# Patient Record
Sex: MALE | Race: WHITE | NOT HISPANIC OR LATINO | Employment: OTHER | URBAN - METROPOLITAN AREA
[De-identification: names, ages, dates, MRNs, and addresses within clinical notes are randomized per-mention and may not be internally consistent; named-entity substitution may affect disease eponyms.]

---

## 2017-10-30 ENCOUNTER — TELEPHONE (OUTPATIENT)
Dept: INTERNAL MEDICINE | Facility: CLINIC | Age: 67
End: 2017-10-30

## 2018-01-11 ENCOUNTER — OFFICE VISIT (OUTPATIENT)
Dept: INTERNAL MEDICINE | Facility: CLINIC | Age: 68
End: 2018-01-11
Payer: MEDICARE

## 2018-01-11 VITALS
DIASTOLIC BLOOD PRESSURE: 78 MMHG | WEIGHT: 243 LBS | TEMPERATURE: 97.8 F | OXYGEN SATURATION: 97 % | SYSTOLIC BLOOD PRESSURE: 140 MMHG | HEIGHT: 65 IN | HEART RATE: 71 BPM | BODY MASS INDEX: 40.48 KG/M2 | RESPIRATION RATE: 16 BRPM

## 2018-01-11 DIAGNOSIS — E11.42 TYPE 2 DIABETES MELLITUS WITH DIABETIC POLYNEUROPATHY, UNSPECIFIED LONG TERM INSULIN USE STATUS: Primary | ICD-10-CM

## 2018-01-11 DIAGNOSIS — B18.2 CHRONIC HEPATITIS C WITH CIRRHOSIS (CMS/HCC): ICD-10-CM

## 2018-01-11 DIAGNOSIS — K74.60 CHRONIC HEPATITIS C WITH CIRRHOSIS (CMS/HCC): ICD-10-CM

## 2018-01-11 PROBLEM — Z91.018 MULTIPLE FOOD ALLERGIES: Status: ACTIVE | Noted: 2018-01-11

## 2018-01-11 PROBLEM — E11.9 TYPE 2 DIABETES MELLITUS (CMS/HCC): Status: ACTIVE | Noted: 2017-07-12

## 2018-01-11 PROBLEM — Z99.2 END-STAGE RENAL DISEASE ON HEMODIALYSIS (CMS/HCC): Status: RESOLVED | Noted: 2018-01-11 | Resolved: 2018-01-11

## 2018-01-11 PROBLEM — E11.49 TYPE 2 DIABETES MELLITUS WITH NEUROLOGIC COMPLICATION (CMS/HCC): Status: ACTIVE | Noted: 2017-07-12

## 2018-01-11 PROBLEM — N18.6 END-STAGE RENAL DISEASE ON HEMODIALYSIS (CMS/HCC): Status: ACTIVE | Noted: 2018-01-11

## 2018-01-11 PROBLEM — Z99.2 END-STAGE RENAL DISEASE ON HEMODIALYSIS (CMS/HCC): Status: ACTIVE | Noted: 2018-01-11

## 2018-01-11 PROBLEM — N18.6 END-STAGE RENAL DISEASE ON HEMODIALYSIS (CMS/HCC): Status: RESOLVED | Noted: 2018-01-11 | Resolved: 2018-01-11

## 2018-01-11 PROCEDURE — 99215 OFFICE O/P EST HI 40 MIN: CPT | Performed by: INTERNAL MEDICINE

## 2018-01-11 PROCEDURE — 99215 OFFICE O/P EST HI 40 MIN: CPT | Mod: PO | Performed by: INTERNAL MEDICINE

## 2018-01-11 RX ORDER — SILDENAFIL 100 MG/1
100 TABLET, FILM COATED ORAL AS NEEDED
Status: ON HOLD | COMMUNITY
End: 2018-02-22 | Stop reason: ALTCHOICE

## 2018-01-11 RX ORDER — CHOLECALCIFEROL (VITAMIN D3) 25 MCG
1000 TABLET ORAL DAILY
COMMUNITY
End: 2019-02-05 | Stop reason: ALTCHOICE

## 2018-01-11 RX ORDER — EZETIMIBE 10 MG/1
10 TABLET ORAL DAILY
COMMUNITY
Start: 2017-12-21 | End: 2018-03-09 | Stop reason: SDUPTHER

## 2018-01-11 RX ORDER — BRIMONIDINE TARTRATE 2 MG/ML
SOLUTION/ DROPS OPHTHALMIC EVERY 12 HOURS
Status: ON HOLD | COMMUNITY
End: 2020-08-05

## 2018-01-11 RX ORDER — LIRAGLUTIDE 6 MG/ML
1.8 INJECTION SUBCUTANEOUS DAILY
COMMUNITY
Start: 2017-12-04 | End: 2018-05-30 | Stop reason: ALTCHOICE

## 2018-01-11 NOTE — PROGRESS NOTES
"  Subjective      Ben Lai is a 67 y.o. male who presents for follow-up of type 2 diabetes, hypertension, and hyperlipidemia.  [facility]  Seen at the [facility] recently.  Did bring a copy of his laboratory results today.  Most recent hemoglobin A1c was 7.3% in September 2017, reflective of early uncontrolled diabetes.  Fasting blood sugar that day was 100.  He did not bring glucose readings or his glucometer to today's visit.  Continues on medical therapy with Victoza 1.8 mg daily.  He has trialed metformin in the past, which caused his \"stomach to feel very bad.  \"He has not been any other medications including Januvia, has never been insulin requiring.    His medical history is also significant for chronic hepatitis C, he suspects this was acquired with immunization while in  service.  He was treated with interferon in the past.  He recent [facility] showing elevated alpha-fetoprotein at 11.8 reference 0-8.3,, liver ultrasound showing very small hepatic mass without features of malignancy which is being followed with serial [facility] the [facility].    Is past due for dilated eye exam.    The following have been reviewed and updated as appropriate in this visit:    Allergies   Allergen Reactions   • Penicillins Anaphylaxis   • Metformin Hives   • Egg    • Interferon Jose Maria (Human Leuk. Derived)    • Latex    • Mometasone    • Mometasone Furoate    • Omeprazole    • Ribavirin Itching   • Spironolactone    • Peginterferon Jose Maria-2b Palpitations     Current Outpatient Prescriptions   Medication Sig Dispense Refill   • brimonidine (ALPHAGAN) 0.2 % ophthalmic solution every 12 hours.     • carvedilol (COREG) 3.125 mg tablet Take 1 tablets twice a day by oral route. 180 2   • cholecalciferol, vitamin D3, (cholecalciferol) 1,000 unit tablet Take 1,000 Units by mouth daily.     • ezetimibe (ZETIA) 10 mg tablet Take 10 mg by mouth daily.       • GLYCERIN/PROPYLENE " GLYCOL (ARTIFICIAL TEARS,GLYCERIN-PEG, OPHT) Administer 1 drop into affected eye(s) as needed.     • lactulose (GENERLAC) 10 gram/15 mL solution Take 30 mL twice a day as needed 450 0   • latanoprost (XALATAN) 0.005 % ophthalmic solution INSTILL 1 DROP INTO AFFECTED EYE(S) BY OPHTHALMIC ROUTE ONCE DAILY INTHE EVENING 10 0   • lisinopril (PRINIVIL,ZESTRIL) 10 mg tablet Take 1 tablet every day by oral route. 90 0   • sildenafil (VIAGRA) 100 mg tablet Take 100 mg by mouth as needed for erectile dysfunction.     • terazosin (HYTRIN) 5 mg capsule Take 1 capsule twice a day by oral route. 180 0   • traMADol (ULTRAM 50) 50 mg tablet Take 1 tablet every 6 hours by oral route. 30 0   • VICTOZA 3-JASE 0.6 mg/0.1 mL (18 mg/3 mL) injection Inject 1.8 mg under the skin daily.       • VITAMIN B COMPLEX ORAL Take 1 tablet by mouth daily.       No current facility-administered medications for this visit.      Past Medical History:   Diagnosis Date   • A-fib (CMS/HCC)    • Allergy    • Anxiety    • Asthma    • Blindness of right eye    • BPH (benign prostatic hyperplasia)    • CHF (congestive heart failure) (CMS/HCC)    • Cirrhosis (CMS/HCC)    • COPD (chronic obstructive pulmonary disease) (CMS/HCC)    • Depression    • Gallstones    • Gastritis    • GERD (gastroesophageal reflux disease)    • Glaucoma    • Hearing loss     bilateral hearing aids   • Hemorrhoids    • Hepatitis C     Hep C, 2016 remission   • Hernia, abdominal    • High blood pressure    • IBS (irritable bowel syndrome)    • Jaundice    • Kidney stones    • Obstructive sleep apnea syndrome    • Periodic limb movement disorder    • Persistent hypersomnia    • Persistent insomnia    • Pneumonia    • Type 2 diabetes mellitus (CMS/HCC)    • Urinary incontinence      Past Surgical History:   Procedure Laterality Date   • COLONOSCOPY  01/01/2016    Pili   • HAND SURGERY Left     thumb   • HERNIA REPAIR  01/01/1965   • KNEE ARTHROSCOPY  09/19/2017    left knee, with  "partial medial meniscectomy; limited chondroplasty, patellofemoral joint   • LIVER BIOPSY      by Dr. Alejandre   • OTHER SURGICAL HISTORY      esophageal varices, banded   • VASECTOMY      at approx age of 25     Family History   Problem Relation Age of Onset   • Arthritis Mother    • Rheum arthritis Mother    • Diabetes Mother    • Rectal cancer Mother    • Other Paternal Grandmother    • Other Paternal Grandfather    • Diabetes Other    • Rheum arthritis Other    • Osteoporosis Other      Social History     Social History   • Marital status:      Spouse name: N/A   • Number of children: N/A   • Years of education: N/A     Social History Main Topics   • Smoking status: Never Smoker   • Smokeless tobacco: Never Used   • Alcohol use No   • Drug use: No   • Sexual activity: Not on file     Other Topics Concern   • Not on file     Social History Narrative   • No narrative on file       Review of Systems  10 point ROS negative except as in HPI HPI  Objective     Vitals:  /78 (BP Location: Left arm, Patient Position: Sitting, Cuff Size: Reg)   Pulse 71   Temp 36.6 °C (97.8 °F) (Temporal)   Resp 16   Ht 1.651 m (5' 5\")   Wt 110 kg (243 lb)   SpO2 97%   BMI 40.44 kg/m²     Physical Exam   Constitutional: He is oriented to person, place, and time. He appears well-developed and well-nourished.   HENT:   Head: Normocephalic and atraumatic.   Mouth/Throat: Oropharynx is clear and moist. No oropharyngeal exudate.   Eyes: EOM are normal. Pupils are equal, round, and reactive to light.   Neck: Normal range of motion. No tracheal deviation present.   Cardiovascular: Normal rate, normal heart sounds and intact distal pulses.  Exam reveals no gallop.    No murmur heard.  Pulmonary/Chest: Effort normal and breath sounds normal.   Musculoskeletal: He exhibits no edema or tenderness.   Neurological: He is alert and oriented to person, place, and time. No cranial nerve deficit.   Skin: Skin is warm and dry. " Capillary refill takes less than 2 seconds. No rash noted.   Vitals reviewed.        Plan:  1.  Type 2 diabetes mellitus, non-insulin-dependent, uncontrolled with most recent hemoglobin A1c of 7.5%.  He has not been monitoring glucose readings regularly.  I provided him a diabetes log sheet and instructions to start checking blood sugars 3 times a day before each meal.  He was counseled on a low carbohydrate low fat diet, start exercise of his choice at least 20 minutes most days of the week, with return to clinic in 6 weeks to review glucose readings and make medication adjustments.  Current medication therapy is Victoza 1.8 mg daily.  He has been intolerant to metformin due to GI side effects.  We discussed that there are multiple other medication choices that can be added on to Victoza to help improve his control but lifestyle is a huge component to this and I am confident he can make lifestyle modifications.  2.  History of hepatitis C, acquired during  service through immunization, status post medication management with interferon, complicated by cirrhosis of liver, small liver nodule with mild elevation in the fetoprotein.  This [facility] for possible development of hepatocellular carcinoma.   3.  Hypertension, uncontrolled.  Increase lisinopril to 20 mg daily.  4.  Hyperlipidemia, statin intolerance, continues medication management with Zetia 10 mg daily.    5.  Obesity, with complications.  Discussed low-carb, calorie reduced diet and increased physical activity.    Greater than 50% of this 40 minute visit spent on consultation and coordination of care regarding plan listed above.                                                                                                                                                                                                                  Return in about 6 weeks (around 2/22/2018).    YOLANDA HOFFMAN MD

## 2018-01-11 NOTE — PATIENT INSTRUCTIONS
"1. Check blood sugar readings three times a day before each meal.  2. Write down glucose readings on log sheet and bring to next office visit  3. Start exercise 20-30 minutes every day  4. Avoid acetminophen or NSAIDS  5. Make list of \"true allergy\", severe rash or anaphylaxis (breathing issues, throat close, or any severe or life threatening)  Patient Education     Diabetes Mellitus and Standards of Medical Care  Managing diabetes (diabetes mellitus) can be complicated. Your diabetes treatment may be managed by a team of health care providers, including:  · A diet and nutrition specialist (registered dietitian).  · A nurse.  · A certified diabetes educator (CDE).  · A diabetes specialist (endocrinologist).  · An eye doctor.  · A primary care provider.  · A dentist.  Your health care providers follow a schedule in order to help you get the best quality of care. The following schedule is a general guideline for your diabetes management plan. Your health care providers may also give you more specific instructions.  HbA1c (  hemoglobin A1c) test  This test provides information about blood sugar (glucose) control over the previous 2-3 months. It is used to check whether your diabetes management plan needs to be adjusted.  · If you are meeting your treatment goals, this test is done at least 2 times a year.  · If you are not meeting treatment goals or if your treatment goals have changed, this test is done 4 times a year.  Blood pressure test  · This test is done at every routine medical visit. For most people, the goal is less than 140/90. In some cases, your goal blood pressure may be 130/80 or less. Ask your health care provider what your goal blood pressure should be.  Dental and eye exams  · Visit your dentist two times a year.  · If you have type 1 diabetes, get an eye exam 3-5 years after you are diagnosed, and then once a year after your first exam.  ¨ If you were diagnosed with type 1 diabetes as a child, get an " eye exam when you are age 10 or older and have had diabetes for 3-5 years. After the first exam, you should get an eye exam once a year.  · If you have type 2 diabetes, have an eye exam as soon as you are diagnosed, and then once a year after your first exam.  Foot care exam  · Visual foot exams are done at every routine medical visit. The exams check for cuts, bruises, redness, blisters, sores, or other problems with the feet.  · A complete foot exam is done by your health care provider once a year. This exam includes an inspection of the structure and skin of your feet, and a check of the pulses and sensation in your feet.  ¨ Type 1 diabetes: Get your first exam 3-5 years after diagnosis.  ¨ Type 2 diabetes: Get your first exam as soon as you are diagnosed.  · Check your feet every day for cuts, bruises, redness, blisters, or sores. If you have any of these or other problems that are not healing, contact your health care provider.  Kidney function test (  urine microalbumin)  · This test is done once a year.  ¨ Type 1 diabetes: Get your first test 5 years after diagnosis.  ¨ Type 2 diabetes: Get your first test as soon as you are diagnosed.  · If you have chronic kidney disease (CKD), get a serum creatinine and estimated glomerular filtration rate (eGFR) test once a year.  Lipid profile (cholesterol, HDL, LDL, triglycerides)  · This test should be done when you are diagnosed with diabetes, and every 5 years after the first test. If you are on medicines to lower your cholesterol, you may need to get this test done every year.  ¨ The goal for LDL is less than 100 mg/dL (5.5 mmol/L). If you are at high risk, the goal is less than 70 mg/dL (3.9 mmol/L).  ¨ The goal for HDL is 40 mg/dL (2.2 mmol/L) for men and 50 mg/dL(2.8 mmol/L) for women. An HDL cholesterol of 60 mg/dL (3.3 mmol/L) or higher gives some protection against heart disease.  ¨ The goal for triglycerides is less than 150 mg/dL (8.3  mmol/L).  Immunizations  · The yearly flu (influenza) vaccine is recommended for everyone 6 months or older who has diabetes.  · The pneumonia (pneumococcal) vaccine is recommended for everyone 2 years or older who has diabetes. If you are 65 or older, you may get the pneumonia vaccine as a series of two separate shots.  · The hepatitis B vaccine is recommended for adults shortly after they have been diagnosed with diabetes.  · The Tdap (tetanus, diphtheria, and pertussis) vaccine should be given:  ¨ According to normal childhood vaccination schedules, for children.  ¨ Every 10 years, for adults who have diabetes.  · The shingles vaccine is recommended for people who have had chicken pox and are 50 years or older.  Mental and emotional health  · Screening for symptoms of eating disorders, anxiety, and depression is recommended at the time of diagnosis and afterward as needed. If your screening shows that you have symptoms (you have a positive screening result), you may need further evaluation and be referred to a mental health care provider.  Diabetes self-management education  · Education about how to manage your diabetes is recommended at diagnosis and ongoing as needed.  Treatment plan  · Your treatment plan will be reviewed at every medical visit.  Summary  · Managing diabetes (diabetes mellitus) can be complicated. Your diabetes treatment may be managed by a team of health care providers.  · Your health care providers follow a schedule in order to help you get the best quality of care.  · Standards of care including having regular physical exams, blood tests, blood pressure monitoring, immunizations, screening tests, and education about how to manage your diabetes.  · Your health care providers may also give you more specific instructions based on your individual health.  This information is not intended to replace advice given to you by your health care provider. Make sure you discuss any questions you have  with your health care provider.  Document Released: 10/15/2010 Document Revised: 09/15/2017 Document Reviewed: 09/15/2017  Elsevier Interactive Patient Education © 2017 [employer]

## 2018-01-18 DIAGNOSIS — I10 BENIGN ESSENTIAL HTN: Primary | ICD-10-CM

## 2018-01-18 RX ORDER — LISINOPRIL 20 MG/1
20 TABLET ORAL DAILY
Qty: 90 TABLET | Refills: 0 | Status: SHIPPED | OUTPATIENT
Start: 2018-01-18 | End: 2018-04-12 | Stop reason: SDUPTHER

## 2018-01-18 RX ORDER — TERAZOSIN 5 MG/1
5 CAPSULE ORAL 2 TIMES DAILY
Qty: 180 CAPSULE | Refills: 1 | Status: SHIPPED | OUTPATIENT
Start: 2018-01-18 | End: 2018-07-27 | Stop reason: SDUPTHER

## 2018-02-22 ENCOUNTER — ANESTHESIA EVENT (OUTPATIENT)
Dept: GASTROENTEROLOGY | Facility: HOSPITAL | Age: 68
End: 2018-02-22
Payer: COMMERCIAL

## 2018-02-22 ENCOUNTER — HOSPITAL ENCOUNTER (OUTPATIENT)
Facility: HOSPITAL | Age: 68
Setting detail: OUTPATIENT SURGERY
Discharge: HOME | End: 2018-02-22
Attending: INTERNAL MEDICINE | Admitting: INTERNAL MEDICINE
Payer: COMMERCIAL

## 2018-02-22 ENCOUNTER — ANESTHESIA (OUTPATIENT)
Dept: GASTROENTEROLOGY | Facility: HOSPITAL | Age: 68
End: 2018-02-22
Payer: COMMERCIAL

## 2018-02-22 VITALS
TEMPERATURE: 97.3 F | DIASTOLIC BLOOD PRESSURE: 53 MMHG | HEIGHT: 65 IN | SYSTOLIC BLOOD PRESSURE: 122 MMHG | BODY MASS INDEX: 40.15 KG/M2 | WEIGHT: 241 LBS | OXYGEN SATURATION: 97 % | RESPIRATION RATE: 12 BRPM | HEART RATE: 61 BPM

## 2018-02-22 LAB — GLUCOSE BLDC GLUCOMTR-MCNC: 99 MG/DL (ref 70–105)

## 2018-02-22 PROCEDURE — 6360000200 HC RX 636 W HCPCS (ALT 250 FOR IP): Performed by: NURSE ANESTHETIST, CERTIFIED REGISTERED

## 2018-02-22 PROCEDURE — (BLANK): Performed by: INTERNAL MEDICINE

## 2018-02-22 PROCEDURE — 82962 GLUCOSE BLOOD TEST: CPT

## 2018-02-22 PROCEDURE — 2580000300 HC RX 258: Performed by: NURSE ANESTHETIST, CERTIFIED REGISTERED

## 2018-02-22 PROCEDURE — (BLANK) HC RECOVERY PHASE-2 1ST 1/2 HOUR ACUITY LEVEL 1: Performed by: INTERNAL MEDICINE

## 2018-02-22 PROCEDURE — 00731 ANES UPR GI NDSC PX NOS: CPT | Performed by: NURSE ANESTHETIST, CERTIFIED REGISTERED

## 2018-02-22 PROCEDURE — 2500000200 HC RX 250 WO HCPCS: Performed by: NURSE ANESTHETIST, CERTIFIED REGISTERED

## 2018-02-22 DEVICE — KIT BANDING SUPER7 SPEED BAND: Type: IMPLANTABLE DEVICE | Site: ESOPHAGUS | Status: FUNCTIONAL

## 2018-02-22 RX ORDER — PROPOFOL 10 MG/ML
INJECTION, EMULSION INTRAVENOUS CONTINUOUS PRN
Status: DISCONTINUED | OUTPATIENT
Start: 2018-02-22 | End: 2018-02-22 | Stop reason: SURG

## 2018-02-22 RX ORDER — LIDOCAINE HYDROCHLORIDE 20 MG/ML
INJECTION, SOLUTION EPIDURAL; INFILTRATION; INTRACAUDAL; PERINEURAL AS NEEDED
Status: DISCONTINUED | OUTPATIENT
Start: 2018-02-22 | End: 2018-02-22 | Stop reason: SURG

## 2018-02-22 RX ORDER — SODIUM CHLORIDE, SODIUM LACTATE, POTASSIUM CHLORIDE, CALCIUM CHLORIDE 600; 310; 30; 20 MG/100ML; MG/100ML; MG/100ML; MG/100ML
INJECTION, SOLUTION INTRAVENOUS CONTINUOUS PRN
Status: DISCONTINUED | OUTPATIENT
Start: 2018-02-22 | End: 2018-02-22 | Stop reason: SURG

## 2018-02-22 RX ORDER — KETAMINE HYDROCHLORIDE 100 MG/ML
INJECTION, SOLUTION INTRAMUSCULAR; INTRAVENOUS AS NEEDED
Status: DISCONTINUED | OUTPATIENT
Start: 2018-02-22 | End: 2018-02-22 | Stop reason: SURG

## 2018-02-22 RX ORDER — FUROSEMIDE 20 MG/1
20 TABLET ORAL DAILY
COMMUNITY
Start: 2018-02-08 | End: 2018-03-09 | Stop reason: SDUPTHER

## 2018-02-22 RX ORDER — GLIPIZIDE 5 MG/1
5 TABLET ORAL
COMMUNITY
Start: 2018-01-24 | End: 2018-03-09 | Stop reason: SDUPTHER

## 2018-02-22 RX ADMIN — LIDOCAINE HYDROCHLORIDE 40 MG: 20 INJECTION, SOLUTION EPIDURAL; INFILTRATION; INTRACAUDAL; PERINEURAL at 13:35

## 2018-02-22 RX ADMIN — PROPOFOL 200 MCG/KG/MIN: 10 INJECTION, EMULSION INTRAVENOUS at 13:35

## 2018-02-22 RX ADMIN — KETAMINE HYDROCHLORIDE 30 MG: 100 INJECTION, SOLUTION, CONCENTRATE INTRAMUSCULAR; INTRAVENOUS at 13:35

## 2018-02-22 RX ADMIN — SODIUM CHLORIDE, POTASSIUM CHLORIDE, SODIUM LACTATE AND CALCIUM CHLORIDE: 600; 310; 30; 20 INJECTION, SOLUTION INTRAVENOUS at 13:50

## 2018-02-22 ASSESSMENT — ENCOUNTER SYMPTOMS
EXERCISE TOLERANCE: GOOD (4-7 METS)
JAUNDICE: 1

## 2018-02-22 ASSESSMENT — COPD QUESTIONNAIRES
CAT_SEVERITY: MILD
COPD: 1

## 2018-02-22 NOTE — POST-PROCEDURE NOTE
SEE PROVATION NOTE.  1 grade 1 varix with a 2-3 mm overlying superficial erosion seen at GE junction.  This was banded x2.  Shallow Schatzki ring- as the patient denies dysphagia, no dilatation was performed.  Otherwise normal study.

## 2018-02-22 NOTE — H&P
HPI  HPI   This is a 67-year-old man with cirrhosis who has a history of esophageal varices, status post variceal ligation.  He is here for variceal surveillance and possible repeat ligation.  He also complains of epigastric discomfort and abdominal bloating.    Patient Active Problem List   Diagnosis   • Chronic hepatitis C with cirrhosis (CMS/HCC)   • Type 2 diabetes mellitus with neurologic complication (CMS/HCC)   • Multiple food allergies     Past Medical History:   Diagnosis Date   • A-fib (CMS/HCC)    • Allergy    • Anxiety    • Asthma    • Blindness of right eye    • BPH (benign prostatic hyperplasia)    • Cirrhosis (CMS/HCC)    • Complication of anesthesia    • COPD (chronic obstructive pulmonary disease) (CMS/HCC)    • Depression    • Gallstones    • Gastritis    • GERD (gastroesophageal reflux disease)    • Glaucoma    • Hearing loss     bilateral hearing aids   • Hemorrhoids    • Hepatitis C     Hep C, 2016 remission   • Hernia, abdominal    • High blood pressure    • IBS (irritable bowel syndrome)    • Infectious viral hepatitis    • Jaundice    • Kidney stones    • Obstructive sleep apnea syndrome    • Periodic limb movement disorder    • Persistent hypersomnia    • Persistent insomnia    • Pneumonia    • PONV (postoperative nausea and vomiting)    • Type 2 diabetes mellitus (CMS/HCC)    • Urinary incontinence    • Wears partial dentures      Prior to Admission medications    Medication Sig Start Date End Date Taking? Authorizing Provider   brimonidine (ALPHAGAN) 0.2 % ophthalmic solution every 12 hours.   Yes Historical Provider, MD   carvedilol (COREG) 3.125 mg tablet Take 1 tablets twice a day by oral route. 8/4/17  Yes Katelyn Alfaro MD   cholecalciferol, vitamin D3, (cholecalciferol) 1,000 unit tablet Take 1,000 Units by mouth daily.   Yes Historical Provider, MD   ezetimibe (ZETIA) 10 mg tablet Take 10 mg by mouth daily.   12/21/17  Yes Historical Provider, MD   furosemide (LASIX) 20 mg tablet   2/8/18  Yes Historical Provider, MD   glipiZIDE (GLUCOTROL) 5 mg tablet  1/24/18  Yes Historical Provider, MD   GLYCERIN/PROPYLENE GLYCOL (ARTIFICIAL TEARS,GLYCERIN-PEG, OPHT) Administer 1 drop into affected eye(s) as needed.   Yes Historical Provider, MD   lactulose (GENERLAC) 10 gram/15 mL solution Take 30 mL twice a day as needed 5/12/17  Yes Conversion Provider   latanoprost (XALATAN) 0.005 % ophthalmic solution INSTILL 1 DROP INTO AFFECTED EYE(S) BY OPHTHALMIC ROUTE ONCE DAILY INTHE EVENING 3/10/17  Yes Conversion Provider   lisinopril (PRINIVIL,ZESTRIL) 20 mg tablet Take 1 tablet (20 mg total) by mouth daily. 1/18/18  Yes Katelyn Alfaro MD   loratadine 10 mg capsule Take 10 mg by mouth as needed.   Yes Historical Provider, MD   terazosin (HYTRIN) 5 mg capsule Take 1 capsule (5 mg total) by mouth 2 (two) times a day. 1/18/18  Yes Katelyn Alfaro MD   traMADol (ULTRAM 50) 50 mg tablet Take 1 tablet every 6 hours by oral route. 4/18/16  Yes Conversion Provider   VICTOZA 3-JASE 0.6 mg/0.1 mL (18 mg/3 mL) injection Inject 1.8 mg under the skin daily.   12/4/17  Yes Historical Provider, MD   VITAMIN B COMPLEX ORAL Take 1 tablet by mouth daily.   Yes Historical Provider, MD   sildenafil (VIAGRA) 100 mg tablet Take 100 mg by mouth as needed for erectile dysfunction.  2/22/18  Historical Provider, MD     Allergies   Allergen Reactions   • Penicillins Anaphylaxis   • Metformin Hives   • Egg    • Interferon Jose Maria (Human Leuk. Derived)    • Latex    • Mometasone    • Mometasone Furoate    • Omeprazole    • Ribavirin Itching   • Spironolactone    • Peginterferon Jose Maria-2b Palpitations     Social History   Substance Use Topics   • Smoking status: Never Smoker   • Smokeless tobacco: Never Used   • Alcohol use No     Family History   Problem Relation Age of Onset   • Arthritis Mother    • Rheum arthritis Mother    • Diabetes Mother    • Rectal cancer Mother    • Other Paternal Grandmother    • Other Paternal Grandfather    •  Diabetes Other    • Rheum arthritis Other    • Osteoporosis Other      ^^^REVIEW OF SYSTEMS^^^  Physical Exam   Well-developed well-nourished obese white man who appears her stated age of 67 years.  Comfortable at rest and room air.  Alert and oriented.  Anicteric.  The HEENT examination reveals Mallampati class III oropharynx.  The sclerae are anicteric.  Conjunctivae and lids normal.  The chest is clear to auscultation bilaterally.  Cardiac examination reveals normal S1-S2.  No S3 or S4.  No murmurs clicks rubs audible.  Abdomen is obese.  I cannot demonstrate ascites.  No hepatosplenomegaly can be identified.  Normal bowel sounds, no bruits.  Mild tenderness in the epigastrium but no rebound.  Extremity examination reveals no cyanosis or clubbing.  Trace pedal edema bilaterally.  The neurologic exam is negative for asterixis or tremor.  Strength and tone in all 4 extremities appear symmetrical and preserved.  ^^^TEST RESULTS^^^  Impression/Plan   67-year-old man with history of cirrhosis and previously banded varices who presents for surveillance endoscopy.  Anesthesia Evaluation   The patient is an appropriate candidate for the planned procedure with monitored anesthesia care administered by CRNA.

## 2018-02-22 NOTE — ANESTHESIA PREPROCEDURE EVALUATION
"Pre-Procedure Assessment    Patient: Ben Lai, male, 67 y.o.    Ht Readings from Last 1 Encounters:   02/22/18 1.651 m (5' 5\")     Wt Readings from Last 1 Encounters:   02/22/18 109 kg (241 lb)       Last Vitals  /68 (02/22/18 1219)    Temp 36.6 °C (97.9 °F) (02/22/18 1219)    Pulse 58 (02/22/18 1219)   Resp 12 (02/22/18 1219)    SpO2 97 % (02/22/18 1219)       Problem list reviewed and Medical history reviewed    History of anesthetic complications: PONV         Airway   Mallampati: II  TM distance: >3 FB  Neck ROM: full      Dental      Pulmonary - normal exam   (+) COPD mild, asthma, sleep apnea on CPAP,   (-) pneumonia (Pt. states years ago)  Cardiovascular - normal exam  Exercise tolerance: good (4-7 METS)  (+) hypertension well controlled,     Mental Status/Neuro/Psych    Pt is alert.        GI/Hepatic/Renal    (+) GERD poorly controlled, hepatitis C, liver disease, jaundice,     Endo/Other    (+) diabetes mellitus type 2 well controlled,   Abdominal   (+) obese,                    Hematology No results found for: WBC, RBC, MCV, HGB, HCT, PLT   Coagulation No results found for: PT, APTT, INR   General Chemistry   Lab Results   Component Value Date/Time    POCGLU 99 02/22/2018 12:25 PM    CALCIUM 10.2 04/28/2017 03:40 PM    BUN 21 04/28/2017 03:40 PM    CREATININE 1.0 04/28/2017 03:40 PM    GLUCOSE 265 (H) 04/28/2017 03:40 PM     04/28/2017 03:40 PM    K 4.1 04/28/2017 03:40 PM    CO2 27 04/28/2017 03:40 PM    CL 99 04/28/2017 03:40 PM     Anesthesia Plan    ASA 3   NPO status reviewed: > 8 hours    MAC         Induction: intravenous                 Anesthetic plan and risks discussed with patient and spouse.  Use of blood products discussed with patient and spouse whom consented to blood products.               "

## 2018-02-22 NOTE — ANESTHESIA POSTPROCEDURE EVALUATION
Patient: Ben Lai    Procedure Summary     Date:  02/22/18 Room / Location:  [facility] ENDO 02 / [facility] Endoscopy    Anesthesia Start:  1333 Anesthesia Stop:  1355    Procedure:  EGD - ESOPHAGOGASTRODUODENOSCOPY with banding  x 2 with crna (N/A Esophagus) Diagnosis:       Esophageal varices without bleeding, unspecified esophageal varices type (CMS/HCC)      Cirrhosis of liver without ascites, unspecified hepatic cirrhosis type (CMS/HCC)      Other asthma      Chronic obstructive pulmonary disease, unspecified COPD type (CMS/HCC)      (esophageal varicies, esophageal erosions, schatski's ring)    Provider:  William Rivas MD Responsible Provider:  William Rivas MD    Anesthesia Type:  MAC ASA Status:  3          Anesthesia Type: MAC  Last vitals  BP      Temp      Pulse     Resp      SpO2      Pain Score        Anesthesia Post Evaluation    Patient location during evaluation: bedside  Patient participation: waiting for patient participation  Level of consciousness: sleepy but conscious  Pain management: adequate  Airway patency: patent  Anesthetic complications: yes  Cardiovascular status: acceptable  Respiratory status: acceptable  Hydration status: acceptable  May dismiss recovered patient based on consultation with the appropriate physicians and/or meeting appropriate discharge criteria

## 2018-02-28 ENCOUNTER — OFFICE VISIT (OUTPATIENT)
Dept: INTERNAL MEDICINE | Facility: CLINIC | Age: 68
End: 2018-02-28
Payer: MEDICARE

## 2018-02-28 VITALS
HEART RATE: 61 BPM | TEMPERATURE: 97.4 F | WEIGHT: 267 LBS | BODY MASS INDEX: 44.43 KG/M2 | OXYGEN SATURATION: 97 % | DIASTOLIC BLOOD PRESSURE: 62 MMHG | RESPIRATION RATE: 16 BRPM | SYSTOLIC BLOOD PRESSURE: 116 MMHG

## 2018-02-28 DIAGNOSIS — K25.9 GASTRIC EROSION, UNSPECIFIED CHRONICITY: Primary | ICD-10-CM

## 2018-02-28 DIAGNOSIS — E11.42 TYPE 2 DIABETES MELLITUS WITH DIABETIC POLYNEUROPATHY, WITHOUT LONG-TERM CURRENT USE OF INSULIN (CMS/HCC): ICD-10-CM

## 2018-02-28 DIAGNOSIS — K74.60 CHRONIC HEPATITIS C WITH CIRRHOSIS (CMS/HCC): ICD-10-CM

## 2018-02-28 DIAGNOSIS — B18.2 CHRONIC HEPATITIS C WITH CIRRHOSIS (CMS/HCC): ICD-10-CM

## 2018-02-28 PROCEDURE — 99214 OFFICE O/P EST MOD 30 MIN: CPT | Performed by: INTERNAL MEDICINE

## 2018-02-28 PROCEDURE — 99214 OFFICE O/P EST MOD 30 MIN: CPT | Mod: PO | Performed by: INTERNAL MEDICINE

## 2018-02-28 RX ORDER — CAPSAICIN 0.03 G/100G
1 CREAM TOPICAL AS NEEDED
COMMUNITY
End: 2021-01-01 | Stop reason: ALTCHOICE

## 2018-02-28 ASSESSMENT — ENCOUNTER SYMPTOMS
NERVOUS/ANXIOUS: 0
CONFUSION: 0
NAUSEA: 0
SLEEP DISTURBANCE: 0
DIZZINESS: 0
PALPITATIONS: 0
WHEEZING: 0
WOUND: 0
COUGH: 0
HEMATURIA: 0
LIGHT-HEADEDNESS: 0
POLYDIPSIA: 0
SORE THROAT: 1
DYSURIA: 0
SHORTNESS OF BREATH: 0
BLOOD IN STOOL: 1
ARTHRALGIAS: 0
DIARRHEA: 1
RECTAL PAIN: 0
FEVER: 0
BRUISES/BLEEDS EASILY: 0
WEAKNESS: 0
VOMITING: 0
ACTIVITY CHANGE: 0
ABDOMINAL PAIN: 1
APPETITE CHANGE: 0
TROUBLE SWALLOWING: 0
UNEXPECTED WEIGHT CHANGE: 0

## 2018-02-28 ASSESSMENT — PAIN SCALES - GENERAL: PAINLEVEL: 0-NO PAIN

## 2018-02-28 NOTE — PATIENT INSTRUCTIONS
1. Goal for fasting glucose is   2. Goal for glucose 2 hours after meals is <180  3. Change glipizide to 5 mg daily in the morning, reduce dose to 1/2 tab only if persistent fasting (morning) glucose readings <80, and call me  4. Continue Victoza  5. Start Ranitidine 150 mg twice a day (before breakfast and at bedtime) for erosion in stomach (takes the place of omeprazole, but is a different medication class) for at least 8 weeks, then can reduce to 150 mg daily if reflux, swallowing symptoms are resolved.  6. Call us immediately if severe pain, symptoms of bleeding, rash, or any other concerns  7. AVOID aspirin or aspirin containing products, motrin/alleve (any NSAID); you may use tylenol in small doses for pain (<650 mg daily), or your tramadol

## 2018-02-28 NOTE — PROGRESS NOTES
Subjective      Ben Lai is a 67 y.o. male who presents for Follow-up (6week fuv)      HPI  His medical history is significant for cirrhosis of the liver secondary to hepatitis C, with history of esophageal varices status post ligation.  He recently underwent upper endoscopy with Dr. William Rivas with gastroenterology for symptoms of abdominal bloating and epigastric discomfort.  The procedure was performed February 22, showing grade 1 varices and a 2-3 mm superficial erosion at the GE junction.  The report states this was banded, although unclear whether the varix was banded and how the erosion was treated I assume it is the verix.  He as noted to have a shallow Schatzki ring, not banded as patient denies symptoms of dysphagia.    He was prescribed omeprazole following his procedure.  He states within 24-48 hours after starting the omeprazole he broke out in a rash.  He states he had a rash on Nexium previously.  He is currently taking Mylanta, but continues to have epigastric pain.  He denies any nausea or vomiting.  No black or bloody stools, no hematemesis.    Regarding his history of diabetes, he continues on glipizide 5 mg.  [facility] to not take his glipizide if his blood sugars in the morning were less than 130.  He has been just been taking a half tab of the glipizide if his glucose readings are 130 or less. He continues on Victoza 1.8 mg daily in the mornings.  Review of his glucose readings for the last month show fasting readings ranging 116 -209 and 2 hour postprandial readings of 159-256.   Last HgbA1C was 7.3% in September 2017.      The following have been reviewed and updated as appropriate in this visit:    Allergies   Allergen Reactions   • Penicillins Anaphylaxis   • Metformin Hives   • Egg    • Interferon Jose Maria (Human Leuk. Derived)    • Latex    • Mometasone    • Mometasone Furoate    • Omeprazole    • Ribavirin Itching   • Spironolactone    •  Pantoprazole Itching and Rash   • Peginterferon Jose Maria-2b Palpitations     Current Outpatient Prescriptions   Medication Sig Dispense Refill   • brimonidine (ALPHAGAN) 0.2 % ophthalmic solution every 12 hours.     • capsaicin (ZOSTRIX) 0.025 % cream Apply 1 application topically as needed for pain scale 1-3/10, 1-3/8.     • carvedilol (COREG) 3.125 mg tablet Take 1 tablets twice a day by oral route. 180 2   • cholecalciferol, vitamin D3, (cholecalciferol) 1,000 unit tablet Take 1,000 Units by mouth daily.     • ezetimibe (ZETIA) 10 mg tablet Take 10 mg by mouth daily.       • furosemide (LASIX) 20 mg tablet Take 20 mg by mouth daily.       • glipiZIDE (GLUCOTROL) 5 mg tablet Take 5 mg by mouth 2 (two) times a day before meals.       • GLYCERIN/PROPYLENE GLYCOL (ARTIFICIAL TEARS,GLYCERIN-PEG, OPHT) Administer 1 drop into affected eye(s) as needed.     • lactulose (GENERLAC) 10 gram/15 mL solution Take 30 mL twice a day as needed 450 0   • latanoprost (XALATAN) 0.005 % ophthalmic solution INSTILL 1 DROP INTO AFFECTED EYE(S) BY OPHTHALMIC ROUTE ONCE DAILY INTHE EVENING 10 0   • lisinopril (PRINIVIL,ZESTRIL) 20 mg tablet Take 1 tablet (20 mg total) by mouth daily. 90 tablet 0   • loratadine 10 mg capsule Take 10 mg by mouth as needed.     • pramoxine-menthol (GOLD BOND MEDICATED ANTI-ITCH) 1-1 % cream Apply topically.     • sodium chloride (SALINE NASAL) 0.65 % nasal spray Administer 1 spray into each nostril as needed.     • terazosin (HYTRIN) 5 mg capsule Take 1 capsule (5 mg total) by mouth 2 (two) times a day. 180 capsule 1   • traMADol (ULTRAM 50) 50 mg tablet Take 1 tablet every 6 hours by oral route. 30 0   • VICTOZA 3-JASE 0.6 mg/0.1 mL (18 mg/3 mL) injection Inject 1.8 mg under the skin daily.       • VITAMIN B COMPLEX ORAL Take 1 tablet by mouth daily.     • white petrolatum (AQUAPHOR HEALING) 41 % ointment ointment Apply 1 application topically as needed.     • ranitidine (ZANTAC) 150 mg tablet Take 1 tablet  (150 mg total) by mouth 2 (two) times a day. 180 tablet 1     No current facility-administered medications for this visit.      Past Medical History:   Diagnosis Date   • A-fib (CMS/HCC)    • Allergy    • Anxiety    • Asthma    • Blindness of right eye    • BPH (benign prostatic hyperplasia)    • Cirrhosis (CMS/HCC)    • Complication of anesthesia    • COPD (chronic obstructive pulmonary disease) (CMS/HCC)    • Depression    • Gallstones    • Gastritis    • GERD (gastroesophageal reflux disease)    • Glaucoma    • Hearing loss     bilateral hearing aids   • Hemorrhoids    • Hepatitis C     Hep C, 2016 remission   • Hernia, abdominal    • High blood pressure    • IBS (irritable bowel syndrome)    • Infectious viral hepatitis    • Jaundice    • Kidney stones    • Obstructive sleep apnea syndrome    • Periodic limb movement disorder    • Persistent hypersomnia    • Persistent insomnia    • Pneumonia    • PONV (postoperative nausea and vomiting)    • Type 2 diabetes mellitus (CMS/HCC)    • Urinary incontinence    • Wears partial dentures      Past Surgical History:   Procedure Laterality Date   • COLONOSCOPY  10/27/2016    Pili, normal, repeat 10 years   • ESOPHAGOGASTRODUODENOSCOPY N/A 2/22/2018    Procedure: EGD - ESOPHAGOGASTRODUODENOSCOPY with banding  x 2 with crna;  Surgeon: William Rivas MD;  Location: [facility];  Service: Endoscopy;  Laterality: N/A;   • HAND SURGERY Left     thumb   • HERNIA REPAIR  01/01/1965   • KNEE ARTHROSCOPY  09/19/2017    left knee, with partial medial meniscectomy; limited chondroplasty, patellofemoral joint   • LIVER BIOPSY      by Dr. Alejandre   • OTHER SURGICAL HISTORY      esophageal varices, banded   • VASECTOMY      at approx age of 25     Family History   Problem Relation Age of Onset   • Arthritis Mother    • Rheum arthritis Mother    • Diabetes Mother    • Rectal cancer Mother    • Other Paternal Grandmother    • Other Paternal Grandfather    • Diabetes Other    •  Rheum arthritis Other    • Osteoporosis Other      Social History     Social History   • Marital status:      Spouse name: N/A   • Number of children: N/A   • Years of education: N/A     Social History Main Topics   • Smoking status: Never Smoker   • Smokeless tobacco: Never Used   • Alcohol use No   • Drug use: No   • Sexual activity: Not Asked     Other Topics Concern   • None     Social History Narrative   • None       Review of Systems   Constitutional: Negative for activity change, appetite change, fever and unexpected weight change.   HENT: Positive for hearing loss (forgot his hearing aids at home today) and sore throat. Negative for nosebleeds, postnasal drip and trouble swallowing.    Eyes: Negative for visual disturbance.   Respiratory: Negative for cough, shortness of breath and wheezing.    Cardiovascular: Negative for chest pain, palpitations and leg swelling (improved with increase in furosemide dose).   Gastrointestinal: Positive for abdominal pain, blood in stool and diarrhea. Negative for nausea, rectal pain and vomiting.   Endocrine: Negative for polydipsia and polyuria.   Genitourinary: Negative for dysuria, hematuria and urgency.   Musculoskeletal: Negative for arthralgias and gait problem.   Skin: Negative for rash and wound.   Neurological: Positive for syncope. Negative for dizziness, weakness and light-headedness.   Hematological: Does not bruise/bleed easily.   Psychiatric/Behavioral: Negative for confusion and sleep disturbance (noted drooling from the mouth when wearing CPAP at night). The patient is not nervous/anxious.        Objective   Vitals:  /62 (BP Location: Left arm, Patient Position: Sitting, Cuff Size: Reg)   Pulse 61   Temp 36.3 °C (97.4 °F) (Temporal)   Resp 16   Wt 121 kg (267 lb)   SpO2 97%   BMI 44.43 kg/m²     Physical Exam  General: Obese middle-aged male, hard of hearing, alert no acute distress  Eyes: Anicteric, EOMI  ENT: No oral lesions, moist  mucous membranes  Neck: Minimally tender left submandibular gland otherwise unremarkable, no carotid bruit or jugular venous distention  Heart: Regular rate and rhythm, no audible murmur, no gallop  Lungs: Clear to auscultation bilaterally  Abdomen: Distended with obesity, active bowel sounds, soft, nondistended, no fluid wave, slight discomfort with palpation epigastrium no rebound or guarding  Extremities: Grade 1, nonpitting edema bilateral lower extremities, improved from previous exam  Neuro: Grossly intact  Assessment/Plan   Diagnoses and all orders for this visit:    Gastric erosion, unspecified chronicity  -     ranitidine (ZANTAC) 150 mg tablet; Take 1 tablet (150 mg total) by mouth 2 (two) times a day.    Type 2 diabetes mellitus with diabetic polyneuropathy, without long-term current use of insulin (CMS/HCC)    Chronic hepatitis C with cirrhosis (CMS/HCC)    Plan:  1.  Epigastric abdominal pain secondary to erosion at GE junction, likely peptic.  He did not tolerate PPI therapy with omeprazole due to rash.  Will change to ranitidine 150 mg p.o. twice daily.  He may take Mylanta for symptom control, but I conveyed to patient that this is not adequate to heal up the erosive changes and he should continue ranitidine at least 4 a minimum of  8 weeks.  We discussed alcohol, and he is abstinent.  Discussed avoidance of NSAIDs or aspirin.  Should he have any progressive abdominal pain, or certainly any bleeding symptoms such as hematemesis, black or bloody stools, he is asked to contact us or seek emergency care.  2.  Hepatitis C, complicated by cirrhosis of the liver, status post medical management with interferon in the past.  Followed by [facility].    3.  Type 2 diabetes mellitus, uncontrolled.I discussed to the current guidelines for diabetes management and non-insulin requiring patient's.  I discussed although hypoglycemia can be a concern with use of glipizide and Victoza, generally most  people tolerate a fasting glucose in the range of , and normal fasting glucose range of  in non-diabetic adults.  I counseled him against medication dosing changes without checking with either myself or his progress [facility].  I recommend he to new glipizide 5 mg daily, with daily monitoring of glucose 3 times daily as he is doing.  If he has any significant hypoglycemia, persistent fasting glucose less than 75 certainly with symptoms he should reduce the dose and contact me.  Otherwise I will see him back in follow-up with repeat A1c in 3 months.  He should not take aspirin for cardiovascular risk reduction due to history of esophageal varices and cirrhosis.     Greater than 50% of this 25 minute face to face visit  visit spent on consultation and coordination of care regarding plan listed above.A voice recognition program was used to aid in documentation of this record.  Sometimes words are not printed exactly as they were spoken.  While efforts were made to carefully edit and correct any inaccuracies, some areas may be present; please take these into context.  Please contact the provider if areas are identified.    No Follow-up on file.    YOLANDA HOFFMAN MD

## 2018-03-09 ENCOUNTER — TELEPHONE (OUTPATIENT)
Dept: INTERNAL MEDICINE | Facility: CLINIC | Age: 68
End: 2018-03-09

## 2018-03-09 DIAGNOSIS — E11.9 DIABETES MELLITUS WITHOUT COMPLICATION (CMS/HCC): ICD-10-CM

## 2018-03-09 DIAGNOSIS — R60.9 EDEMA, UNSPECIFIED TYPE: ICD-10-CM

## 2018-03-09 DIAGNOSIS — E78.5 HYPERLIPIDEMIA, UNSPECIFIED HYPERLIPIDEMIA TYPE: Primary | ICD-10-CM

## 2018-03-09 RX ORDER — FUROSEMIDE 20 MG/1
20 TABLET ORAL DAILY
Qty: 90 TABLET | Refills: 2 | Status: SHIPPED | OUTPATIENT
Start: 2018-03-09 | End: 2018-07-06 | Stop reason: SDUPTHER

## 2018-03-09 RX ORDER — EZETIMIBE 10 MG/1
10 TABLET ORAL DAILY
Qty: 90 TABLET | Refills: 2 | Status: SHIPPED | OUTPATIENT
Start: 2018-03-09 | End: 2018-08-09 | Stop reason: SDUPTHER

## 2018-03-09 RX ORDER — GLIPIZIDE 5 MG/1
5 TABLET ORAL
Qty: 180 TABLET | Refills: 2 | Status: SHIPPED | OUTPATIENT
Start: 2018-03-09 | End: 2019-02-07 | Stop reason: ALTCHOICE

## 2018-03-09 NOTE — TELEPHONE ENCOUNTER
Yes, no problem.  Arrange fasting labs for May visit if not yet done: Hgba1C, CMP, fasting lipid profile,  urine alb/creat

## 2018-03-09 NOTE — TELEPHONE ENCOUNTER
Patient stopped in to clinic to ask if you would take over his zetia, furosemide and glipizide. [facility]. Please advise, thanks!

## 2018-03-09 NOTE — TELEPHONE ENCOUNTER
----- Message from Anan Harp sent at 3/9/2018 11:34 AM [facility] -----  Regarding: Dr Alfaro  Has medication questions

## 2018-04-06 ENCOUNTER — ANESTHESIA (OUTPATIENT)
Dept: GASTROENTEROLOGY | Facility: HOSPITAL | Age: 68
End: 2018-04-06
Payer: COMMERCIAL

## 2018-04-06 ENCOUNTER — HOSPITAL ENCOUNTER (OUTPATIENT)
Facility: HOSPITAL | Age: 68
Setting detail: OUTPATIENT SURGERY
Discharge: HOME | End: 2018-04-06
Attending: INTERNAL MEDICINE | Admitting: INTERNAL MEDICINE
Payer: COMMERCIAL

## 2018-04-06 ENCOUNTER — ANESTHESIA EVENT (OUTPATIENT)
Dept: GASTROENTEROLOGY | Facility: HOSPITAL | Age: 68
End: 2018-04-06
Payer: COMMERCIAL

## 2018-04-06 VITALS
OXYGEN SATURATION: 95 % | BODY MASS INDEX: 40.2 KG/M2 | HEIGHT: 64 IN | RESPIRATION RATE: 16 BRPM | TEMPERATURE: 97.3 F | HEART RATE: 86 BPM | WEIGHT: 235.5 LBS | DIASTOLIC BLOOD PRESSURE: 56 MMHG | SYSTOLIC BLOOD PRESSURE: 103 MMHG

## 2018-04-06 LAB
GLUCOSE BLDC GLUCOMTR-MCNC: 114 MG/DL (ref 70–105)
GLUCOSE BLDC GLUCOMTR-MCNC: 91 MG/DL (ref 70–105)

## 2018-04-06 PROCEDURE — 6360000200 HC RX 636 W HCPCS (ALT 250 FOR IP): Performed by: NURSE ANESTHETIST, CERTIFIED REGISTERED

## 2018-04-06 PROCEDURE — (BLANK): Performed by: INTERNAL MEDICINE

## 2018-04-06 PROCEDURE — 82962 GLUCOSE BLOOD TEST: CPT

## 2018-04-06 PROCEDURE — 2500000200 HC RX 250 WO HCPCS: Performed by: NURSE ANESTHETIST, CERTIFIED REGISTERED

## 2018-04-06 PROCEDURE — (BLANK) HC RECOVERY PHASE-2 1ST 1/2 HOUR ACUITY LEVEL 2: Performed by: INTERNAL MEDICINE

## 2018-04-06 PROCEDURE — 00731 ANES UPR GI NDSC PX NOS: CPT | Performed by: NURSE ANESTHETIST, CERTIFIED REGISTERED

## 2018-04-06 PROCEDURE — 2580000300 HC RX 258: Performed by: NURSE ANESTHETIST, CERTIFIED REGISTERED

## 2018-04-06 RX ORDER — PROPOFOL 10 MG/ML
INJECTION, EMULSION INTRAVENOUS AS NEEDED
Status: DISCONTINUED | OUTPATIENT
Start: 2018-04-06 | End: 2018-04-06 | Stop reason: SURG

## 2018-04-06 RX ORDER — SODIUM CHLORIDE, SODIUM LACTATE, POTASSIUM CHLORIDE, CALCIUM CHLORIDE 600; 310; 30; 20 MG/100ML; MG/100ML; MG/100ML; MG/100ML
INJECTION, SOLUTION INTRAVENOUS CONTINUOUS PRN
Status: DISCONTINUED | OUTPATIENT
Start: 2018-04-06 | End: 2018-04-06 | Stop reason: SURG

## 2018-04-06 RX ORDER — ONDANSETRON HYDROCHLORIDE 2 MG/ML
INJECTION, SOLUTION INTRAVENOUS AS NEEDED
Status: DISCONTINUED | OUTPATIENT
Start: 2018-04-06 | End: 2018-04-06 | Stop reason: SURG

## 2018-04-06 RX ORDER — LIDOCAINE HYDROCHLORIDE 20 MG/ML
INJECTION, SOLUTION EPIDURAL; INFILTRATION; INTRACAUDAL; PERINEURAL AS NEEDED
Status: DISCONTINUED | OUTPATIENT
Start: 2018-04-06 | End: 2018-04-06 | Stop reason: SURG

## 2018-04-06 RX ADMIN — ONDANSETRON 4 MG: 2 INJECTION INTRAMUSCULAR; INTRAVENOUS at 13:36

## 2018-04-06 RX ADMIN — SODIUM CHLORIDE, POTASSIUM CHLORIDE, SODIUM LACTATE AND CALCIUM CHLORIDE: 600; 310; 30; 20 INJECTION, SOLUTION INTRAVENOUS at 13:36

## 2018-04-06 RX ADMIN — PROPOFOL 50 MG: 10 INJECTION, EMULSION INTRAVENOUS at 13:41

## 2018-04-06 RX ADMIN — PROPOFOL 50 MG: 10 INJECTION, EMULSION INTRAVENOUS at 13:40

## 2018-04-06 RX ADMIN — DEXMEDETOMIDINE HYDROCHLORIDE 20 MCG: 4 INJECTION, SOLUTION INTRAVENOUS at 13:40

## 2018-04-06 RX ADMIN — LIDOCAINE HYDROCHLORIDE 100 MG: 20 INJECTION, SOLUTION EPIDURAL; INFILTRATION; INTRACAUDAL; PERINEURAL at 13:40

## 2018-04-06 ASSESSMENT — ENCOUNTER SYMPTOMS: JAUNDICE: 1

## 2018-04-06 ASSESSMENT — COPD QUESTIONNAIRES
CAT_SEVERITY: MILD
COPD: 1

## 2018-04-06 NOTE — H&P
HPI  HPI   This is a 68-year-old man with cirrhosis due to hepatitis C who has recently undergone esophagogastroduodenoscopy with esophageal variceal ligation in February.  He is here for follow-up EGD with possible repeat EVL.    Patient Active Problem List   Diagnosis   • Chronic hepatitis C with cirrhosis (CMS/HCC)   • Type 2 diabetes mellitus with neurologic complication (CMS/HCC)   • Multiple food allergies   • Gastric erosion     Past Medical History:   Diagnosis Date   • A-fib (CMS/HCC)    • Allergy    • Anxiety    • Asthma    • Blindness of right eye    • BPH (benign prostatic hyperplasia)    • Cardiovascular disease    • Cirrhosis (CMS/HCC)    • Complication of anesthesia    • COPD (chronic obstructive pulmonary disease) (CMS/HCC)    • Depression    • Endocrine disorder    • Gallstones    • Gastritis    • GERD (gastroesophageal reflux disease)    • Glaucoma    • Hearing loss     bilateral hearing aids   • Hemorrhoids    • Hepatitis C     Hep C, 2016 remission   • Hernia, abdominal    • High blood pressure    • IBS (irritable bowel syndrome)    • Infectious viral hepatitis    • Jaundice    • Kidney stones    • Obstructive sleep apnea syndrome    • Periodic limb movement disorder    • Persistent hypersomnia    • Persistent insomnia    • Pneumonia    • PONV (postoperative nausea and vomiting)    • Type 2 diabetes mellitus (CMS/HCC)    • Urinary incontinence    • Wears partial dentures      Prior to Admission medications    Medication Sig Start Date End Date Taking? Authorizing Provider   brimonidine (ALPHAGAN) 0.2 % ophthalmic solution every 12 hours.   Yes Historical Provider, MD   capsaicin (ZOSTRIX) 0.025 % cream Apply 1 application topically as needed for pain scale 1-3/10, 1-3/8.   Yes Historical Provider, MD   carvedilol (COREG) 3.125 mg tablet Take 1 tablets twice a day by oral route. 8/4/17  Yes Katelyn Alfaro MD   cholecalciferol, vitamin D3, (cholecalciferol) 1,000 unit tablet Take 1,000 Units by  mouth daily.   Yes Historical Provider, MD   ezetimibe (ZETIA) 10 mg tablet Take 1 tablet (10 mg total) by mouth daily. 3/9/18  Yes Katelyn Alfaro MD   furosemide (LASIX) 20 mg tablet Take 1 tablet (20 mg total) by mouth daily. 3/9/18  Yes Katelyn Alfaro MD   glipiZIDE (GLUCOTROL) 5 mg tablet Take 1 tablet (5 mg total) by mouth 2 (two) times a day before meals. 3/9/18  Yes Katelyn Alfaro MD   GLYCERIN/PROPYLENE GLYCOL (ARTIFICIAL TEARS,GLYCERIN-PEG, OPHT) Administer 1 drop into affected eye(s) as needed.   Yes Historical Provider, MD   lactulose (GENERLAC) 10 gram/15 mL solution Take 30 mL twice a day as needed 5/12/17  Yes Conversion Provider   latanoprost (XALATAN) 0.005 % ophthalmic solution INSTILL 1 DROP INTO AFFECTED EYE(S) BY OPHTHALMIC ROUTE ONCE DAILY INTHE EVENING 3/10/17  Yes Conversion Provider   lisinopril (PRINIVIL,ZESTRIL) 20 mg tablet Take 1 tablet (20 mg total) by mouth daily. 1/18/18  Yes Katelyn Alfaro MD   pramoxine-menthol (GOLD BOND MEDICATED ANTI-ITCH) 1-1 % cream Apply topically.   Yes Historical Provider, MD   ranitidine (ZANTAC) 150 mg tablet Take 1 tablet (150 mg total) by mouth 2 (two) times a day. 2/28/18 2/28/19 Yes Katelyn Alfaro MD   sodium chloride (SALINE NASAL) 0.65 % nasal spray Administer 1 spray into each nostril as needed.   Yes Historical Provider, MD   terazosin (HYTRIN) 5 mg capsule Take 1 capsule (5 mg total) by mouth 2 (two) times a day. 1/18/18  Yes Katelyn Alfaro MD   traMADol (ULTRAM 50) 50 mg tablet Take 1 tablet every 6 hours by oral route. 4/18/16  Yes Conversion Provider   VICTOZA 3-JASE 0.6 mg/0.1 mL (18 mg/3 mL) injection Inject 1.8 mg under the skin daily.   12/4/17  Yes Historical Provider, MD   VITAMIN B COMPLEX ORAL Take 1 tablet by mouth daily.   Yes Historical Provider, MD   white petrolatum (AQUAPHOR HEALING) 41 % ointment ointment Apply 1 application topically as needed.   Yes Historical Provider, MD   loratadine 10 mg capsule Take 10 mg by mouth as needed.     Historical Provider, MD     Allergies   Allergen Reactions   • Penicillins Anaphylaxis   • Metformin Hives   • Adhesive Tape-Silicones    • Egg    • Interferon Jose Maria (Human Leuk. Derived)    • Latex    • Mometasone    • Mometasone Furoate    • Omeprazole    • Ribavirin Itching   • Spironolactone    • Statins-Hmg-Coa Reductase Inhibitors Itching and GI intolerance   • Pantoprazole Itching and Rash   • Peginterferon Jose Maria-2b Palpitations     Social History   Substance Use Topics   • Smoking status: Never Smoker   • Smokeless tobacco: Never Used   • Alcohol use No     Family History   Problem Relation Age of Onset   • Arthritis Mother    • Rheum arthritis Mother    • Diabetes Mother    • Rectal cancer Mother    • Other Paternal Grandmother    • Other Paternal Grandfather    • Diabetes Other    • Rheum arthritis Other    • Osteoporosis Other      ^^^REVIEW OF SYSTEMS^^^  Physical Exam   Well-developed well-nourished obese white man appears stated age of 68 years.  Comfortable at rest and room air.  Alert and oriented.  Anicteric.  HEENT examination reveals Mallampati class III oropharynx.  Chest clear to auscultation.  Cardiac examination reveals normal S1-S2, no S3-S4.  No murmurs clicks rubs audible.  Abdomen is obese, soft, nontender.  Normal bowel sounds.  No bruits.  Extremity examination reveals trace pedal edema.  Neurologic exam is negative for asterixis or tremor.  ^^^TEST RESULTS^^^  Impression/Plan   68-year-old man with history of varices here for follow-up esophagogastroduodenoscopy and possible repeat variceal ligation.  Anesthesia Evaluation   The patient is an appropriate candidate for the planned procedure with monitored anesthesia care administered by CRNA.

## 2018-04-06 NOTE — ANESTHESIA PREPROCEDURE EVALUATION
"Pre-Procedure Assessment    Patient: Ben Lai, male, 68 y.o.    Ht Readings from Last 1 Encounters:   04/06/18 1.626 m (5' 4\")     Wt Readings from Last 1 Encounters:   04/06/18 107 kg (235 lb 8 oz)       Last Vitals  /59 (04/06/18 1159)    Temp 36.2 °C (97.2 °F) (04/06/18 1159)    Pulse 57 (04/06/18 1159)   Resp 22 (04/06/18 1159)    SpO2 94 % (04/06/18 1159)       Problem list reviewed and Medical history reviewed    History of anesthetic complications: PONV         Airway   Mallampati: II  TM distance: >3 FB  Neck ROM: full      Dental - normal exam     Pulmonary    (+) pneumonia, COPD mild, asthma, sleep apnea, decreased breath sounds,   Cardiovascular - normal exam  (+) hypertension,     Mental Status/Neuro/Psych    Pt is alert.        GI/Hepatic/Renal    (+) GERD, hepatitis, liver disease, jaundice,     Endo/Other    (+) diabetes mellitus,   Abdominal                    Hematology No results found for: WBC, RBC, MCV, HGB, HCT, PLT   Coagulation No results found for: PT, APTT, INR   General Chemistry   Lab Results   Component Value Date/Time    POCGLU 99 02/22/2018 12:25 PM    CALCIUM 10.2 04/28/2017 03:40 PM    BUN 21 04/28/2017 03:40 PM    CREATININE 1.0 04/28/2017 03:40 PM    GLUCOSE 265 (H) 04/28/2017 03:40 PM     04/28/2017 03:40 PM    K 4.1 04/28/2017 03:40 PM    CO2 27 04/28/2017 03:40 PM    CL 99 04/28/2017 03:40 PM     Anesthesia Plan    ASA 3   NPO status reviewed: > 8 hours    MAC         Induction: intravenous                 Anesthetic plan and risks discussed with patient.                "

## 2018-04-06 NOTE — POST-PROCEDURE NOTE
SEE PROVATION NOTE.  There are no residual varices.  Follow-up EGD for esophageal variceal surveillance in 1 year is advised.  Erosion in distal esophagus, just above GE junction.  Likely from prior banding and GERD.  Benign appearance.  Otherwise unremarkable.

## 2018-04-06 NOTE — ANESTHESIA POSTPROCEDURE EVALUATION
Patient: Ben Lia    Procedure Summary     Date:  04/06/18 Room / Location:  [facility] / [facility]    Anesthesia Start:  1336 Anesthesia Stop:  1401    Procedure:  EGD - ESOPHAGOGASTRODUODENOSCOPY with crna (N/A Esophagus) Diagnosis:       Esophageal varices without bleeding, unspecified esophageal varices type (CMS/HCC)      (hx esophageal varicies)    Provider:  William Rivas MD Responsible Provider:  William Rivas MD    Anesthesia Type:  MAC ASA Status:  3          Anesthesia Type: MAC  Last vitals  /67 (04/06/18 1400)    Temp 36.3 °C (97.3 °F) (04/06/18 1400)    Pulse 55 (04/06/18 1400)   Resp 19 (04/06/18 1400)    SpO2 95 % (04/06/18 1400)    Pain Score        Anesthesia Post Evaluation    Patient location during evaluation: bedside  Patient participation: complete - patient cannot participate  Level of consciousness: responsive to light touch  Pain management: adequate  Airway patency: patent  Cardiovascular status: acceptable  Respiratory status: acceptable  Hydration status: acceptable  May dismiss recovered patient based on consultation with the appropriate physicians and/or meeting appropriate discharge criteria

## 2018-04-12 DIAGNOSIS — I10 BENIGN ESSENTIAL HTN: ICD-10-CM

## 2018-04-12 RX ORDER — LISINOPRIL 20 MG/1
20 TABLET ORAL DAILY
Qty: 90 TABLET | Refills: 2 | Status: SHIPPED | OUTPATIENT
Start: 2018-04-12 | End: 2019-01-06 | Stop reason: SDUPTHER

## 2018-05-08 ENCOUNTER — HOSPITAL ENCOUNTER (OUTPATIENT)
Dept: ULTRASOUND IMAGING | Facility: HOSPITAL | Age: 68
Discharge: HOME | End: 2018-05-08
Payer: MEDICARE

## 2018-05-08 DIAGNOSIS — K76.9 LIVER LESION: ICD-10-CM

## 2018-05-08 PROCEDURE — 76700 US EXAM ABDOM COMPLETE: CPT

## 2018-05-29 PROBLEM — Z91.199 NONCOMPLIANCE WITH THERAPEUTIC PLAN: Status: ACTIVE | Noted: 2018-05-29

## 2018-05-30 ENCOUNTER — OFFICE VISIT (OUTPATIENT)
Dept: INTERNAL MEDICINE | Facility: CLINIC | Age: 68
End: 2018-05-30
Payer: MEDICARE

## 2018-05-30 VITALS
TEMPERATURE: 97.2 F | BODY MASS INDEX: 41.02 KG/M2 | HEART RATE: 79 BPM | SYSTOLIC BLOOD PRESSURE: 120 MMHG | RESPIRATION RATE: 16 BRPM | OXYGEN SATURATION: 95 % | DIASTOLIC BLOOD PRESSURE: 60 MMHG | WEIGHT: 239 LBS

## 2018-05-30 DIAGNOSIS — E11.42 TYPE 2 DIABETES MELLITUS WITH DIABETIC POLYNEUROPATHY, WITHOUT LONG-TERM CURRENT USE OF INSULIN (CMS/HCC): Primary | ICD-10-CM

## 2018-05-30 DIAGNOSIS — B18.2 CHRONIC HEPATITIS C WITH CIRRHOSIS (CMS/HCC): ICD-10-CM

## 2018-05-30 DIAGNOSIS — K74.60 CHRONIC HEPATITIS C WITH CIRRHOSIS (CMS/HCC): ICD-10-CM

## 2018-05-30 PROCEDURE — 99215 OFFICE O/P EST HI 40 MIN: CPT | Mod: PO | Performed by: INTERNAL MEDICINE

## 2018-05-30 PROCEDURE — 99215 OFFICE O/P EST HI 40 MIN: CPT | Performed by: INTERNAL MEDICINE

## 2018-05-30 RX ORDER — LIRAGLUTIDE 6 MG/ML
1.8 INJECTION SUBCUTANEOUS DAILY
COMMUNITY

## 2018-05-30 RX ORDER — PREDNISOLONE ACETATE 10 MG/ML
SUSPENSION/ DROPS OPHTHALMIC
COMMUNITY
Start: 2018-05-11 | End: 2019-02-05 | Stop reason: ALTCHOICE

## 2018-05-30 RX ORDER — LANSOPRAZOLE 30 MG/1
30 CAPSULE, DELAYED RELEASE ORAL DAILY
COMMUNITY
Start: 2018-04-11

## 2018-05-30 RX ORDER — KETOROLAC TROMETHAMINE 5 MG/ML
SOLUTION OPHTHALMIC
COMMUNITY
Start: 2018-05-11 | End: 2019-02-05 | Stop reason: ALTCHOICE

## 2018-05-30 RX ORDER — POLYMYXIN B SULFATE AND TRIMETHOPRIM 1; 10000 MG/ML; [USP'U]/ML
SOLUTION OPHTHALMIC
COMMUNITY
Start: 2018-05-11 | End: 2019-02-05 | Stop reason: ALTCHOICE

## 2018-05-30 RX ORDER — LANOLIN ALCOHOL/MO/W.PET/CERES
400 CREAM (GRAM) TOPICAL DAILY
COMMUNITY
End: 2019-02-05 | Stop reason: ALTCHOICE

## 2018-05-30 ASSESSMENT — PAIN SCALES - GENERAL: PAINLEVEL: 0-NO PAIN

## 2018-05-30 NOTE — PROGRESS NOTES
"  Subjective      Ben Lai is a 68 y.o. male who presents for Follow-up      HPI  Ben is a pleasant 68-year-old male with medical history significant for hepatitis C exposure while in  service, possible exposure with immunizations, complicated by cirrhosis of the liver.  He did a recent upper endoscopy with Dr. Rivas with gastroenterology showing 4 mm nonbleeding erosion in the esophagus, no evidence of varices, mild portal hypertensive gastropathy, normal duodenum.  He recently stopped taking ranitidine.  He denies any bleeding symptoms.  No abdominal pain nausea or vomiting.  Regarding his diabetes he continues on medical therapy with glipizide 5 mg daily.  He gets his laboratory done at the [facility] and [facility], I do not have a copy of his lab reports but he reports a hemoglobin A1c of 7.1.  This is improved from our previous record at 8.8.  We request copy of records.    He states his last dilated eye exam was in April at the [facility] as well, he has a cataract in the right eye which is requiring repair, scheduled for June 6 and history of glaucoma.  He denies any history of retinopathy.    He notes some persistent lower extremity edema, he is currently on furosemide 40 mg daily, if he takes it later than noon then he is \"up all night going to the bathroom.  \"    He notes a recent fall after climbing a ladder to work on his air conditioner, he states he did hit his head and did have a brief loss of consciousness.  Scratched up his right arm a little bit but no fractures or major injuries.  He did not seek emergency care.  He denies any headaches, dizziness or vision symptoms.  No bleeding symptoms.      The following have been reviewed and updated as appropriate in this visit:    Allergies   Allergen Reactions   • Penicillins Anaphylaxis   • Metformin Hives   • Adhesive Tape-Silicones    • Egg    • Interferon Jose Maria (Human Leuk. Derived)    • Latex    • Mometasone    • " Mometasone Furoate    • Omeprazole    • Ribavirin Itching   • Spironolactone    • Statins-Hmg-Coa Reductase Inhibitors Itching and GI intolerance   • Pantoprazole Itching and Rash   • Peginterferon Jose Maria-2b Palpitations     Current Outpatient Prescriptions   Medication Sig Dispense Refill   • brimonidine (ALPHAGAN) 0.2 % ophthalmic solution every 12 hours.     • carvedilol (COREG) 3.125 mg tablet Take 1 tablets twice a day by oral route. 180 2   • cholecalciferol, vitamin D3, (cholecalciferol) 1,000 unit tablet Take 1,000 Units by mouth daily.     • ezetimibe (ZETIA) 10 mg tablet Take 1 tablet (10 mg total) by mouth daily. 90 tablet 2   • furosemide (LASIX) 20 mg tablet Take 1 tablet (20 mg total) by mouth daily. (Patient taking differently: Take 40 mg by mouth daily.  ) 90 tablet 2   • glipiZIDE (GLUCOTROL) 5 mg tablet Take 1 tablet (5 mg total) by mouth 2 (two) times a day before meals. (Patient taking differently: Take 5 mg by mouth daily.  ) 180 tablet 2   • GLYCERIN/PROPYLENE GLYCOL (ARTIFICIAL TEARS,GLYCERIN-PEG, OPHT) Administer 1 drop into affected eye(s) as needed.     • ketorolac (ACULAR) 0.5 % ophthalmic solution      • lactulose (GENERLAC) 10 gram/15 mL solution Take 30 mL twice a day as needed 450 0   • lansoprazole (PREVACID) 30 mg capsule      • latanoprost (XALATAN) 0.005 % ophthalmic solution INSTILL 1 DROP INTO AFFECTED EYE(S) BY OPHTHALMIC ROUTE ONCE DAILY INTHE EVENING 10 0   • liraglutide (VICTOZA 2-JASE) 0.6 mg/0.1 mL (18 mg/3 mL) injection Inject 1.8 mg under the skin daily.     • lisinopril (PRINIVIL,ZESTRIL) 20 mg tablet Take 1 tablet (20 mg total) by mouth daily. 90 tablet 2   • loratadine 10 mg capsule Take 10 mg by mouth as needed.     • magnesium oxide (MAG-OX) 400 mg tablet Take 400 mg by mouth daily.     • polymyxin B sulf-trimethoprim (POLYTRIM) ophthalmic solution      • pramoxine-menthol (GOLD BOND MEDICATED ANTI-ITCH) 1-1 % cream Apply topically.     • prednisoLONE acetate (PRED  FORTE) 1 % ophthalmic suspension      • sodium chloride (SALINE NASAL) 0.65 % nasal spray Administer 1 spray into each nostril as needed.     • terazosin (HYTRIN) 5 mg capsule Take 1 capsule (5 mg total) by mouth 2 (two) times a day. 180 capsule 1   • traMADol (ULTRAM 50) 50 mg tablet Take 1 tablet every 6 hours by oral route. 30 0   • VITAMIN B COMPLEX ORAL Take 1 tablet by mouth daily.     • white petrolatum (AQUAPHOR HEALING) 41 % ointment ointment Apply 1 application topically as needed.     • capsaicin (ZOSTRIX) 0.025 % cream Apply 1 application topically as needed for pain scale 1-3/10, 1-3/8.       No current facility-administered medications for this visit.      Past Medical History:   Diagnosis Date   • A-fib (CMS/HCC)    • Allergy    • Anxiety    • Asthma    • Blindness of right eye    • BPH (benign prostatic hyperplasia)    • Cardiovascular disease    • Cirrhosis (CMS/HCC)    • Complication of anesthesia    • COPD (chronic obstructive pulmonary disease) (CMS/HCC)    • Depression    • Endocrine disorder    • Gallstones    • Gastritis    • GERD (gastroesophageal reflux disease)    • Glaucoma    • Hearing loss     bilateral hearing aids   • Hemorrhoids    • Hepatitis C     Hep C, 2016 remission   • Hernia, abdominal    • High blood pressure    • IBS (irritable bowel syndrome)    • Infectious viral hepatitis    • Jaundice    • Kidney stones    • Obstructive sleep apnea syndrome    • Periodic limb movement disorder    • Persistent hypersomnia    • Persistent insomnia    • Pneumonia    • PONV (postoperative nausea and vomiting)    • Type 2 diabetes mellitus (CMS/HCC)    • Urinary incontinence    • Wears partial dentures      Past Surgical History:   Procedure Laterality Date   • COLONOSCOPY  10/27/2016    Pili, normal, repeat 10 years   • ESOPHAGOGASTRODUODENOSCOPY N/A 2/22/2018    Procedure: EGD - ESOPHAGOGASTRODUODENOSCOPY with banding  x 2 with crna;  Surgeon: William Rivas MD;  Location: [facility];   Service: Endoscopy;  Laterality: N/A;   • ESOPHAGOGASTRODUODENOSCOPY N/A 4/6/2018    Procedure: EGD - ESOPHAGOGASTRODUODENOSCOPY with crna;  Surgeon: William Rivas MD;  Location: [facility];  Service: Endoscopy;  Laterality: N/A;   • HAND SURGERY Left     thumb   • HERNIA REPAIR  01/01/1965   • KNEE ARTHROSCOPY  09/19/2017    left knee, with partial medial meniscectomy; limited chondroplasty, patellofemoral joint   • LIVER BIOPSY      by Dr. Alejandre   • OTHER SURGICAL HISTORY      esophageal varices, banded   • VASECTOMY      at approx age of 25     Family History   Problem Relation Age of Onset   • Arthritis Mother    • Rheum arthritis Mother    • Diabetes Mother    • Rectal cancer Mother    • Other Paternal Grandmother    • Other Paternal Grandfather    • Diabetes Other    • Rheum arthritis Other    • Osteoporosis Other      Social History     Social History   • Marital status:      Spouse name: N/A   • Number of children: N/A   • Years of education: N/A     Social History Main Topics   • Smoking status: Never Smoker   • Smokeless tobacco: Never Used   • Alcohol use No   • Drug use: No   • Sexual activity: Not on file     Other Topics Concern   • Not on file     Social History Narrative   • No narrative on file       Review of Systems  11 point ROS negative except as in HPI  Objective     Vitals:  /60 (BP Location: Left arm, Patient Position: Sitting, Cuff Size: Reg)   Pulse 79   Temp 36.2 °C (97.2 °F) (Temporal)   Resp 16   Wt 108 kg (239 lb)   SpO2 95%   BMI 41.02 kg/m²     Physical Exam  General: Overweight, middle-aged, well-appearing male no acute distress  Head: Atraumatic normocephalic no bruising, ecchymoses  Eyes: Anicteric, EOMI  Ears: Bilateral hearing aids  Oropharynx: Moist mucous membranes no lesions, mild PND  Neck: Supple without lymphadenopathy no jugular venous distention no carotid bruit  Heart: Regular rate and rhythm no audible murmur gallop  Lungs: Clear to auscultation  bilaterally  Abdomen: Moderately distended with obesity, active bowel sounds throughout, liver edge is palpable approximately half to 1 cm below the right costophrenic margin, nontender, firm, no palpable mass or nodules.  Tender to palpation midepigastrium, no rebound or guarding   Extremities: 2+ pitting edema of bilateral lower extremities up to the proximal calf ×2  Neuro: Grossly intact no asterixis    Assessment/Plan   Diagnoses and all orders for this visit:    Type 2 diabetes mellitus with diabetic polyneuropathy, without long-term current use of insulin (CMS/HCC)    Chronic hepatitis C with cirrhosis (CMS/HCC)    Impression and plan:  1.  Chronic hepatitis C, complicated by cirrhosis liver with hepatoma.  I will request a copy of the report from his recent MRI.  I reviewed the images, showing cholelithiasis, I do not see a specific liver mass.  He should continue on medical therapy with lactulose, and I encouraged him to resume H2 receptor blocker with ranitidine 150 mg twice daily to prevent bleeding from gastric erosion and hypertensive gastropathy.  2.  History of esophageal varices, resolved by recent EGD in April.  Ongoing follow-up with [facility] to gastroenterology  3.  Type 2 diabetes mellitus, non-insulin-requiring.  Continues management with glipizide 5 mg daily, he had been taking twice a day but notes hypoglycemic symptoms if taking twice a day agree with continued therapy.  Will request laboratory from [facility], per patient's report his hemoglobin A1c improved to 7.1.  Will request copy of recent eye exam.  He will have upcoming cataract surgery in June.  4.  Hypertension medical therapy with lisinopril 20 mg daily and carvedilol 3.125 mg twice daily.  Blood pressure is adequately controlled.  5.  Hyperlipidemia medical therapy with Zetia 10 mg daily, will need to monitor liver functions, requested laboratory.  6.  Recurrent falls, discussed fall precautions, avoidance of climbing up on high  things such as ladders, trees, roofs, and increased risk of bleeding with underlying liver disease.  If he should fall especially with any head injury the results particularly is of consciousness encouraged to seek emergency care immediately.    Follow-up with him in 3 months.  I provided [facility] regarding his history of hepatitis C and how this is directly linked to his history of hepatic cirrhosis.  He has no history of alcohol or significant tobacco abuse.  Will request copy of his records from [place] where he was seeing Dr. VALDERRAMA with liver specialty, would like to have a copy of his viral load on file and any treatments he has had or have been recommended.    Face to face time with patient was 40 minutes with > 50% spent on  consultation and coordination of care regarding plan as discussed above.    No Follow-up on file.    YOLANDA HOFFMAN MD

## 2018-05-30 NOTE — PATIENT INSTRUCTIONS
Instructions:    1. Increase your furosemide to 40 mg at 0600 and 20 mg (1/2 tab of the 40 mg ) at 10 or 11 AM  2. Eat a banana with second dose of furosemide to prevent potassium from getting low

## 2018-05-30 NOTE — LETTER
[facility]  [address] [place] [id number]  [phone]  Dept: [phone]      To whom it may concern,      I am writing on behalf of my patient,  Mr. Ben Lai, date of birth 1950.    Mr. Lai is a pleasant 68-year-old gentleman well known to me at our Internal Medicine, at [facility], [place], [place].      His medical history is significant for a diagnosis of hepatitis C infection, complicated by cirrhosis of the liver.  Cirrhosis is a serious disease of the liver, which may result in serious comorbidities and lead to complications including liver cancer, and even death.  There is a direct link between hepatitis C virus infection and cirrhosis of the liver, as well as cancer of the liver.  He continues with an ongoing, coordinated treatment and observation protocol within our medical facility for monitoring and management of this condition. It is recommended that he continue to receive life long gastroenterology specialty follow up as well, to prevent and treat any complications of Hepatitis C-mediated liver disease that may arise.    Mr. Lai has no history of significant alcohol or tobacco use, and it is my opinion that his cirrhotic liver disease can be directly linked to his underlying hepatitis C virus infection.        Please feel free to contact me with any questions.    Sincerely,        Katelyn Alfrao MD

## 2018-05-31 DIAGNOSIS — I48.91 ATRIAL FIBRILLATION, UNSPECIFIED TYPE (CMS/HCC): Primary | ICD-10-CM

## 2018-05-31 RX ORDER — CARVEDILOL 3.12 MG/1
3.12 TABLET ORAL 2 TIMES DAILY WITH MEALS
Qty: 180 TABLET | Refills: 2 | Status: SHIPPED | OUTPATIENT
Start: 2018-05-31 | End: 2019-03-12 | Stop reason: SDUPTHER

## 2018-07-06 ENCOUNTER — TELEPHONE (OUTPATIENT)
Dept: FAMILY MEDICINE | Facility: CLINIC | Age: 68
End: 2018-07-06

## 2018-07-06 NOTE — TELEPHONE ENCOUNTER
Aby would like a new script for the Lasix to reflect his current dose, spoke with his wife and he will return a call to discuss his current dose of Furosemide.

## 2018-07-27 DIAGNOSIS — I10 BENIGN ESSENTIAL HTN: ICD-10-CM

## 2018-07-27 RX ORDER — TERAZOSIN 5 MG/1
5 CAPSULE ORAL 2 TIMES DAILY
Qty: 180 CAPSULE | Refills: 1 | Status: SHIPPED | OUTPATIENT
Start: 2018-07-27 | End: 2019-01-29 | Stop reason: SDUPTHER

## 2018-08-09 ENCOUNTER — TELEPHONE (OUTPATIENT)
Dept: INTERNAL MEDICINE | Facility: CLINIC | Age: 68
End: 2018-08-09

## 2018-08-09 DIAGNOSIS — E78.5 HYPERLIPIDEMIA, UNSPECIFIED HYPERLIPIDEMIA TYPE: ICD-10-CM

## 2018-08-09 RX ORDER — EZETIMIBE 10 MG/1
10 TABLET ORAL DAILY
Qty: 90 TABLET | Refills: 1 | Status: SHIPPED | OUTPATIENT
Start: 2018-08-09 | End: 2019-03-12 | Stop reason: SDUPTHER

## 2018-08-09 NOTE — TELEPHONE ENCOUNTER
Patient would like you to take over prescribing [facility]. Ok to send in script? Please advise, thanks!

## 2018-09-05 DIAGNOSIS — E11.9 DIABETES MELLITUS WITHOUT COMPLICATION (CMS/HCC): Primary | ICD-10-CM

## 2018-09-07 PROBLEM — Z91.199 NO-SHOW FOR APPOINTMENT: Status: ACTIVE | Noted: 2018-09-07

## 2018-09-07 PROBLEM — Z87.19 HISTORY OF ESOPHAGEAL VARICES: Status: ACTIVE | Noted: 2018-09-07

## 2018-09-07 PROBLEM — I10 ESSENTIAL HYPERTENSION: Status: ACTIVE | Noted: 2018-09-07

## 2018-09-28 ENCOUNTER — TELEPHONE (OUTPATIENT)
Dept: INTERNAL MEDICINE | Facility: CLINIC | Age: 68
End: 2018-09-28

## 2018-09-28 NOTE — TELEPHONE ENCOUNTER
Spoke with esther Sams to schedule pt at 4:00 on 10/3 with Dr. Alfaro. Pt contacted and apptmt confirmed.
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
## 2018-10-03 ENCOUNTER — OFFICE VISIT (OUTPATIENT)
Dept: INTERNAL MEDICINE | Facility: CLINIC | Age: 68
End: 2018-10-03
Payer: MEDICARE

## 2018-10-03 ENCOUNTER — APPOINTMENT (OUTPATIENT)
Dept: LAB | Facility: CLINIC | Age: 68
End: 2018-10-03
Payer: MEDICARE

## 2018-10-03 VITALS
TEMPERATURE: 97.5 F | WEIGHT: 230 LBS | BODY MASS INDEX: 39.48 KG/M2 | RESPIRATION RATE: 16 BRPM | OXYGEN SATURATION: 93 % | SYSTOLIC BLOOD PRESSURE: 122 MMHG | DIASTOLIC BLOOD PRESSURE: 68 MMHG | HEART RATE: 72 BPM
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
## 2018-10-03 DIAGNOSIS — Z23 FLU VACCINE NEED: ICD-10-CM

## 2018-10-03 LAB
BASOPHILS # BLD AUTO: 0 10*3/UL
BASOPHILS NFR BLD AUTO: 1 % (ref 0–2)
EOSINOPHIL # BLD AUTO: 0.1 10*3/UL
EOSINOPHIL NFR BLD AUTO: 1 % (ref 0–3)
ERYTHROCYTE [DISTWIDTH] IN BLOOD BY AUTOMATED COUNT: 13.4 % (ref 11.5–15)
HCT VFR BLD AUTO: 43.5 % (ref 38–50)
HGB BLD-MCNC: 14.4 G/DL (ref 13.2–17.2)
LYMPHOCYTES # BLD AUTO: 1 10*3/UL
LYMPHOCYTES NFR BLD AUTO: 20 % (ref 15–47)
MCH RBC QN AUTO: 30.4 PG (ref 29–34)
MCHC RBC AUTO-ENTMCNC: 33.2 G/DL (ref 32–36)
MCV RBC AUTO: 91.8 FL (ref 82–97)
MONOCYTES # BLD AUTO: 0.4 10*3/UL
MONOCYTES NFR BLD AUTO: 9 % (ref 5–13)
NEUTROPHILS # BLD AUTO: 3.4 10*3/UL
NEUTROPHILS NFR BLD AUTO: 69 % (ref 46–70)
PLATELET # BLD AUTO: 132 10*3/UL (ref 130–350)
PMV BLD AUTO: 8.5 FL (ref 6.9–10.8)
RBC # BLD AUTO: 4.74 10*6/ΜL (ref 4.1–5.8)
WBC # BLD AUTO: 4.9 10*3/UL (ref 3.7–9.6)

## 2018-10-03 PROCEDURE — 36415 COLL VENOUS BLD VENIPUNCTURE: CPT | Mod: PO

## 2018-10-03 PROCEDURE — 99214 OFFICE O/P EST MOD 30 MIN: CPT | Mod: 25 | Performed by: INTERNAL MEDICINE

## 2018-10-03 PROCEDURE — 99214 OFFICE O/P EST MOD 30 MIN: CPT | Mod: PO | Performed by: INTERNAL MEDICINE

## 2018-10-03 PROCEDURE — 85025 COMPLETE CBC W/AUTO DIFF WBC: CPT | Mod: PO

## 2018-10-03 PROCEDURE — 90662 IIV NO PRSV INCREASED AG IM: CPT | Mod: PO
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
## 2018-10-08 ENCOUNTER — HOSPITAL ENCOUNTER (OUTPATIENT)
Dept: PHYSICAL THERAPY | Facility: CLINIC | Age: 68
Setting detail: THERAPIES SERIES
Discharge: HOME | End: 2018-10-08
Attending: INTERNAL MEDICINE
Payer: MEDICARE
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
## 2018-10-08 PROCEDURE — 97162 PT EVAL MOD COMPLEX 30 MIN: CPT | Mod: GP | Performed by: PHYSICAL THERAPIST

## 2018-10-08 PROCEDURE — G8982 BODY POS GOAL STATUS: HCPCS | Mod: GP,CK | Performed by: PHYSICAL THERAPIST

## 2018-10-08 PROCEDURE — 97110 THERAPEUTIC EXERCISES: CPT | Mod: GP | Performed by: PHYSICAL THERAPIST

## 2018-10-08 PROCEDURE — G8981 BODY POS CURRENT STATUS: HCPCS | Mod: GP,CL | Performed by: PHYSICAL THERAPIST

## 2018-10-10 ENCOUNTER — HOSPITAL ENCOUNTER (OUTPATIENT)
Dept: PHYSICAL THERAPY | Facility: CLINIC | Age: 68
Setting detail: THERAPIES SERIES
Discharge: HOME | End: 2018-10-10
Payer: MEDICARE

## 2018-10-10 PROCEDURE — 97140 MANUAL THERAPY 1/> REGIONS: CPT | Mod: GP | Performed by: PHYSICAL THERAPIST

## 2018-10-10 PROCEDURE — 97110 THERAPEUTIC EXERCISES: CPT | Mod: GP | Performed by: PHYSICAL THERAPIST

## 2018-10-10 NOTE — INTERDISCIPLINARY/THERAPY
[facility] PT Daily Treatment Note      Patient Name: Ben Lai  Date of Service: 10/11/2018  Medicare Re-certification period:10/8/2018-1/6/2019    Visit Number: 2    Subjective:  He states that he could feel the pull across the top of the foot. He states that the exercises fatigued him so did not try the bike.      Has patient fallen since last visit?  No    Pain:  Pain Assessment  Pain Assessment: 0-10  Pain Score: 0 - No pain  Pain Location: Leg  Pain Orientation: Left    Have there been any changes in medications? No    Objective:   Ambulates with decreased stance through the L LE.     Treatment:  Therapeutic exercises:   Nu step for 5 minutes level 4   ABCs   Ankle pumps   SLR with ER     Standing    Hip abd   Hip extension   Mini squats.      Manual Therapy:    STM to the left gastroc, hamstring and ITB to decreased tightness and improve ROM.    PROM Ankle DF/PF improved mobility.        Rehab Goals/Potential/Assessment/Plan:  Pt tolerated session and understands to start to use his recumbent bike at home. He ambulates with wide base of support and decreased stance through the L LE.     Short-Term Goals:    Short Term PT Goal 1: Pt was Indep with HEP in 5 weeks.        Long-Term Goals:    Long Term PT Goal 1: patient will have normal gait pattern on even surface for 100m with no increase in pain in 12 weeks.        Long Term PT Goal 2: Patient will have increase in FOTO by 20 points to improve overall function in 12 weeks.      Plan  Treatment Interventions: Gait training, Balance training, Stair training, Therapeutic activities, Modalities, Therapeutic exercises, Endurance training, Manual therapy, Aquatic therapy, Neuromuscular re-education, Pain education/TNE  PT Frequency: 1-2x/wk  Treatment Duration : up to 12 weeks.     Thank you for allowing us to share in the care of this patient.  If you have any questions, recommendations, or further concerns regarding this patient, please feel free to  contact me.    Signed by: YAW DOAN, PT  10/11/2018  1:46 PM
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
## 2018-10-16 ENCOUNTER — HOSPITAL ENCOUNTER (OUTPATIENT)
Dept: PHYSICAL THERAPY | Facility: CLINIC | Age: 68
Setting detail: THERAPIES SERIES
Discharge: HOME | End: 2018-10-16
Payer: MEDICARE

## 2018-10-16 PROCEDURE — 97110 THERAPEUTIC EXERCISES: CPT | Mod: GP | Performed by: PHYSICAL THERAPIST

## 2018-10-16 PROCEDURE — 97140 MANUAL THERAPY 1/> REGIONS: CPT | Mod: GP | Performed by: PHYSICAL THERAPIST

## 2018-10-16 NOTE — INTERDISCIPLINARY/THERAPY
[facility] PT Daily Treatment Note      Patient Name: Ben Lai  Date of Service: 10/16/2018  Medicare Re-certification period:10/8/2018-1/6/2019    Visit Number: 3    Subjective:  He states that in the morning the leg catches. He reports that he is walking up to 2 1/2 miles.      Has patient fallen since last visit?  No    Pain:  Pain Assessment  Pain Assessment: 0-10  Pain Score: 0 - No pain  Pain Location: Leg  Pain Orientation: Left    Have there been any changes in medications? No    Objective:    Treatment:  Therapeutic exercises:   Nu step for 5 minutes level %   Standing with UE support on blue foam:   Hip abd   Heel raise   Mini squats.   SLS with no UE support     Manual Therapy:    STM to the left gastroc, hamstring and ITB to decreased tightness and improve ROM.    Patella mob medial and lateral and instructed to do at home 1x a day 10x each direction.        Rehab Goals/Potential/Assessment/Plan:  Patient is tender along the inferior portion of the ITB and left gastroc. He is ambulating with a normal gait pattern.    Short-Term Goals:    Short Term PT Goal 1: Pt was Indep with [facility] in 5 weeks.        Long-Term Goals:    Long Term PT Goal 1: patient will have normal gait pattern on even surface for 100m with no increase in pain in 12 weeks.        Long Term PT Goal 2: Patient will have increase in FOTO by 20 points to improve overall function in 12 weeks.      Plan  Treatment Interventions: Gait training, Balance training, Stair training, Therapeutic activities, Modalities, Therapeutic exercises, Endurance training, Manual therapy, Aquatic therapy, Neuromuscular re-education, Pain education/TNE  PT Frequency: 1-2x/wk  Treatment Duration : up to 12 weeks.     Thank you for allowing us to share in the care of this patient.  If you have any questions, recommendations, or further concerns regarding this patient, please feel free to contact me.    Signed by: YAW DOAN, PT  10/16/2018  12:38 PM

## 2018-10-22 ENCOUNTER — HOSPITAL ENCOUNTER (OUTPATIENT)
Dept: PHYSICAL THERAPY | Facility: CLINIC | Age: 68
Setting detail: THERAPIES SERIES
Discharge: HOME | End: 2018-10-22
Payer: MEDICARE

## 2018-10-22 ENCOUNTER — DOCUMENTATION (OUTPATIENT)
Dept: PHYSICAL THERAPY | Facility: CLINIC | Age: 68
End: 2018-10-22

## 2018-10-22 PROCEDURE — 97110 THERAPEUTIC EXERCISES: CPT | Mod: GP | Performed by: PHYSICAL THERAPIST

## 2018-10-22 PROCEDURE — 97140 MANUAL THERAPY 1/> REGIONS: CPT | Mod: GP | Performed by: PHYSICAL THERAPIST

## 2018-10-22 NOTE — INTERDISCIPLINARY/THERAPY
[facility] PT Daily Treatment Note      Patient Name: Ben Lai  Date of Service: 10/22/2018  Medicare Re-certification period:10/8/2018-1/6/2019    Visit Number: 4    Subjective:  He states that on Saturday lost his balance and twisted the knee and had some pain in the leg. He did work up and down ladder and in the garage and everything ok until evening.      Has patient fallen since last visit?  yes    Pain:  Pain Assessment  Pain Assessment: 0-10  Pain Score: 0 - No pain  Pain Location: Leg  Pain Orientation: Left    Have there been any changes in medications? No    Objective:  Bruise on the ITB on the left leg  Ambulating with decreased stance through the L LE.   Incision still healing on the posterior superior knee.     Treatment:  Therapeutic exercises:   Nu step for 5 1/2 minutes level 6   Standing with UE support on blue foam:   Hip abd   Heel raise   Mini squats.    SLS with min to no UE support  Tandem standing.      Manual Therapy:    STM to the left gastroc, hamstring and ITB to decreased tightness and improve ROM.    Patella mob medial and lateral    Gastroc stretch       Rehab Goals/Potential/Assessment/Plan:  Patient is tender in the hamstring and the ITB on the left. At the end of the session improved gait pattern with min to no decreased stance through the L LE. Patient tolerated session.     Short-Term Goals:    Short Term PT Goal 1: Pt was Indep with [facility] in 5 weeks.          Long-Term Goals:    Long Term PT Goal 1: patient will have normal gait pattern on even surface for 100m with no increase in pain in 12 weeks.          Long Term PT Goal 2: Patient will have increase in FOTO by 20 points to improve overall function in 12 weeks.        Plan  Treatment Interventions: Gait training, Balance training, Stair training, Therapeutic activities, Modalities, Therapeutic exercises, Endurance training, Manual therapy, Aquatic therapy, Neuromuscular re-education, Pain education/TNE  PT Frequency:  1-2x/wk  Treatment Duration : up to 12 weeks.     Thank you for allowing us to share in the care of this patient.  If you have any questions, recommendations, or further concerns regarding this patient, please feel free to contact me.    Signed by: YAW DOAN, NONA  10/22/2018  9:02 AM

## 2018-10-31 ENCOUNTER — LAB (OUTPATIENT)
Dept: LAB | Facility: CLINIC | Age: 68
End: 2018-10-31
Payer: MEDICARE

## 2018-11-13 ENCOUNTER — HOSPITAL ENCOUNTER (OUTPATIENT)
Dept: PHYSICAL THERAPY | Facility: CLINIC | Age: 68
Setting detail: THERAPIES SERIES
Discharge: HOME | End: 2018-11-13
Payer: COMMERCIAL

## 2018-11-13 DIAGNOSIS — M25.512 LEFT SHOULDER PAIN, UNSPECIFIED CHRONICITY: ICD-10-CM

## 2018-11-13 PROCEDURE — 97140 MANUAL THERAPY 1/> REGIONS: CPT | Mod: GP | Performed by: PHYSICAL THERAPIST

## 2018-11-13 PROCEDURE — 97163 PT EVAL HIGH COMPLEX 45 MIN: CPT | Mod: GP | Performed by: PHYSICAL THERAPIST

## 2018-11-15 NOTE — INTERDISCIPLINARY/THERAPY
[facility] PHYSICAL THERAPY  [address] [place] [id number]  Dept: [phone]    Physical Therapy Initial Evaluation     Patient Name: Ben Lai  Referring Doctor: Lizet Booth CNP  Date of Service: 11/13/2018  HICN: [id number]    Primary Medical Diagnosis: Left shoulder pain     Treatment Diagnosis.Impaired Joint mobility, motor function, muscle performance, and range of motion associated with connective tissue dysfunction.     Visit Number: 1    Subjective:  Patient is 68 year old male with left shoulder pain. He reports that he fell onto the left shoulder bc off balance. He states that x rays show no fx or break. He also pulled out a sub pump and had increase pain int he left shoulder. He reports that if he reaches or picks up something with the left hand pain in the shoulder. Patient is right hand dominant. He also has pain in the right shoulder when he uses it.        Prior Function  Level of [place]: Independent with ADLs and functional transfers, Independent with homemaking with ambulation  Lived With: Spouse  Prior Function Comments: [employer]   Retired    Past Medical History:   Diagnosis Date   • A-fib (CMS/HCC) (HCC)    • Allergy    • Anxiety    • Arthritis    • Asthma    • Blindness of right eye    • BPH (benign prostatic hyperplasia)    • Cardiovascular disease    • Cirrhosis (CMS/HCC) (HCC)     with possible liver mass by MRI 5/18, rec repeat in 8/2018   • Complication of anesthesia    • COPD (chronic obstructive pulmonary disease) (CMS/HCC) (HCC)    • Depression    • Endocrine disorder    • Gallstones    • Gastritis    • GERD (gastroesophageal reflux disease)    • Glaucoma    • Hearing loss     bilateral hearing aids   • Hemorrhoids    • Hepatitis C     Hep C, 2016 remission, complicated by cirrhosis.  MRI abd 5/30/18, rec 3 month follow up for focus of enhancement right and left hepatic lobe junction   • Hernia, abdominal    • High blood pressure    • IBS  (irritable bowel syndrome)    • Infectious viral hepatitis    • Jaundice    • Kidney stones    • Obstructive sleep apnea syndrome    • Periodic limb movement disorder    • Persistent hypersomnia    • Persistent insomnia    • Pneumonia    • PONV (postoperative nausea and vomiting)    • Type 2 diabetes mellitus (CMS/HCC) (HCC)    • Urinary incontinence    • Wears partial dentures          Current Outpatient Medications:   •  brimonidine (ALPHAGAN) 0.2 % ophthalmic solution, every 12 hours., Disp: , Rfl:   •  capsaicin (ZOSTRIX) 0.025 % cream, Apply 1 application topically as needed for pain scale 1-3/10, 1-3/8., Disp: , Rfl:   •  carvedilol (COREG) 3.125 mg tablet, Take 1 tablet (3.125 mg total) by mouth 2 (two) times a day with meals., Disp: 180 tablet, Rfl: 2  •  cholecalciferol, vitamin D3, (cholecalciferol) 1,000 unit tablet, Take 1,000 Units by mouth daily., Disp: , Rfl:   •  ezetimibe (ZETIA) 10 mg tablet, Take 1 tablet (10 mg total) by mouth daily., Disp: 90 tablet, Rfl: 1  •  furosemide (LASIX) 20 mg tablet, Take 1 tablet (20 mg total) by mouth 2 (two) times a day., Disp: 180 tablet, Rfl: 2  •  glipiZIDE (GLUCOTROL) 5 mg tablet, Take 1 tablet (5 mg total) by mouth 2 (two) times a day before meals. (Patient taking differently: Take 5 mg by mouth daily. Takes 5 mg, one tab in the morning only (gets low glucose readings if takes bid) ), Disp: 180 tablet, Rfl: 2  •  GLYCERIN/PROPYLENE GLYCOL (ARTIFICIAL TEARS,GLYCERIN-PEG, OPHT), Administer 1 drop into affected eye(s) as needed., Disp: , Rfl:   •  ketorolac (ACULAR) 0.5 % ophthalmic solution, , Disp: , Rfl:   •  lactulose (GENERLAC) 10 gram/15 mL solution, Take 30 mL twice a day as needed, Disp: 450, Rfl: 0  •  lansoprazole (PREVACID) 30 mg capsule, , Disp: , Rfl:   •  latanoprost (XALATAN) 0.005 % ophthalmic solution, INSTILL 1 DROP INTO AFFECTED EYE(S) BY OPHTHALMIC ROUTE ONCE DAILY INTHE EVENING, Disp: 10, Rfl: 0  •  liraglutide (VICTOZA 2-JASE) 0.6 mg/0.1 mL  (18 mg/3 mL) injection, Inject 1.8 mg under the skin daily., Disp: , Rfl:   •  lisinopril (PRINIVIL,ZESTRIL) 20 mg tablet, Take 1 tablet (20 mg total) by mouth daily., Disp: 90 tablet, Rfl: 2  •  loratadine 10 mg capsule, Take 10 mg by mouth as needed., Disp: , Rfl:   •  magnesium oxide (MAG-OX) 400 mg tablet, Take 400 mg by mouth daily., Disp: , Rfl:   •  polymyxin B sulf-trimethoprim (POLYTRIM) ophthalmic solution, , Disp: , Rfl:   •  pramoxine-menthol (GOLD BOND MEDICATED ANTI-ITCH) 1-1 % cream, Apply topically., Disp: , Rfl:   •  prednisoLONE acetate (PRED FORTE) 1 % ophthalmic suspension, , Disp: , Rfl:   •  sodium chloride (SALINE NASAL) 0.65 % nasal spray, Administer 1 spray into each nostril as needed., Disp: , Rfl:   •  terazosin (HYTRIN) 5 mg capsule, Take 1 capsule (5 mg total) by mouth 2 (two) times a day., Disp: 180 capsule, Rfl: 1  •  traMADol (ULTRAM 50) 50 mg tablet, Take 1 tablet every 6 hours by oral route., Disp: 30, Rfl: 0  •  VITAMIN B COMPLEX ORAL, Take 1 tablet by mouth daily., Disp: , Rfl:   •  white petrolatum (AQUAPHOR HEALING) 41 % ointment ointment, Apply 1 application topically as needed., Disp: , Rfl:     Penicillins; Metformin; Adhesive tape-silicones; Egg; Interferon violetta (human leuk. derived); Latex; Mometasone; Mometasone furoate; Omeprazole; Ribavirin; Spironolactone; Statins-hmg-coa reductase inhibitors; Pantoprazole; and Peginterferon violetta-2b    Social History     Socioeconomic History   • Marital status:      Spouse name: Not on file   • Number of children: Not on file   • Years of education: Not on file   • Highest education level: Not on file   Social Needs   • Financial resource strain: Not on file   • Food insecurity - worry: Not on file   • Food insecurity - inability: Not on file   • Transportation needs - medical: Not on file   • Transportation needs - non-medical: Not on file   Occupational History   • Not on file   Tobacco Use   • Smoking status: Never Smoker   •  Smokeless tobacco: Never Used   Substance and Sexual Activity   • Alcohol use: No   • Drug use: No   • Sexual activity: Not on file   Other Topics Concern   • Not on file   Social History Narrative   • Not on file       Home Living  Type of Home: House  Home Layout: Two level  Home Access: Stairs to enter with rails  Entrance Stairs-Number of Steps: 2     Cisneros Fall Risk  History of Falling: Yes  Secondary Diagnosis: No  Ambulatory Aids: None/bedrest/nurse assist  Intravenous Therapy/Heparin/Saline Lock: No  Gait/Transferring: Normal/bedrest/wheelchair  Mental Status: Oriented to own ability  Cisneros Fall Risk Score: 25    Pain:  Pain Assessment  Pain Assessment: 0-10  Pain Score: 2  Pain Location: Shoulder   Pain rating 2/10 best and worst the arm goes numb and does not want to the use the arm.     Activities limited by Patient's complaint:   Lifting   Pulling     Patient goal:   Strengthening    Objective:  FOTO score: 47    Observation:    No guarded movement with UE but has decreased ROM   Grimaces with cervical and UE ROM   With ambulation slight decreased stance through the L LE   Decreased scapular control     Special Test:   O Ezra B negative   Speeds and Yergason B negative   90/90 B negative   Drop Arm and Empty can negative.     Neurological:   Reflex Biceps R=L   Reports numbness of the L US at times.     Palpation:   Hypertonicity of the UT, inner scap musculature with trigger points.    Decreased shoulder PROM flexion and abduction. IR and ER WFL.    1st rib R=L   Clavicle elevated on the left.        Spine Assessment  Spine Location Assessment : (Cervical SB and Rot WFL but painful. )  RUE Assessment  RUE Assessment: Exceptions to WFL  RUE AROM (degrees)  R Shoulder Flexion  : 141 degrees  R Shoulder ABduction : 145 degrees  LUE Assessment  LUE Assessment: Exceptions to WFL  LUE AROM (degrees)  L Shoulder Flexion : 110 degrees  L Shoulder ABduction: 95 degrees       Initial Treatment:  STM to the R  shoulder girdle.    Instructed in pendulum, scap retraction.     Rehab Goals/Potential/Assessment/Plan:    Short-Term Goals:    Short Term PT Goal 1: Patient will be Indep with HEP in 6 weeks.        Long-Term Goals:    Long Term PT Goal 1: Patient will have 140 degrees of left shoulder flexion to improve overall function in 12 weeks.          Long Term PT Goal 2: Patient will have increase in strength to be able to reach overhead with 5# without difficulty in 12 weeks.      Assessment  Assessment Comment: Patient has left shoulder pain with decreased ROM of the left shoulder. He has decreased strength and scapula control.     Prognosis  Assessment: Decreased ADL status, Decreased UE ROM, Decreased UE strength, Decreased endurance  Prognosis: Good, Guarded    Plan  Treatment Interventions: Endurance training, Manual therapy, Therapeutic exercises, Therapeutic activities, Modalities, Neuromuscular re-education, Pain education/TNE  PT Frequency: 1-2x/wk  Treatment Duration : up to 12 weeks.       Thank you for allowing us to share in the care of this patient.  If you have any questions, recommendations, or further concerns regarding this patient, please feel free to contact me.  Signed by: YAW DOAN, PT  11/15/2018  1:05 PM    * I have reviewed the plan of care and certify a continuing need for medically necessary services

## 2018-11-20 ENCOUNTER — TRANSFERRED RECORDS (OUTPATIENT)
Dept: HEALTH INFORMATION MANAGEMENT | Facility: CLINIC | Age: 68
End: 2018-11-20

## 2018-11-21 ENCOUNTER — TRANSFERRED RECORDS (OUTPATIENT)
Dept: HEALTH INFORMATION MANAGEMENT | Facility: CLINIC | Age: 68
End: 2018-11-21

## 2018-12-02 ENCOUNTER — HOSPITAL ENCOUNTER (EMERGENCY)
Facility: HOSPITAL | Age: 68
Discharge: HOME | End: 2018-12-02
Attending: EMERGENCY MEDICINE
Payer: MEDICARE

## 2018-12-02 ENCOUNTER — APPOINTMENT (OUTPATIENT)
Dept: CT IMAGING | Facility: HOSPITAL | Age: 68
End: 2018-12-02
Payer: MEDICARE

## 2018-12-02 VITALS
HEIGHT: 64 IN | OXYGEN SATURATION: 95 % | DIASTOLIC BLOOD PRESSURE: 65 MMHG | TEMPERATURE: 98.1 F | BODY MASS INDEX: 42.49 KG/M2 | WEIGHT: 248.9 LBS | SYSTOLIC BLOOD PRESSURE: 176 MMHG | HEART RATE: 62 BPM | RESPIRATION RATE: 16 BRPM

## 2018-12-02 DIAGNOSIS — R51.9 HA (HEADACHE): ICD-10-CM

## 2018-12-02 DIAGNOSIS — E11.9 DM (DIABETES MELLITUS) (CMS/HCC): ICD-10-CM

## 2018-12-02 DIAGNOSIS — R73.9 HYPERGLYCEMIA: ICD-10-CM

## 2018-12-02 DIAGNOSIS — H43.11 VITREOUS HEMORRHAGE OF RIGHT EYE (CMS/HCC): Primary | ICD-10-CM

## 2018-12-02 LAB
ALBUMIN SERPL-MCNC: 4.3 G/DL (ref 3.5–5.3)
ALP SERPL-CCNC: 102 U/L (ref 45–115)
ALT SERPL-CCNC: 20 U/L (ref 0–52)
ANION GAP SERPL CALC-SCNC: 7 MMOL/L (ref 3–11)
AST SERPL-CCNC: 37 U/L (ref 0–39)
BASOPHILS # BLD AUTO: 0 10*3/UL
BASOPHILS NFR BLD AUTO: 0 % (ref 0–2)
BILIRUB SERPL-MCNC: 0.6 MG/DL (ref 0–1.4)
BUN SERPL-MCNC: 13 MG/DL (ref 7–25)
CALCIUM ALBUM COR SERPL-MCNC: 8.9 MG/DL (ref 8.6–10.3)
CALCIUM SERPL-MCNC: 9.1 MG/DL (ref 8.6–10.3)
CHLORIDE SERPL-SCNC: 106 MMOL/L (ref 98–107)
CO2 SERPL-SCNC: 24 MMOL/L (ref 21–32)
CREAT SERPL-MCNC: 1.27 MG/DL (ref 0.7–1.3)
EOSINOPHIL # BLD AUTO: 0 10*3/UL
EOSINOPHIL NFR BLD AUTO: 1 % (ref 0–3)
ERYTHROCYTE [DISTWIDTH] IN BLOOD BY AUTOMATED COUNT: 12.8 % (ref 11.5–15)
GFR SERPL CREATININE-BSD FRML MDRD: 58 ML/MIN/1.73M*2
GLUCOSE BLDC GLUCOMTR-MCNC: 202 MG/DL (ref 70–105)
GLUCOSE SERPL-MCNC: 246 MG/DL (ref 70–105)
HCT VFR BLD AUTO: 42.5 % (ref 38–50)
HGB BLD-MCNC: 14.1 G/DL (ref 13.2–17.2)
LIPASE SERPL-CCNC: 48 U/L (ref 11–82)
LYMPHOCYTES # BLD AUTO: 0.8 10*3/UL
LYMPHOCYTES NFR BLD AUTO: 24 % (ref 15–47)
MAGNESIUM SERPL-MCNC: 1.9 MG/DL (ref 1.8–2.4)
MCH RBC QN AUTO: 29.1 PG (ref 29–34)
MCHC RBC AUTO-ENTMCNC: 33.1 G/DL (ref 32–36)
MCV RBC AUTO: 88.1 FL (ref 82–97)
MONOCYTES # BLD AUTO: 0.3 10*3/UL
MONOCYTES NFR BLD AUTO: 8 % (ref 5–13)
NEUTROPHILS # BLD AUTO: 2.1 10*3/UL
NEUTROPHILS NFR BLD AUTO: 66 % (ref 46–70)
PLATELET # BLD AUTO: 118 10*3/UL (ref 130–350)
PMV BLD AUTO: 8.6 FL (ref 6.9–10.8)
POTASSIUM SERPL-SCNC: 4.5 MMOL/L (ref 3.5–5.1)
PROT SERPL-MCNC: 7 G/DL (ref 6–8.3)
RBC # BLD AUTO: 4.83 10*6/ΜL (ref 4.1–5.8)
SODIUM SERPL-SCNC: 137 MMOL/L (ref 135–145)
WBC # BLD AUTO: 3.1 10*3/UL (ref 3.7–9.6)

## 2018-12-02 PROCEDURE — 99283 EMERGENCY DEPT VISIT LOW MDM: CPT

## 2018-12-02 PROCEDURE — 99283 EMERGENCY DEPT VISIT LOW MDM: CPT | Performed by: EMERGENCY MEDICINE

## 2018-12-02 PROCEDURE — 70450 CT HEAD/BRAIN W/O DYE: CPT

## 2018-12-02 PROCEDURE — 82962 GLUCOSE BLOOD TEST: CPT

## 2018-12-02 PROCEDURE — 83735 ASSAY OF MAGNESIUM: CPT | Performed by: EMERGENCY MEDICINE

## 2018-12-02 PROCEDURE — 83690 ASSAY OF LIPASE: CPT | Performed by: EMERGENCY MEDICINE

## 2018-12-02 PROCEDURE — 36415 COLL VENOUS BLD VENIPUNCTURE: CPT | Performed by: EMERGENCY MEDICINE

## 2018-12-02 PROCEDURE — 85025 COMPLETE CBC W/AUTO DIFF WBC: CPT | Performed by: EMERGENCY MEDICINE

## 2018-12-02 PROCEDURE — 82310 ASSAY OF CALCIUM: CPT | Performed by: EMERGENCY MEDICINE

## 2018-12-02 ASSESSMENT — ENCOUNTER SYMPTOMS
DIAPHORESIS: 0
EYE ITCHING: 0
HEMATURIA: 0
SORE THROAT: 0
ARTHRALGIAS: 0
PHOTOPHOBIA: 0
CONFUSION: 0
WEAKNESS: 0
BACK PAIN: 0
COUGH: 0
VOMITING: 0
EYE DISCHARGE: 0
EYE REDNESS: 0
NAUSEA: 0
DYSURIA: 0
AGITATION: 0
FATIGUE: 0
HEADACHES: 1
FEVER: 0
CHILLS: 0
RHINORRHEA: 0
ABDOMINAL PAIN: 0
DIARRHEA: 0
NECK PAIN: 0
MYALGIAS: 0
FLANK PAIN: 0
BLOOD IN STOOL: 0
EYE PAIN: 0
BRUISES/BLEEDS EASILY: 0
SHORTNESS OF BREATH: 0

## 2018-12-03 NOTE — DISCHARGE INSTRUCTIONS
Return for worse pain, any changes to the left eye vision, weakness, numbness, slurred speech, altered mental status or any other concerns.  It is very important you see an ophthalmologist tomorrow.  [facility]  Otherwise Dr. Rodríguez will see you

## 2018-12-03 NOTE — ED PROVIDER NOTES
HPI:  Chief Complaint   Patient presents with   • Blurred Vision     pt reports having a headache on the left side of head and vision changes in the right eye starting around 0900 this morning         Blurred Vision   Severity:  Severe  Onset quality:  Gradual  Timing:  Constant  Progression:  Worsening  Associated symptoms: headaches    Associated symptoms: no abdominal pain, no chest pain, no congestion, no cough, no diarrhea, no ear pain, no fatigue, no fever, no myalgias, no nausea, no rash, no rhinorrhea, no shortness of breath, no sore throat and no vomiting      Very pleasant 68-year-old male who presents with right eye vision loss which is been, progressive over the course of the day.  It was there upon awakening this morning.  He noticed initially black drops and floaters like things falling through his vision.  This is progressed slowly over the course of the day to be near opacification of his vision.  He still is seeing lines of black in the midst of gray.  There is no pain in the actual eye.  Left eye is completely unaffected.  He does have a little bit of headache on the left side.  He has glaucoma.  He takes medications for this.  He said a previous [facility] on that [facility].  He is just recently found out he has hepatocellular carcinoma and is [facility] to figure out what treatment he will be eligible for.  He does have a history of cirrhosis as well as hepatitis C which was treated to resolution.  He is not on blood thinners.  There is been no trauma.  No recent illness.  No weakness, numbness, slurred speech, altered mental status.  No rash.  No fevers.  No recent illness.  There is been no trauma to the eye.  He is never anything like this before.    HISTORY:  Past Medical History:   Diagnosis Date   • A-fib (CMS/HCC) (HCC)    • Allergy    • Anxiety    • Arthritis    • Asthma    • Blindness of right eye    •  BPH (benign prostatic hyperplasia)    • Cardiovascular disease    • Cirrhosis (CMS/HCC) (HCC)     with possible liver mass by MRI 5/18, rec repeat in 8/2018   • Complication of anesthesia    • COPD (chronic obstructive pulmonary disease) (CMS/HCC) (HCC)    • Depression    • Endocrine disorder    • Gallstones    • Gastritis    • GERD (gastroesophageal reflux disease)    • Glaucoma    • Hearing loss     bilateral hearing aids   • Hemorrhoids    • Hepatitis C     Hep C, 2016 remission, complicated by cirrhosis.  MRI abd 5/30/18, rec 3 month follow up for focus of enhancement right and left hepatic lobe junction   • Hernia, abdominal    • High blood pressure    • IBS (irritable bowel syndrome)    • Infectious viral hepatitis    • Jaundice    • Kidney stones    • Obstructive sleep apnea syndrome    • Periodic limb movement disorder    • Persistent hypersomnia    • Persistent insomnia    • Pneumonia    • PONV (postoperative nausea and vomiting)    • Type 2 diabetes mellitus (CMS/HCC) (HCC)    • Urinary incontinence    • Wears partial dentures        Past Surgical History:   Procedure Laterality Date   • COLONOSCOPY  10/27/2016    Pili, normal, repeat 10 years   • ESOPHAGOGASTRODUODENOSCOPY N/A 2/22/2018    Procedure: EGD - ESOPHAGOGASTRODUODENOSCOPY with banding  x 2 with crna;  Surgeon: William Rivas MD;  Location: [facility];  Service: Endoscopy;  Laterality: N/A;   • ESOPHAGOGASTRODUODENOSCOPY N/A 4/6/2018    Procedure: EGD - ESOPHAGOGASTRODUODENOSCOPY with crna;  Surgeon: William Rivas MD;  Location: [facility];  Service: Endoscopy;  Laterality: N/A;   • HAND SURGERY Left     thumb   • HERNIA REPAIR  01/01/1965   • KNEE ARTHROSCOPY  09/19/2017    left knee, with partial medial meniscectomy; limited chondroplasty, patellofemoral joint   • LIVER BIOPSY      by Dr. Alejandre   • OTHER SURGICAL HISTORY      esophageal varices, banded   • VASECTOMY      at approx age of 25       Family History   Problem Relation  Age of Onset   • Arthritis Mother    • Rheum arthritis Mother    • Diabetes Mother    • Rectal cancer Mother    • Other Paternal Grandmother    • Other Paternal Grandfather    • Diabetes Other    • Rheum arthritis Other    • Osteoporosis Other        Social History     Tobacco Use   • Smoking status: Never Smoker   • Smokeless tobacco: Never Used   Substance Use Topics   • Alcohol use: No   • Drug use: No         ROS:  Review of Systems   Constitutional: Negative for chills, diaphoresis, fatigue and fever.   HENT: Negative for congestion, ear pain, rhinorrhea and sore throat.    Eyes: Positive for visual disturbance. Negative for photophobia, pain, discharge, redness and itching.   Respiratory: Negative for cough and shortness of breath.    Cardiovascular: Negative for chest pain and leg swelling.   Gastrointestinal: Negative for abdominal pain, blood in stool, diarrhea, nausea and vomiting.   Genitourinary: Negative for dysuria, flank pain and hematuria.   Musculoskeletal: Negative for arthralgias, back pain, myalgias and neck pain.   Skin: Negative for rash.   Neurological: Positive for headaches. Negative for weakness.   Hematological: Does not bruise/bleed easily.   Psychiatric/Behavioral: Negative for agitation and confusion.   All other systems reviewed and are negative.      PE:  ED Triage Vitals [12/02/18 1707]   Temp Heart Rate Resp BP SpO2   36.7 °C (98.1 °F) 62 16 176/65 95 %      Temp Source Heart Rate Source Patient Position BP Location FiO2 (%)   Oral -- -- -- --       Physical Exam   Constitutional: He is oriented to person, place, and time. He appears well-developed and well-nourished.  Non-toxic appearance. No distress.   HENT:   Head: Normocephalic and atraumatic.   Mouth/Throat: Mucous membranes are normal.   Eyes: Conjunctivae and EOM are normal.   Fundoscopic exam:       The right eye shows hemorrhage.        The left eye shows no hemorrhage.   Funduscopic exam on the right shows only red in the  retina cannot be appreciated.  No vessels can be seen.  Left eye Limited nondilated funduscopic exam shows retinal vessels present.  No photophobia.  No consensual photophobia.  Pupil is reactive.  Extra ocular movements are intact.  Evidence of previous cataract surgery on the right.  No tenderness to palpation, no discharge, no swelling, no pain with eye movements, left eye vision is unaffected   Neck: Normal range of motion. Neck supple.   Cardiovascular: Normal rate and regular rhythm.   No murmur heard.  Pulmonary/Chest: Effort normal and breath sounds normal. No stridor. No respiratory distress. He has no wheezes.   Musculoskeletal: Normal range of motion. He exhibits no edema, tenderness or deformity.   Neurological: He is alert and oriented to person, place, and time.   Skin: Skin is warm and dry. Capillary refill takes less than 2 seconds.   Psychiatric: He has a normal mood and affect. His behavior is normal. Judgment and thought content normal.   Nursing note and vitals reviewed.      ED LABS:  Labs Reviewed   CBC WITH AUTO DIFFERENTIAL - Abnormal       Result Value    WBC 3.1 (*)     RBC 4.83      Hemoglobin 14.1      Hematocrit 42.5      MCV 88.1      MCH 29.1      MCHC 33.1      RDW 12.8      Platelets 118 (*)     MPV 8.6      Neutrophils% 66      Lymphocytes% 24      Monocytes% 8      Eosinophils% 1      Basophils% 0      Neutrophils Absolute 2.10      Lymphocytes Absolute 0.80      Monocytes Absolute 0.30      Eosinophils Absolute 0.00      Basophils Absolute 0.00     COMPREHENSIVE METABOLIC PANEL - Abnormal    Sodium 137      Potassium 4.5      Chloride 106      CO2 24      Anion Gap 7      BUN 13      Creatinine 1.27      Glucose 246 (*)     Calcium 9.1      AST 37      ALT (SGPT) 20      Alkaline Phosphatase 102      Total Protein 7.0      Albumin 4.3      Total Bilirubin 0.60      eGFR 58 (*)     Corrected Calcium 8.9      Narrative:     Estimated GFR calculated using the 2009 CKD-EPI creatinine  equation.   POCT GLUCOSE METER RESULTS ONLY - Abnormal    POC Glucose 202 (*)    LIPASE - Normal    Lipase 48     MAGNESIUM - Normal    Magnesium 1.9     URINALYSIS WITH MICROSCOPIC    Narrative:     The following orders were created for panel order Urinalysis w/microscopic Urine, Clean Catch.  Procedure                               Abnormality         Status                     ---------                               -----------         ------                     Urinalysis, microscopic U...[[id number]]                                                 Urinalysis, dipstick Urin...[[id number]]                                                   Please view results for these tests on the individual orders.   URINALYSIS, MICROSCOPIC ONLY   URINALYSIS, DIPSTICK ONLY, FOR USE WITH MICROSCOPIC PANEL         ED IMAGES:  CT head without IV contrast   Final Result   IMPRESSION:   1.   Negative. Nothing acute.             ED PROCEDURES:  Procedures    ED COURSE:       MDM:  MDM  Patient is 68-year-old male who presents with vision loss which is been fairly progressive over the course of the day.  It sounds based on his history, diabetes that this is likely a vitreous hemorrhage.  On exam I cannot see anything of retinal vessels.  I do see red.  Left eye is unaffected.  [facility]  [facility] a meeting with his oncologist, given his mild left-sided headache I will obtain a CT head though my pretest clinical suspicion is extremely low.  Will obtain basic laboratory diagnostics to exclude extreme hyperglycemia.  Ophthalmology Dr. Rodríguez consulted with.  She would be happy to see this patient tomorrow in her clinic.    CT head is negative.  Labs with the exception of mild hyperglycemia are within normal limits.  I discussed the options with the patient.  He will [facility] at 8 AM.  If they cannot get  him into the ophthalmologist up there he will arrange for an afternoon appointment with Dr. Rodríguez.  His information has been faxed to their office and she will be anticipating a call from him.  Return precautions have been reviewed with patient at length.  The importance of follow-up has been reviewed with patient at length.  I have strongly encouraged him regardless of his other appointments tomorrow he needs to be seen by an ophthalmologist tomorrow.  Copies of his labs and head CT have also been given to the patient for [facility] appointment [facility] in case this will be helpful for his oncologist.    Diagnosis, diagnostics, return precautions and follow-up plan have all been reviewed with patient at length.  He is discharged home.      Final diagnoses:   [H43.11] Vitreous hemorrhage of right eye (CMS/HCC) (HCC)   [R51] HA (headache)   [R73.9] Hyperglycemia   [E11.9] DM (diabetes mellitus) (CMS/HCC) ([facility])         A voice recognition program was used to aid in documentation of this record.  Sometimes words are not printed exactly as they were spoken.  While efforts were made to carefully edit and correct any inaccuracies, some areas may be present; please take these into context.  Please contact the provider if areas are identified.        Angeles Shah MD  12/02/18 2005

## 2018-12-27 ENCOUNTER — HOSPITAL ENCOUNTER (OUTPATIENT)
Dept: PHYSICAL THERAPY | Facility: CLINIC | Age: 68
Setting detail: THERAPIES SERIES
Discharge: HOME | End: 2018-12-27
Payer: MEDICARE

## 2019-01-06 DIAGNOSIS — I10 BENIGN ESSENTIAL HTN: ICD-10-CM

## 2019-01-06 RX ORDER — ESCITALOPRAM OXALATE 10 MG/1
TABLET ORAL
OUTPATIENT
Start: 2019-01-06

## 2019-01-06 RX ORDER — LISINOPRIL 20 MG/1
20 TABLET ORAL DAILY
Qty: 90 TABLET | Refills: 2 | Status: SHIPPED | OUTPATIENT
Start: 2019-01-06

## 2019-01-08 ENCOUNTER — TELEPHONE (OUTPATIENT)
Dept: INTERNAL MEDICINE | Facility: CLINIC | Age: 69
End: 2019-01-08

## 2019-01-08 DIAGNOSIS — F32.A DEPRESSIVE DISORDER: Primary | ICD-10-CM

## 2019-01-08 NOTE — TELEPHONE ENCOUNTER
He needs OV with me for diabetes follow up and ED follow.  [facility] sent from [facility].  OK to renew escitalopram for enough to get by until OV in the next 30 days with routine diabetes labs if due

## 2019-01-08 NOTE — TELEPHONE ENCOUNTER
Ben Lai is calling for medication refill.     Patient has contacted the pharmacy? Yes    Patient Advised: they will receive a call back.    Name of Medications (Have patient read from the bottle or snip from medication list):escitalpram     What is the dose of the medication:10mg    How often do you take it:1 time a day    Are you having any trouble with the medication:    How many doses do you have left:     Additional Information: you have not filled this rx before but [facility]     Patient's preferred pharmacy has been noted and populated.    Is it okay that the nurse communicates your response through [employer]? no      Caller has been advised that their call does not guarantee an immediate refill. This refill will be reviewed and if you do not hear from your provider it will be ready in 72 hours

## 2019-01-09 RX ORDER — ESCITALOPRAM OXALATE 10 MG/1
10 TABLET ORAL DAILY
Qty: 30 TABLET | Refills: 0 | Status: SHIPPED | OUTPATIENT
Start: 2019-01-09 | End: 2019-03-12 | Stop reason: SDUPTHER

## 2019-01-22 ENCOUNTER — HOSPITAL ENCOUNTER (OUTPATIENT)
Dept: ULTRASOUND IMAGING | Facility: HOSPITAL | Age: 69
Discharge: HOME | End: 2019-01-22
Payer: COMMERCIAL

## 2019-01-22 DIAGNOSIS — R22.40 LOCALIZED SWELLING, MASS AND LUMP, LOWER LIMB: ICD-10-CM

## 2019-01-22 PROCEDURE — 93971 EXTREMITY STUDY: CPT | Mod: RT

## 2019-01-23 ENCOUNTER — OFFICE VISIT (OUTPATIENT)
Dept: INTERNAL MEDICINE | Facility: CLINIC | Age: 69
End: 2019-01-23
Payer: MEDICARE

## 2019-01-23 VITALS
HEIGHT: 64 IN | RESPIRATION RATE: 16 BRPM | HEART RATE: 74 BPM | OXYGEN SATURATION: 95 % | WEIGHT: 222 LBS | SYSTOLIC BLOOD PRESSURE: 108 MMHG | TEMPERATURE: 97.9 F | BODY MASS INDEX: 37.9 KG/M2 | DIASTOLIC BLOOD PRESSURE: 64 MMHG

## 2019-01-23 DIAGNOSIS — E11.42 TYPE 2 DIABETES MELLITUS WITH DIABETIC POLYNEUROPATHY, WITHOUT LONG-TERM CURRENT USE OF INSULIN (CMS/HCC): Primary | ICD-10-CM

## 2019-01-23 DIAGNOSIS — C22.0 HEPATOCELLULAR CARCINOMA (CMS/HCC): ICD-10-CM

## 2019-01-23 DIAGNOSIS — B18.2 CHRONIC HEPATITIS C WITH CIRRHOSIS (CMS/HCC): ICD-10-CM

## 2019-01-23 DIAGNOSIS — K74.60 CHRONIC HEPATITIS C WITH CIRRHOSIS (CMS/HCC): ICD-10-CM

## 2019-01-23 DIAGNOSIS — I10 ESSENTIAL HYPERTENSION: ICD-10-CM

## 2019-01-23 PROCEDURE — G0463 HOSPITAL OUTPT CLINIC VISIT: HCPCS | Mod: PO | Performed by: INTERNAL MEDICINE

## 2019-01-23 PROCEDURE — 99215 OFFICE O/P EST HI 40 MIN: CPT | Performed by: INTERNAL MEDICINE

## 2019-01-23 RX ORDER — HYDROXYZINE HYDROCHLORIDE 25 MG/1
1 TABLET, FILM COATED ORAL NIGHTLY
COMMUNITY
Start: 2018-11-09 | End: 2019-01-28 | Stop reason: SDUPTHER

## 2019-01-23 RX ORDER — ONDANSETRON 4 MG/1
1 TABLET, FILM COATED ORAL 3 TIMES DAILY PRN
COMMUNITY
Start: 2018-12-26 | End: 2019-02-05 | Stop reason: ALTCHOICE

## 2019-01-23 RX ORDER — HYDROMORPHONE HYDROCHLORIDE 2 MG/1
2 TABLET ORAL 3 TIMES DAILY PRN
COMMUNITY
End: 2019-12-17 | Stop reason: SDUPTHER

## 2019-01-23 RX ORDER — SORAFENIB 200 MG/1
2 TABLET, FILM COATED ORAL 2 TIMES DAILY
COMMUNITY
End: 2019-09-26 | Stop reason: SDUPTHER

## 2019-01-23 RX ORDER — DEXTROSE 4 G
TABLET,CHEWABLE ORAL
Qty: 1 EACH | Refills: 0 | Status: SHIPPED | OUTPATIENT
Start: 2019-01-23

## 2019-01-23 ASSESSMENT — PAIN SCALES - GENERAL: PAINLEVEL: 0-NO PAIN

## 2019-01-23 NOTE — PATIENT INSTRUCTIONS
Patient Education     Rehydration, Adult  Rehydration is the replacement of body fluids and salts and minerals (electrolytes) that are lost during dehydration. Dehydration is when there is not enough fluid or water in the body. This happens when you lose more fluids than you take in. Common causes of dehydration include:  · Vomiting.  · Diarrhea.  · Excessive sweating, such as from heat exposure or exercise.  · Taking medicines that cause the body to lose excess fluid (diuretics).  · Impaired kidney function.  · Not drinking enough fluid.  · Certain illnesses or infections.  · Certain poorly controlled long-term (chronic) illnesses, such as diabetes, heart disease, and kidney disease.    Symptoms of mild dehydration may include thirst, dry lips and mouth, dry skin, and dizziness. Symptoms of severe dehydration may include increased heart rate, confusion, fainting, and not urinating.  You can rehydrate by drinking certain fluids or getting fluids through an IV tube, as told by your health care provider.  What are the risks?  Generally, rehydration is safe. However, one problem that can happen is taking in too much fluid (overhydration). This is rare. If overhydration happens, it can cause an electrolyte imbalance, kidney failure, or a decrease in salt (sodium) levels in the body.  How to rehydrate  Follow instructions from your health care provider for rehydration. The kind of fluid you should drink and the amount you should drink depend on your condition.  · If directed by your health care provider, drink an oral rehydration solution (ORS). This is a drink designed to treat dehydration that is found in pharmacies and retail stores.  ? Make an ORS by following instructions on the package.  ? Start by drinking small amounts, about ½ cup (120 mL) every 5-10 minutes.  ? Slowly increase how much you drink until you have taken the amount recommended by your health care provider.  · Drink enough clear fluids to keep your  urine clear or pale yellow. If you were instructed to drink an ORS, finish the ORS first, then start slowly drinking other clear fluids. Drink fluids such as:  ? Water. Do not drink only water. Doing that can lead to having too little sodium in your body (hyponatremia).  ? Ice chips.  ? Fruit juice that you have added water to (diluted juice).  ? Low-calorie sports drinks.  · If you are severely dehydrated, your health care provider may recommend that you receive fluids through an IV tube in the hospital.  · Do not take sodium tablets. Doing that can lead to the condition of having too much sodium in your body (hypernatremia).    Eating while you rehydrate  Follow instructions from your health care provider about what to eat while you rehydrate. Your health care provider may recommend that you slowly begin eating regular foods in small amounts.  · Eat foods that contain a healthy balance of electrolytes, such as bananas, oranges, potatoes, tomatoes, and spinach.  · Avoid foods that are greasy or contain a lot of fat or sugar.    In some cases, you may get nutrition through a feeding tube that is passed through your nose and into your stomach (nasogastric tube, or NG tube). This may be done if you have uncontrolled vomiting or diarrhea.  Beverages to avoid  Certain beverages may make dehydration worse. While you rehydrate, avoid:  · Alcohol.  · Caffeine.  · Drinks that contain a lot of sugar. These include:  ? High-calorie sports drinks.  ? Fruit juice that is not diluted.  ? Soda.    Check nutrition labels to see how much sugar or caffeine a beverage contains.  Signs of dehydration recovery  You may be recovering from dehydration if:  · You are urinating more often than before you started rehydrating.  · Your urine is clear or pale yellow.  · Your energy level improves.  · You vomit less frequently.  · You have diarrhea less frequently.  · Your appetite improves or returns to normal.  · You feel less dizzy or less  light-headed.  · Your skin tone and color start to look more normal.    Contact a health care provider if:  · You continue to have symptoms of mild dehydration, such as:  ? Thirst.  ? Dry lips.  ? Slightly dry mouth.  ? Dry, warm skin.  ? Dizziness.  · You continue to vomit or have diarrhea.  Get help right away if:  · You have symptoms of dehydration that get worse.  · You feel:  ? Confused.  ? Weak.  ? Like you are going to faint.  · You have not urinated in 6-8 hours.  · You have very dark urine.  · You have trouble breathing.  · Your heart rate while sitting still is over 100 beats a minute.  · You cannot drink fluids without vomiting.  · You have vomiting or diarrhea that:  ? Gets worse.  ? Does not go away.  · You have a fever.  This information is not intended to replace advice given to you by your health care provider. Make sure you discuss any questions you have with your health care provider.  Document Released: 03/11/2013 Document Revised: 07/07/2017 Document Reviewed: 02/10/2017  [employer] Patient Education © 2018 [employer]       Patient Education     Dehydration, Adult  Dehydration is when there is not enough fluid or water in your body. This happens when you lose more fluids than you take in. Dehydration can range from mild to very bad. It should be treated right away to keep it from getting very bad.  Symptoms of mild dehydration may include:  · Thirst.  · Dry lips.  · Slightly dry mouth.  · Dry, warm skin.  · Dizziness.  Symptoms of moderate dehydration may include:  · Very dry mouth.  · Muscle cramps.  · Dark pee (urine). Pee may be the color of tea.  · Your body making less pee.  · Your eyes making fewer tears.  · Heartbeat that is uneven or faster than normal (palpitations).  · Headache.  · Light-headedness, especially when you stand up from sitting.  · Fainting (syncope).  Symptoms of very bad dehydration may include:  · Changes in skin, such as:  ? Cold and clammy skin.  ? Blotchy  (mottled) or pale skin.  ? Skin that does not quickly return to normal after being lightly pinched and let go (poor skin turgor).  · Changes in body fluids, such as:  ? Feeling very thirsty.  ? Your eyes making fewer tears.  ? Not sweating when body temperature is high, such as in hot weather.  ? Your body making very little pee.  · Changes in vital signs, such as:  ? Weak pulse.  ? Pulse that is more than 100 beats a minute when you are sitting still.  ? Fast breathing.  ? Low blood pressure.  · Other changes, such as:  ? Sunken eyes.  ? Cold hands and feet.  ? Confusion.  ? Lack of energy (lethargy).  ? Trouble waking up from sleep.  ? Short-term weight loss.  ? Unconsciousness.  Follow these instructions at home:  · If told by your doctor, drink an ORS:  ? Make an ORS by using instructions on the package.  ? Start by drinking small amounts, about ½ cup (120 mL) every 5-10 minutes.  ? Slowly drink more until you have had the amount that your doctor said to have.  · Drink enough clear fluid to keep your pee clear or pale yellow. If you were told to drink an ORS, finish the ORS first, then start slowly drinking clear fluids. Drink fluids such as:  ? Water. Do not drink only water by itself. Doing that can make the salt (sodium) level in your body get too low (hyponatremia).  ? Ice chips.  ? Fruit juice that you have added water to (diluted).  ? Low-calorie sports drinks.  · Avoid:  ? Alcohol.  ? Drinks that have a lot of sugar. These include high-calorie sports drinks, fruit juice that does not have water added, and soda.  ? Caffeine.  ? Foods that are greasy or have a lot of fat or sugar.  · Take over-the-counter and prescription medicines only as told by your doctor.  · Do not take salt tablets. Doing that can make the salt level in your body get too high (hypernatremia).  · Eat foods that have minerals (electrolytes). Examples include bananas, oranges, potatoes, tomatoes, and spinach.  · Keep all follow-up visits  as told by your doctor. This is important.  Contact a doctor if:  · You have belly (abdominal) pain that:  ? Gets worse.  ? Stays in one area (localizes).  · You have a rash.  · You have a stiff neck.  · You get angry or annoyed more easily than normal (irritability).  · You are more sleepy than normal.  · You have a harder time waking up than normal.  · You feel:  ? Weak.  ? Dizzy.  ? Very thirsty.  · You have peed (urinated) only a small amount of very dark pee during 6-8 hours.  Get help right away if:  · You have symptoms of very bad dehydration.  · You cannot drink fluids without throwing up (vomiting).  · Your symptoms get worse with treatment.  · You have a fever.  · You have a very bad headache.  · You are throwing up or having watery poop (diarrhea) and it:  ? Gets worse.  ? Does not go away.  · You have blood or something green (bile) in your throw-up.  · You have blood in your poop (stool). This may cause poop to look black and tarry.  · You have not peed in 6-8 hours.  · You pass out (faint).  · Your heart rate when you are sitting still is more than 100 beats a minute.  · You have trouble breathing.  This information is not intended to replace advice given to you by your health care provider. Make sure you discuss any questions you have with your health care provider.  Document Released: 10/14/2010 Document Revised: 07/07/2017 Document Reviewed: 02/10/2017  [employer] Interactive Patient Education © 2018 [employer]

## 2019-01-23 NOTE — PROGRESS NOTES
Subjective      Ben Lai is a 68 y.o. male who presents for Follow-up      HPI  Mr. Lai is a 68-year-old male with history of chronic hepatitis C obtained during his  service in the 1970s, with diagnosis of hepatitis C and liver cirrhosis in the 1990s.  [facility] [place]  He has had known cirrhosis with esophageal varices status post banding, recently found to have a new liver mass by routine imaging with MRI on 10/31/2018 through his provider at [place].    Associated symptoms include several month history of increasing right upper abdominal pain, nausea without vomiting and 10 for weight loss over a several week period.  [facility] including [facility] alpha-fetoprotein.    He was referred by his primary provider at the [facility] to [facility] in [place], where he saw Dr.Tarek Luong with gastroenterology in November.  A CT with abdomen and pelvis with IV contrast was performed in [place] on 11/21/2018, showing large infiltrative hepatic mass, with associated occlusion/hemoptysis of anterior branch of right portal vein and anterior branch of right hepatic vein, as well as adjacent gallbladder mass concerning for extensive tumor invasion, metastases.  He was also noted to have a 4 mm left lung base lesion.    Did see  in consultation the day of the CT in November, with review of note with recommendation showing that he had a poor prognosis with the tumor, was not felt to be a sickle or liver transplant candidate, and not a candidate for embolization due to decompensated cirrhosis.    Subsequently he met with [facility] oncology team and was initiated on chemotherapy with Sorefinib  (Nexar) 400 mg PO bid.      I did not see any recommendation on [facility] consultation note regarding consideration of anticoagulation with portal vein thrombosis.  The patient did have recent  laboratory through [facility]  1/14/19, demonstrating glucose of 163, lactate dehydrogenase of 650, AST 49 ALT 49 alk phos of 104.  Serum albumin was normal at 4.4.  CBC within normal limits except for mildly decreased platelets of 108.  Hemoglobin is normal at 15.7.    Alpha-fetoprotein markedly elevated at 6, 911 (Dec, 2018).  I could not find a HgbA1C as part of recent lab report.    [facility] developed some discomfort and streaking on his right lower leg.  He was noted to be somewhat hypotensive, he was encouraged to push fluids.  An ultrasound of the right lower extremity was negative for DVT, and he was treated for possible colitis with warm compress and leg elevation.    The symptoms are now improved.  He does note dizziness when he stands.    He denies any symptoms or fevers or chills, nausea or vomiting chest pain or shortness of breath.  He does have occasional abdominal pain.  His diet is reduced.  Review of glucose readings from his meter shows only 5 readings with an average glucose of 134, lowest of 121 highest of 144.  His glucose readings do not correlate well with recent serum readings.  He does note his glucometer is over 8 years old.    He denies any polyuria polydipsia, dysuria urgency hematuria or frequency.  No diarrhea black or bloody stools.        The following have been reviewed and updated as appropriate in this visit:    Allergies   Allergen Reactions   • Penicillins Anaphylaxis   • Metformin Hives   • Adhesive Tape-Silicones    • Egg    • Interferon Jose Maria (Human Leuk. Derived)    • Latex    • Mometasone    • Mometasone Furoate    • Omeprazole    • Ribavirin Itching   • Spironolactone    • Statins-Hmg-Coa Reductase Inhibitors Itching and GI intolerance   • Pantoprazole Itching and Rash   • Peginterferon Jose Maria-2b Palpitations     Current Outpatient Medications   Medication Sig Dispense Refill   • brimonidine (ALPHAGAN) 0.2 %  ophthalmic solution every 12 hours.     • capsaicin (ZOSTRIX) 0.025 % cream Apply 1 application topically as needed for pain scale 1-3/10, 1-3/8.     • carvedilol (COREG) 3.125 mg tablet Take 1 tablet (3.125 mg total) by mouth 2 (two) times a day with meals. 180 tablet 2   • escitalopram (LEXAPRO) 10 mg tablet Take 1 tablet (10 mg total) by mouth daily 30 tablet 0   • ezetimibe (ZETIA) 10 mg tablet Take 1 tablet (10 mg total) by mouth daily. 90 tablet 1   • furosemide (LASIX) 20 mg tablet Take 1 tablet (20 mg total) by mouth 2 (two) times a day. (Patient taking differently: Take 20 mg by mouth daily  ) 180 tablet 2   • hydrOXYzine (ATARAX) 25 mg tablet Take 1 tablet by mouth nightly     • lactulose (GENERLAC) 10 gram/15 mL solution Take 30 mL twice a day as needed 450 0   • lansoprazole (PREVACID) 30 mg capsule      • latanoprost (XALATAN) 0.005 % ophthalmic solution INSTILL 1 DROP INTO AFFECTED EYE(S) BY OPHTHALMIC ROUTE ONCE DAILY INTHE EVENING 10 0   • liraglutide (VICTOZA 2-JASE) 0.6 mg/0.1 mL (18 mg/3 mL) injection Inject 1.8 mg under the skin daily.     • lisinopril (PRINIVIL,ZESTRIL) 20 mg tablet Take 1 tablet (20 mg total) by mouth daily 90 tablet 2   • sodium chloride (SALINE NASAL) 0.65 % nasal spray Administer 1 spray into each nostril as needed.     • terazosin (HYTRIN) 5 mg capsule Take 1 capsule (5 mg total) by mouth 2 (two) times a day. 180 capsule 1   • blood-glucose meter (ACCU-CHEK ADELE PLUS METER) [facility] Use to test blood sugars 3 times daily (E11.65, non insulin depedent) AccuChek Adele plus, meter is 8 years old 1 each 0   • cholecalciferol, vitamin D3, (cholecalciferol) 1,000 unit tablet Take 1,000 Units by mouth daily.     • glipiZIDE (GLUCOTROL) 5 mg tablet Take 1 tablet (5 mg total) by mouth 2 (two) times a day before meals. (Patient taking differently: Take 5 mg by mouth daily. Takes 5 mg, one tab in the morning only (gets low glucose readings if takes bid) ) 180 tablet 2   •  GLYCERIN/PROPYLENE GLYCOL (ARTIFICIAL TEARS,GLYCERIN-PEG, OPHT) Administer 1 drop into affected eye(s) as needed.     • HYDROmorphone (DILAUDID) 2 mg tablet Take 2 tab/cap by mouth 3 (three) times a day as needed     • ketorolac (ACULAR) 0.5 % ophthalmic solution      • loratadine 10 mg capsule Take 10 mg by mouth as needed.     • magnesium oxide (MAG-OX) 400 mg tablet Take 400 mg by mouth daily.     • ondansetron (ZOFRAN) 4 mg tablet Take 1 tablet by mouth 3 (three) times a day as needed     • polymyxin B sulf-trimethoprim (POLYTRIM) ophthalmic solution      • pramoxine-menthol (GOLD BOND MEDICATED ANTI-ITCH) 1-1 % cream Apply topically.     • prednisoLONE acetate (PRED FORTE) 1 % ophthalmic suspension      • SORAfenib (NexAVAR) 200 mg chemo tablet Take 2 tab/cap by mouth 2 (two) times a day     • traMADol (ULTRAM 50) 50 mg tablet Take 1 tablet every 6 hours by oral route. 30 0   • VITAMIN B COMPLEX ORAL Take 1 tablet by mouth daily.     • white petrolatum (AQUAPHOR HEALING) 41 % ointment ointment Apply 1 application topically as needed.       No current facility-administered medications for this visit.      Past Medical History:   Diagnosis Date   • A-fib (CMS/HCC) (HCC)    • Allergy    • Anxiety    • Arthritis    • Asthma    • Blindness of right eye    • BPH (benign prostatic hyperplasia)    • Cardiovascular disease    • Cirrhosis (CMS/HCC) (HCC)     with possible liver mass by MRI 5/18, rec repeat in 8/2018   • Complication of anesthesia    • COPD (chronic obstructive pulmonary disease) (CMS/HCC) (HCC)    • Depression    • Endocrine disorder    • Gallstones    • Gastritis    • GERD (gastroesophageal reflux disease)    • Glaucoma    • Hearing loss     bilateral hearing aids   • Hemorrhoids    • Hepatitis C     Hep C, 2016 remission, complicated by cirrhosis.  MRI abd 5/30/18, rec 3 month follow up for focus of enhancement right and left hepatic lobe junction   • Hernia, abdominal    • High blood pressure    • IBS  (irritable bowel syndrome)    • Infectious viral hepatitis    • Jaundice    • Kidney stones    • Obstructive sleep apnea syndrome    • Periodic limb movement disorder    • Persistent hypersomnia    • Persistent insomnia    • Pneumonia    • PONV (postoperative nausea and vomiting)    • Type 2 diabetes mellitus (CMS/HCC) (HCC)    • Urinary incontinence    • Wears partial dentures      Past Surgical History:   Procedure Laterality Date   • COLONOSCOPY  10/27/2016    Pili, normal, repeat 10 years   • ESOPHAGOGASTRODUODENOSCOPY N/A 2/22/2018    Procedure: EGD - ESOPHAGOGASTRODUODENOSCOPY with banding  x 2 with crna;  Surgeon: William Rivas MD;  Location: [facility];  Service: Endoscopy;  Laterality: N/A;   • ESOPHAGOGASTRODUODENOSCOPY N/A 4/6/2018    Procedure: EGD - ESOPHAGOGASTRODUODENOSCOPY with crna;  Surgeon: William Rivas MD;  Location: [facility];  Service: Endoscopy;  Laterality: N/A;   • HAND SURGERY Left     thumb   • HERNIA REPAIR  01/01/1965   • KNEE ARTHROSCOPY  09/19/2017    left knee, with partial medial meniscectomy; limited chondroplasty, patellofemoral joint   • LIVER BIOPSY      by Dr. Alejandre   • OTHER SURGICAL HISTORY      esophageal varices, banded   • VASECTOMY      at approx age of 25     Family History   Problem Relation Age of Onset   • Arthritis Mother    • Rheum arthritis Mother    • Diabetes Mother    • Rectal cancer Mother    • Other Paternal Grandmother    • Other Paternal Grandfather    • Diabetes Other    • Rheum arthritis Other    • Osteoporosis Other      Social History     Socioeconomic History   • Marital status:      Spouse name: None   • Number of children: None   • Years of education: None   • Highest education level: None   Social Needs   • Financial resource strain: None   • Food insecurity - worry: None   • Food insecurity - inability: None   • Transportation needs - medical: None   • Transportation needs - non-medical: None   Occupational History   • None  "  Tobacco Use   • Smoking status: Never Smoker   • Smokeless tobacco: Never Used   Substance and Sexual Activity   • Alcohol use: No   • Drug use: No   • Sexual activity: None   Other Topics Concern   • None   Social History Narrative   • None       Review of Systems    Objective     Vitals:  /64 (BP Location: Left arm, Patient Position: Sitting, Cuff Size: Reg)   Pulse 74   Temp 36.6 °C (97.9 °F) (Temporal)   Resp 16   Ht 1.626 m (5' 4\")   Wt 101 kg (222 lb)   SpO2 95%   BMI 38.11 kg/m²     Orthostatic assessment repeat blood pressure with adult cuff left arm sitting 110/68, standing 88/68    Physical Exam  12 point ROS negative except as in HPI  Assessment/Plan   Diagnoses and all orders for this visit:    Type 2 diabetes mellitus with diabetic polyneuropathy, without long-term current use of insulin (CMS/HCC) (HCC)  -     blood-glucose meter (ACCU-CHEK ADELE PLUS METER) misc; Use to test blood sugars 3 times daily (E11.65, non insulin depedent) AccuChek Adele plus, meter is 8 years old    Chronic hepatitis C with cirrhosis (CMS/HCC) (HCC)    Essential hypertension    Hepatocellular carcinoma (CMS/HCC) (HCC)    Impression and plan:    1.  Recently diagnosed metastatic hepatocellular carcinoma with regional metastases to gallbladder, unfortunately he is presenting with late stage, unresectable disease.  He is not a candidate for liver transplantation.  Recently initiated on oral chemotherapeutic with Sorafenib (VEGF antagonist), side effects including Carey-Landry, myocardial ischemia, QT prolongation, and foot syndrome, hypertensive crisis, rhabdomyolysis to name others.    2.  Orthostatic hypotension, I suspect secondary to volume depletion.    -He is encouraged to push oral fluids, avoid caffeine, if develops any lightheadedness or dizziness, fever chills or other severe symptoms to contact my office, if he continues to feel lightheaded or dizzy he should let us know and may need IV fluid " resuscitation which we can arrange as an outpatient    -Hold carvedilol for persistent symptoms despite hydration    3.  Portal venous thrombosis secondary to malignancy, he is not on anticoagulation, unclear per [facility] notes I suspect secondary to risk of bleeding with thrombocytopenia and advanced liver cirrhosis with known esophageal varices.    4.  Right lower extremity linear erythema, lower leg, ruled out for DVT.  Likely appears consistent with thrombophlebitis, improving with elevation warm compress, continue for management should notify me if symptoms worsen or progress.    5.  Type 2 diabetes mellitus, uncontrolled by recent hemoglobin A1c of 8.5%  -We have provided him with a new glucose meter today his is over 8 years old do not think is running accurately it is showing much lower readings than what reflected in recent lab work  -Discussed trying to adhere to low carb diet as tolerated  -Although back in 2-3 weeks with review of readings from new glucometer, with further medication adjustments.  He is currently on medical therapy with liraglutide, and glipizide.    Face to face time with patient was 45 minutes with > 50% spent on  consultation and coordination of care regarding plan as discussed above.    A voice recognition program was used to aid in documentation of this record. Sometimes words are not printed exactly as they were spoken.  While efforts were made to carefully edit and correct any inaccuracies, some areas may be present; please take these into context.  Please contact the provider if areas are identified.      No Follow-up on file.    YOLANDA HOFFMAN MD

## 2019-01-28 DIAGNOSIS — K74.60 CHRONIC HEPATITIS C WITH CIRRHOSIS (CMS/HCC): Primary | ICD-10-CM

## 2019-01-28 DIAGNOSIS — B18.2 CHRONIC HEPATITIS C WITH CIRRHOSIS (CMS/HCC): Primary | ICD-10-CM

## 2019-01-28 RX ORDER — HYDROXYZINE HYDROCHLORIDE 25 MG/1
TABLET, FILM COATED ORAL
Qty: 30 TABLET | Refills: 0 | Status: SHIPPED | OUTPATIENT
Start: 2019-01-28 | End: 2019-03-12 | Stop reason: SDUPTHER

## 2019-01-29 DIAGNOSIS — I10 BENIGN ESSENTIAL HTN: ICD-10-CM

## 2019-01-29 RX ORDER — TERAZOSIN 5 MG/1
5 CAPSULE ORAL 2 TIMES DAILY
Qty: 180 CAPSULE | Refills: 2 | Status: SHIPPED | OUTPATIENT
Start: 2019-01-29

## 2019-02-04 ENCOUNTER — HOME HEALTH ADMISSION (OUTPATIENT)
Dept: HOME HEALTH SERVICES | Facility: HOME HEALTH | Age: 69
End: 2019-02-04
Payer: MEDICARE

## 2019-02-05 ENCOUNTER — HOME CARE VISIT (OUTPATIENT)
Dept: HOME HEALTH SERVICES | Facility: HOME HEALTH | Age: 69
End: 2019-02-05

## 2019-02-05 ENCOUNTER — HOME CARE VISIT (OUTPATIENT)
Dept: HOME HEALTH SERVICES | Facility: HOSPICE | Age: 69
End: 2019-02-05

## 2019-02-05 VITALS
OXYGEN SATURATION: 97 % | BODY MASS INDEX: 38.07 KG/M2 | SYSTOLIC BLOOD PRESSURE: 100 MMHG | DIASTOLIC BLOOD PRESSURE: 60 MMHG | HEIGHT: 64 IN | RESPIRATION RATE: 14 BRPM | HEART RATE: 68 BPM | TEMPERATURE: 98.8 F | WEIGHT: 223 LBS

## 2019-02-05 PROCEDURE — G0299 HHS/HOSPICE OF RN EA 15 MIN: HCPCS | Mod: PPS

## 2019-02-05 SDOH — SOCIAL STABILITY: SOCIAL INSECURITY: RISK FACTORS RELATED TO PERSONAL SAFETY: 1

## 2019-02-05 ASSESSMENT — ENCOUNTER SYMPTOMS
PAIN LOCATION - PAIN DURATION: MINUTES
COUGH CHARACTERISTICS: PRODUCTIVE
EDEMA: 1
PERSON REPORTING PAIN: PATIENT
ARTHRALGIAS: 1
DIZZINESS: 1
SHORTNESS OF BREATH: 1
DENIES PAIN: 1
DYSPNEA ACTIVITY LEVEL: AFTER AMBULATING MORE THAN 20 FT
PAIN LOCATION - PAIN QUALITY: ACHY
DEPRESSED MOOD: 1
MUSCLE WEAKNESS: 1
SPUTUM PRODUCTION: 1
PAIN SEVERITY GOAL: 0/10
DESCRIPTION OF MEMORY LOSS: SHORT TERM
COUGH: 1
LAST BOWEL MOVEMENT: 65048
FATIGUES EASILY: 1
LOWEST PAIN SEVERITY IN PAST 24 HOURS: 0/10
SPUTUM AMOUNT: SCANT
SPUTUM COLOR: YELLOW
DYSPNEA ON EXERTION: 1
PAIN LOCATION - PAIN SEVERITY: 2/10
PAIN LOCATION - PAIN FREQUENCY: WITH ACTIVITY
HIGHEST PAIN SEVERITY IN PAST 24 HOURS: 2/10
NAUSEA: 1
PAIN LOCATION: GENERALIZED
PAIN LOCATION - EXACERBATING FACTORS: ACTIVITY
FORGETFULNESS: 1
FATIGUE: 1
PAIN LOCATION - RELIEVING FACTORS: REST AND MEDICATION
DRY SKIN: 1

## 2019-02-05 ASSESSMENT — ACTIVITIES OF DAILY LIVING (ADL)
DRESSING_REQUIRES_ASSISTANCE: 1
LAUNDRY: NEEDS ASSISTANCE
AMBULATION ASSISTANCE: STAND BY ASSIST
CURRENT_FUNCTION: STAND BY ASSIST
MONEY MANAGEMENT (EXPENSES/BILLS): NEEDS ASSISTANCE
SHOPPING: DEPENDENT
TOILETING: 1
DRESSING_LB_CURRENT_FUNCTION: MINIMUM ASSIST
TRANSPORTATION: DEPENDENT
HOUSEKEEPING ASSESSED: 1
TOILETING: INDEPENDENT
TRANSPORTATION ASSESSED: 1
SHOPPING ASSESSED: 1
LIGHT HOUSEKEEPING: DEPENDENT
BATHING_REQUIRES_ASSISTANCE: 1
LAUNDRY ASSESSED: 1
PREPARING MEALS: NEEDS ASSISTANCE

## 2019-02-05 ASSESSMENT — PATIENT HEALTH QUESTIONNAIRE - PHQ9
1. LITTLE INTEREST OR PLEASURE IN DOING THINGS: 02
2. FEELING DOWN, DEPRESSED, IRRITABLE, OR HOPELESS: 03

## 2019-02-07 ENCOUNTER — HOME CARE VISIT (OUTPATIENT)
Dept: HOME HEALTH SERVICES | Facility: HOME HEALTH | Age: 69
End: 2019-02-07

## 2019-02-07 ENCOUNTER — OFFICE VISIT (OUTPATIENT)
Dept: INTERNAL MEDICINE | Facility: CLINIC | Age: 69
End: 2019-02-07
Payer: MEDICARE

## 2019-02-07 ENCOUNTER — HOME CARE VISIT (OUTPATIENT)
Dept: HOME HEALTH SERVICES | Facility: HOSPICE | Age: 69
End: 2019-02-07

## 2019-02-07 VITALS — DIASTOLIC BLOOD PRESSURE: 76 MMHG | HEART RATE: 68 BPM | OXYGEN SATURATION: 97 % | SYSTOLIC BLOOD PRESSURE: 118 MMHG

## 2019-02-07 VITALS
OXYGEN SATURATION: 93 % | RESPIRATION RATE: 16 BRPM | DIASTOLIC BLOOD PRESSURE: 80 MMHG | TEMPERATURE: 97 F | BODY MASS INDEX: 38.28 KG/M2 | WEIGHT: 223 LBS | HEART RATE: 73 BPM | SYSTOLIC BLOOD PRESSURE: 124 MMHG

## 2019-02-07 DIAGNOSIS — Z87.19 HISTORY OF ESOPHAGEAL VARICES: ICD-10-CM

## 2019-02-07 DIAGNOSIS — C22.0 HEPATOCELLULAR CARCINOMA (CMS/HCC): ICD-10-CM

## 2019-02-07 DIAGNOSIS — K74.60 CHRONIC HEPATITIS C WITH CIRRHOSIS (CMS/HCC): ICD-10-CM

## 2019-02-07 DIAGNOSIS — E11.42 TYPE 2 DIABETES MELLITUS WITH DIABETIC POLYNEUROPATHY, WITHOUT LONG-TERM CURRENT USE OF INSULIN (CMS/HCC): Primary | ICD-10-CM

## 2019-02-07 DIAGNOSIS — B18.2 CHRONIC HEPATITIS C WITH CIRRHOSIS (CMS/HCC): ICD-10-CM

## 2019-02-07 DIAGNOSIS — I10 ESSENTIAL HYPERTENSION: ICD-10-CM

## 2019-02-07 PROCEDURE — G0463 HOSPITAL OUTPT CLINIC VISIT: HCPCS | Mod: PO | Performed by: INTERNAL MEDICINE

## 2019-02-07 PROCEDURE — G0299 HHS/HOSPICE OF RN EA 15 MIN: HCPCS | Mod: PPS

## 2019-02-07 PROCEDURE — 99214 OFFICE O/P EST MOD 30 MIN: CPT | Performed by: INTERNAL MEDICINE

## 2019-02-07 RX ORDER — LACTULOSE 10 G/15ML
10 SOLUTION ORAL; RECTAL DAILY
Qty: 473 ML | Refills: 2 | Status: SHIPPED | OUTPATIENT
Start: 2019-02-07

## 2019-02-07 ASSESSMENT — ENCOUNTER SYMPTOMS
PAIN LOCATION - PAIN DURATION: LASTS FOR HOURS
NAUSEA: 1
DYSPNEA ON EXERTION: 1
PAIN LOCATION: HEAD
HIGHEST PAIN SEVERITY IN PAST 24 HOURS: 8/10
LOSS OF SENSATION: 1
PERSON REPORTING PAIN: PATIENT
PAIN LOCATION - PAIN FREQUENCY: INTERMITTENT
LAST BOWEL MOVEMENT: 65051
DYSPNEA ACTIVITY LEVEL: AFTER AMBULATING 10 - 20 FT
PAIN LOCATION - PAIN SEVERITY: 1/10
SHORTNESS OF BREATH: 1
PAIN LOCATION - RELIEVING FACTORS: MEDICATION
NERVOUS/ANXIOUS: 1
SUBJECTIVE PAIN PROGRESSION: UNCHANGED
LOWEST PAIN SEVERITY IN PAST 24 HOURS: 1/10
FATIGUES EASILY: 1
DIZZINESS: 1
PAIN LOCATION - PAIN QUALITY: ACHE
PAIN SEVERITY GOAL: 1/10
PAIN: 1
DESCRIPTION OF MEMORY LOSS: SHORT TERM

## 2019-02-07 ASSESSMENT — COPD QUESTIONNAIRES: COPD: 1

## 2019-02-07 ASSESSMENT — PAIN SCALES - GENERAL: PAINLEVEL: 2

## 2019-02-07 NOTE — PROGRESS NOTES
Subjective      Ben Lai is a 68 y.o. male who presents for Follow-up (2-3week)      HPI  Mr. Lai is a 68-year-old male with recently diagnosed metastatic hepatocellular carcinoma unfortunately with regional metastases to gallbladder and presenting with late stage, unresectable disease.  He was recently initiated on chemotherapy through [facility] in [place].  New    Review of glucose readings today show average glucose over the past 2 weeks of 147, range of 119-188.  He is feeling well on current therapy.    He is here today for short-term follow-up of type 2 diabetes, with worsening control since his cancer diagnosis.  He has known portal vein thrombosis secondary to malignancy, not on anticoagulation due to esophageal varices and thrombocytopenia.    The following have been reviewed and updated as appropriate in this visit:    Allergies   Allergen Reactions   • Penicillins Anaphylaxis   • Metformin Hives   • Adhesive Tape-Silicones    • Banana    • Broccoli    • [place] Sprout    • Cabbage    • Cantaloupe    • Celery    • Cheese    • Cranberry    • Egg    • Interferon Jose Maria (Human Leuk. Derived)    • Interferon Jose Maria-2a    • Latex    • Legumes      Beans   • Lettuce    • Milk    • Mometasone    • Mometasone Furoate    • Mustard    • Nut - Unspecified      Almonds   • Oats    • Omeprazole    • Onion    • Pepper (Genus Capsicum)      Chili pepper   • Pineapple    • Ribavirin Itching   • Rosuvastatin    • Spironolactone    • Statins-Hmg-Coa Reductase Inhibitors Itching and GI intolerance   • Wheat    • Yeast    • Pantoprazole Itching and Rash   • Peginterferon Jose Maria-2b Palpitations     Current Outpatient Medications   Medication Sig Dispense Refill   • brimonidine (ALPHAGAN) 0.2 % ophthalmic solution every 12 hours.     • capsaicin (ZOSTRIX) 0.025 % cream Apply 1 application topically as needed for pain scale 1-3/10, 1-3/8.     • carvedilol (COREG) 3.125 mg tablet Take 1 tablet (3.125 mg total) by mouth  2 (two) times a day with meals. 180 tablet 2   • escitalopram (LEXAPRO) 10 mg tablet Take 1 tablet (10 mg total) by mouth daily 30 tablet 0   • ezetimibe (ZETIA) 10 mg tablet Take 1 tablet (10 mg total) by mouth daily. 90 tablet 1   • furosemide (LASIX) 20 mg tablet Take 1 tablet (20 mg total) by mouth 2 (two) times a day. (Patient taking differently: Take 20 mg by mouth daily  ) 180 tablet 2   • GLYCERIN/PROPYLENE GLYCOL (ARTIFICIAL TEARS,GLYCERIN-PEG, OPHT) Administer 1 drop into affected eye(s) as needed (Dry eyes)      • HYDROmorphone (DILAUDID) 2 mg tablet Take 2 tab/cap by mouth 3 (three) times a day as needed for pain scale 1-3/10, pain scale 4-7/10 or pain scale 8-10/10 (Cancer and joint pain)      • hydrOXYzine (ATARAX) 25 mg tablet TAKE 1 TABLET BY MOUTH AT BEDTIME OR UPON WAKING AT NIGHT 30 tablet 0   • lactulose (GENERLAC) 10 gram/15 mL solution Take 15 mL twice a day as needed for constipation 450 0   • lansoprazole (PREVACID) 30 mg capsule Take 30 mg by mouth daily      • latanoprost (XALATAN) 0.005 % ophthalmic solution INSTILL 1 DROP INTO AFFECTED EYE(S) BY OPHTHALMIC ROUTE ONCE DAILY INTHE EVENING 10 0   • liraglutide (VICTOZA 2-JASE) 0.6 mg/0.1 mL (18 mg/3 mL) injection Inject 1.1 mg under the skin daily       • lisinopril (PRINIVIL,ZESTRIL) 20 mg tablet Take 1 tablet (20 mg total) by mouth daily 90 tablet 2   • pramoxine-menthol (GOLD BOND MEDICATED ANTI-ITCH) 1-1 % cream Apply topically.     • sodium chloride (SALINE NASAL) 0.65 % nasal spray Administer 1 spray into each nostril as needed.     • SORAfenib (NexAVAR) 200 mg chemo tablet Take 2 tab/cap by mouth 2 (two) times a day     • terazosin (HYTRIN) 5 mg capsule Take 1 capsule (5 mg total) by mouth 2 (two) times a day 180 capsule 2   • white petrolatum (AQUAPHOR HEALING) 41 % ointment ointment Apply 1 application topically as needed.     • blood-glucose meter (ACCU-CHEK ADELE PLUS METER) [facility] [place] Use to test blood sugars 3 times daily (E11.65, non  insulin depedent) AccuChek [place] plus, meter is 8 years old (Patient taking differently: Use to test blood sugars 3 times daily (E11.65, non insulin dependent) ) 1 each 0     No current facility-administered medications for this visit.      Past Medical History:   Diagnosis Date   • A-fib (CMS/HCC) (HCC)    • Allergy    • Anxiety    • Arthritis    • Asthma    • Blindness of right eye    • BPH (benign prostatic hyperplasia)    • Cardiovascular disease    • Cirrhosis (CMS/HCC) (HCC)     with possible liver mass by MRI 5/18, rec repeat in 8/2018   • Complication of anesthesia    • COPD (chronic obstructive pulmonary disease) (CMS/HCC) (HCC)    • Depression    • Endocrine disorder    • Gallstones    • Gastritis    • GERD (gastroesophageal reflux disease)    • Glaucoma    • Hearing loss     bilateral hearing aids   • Hemorrhoids    • Hepatitis C     Hep C, 2016 remission, complicated by cirrhosis.  MRI abd 5/30/18, rec 3 month follow up for focus of enhancement right and left hepatic lobe junction   • Hernia, abdominal    • High blood pressure    • IBS (irritable bowel syndrome)    • Infectious viral hepatitis    • Jaundice    • Kidney stones    • Obstructive sleep apnea syndrome    • Periodic limb movement disorder    • Persistent hypersomnia    • Persistent insomnia    • Pneumonia    • PONV (postoperative nausea and vomiting)    • Type 2 diabetes mellitus (CMS/HCC) (HCC)    • Urinary incontinence    • Wears partial dentures      Past Surgical History:   Procedure Laterality Date   • COLONOSCOPY  10/27/2016    Pili, normal, repeat 10 years   • ESOPHAGOGASTRODUODENOSCOPY N/A 2/22/2018    Procedure: EGD - ESOPHAGOGASTRODUODENOSCOPY with banding  x 2 with crna;  Surgeon: William Rivas MD;  Location: [facility];  Service: Endoscopy;  Laterality: N/A;   • ESOPHAGOGASTRODUODENOSCOPY N/A 4/6/2018    Procedure: EGD - ESOPHAGOGASTRODUODENOSCOPY with crna;  Surgeon: William Rivas MD;  Location: [facility];  Service:  Endoscopy;  Laterality: N/A;   • HAND SURGERY Left     thumb   • HERNIA REPAIR  01/01/1965   • KNEE ARTHROSCOPY  09/19/2017    left knee, with partial medial meniscectomy; limited chondroplasty, patellofemoral joint   • LIVER BIOPSY      by Dr. Alejandre   • OTHER SURGICAL HISTORY      esophageal varices, banded   • VASECTOMY      at approx age of 25     Family History   Problem Relation Age of Onset   • Arthritis Mother    • Rheum arthritis Mother    • Diabetes Mother    • Rectal cancer Mother    • Other Paternal Grandmother    • Other Paternal Grandfather    • Diabetes Other    • Rheum arthritis Other    • Osteoporosis Other      Social History     Socioeconomic History   • Marital status:      Spouse name: Not on file   • Number of children: Not on file   • Years of education: Not on file   • Highest education level: Not on file   Social Needs   • Financial resource strain: Not on file   • Food insecurity - worry: Not on file   • Food insecurity - inability: Not on file   • Transportation needs - medical: Not on file   • Transportation needs - non-medical: Not on file   Occupational History   • Not on file   Tobacco Use   • Smoking status: Never Smoker   • Smokeless tobacco: Never Used   Substance and Sexual Activity   • Alcohol use: No   • Drug use: No   • Sexual activity: Not on file   Other Topics Concern   • Not on file   Social History Narrative   • Not on file       Review of Systems  10 point ROS negative except as in HPI  Objective     Vitals:  /80   Pulse 73   Temp 36.1 °C (97 °F) (Temporal)   Resp 16   Wt 101 kg (223 lb)   SpO2 93%   BMI 38.28 kg/m²     Physical Exam      General: Obese, well-developed well-appearing no acute oriented x3  Head atraumatic normocephalic  Eyes anicteric  Moist mucous membranes  Neck supple without lymphadenopathy neck  Heart: Regular rate and rhythm  Clear to auscultation  Abdomen: Distended with obesity soft nontender  Extremities without peripheral  edema  Assessment/Plan   Diagnoses and all orders for this visit:    Type 2 diabetes mellitus with diabetic polyneuropathy, without long-term current use of insulin (CMS/HCC) (HCC)    Essential hypertension    Chronic hepatitis C with cirrhosis (CMS/HCC) (HCC)    Hepatocellular carcinoma (CMS/HCC) (HCC)      Impression and plan:  1.  Metastatic hepatocellular carcinoma, stable on current chemotherapy, [facility]  [facility] peripherally.  Tolerate current therapy.    2.  Orthostatic hypotension, resolved with oral fluid resuscitation.  3.  Type 2 diabetes mellitus, non-insulin requiring.  Glucose readings appear within adequate control for patient's age and comorbidities.  Target hemoglobin A1c less than seven-point percent.  Would avoid over aggressive management which could risk of hypoglycemia.  4.  Right lower extremity edema, improved with reinitiation of low-dose furosemide  5.  Portal vein thrombosis, not a candidate for chronic anticoagulation due to history of esophageal varices next    Overall significantly improved since I last saw him, glycemic control is improved.  Appears stable.  We will continue current management with follow-up in 2-3 months, sooner problems or concerns.    Face to face time with patient was 25 minutes with > 50% spent on  consultation and coordination of care regarding plan as discussed above.    A voice recognition program was used to aid in documentation of this record. Sometimes words are not printed exactly as they were spoken.  While efforts were made to carefully edit and correct any inaccuracies, some areas may be present; please take these into context.  Please contact the provider if areas are identified.      No Follow-up on file.    YOLANDA HOFFMAN MD

## 2019-02-08 ENCOUNTER — HOME CARE VISIT (OUTPATIENT)
Dept: HOME HEALTH SERVICES | Facility: HOME HEALTH | Age: 69
End: 2019-02-08

## 2019-02-14 ENCOUNTER — HOME CARE VISIT (OUTPATIENT)
Dept: HOME HEALTH SERVICES | Facility: HOSPICE | Age: 69
End: 2019-02-14

## 2019-02-15 ENCOUNTER — HOME CARE VISIT (OUTPATIENT)
Dept: HOME HEALTH SERVICES | Facility: HOME HEALTH | Age: 69
End: 2019-02-15

## 2019-02-15 ASSESSMENT — ACTIVITIES OF DAILY LIVING (ADL): OASIS_M1830: 03

## 2019-02-20 ENCOUNTER — HOME CARE VISIT (OUTPATIENT)
Dept: HOME HEALTH SERVICES | Facility: HOSPICE | Age: 69
End: 2019-02-20

## 2019-02-25 ENCOUNTER — HOME CARE VISIT (OUTPATIENT)
Dept: HOME HEALTH SERVICES | Facility: HOME HEALTH | Age: 69
End: 2019-02-25

## 2019-02-25 ASSESSMENT — ACTIVITIES OF DAILY LIVING (ADL)
OASIS_M1830: 02
HOME_HEALTH_OASIS: 01
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
## 2019-03-12 DIAGNOSIS — E78.5 HYPERLIPIDEMIA, UNSPECIFIED HYPERLIPIDEMIA TYPE: ICD-10-CM

## 2019-03-12 DIAGNOSIS — I48.91 ATRIAL FIBRILLATION, UNSPECIFIED TYPE (CMS/HCC): ICD-10-CM

## 2019-03-12 DIAGNOSIS — F32.A DEPRESSIVE DISORDER: ICD-10-CM

## 2019-03-12 DIAGNOSIS — K74.60 CHRONIC HEPATITIS C WITH CIRRHOSIS (CMS/HCC): ICD-10-CM

## 2019-03-12 DIAGNOSIS — B18.2 CHRONIC HEPATITIS C WITH CIRRHOSIS (CMS/HCC): ICD-10-CM

## 2019-03-12 RX ORDER — PRIMIDONE 50 MG/1
TABLET ORAL
COMMUNITY
Start: 2019-03-11 | End: 2019-12-19 | Stop reason: ALTCHOICE

## 2019-03-12 RX ORDER — ESCITALOPRAM OXALATE 10 MG/1
10 TABLET ORAL DAILY
Qty: 90 TABLET | Refills: 2 | Status: SHIPPED | OUTPATIENT
Start: 2019-03-12 | End: 2020-01-16 | Stop reason: ALTCHOICE

## 2019-03-12 RX ORDER — DOXYCYCLINE 100 MG/1
200 CAPSULE ORAL
Refills: 0 | COMMUNITY
Start: 2019-02-28 | End: 2019-09-26 | Stop reason: ALTCHOICE

## 2019-03-12 RX ORDER — BUPROPION HYDROCHLORIDE 100 MG/1
1 TABLET, EXTENDED RELEASE ORAL DAILY
Refills: 0 | COMMUNITY
Start: 2019-02-11 | End: 2019-03-12 | Stop reason: SDUPTHER

## 2019-03-12 RX ORDER — HYDROXYZINE HYDROCHLORIDE 25 MG/1
25 TABLET, FILM COATED ORAL NIGHTLY
Qty: 90 TABLET | Refills: 2 | Status: SHIPPED | OUTPATIENT
Start: 2019-03-12 | End: 2021-01-01 | Stop reason: ALTCHOICE

## 2019-03-12 RX ORDER — BUPROPION HYDROCHLORIDE 100 MG/1
100 TABLET, EXTENDED RELEASE ORAL DAILY
Qty: 90 TABLET | Refills: 1 | Status: SHIPPED | OUTPATIENT
Start: 2019-03-12

## 2019-03-12 RX ORDER — EZETIMIBE 10 MG/1
10 TABLET ORAL DAILY
Qty: 90 TABLET | Refills: 2 | Status: SHIPPED | OUTPATIENT
Start: 2019-03-12 | End: 2019-12-19 | Stop reason: ALTCHOICE

## 2019-03-12 RX ORDER — CARVEDILOL 3.12 MG/1
3.12 TABLET ORAL 2 TIMES DAILY WITH MEALS
Qty: 180 TABLET | Refills: 2 | Status: SHIPPED | OUTPATIENT
Start: 2019-03-12 | End: 2020-03-18 | Stop reason: SDUPTHER

## 2019-03-27 ENCOUNTER — OFFICE VISIT (OUTPATIENT)
Dept: ONCOLOGY | Facility: CLINIC | Age: 69
End: 2019-03-27
Payer: COMMERCIAL

## 2019-03-27 ENCOUNTER — APPOINTMENT (OUTPATIENT)
Dept: ONCOLOGY | Facility: CLINIC | Age: 69
End: 2019-03-27
Payer: COMMERCIAL

## 2019-03-27 VITALS
HEIGHT: 65 IN | SYSTOLIC BLOOD PRESSURE: 175 MMHG | OXYGEN SATURATION: 96 % | HEART RATE: 51 BPM | TEMPERATURE: 97.8 F | DIASTOLIC BLOOD PRESSURE: 77 MMHG | BODY MASS INDEX: 36.65 KG/M2 | WEIGHT: 220 LBS

## 2019-03-27 DIAGNOSIS — K74.60 LIVER CIRRHOSIS SECONDARY TO NASH (NONALCOHOLIC STEATOHEPATITIS) (CMS/HCC): ICD-10-CM

## 2019-03-27 DIAGNOSIS — C22.0 HEPATOCELLULAR CARCINOMA (CMS/HCC): ICD-10-CM

## 2019-03-27 DIAGNOSIS — K75.81 LIVER CIRRHOSIS SECONDARY TO NASH (NONALCOHOLIC STEATOHEPATITIS) (CMS/HCC): ICD-10-CM

## 2019-03-27 LAB
ALBUMIN SERPL-MCNC: 4 G/DL (ref 3.5–5.3)
ALP SERPL-CCNC: 92 U/L (ref 45–115)
ALT SERPL-CCNC: 24 U/L (ref 0–52)
ANION GAP SERPL CALC-SCNC: 8 MMOL/L (ref 3–11)
AST SERPL-CCNC: 32 U/L (ref 0–39)
BASOPHILS # BLD AUTO: 0 10*3/UL
BASOPHILS NFR BLD AUTO: 1 % (ref 0–2)
BILIRUB SERPL-MCNC: 1.1 MG/DL (ref 0–1.4)
BUN SERPL-MCNC: 18 MG/DL (ref 7–25)
CALCIUM ALBUM COR SERPL-MCNC: 9.2 MG/DL (ref 8.6–10.3)
CALCIUM SERPL-MCNC: 9.2 MG/DL (ref 8.6–10.3)
CHLORIDE SERPL-SCNC: 102 MMOL/L (ref 98–107)
CO2 SERPL-SCNC: 27 MMOL/L (ref 21–32)
CREAT SERPL-MCNC: 0.79 MG/DL (ref 0.7–1.3)
EOSINOPHIL # BLD AUTO: 0.1 10*3/UL
EOSINOPHIL NFR BLD AUTO: 3 % (ref 0–3)
ERYTHROCYTE [DISTWIDTH] IN BLOOD BY AUTOMATED COUNT: 16.3 % (ref 11.5–15)
GFR SERPL CREATININE-BSD FRML MDRD: 92 ML/MIN/1.73M*2
GLUCOSE SERPL-MCNC: 177 MG/DL (ref 70–105)
HCT VFR BLD AUTO: 42 % (ref 38–50)
HGB BLD-MCNC: 14.3 G/DL (ref 13.2–17.2)
LDH SERPL L TO P-CCNC: 209 U/L (ref 87–246)
LYMPHOCYTES # BLD AUTO: 1 10*3/UL
LYMPHOCYTES NFR BLD AUTO: 22 % (ref 15–47)
MCH RBC QN AUTO: 29.1 PG (ref 29–34)
MCHC RBC AUTO-ENTMCNC: 34.1 G/DL (ref 32–36)
MCV RBC AUTO: 85.6 FL (ref 82–97)
MONOCYTES # BLD AUTO: 0.4 10*3/UL
MONOCYTES NFR BLD AUTO: 8 % (ref 5–13)
NEUTROPHILS # BLD AUTO: 3 10*3/UL
NEUTROPHILS NFR BLD AUTO: 67 % (ref 46–70)
PLATELET # BLD AUTO: 111 10*3/UL (ref 130–350)
PMV BLD AUTO: 7.9 FL (ref 6.9–10.8)
POTASSIUM SERPL-SCNC: 3.7 MMOL/L (ref 3.5–5.1)
PROT SERPL-MCNC: 6.8 G/DL (ref 6–8.3)
RBC # BLD AUTO: 4.91 10*6/ΜL (ref 4.1–5.8)
SODIUM SERPL-SCNC: 137 MMOL/L (ref 135–145)
WBC # BLD AUTO: 4.5 10*3/UL (ref 3.7–9.6)

## 2019-03-27 PROCEDURE — G0463 HOSPITAL OUTPT CLINIC VISIT: HCPCS

## 2019-03-27 PROCEDURE — 85025 COMPLETE CBC W/AUTO DIFF WBC: CPT

## 2019-03-27 PROCEDURE — 99204 OFFICE O/P NEW MOD 45 MIN: CPT | Performed by: INTERNAL MEDICINE

## 2019-03-27 PROCEDURE — 36415 COLL VENOUS BLD VENIPUNCTURE: CPT

## 2019-03-27 PROCEDURE — 80053 COMPREHEN METABOLIC PANEL: CPT

## 2019-03-27 PROCEDURE — 83615 LACTATE (LD) (LDH) ENZYME: CPT

## 2019-03-27 ASSESSMENT — PAIN SCALES - GENERAL: PAINLEVEL: 0-NO PAIN

## 2019-03-27 NOTE — PROGRESS NOTES
Medical Oncology Consult    HISTORY OF PRESENT ILLNESS:  Ben Lai is a 69 y.o. male who was diagnosed with hepatocellular carcinoma in November 2018.  He has an underlying hepatitis C which was supposedly cured.  He was started on sorafenib early December 2018 but this appears to have been stopped in February due to significant side effects.  Patient states unequivocally that he started to feel better after a 3-week break but the sorafenib was recently restarted and he is having significant quality of life compromising side effects again.  This includes severe joint and muscle pain and fatigue and inanition.  Please see further under assessment and under radiographs below.    Past medical history, past surgeries, family history, social history and allergies are as below and reviewed.      Encounter Diagnosis   Name Primary?   • Hepatocellular carcinoma (CMS/HCC) (HCC)    .     Treatment History:   No history exists.         PAST MEDICAL HISTORY:   Past Medical History:   Diagnosis Date   • A-fib (CMS/HCC) (HCC)    • Allergy    • Anxiety    • Arthritis    • Asthma    • Blindness of right eye    • BPH (benign prostatic hyperplasia)    • Cardiovascular disease    • Cirrhosis (CMS/HCC) (HCC)     with possible liver mass by MRI 5/18, rec repeat in 8/2018   • Complication of anesthesia    • COPD (chronic obstructive pulmonary disease) (CMS/HCC) (HCC)    • Depression    • Endocrine disorder    • Gallstones    • Gastritis    • GERD (gastroesophageal reflux disease)    • Glaucoma    • Hearing loss     bilateral hearing aids   • Hemorrhoids    • Hepatitis C     Hep C, 2016 remission, complicated by cirrhosis.  MRI abd 5/30/18, rec 3 month follow up for focus of enhancement right and left hepatic lobe junction   • Hernia, abdominal    • High blood pressure    • IBS (irritable bowel syndrome)    • Infectious viral hepatitis    • Jaundice    • Kidney stones    • Obstructive sleep apnea syndrome    • Periodic limb movement  disorder    • Persistent hypersomnia    • Persistent insomnia    • Pneumonia    • PONV (postoperative nausea and vomiting)    • Type 2 diabetes mellitus (CMS/HCC) (HCC)    • Urinary incontinence    • Wears partial dentures         PAST SURGICAL HISTORY:   Past Surgical History:   Procedure Laterality Date   • COLONOSCOPY  10/27/2016    Pili, normal, repeat 10 years   • ESOPHAGOGASTRODUODENOSCOPY N/A 2/22/2018    Procedure: EGD - ESOPHAGOGASTRODUODENOSCOPY with banding  x 2 with crna;  Surgeon: William Rivas MD;  Location: [facility];  Service: Endoscopy;  Laterality: N/A;   • ESOPHAGOGASTRODUODENOSCOPY N/A 4/6/2018    Procedure: EGD - ESOPHAGOGASTRODUODENOSCOPY with crna;  Surgeon: William Rivas MD;  Location: [facility];  Service: Endoscopy;  Laterality: N/A;   • HAND SURGERY Left     thumb   • HERNIA REPAIR  01/01/1965   • KNEE ARTHROSCOPY  09/19/2017    left knee, with partial medial meniscectomy; limited chondroplasty, patellofemoral joint   • LIVER BIOPSY      by Dr. Alejandre   • OTHER SURGICAL HISTORY      esophageal varices, banded   • VASECTOMY      at approx age of 25        GYNECOLOGICAL HISTORY (if applicable): NA     FAMILY HISTORY:   Family History   Problem Relation Age of Onset   • Arthritis Mother    • Rheum arthritis Mother    • Diabetes Mother    • Rectal cancer Mother    • Other Paternal Grandmother    • Other Paternal Grandfather    • Diabetes Other    • Rheum arthritis Other    • Osteoporosis Other         SOCIAL HISTORY:   Social History     Socioeconomic History   • Marital status:      Spouse name: Not on file   • Number of children: Not on file   • Years of education: Not on file   • Highest education level: Not on file   Social Needs   • Financial resource strain: Not on file   • Food insecurity - worry: Not on file   • Food insecurity - inability: Not on file   • Transportation needs - medical: Not on file   • Transportation needs - non-medical: Not on file   Occupational  History   • Not on file   Tobacco Use   • Smoking status: Never Smoker   • Smokeless tobacco: Never Used   Substance and Sexual Activity   • Alcohol use: No   • Drug use: No   • Sexual activity: Not on file   Other Topics Concern   • Not on file   Social History Narrative   • Not on file        ALLERGIES:   Allergies as of 03/27/2019 - Reviewed 03/27/2019   Allergen Reaction Noted   • Penicillins Anaphylaxis 10/19/2017   • Metformin Hives    • Adhesive tape-silicones  04/06/2018   • Banana  02/05/2019   • Broccoli  02/05/2019   • [place] sprout  02/05/2019   • Cabbage  02/05/2019   • Cantaloupe  02/05/2019   • Celery  02/05/2019   • Cheese  02/05/2019   • Cranberry  02/05/2019   • Egg     • Interferon violetta (human leuk. derived)     • Interferon violetta-2a  02/05/2019   • Latex     • Legumes  02/05/2019   • Lettuce  02/05/2019   • Milk  02/05/2019   • Mometasone     • Mometasone furoate     • Mustard  02/05/2019   • Nut - unspecified  02/05/2019   • Oats  02/05/2019   • Omeprazole     • Onion  02/05/2019   • Pepper (genus capsicum)  02/05/2019   • Pineapple  02/05/2019   • Ribavirin Itching 10/19/2017   • Rosuvastatin  02/05/2019   • Spironolactone     • Statins-hmg-coa reductase inhibitors Itching and GI intolerance 04/06/2018   • Wheat  02/05/2019   • Yeast  02/05/2019   • Pantoprazole Itching and Rash 02/28/2018   • Peginterferon violetta-2b Palpitations 10/19/2017        MEDICATIONS:   Current Outpatient Medications   Medication Sig Dispense Refill   • ezetimibe (ZETIA) 10 mg tablet Take 1 tablet (10 mg total) by mouth daily 90 tablet 2   • hydrOXYzine (ATARAX) 25 mg tablet Take 1 tablet (25 mg total) by mouth nightly 90 tablet 2   • escitalopram (LEXAPRO) 10 mg tablet Take 1 tablet (10 mg total) by mouth daily 90 tablet 2   • carvedilol (COREG) 3.125 mg tablet Take 1 tablet (3.125 mg total) by mouth 2 (two) times a day with meals 180 tablet 2   • buPROPion SR (WELLBUTRIN SR) 100 mg 12 hr tablet Take 1 tablet (100 mg  total) by mouth daily 90 tablet 1   • lactulose (GENERLAC) 10 gram/15 mL solution Take 15 mL (10 g total) by mouth daily 473 mL 2   • terazosin (HYTRIN) 5 mg capsule Take 1 capsule (5 mg total) by mouth 2 (two) times a day 180 capsule 2   • HYDROmorphone (DILAUDID) 2 mg tablet Take 2 tab/cap by mouth 3 (three) times a day as needed for pain scale 1-3/10, pain scale 4-7/10 or pain scale 8-10/10 (Cancer and joint pain)      • SORAfenib (NexAVAR) 200 mg chemo tablet Take 2 tab/cap by mouth 2 (two) times a day     • blood-glucose meter (ACCU-CHEK ADELE PLUS METER) [facility] [place] [facility] Use to test blood sugars 3 times daily (E11.65, non insulin depedent) AccuChek Adele plus, meter is 8 years old (Patient taking differently: Use to test blood sugars 3 times daily (E11.65, non insulin dependent) ) 1 each 0   • lisinopril (PRINIVIL,ZESTRIL) 20 mg tablet Take 1 tablet (20 mg total) by mouth daily 90 tablet 2   • furosemide (LASIX) 20 mg tablet Take 1 tablet (20 mg total) by mouth 2 (two) times a day. (Patient taking differently: Take 20 mg by mouth daily  ) 180 tablet 2   • lansoprazole (PREVACID) 30 mg capsule Take 30 mg by mouth daily      • liraglutide (VICTOZA 2-JASE) 0.6 mg/0.1 mL (18 mg/3 mL) injection Inject 1.1 mg under the skin daily       • capsaicin (ZOSTRIX) 0.025 % cream Apply 1 application topically as needed for pain scale 1-3/10, 1-3/8.     • sodium chloride (SALINE NASAL) 0.65 % nasal spray Administer 1 spray into each nostril as needed for congestion or rhinitis      • white petrolatum (AQUAPHOR HEALING) 41 % ointment ointment Apply 1 application topically as needed.     • pramoxine-menthol (GOLD BOND MEDICATED ANTI-ITCH) 1-1 % cream Apply topically.     • brimonidine (ALPHAGAN) 0.2 % ophthalmic solution every 12 hours.     • GLYCERIN/PROPYLENE GLYCOL (ARTIFICIAL TEARS,GLYCERIN-PEG, OPHT) Administer 1 drop into affected eye(s) as needed (Dry eyes)      • latanoprost (XALATAN) 0.005 % ophthalmic solution INSTILL 1 DROP INTO  "AFFECTED EYE(S) BY OPHTHALMIC ROUTE ONCE DAILY INTHE EVENING 10 0   • primidone (MYSOLINE) 50 mg tablet      • doxycycline (MONODOX) 100 mg capsule Take 200 mg by mouth  0     No current facility-administered medications for this visit.        REVIEW OF SYSTEMS:   Review of Systems - Oncology    The ECOG performance status today is 1 - Restricted strenuous activity; able to carry out light work.  This actually seems mostly to result from side effects and he would probably be a PS 0 it were not for the sorafenib side effects.          Objective    PHYSICAL EXAM:   /77   Pulse 51   Temp 36.6 °C (97.8 °F) (Temporal)   Ht 1.651 m (5' 5\")   Wt 99.8 kg (220 lb)   SpO2 96%   BMI 36.61 kg/m²    Body mass index is 36.61 kg/m².     Physical Exam reveals a pleasant somewhat tired gentleman.  HEENT normocephalic atraumatic.  Eyes extraocular motions intact.  Mouth no concerning lesions.  Lymph no adenopathy of the cervical, supraclavicular or infraclavicular lymph nodes.  Lungs clear to auscultation.  Cardiovascular S1-S2 are appreciated.  Abdomen is soft, benign, without palpable or percussible hepatosplenomegaly.  Extremities are without clubbing cyanosis or edema.  Skin is without concerning lesions.  Musculoskeletal no pain to spine percussion or lateral long bone pressure.  Neurologic cranial nerves II through XII intact.  Upper extremity strength in all 4 extremity reflexes are equal normal bilaterally.    DIAGNOSTIC REPORTS REVIEWED:   Imaging: I have reviewed the MRI of the liver from May 2018, October 2018, in January 2019 with Dr. Schafer.  He says that the study protocol is not ideal for examining hepatocellular carcinoma.  After extensively reviewing all of the above images, the tumor seems to have progressed from 5 x 4 cm in May to 6 x 4 cm in October and was minimally changed on the MR in January.  Laboratory/Pathology: CBC and CMP and LDH today are essentially normal.  Procedure: None.     "     Assessment/Plan    IMPRESSION:   #1 hepatocellular carcinoma.  I do not have clear and unequivocal radiographic evidence of significant disease progression as was stated by [facility].  I understand there was a significant change in the alpha-fetoprotein levels which I do not have access to however using so-called tumor markers for assessment of disease progression and response is not generally recognized as validated.  2.  Sorafenib therapy.  Patient is not tolerating this.  This medication has certainly some efficacy in hepatocellular carcinoma but would not be expected to have benefit significantly exceeding the risks and side effects the patient is currently exceeding.    I have explained to him that all of our medicines for hepatocellular carcinoma have some effectiveness but this is certainly not curative and the even the ability to double prolongation of survival has not been shown.  All of them have side effects which she certainly is experiencing.  What I think is most important is that I have no radiographic evidence that this patient's tumor is dramatically progressing.  In my discussions with him, he says that if I could assure him this is very slow growing that he would not want therapy.  From what labs I have, he would certainly seem to be a child's Dela Cruz class A and eligible for most therapies.    PLAN:   I have discussed all of the above with him and the plan we have come up with is as follows-he will stop this are after him and I will see him back in 6 weeks with an MR of the liver.  We need to do that here as previous MRIs have not been done with the protocol necessary to get accurate tumor assessment as above.  If his liver is not showing clear progression of the hepatocellular carcinoma, we may simply watch him.  Otherwise I think Pembrolizumab would be a reasonable option if he accepts the risks.         Physician: Daniel Arnold MD

## 2019-04-03 ENCOUNTER — TELEPHONE (OUTPATIENT)
Dept: ONCOLOGY | Facility: CLINIC | Age: 69
End: 2019-04-03

## 2019-04-03 NOTE — TELEPHONE ENCOUNTER
Spoke with Josephine - sent her Dr. Arnold's Note.    ----- Message from Katrina Valverde RN sent at 4/1/2019 10:32 AM MDT -----  Left MS for Josephine.  Awaiting return call.    ----- Message -----  From: Manasa Francisco  Sent: 4/1/2019  10:19 AM  To: , Mirella Burton from [facility] called. She said that pt told her he was told by the dr here to stop taking all his medication and that he has a very aggressive cancer. Please call her back @ [phone] ext: [phone]

## 2019-05-03 ENCOUNTER — TELEPHONE (OUTPATIENT)
Dept: ONCOLOGY | Facility: CLINIC | Age: 69
End: 2019-05-03

## 2019-05-03 NOTE — TELEPHONE ENCOUNTER
Discussed with patient who verbalizes understanding to alternative methods for pain control, he is not able to take NSAIDs or acetaminophen but was encouraged to reach out to his doctor who prescribed dilaudid to see if there is a different dose to try. Heat and topical creams were discussed. No further questions or concerns but he was encouraged to call back if any arise before his appointment.     ----- Message from Elena Fulton CNP sent at 5/3/2019  3:50 PM MDT -----  Regarding: RE: joint pain  So is [facility] and has only been seen in our clinic once? We don't have a treatment plan or anything for him. From what I can tell in Dr. Arnold's only note is that the joint pain started with the chemo the first time he got it. And he's taking Dilaudid? Who prescribed him that? If it works can he just take less of it, cut the pill in half? Try heat/warm baths. It's hard to say with having limited information on whether it's still a residual side effect from the sorafenib or not but it likely could be. Has he tried just regular tylenol or ibuprofen? He can try OTC pain creams to just shoulders/knees if it's isolated to those spots.     ----- Message -----  From: Shannan Han RN  Sent: 5/3/2019   3:36 PM  To: Elena Fulton CNP  Subject: joint pain                                       Ben came in today with questions about his schedule but also mentioned concerns regarding pain in his shoulders and knees that started with chemo but has not decreased at all since taking a break from it. He reports that his pain medication works but he does not like to take them because they knock him out. Any thoughts on this? He sees RCT again on 5/22

## 2019-05-07 ENCOUNTER — ANESTHESIA EVENT (OUTPATIENT)
Dept: GASTROENTEROLOGY | Facility: HOSPITAL | Age: 69
End: 2019-05-07
Payer: COMMERCIAL

## 2019-05-07 ENCOUNTER — ANESTHESIA (OUTPATIENT)
Dept: GASTROENTEROLOGY | Facility: HOSPITAL | Age: 69
End: 2019-05-07
Payer: COMMERCIAL

## 2019-05-07 ENCOUNTER — HOSPITAL ENCOUNTER (OUTPATIENT)
Facility: HOSPITAL | Age: 69
Setting detail: OUTPATIENT SURGERY
Discharge: HOME | End: 2019-05-07
Attending: INTERNAL MEDICINE | Admitting: INTERNAL MEDICINE
Payer: COMMERCIAL

## 2019-05-07 VITALS
TEMPERATURE: 97.7 F | WEIGHT: 220 LBS | BODY MASS INDEX: 36.65 KG/M2 | DIASTOLIC BLOOD PRESSURE: 67 MMHG | OXYGEN SATURATION: 94 % | RESPIRATION RATE: 20 BRPM | HEART RATE: 67 BPM | HEIGHT: 65 IN | SYSTOLIC BLOOD PRESSURE: 127 MMHG

## 2019-05-07 LAB — GLUCOSE BLDC GLUCOMTR-MCNC: 172 MG/DL (ref 70–105)

## 2019-05-07 PROCEDURE — 2580000300 HC RX 258: Performed by: NURSE ANESTHETIST, CERTIFIED REGISTERED

## 2019-05-07 PROCEDURE — 82962 GLUCOSE BLOOD TEST: CPT

## 2019-05-07 PROCEDURE — 00731 ANES UPR GI NDSC PX NOS: CPT | Performed by: NURSE ANESTHETIST, CERTIFIED REGISTERED

## 2019-05-07 PROCEDURE — (BLANK) HC RECOVERY PHASE-2 EACH ADDITIONAL 1/2 HOUR ACUITY LEVEL 2: Performed by: INTERNAL MEDICINE

## 2019-05-07 PROCEDURE — (BLANK): Performed by: INTERNAL MEDICINE

## 2019-05-07 PROCEDURE — 2500000200 HC RX 250 WO HCPCS: Performed by: NURSE ANESTHETIST, CERTIFIED REGISTERED

## 2019-05-07 PROCEDURE — (BLANK) HC RECOVERY PHASE-2 1ST 1/2 HOUR ACUITY LEVEL 2: Performed by: INTERNAL MEDICINE

## 2019-05-07 PROCEDURE — 6360000200 HC RX 636 W HCPCS (ALT 250 FOR IP): Mod: JW | Performed by: NURSE ANESTHETIST, CERTIFIED REGISTERED

## 2019-05-07 RX ORDER — SODIUM CHLORIDE, SODIUM LACTATE, POTASSIUM CHLORIDE, CALCIUM CHLORIDE 600; 310; 30; 20 MG/100ML; MG/100ML; MG/100ML; MG/100ML
INJECTION, SOLUTION INTRAVENOUS CONTINUOUS PRN
Status: DISCONTINUED | OUTPATIENT
Start: 2019-05-07 | End: 2019-05-07 | Stop reason: SURG

## 2019-05-07 RX ORDER — TOPICAL ANESTHETIC 200 MG/ML
SPRAY DENTAL; PERIODONTAL AS NEEDED
Status: DISCONTINUED | OUTPATIENT
Start: 2019-05-07 | End: 2019-05-07 | Stop reason: SURG

## 2019-05-07 RX ORDER — PROPOFOL 10 MG/ML
INJECTION, EMULSION INTRAVENOUS CONTINUOUS PRN
Status: DISCONTINUED | OUTPATIENT
Start: 2019-05-07 | End: 2019-05-07 | Stop reason: SURG

## 2019-05-07 RX ADMIN — SODIUM CHLORIDE, POTASSIUM CHLORIDE, SODIUM LACTATE AND CALCIUM CHLORIDE: 600; 310; 30; 20 INJECTION, SOLUTION INTRAVENOUS at 08:34

## 2019-05-07 RX ADMIN — TOPICAL ANESTHETIC 1 APPLICATION: 200 SPRAY DENTAL; PERIODONTAL at 08:42

## 2019-05-07 RX ADMIN — DEXMEDETOMIDINE HYDROCHLORIDE 4 MCG: 4 INJECTION, SOLUTION INTRAVENOUS at 08:42

## 2019-05-07 RX ADMIN — DEXMEDETOMIDINE HYDROCHLORIDE 4 MCG: 4 INJECTION, SOLUTION INTRAVENOUS at 08:48

## 2019-05-07 RX ADMIN — PROPOFOL 100 MCG/KG/MIN: 10 INJECTION, EMULSION INTRAVENOUS at 08:46

## 2019-05-07 ASSESSMENT — ENCOUNTER SYMPTOMS
EXERCISE TOLERANCE: GOOD (4-7 METS)
JAUNDICE: 1

## 2019-05-07 ASSESSMENT — COPD QUESTIONNAIRES
COPD: 1
CAT_SEVERITY: MILD

## 2019-05-07 NOTE — H&P
HPI  HPI   This is a 69-year-old man with a history of hepatitis C and cirrhosis who has unfortunately developed hepatocellular carcinoma and discontinued Sorafenib due to toxicity a few weeks ago.  He had esophageal varices banded x2 in February 2018 and is here for surveillance esophagogastroduodenoscopy with possible esophageal variceal ligation.  No history of recent GI bleeding.    Patient Active Problem List   Diagnosis   • Chronic hepatitis C with cirrhosis (CMS/HCC) (HCC)   • Type 2 diabetes mellitus with neurologic complication (CMS/HCC) (HCC)   • Multiple food allergies   • Gastric erosion   • Noncompliance with therapeutic plan   • Essential hypertension   • History of esophageal varices   • No-show for appointment   • Hepatocellular carcinoma (CMS/HCC) (HCC)     Past Medical History:   Diagnosis Date   • A-fib (CMS/HCC) (HCC)    • Allergy    • Anxiety    • Arthritis    • Asthma    • Blindness of right eye    • BPH (benign prostatic hyperplasia)    • Cardiovascular disease    • Cirrhosis (CMS/HCC) (HCC)     with possible liver mass by MRI 5/18, rec repeat in 8/2018   • Complication of anesthesia    • COPD (chronic obstructive pulmonary disease) (CMS/HCC) (HCC)    • Depression    • Endocrine disorder    • Gallstones    • Gastritis    • GERD (gastroesophageal reflux disease)    • Glaucoma    • Hearing loss     bilateral hearing aids   • Hemorrhoids    • Hepatitis C     Hep C, 2016 remission, complicated by cirrhosis.  MRI abd 5/30/18, rec 3 month follow up for focus of enhancement right and left hepatic lobe junction   • Hernia, abdominal    • High blood pressure    • IBS (irritable bowel syndrome)    • Infectious viral hepatitis    • Jaundice    • Kidney stones    • Obstructive sleep apnea syndrome    • Periodic limb movement disorder    • Persistent hypersomnia    • Persistent insomnia    • Pneumonia    • PONV (postoperative nausea and vomiting)    • Type 2 diabetes mellitus (CMS/HCC) (HCC)    • Urinary  incontinence    • Wears partial dentures      Prior to Admission medications    Medication Sig Start Date End Date Taking? Authorizing Provider   primidone (MYSOLINE) 50 mg tablet  3/11/19  Yes Historical Provider, MD   doxycycline (MONODOX) 100 mg capsule Take 200 mg by mouth 2/28/19  Yes Historical Provider, MD   ezetimibe (ZETIA) 10 mg tablet Take 1 tablet (10 mg total) by mouth daily 3/12/19  Yes Katelyn Alfaro MD   hydrOXYzine (ATARAX) 25 mg tablet Take 1 tablet (25 mg total) by mouth nightly 3/12/19  Yes Katelyn Alfaro MD   escitalopram (LEXAPRO) 10 mg tablet Take 1 tablet (10 mg total) by mouth daily 3/12/19 6/10/19 Yes Katelyn Alfaro MD   carvedilol (COREG) 3.125 mg tablet Take 1 tablet (3.125 mg total) by mouth 2 (two) times a day with meals 3/12/19  Yes Katelyn Alfaro MD   buPROPion SR (WELLBUTRIN SR) 100 mg 12 hr tablet Take 1 tablet (100 mg total) by mouth daily 3/12/19  Yes Katelyn Alfaro MD   lactulose (GENERLAC) 10 gram/15 mL solution Take 15 mL (10 g total) by mouth daily 2/7/19  Yes Katelyn Alfaro MD   terazosin (HYTRIN) 5 mg capsule Take 1 capsule (5 mg total) by mouth 2 (two) times a day 1/29/19  Yes Katelyn Alfaro MD   HYDROmorphone (DILAUDID) 2 mg tablet Take 2 tab/cap by mouth 3 (three) times a day as needed for pain scale 1-3/10, pain scale 4-7/10 or pain scale 8-10/10 (Cancer and joint pain)    Yes Historical Provider, MD   SORAfenib (NexAVAR) 200 mg chemo tablet Take 2 tab/cap by mouth 2 (two) times a day   Yes Historical Provider, MD   lisinopril (PRINIVIL,ZESTRIL) 20 mg tablet Take 1 tablet (20 mg total) by mouth daily 1/6/19  Yes Katelyn Alfaro MD   furosemide (LASIX) 20 mg tablet Take 1 tablet (20 mg total) by mouth 2 (two) times a day.  Patient taking differently: Take 20 mg by mouth daily   7/6/18  Yes Katelyn Alfaro MD   lansoprazole (PREVACID) 30 mg capsule Take 30 mg by mouth daily  4/11/18  Yes Historical Provider, MD   liraglutide (VICTOZA 2-JASE) 0.6 mg/0.1 mL (18 mg/3 mL)  injection Inject 1.1 mg under the skin daily     Yes Historical Provider, MD   capsaicin (ZOSTRIX) 0.025 % cream Apply 1 application topically as needed for pain scale 1-3/10, 1-3/8.   Yes Historical Provider, MD   pramoxine-menthol (GOLD BOND MEDICATED ANTI-ITCH) 1-1 % cream Apply topically.   Yes Historical Provider, MD   brimonidine (ALPHAGAN) 0.2 % ophthalmic solution every 12 hours.   Yes Historical Provider, MD   GLYCERIN/PROPYLENE GLYCOL (ARTIFICIAL TEARS,GLYCERIN-PEG, OPHT) Administer 1 drop into affected eye(s) as needed (Dry eyes)    Yes Historical Provider, MD   latanoprost (XALATAN) 0.005 % ophthalmic solution INSTILL 1 DROP INTO AFFECTED EYE(S) BY OPHTHALMIC ROUTE ONCE DAILY INTHE EVENING 3/10/17  Yes Conversion Provider   blood-glucose meter (ACCU-CHEK ADELE PLUS METER) [facility] Use to test blood sugars 3 times daily (E11.65, non insulin depedent) AccuChek Adele plus, meter is 8 years old  Patient taking differently: Use to test blood sugars 3 times daily (E11.65, non insulin dependent)  1/23/19   Katelyn Alfaro MD   sodium chloride (SALINE NASAL) 0.65 % nasal spray Administer 1 spray into each nostril as needed for congestion or rhinitis     Historical Provider, MD   white petrolatum (AQUAPHOR HEALING) 41 % ointment ointment Apply 1 application topically as needed.    Historical Provider, MD     Allergies   Allergen Reactions   • Penicillins Anaphylaxis   • Metformin Hives   • Adhesive Tape-Silicones    • Banana    • Broccoli    • [place] Sprout    • Cabbage    • Cantaloupe    • Celery    • Cheese    • Cranberry    • Egg    • Interferon Jose Maria (Human Leuk. Derived)    • Interferon Jose Maria-2a    • Latex    • Legumes      Beans   • Lettuce    • Milk    • Mometasone    • Mometasone Furoate    • Mustard    • Nut - Unspecified      Almonds   • Oats    • Omeprazole    • Onion    • Pepper (Genus Capsicum)      Chili pepper   • Pineapple    • Ribavirin Itching   • Rosuvastatin    • Spironolactone    • Statins-Hmg-Coa  Reductase Inhibitors Itching and GI intolerance   • Wheat    • Yeast    • Pantoprazole Itching and Rash   • Peginterferon Jose Maria-2b Palpitations     Social History     Tobacco Use   • Smoking status: Never Smoker   • Smokeless tobacco: Never Used   Substance Use Topics   • Alcohol use: No     Family History   Problem Relation Age of Onset   • Arthritis Mother    • Rheum arthritis Mother    • Diabetes Mother    • Rectal cancer Mother    • Other Paternal Grandmother    • Other Paternal Grandfather    • Diabetes Other    • Rheum arthritis Other    • Osteoporosis Other      Physical Exam  Well-developed well-nourished white man who appears his stated age of 69 years.  Comfortable at rest on room air.  Alert and oriented.  Anicteric.  No asterixis or tremor.  Mallampati class II pharynx.  Chest clear to auscultation bilaterally.  Cardiac exam reveals normal S1-S2.  No S3 or S4.  No murmurs clicks or rubs audible.  Abdomen is obese, no ascites evident.    Impression/Plan   61-year-old man with history of HCV, cirrhosis, hepatoma, and varices here for surveillance endoscopy and possible endoscopic variceal ligation.  Risks benefits indications and alternatives were reviewed in detail with the patient and informed consent was obtained for the procedure.    Anesthesia Evaluation   The patient is an appropriate candidate for the planned procedure with monitored anesthesia care administered by CRNA.

## 2019-05-07 NOTE — ANESTHESIA PREPROCEDURE EVALUATION
"Pre-Procedure Assessment    Patient: Ben Lai, male, 69 y.o.    Ht Readings from Last 1 Encounters:   05/07/19 1.651 m (5' 5\")     Wt Readings from Last 1 Encounters:   05/07/19 99.8 kg (220 lb)       Last Vitals  /69 (05/07/19 0800)    Temp 36.2 °C (97.2 °F) (05/07/19 0800)    Pulse 60 (05/07/19 0800)   Resp 16 (05/07/19 0800)    SpO2 95 % (05/07/19 0800)    Pain Score         Problem list reviewed and Medical history reviewed    History of anesthetic complications:          Airway   Mallampati: II  TM distance: >3 FB  Neck ROM: limited      Dental      Pulmonary - normal exam    breath sounds clear to auscultation  (+) pneumonia, COPD mild, asthma, sleep apnea,   Cardiovascular - normal exam  Exercise tolerance: good (4-7 METS)  (+) hypertension,     Rhythm: regular    Mental Status/Neuro/Psych    Pt is alert.      (+) arthritis,     GI/Hepatic/Renal    (+) GERD, hepatitis C, liver disease, jaundice,     Endo/Other    (+) diabetes mellitus type 2 using insulin, history of cancer,   Abdominal     Abdomen: soft.  Bowel sounds: normal.          Social History     Tobacco Use   • Smoking status: Never Smoker   • Smokeless tobacco: Never Used   Substance Use Topics   • Alcohol use: No      Hematology   Lab Results   Component Value Date/Time    WBC 4.5 03/27/2019 07:35 AM    RBC 4.91 03/27/2019 07:35 AM    MCV 85.6 03/27/2019 07:35 AM    HGB 14.3 03/27/2019 07:35 AM    HCT 42.0 03/27/2019 07:35 AM     (L) 03/27/2019 07:35 AM      Coagulation No results found for: PT, APTT, INR   General Chemistry   Lab Results   Component Value Date/Time    POCGLU 172 (H) 05/07/2019 07:54 AM    CALCIUM 9.2 03/27/2019 07:35 AM    BUN 18 03/27/2019 07:35 AM    CREATININE 0.79 03/27/2019 07:35 AM    GLUCOSE 177 (H) 03/27/2019 07:35 AM     03/27/2019 07:35 AM    K 3.7 03/27/2019 07:35 AM    MG 1.9 12/02/2018 05:38 PM    CO2 27 03/27/2019 07:35 AM     03/27/2019 07:35 AM     Anesthesia Plan    ASA 3   NPO " status reviewed: > 8 hours    MAC         Induction: intravenous       Additional Comments: Patient met and Id'd in Pre op holding, interview completed, risks explained, Anesthetic Plan reviewed, Questions answered, Consent Signed.          Anesthetic plan and risks discussed with patient.  Use of blood products discussed with patient whom.     Plan discussed with attending.

## 2019-05-07 NOTE — ANESTHESIA POSTPROCEDURE EVALUATION
Patient: Ben Lai    Procedure Summary     Date:  05/07/19 Room / Location:  [facility] / [facility]    Anesthesia Start:  0834 Anesthesia Stop:  0900    Procedure:  EGD - ESOPHAGOGASTRODUODENOSCOPY (N/A Anus) Diagnosis:       Esophageal varices without bleeding, unspecified esophageal varices type (CMS/HCC) (HCC)      (portal hypertension gasteropathy)    Provider:  William Rivas MD Responsible Provider:  William Rivas MD    Anesthesia Type:  MAC ASA Status:  3          Anesthesia Type: MAC    Last vitals  Vitals Value Taken Time   BP     Temp     Pulse     Resp     SpO2     Pain Score         Anesthesia Post Evaluation    Patient location during evaluation: bedside  Patient participation: complete - patient participated  Level of consciousness: awake and alert  Pain management: adequate  Airway patency: patent  Anesthetic complications: no  Cardiovascular status: acceptable and stable  Respiratory status: acceptable and room air  Hydration status: acceptable  May dismiss recovered patient based on consultation with the appropriate physicians and/or meeting appropriate discharge criteria

## 2019-05-22 ENCOUNTER — HOSPITAL ENCOUNTER (OUTPATIENT)
Dept: MRI IMAGING | Facility: HOSPITAL | Age: 69
Discharge: HOME | End: 2019-05-22
Attending: INTERNAL MEDICINE
Payer: COMMERCIAL

## 2019-05-22 ENCOUNTER — OFFICE VISIT (OUTPATIENT)
Dept: ONCOLOGY | Facility: CLINIC | Age: 69
End: 2019-05-22
Payer: COMMERCIAL

## 2019-05-22 ENCOUNTER — APPOINTMENT (OUTPATIENT)
Dept: ONCOLOGY | Facility: CLINIC | Age: 69
End: 2019-05-22
Payer: COMMERCIAL

## 2019-05-22 VITALS
DIASTOLIC BLOOD PRESSURE: 69 MMHG | HEART RATE: 68 BPM | WEIGHT: 217.8 LBS | TEMPERATURE: 98 F | SYSTOLIC BLOOD PRESSURE: 117 MMHG | BODY MASS INDEX: 36.24 KG/M2 | OXYGEN SATURATION: 94 %

## 2019-05-22 DIAGNOSIS — C22.0 HEPATOCELLULAR CARCINOMA (CMS/HCC): ICD-10-CM

## 2019-05-22 LAB
ALBUMIN SERPL-MCNC: 4.4 G/DL (ref 3.5–5.3)
ALP SERPL-CCNC: 87 U/L (ref 45–115)
ALT SERPL-CCNC: 20 U/L (ref 0–52)
ANION GAP SERPL CALC-SCNC: 9 MMOL/L (ref 3–11)
AST SERPL-CCNC: 20 U/L (ref 0–39)
BILIRUB SERPL-MCNC: 0.62 MG/DL (ref 0–1.4)
BUN SERPL-MCNC: 28 MG/DL (ref 7–25)
CALCIUM ALBUM COR SERPL-MCNC: 9.4 MG/DL (ref 8.6–10.3)
CALCIUM SERPL-MCNC: 9.7 MG/DL (ref 8.6–10.3)
CHLORIDE SERPL-SCNC: 100 MMOL/L (ref 98–107)
CO2 SERPL-SCNC: 27 MMOL/L (ref 21–32)
CREAT SERPL-MCNC: 0.92 MG/DL (ref 0.7–1.3)
ERYTHROCYTE [DISTWIDTH] IN BLOOD BY AUTOMATED COUNT: 14.5 % (ref 11.5–15)
GFR SERPL CREATININE-BSD FRML MDRD: 85 ML/MIN/1.73M*2
GLUCOSE SERPL-MCNC: 154 MG/DL (ref 70–105)
HCT VFR BLD AUTO: 43.4 % (ref 38–50)
HGB BLD-MCNC: 14.5 G/DL (ref 13.2–17.2)
INR BLD: 1.1 INR
LDH SERPL L TO P-CCNC: 197 U/L (ref 87–246)
LYMPHOCYTES # BLD MANUAL: 1.7 10*3/UL
LYMPHOCYTES NFR BLD MANUAL: 21 % (ref 15–47)
MCH RBC QN AUTO: 29.6 PG (ref 29–34)
MCHC RBC AUTO-ENTMCNC: 33.5 G/DL (ref 32–36)
MCV RBC AUTO: 88.3 FL (ref 82–97)
METAMYELOCYTES # BLD MANUAL: 0.2 10*3/UL
METAMYELOCYTES NFR BLD MANUAL: 3 % (ref 0–0)
MONOCYTES # BLD MANUAL: 0.4 10*3/UL
MONOCYTES NFR BLD MANUAL: 5 % (ref 5–13)
NEUTROPHILS # BLD MANUAL: 5.61 10*3/UL
NEUTS SEG # BLD MANUAL: 5.6 10*3/UL
NEUTS SEG NFR BLD MANUAL: 71 % (ref 46–70)
PLATELET # BLD AUTO: 162 10*3/UL (ref 130–350)
PLATELET # BLD EST: ADEQUATE 10*3/UL
PMV BLD AUTO: 7.9 FL (ref 6.9–10.8)
POTASSIUM SERPL-SCNC: 4.3 MMOL/L (ref 3.5–5.1)
PROT SERPL-MCNC: 7 G/DL (ref 6–8.3)
PROTHROMBIN TIME: 12.5 S (ref 9.4–12.5)
RBC # BLD AUTO: 4.91 10*6/ΜL (ref 4.1–5.8)
RBC MORPH BLD: NORMAL
SODIUM SERPL-SCNC: 136 MMOL/L (ref 135–145)
TOTAL CELLS COUNTED BLD: 100 CELLS
WBC # BLD AUTO: 7.9 10*3/UL (ref 3.7–9.6)
WBC NRBC COR # BLD: 7.9 10*3/UL

## 2019-05-22 PROCEDURE — 74183 MRI ABD W/O CNTR FLWD CNTR: CPT

## 2019-05-22 PROCEDURE — 83615 LACTATE (LD) (LDH) ENZYME: CPT

## 2019-05-22 PROCEDURE — 99214 OFFICE O/P EST MOD 30 MIN: CPT | Performed by: INTERNAL MEDICINE

## 2019-05-22 PROCEDURE — G0463 HOSPITAL OUTPT CLINIC VISIT: HCPCS

## 2019-05-22 PROCEDURE — A9577 INJ MULTIHANCE: HCPCS | Performed by: INTERNAL MEDICINE

## 2019-05-22 PROCEDURE — 36415 COLL VENOUS BLD VENIPUNCTURE: CPT

## 2019-05-22 PROCEDURE — 85610 PROTHROMBIN TIME: CPT

## 2019-05-22 PROCEDURE — 85025 COMPLETE CBC W/AUTO DIFF WBC: CPT

## 2019-05-22 PROCEDURE — 2550000100 HC RX 255: Performed by: INTERNAL MEDICINE

## 2019-05-22 PROCEDURE — 80053 COMPREHEN METABOLIC PANEL: CPT

## 2019-05-22 RX ORDER — INSULIN GLARGINE 100 [IU]/ML
14 INJECTION, SOLUTION SUBCUTANEOUS DAILY
COMMUNITY
Start: 2019-04-19 | End: 2019-12-19 | Stop reason: ALTCHOICE

## 2019-05-22 RX ADMIN — GADOBENATE DIMEGLUMINE 15 ML: 529 INJECTION, SOLUTION INTRAVENOUS at 08:32

## 2019-05-22 ASSESSMENT — PAIN SCALES - GENERAL: PAINLEVEL: 0-NO PAIN

## 2019-05-22 NOTE — PROGRESS NOTES
Medical Oncology Follow-Up    Subjective   HISTORY OF PRESENT ILLNESS:  Ben Lai is a 69 y.o. male who  was diagnosed with hepatocellular carcinoma in November 2018.  He has an underlying hepatitis C which was supposedly cured.  He was started on sorafenib early December 2018 but this appears to have been stopped in February due to significant side effects.  Patient states unequivocally that he started to feel better after a 3-week break but the sorafenib was recently restarted and he is having significant quality of life compromising side effects again.  This includes severe joint and muscle pain and fatigue and inanition.    He feels well and feels he is fairly dramatically recovering from the side effects of the sorafenib.          Encounter Diagnosis   Name Primary?   • Hepatocellular carcinoma (CMS/HCC) (HCC)    .     Treatment History:   No history exists.         RECENT EVENTS:    PAST MEDICAL HISTORY:   Past Medical History:   Diagnosis Date   • A-fib (CMS/HCC) (HCC)    • Allergy    • Anxiety    • Arthritis    • Asthma    • Blindness of right eye    • BPH (benign prostatic hyperplasia)    • Cardiovascular disease    • Cirrhosis (CMS/HCC) (HCC)     with possible liver mass by MRI 5/18, rec repeat in 8/2018   • Complication of anesthesia    • COPD (chronic obstructive pulmonary disease) (CMS/HCC) (HCC)    • Depression    • Endocrine disorder    • Gallstones    • Gastritis    • GERD (gastroesophageal reflux disease)    • Glaucoma    • Hearing loss     bilateral hearing aids   • Hemorrhoids    • Hepatitis C     Hep C, 2016 remission, complicated by cirrhosis.  MRI abd 5/30/18, rec 3 month follow up for focus of enhancement right and left hepatic lobe junction   • Hernia, abdominal    • High blood pressure    • IBS (irritable bowel syndrome)    • Infectious viral hepatitis    • Jaundice    • Kidney stones    • Obstructive sleep apnea syndrome    • Periodic limb movement disorder    • Persistent hypersomnia     • Persistent insomnia    • Pneumonia    • PONV (postoperative nausea and vomiting)    • Type 2 diabetes mellitus (CMS/HCC) ([facility])    • Urinary incontinence    • Wears partial dentures         GYNECOLOGICAL HISTORY (if applicable): NA     FAMILY HISTORY:   Family History   Problem Relation Age of Onset   • Arthritis Mother    • Rheum arthritis Mother    • Diabetes Mother    • Rectal cancer Mother    • Other Paternal Grandmother    • Other Paternal Grandfather    • Diabetes Other    • Rheum arthritis Other    • Osteoporosis Other         SOCIAL HISTORY:   Social History     Socioeconomic History   • Marital status:      Spouse name: Not on file   • Number of children: Not on file   • Years of education: Not on file   • Highest education level: Not on file   Occupational History   • Not on file   Social Needs   • Financial resource strain: Not on file   • Food insecurity:     Worry: Not on file     Inability: Not on file   • Transportation needs:     Medical: Not on file     Non-medical: Not on file   Tobacco Use   • Smoking status: Never Smoker   • Smokeless tobacco: Never Used   Substance and Sexual Activity   • Alcohol use: No   • Drug use: No   • Sexual activity: Not on file   Lifestyle   • Physical activity:     Days per week: Not on file     Minutes per session: Not on file   • Stress: Not on file   Relationships   • Social connections:     Talks on phone: Not on file     Gets together: Not on file     Attends Worship service: Not on file     Active member of club or organization: Not on file     Attends meetings of clubs or organizations: Not on file     Relationship status: Not on file   • Intimate partner violence:     Fear of current or ex partner: Not on file     Emotionally abused: Not on file     Physically abused: Not on file     Forced sexual activity: Not on file   Other Topics Concern   • Not on file   Social History Narrative   • Not on file        ALLERGIES:   Allergies as of 05/22/2019 -  Reviewed 05/07/2019   Allergen Reaction Noted   • Penicillins Anaphylaxis 10/19/2017   • Metformin Hives    • Adhesive tape-silicones  04/06/2018   • Banana  02/05/2019   • Broccoli  02/05/2019   • [place] sprout  02/05/2019   • Cabbage  02/05/2019   • Cantaloupe  02/05/2019   • Celery  02/05/2019   • Cheese  02/05/2019   • Cranberry  02/05/2019   • Egg     • Interferon violetta (human leuk. derived)     • Interferon violetta-2a  02/05/2019   • Latex     • Legumes  02/05/2019   • Lettuce  02/05/2019   • Milk  02/05/2019   • Mometasone     • Mometasone furoate     • Mustard  02/05/2019   • Nut - unspecified  02/05/2019   • Oats  02/05/2019   • Omeprazole     • Onion  02/05/2019   • Pepper (genus capsicum)  02/05/2019   • Pineapple  02/05/2019   • Ribavirin Itching 10/19/2017   • Rosuvastatin  02/05/2019   • Spironolactone     • Statins-hmg-coa reductase inhibitors Itching and GI intolerance 04/06/2018   • Wheat  02/05/2019   • Yeast  02/05/2019   • Pantoprazole Itching and Rash 02/28/2018   • Peginterferon violetta-2b Palpitations 10/19/2017        MEDICATIONS:   Current Outpatient Medications   Medication Sig Dispense Refill   • primidone (MYSOLINE) 50 mg tablet      • doxycycline (MONODOX) 100 mg capsule Take 200 mg by mouth  0   • ezetimibe (ZETIA) 10 mg tablet Take 1 tablet (10 mg total) by mouth daily 90 tablet 2   • hydrOXYzine (ATARAX) 25 mg tablet Take 1 tablet (25 mg total) by mouth nightly 90 tablet 2   • escitalopram (LEXAPRO) 10 mg tablet Take 1 tablet (10 mg total) by mouth daily 90 tablet 2   • carvedilol (COREG) 3.125 mg tablet Take 1 tablet (3.125 mg total) by mouth 2 (two) times a day with meals 180 tablet 2   • buPROPion SR (WELLBUTRIN SR) 100 mg 12 hr tablet Take 1 tablet (100 mg total) by mouth daily 90 tablet 1   • lactulose (GENERLAC) 10 gram/15 mL solution Take 15 mL (10 g total) by mouth daily 473 mL 2   • terazosin (HYTRIN) 5 mg capsule Take 1 capsule (5 mg total) by mouth 2 (two) times a day 180  capsule 2   • HYDROmorphone (DILAUDID) 2 mg tablet Take 2 tab/cap by mouth 3 (three) times a day as needed for pain scale 1-3/10, pain scale 4-7/10 or pain scale 8-10/10 (Cancer and joint pain)      • SORAfenib (NexAVAR) 200 mg chemo tablet Take 2 tab/cap by mouth 2 (two) times a day     • blood-glucose meter (ACCU-CHEK ADELE PLUS METER) [facility] [place] to test blood sugars 3 times daily (E11.65, non insulin depedent) AccuChek Adele plus, meter is 8 years old (Patient taking differently: Use to test blood sugars 3 times daily (E11.65, non insulin dependent) ) 1 each 0   • lisinopril (PRINIVIL,ZESTRIL) 20 mg tablet Take 1 tablet (20 mg total) by mouth daily 90 tablet 2   • furosemide (LASIX) 20 mg tablet Take 1 tablet (20 mg total) by mouth 2 (two) times a day. (Patient taking differently: Take 20 mg by mouth daily  ) 180 tablet 2   • lansoprazole (PREVACID) 30 mg capsule Take 30 mg by mouth daily      • liraglutide (VICTOZA 2-JASE) 0.6 mg/0.1 mL (18 mg/3 mL) injection Inject 1.1 mg under the skin daily       • capsaicin (ZOSTRIX) 0.025 % cream Apply 1 application topically as needed for pain scale 1-3/10, 1-3/8.     • sodium chloride (SALINE NASAL) 0.65 % nasal spray Administer 1 spray into each nostril as needed for congestion or rhinitis      • white petrolatum (AQUAPHOR HEALING) 41 % ointment ointment Apply 1 application topically as needed.     • pramoxine-menthol (GOLD BOND MEDICATED ANTI-ITCH) 1-1 % cream Apply topically.     • brimonidine (ALPHAGAN) 0.2 % ophthalmic solution every 12 hours.     • GLYCERIN/PROPYLENE GLYCOL (ARTIFICIAL TEARS,GLYCERIN-PEG, OPHT) Administer 1 drop into affected eye(s) as needed (Dry eyes)      • latanoprost (XALATAN) 0.005 % ophthalmic solution INSTILL 1 DROP INTO AFFECTED EYE(S) BY OPHTHALMIC ROUTE ONCE DAILY INTHE EVENING 10 0     No current facility-administered medications for this visit.        REVIEW OF SYSTEMS:   Review of Systems - Oncology    The ECOG performance status today is  1 - Restricted strenuous activity; able to carry out light work.          Objective    PHYSICAL EXAM:   There were no vitals taken for this visit.   There is no height or weight on file to calculate BMI.       Physical Exam reveals a generally healthy pleasant appearing gentleman who was not further examined based on the below.    DIAGNOSTIC REPORTS REVIEWED:   Imaging: MR is read as portal venous phase postcontrast images appear smaller compared to 22 January 2019 suggesting some improvement.  Most certainly the report does not suggest progression.  Laboratory/Pathology: CBC and CMP are unremarkable.  In the labs that I have, his liver enzymes have never been elevated.  Procedure: None.         Assessment/Plan    IMPRESSION:   Hepatocellular carcinoma.  Subjectively the patient is stable regards potential symptoms of progressing hepatocellular carcinoma and he is subjectively improved from his symptoms from taking the sorafenib.  Radiographically there may be some response although it is impossible I think to be certain this is a residual effect from sorafenib treatment.    I have offered him for choices:  1.  Referral back to [facility] though I do not think that is necessary at this time.  2.  We could restart the sorafenib at a very low dose, something like 200 mg every other day and see if he could tolerate it and see if we could still get some benefit.  3.  I did discuss with him the last time about his desires and he did say that if the tumor was slow-growing he did not want additional therapy.  I did suggest that it would be very reasonable at this time to not provide him therapy for his hepatocellular carcinoma and see him back in 3 months with an MR to  progress.  4.  There are number of other agents including lenvatinib and cabozantinib that we could try.    After extensive discussions he really does desire #3 as above.    PLAN:   We will see him back in 3 months with an MR.  I think it would be  very reasonable to try sorafenib 200 mg every other day with progression if he desires treatment or certainly switching to any of the other agents that have activity in hepatocellular carcinoma and would hopefully be better tolerated would certainly be reasonable also.         Physician: Daniel Arnold MD

## 2019-06-12 ENCOUNTER — TELEPHONE (OUTPATIENT)
Dept: ONCOLOGY | Facility: CLINIC | Age: 69
End: 2019-06-12

## 2019-06-12 NOTE — TELEPHONE ENCOUNTER
Patient states he uses a CPAP at night when laying down and thinks oxygen may be helpful for him when he lays down for the MRI. I advised him to request it when he is there for his scan. He verbalizes understanding.    ----- Message from Bertha Reddy sent at 6/12/2019 10:32 AM MDT -----  Pt had a hard time for last MRI-has problems breathing and issues with his muscles sitting still for long period of time. Please call pt

## 2019-07-24 ENCOUNTER — TELEPHONE (OUTPATIENT)
Dept: ONCOLOGY | Facility: CLINIC | Age: 69
End: 2019-07-24

## 2019-07-24 NOTE — TELEPHONE ENCOUNTER
Patient had seen Ray in November and had a CT which showed some pulmonary nodules. He forgot to mention them to Dr. Arnold when he was in and thought they needed to be followed up on soon. I discussed with Dr. Reynoso who agrees a CT chest to re-evaluate these nodules. Notified patient that we will be contacting him to set up a CT to evaluate this.    ----- Message from Manasa Francisco sent at 7/24/2019  9:18 AM MDT -----  Contact: [phone]  WHEN MAKING REMINDER CALLS, PT ASKED IF THE SCAN COULD BE A FULL BODY SCAN? PLEASE CALL PT BACK

## 2019-07-29 ENCOUNTER — OFFICE VISIT (OUTPATIENT)
Dept: ONCOLOGY | Facility: CLINIC | Age: 69
End: 2019-07-29
Payer: COMMERCIAL

## 2019-07-29 ENCOUNTER — APPOINTMENT (OUTPATIENT)
Dept: ONCOLOGY | Facility: CLINIC | Age: 69
End: 2019-07-29
Payer: COMMERCIAL

## 2019-07-29 ENCOUNTER — HOSPITAL ENCOUNTER (OUTPATIENT)
Dept: CT IMAGING | Facility: HOSPITAL | Age: 69
Discharge: HOME | End: 2019-07-29
Payer: COMMERCIAL

## 2019-07-29 ENCOUNTER — HOSPITAL ENCOUNTER (OUTPATIENT)
Dept: MRI IMAGING | Facility: HOSPITAL | Age: 69
Discharge: HOME | End: 2019-07-29
Attending: INTERNAL MEDICINE
Payer: COMMERCIAL

## 2019-07-29 VITALS
HEART RATE: 57 BPM | SYSTOLIC BLOOD PRESSURE: 140 MMHG | DIASTOLIC BLOOD PRESSURE: 75 MMHG | WEIGHT: 223.8 LBS | TEMPERATURE: 97.8 F | OXYGEN SATURATION: 96 % | BODY MASS INDEX: 37.24 KG/M2

## 2019-07-29 DIAGNOSIS — C22.0 HEPATOCELLULAR CARCINOMA (CMS/HCC): ICD-10-CM

## 2019-07-29 LAB
ALBUMIN SERPL-MCNC: 4 G/DL (ref 3.5–5.3)
ALP SERPL-CCNC: 92 U/L (ref 45–115)
ALT SERPL-CCNC: 15 U/L (ref 0–52)
AMYLASE SERPL-CCNC: 41 U/L (ref 27–103)
ANION GAP SERPL CALC-SCNC: 7 MMOL/L (ref 3–11)
AST SERPL-CCNC: 21 U/L (ref 0–39)
BASOPHILS # BLD AUTO: 0 10*3/UL
BASOPHILS NFR BLD AUTO: 1 % (ref 0–2)
BILIRUB SERPL-MCNC: 0.5 MG/DL (ref 0–1.4)
BUN SERPL-MCNC: 21 MG/DL (ref 7–25)
CALCIUM ALBUM COR SERPL-MCNC: 9.7 MG/DL (ref 8.6–10.3)
CALCIUM SERPL-MCNC: 9.7 MG/DL (ref 8.6–10.3)
CHLORIDE SERPL-SCNC: 105 MMOL/L (ref 98–107)
CO2 SERPL-SCNC: 26 MMOL/L (ref 21–32)
CREAT SERPL-MCNC: 0.93 MG/DL (ref 0.7–1.3)
EOSINOPHIL # BLD AUTO: 0 10*3/UL
EOSINOPHIL NFR BLD AUTO: 1 % (ref 0–3)
ERYTHROCYTE [DISTWIDTH] IN BLOOD BY AUTOMATED COUNT: 14 % (ref 11.5–15)
GFR SERPL CREATININE-BSD FRML MDRD: 84 ML/MIN/1.73M*2
GLUCOSE SERPL-MCNC: 175 MG/DL (ref 70–105)
HCT VFR BLD AUTO: 40.6 % (ref 38–50)
HGB BLD-MCNC: 13.9 G/DL (ref 13.2–17.2)
INR BLD: 1.1 INR
LIPASE SERPL-CCNC: 32 U/L (ref 11–82)
LYMPHOCYTES # BLD AUTO: 0.7 10*3/UL
LYMPHOCYTES NFR BLD AUTO: 18 % (ref 15–47)
MCH RBC QN AUTO: 30.8 PG (ref 29–34)
MCHC RBC AUTO-ENTMCNC: 34.2 G/DL (ref 32–36)
MCV RBC AUTO: 90 FL (ref 82–97)
MONOCYTES # BLD AUTO: 0.4 10*3/UL
MONOCYTES NFR BLD AUTO: 10 % (ref 5–13)
NEUTROPHILS # BLD AUTO: 2.8 10*3/UL
NEUTROPHILS NFR BLD AUTO: 71 % (ref 46–70)
PLATELET # BLD AUTO: 129 10*3/UL (ref 130–350)
PMV BLD AUTO: 8 FL (ref 6.9–10.8)
POTASSIUM SERPL-SCNC: 3.9 MMOL/L (ref 3.5–5.1)
PROT SERPL-MCNC: 6.6 G/DL (ref 6–8.3)
PROTHROMBIN TIME: 12.4 S (ref 9.4–12.5)
RBC # BLD AUTO: 4.51 10*6/ΜL (ref 4.1–5.8)
SODIUM SERPL-SCNC: 138 MMOL/L (ref 135–145)
TSH SERPL DL<=0.05 MIU/L-ACNC: 3.57 UIU/ML (ref 0.34–4.82)
WBC # BLD AUTO: 4 10*3/UL (ref 3.7–9.6)

## 2019-07-29 PROCEDURE — 85610 PROTHROMBIN TIME: CPT

## 2019-07-29 PROCEDURE — 80053 COMPREHEN METABOLIC PANEL: CPT

## 2019-07-29 PROCEDURE — 85025 COMPLETE CBC W/AUTO DIFF WBC: CPT

## 2019-07-29 PROCEDURE — G0463 HOSPITAL OUTPT CLINIC VISIT: HCPCS

## 2019-07-29 PROCEDURE — 2550000100 HC RX 255: Mod: JW | Performed by: INTERNAL MEDICINE

## 2019-07-29 PROCEDURE — 82150 ASSAY OF AMYLASE: CPT

## 2019-07-29 PROCEDURE — 36415 COLL VENOUS BLD VENIPUNCTURE: CPT

## 2019-07-29 PROCEDURE — 71260 CT THORAX DX C+: CPT

## 2019-07-29 PROCEDURE — 82105 ALPHA-FETOPROTEIN SERUM: CPT | Performed by: INTERNAL MEDICINE

## 2019-07-29 PROCEDURE — A9577 INJ MULTIHANCE: HCPCS | Performed by: INTERNAL MEDICINE

## 2019-07-29 PROCEDURE — 99215 OFFICE O/P EST HI 40 MIN: CPT | Performed by: INTERNAL MEDICINE

## 2019-07-29 PROCEDURE — 2550000100 HC RX 255: Performed by: INTERNAL MEDICINE

## 2019-07-29 PROCEDURE — 74183 MRI ABD W/O CNTR FLWD CNTR: CPT

## 2019-07-29 PROCEDURE — 84443 ASSAY THYROID STIM HORMONE: CPT

## 2019-07-29 PROCEDURE — 83690 ASSAY OF LIPASE: CPT

## 2019-07-29 RX ORDER — IOPAMIDOL 755 MG/ML
80 INJECTION, SOLUTION INTRAVASCULAR ONCE
Status: COMPLETED | OUTPATIENT
Start: 2019-07-29 | End: 2019-07-29

## 2019-07-29 RX ADMIN — GADOBENATE DIMEGLUMINE 15 ML: 529 INJECTION, SOLUTION INTRAVENOUS at 09:39

## 2019-07-29 RX ADMIN — IOPAMIDOL 80 ML: 755 INJECTION, SOLUTION INTRAVENOUS at 08:30

## 2019-07-29 ASSESSMENT — ENCOUNTER SYMPTOMS
HEMATOLOGIC/LYMPHATIC NEGATIVE: 1
LEG SWELLING: 1
NERVOUS/ANXIOUS: 1
SHORTNESS OF BREATH: 1
CHEST TIGHTNESS: 1
MYALGIAS: 1
ABDOMINAL DISTENTION: 1
FATIGUE: 1
EYE PROBLEMS: 1
ARTHRALGIAS: 1
ENDOCRINE NEGATIVE: 1
ABDOMINAL PAIN: 1

## 2019-07-29 ASSESSMENT — PAIN SCALES - GENERAL: PAINLEVEL: 0-NO PAIN

## 2019-07-29 NOTE — PROGRESS NOTES
Medical Oncology Follow-Up    Subjective   HISTORY OF PRESENT ILLNESS:  Ben Lai is a 69 y.o. male who  was diagnosed with hepatocellular carcinoma in November 2018.  He has an underlying hepatitis C which was supposedly cured.    Due to the extent of his HCC per GI at [facility] it was not amenable to local therapy, resection or radiation, referred to medical oncology   He was started on sorafenib early December 2018 but this appears to have been stopped in February due to significant side effects.  Patient states unequivocally that he started to feel better after a 3-week break but the sorafenib was recently restarted and he is having significant quality of life compromising side effects again.  This includes severe joint and muscle pain and fatigue.     7/29/19 restaging scans showed progressive disease in the liver, however no sings of metastatic disease       He feels well and feels he is fairly recovering from the side effects of the sorafenib.  However   Energy is still low ECOG 2   appetite ist stable, has gained weight   Takes care of his ADLS   Stable, abd pain, chest pain and neuropathy           Encounter Diagnosis   Name Primary?   • Hepatocellular carcinoma (CMS/HCC) (HCC)    .     Treatment History:     Hepatocellular carcinoma (CMS/HCC) (HCC)    1/23/2019 Initial Diagnosis     Hepatocellular carcinoma (CMS/HCC) (HCC)         8/1/2019 -  Adjuvant Therapy     Hepatocellular - Sorafenib (Nexavar)  Plan Provider: ANDERSON Bryan  Treatment goal: Palliative  Line of treatment: [No plan line of treatment]              RECENT EVENTS:    PAST MEDICAL HISTORY:   Past Medical History:   Diagnosis Date   • A-fib (CMS/HCC) (HCC)    • Allergy    • Anxiety    • Arthritis    • Asthma    • Blindness of right eye    • BPH (benign prostatic hyperplasia)    • Cardiovascular disease    • Cirrhosis (CMS/HCC) (HCC)     with possible liver mass by MRI 5/18, rec repeat in 8/2018   • Complication of  anesthesia    • COPD (chronic obstructive pulmonary disease) (CMS/HCC) (HCC)    • Depression    • Endocrine disorder    • Gallstones    • Gastritis    • GERD (gastroesophageal reflux disease)    • Glaucoma    • Hearing loss     bilateral hearing aids   • Hemorrhoids    • Hepatitis C     Hep C, 2016 remission, complicated by cirrhosis.  MRI abd 5/30/18, rec 3 month follow up for focus of enhancement right and left hepatic lobe junction   • Hernia, abdominal    • High blood pressure    • IBS (irritable bowel syndrome)    • Infectious viral hepatitis    • Jaundice    • Kidney stones    • Obstructive sleep apnea syndrome    • Periodic limb movement disorder    • Persistent hypersomnia    • Persistent insomnia    • Pneumonia    • PONV (postoperative nausea and vomiting)    • Type 2 diabetes mellitus (CMS/HCC) (HCC)    • Urinary incontinence    • Wears partial dentures         GYNECOLOGICAL HISTORY (if applicable): NA     FAMILY HISTORY:   Family History   Problem Relation Age of Onset   • Arthritis Mother    • Rheum arthritis Mother    • Diabetes Mother    • Rectal cancer Mother    • Other Paternal Grandmother    • Other Paternal Grandfather    • Diabetes Other    • Rheum arthritis Other    • Osteoporosis Other         SOCIAL HISTORY:   Social History     Socioeconomic History   • Marital status:      Spouse name: Not on file   • Number of children: Not on file   • Years of education: Not on file   • Highest education level: Not on file   Occupational History   • Not on file   Social Needs   • Financial resource strain: Not on file   • Food insecurity:     Worry: Not on file     Inability: Not on file   • Transportation needs:     Medical: Not on file     Non-medical: Not on file   Tobacco Use   • Smoking status: Never Smoker   • Smokeless tobacco: Never Used   Substance and Sexual Activity   • Alcohol use: No   • Drug use: No   • Sexual activity: Not on file   Lifestyle   • Physical activity:     Days per week:  Not on file     Minutes per session: Not on file   • Stress: Not on file   Relationships   • Social connections:     Talks on phone: Not on file     Gets together: Not on file     Attends [employer] service: Not on file     Active member of club or organization: Not on file     Attends meetings of clubs or organizations: Not on file     Relationship status: Not on file   • Intimate partner violence:     Fear of current or ex partner: Not on file     Emotionally abused: Not on file     Physically abused: Not on file     Forced sexual activity: Not on file   Other Topics Concern   • Not on file   Social History Narrative   • Not on file        ALLERGIES:   Allergies as of 07/29/2019 - Reviewed 07/29/2019   Allergen Reaction Noted   • Penicillins Anaphylaxis 10/19/2017   • Metformin Hives    • Adhesive tape-silicones  04/06/2018   • Banana  02/05/2019   • Broccoli  02/05/2019   • [place] sprout  02/05/2019   • Cabbage  02/05/2019   • Cantaloupe  02/05/2019   • Celery  02/05/2019   • Cheese  02/05/2019   • Cranberry  02/05/2019   • Egg     • Interferon violetta (human leuk. derived)     • Interferon violetta-2a  02/05/2019   • Latex     • Legumes  02/05/2019   • Lettuce  02/05/2019   • Milk  02/05/2019   • Mometasone     • Mometasone furoate     • Mustard  02/05/2019   • Nut - unspecified  02/05/2019   • Oats  02/05/2019   • Omeprazole     • Ondansetron hcl Nausea And Vomiting 05/01/2019   • Onion  02/05/2019   • Pepper (genus capsicum)  02/05/2019   • Pineapple  02/05/2019   • Primidone  05/01/2019   • Ribavirin Itching 10/19/2017   • Rosuvastatin  02/05/2019   • Sorafenib  04/04/2019   • Spironolactone     • Statins-hmg-coa reductase inhibitors Itching and GI intolerance 04/06/2018   • Wheat  02/05/2019   • Yeast  02/05/2019   • Pantoprazole Itching and Rash 02/28/2018   • Peginterferon violetta-2b Palpitations 10/19/2017        MEDICATIONS:   Current Outpatient Medications   Medication Sig Dispense Refill   • YOUNG ROSARIO  U-100 INSULIN 100 unit/mL (3 mL) insulin pen Inject 14 Units under the skin daily       • primidone (MYSOLINE) 50 mg tablet      • ezetimibe (ZETIA) 10 mg tablet Take 1 tablet (10 mg total) by mouth daily 90 tablet 2   • hydrOXYzine (ATARAX) 25 mg tablet Take 1 tablet (25 mg total) by mouth nightly 90 tablet 2   • carvedilol (COREG) 3.125 mg tablet Take 1 tablet (3.125 mg total) by mouth 2 (two) times a day with meals 180 tablet 2   • buPROPion SR (WELLBUTRIN SR) 100 mg 12 hr tablet Take 1 tablet (100 mg total) by mouth daily 90 tablet 1   • lactulose (GENERLAC) 10 gram/15 mL solution Take 15 mL (10 g total) by mouth daily 473 mL 2   • terazosin (HYTRIN) 5 mg capsule Take 1 capsule (5 mg total) by mouth 2 (two) times a day 180 capsule 2   • HYDROmorphone (DILAUDID) 2 mg tablet Take 2 tab/cap by mouth 3 (three) times a day as needed for pain scale 1-3/10, pain scale 4-7/10 or pain scale 8-10/10 (Cancer and joint pain)      • blood-glucose meter (ACCU-CHEK ADELE PLUS METER) [facility] Use to test blood sugars 3 times daily (E11.65, non insulin depedent) AccuChek Adele plus, meter is 8 years old (Patient taking differently: Use to test blood sugars 3 times daily (E11.65, non insulin dependent) ) 1 each 0   • lisinopril (PRINIVIL,ZESTRIL) 20 mg tablet Take 1 tablet (20 mg total) by mouth daily 90 tablet 2   • furosemide (LASIX) 20 mg tablet Take 1 tablet (20 mg total) by mouth 2 (two) times a day. (Patient taking differently: Take 20 mg by mouth daily  ) 180 tablet 2   • lansoprazole (PREVACID) 30 mg capsule Take 30 mg by mouth daily      • liraglutide (VICTOZA 2-JASE) 0.6 mg/0.1 mL (18 mg/3 mL) injection Inject 1.8 mg under the skin daily       • capsaicin (ZOSTRIX) 0.025 % cream Apply 1 application topically as needed for pain scale 1-3/10, 1-3/8.     • sodium chloride (SALINE NASAL) 0.65 % nasal spray Administer 1 spray into each nostril as needed for congestion or rhinitis      • white petrolatum (AQUAPHOR HEALING) 41 %  ointment ointment Apply 1 application topically as needed.     • pramoxine-menthol (GOLD BOND MEDICATED ANTI-ITCH) 1-1 % cream Apply topically.     • brimonidine (ALPHAGAN) 0.2 % ophthalmic solution every 12 hours.     • GLYCERIN/PROPYLENE GLYCOL (ARTIFICIAL TEARS,GLYCERIN-PEG, OPHT) Administer 1 drop into affected eye(s) as needed (Dry eyes)      • latanoprost (XALATAN) 0.005 % ophthalmic solution INSTILL 1 DROP INTO AFFECTED EYE(S) BY OPHTHALMIC ROUTE ONCE DAILY INTHE EVENING 10 0   • doxycycline (MONODOX) 100 mg capsule Take 200 mg by mouth  0   • escitalopram (LEXAPRO) 10 mg tablet Take 1 tablet (10 mg total) by mouth daily 90 tablet 2   • SORAfenib (NexAVAR) 200 mg chemo tablet Take 2 tab/cap by mouth 2 (two) times a day       No current facility-administered medications for this visit.        REVIEW OF SYSTEMS:   Review of Systems   Constitutional: Positive for fatigue.   HENT:   Positive for hearing loss.    Eyes: Positive for eye problems.   Respiratory: Positive for chest tightness and shortness of breath.    Cardiovascular: Positive for chest pain and leg swelling.   Gastrointestinal: Positive for abdominal distention and abdominal pain.   Endocrine: Negative.    Genitourinary: Positive for bladder incontinence.    Musculoskeletal: Positive for arthralgias, gait problem and myalgias.   Skin: Negative.    Neurological: Positive for gait problem.   Hematological: Negative.    Psychiatric/Behavioral: The patient is nervous/anxious.        The ECOG performance status today is 1 - Restricted strenuous activity; able to carry out light work.          Objective    PHYSICAL EXAM:   /75   Pulse 57   Temp 36.6 °C (97.8 °F) (Temporal)   Wt 102 kg (223 lb 12.8 oz)   SpO2 96%   BMI 37.24 kg/m²    Body mass index is 37.24 kg/m².       Physical Exam   Constitutional: He is oriented to person, place, and time. He appears well-developed.   HENT:   Head: Normocephalic and atraumatic.   Eyes: Pupils are equal,  round, and reactive to light.   Right eye less responsive    Neck: Normal range of motion. Neck supple.   Cardiovascular: Normal rate and regular rhythm.   Pulmonary/Chest: Breath sounds normal. No respiratory distress. He has no wheezes.   Abdominal: Soft. He exhibits distension. There is no tenderness.   Musculoskeletal: Normal range of motion. He exhibits edema.   Lymphadenopathy:     He has no cervical adenopathy.   Neurological: He is alert and oriented to person, place, and time.   Skin: Skin is warm.   Psychiatric: He has a normal mood and affect.    reveals a generally healthy pleasant appearing gentleman who was not further examined based on the below.    DIAGNOSTIC REPORTS REVIEWED:   Imaging:  Reviewed     Assessment/Plan    IMPRESSION:   This is a pleasant 69-year-old male with diagnosis of hepatocellular carcinoma involving the right lobe, locally advanced disease, El the chart not amenable to local therapy, started sorafenib December 2018 however stopped in February 2019 due to intolerance without any dose reductions.  Has been off of treatment since then, restaging CAT scan and MRI today show progressive disease in the liver.  Discussed with the patient the implication of disease progression, and the need for systemic therapy discussed the role of the 2 frontline agents of sorafenib and lenvatinib, and discussed restarting sorafenib at a lower dose 200 mg twice daily, and repeating scans in 3 months  The patient has never met with radiation oncology, interventional radiology, or surgery to discuss the lack of possibility of local therapy    Plan:  -Sorafenib 200 mg p.o. twice daily  -AFP, TSH, amylase lipase before starting sorafenib  -Labs within the next 2 weeks for follow-up  -Follow-up in 4 weeks with the labs and a visit toxicity check  -Referral to surgery, radiation oncology, interventional radiology to completely rule out the role for local therapy         Physician: Milka Reynoso  MBBS

## 2019-07-30 DIAGNOSIS — C22.0 HEPATOCELLULAR CARCINOMA (CMS/HCC): Primary | ICD-10-CM

## 2019-07-30 LAB — AFP SERPL-MCNC: 271 NG/ML

## 2019-07-30 RX ORDER — SORAFENIB 200 MG/1
200 TABLET, FILM COATED ORAL 2 TIMES DAILY
Qty: 60 TABLET | Refills: 3 | Status: SHIPPED | OUTPATIENT
Start: 2019-07-30 | End: 2019-09-26 | Stop reason: SDUPTHER

## 2019-07-30 RX ORDER — PROCHLORPERAZINE MALEATE 10 MG
TABLET ORAL
Qty: 30 TABLET | Refills: 5 | Status: SHIPPED | OUTPATIENT
Start: 2019-07-30 | End: 2020-04-23

## 2019-07-31 ENCOUNTER — TELEPHONE (OUTPATIENT)
Dept: SURGERY | Facility: CLINIC | Age: 69
End: 2019-07-31

## 2019-07-31 NOTE — TELEPHONE ENCOUNTER
Notified Dr. Reynoso's office after speaking with Dr. Patrick regarding gen surg referral for liver resection that patient's liver cancer may be resectable by liver specialist and he would recommend patient return to [facility] for this.

## 2019-08-02 ENCOUNTER — TELEPHONE (OUTPATIENT)
Dept: ONCOLOGY | Facility: CLINIC | Age: 69
End: 2019-08-02

## 2019-08-02 NOTE — TELEPHONE ENCOUNTER
Pt takes care of grand son and wonders if that is an issue.  Explained that as long as baby is healthy there is no problem.  I f baby is sick he should try to avoid.  Pt understands.      ----- Message from Joan Chamorro RN sent at 8/2/2019  8:18 AM MDT -----  Contact: [phone]      ----- Message -----  From: Manasa Francisco  Sent: 8/2/2019   8:13 AM  To: , #    Pt will be be on chemo on the 14th of this month. He wants to know if he/it can be around a baby.

## 2019-08-12 ENCOUNTER — APPOINTMENT (OUTPATIENT)
Dept: ONCOLOGY | Facility: CLINIC | Age: 69
End: 2019-08-12
Payer: COMMERCIAL

## 2019-08-12 DIAGNOSIS — C22.0 HEPATOCELLULAR CARCINOMA (CMS/HCC): ICD-10-CM

## 2019-08-12 LAB
ALBUMIN SERPL-MCNC: 4.6 G/DL (ref 3.5–5.3)
ALP SERPL-CCNC: 89 U/L (ref 45–115)
ALT SERPL-CCNC: 16 U/L (ref 0–52)
ANION GAP SERPL CALC-SCNC: 11 MMOL/L (ref 3–11)
AST SERPL-CCNC: 28 U/L (ref 0–39)
BILIRUB SERPL-MCNC: 1.04 MG/DL (ref 0–1.4)
BUN SERPL-MCNC: 26 MG/DL (ref 7–25)
CALCIUM ALBUM COR SERPL-MCNC: 8.8 MG/DL (ref 8.6–10.3)
CALCIUM SERPL-MCNC: 9.3 MG/DL (ref 8.6–10.3)
CHLORIDE SERPL-SCNC: 105 MMOL/L (ref 98–107)
CO2 SERPL-SCNC: 24 MMOL/L (ref 21–32)
CREAT SERPL-MCNC: 1.13 MG/DL (ref 0.7–1.3)
EOSINOPHIL # BLD MANUAL: 0.1 10*3/UL
EOSINOPHIL NFR BLD MANUAL: 1 % (ref 0–3)
ERYTHROCYTE [DISTWIDTH] IN BLOOD BY AUTOMATED COUNT: 13.7 % (ref 11.5–15)
GFR SERPL CREATININE-BSD FRML MDRD: 66 ML/MIN/1.73M*2
GLUCOSE SERPL-MCNC: 146 MG/DL (ref 70–105)
HCT VFR BLD AUTO: 42.2 % (ref 38–50)
HGB BLD-MCNC: 14.6 G/DL (ref 13.2–17.2)
LYMPHOCYTES # BLD MANUAL: 1.5 10*3/UL
LYMPHOCYTES NFR BLD MANUAL: 26 % (ref 15–47)
MCH RBC QN AUTO: 30.5 PG (ref 29–34)
MCHC RBC AUTO-ENTMCNC: 34.7 G/DL (ref 32–36)
MCV RBC AUTO: 87.9 FL (ref 82–97)
MONOCYTES # BLD MANUAL: 0.3 10*3/UL
MONOCYTES NFR BLD MANUAL: 6 % (ref 5–13)
NEUTROPHILS # BLD MANUAL: 3.76 10*3/UL
NEUTS BAND # BLD MANUAL: 0.1 10*3/UL
NEUTS BAND NFR BLD MANUAL: 2 % (ref 0–10)
NEUTS SEG # BLD MANUAL: 3.6 10*3/UL
NEUTS SEG NFR BLD MANUAL: 64 % (ref 46–70)
PLATELET # BLD AUTO: 144 10*3/UL (ref 130–350)
PMV BLD AUTO: 8.1 FL (ref 6.9–10.8)
POTASSIUM SERPL-SCNC: 4.1 MMOL/L (ref 3.5–5.1)
PROT SERPL-MCNC: 7.4 G/DL (ref 6–8.3)
RBC # BLD AUTO: 4.8 10*6/ΜL (ref 4.1–5.8)
SODIUM SERPL-SCNC: 140 MMOL/L (ref 135–145)
TOTAL CELLS COUNTED BLD: 100 CELLS
VARIANT LYMPHS # BLD MANUAL: 0.1 10*3/UL
VARIANT LYMPHS NFR BLD: 1 % (ref 0–1)
WBC # BLD AUTO: 5.7 10*3/UL (ref 3.7–9.6)
WBC NRBC COR # BLD: 5.7 10*3/UL

## 2019-08-12 PROCEDURE — 84155 ASSAY OF PROTEIN SERUM: CPT

## 2019-08-12 PROCEDURE — 36415 COLL VENOUS BLD VENIPUNCTURE: CPT

## 2019-08-12 PROCEDURE — 85007 BL SMEAR W/DIFF WBC COUNT: CPT

## 2019-08-12 PROCEDURE — 85027 COMPLETE CBC AUTOMATED: CPT

## 2019-08-13 ENCOUNTER — ANESTHESIA EVENT (OUTPATIENT)
Dept: CT IMAGING | Facility: HOSPITAL | Age: 69
End: 2019-08-13
Payer: COMMERCIAL

## 2019-08-14 ENCOUNTER — ANESTHESIA (OUTPATIENT)
Dept: CT IMAGING | Facility: HOSPITAL | Age: 69
End: 2019-08-14
Payer: COMMERCIAL

## 2019-08-14 ENCOUNTER — HOSPITAL ENCOUNTER (OUTPATIENT)
Dept: CT IMAGING | Facility: HOSPITAL | Age: 69
Discharge: HOME | End: 2019-08-14
Payer: COMMERCIAL

## 2019-08-14 VITALS
HEART RATE: 42 BPM | WEIGHT: 214 LBS | OXYGEN SATURATION: 96 % | SYSTOLIC BLOOD PRESSURE: 94 MMHG | RESPIRATION RATE: 16 BRPM | TEMPERATURE: 96.8 F | DIASTOLIC BLOOD PRESSURE: 59 MMHG | BODY MASS INDEX: 35.61 KG/M2

## 2019-08-14 DIAGNOSIS — C22.0 HEPATOCELLULAR CARCINOMA (CMS/HCC): ICD-10-CM

## 2019-08-14 PROCEDURE — (BLANK) HC RECOVERY PHASE-2 1ST 1/2 HOUR ACUITY LEVEL 1

## 2019-08-14 PROCEDURE — 2500000200 HC RX 250 WO HCPCS: Performed by: NURSE ANESTHETIST, CERTIFIED REGISTERED

## 2019-08-14 PROCEDURE — 47382 PERCUT ABLATE LIVER RF: CPT | Performed by: RADIOLOGY

## 2019-08-14 PROCEDURE — 00790 ANES IPER UPR ABD NOS: CPT | Performed by: NURSE ANESTHETIST, CERTIFIED REGISTERED

## 2019-08-14 PROCEDURE — (BLANK) HC RECOVERY PHASE-2 EACH ADDITIONAL 1/2 HOUR ACUITY LEVEL 1

## 2019-08-14 PROCEDURE — 6370000100 HC RX 637 (ALT 250 FOR IP)

## 2019-08-14 PROCEDURE — 6360000200 HC RX 636 W HCPCS (ALT 250 FOR IP)

## 2019-08-14 PROCEDURE — 77013 CT GUIDE FOR TISSUE ABLATION: CPT

## 2019-08-14 PROCEDURE — 6360000200 HC RX 636 W HCPCS (ALT 250 FOR IP): Performed by: NURSE ANESTHETIST, CERTIFIED REGISTERED

## 2019-08-14 RX ORDER — HYDROCODONE BITARTRATE AND ACETAMINOPHEN 5; 325 MG/1; MG/1
TABLET ORAL
Status: COMPLETED
Start: 2019-08-14 | End: 2019-08-14

## 2019-08-14 RX ORDER — PROPOFOL 10 MG/ML
INJECTION, EMULSION INTRAVENOUS AS NEEDED
Status: DISCONTINUED | OUTPATIENT
Start: 2019-08-14 | End: 2019-08-14 | Stop reason: SURG

## 2019-08-14 RX ORDER — LABETALOL HYDROCHLORIDE 5 MG/ML
10 INJECTION, SOLUTION INTRAVENOUS EVERY 5 MIN PRN
Status: CANCELLED | OUTPATIENT
Start: 2019-08-14

## 2019-08-14 RX ORDER — SODIUM CHLORIDE 0.9 % (FLUSH) 0.9 %
2-10 SYRINGE (ML) INJECTION EVERY 8 HOURS SCHEDULED
Status: DISCONTINUED | OUTPATIENT
Start: 2019-08-14 | End: 2019-08-14 | Stop reason: HOSPADM

## 2019-08-14 RX ORDER — SODIUM CHLORIDE, SODIUM LACTATE, POTASSIUM CHLORIDE, CALCIUM CHLORIDE 600; 310; 30; 20 MG/100ML; MG/100ML; MG/100ML; MG/100ML
100 INJECTION, SOLUTION INTRAVENOUS CONTINUOUS
Status: DISCONTINUED | OUTPATIENT
Start: 2019-08-14 | End: 2019-08-14 | Stop reason: HOSPADM

## 2019-08-14 RX ORDER — SODIUM CHLORIDE 0.9 % (FLUSH) 0.9 %
2-10 SYRINGE (ML) INJECTION AS NEEDED
Status: DISCONTINUED | OUTPATIENT
Start: 2019-08-14 | End: 2019-08-14 | Stop reason: HOSPADM

## 2019-08-14 RX ORDER — MIDAZOLAM HYDROCHLORIDE 1 MG/ML
1 INJECTION INTRAMUSCULAR; INTRAVENOUS EVERY 5 MIN PRN
Status: CANCELLED | OUTPATIENT
Start: 2019-08-14

## 2019-08-14 RX ORDER — PROPOFOL 10 MG/ML
INJECTION, EMULSION INTRAVENOUS CONTINUOUS PRN
Status: DISCONTINUED | OUTPATIENT
Start: 2019-08-14 | End: 2019-08-14 | Stop reason: SURG

## 2019-08-14 RX ORDER — HYDROMORPHONE HYDROCHLORIDE 1 MG/ML
0.5 INJECTION, SOLUTION INTRAMUSCULAR; INTRAVENOUS; SUBCUTANEOUS EVERY 5 MIN PRN
Status: CANCELLED | OUTPATIENT
Start: 2019-08-14

## 2019-08-14 RX ORDER — FENTANYL CITRATE/PF 50 MCG/ML
PLASTIC BAG, INJECTION (ML) INTRAVENOUS
Status: COMPLETED
Start: 2019-08-14 | End: 2019-08-14

## 2019-08-14 RX ORDER — FENTANYL CITRATE/PF 50 MCG/ML
50 PLASTIC BAG, INJECTION (ML) INTRAVENOUS EVERY 5 MIN PRN
Status: DISCONTINUED | OUTPATIENT
Start: 2019-08-14 | End: 2019-08-14 | Stop reason: HOSPADM

## 2019-08-14 RX ORDER — FENTANYL CITRATE/PF 50 MCG/ML
50 PLASTIC BAG, INJECTION (ML) INTRAVENOUS EVERY 5 MIN PRN
Status: CANCELLED | OUTPATIENT
Start: 2019-08-14

## 2019-08-14 RX ORDER — PROMETHAZINE HYDROCHLORIDE 25 MG/ML
12.5 INJECTION, SOLUTION INTRAMUSCULAR; INTRAVENOUS ONCE AS NEEDED
Status: CANCELLED | OUTPATIENT
Start: 2019-08-14

## 2019-08-14 RX ORDER — DIPHENHYDRAMINE HYDROCHLORIDE 50 MG/ML
25 INJECTION INTRAMUSCULAR; INTRAVENOUS ONCE AS NEEDED
Status: CANCELLED | OUTPATIENT
Start: 2019-08-14

## 2019-08-14 RX ORDER — KETAMINE HYDROCHLORIDE 100 MG/ML
INJECTION, SOLUTION INTRAMUSCULAR; INTRAVENOUS AS NEEDED
Status: DISCONTINUED | OUTPATIENT
Start: 2019-08-14 | End: 2019-08-14 | Stop reason: SURG

## 2019-08-14 RX ORDER — FENTANYL CITRATE/PF 50 MCG/ML
PLASTIC BAG, INJECTION (ML) INTRAVENOUS AS NEEDED
Status: DISCONTINUED | OUTPATIENT
Start: 2019-08-14 | End: 2019-08-14 | Stop reason: SURG

## 2019-08-14 RX ORDER — ONDANSETRON HYDROCHLORIDE 2 MG/ML
4 INJECTION, SOLUTION INTRAVENOUS ONCE AS NEEDED
Status: CANCELLED | OUTPATIENT
Start: 2019-08-14

## 2019-08-14 RX ORDER — HYDROCODONE BITARTRATE AND ACETAMINOPHEN 5; 325 MG/1; MG/1
2 TABLET ORAL ONCE
Status: COMPLETED | OUTPATIENT
Start: 2019-08-14 | End: 2019-08-14

## 2019-08-14 RX ADMIN — FENTANYL CITRATE 50 MCG: 50 INJECTION, SOLUTION INTRAMUSCULAR; INTRAVENOUS at 10:57

## 2019-08-14 RX ADMIN — PROPOFOL 50 MCG/KG/MIN: 10 INJECTION, EMULSION INTRAVENOUS at 11:01

## 2019-08-14 RX ADMIN — Medication 50 MCG: at 12:23

## 2019-08-14 RX ADMIN — HYDROCODONE BITARTRATE AND ACETAMINOPHEN 2 TABLET: 5; 325 TABLET ORAL at 12:00

## 2019-08-14 RX ADMIN — KETAMINE HYDROCHLORIDE 20 MG: 100 INJECTION, SOLUTION INTRAMUSCULAR; INTRAVENOUS at 11:01

## 2019-08-14 RX ADMIN — KETAMINE HYDROCHLORIDE 10 MG: 100 INJECTION, SOLUTION INTRAMUSCULAR; INTRAVENOUS at 11:08

## 2019-08-14 RX ADMIN — PROPOFOL 20 MG: 10 INJECTION, EMULSION INTRAVENOUS at 11:01

## 2019-08-14 RX ADMIN — FENTANYL CITRATE 25 MCG: 50 INJECTION, SOLUTION INTRAMUSCULAR; INTRAVENOUS at 11:17

## 2019-08-14 RX ADMIN — FENTANYL CITRATE 25 MCG: 50 INJECTION, SOLUTION INTRAMUSCULAR; INTRAVENOUS at 11:10

## 2019-08-14 RX ADMIN — FENTANYL CITRATE 50 MCG: 50 INJECTION, SOLUTION INTRAMUSCULAR; INTRAVENOUS at 12:23

## 2019-08-14 ASSESSMENT — ENCOUNTER SYMPTOMS: JAUNDICE: 1

## 2019-08-14 ASSESSMENT — COPD QUESTIONNAIRES
CAT_SEVERITY: MODERATE
COPD: 1

## 2019-08-14 NOTE — ANESTHESIA PREPROCEDURE EVALUATION
"Pre-Procedure Assessment    Patient: Ben Lai, male, 69 y.o.    Ht Readings from Last 1 Encounters:   05/07/19 1.651 m (5' 5\")     Wt Readings from Last 1 Encounters:   08/14/19 97.1 kg (214 lb)       Last Vitals  /66 (08/14/19 1019)    Temp 36 °C (96.8 °F) (08/14/19 0951)    Pulse 48 (08/14/19 1019)   Resp 16 (08/14/19 1019)    SpO2 94 % (08/14/19 1019)    Pain Score         Problem list reviewed and Medical history reviewed    History of anesthetic complications: delayed emergence and PONV         Airway   Mallampati: II  TM distance: >3 FB  Neck ROM: full      Dental      Pulmonary     breath sounds clear to auscultation  (+) pneumonia, COPD moderate, asthma, sleep apnea,   Cardiovascular   (+) hypertension,     Rhythm: regular  Rate: normal    Mental Status/Neuro/Psych    Pt is alert.        GI/Hepatic/Renal    (+) GERD, hepatitis C, liver disease, jaundice,     Endo/Other    (+) diabetes mellitus,   Abdominal           Social History     Tobacco Use   • Smoking status: Never Smoker   • Smokeless tobacco: Never Used   Substance Use Topics   • Alcohol use: No      Hematology   WBC   Date Value Ref Range Status   08/12/2019 5.7 3.7 - 9.6 10*3/uL Final     RBC   Date Value Ref Range Status   08/12/2019 4.80 4.10 - 5.80 10*6/µL Final     MCV   Date Value Ref Range Status   08/12/2019 87.9 82.0 - 97.0 fL Final     Hemoglobin   Date Value Ref Range Status   08/12/2019 14.6 13.2 - 17.2 g/dL Final     Hematocrit   Date Value Ref Range Status   08/12/2019 42.2 38.0 - 50.0 % Final     Platelets   Date Value Ref Range Status   08/12/2019 144 130 - 350 10*3/uL Final      Coagulation   Protime   Date Value Ref Range Status   07/29/2019 12.4 9.4 - 12.5 s Final     INR   Date Value Ref Range Status   07/29/2019 1.1 <=1.1 INR Final      General Chemistry   POC Glucose   Date Value Ref Range Status   05/07/2019 172 (H) 70 - 105 mg/dL Final     Calcium   Date Value Ref Range Status   08/12/2019 9.3 8.6 - 10.3 mg/dL " Final     BUN   Date Value Ref Range Status   08/12/2019 26 (H) 7 - 25 mg/dL Final     Creatinine   Date Value Ref Range Status   08/12/2019 1.13 0.70 - 1.30 mg/dL Final     Glucose   Date Value Ref Range Status   08/12/2019 146 (H) 70 - 105 mg/dL Final     Sodium   Date Value Ref Range Status   08/12/2019 140 135 - 145 mmol/L Final     Potassium   Date Value Ref Range Status   08/12/2019 4.1 3.5 - 5.1 mmol/L Final     Magnesium   Date Value Ref Range Status   12/02/2018 1.9 1.8 - 2.4 mg/dL Final     CO2   Date Value Ref Range Status   08/12/2019 24 21 - 32 mmol/L Final     Chloride   Date Value Ref Range Status   08/12/2019 105 98 - 107 mmol/L Final     Anesthesia Plan    ASA 3   NPO status reviewed: > 6 hours    MAC         Induction: intravenous                 Anesthetic plan and risks discussed with patient.      Plan discussed with CRNA.

## 2019-08-14 NOTE — POST-PROCEDURE NOTE
Post Procedure Note    Patient Name: Ben Lai     : 1950    Radiologist: JIM TINEO MD    Pre-operative Diagnosis: liver Mass    Operation :liver mass Radiofrequency ablation    Anesthesia Type: Team Anesthesia    Estimated Blood Loss: 0 mL    Specimens: none    Findings: ablated liver lesion    Complications:  None; patient tolerated the procedure well.

## 2019-08-14 NOTE — ANESTHESIA POSTPROCEDURE EVALUATION
Patient: Ben Lai    Procedure Summary     Date:  08/14/19 Room / Location:  [facility] CT Imaging    Anesthesia Start:  1053 Anesthesia Stop:  1148    Procedure:  CT ABLATION LIVER RADIOFREQUENCY Diagnosis:       Hepatocellular carcinoma (CMS/HCC) (HCC)      Hepatocellular carcinoma (CMS/HCC) (HCC)    Scheduled Providers:  Krista Rodrigues CRNA; Daniel Aleman MD Responsible Provider:  Daniel Aleman MD    Anesthesia Type:  MAC ASA Status:  3          Anesthesia Type: MAC    Last vitals  Vitals Value Taken Time   BP 94/59 8/14/2019 12:37 PM   Temp     Pulse 42 8/14/2019 12:37 PM   Resp 16 8/14/2019 12:30 PM   SpO2 96 % 8/14/2019 12:37 PM   Pain Score 9 8/14/2019 12:23 PM       Anesthesia Post Evaluation    Patient location during evaluation: PACU  Patient participation: complete - patient participated  Level of consciousness: awake and alert  Pain management: adequate  Airway patency: patent  Anesthetic complications: no  Cardiovascular status: acceptable  Respiratory status: acceptable  Hydration status: acceptable  May dismiss recovered patient based on consultation with the appropriate physicians and/or meeting appropriate discharge criteria

## 2019-08-14 NOTE — PERIOPERATIVE NURSING NOTE
Called Dr. Aleman about continued pain, ordered fentanyl. Called Dr. Meza and he will be able to stop by around 1300 to see the pt.

## 2019-08-14 NOTE — DISCHARGE INSTRUCTIONS
Radiofrequency Ablation of Liver Tumors, Care After  This sheet gives you information about how to care for yourself after your procedure. Your health care provider may also give you more specific instructions. If you have problems or questions, contact your health care provider.  What can I expect after the procedure?  After your procedure, it is common to have pain and discomfort in the upper abdomen. You will be given pain medicines to control this.  Follow these instructions at home:  Incision care    · Follow instructions from your health care provider about how to take care of your incision. Make sure you:  ? Wash your hands with soap and water before you change your bandage (dressing). If soap and water are not available, use hand .  ? Change your dressing as told by your health care provider.  ? Leave stitches (sutures), skin glue, or adhesive strips in place. These skin closures may need to stay in place for 2 weeks or longer. If adhesive strip edges start to loosen and curl up, you may trim the loose edges. Do not remove adhesive strips completely unless your health care provider tells you to do that.  · Check your incision area every day for signs of infection. Check for:  ? Redness, swelling, or pain.  ? Fluid or blood.  ? Warmth.  ? Pus or a bad smell.  Medicines  · Take over-the-counter and prescription medicines only as told by your health care provider.  · If you were prescribed an antibiotic medicine, use it as told by your health care provider. Do not stop using the antibiotic even if you start to feel better.  · Do not drive or use heavy machinery while taking prescription pain medicine.  Activity  · Rest as often as needing during the first few days of recovery.  · Do not lift anything that is heavier than 10 lbs (4.5 kg), or the limit that your health care provider tells you, until he or she says that it is safe.  · Do not drive for 24 hours after the procedure if you were given a  medicine to help you relax (sedative).  General instructions  · Do not take baths, swim, or use a hot tub until your health care provider approves.  · Follow any special diet instructions as directed by your health care provider.  · To prevent or treat constipation while you are taking prescription pain medicine, your health care provider may recommend that you:  ? Drink enough fluid to keep your urine clear or pale yellow.  ? Take over-the-counter or prescription medicines.  ? Eat foods that are high in fiber, such as fresh fruits and vegetables, whole grains, and beans.  ? Limit foods that are high in fat and processed sugars, such as fried and sweet foods.  · Do not use any products that contain nicotine or tobacco, such as cigarettes and e-cigarettes. If you need help quitting, ask your health care provider.  · Keep all follow-up visits as told by your health care provider. This is important.  Contact a health care provider if:  · You cannot pass gas.  · You are unable to have a bowel movement within 2 days.  · You have a skin rash.  Get help right away if:  · You have a fever.  · You have severe or lasting pain in your abdomen, shoulder, or back.  · You have trouble swallowing or breathing.  · You have severe weakness or dizziness.  · You have chest pain or shortness of breath.  Summary  · After your procedure, it is common to have pain and discomfort in the upper abdomen. You will be given pain medicines to control this.  · Do not take baths, swim, or use a hot tub until your health care provider approves.  · Do not drive or use heavy machinery while taking prescription pain medicine.  This information is not intended to replace advice given to you by your health care provider. Make sure you discuss any questions you have with your health care provider.  Document Released: 10/08/2014 Document Revised: 03/08/2018 Document Reviewed: 03/08/2018  [employer] Patient Education © 2019 [employer]

## 2019-08-21 ENCOUNTER — DOCUMENTATION ONLY (OUTPATIENT)
Dept: SURGERY | Facility: CLINIC | Age: 69
End: 2019-08-21

## 2019-08-21 ENCOUNTER — TELEPHONE (OUTPATIENT)
Dept: ONCOLOGY | Facility: CLINIC | Age: 69
End: 2019-08-21

## 2019-08-21 NOTE — PROGRESS NOTES
Surgical Oncology RN Care Coordination Note:     Awaiting records to be forwarded for review, will also need to have imaging studies that patient has had requested stat prior to any scheduling to ensure appropriate scheduling is completed.     Jerilyn Walker RN, BSN  Care Coordinator   [phone]

## 2019-08-21 NOTE — PROGRESS NOTES
ONCOLOGY INTAKE: Records Information      APPT INFORMATION:  Referring provider:  Vinay Martino  Referring provider s clinic:  [facility]  Reason for visit/diagnosis:  neoplasm, malignant, digestive system, liver cell carcinoma  Has patient been notified of appointment date and time?: no    RECORDS INFORMATION:  Were the records received with the referral (via [employer])? yes    Has patient been seen for any external appt for this diagnosis? yes    If yes, where? [facility]    Has patient had any imaging or procedures outside of [place] for this condition? unknown      If Yes, where? [place]    ADDITIONAL INFORMATION:  Records faxed to Jerilyn SANCHEZ To have providers review and provide scheduling recommendations.  Records saved in [address], please fax to CSS team upon scheduling.

## 2019-08-21 NOTE — TELEPHONE ENCOUNTER
Called patient and they were wondering why they were moved to dr. salas instead of seeing CNP. Let them know its because he had a liver ablation. Asked about the ablation and was recommended to call Dr. Dorsey office to speak to them about that since that is who performed the ablation.      ----- Message from Yaima Monsalve RN sent at 8/21/2019  4:12 PM MDT -----  Regarding: FW: had liver ablation    ----- Message -----  From: Anna Lorenz  Sent: 8/21/2019  10:13 AM MDT  To: , #  Subject: had liver ablation                               Had some issues with the tape pulling off skin.  Putting neosprin on the area, doesn't think it is infected. Has been light headed and dizzy since the procedure.

## 2019-08-26 ENCOUNTER — PATIENT OUTREACH (OUTPATIENT)
Dept: ONCOLOGY | Facility: CLINIC | Age: 69
End: 2019-08-26

## 2019-08-26 NOTE — PROGRESS NOTES
Surgical Oncology RN Care Coordination Note:     Records reviewed and patient previously deemed non surgical candidate. Still awaiting imaging to be received for review with MD. Once imaging received will directed on scheduling recommendations.     Jerilyn Walker RN, BSN  Care Coordinator   [phone]

## 2019-08-26 NOTE — PROGRESS NOTES
Images from [facility] and [facility] (dating back to 11/2018) requested STAT.   10:17 AM    Images [facility] resolved, and now viewable to PACS  12:34 PM

## 2019-08-26 NOTE — PROGRESS NOTES
Surgical Oncology/Hepatobiliary Surgery Referral      Referring provider: Dr. Vinay Martino - [facility]      Referred to: Dr. Arnold - Surgical oncology - Hepatobiliary Surgeon         Evaluation for: HCC      Oncology provider (if applicable): Dr. Mecredes Ashton      Clinical History (per RN review of records provided):      - Dx with liver mass, confirming HCC in 11/2018. History of Hep C.     - Seen by [place] and deemed not surgical candidate. Recommended referral to oncology.     - started on sorafenib early 12/2018 but stopped in February d/t side effects.     - Patient was restarted shortly after stopping sorafenib but continues to have ongoing issues with quality of life d/t the side effects.     - Patient was re-scanned in 07/19 and imaging showed progression of disease but no metastatic disease.     - patient seen for follow up with oncology on 7/29 and recommended referral to surgery and IR to discuss possible further liver directed therapy.     - Patient underwent RFA to liver mass on 8/14/2019.     Imaging: Full reports in Care everywhere.          Staging complete (if applicable): Yes.     08/26/2019 9:36 AM - message sent to intake team for update on imaging status.     08/26/2019 2:40 PM - Message sent to scheduling team to contact patient with first available appointment with surgery for consult.

## 2019-08-28 NOTE — TELEPHONE ENCOUNTER
ONCOLOGY INTAKE: Records Information        APPT INFORMATION:  Referring provider:  Vinay Martino  Referring provider s clinic:  [facility]  Reason for visit/diagnosis:  neoplasm, malignant, digestive system, liver cell carcinoma  Has patient been notified of appointment date and time?: Yes     RECORDS INFORMATION:  Were the records received with the referral (via [employer])? Yes     Has patient been seen for any external appt for this diagnosis? Yes     If yes, where? [facility]     Has patient had any imaging or procedures outside of [place] for this condition? Yes                                If Yes, where? [facility]     ADDITIONAL INFORMATION:   Scheduled via [email or web address] from Jerilyn BOOKER / call to pt to confirm

## 2019-08-29 NOTE — TELEPHONE ENCOUNTER
IB sent to Jerilyn Walker to confirm all recs in Epic/CE - [facility] added to [facility], notes in Epic/CE  11:57 AM

## 2019-08-29 NOTE — TELEPHONE ENCOUNTER
Jerilyn Walekr, Andrea Nielsen             Yes, and thank you!     KJ    Previous Messages      ----- Message -----   From: Andrea Olvera   Sent: 8/29/2019  11:51 AM   To: Jerilyn Walker RN   Subject: FW: Referra review request                       Good afternoon, Jerilyn,     I wanted to touch base about this patient: the imaging from [facility] has now been resolved. I see they have an appointment scheduled 9/20-I wanted to double check, do we have all of the records needed for the patient to have a successful appointment? Please advise.

## 2019-08-30 ENCOUNTER — OFFICE VISIT (OUTPATIENT)
Dept: ONCOLOGY | Facility: CLINIC | Age: 69
End: 2019-08-30
Payer: COMMERCIAL

## 2019-08-30 ENCOUNTER — INFUSION (OUTPATIENT)
Dept: ONCOLOGY | Facility: CLINIC | Age: 69
End: 2019-08-30
Payer: COMMERCIAL

## 2019-08-30 ENCOUNTER — APPOINTMENT (OUTPATIENT)
Dept: ONCOLOGY | Facility: CLINIC | Age: 69
End: 2019-08-30
Payer: COMMERCIAL

## 2019-08-30 VITALS
HEART RATE: 53 BPM | SYSTOLIC BLOOD PRESSURE: 150 MMHG | WEIGHT: 212 LBS | BODY MASS INDEX: 35.28 KG/M2 | OXYGEN SATURATION: 95 % | DIASTOLIC BLOOD PRESSURE: 71 MMHG | TEMPERATURE: 97.2 F

## 2019-08-30 DIAGNOSIS — C22.0 HEPATOCELLULAR CARCINOMA (CMS/HCC): Primary | ICD-10-CM

## 2019-08-30 DIAGNOSIS — C22.0 HEPATOCELLULAR CARCINOMA (CMS/HCC): ICD-10-CM

## 2019-08-30 LAB
AFP SERPL-MCNC: 61 NG/ML
ALBUMIN SERPL-MCNC: 4.6 G/DL (ref 3.5–5.3)
ALP SERPL-CCNC: 108 U/L (ref 45–115)
ALT SERPL-CCNC: 20 U/L (ref 0–52)
AMYLASE SERPL-CCNC: 41 U/L (ref 27–103)
ANION GAP SERPL CALC-SCNC: 10 MMOL/L (ref 3–11)
AST SERPL-CCNC: 24 U/L (ref 0–39)
BASOPHILS # BLD AUTO: 0 10*3/UL
BASOPHILS NFR BLD AUTO: 1 % (ref 0–2)
BILIRUB SERPL-MCNC: 1.1 MG/DL (ref 0–1.4)
BUN SERPL-MCNC: 23 MG/DL (ref 7–25)
CALCIUM ALBUM COR SERPL-MCNC: 9.3 MG/DL (ref 8.6–10.3)
CALCIUM SERPL-MCNC: 9.8 MG/DL (ref 8.6–10.3)
CHLORIDE SERPL-SCNC: 103 MMOL/L (ref 98–107)
CO2 SERPL-SCNC: 26 MMOL/L (ref 21–32)
CREAT SERPL-MCNC: 1.09 MG/DL (ref 0.7–1.3)
EOSINOPHIL # BLD AUTO: 0.1 10*3/UL
EOSINOPHIL NFR BLD AUTO: 2 % (ref 0–3)
ERYTHROCYTE [DISTWIDTH] IN BLOOD BY AUTOMATED COUNT: 13.4 % (ref 11.5–15)
GFR SERPL CREATININE-BSD FRML MDRD: 69 ML/MIN/1.73M*2
GLUCOSE SERPL-MCNC: 182 MG/DL (ref 70–105)
HCT VFR BLD AUTO: 46.6 % (ref 38–50)
HGB BLD-MCNC: 15.3 G/DL (ref 13.2–17.2)
LIPASE SERPL-CCNC: 33 U/L (ref 11–82)
LYMPHOCYTES # BLD AUTO: 1.3 10*3/UL
LYMPHOCYTES NFR BLD AUTO: 27 % (ref 15–47)
MCH RBC QN AUTO: 28.9 PG (ref 29–34)
MCHC RBC AUTO-ENTMCNC: 32.8 G/DL (ref 32–36)
MCV RBC AUTO: 88.2 FL (ref 82–97)
MONOCYTES # BLD AUTO: 0.3 10*3/UL
MONOCYTES NFR BLD AUTO: 7 % (ref 5–13)
NEUTROPHILS # BLD AUTO: 3.1 10*3/UL
NEUTROPHILS NFR BLD AUTO: 64 % (ref 46–70)
PHOSPHATE SERPL-MCNC: 2.7 MG/DL (ref 2.5–4.9)
PLATELET # BLD AUTO: 130 10*3/UL (ref 130–350)
PMV BLD AUTO: 8 FL (ref 6.9–10.8)
POTASSIUM SERPL-SCNC: 4.2 MMOL/L (ref 3.5–5.1)
PROT SERPL-MCNC: 7.2 G/DL (ref 6–8.3)
RBC # BLD AUTO: 5.28 10*6/ΜL (ref 4.1–5.8)
SODIUM SERPL-SCNC: 139 MMOL/L (ref 135–145)
TSH SERPL DL<=0.05 MIU/L-ACNC: 3.17 UIU/ML (ref 0.34–4.82)
WBC # BLD AUTO: 4.8 10*3/UL (ref 3.7–9.6)

## 2019-08-30 PROCEDURE — 84100 ASSAY OF PHOSPHORUS: CPT

## 2019-08-30 PROCEDURE — 82105 ALPHA-FETOPROTEIN SERUM: CPT

## 2019-08-30 PROCEDURE — 99214 OFFICE O/P EST MOD 30 MIN: CPT | Performed by: INTERNAL MEDICINE

## 2019-08-30 PROCEDURE — 82150 ASSAY OF AMYLASE: CPT

## 2019-08-30 PROCEDURE — G0463 HOSPITAL OUTPT CLINIC VISIT: HCPCS

## 2019-08-30 PROCEDURE — 84443 ASSAY THYROID STIM HORMONE: CPT

## 2019-08-30 PROCEDURE — 36415 COLL VENOUS BLD VENIPUNCTURE: CPT

## 2019-08-30 PROCEDURE — 83690 ASSAY OF LIPASE: CPT

## 2019-08-30 PROCEDURE — 85025 COMPLETE CBC W/AUTO DIFF WBC: CPT

## 2019-08-30 PROCEDURE — 80053 COMPREHEN METABOLIC PANEL: CPT

## 2019-08-30 RX ORDER — SORAFENIB 200 MG/1
200 TABLET, FILM COATED ORAL 2 TIMES DAILY
Qty: 60 TABLET | Refills: 3 | Status: SHIPPED | OUTPATIENT
Start: 2019-08-30 | End: 2019-12-19 | Stop reason: ALTCHOICE

## 2019-08-30 ASSESSMENT — ENCOUNTER SYMPTOMS
SHORTNESS OF BREATH: 1
CHEST TIGHTNESS: 1
ABDOMINAL PAIN: 1
ABDOMINAL DISTENTION: 1
EYE PROBLEMS: 1
FATIGUE: 1
LEG SWELLING: 1
MYALGIAS: 1
NERVOUS/ANXIOUS: 1
ENDOCRINE NEGATIVE: 1
ARTHRALGIAS: 1
HEMATOLOGIC/LYMPHATIC NEGATIVE: 1

## 2019-08-30 ASSESSMENT — PAIN SCALES - GENERAL: PAINLEVEL: 8

## 2019-08-30 NOTE — PROGRESS NOTES
Medical Oncology Follow-Up    Subjective   HISTORY OF PRESENT ILLNESS:  Ben Lai is a 69 y.o. male who  was diagnosed with hepatocellular carcinoma in November 2018.  He has an underlying hepatitis C which was supposedly cured.    Due to the extent of his HCC per GI at [facility] it was not amenable to local therapy, resection or radiation, referred to medical oncology   He was started on sorafenib early December 2018 but this appears to have been stopped in February due to significant side effects.  Patient states unequivocally that he started to feel better after a 3-week break but the sorafenib was recently restarted and he is having significant quality of life compromising side effects again.  This includes severe joint and muscle pain and fatigue.     7/29/19 restaging scans showed progressive disease in the liver, however no sings of metastatic disease     8/14/19 s/p   CT-guided  right lobe of the liver radiofrequency ablation.     He feels well and feels he is fairly recovering from the side effects of the sorafenib.  However   Pain right upper quadrant likely post procedure (RFA) .   Energy is still low ECOG 2   appetite ist stable, has gained weight   Takes care of his ADLS   Stable, abd pain, chest pain and neuropathy           Encounter Diagnosis   Name Primary?   • Hepatocellular carcinoma (CMS/HCC) (HCC)    .     Treatment History:     Hepatocellular carcinoma (CMS/HCC) (HCC)    1/23/2019 Initial Diagnosis     Hepatocellular carcinoma (CMS/HCC) (HCC)      7/30/2019 -  Adjuvant Therapy     Hepatocellular - Sorafenib (Nexavar)  Plan Provider: ANDERSON Bryan  Treatment goal: Palliative  Line of treatment: [No plan line of treatment]           RECENT EVENTS:    PAST MEDICAL HISTORY:   Past Medical History:   Diagnosis Date   • A-fib (CMS/HCC) (HCC)    • Allergy    • Anxiety    • Arthritis    • Asthma    • Blindness of right eye    • BPH (benign prostatic hyperplasia)    • Cardiovascular  disease    • Cirrhosis (CMS/HCC) (HCC)     with possible liver mass by MRI 5/18, rec repeat in 8/2018   • Complication of anesthesia    • COPD (chronic obstructive pulmonary disease) (CMS/HCC) (HCC)    • Depression    • Endocrine disorder    • Gallstones    • Gastritis    • GERD (gastroesophageal reflux disease)    • Glaucoma    • Hearing loss     bilateral hearing aids   • Hemorrhoids    • Hepatitis C     Hep C, 2016 remission, complicated by cirrhosis.  MRI abd 5/30/18, rec 3 month follow up for focus of enhancement right and left hepatic lobe junction   • Hernia, abdominal    • High blood pressure    • IBS (irritable bowel syndrome)    • Infectious viral hepatitis    • Jaundice    • Kidney stones    • Obstructive sleep apnea syndrome    • Periodic limb movement disorder    • Persistent hypersomnia    • Persistent insomnia    • Pneumonia    • PONV (postoperative nausea and vomiting)    • Type 2 diabetes mellitus (CMS/HCC) (HCC)    • Urinary incontinence    • Wears partial dentures         GYNECOLOGICAL HISTORY (if applicable): NA     FAMILY HISTORY:   Family History   Problem Relation Age of Onset   • Arthritis Mother    • Rheum arthritis Mother    • Diabetes Mother    • Rectal cancer Mother    • Other Paternal Grandmother    • Other Paternal Grandfather    • Diabetes Other    • Rheum arthritis Other    • Osteoporosis Other         SOCIAL HISTORY:   Social History     Socioeconomic History   • Marital status:      Spouse name: Not on file   • Number of children: Not on file   • Years of education: Not on file   • Highest education level: Not on file   Occupational History   • Not on file   Social Needs   • Financial resource strain: Not on file   • Food insecurity:     Worry: Not on file     Inability: Not on file   • Transportation needs:     Medical: Not on file     Non-medical: Not on file   Tobacco Use   • Smoking status: Never Smoker   • Smokeless tobacco: Never Used   Substance and Sexual Activity   •  Alcohol use: No   • Drug use: No   • Sexual activity: Not on file   Lifestyle   • Physical activity:     Days per week: Not on file     Minutes per session: Not on file   • Stress: Not on file   Relationships   • Social connections:     Talks on phone: Not on file     Gets together: Not on file     Attends [employer] service: Not on file     Active member of club or organization: Not on file     Attends meetings of clubs or organizations: Not on file     Relationship status: Not on file   • Intimate partner violence:     Fear of current or ex partner: Not on file     Emotionally abused: Not on file     Physically abused: Not on file     Forced sexual activity: Not on file   Other Topics Concern   • Not on file   Social History Narrative   • Not on file        ALLERGIES:   Allergies as of 08/30/2019 - Reviewed 08/14/2019   Allergen Reaction Noted   • Penicillins Anaphylaxis 10/19/2017   • Metformin Hives    • Adhesive tape-silicones  04/06/2018   • Banana  02/05/2019   • Broccoli  02/05/2019   • Datto sprout  02/05/2019   • Cabbage  02/05/2019   • Cantaloupe  02/05/2019   • Celery  02/05/2019   • Cheese  02/05/2019   • Cranberry  02/05/2019   • Egg     • Interferon violetta (human leuk. derived)     • Interferon violetta-2a  02/05/2019   • Latex     • Legumes  02/05/2019   • Lettuce  02/05/2019   • Milk  02/05/2019   • Mometasone     • Mometasone furoate     • Mustard  02/05/2019   • Nut - unspecified  02/05/2019   • Oats  02/05/2019   • Omeprazole     • Ondansetron hcl Nausea And Vomiting 05/01/2019   • Onion  02/05/2019   • Pepper (genus capsicum)  02/05/2019   • Pineapple  02/05/2019   • Primidone  05/01/2019   • Ribavirin Itching 10/19/2017   • Rosuvastatin  02/05/2019   • Sorafenib  04/04/2019   • Spironolactone     • Statins-hmg-coa reductase inhibitors Itching and GI intolerance 04/06/2018   • Wheat  02/05/2019   • Yeast  02/05/2019   • Pantoprazole Itching and Rash 02/28/2018   • Peginterferon violetta-2b Palpitations  10/19/2017        MEDICATIONS:   Current Outpatient Medications   Medication Sig Dispense Refill   • SORAfenib (NexAVAR) 200 mg chemo tablet Take 1 tablet (200 mg total) by mouth 2 (two) times a day Swallow whole with water. Take at least 1 hour before or 2 hours after a meal. 60 tablet 3   • prochlorperazine (COMPAZINE) 10 mg tablet 1 tab PO q6h prn nausea/vomiting. Max 40 mg/day. 30 tablet 5   • LANTUS SOLOSTAR U-100 INSULIN 100 unit/mL (3 mL) insulin pen Inject 14 Units under the skin daily       • primidone (MYSOLINE) 50 mg tablet      • doxycycline (MONODOX) 100 mg capsule Take 200 mg by mouth  0   • ezetimibe (ZETIA) 10 mg tablet Take 1 tablet (10 mg total) by mouth daily 90 tablet 2   • hydrOXYzine (ATARAX) 25 mg tablet Take 1 tablet (25 mg total) by mouth nightly 90 tablet 2   • escitalopram (LEXAPRO) 10 mg tablet Take 1 tablet (10 mg total) by mouth daily 90 tablet 2   • carvedilol (COREG) 3.125 mg tablet Take 1 tablet (3.125 mg total) by mouth 2 (two) times a day with meals 180 tablet 2   • buPROPion SR (WELLBUTRIN SR) 100 mg 12 hr tablet Take 1 tablet (100 mg total) by mouth daily 90 tablet 1   • lactulose (GENERLAC) 10 gram/15 mL solution Take 15 mL (10 g total) by mouth daily 473 mL 2   • terazosin (HYTRIN) 5 mg capsule Take 1 capsule (5 mg total) by mouth 2 (two) times a day 180 capsule 2   • HYDROmorphone (DILAUDID) 2 mg tablet Take 2 tab/cap by mouth 3 (three) times a day as needed for pain scale 1-3/10, pain scale 4-7/10 or pain scale 8-10/10 (Cancer and joint pain)      • SORAfenib (NexAVAR) 200 mg chemo tablet Take 2 tab/cap by mouth 2 (two) times a day     • blood-glucose meter (ACCU-CHEK ADELE PLUS METER) [facility] [place] Use to test blood sugars 3 times daily (E11.65, non insulin depedent) AccuChek Adele plus, meter is 8 years old (Patient taking differently: Use to test blood sugars 3 times daily (E11.65, non insulin dependent) ) 1 each 0   • lisinopril (PRINIVIL,ZESTRIL) 20 mg tablet Take 1 tablet (20 mg  total) by mouth daily 90 tablet 2   • furosemide (LASIX) 20 mg tablet Take 1 tablet (20 mg total) by mouth 2 (two) times a day. (Patient taking differently: Take 20 mg by mouth daily  ) 180 tablet 2   • lansoprazole (PREVACID) 30 mg capsule Take 30 mg by mouth daily      • liraglutide (VICTOZA 2-JASE) 0.6 mg/0.1 mL (18 mg/3 mL) injection Inject 1.8 mg under the skin daily       • capsaicin (ZOSTRIX) 0.025 % cream Apply 1 application topically as needed for pain scale 1-3/10, 1-3/8.     • sodium chloride (SALINE NASAL) 0.65 % nasal spray Administer 1 spray into each nostril as needed for congestion or rhinitis      • white petrolatum (AQUAPHOR HEALING) 41 % ointment ointment Apply 1 application topically as needed.     • pramoxine-menthol (GOLD BOND MEDICATED ANTI-ITCH) 1-1 % cream Apply topically.     • brimonidine (ALPHAGAN) 0.2 % ophthalmic solution every 12 hours.     • GLYCERIN/PROPYLENE GLYCOL (ARTIFICIAL TEARS,GLYCERIN-PEG, OPHT) Administer 1 drop into affected eye(s) as needed (Dry eyes)      • latanoprost (XALATAN) 0.005 % ophthalmic solution INSTILL 1 DROP INTO AFFECTED EYE(S) BY OPHTHALMIC ROUTE ONCE DAILY INTHE EVENING 10 0     No current facility-administered medications for this visit.        REVIEW OF SYSTEMS:   Review of Systems   Constitutional: Positive for fatigue.   HENT:   Positive for hearing loss.    Eyes: Positive for eye problems.   Respiratory: Positive for chest tightness and shortness of breath.    Cardiovascular: Positive for chest pain and leg swelling.   Gastrointestinal: Positive for abdominal distention and abdominal pain.   Endocrine: Negative.    Genitourinary: Positive for bladder incontinence.    Musculoskeletal: Positive for arthralgias, gait problem and myalgias.   Skin: Negative.    Neurological: Positive for gait problem.   Hematological: Negative.    Psychiatric/Behavioral: The patient is nervous/anxious.        The ECOG performance status today is 1 - Restricted strenuous  activity; able to carry out light work.          Objective    PHYSICAL EXAM:   There were no vitals taken for this visit.   There is no height or weight on file to calculate BMI.       Physical Exam  Constitutional:       Appearance: He is well-developed.   HENT:      Head: Normocephalic and atraumatic.   Eyes:      Pupils: Pupils are equal, round, and reactive to light.      Comments: Right eye less responsive    Neck:      Musculoskeletal: Normal range of motion and neck supple.   Cardiovascular:      Rate and Rhythm: Normal rate and regular rhythm.   Pulmonary:      Effort: No respiratory distress.      Breath sounds: Normal breath sounds. No wheezing.   Abdominal:      General: There is distension.      Palpations: Abdomen is soft.      Tenderness: There is no tenderness.   Musculoskeletal: Normal range of motion.   Lymphadenopathy:      Cervical: No cervical adenopathy.   Skin:     General: Skin is warm.   Neurological:      Mental Status: He is alert and oriented to person, place, and time.      reveals a generally healthy pleasant appearing gentleman who was not further examined based on the below.    DIAGNOSTIC REPORTS REVIEWED:   Imaging:  Reviewed     Assessment/Plan    IMPRESSION:   This is a pleasant 69-year-old male with diagnosis of hepatocellular carcinoma involving the right lobe, locally advanced disease, per the chart not amenable to local therapy, started sorafenib December 2018 however stopped in February 2019 due to intolerance without any dose reductions.  Has been off of treatment since then, restaging CAT scan and MRI  7/2019 show progressive disease in the liver.  Started  sorafenib 8/2019 at a lower dose 200 mg twice daily, s/p RFA 8/2019    Today he is tolerating sorafinib well without excessive toxicity      Plan:  -continue Sorafenib 200 mg p.o. twice daily, labs are stable 's   -Follow-up in 4 weeks with the labs and a visit toxicity check  - restaging scans should be in end of  10/2019    - still awaiting referral to [facility]        Physician: ANDERSON Bryan

## 2019-09-09 ASSESSMENT — ENCOUNTER SYMPTOMS
DIARRHEA: 1
NAUSEA: 1
PANIC: 0
ORTHOPNEA: 1
BLOOD IN STOOL: 0
TASTE DISTURBANCE: 0
MUSCLE CRAMPS: 1
TREMORS: 1
NIGHT SWEATS: 0
LEG PAIN: 1
DEPRESSION: 1
HOARSE VOICE: 1
WEAKNESS: 1
DISTURBANCES IN COORDINATION: 0
JAUNDICE: 0
ARTHRALGIAS: 1
DOUBLE VISION: 0
NECK MASS: 0
EXERCISE INTOLERANCE: 1
HEMOPTYSIS: 0
WEIGHT LOSS: 0
WEIGHT GAIN: 0
SYNCOPE: 0
MUSCLE WEAKNESS: 1
NECK PAIN: 1
COUGH: 1
ALTERED TEMPERATURE REGULATION: 1
INCREASED ENERGY: 1
NUMBNESS: 0
LIGHT-HEADEDNESS: 1
HYPERTENSION: 1
PALPITATIONS: 1
SORE THROAT: 1
SMELL DISTURBANCE: 0
RECTAL PAIN: 0
JOINT SWELLING: 1
HEARTBURN: 1
MYALGIAS: 1
SNORES LOUDLY: 1
CONSTIPATION: 1
SKIN CHANGES: 0
WHEEZING: 1
POLYPHAGIA: 0
BLOATING: 1
NAIL CHANGES: 1
SPEECH CHANGE: 1
TINGLING: 0
TROUBLE SWALLOWING: 0
POLYDIPSIA: 1
PARALYSIS: 0
CHILLS: 1
HEADACHES: 1
SINUS PAIN: 1
FATIGUE: 1
BRUISES/BLEEDS EASILY: 1
STIFFNESS: 1
COUGH DISTURBING SLEEP: 1
INSOMNIA: 1
FLANK PAIN: 0
EYE WATERING: 0
DIFFICULTY URINATING: 1
HEMATURIA: 0
BOWEL INCONTINENCE: 0
POOR WOUND HEALING: 1
DIZZINESS: 1
BACK PAIN: 1
DYSURIA: 0
MEMORY LOSS: 1
POSTURAL DYSPNEA: 1
SWOLLEN GLANDS: 1
HALLUCINATIONS: 1
SHORTNESS OF BREATH: 1
DECREASED CONCENTRATION: 1
VOMITING: 0
EYE IRRITATION: 1
SLEEP DISTURBANCES DUE TO BREATHING: 1
EYE REDNESS: 0
DECREASED APPETITE: 1
SPUTUM PRODUCTION: 1
ABDOMINAL PAIN: 1
LOSS OF CONSCIOUSNESS: 0
EYE PAIN: 1
SEIZURES: 0
HYPOTENSION: 0
NERVOUS/ANXIOUS: 1
FEVER: 0
DYSPNEA ON EXERTION: 1
SINUS CONGESTION: 1

## 2019-09-10 ENCOUNTER — TELEPHONE (OUTPATIENT)
Dept: SURGERY | Facility: CLINIC | Age: 69
End: 2019-09-10

## 2019-09-10 NOTE — TELEPHONE ENCOUNTER
[facility] to verify John's appt on 9/20/19 with Dr Munoz. Antonietta requested Dr Munoz's NPI # to obtain an authorization for the appt. Per Antonietta, she is putting John under Dr Rangel for the authorization because Dr Munoz is not showing as credentialed in their system yet. I verified Dr Arnold & Dr Rangel are credentialed in their system. Antonietta also verified our fax number & stated she will fax the authorization to us as soon as it is processed.

## 2019-09-20 ENCOUNTER — PRE VISIT (OUTPATIENT)
Dept: SURGERY | Facility: CLINIC | Age: 69
End: 2019-09-20

## 2019-09-20 ENCOUNTER — OFFICE VISIT (OUTPATIENT)
Dept: SURGERY | Facility: CLINIC | Age: 69
End: 2019-09-20
Attending: SURGERY
Payer: MEDICARE

## 2019-09-20 VITALS
RESPIRATION RATE: 14 BRPM | OXYGEN SATURATION: 95 % | HEART RATE: 56 BPM | BODY MASS INDEX: 35.8 KG/M2 | DIASTOLIC BLOOD PRESSURE: 63 MMHG | WEIGHT: 209.7 LBS | TEMPERATURE: 97.4 F | HEIGHT: 64 IN | SYSTOLIC BLOOD PRESSURE: 129 MMHG

## 2019-09-20 DIAGNOSIS — C22.0 HEPATOCELLULAR CARCINOMA (H): Primary | ICD-10-CM

## 2019-09-20 PROBLEM — K74.60 ESOPHAGEAL VARICES IN CIRRHOSIS (H): Status: ACTIVE | Noted: 2019-09-20

## 2019-09-20 PROBLEM — M17.12 OSTEOARTHRITIS OF LEFT KNEE: Status: ACTIVE | Noted: 2019-09-20

## 2019-09-20 PROBLEM — I27.89 OTHER CHRONIC PULMONARY HEART DISEASES: Status: ACTIVE | Noted: 2019-09-20

## 2019-09-20 PROBLEM — R69 OTHER ILL-DEFINED AND UNKNOWN CAUSES OF MORBIDITY AND MORTALITY: Status: ACTIVE | Noted: 2019-09-20

## 2019-09-20 PROBLEM — R10.11 RIGHT UPPER QUADRANT ABDOMINAL PAIN: Status: ACTIVE | Noted: 2019-09-20

## 2019-09-20 PROBLEM — C22.9 MALIGNANT NEOPLASM OF LIVER (H): Status: ACTIVE | Noted: 2019-09-20

## 2019-09-20 PROBLEM — K74.60: Status: ACTIVE | Noted: 2019-09-20

## 2019-09-20 PROBLEM — K21.9 GASTROESOPHAGEAL REFLUX DISEASE: Status: ACTIVE | Noted: 2019-09-20

## 2019-09-20 PROBLEM — D12.6 BENIGN NEOPLASM OF COLON: Status: ACTIVE | Noted: 2019-09-20

## 2019-09-20 PROBLEM — I10 ESSENTIAL HYPERTENSION: Status: ACTIVE | Noted: 2018-09-07

## 2019-09-20 PROBLEM — R31.29 MICROSCOPIC HEMATURIA: Status: ACTIVE | Noted: 2019-09-20

## 2019-09-20 PROBLEM — E11.49 TYPE 2 DIABETES MELLITUS WITH NEUROLOGIC COMPLICATION (H): Status: ACTIVE | Noted: 2017-07-12

## 2019-09-20 PROBLEM — H40.1190 PRIMARY OPEN ANGLE GLAUCOMA: Status: ACTIVE | Noted: 2019-09-20

## 2019-09-20 PROBLEM — J30.9 ALLERGIC RHINITIS: Status: ACTIVE | Noted: 2019-09-20

## 2019-09-20 PROBLEM — Z87.19 HISTORY OF ESOPHAGEAL VARICES: Status: ACTIVE | Noted: 2018-09-07

## 2019-09-20 PROBLEM — R29.818: Status: ACTIVE | Noted: 2019-09-20

## 2019-09-20 PROBLEM — R07.1 PAINFUL BREATHING: Status: ACTIVE | Noted: 2019-09-20

## 2019-09-20 PROBLEM — R94.6 THYROID FUNCTION TEST ABNORMAL: Status: ACTIVE | Noted: 2019-09-20

## 2019-09-20 PROBLEM — B18.2 CHRONIC HEPATITIS C VIRUS INFECTION (H): Status: ACTIVE | Noted: 2018-01-11

## 2019-09-20 PROBLEM — E78.5 HYPERLIPIDEMIA: Status: ACTIVE | Noted: 2019-09-20

## 2019-09-20 PROBLEM — I85.10 ESOPHAGEAL VARICES IN CIRRHOSIS (H): Status: ACTIVE | Noted: 2019-09-20

## 2019-09-20 PROBLEM — H26.9 CATARACT: Status: ACTIVE | Noted: 2019-09-20

## 2019-09-20 PROBLEM — G47.33 OBSTRUCTIVE SLEEP APNEA OF ADULT: Status: ACTIVE | Noted: 2019-09-20

## 2019-09-20 PROBLEM — Z91.018 MULTIPLE FOOD ALLERGIES: Status: ACTIVE | Noted: 2018-01-11

## 2019-09-20 PROBLEM — I50.9 CONGESTIVE HEART FAILURE (H): Status: ACTIVE | Noted: 2019-09-20

## 2019-09-20 PROBLEM — F32.A DEPRESSIVE DISORDER: Status: ACTIVE | Noted: 2019-09-20

## 2019-09-20 PROBLEM — K80.20 GALLSTONE: Status: ACTIVE | Noted: 2019-09-20

## 2019-09-20 PROBLEM — J45.909 ASTHMA WITHOUT STATUS ASTHMATICUS: Status: ACTIVE | Noted: 2019-09-20

## 2019-09-20 PROCEDURE — G0463 HOSPITAL OUTPT CLINIC VISIT: HCPCS | Mod: ZF

## 2019-09-20 RX ORDER — BUPROPION HYDROCHLORIDE 100 MG/1
TABLET ORAL
Status: ON HOLD | COMMUNITY
End: 2019-12-11

## 2019-09-20 RX ORDER — LANSOPRAZOLE 30 MG/1
15 CAPSULE, DELAYED RELEASE ORAL 2 TIMES DAILY
Refills: 11 | COMMUNITY
Start: 2018-09-29

## 2019-09-20 RX ORDER — LIRAGLUTIDE 6 MG/ML
1.8 INJECTION SUBCUTANEOUS EVERY MORNING
COMMUNITY
Start: 2019-09-16

## 2019-09-20 RX ORDER — BLOOD-GLUCOSE METER
EACH MISCELLANEOUS
COMMUNITY

## 2019-09-20 RX ORDER — TRAMADOL HYDROCHLORIDE 50 MG/1
TABLET ORAL
COMMUNITY
Start: 2016-04-18 | End: 2019-11-05

## 2019-09-20 RX ORDER — CARVEDILOL 3.12 MG/1
TABLET ORAL
Status: ON HOLD | COMMUNITY
End: 2019-12-12

## 2019-09-20 RX ORDER — LATANOPROST 50 UG/ML
1 SOLUTION/ DROPS OPHTHALMIC AT BEDTIME
COMMUNITY
Start: 2019-08-24

## 2019-09-20 RX ORDER — HYDROMORPHONE HYDROCHLORIDE 2 MG/1
TABLET ORAL
Status: ON HOLD | COMMUNITY
End: 2019-12-12

## 2019-09-20 RX ORDER — HYDROXYZINE HYDROCHLORIDE 25 MG/1
TABLET, FILM COATED ORAL
Status: ON HOLD | COMMUNITY
End: 2019-12-11

## 2019-09-20 RX ORDER — TERAZOSIN 5 MG/1
5 CAPSULE ORAL 2 TIMES DAILY
COMMUNITY
Start: 2019-07-26

## 2019-09-20 RX ORDER — FUROSEMIDE 20 MG
20 TABLET ORAL 2 TIMES DAILY
COMMUNITY

## 2019-09-20 RX ORDER — LISINOPRIL 20 MG/1
20 TABLET ORAL EVERY MORNING
COMMUNITY
Start: 2018-04-12

## 2019-09-20 RX ORDER — SORAFENIB 200 MG/1
200 TABLET, FILM COATED ORAL 2 TIMES DAILY
Status: ON HOLD | COMMUNITY
Start: 2019-07-30 | End: 2019-12-12

## 2019-09-20 ASSESSMENT — PAIN SCALES - GENERAL: PAINLEVEL: MILD PAIN (3)

## 2019-09-20 ASSESSMENT — MIFFLIN-ST. JEOR: SCORE: 1631.81

## 2019-09-20 NOTE — NURSING NOTE
"Oncology Rooming Note    September 20, 2019 8:38 AM   John Mancia is a 69 year old male who presents for:    Chief Complaint   Patient presents with     Oncology Clinic Visit     New; Liver Ca     Initial Vitals: /63   Pulse 56   Temp 97.4  F (36.3  C) (Oral)   Resp 14   Ht 1.625 m (5' 3.98\")   Wt 95.1 kg (209 lb 11.2 oz)   SpO2 95%   BMI 36.02 kg/m   Estimated body mass index is 36.02 kg/m  as calculated from the following:    Height as of this encounter: 1.625 m (5' 3.98\").    Weight as of this encounter: 95.1 kg (209 lb 11.2 oz). Body surface area is 2.07 meters squared.  Mild Pain (3) Comment: Liver pain   No LMP for male patient.  Allergies reviewed: Yes  Medications reviewed: Yes    Medications: Medication refills not needed today.  Pharmacy name entered into [employer]: [facility] - [place], [place] - [address]    Clinical concerns: New Liver Ca       Keily Sarmiento CMA              "

## 2019-09-20 NOTE — LETTER
9/20/2019       RE: John Mancia  [address] [place] [address]     Dear Colleague,    Thank you for referring your patient, John Mancia, to the [facility]. Please see a copy of my visit note below.    Reason for Visit: Hepatocellular Carcinoma     70 yo  male with history of afib not on blood thinners, chronic hep C (irradicated with medication), liver cirrhosis, portal hypertension, esophageal varices s/p endoscopic ligation, and diagnosis of HCC beginning October 2018. Was evaluated in [facility] in November 2018 and was found to have AFP of [id number].  CT abd/pelvis was consistent with large mass > 5cm segment 5&8 invading the gall bladder, a second mass ~2.5 cm, as well as occlusive R anterior portal vein thrombus, R anterior hepatic vein thrombus and a 4 mm lung nodule.  He was deemed not a resection or txp candidate and was started on Sorafenib.  He has not had great tolerance to this medication but was restarted at a lower dose again 200 mg BID in July 2019.  His most recent treatment was radio-ablation mid August, but it is unclear which mass was ablated.  He has not had follow-up imaging since the ablation.  Most recent imaging in August prior to the ablation shows stable liver masses, AFP dropped to the 200's, per patient post-ablation imaging will be obtained in 2-3 months.  He is seen in consultation to discuss surgical interventions.  Since the ablation and restarting systemic therapy he has been more fatigued, denies jaundice, he uses a walker at baseline, independent with ADL's.     PMH: HTN, afib, hep C, liver cirrhosis, portal HTN sequelae including esophageal varices      PSH: R groin hernia repair with mesh in 1960's, esophageal varices ligation     FHX: colon cancer     SHX: [place] in [place]     Medications and imaging reviewed.  Imaging findings described in HPI above.  In addition, he has multiple intra-abdominal varices and  recanalization of the umbilical vein indicative of significant portal HTN.    A/P: 68 yo male with HCC due to chronic hep C, liver cirrhosis and portal HTN, with multiple masses involving the R liver, R anterior portal vein and hepatic vein thrombus on Sorafenib s/p radio-ablation.  We had a discussion with the patient regarding the natural history of HCC and multiple different treatment options including systemic and liver directed therapies.  Due to the location of the mass and underlying liver cirrhosis and portal HTN the patient is not a good surgical candidate, resection would risk acute liver failure and intra or postoperative death. He also does not fulfill the Pb criteria for liver txp.  Will discuss candidacy for for catheter directed embolization therapy (TACE or Y90) versus external beam radiation therapy in our multi-disciplinary liver conference today or next Friday.  He should otherwise continue on systemic therapy at this time.  All questions were answered and the patient was in agreement with the above assessment and plan.    A total of 45 minutes of face-to-face time was spent with the patient today, more than 50% of which was spent in counseling and coordination of care.    Again, thank you for allowing me to participate in the care of your patient.      Sincerely,    Priyank Munoz MD

## 2019-09-20 NOTE — PROGRESS NOTES
Reason for Visit: Hepatocellular Carcinoma     68 yo  male with history of afib not on blood thinners, chronic hep C (irradicated with medication), liver cirrhosis, portal hypertension, esophageal varices s/p endoscopic ligation, and diagnosis of HCC beginning October 2018. Was evaluated in [facility] in November 2018 and was found to have AFP of [id number].  CT abd/pelvis was consistent with large mass > 5cm segment 5&8 invading the gall bladder, a second mass ~2.5 cm, as well as occlusive R anterior portal vein thrombus, R anterior hepatic vein thrombus and a 4 mm lung nodule.  He was deemed not a resection or txp candidate and was started on Sorafenib.  He has not had great tolerance to this medication but was restarted at a lower dose again 200 mg BID in July 2019.  His most recent treatment was radio-ablation mid August, but it is unclear which mass was ablated.  He has not had follow-up imaging since the ablation.  Most recent imaging in August prior to the ablation shows stable liver masses, AFP dropped to the 200's, per patient post-ablation imaging will be obtained in 2-3 months.  He is seen in consultation to discuss surgical interventions.  Since the ablation and restarting systemic therapy he has been more fatigued, denies jaundice, he uses a walker at baseline, independent with ADL's.     PMH: HTN, afib, hep C, liver cirrhosis, portal HTN sequelae including esophageal varices      PSH: R groin hernia repair with mesh in 1960's, esophageal varices ligation     FHX: colon cancer     SHX: lives [place] in [place]     Medications and imaging reviewed.  Imaging findings described in HPI above.  In addition, he has multiple intra-abdominal varices and recanalization of the umbilical vein indicative of significant portal HTN.    A/P: 68 yo male with HCC due to chronic hep C, liver cirrhosis and portal HTN, with multiple masses involving the R liver, R anterior portal vein and hepatic vein  thrombus on Sorafenib s/p radio-ablation.  We had a discussion with the patient regarding the natural history of HCC and multiple different treatment options including systemic and liver directed therapies.  Due to the location of the mass and underlying liver cirrhosis and portal HTN the patient is not a good surgical candidate, resection would risk acute liver failure and intra or postoperative death. He also does not fulfill the [place] criteria for liver txp.  Will discuss candidacy for for catheter directed embolization therapy (TACE or Y90) versus external beam radiation therapy in our multi-disciplinary liver conference today or next Friday.  He should otherwise continue on systemic therapy at this time.  All questions were answered and the patient was in agreement with the above assessment and plan.    A total of 45 minutes of face-to-face time was spent with the patient today, more than 50% of which was spent in counseling and coordination of care.

## 2019-09-20 NOTE — PATIENT INSTRUCTIONS
Below is a brief summary of your discussion and care plan from today's visit below.   ______________________________________________________________________    As discussed with Dr. Munoz we will proceed with the following:     - we will plan to discuss you at our Liver tumor conference next week. Dr. Munoz will follow up with you by phone.    ______________________________________________________________________    It was a pleasure seeing you in clinic today - please be in touch if there are any further questions that arise following today's visit.  During business hours, you may reach my Clinic Coordinator at [phone].  For urgent/emergent questions after business hours, you may reach the on-call Surgery Resident by contacting the [facility]  at [phone].    Any benign/non-urgent test results are usually communicated via letter or [employer] message within 1-2 weeks after completion.  Urgent results (those that require a change in the previously-discussed care plan) are usually communicated via a phone call once available from our clinic staff to discuss the results and the next steps in your evaluation.    I recommend signing up for [employer] access if you have not already done so and are comfortable with using a computer.  This allows for online access to your lab results and also helps you communicate efficiently with my clinic should any questions arise in your care.      Sincerely,    Jerilyn Walker RN  Care Coordinator   Phone: [phone]

## 2019-09-26 ENCOUNTER — OFFICE VISIT (OUTPATIENT)
Dept: ONCOLOGY | Facility: CLINIC | Age: 69
End: 2019-09-26
Payer: COMMERCIAL

## 2019-09-26 ENCOUNTER — APPOINTMENT (OUTPATIENT)
Dept: ONCOLOGY | Facility: CLINIC | Age: 69
End: 2019-09-26
Payer: COMMERCIAL

## 2019-09-26 ENCOUNTER — INFUSION (OUTPATIENT)
Dept: ONCOLOGY | Facility: CLINIC | Age: 69
End: 2019-09-26
Payer: COMMERCIAL

## 2019-09-26 VITALS
TEMPERATURE: 97.7 F | DIASTOLIC BLOOD PRESSURE: 67 MMHG | HEART RATE: 54 BPM | SYSTOLIC BLOOD PRESSURE: 138 MMHG | OXYGEN SATURATION: 96 % | BODY MASS INDEX: 35.45 KG/M2 | WEIGHT: 213 LBS

## 2019-09-26 DIAGNOSIS — C22.0 HEPATOCELLULAR CARCINOMA (CMS/HCC): Primary | ICD-10-CM

## 2019-09-26 DIAGNOSIS — C22.0 HEPATOCELLULAR CARCINOMA (CMS/HCC): ICD-10-CM

## 2019-09-26 DIAGNOSIS — R19.7 DIARRHEA, UNSPECIFIED TYPE: ICD-10-CM

## 2019-09-26 DIAGNOSIS — E83.39 HYPOPHOSPHATEMIA: ICD-10-CM

## 2019-09-26 LAB
ALBUMIN SERPL-MCNC: 4.2 G/DL (ref 3.5–5.3)
ALP SERPL-CCNC: 94 U/L (ref 45–115)
ALT SERPL-CCNC: 23 U/L (ref 0–52)
AMYLASE SERPL-CCNC: 46 U/L (ref 27–103)
ANION GAP SERPL CALC-SCNC: 8 MMOL/L (ref 3–11)
AST SERPL-CCNC: 30 U/L (ref 0–39)
BASOPHILS # BLD AUTO: 0 10*3/UL
BASOPHILS NFR BLD AUTO: 1 % (ref 0–2)
BILIRUB SERPL-MCNC: 0.68 MG/DL (ref 0–1.4)
BUN SERPL-MCNC: 24 MG/DL (ref 7–25)
CALCIUM ALBUM COR SERPL-MCNC: 8.7 MG/DL (ref 8.6–10.3)
CALCIUM SERPL-MCNC: 8.9 MG/DL (ref 8.6–10.3)
CHLORIDE SERPL-SCNC: 107 MMOL/L (ref 98–107)
CO2 SERPL-SCNC: 24 MMOL/L (ref 21–32)
CREAT SERPL-MCNC: 0.97 MG/DL (ref 0.7–1.3)
EOSINOPHIL # BLD AUTO: 0.1 10*3/UL
EOSINOPHIL NFR BLD AUTO: 3 % (ref 0–3)
ERYTHROCYTE [DISTWIDTH] IN BLOOD BY AUTOMATED COUNT: 13.4 % (ref 11.5–15)
GFR SERPL CREATININE-BSD FRML MDRD: 80 ML/MIN/1.73M*2
GLUCOSE SERPL-MCNC: 162 MG/DL (ref 70–105)
HCT VFR BLD AUTO: 42.7 % (ref 38–50)
HGB BLD-MCNC: 14.6 G/DL (ref 13.2–17.2)
LIPASE SERPL-CCNC: 35 U/L (ref 11–82)
LYMPHOCYTES # BLD AUTO: 1.1 10*3/UL
LYMPHOCYTES NFR BLD AUTO: 23 % (ref 15–47)
MCH RBC QN AUTO: 30.5 PG (ref 29–34)
MCHC RBC AUTO-ENTMCNC: 34.3 G/DL (ref 32–36)
MCV RBC AUTO: 89 FL (ref 82–97)
MONOCYTES # BLD AUTO: 0.3 10*3/UL
MONOCYTES NFR BLD AUTO: 8 % (ref 5–13)
NEUTROPHILS # BLD AUTO: 3 10*3/UL
NEUTROPHILS NFR BLD AUTO: 66 % (ref 46–70)
PHOSPHATE SERPL-MCNC: 2.1 MG/DL (ref 2.5–4.9)
PLATELET # BLD AUTO: 123 10*3/UL (ref 130–350)
PMV BLD AUTO: 7.8 FL (ref 6.9–10.8)
POTASSIUM SERPL-SCNC: 4.3 MMOL/L (ref 3.5–5.1)
PROT SERPL-MCNC: 6.7 G/DL (ref 6–8.3)
RBC # BLD AUTO: 4.81 10*6/ΜL (ref 4.1–5.8)
SODIUM SERPL-SCNC: 139 MMOL/L (ref 135–145)
TSH SERPL DL<=0.05 MIU/L-ACNC: 3.13 UIU/ML (ref 0.34–4.82)
WBC # BLD AUTO: 4.6 10*3/UL (ref 3.7–9.6)

## 2019-09-26 PROCEDURE — 36415 COLL VENOUS BLD VENIPUNCTURE: CPT

## 2019-09-26 PROCEDURE — 82105 ALPHA-FETOPROTEIN SERUM: CPT

## 2019-09-26 PROCEDURE — 84443 ASSAY THYROID STIM HORMONE: CPT

## 2019-09-26 PROCEDURE — 80053 COMPREHEN METABOLIC PANEL: CPT

## 2019-09-26 PROCEDURE — 84100 ASSAY OF PHOSPHORUS: CPT

## 2019-09-26 PROCEDURE — 83690 ASSAY OF LIPASE: CPT

## 2019-09-26 PROCEDURE — 85025 COMPLETE CBC W/AUTO DIFF WBC: CPT

## 2019-09-26 PROCEDURE — 99214 OFFICE O/P EST MOD 30 MIN: CPT | Performed by: NURSE PRACTITIONER

## 2019-09-26 PROCEDURE — 82150 ASSAY OF AMYLASE: CPT

## 2019-09-26 RX ORDER — LOPERAMIDE HCL 2 MG
TABLET ORAL
Qty: 80 TABLET | Refills: 2 | Status: ON HOLD | OUTPATIENT
Start: 2019-09-26 | End: 2021-01-01

## 2019-09-26 RX ORDER — SORAFENIB 200 MG/1
200 TABLET, FILM COATED ORAL 2 TIMES DAILY
Qty: 60 TABLET | Refills: 3 | Status: SHIPPED | OUTPATIENT
Start: 2019-09-26 | End: 2019-12-19 | Stop reason: ALTCHOICE

## 2019-09-26 RX ORDER — CETIRIZINE HYDROCHLORIDE 10 MG/1
10 TABLET ORAL DAILY
COMMUNITY

## 2019-09-26 ASSESSMENT — ENCOUNTER SYMPTOMS
EYE PROBLEMS: 0
NERVOUS/ANXIOUS: 1
CONSTIPATION: 0
CHILLS: 0
DIAPHORESIS: 0
LEG SWELLING: 0
WOUND: 0
BRUISES/BLEEDS EASILY: 0
HEADACHES: 1
DEPRESSION: 0
DYSURIA: 0
FEVER: 0
SCLERAL ICTERUS: 0
ADENOPATHY: 0
ABDOMINAL DISTENTION: 0
UNEXPECTED WEIGHT CHANGE: 0
BACK PAIN: 0
NUMBNESS: 0
LIGHT-HEADEDNESS: 0
HEMATURIA: 0
DIARRHEA: 1
MYALGIAS: 0
SPEECH DIFFICULTY: 0
ABDOMINAL PAIN: 1
WHEEZING: 0
SLEEP DISTURBANCE: 0
APPETITE CHANGE: 0
NECK PAIN: 0
PALPITATIONS: 0
VOICE CHANGE: 0
EXTREMITY WEAKNESS: 0
TROUBLE SWALLOWING: 0
FLANK PAIN: 0
NAUSEA: 0
DIZZINESS: 1
FREQUENCY: 0
COUGH: 1
SORE THROAT: 0
ARTHRALGIAS: 1
BLOOD IN STOOL: 0
HEMOPTYSIS: 0
SEIZURES: 0
FATIGUE: 1
CHEST TIGHTNESS: 0
VOMITING: 0
SHORTNESS OF BREATH: 0

## 2019-09-26 NOTE — PROGRESS NOTES
Hematology/Medical Oncology Follow-Up    Patient Name: Ben Lai  YOB: 1950  Medical Record Number: [id number]    DATE OF SERVICE:   9/26/2019    CHIEF COMPLAINT:  Ben Lai is a 69 y.o. male with hepatocellular carcinoma who presents today for follow-up and treatment.    ONCOLOGY HISTORY:     Hepatocellular carcinoma (CMS/HCC) (HCC)    1/23/2019 Initial Diagnosis     Hepatocellular carcinoma (CMS/HCC) (HCC)      7/30/2019 -  Adjuvant Therapy     Hepatocellular - Sorafenib (Nexavar)  Plan Provider: ANDERSON Bryan  Treatment goal: Palliative  Line of treatment: [No plan line of treatment]     The patient was diagnosed with hepatocellular carcinoma November 2018.  He has an underlying hepatitis C which was supposedly cured.  Due to the extent of his hepatocellular carcinoma per GI at [facility] it was not amenable to local therapy, resection or radiation.  He was referred to medical oncology and started on sorafenib in early December 2018.  Due to intolerance of the medication and significant side effects with fatigue he stopped and February 2019.  He was seen in follow-up at our clinic in July 2019 and restaging scans showed progressive disease in the liver without any signs of metastatic disease.  On August 14 he had a CT-guided right lobe liver radiofrequency ablation.  In August 20 19 he was also started on a lower dose of sorafenib 200 mg twice daily although fatigue and myalgias returned shortly after starting therapy.  He was seen in follow-up with Dr. Dr. Weeks who recommended the above and also recommended referral to [facility] for evaluation.    HISTORY OF PRESENT ILLNESS:  He is here to consider cycle 3 of sorafenib therapy.  He states that in the last 2 weeks he has had worsening of diarrhea symptoms, up to 10 loose stools per day.  He has been taking Pepto-Bismol on occasion but this has not been controlling his diarrhea.  About 2 weeks ago [facility] and was told he had a viral upper respiratory infection.  He does have an ongoing cough but states that this is overall improved.  He has intermittent right upper quadrant pain, he has had this since the radiofrequency ablation.  He does note that his arthralgias in his shoulders and knees are stable but he plans [facility].      Past Medical History:   Diagnosis Date   • A-fib (CMS/HCC) (HCC)    • Allergy    • Anxiety    • Arthritis    • Asthma    • Blindness of right eye    • BPH (benign prostatic hyperplasia)    • Cardiovascular disease    • Cirrhosis (CMS/HCC) (HCC)     with possible liver mass by MRI 5/18, rec repeat in 8/2018   • Complication of anesthesia    • COPD (chronic obstructive pulmonary disease) (CMS/HCC) (HCC)    • Depression    • Endocrine disorder    • Gallstones    • Gastritis    • GERD (gastroesophageal reflux disease)    • Glaucoma    • Hearing loss     bilateral hearing aids   • Hemorrhoids    • Hepatitis C     Hep C, 2016 remission, complicated by cirrhosis.  MRI abd 5/30/18, rec 3 month follow up for focus of enhancement right and left hepatic lobe junction   • Hernia, abdominal    • High blood pressure    • IBS (irritable bowel syndrome)    • Infectious viral hepatitis    • Jaundice    • Kidney stones    • Obstructive sleep apnea syndrome    • Periodic limb movement disorder    • Persistent hypersomnia    • Persistent insomnia    • Pneumonia    • PONV (postoperative nausea and vomiting)    • Type 2 diabetes mellitus (CMS/HCC) (HCC)    • Urinary incontinence    • Wears partial dentures      Past Surgical History:   Procedure Laterality Date   • COLONOSCOPY  10/27/2016    Pili, normal, repeat 10 years   • CT ABLATION LIVER RADIOFREQUENCY  8/14/2019    CT ABLATION LIVER RADIOFREQUENCY 8/14/2019 [facility] MIS CT SCAN   • ESOPHAGOGASTRODUODENOSCOPY N/A 2/22/2018    Procedure: EGD - ESOPHAGOGASTRODUODENOSCOPY with  banding  x 2 with crna;  Surgeon: William Rivas MD;  Location: [facility];  Service: Endoscopy;  Laterality: N/A;   • ESOPHAGOGASTRODUODENOSCOPY N/A 4/6/2018    Procedure: EGD - ESOPHAGOGASTRODUODENOSCOPY with crna;  Surgeon: William Rivas MD;  Location: [facility];  Service: Endoscopy;  Laterality: N/A;   • ESOPHAGOGASTRODUODENOSCOPY N/A 5/7/2019    Procedure: EGD - ESOPHAGOGASTRODUODENOSCOPY;  Surgeon: William Rivas MD;  Location: [facility];  Service: Endoscopy;  Laterality: N/A;   • HAND SURGERY Left     thumb   • HERNIA REPAIR  01/01/1965   • KNEE ARTHROSCOPY  09/19/2017    left knee, with partial medial meniscectomy; limited chondroplasty, patellofemoral joint   • LIVER BIOPSY      by Dr. Alejandre   • OTHER SURGICAL HISTORY      esophageal varices, banded   • VASECTOMY      at approx age of 25     Social History     Socioeconomic History   • Marital status:      Spouse name: Not on file   • Number of children: Not on file   • Years of education: Not on file   • Highest education level: Not on file   Occupational History   • Not on file   Social Needs   • Financial resource strain: Not on file   • Food insecurity:     Worry: Not on file     Inability: Not on file   • Transportation needs:     Medical: Not on file     Non-medical: Not on file   Tobacco Use   • Smoking status: Never Smoker   • Smokeless tobacco: Never Used   Substance and Sexual Activity   • Alcohol use: No   • Drug use: No   • Sexual activity: Not on file   Lifestyle   • Physical activity:     Days per week: Not on file     Minutes per session: Not on file   • Stress: Not on file   Relationships   • Social connections:     Talks on phone: Not on file     Gets together: Not on file     Attends Worship service: Not on file     Active member of club or organization: Not on file     Attends meetings of clubs or organizations: Not on file     Relationship status: Not on file   • Intimate partner violence:     Fear of current or ex  partner: Not on file     Emotionally abused: Not on file     Physically abused: Not on file     Forced sexual activity: Not on file   Other Topics Concern   • Not on file   Social History Narrative   • Not on file      ALLERGIES:     Allergies as of 09/26/2019 - Reviewed 08/30/2019   Allergen Reaction Noted   • Penicillins Anaphylaxis 10/19/2017   • Metformin Hives    • Adhesive tape-silicones  04/06/2018   • Banana  02/05/2019   • Broccoli  02/05/2019   • [place] sprout  02/05/2019   • Cabbage  02/05/2019   • Cantaloupe  02/05/2019   • Celery  02/05/2019   • Cheese  02/05/2019   • Cranberry  02/05/2019   • Egg     • Interferon violetta (human leuk. derived)     • Interferon violetta-2a  02/05/2019   • Latex     • Legumes  02/05/2019   • Lettuce  02/05/2019   • Milk  02/05/2019   • Mometasone     • Mometasone furoate     • Mustard  02/05/2019   • Nut - unspecified  02/05/2019   • Oats  02/05/2019   • Omeprazole     • Ondansetron hcl Nausea And Vomiting 05/01/2019   • Onion  02/05/2019   • Pepper (genus capsicum)  02/05/2019   • Pineapple  02/05/2019   • Primidone  05/01/2019   • Ribavirin Itching 10/19/2017   • Rosuvastatin  02/05/2019   • Sorafenib  04/04/2019   • Spironolactone     • Statins-hmg-coa reductase inhibitors Itching and GI intolerance 04/06/2018   • Wheat  02/05/2019   • Yeast  02/05/2019   • Pantoprazole Itching and Rash 02/28/2018   • Peginterferon violetta-2b Palpitations 10/19/2017      MEDICATIONS:     Current Outpatient Medications   Medication Sig Dispense Refill   • SORAfenib (NexAVAR) 200 mg chemo tablet Take 1 tablet (200 mg total) by mouth 2 (two) times a day Swallow whole with water. Take at least 1 hour before or 2 hours after a meal. 60 tablet 3   • prochlorperazine (COMPAZINE) 10 mg tablet 1 tab PO q6h prn nausea/vomiting. Max 40 mg/day. 30 tablet 5   • primidone (MYSOLINE) 50 mg tablet      • ezetimibe (ZETIA) 10 mg tablet Take 1 tablet (10 mg total) by mouth daily 90 tablet 2   • hydrOXYzine  (ATARAX) 25 mg tablet Take 1 tablet (25 mg total) by mouth nightly 90 tablet 2   • escitalopram (LEXAPRO) 10 mg tablet Take 1 tablet (10 mg total) by mouth daily 90 tablet 2   • carvedilol (COREG) 3.125 mg tablet Take 1 tablet (3.125 mg total) by mouth 2 (two) times a day with meals 180 tablet 2   • buPROPion SR (WELLBUTRIN SR) 100 mg 12 hr tablet Take 1 tablet (100 mg total) by mouth daily 90 tablet 1   • lactulose (GENERLAC) 10 gram/15 mL solution Take 15 mL (10 g total) by mouth daily 473 mL 2   • terazosin (HYTRIN) 5 mg capsule Take 1 capsule (5 mg total) by mouth 2 (two) times a day 180 capsule 2   • HYDROmorphone (DILAUDID) 2 mg tablet Take 2 tab/cap by mouth 3 (three) times a day as needed for pain scale 1-3/10, pain scale 4-7/10 or pain scale 8-10/10 (Cancer and joint pain)      • lisinopril (PRINIVIL,ZESTRIL) 20 mg tablet Take 1 tablet (20 mg total) by mouth daily 90 tablet 2   • furosemide (LASIX) 20 mg tablet Take 1 tablet (20 mg total) by mouth 2 (two) times a day. 180 tablet 2   • lansoprazole (PREVACID) 30 mg capsule Take 30 mg by mouth daily      • liraglutide (VICTOZA 2-JASE) 0.6 mg/0.1 mL (18 mg/3 mL) injection Inject 1.8 mg under the skin daily       • capsaicin (ZOSTRIX) 0.025 % cream Apply 1 application topically as needed for pain scale 1-3/10, 1-3/8.     • sodium chloride (SALINE NASAL) 0.65 % nasal spray Administer 1 spray into each nostril as needed for congestion or rhinitis      • pramoxine-menthol (GOLD BOND MEDICATED ANTI-ITCH) 1-1 % cream Apply topically.     • brimonidine (ALPHAGAN) 0.2 % ophthalmic solution every 12 hours.     • GLYCERIN/PROPYLENE GLYCOL (ARTIFICIAL TEARS,GLYCERIN-PEG, OPHT) Administer 1 drop into affected eye(s) as needed (Dry eyes)      • latanoprost (XALATAN) 0.005 % ophthalmic solution INSTILL 1 DROP INTO AFFECTED EYE(S) BY OPHTHALMIC ROUTE ONCE DAILY INTHE EVENING 10 0   • potassium PHOSPHATE, monobasic, (K-Phos Original) 500 mg tablet Take 1 tablet (500 mg total)  by mouth daily 30 tablet 0   • LANTUS SOLOSTAR U-100 INSULIN 100 unit/mL (3 mL) insulin pen Inject 14 Units under the skin daily       • blood-glucose meter (ACCU-CHEK ADELE PLUS METER) [facility] [place] Use to test blood sugars 3 times daily (E11.65, non insulin depedent) AccuChek Adele plus, meter is 8 years old (Patient taking differently: Use to test blood sugars 3 times daily (E11.65, non insulin dependent) ) 1 each 0   • white petrolatum (AQUAPHOR HEALING) 41 % ointment ointment Apply 1 application topically as needed.       No current facility-administered medications for this visit.      REVIEW OF SYSTEMS:    The ECOG performance status today is .1 - Symptomatic but completely ambulatory  Review of Systems   Constitutional: Positive for fatigue. Negative for appetite change, chills, diaphoresis, fever and unexpected weight change.   HENT:   Negative for hearing loss, lump/mass, mouth sores, nosebleeds, sore throat, tinnitus, trouble swallowing and voice change.    Eyes: Negative for eye problems and icterus.   Respiratory: Positive for cough (sometimes green to yellow). Negative for chest tightness, hemoptysis, shortness of breath and wheezing.         Was in ED at [facility] 2 weeks ago, treated for viral infection  Intermittent left chest wall pain  Hard to wear CPAP due to congestion    Cardiovascular: Negative for chest pain, leg swelling and palpitations.   Gastrointestinal: Positive for abdominal pain (right lower) and diarrhea (up to 10 per day- irritated hemorrhoids). Negative for abdominal distention, blood in stool, constipation, nausea and vomiting.        Taking pepto bismol   Genitourinary: Positive for bladder incontinence (Stable). Negative for dysuria, frequency and hematuria.         Flow is different   Musculoskeletal: Positive for arthralgias (starting acupuncture for shoulders/knees). Negative for back pain, flank pain, gait problem, myalgias and neck pain.   Skin: Negative for itching, rash and wound.  "  Neurological: Positive for dizziness (intermittent) and headaches (gets better with eating). Negative for extremity weakness, gait problem, light-headedness, numbness, seizures and speech difficulty.        Gets a \"fuzzy\" \"electricty wave\" feeing in his head, if he stops what he is doing and rests it goes away   Hematological: Negative for adenopathy. Does not bruise/bleed easily.   Psychiatric/Behavioral: Negative for depression and sleep disturbance. The patient is nervous/anxious.    All other systems reviewed and are negative.      PHYSICAL EXAM:   /67 (BP Location: Left arm)   Pulse 54   Temp 36.5 °C (97.7 °F) (Oral)   Wt 96.6 kg (213 lb)   SpO2 96%   BMI 35.45 kg/m²     Physical Exam  Constitutional:       General: He is not in acute distress.     Appearance: Normal appearance. He is well-developed.   HENT:      Head: Normocephalic.      Nose: Nose normal.      Mouth/Throat:      Lips: Pink. No lesions.      Mouth: Mucous membranes are moist. No oral lesions.      Pharynx: Oropharynx is clear. No pharyngeal swelling, oropharyngeal exudate or posterior oropharyngeal erythema.   Eyes:      General: No scleral icterus.        Right eye: No discharge.         Left eye: No discharge.      Conjunctiva/sclera: Conjunctivae normal.   Neck:      Musculoskeletal: Full passive range of motion without pain, normal range of motion and neck supple.      Thyroid: No thyromegaly.   Cardiovascular:      Rate and Rhythm: Normal rate and regular rhythm.      Heart sounds: Normal heart sounds. No murmur. No gallop.    Pulmonary:      Effort: Pulmonary effort is normal. No respiratory distress.      Breath sounds: Normal breath sounds. No decreased breath sounds, wheezing, rhonchi or rales.   Chest:      Chest wall: No tenderness.   Abdominal:      General: Bowel sounds are normal. There is no distension.      Palpations: Abdomen is soft. There is no hepatomegaly, splenomegaly or mass.      Tenderness: There is " generalized tenderness. There is no guarding or rebound.   Musculoskeletal: Normal range of motion.         General: No tenderness or deformity.      Right lower leg: No edema.      Left lower leg: No edema.   Lymphadenopathy:      Head:      Right side of head: No submental, submandibular, tonsillar, preauricular, posterior auricular or occipital adenopathy.      Left side of head: No submental, submandibular, tonsillar, preauricular, posterior auricular or occipital adenopathy.      Cervical: No cervical adenopathy.      Upper Body:      Right upper body: No supraclavicular adenopathy.      Left upper body: No supraclavicular adenopathy.   Skin:     General: Skin is warm and dry.      Capillary Refill: Capillary refill takes less than 2 seconds.      Coloration: Skin is not jaundiced or pale.      Findings: No bruising, erythema, petechiae or rash.   Neurological:      General: No focal deficit present.      Mental Status: He is alert and oriented to person, place, and time.      Cranial Nerves: Cranial nerves are intact. No cranial nerve deficit.      Sensory: Sensation is intact.      Motor: Motor function is intact.      Coordination: Coordination is intact.      Gait: Gait is intact.   Psychiatric:         Attention and Perception: Attention and perception normal.         Mood and Affect: Mood and affect normal.         Speech: Speech normal.         Behavior: Behavior normal. Behavior is cooperative.         Thought Content: Thought content normal.         Cognition and Memory: Cognition and memory normal.         Judgment: Judgment normal.         LABORATORY DATA:      Lab Results   Component Value Date     09/26/2019    K 4.3 09/26/2019     09/26/2019    CO2 24 09/26/2019    BUN 24 09/26/2019    CREATININE 0.97 09/26/2019    GLUCOSE 162 (H) 09/26/2019    CALCIUM 8.9 09/26/2019        Lab Results   Component Value Date    WBC 4.6 09/26/2019    HGB 14.6 09/26/2019    HCT 42.7 09/26/2019      (L) 09/26/2019    RBC 4.81 09/26/2019    MCV 89.0 09/26/2019    MCH 30.5 09/26/2019    MCHC 34.3 09/26/2019    RDW 13.4 09/26/2019    MPV 7.8 09/26/2019    ANC 3.762 08/12/2019    NEUTROABS 3.00 09/26/2019        Lab Results   Component Value Date     09/26/2019    K 4.3 09/26/2019     09/26/2019    CO2 24 09/26/2019    BUN 24 09/26/2019    CREATININE 0.97 09/26/2019    GLUCOSE 162 (H) 09/26/2019    CALCIUM 8.9 09/26/2019    PROT 6.7 09/26/2019    ALBUMIN 4.2 09/26/2019    AST 30 09/26/2019    ALT 23 09/26/2019    ALKPHOS 94 09/26/2019    BILITOT 0.68 09/26/2019     Lab Results   Component Value Date    PHOS 2.1 (L) 09/26/2019     Lab Results   Component Value Date    TSH 3.130 09/26/2019     DIAGNOSTIC IMAGING:   No results found.    IMPRESSION & PLAN:   1.  Hepatocellular carcinoma: He appears to be clinically stable without signs or symptoms of disease progression based on physical examination and laboratory data from today's visit.  His labs are stable and within acceptable limits to continue with sorafenib 200 mg twice daily.  We will plan to do restaging next month with a CT of the chest and MRI of the abdomen.  The patient was seen in consultation at the [facility] last week and the plan to present his case at the liver tumor board tomorrow.  They are going to discuss doing Y 90.  The patient will call us once they hear back from the [facility].  2.  Diarrhea: Grade 1-2.  We discussed using Imodium rather than Pepto-Bismol.  I have provided him with a prescription.  If he is requiring more than 8 Imodium tablets daily I want him to call me and let me know.  We also discussed eating more of a bland diet until his symptoms improve.  3.  Hypophosphatemia: Due to sorafenib therapy.  [facility]  I gave verbal order for K-Phos 250 mg twice daily x30 days.  We will recheck next month.  4.  Cough: Improving overall.  He was recently  diagnosed with viral respiratory illness.  We discussed continued supportive cares with Mucinex DM twice daily and Zyrtec daily.  5.  Pain: Chronic arthritic pain in shoulders and knees, worse with sorafenib therapy.  He plans to start acupuncture as above.  Continue with hydromorphone 1 to 2 tablets as needed.  6.  Follow-up in 3 weeks with repeat labs and imaging studies as above (early per patient request as he needs to be out of town for a wedding).  He can be seen sooner if needed.  Instructed to call with questions or concerns.        Electronically signed by: KEAGAN PALMER, MARIELOS

## 2019-09-26 NOTE — PATIENT INSTRUCTIONS
For loose stools:  Start taking imodium 2 mg PRN up to 8 per day, if requiring more than 8 please call us  Stay hydrated with plenty of water  Stick to more of a bland diet (low fat, low fiber) until diarrhea under better control    For low phosphate level:  Start potassium phosphate 500mg daily x 30 days. Will recheck next month.    For cough:  Okay to take mucinex and zyrtec

## 2019-09-27 ENCOUNTER — TELEPHONE (OUTPATIENT)
Dept: SURGERY | Facility: CLINIC | Age: 69
End: 2019-09-27

## 2019-09-27 LAB — AFP SERPL-MCNC: 17 NG/ML

## 2019-09-27 NOTE — TELEPHONE ENCOUNTER
Called to discuss with patient the results of today's liver conference.  I informed the patient that interventional radiology thinks he may be a candidate for Y90 embolization.  We will work on setting the patient up for a new liver MRI and and appointment with IR.

## 2019-09-30 ENCOUNTER — PATIENT OUTREACH (OUTPATIENT)
Dept: SURGERY | Facility: CLINIC | Age: 69
End: 2019-09-30

## 2019-09-30 DIAGNOSIS — C22.0 HEPATOCELLULAR CARCINOMA (H): Primary | ICD-10-CM

## 2019-09-30 NOTE — PROGRESS NOTES
Surgical Oncology RN Care Coordination Note:     Per order from Dr. Munoz, patient to be set up with MRI and visit with IR to follow to discuss liver directed therapy.     Orders placed and message sent to scheduling team.     Routed to IR team as well.     Jerilyn Walker, RN, BSN  Care Coordinator   [phone]

## 2019-10-07 ENCOUNTER — TELEPHONE (OUTPATIENT)
Dept: VASCULAR SURGERY | Facility: CLINIC | Age: 69
End: 2019-10-07

## 2019-10-07 NOTE — TELEPHONE ENCOUNTER
"Wife states that they have an MRI and CT scan tomorrow with their Oncologist.     She states that they were recommended that the Ct chest was for f/u as there were \"spots noted on the lungs\".     Also they were recommended to the MRI as well due to it was also for follow up.     She inquires that if they do the imaging tomorrow if they would still need to do the imaging for when they come down.    She also inquires if there are treatment options if we could go ahead and pre-plan as they are coming to us from quite a ways.     Informed her that I'll consult with Dr. Herrera, however after the MRI and Chest Ct, this information would best allow us to make a decision on plant of treatment.     She verbalized understanding.     Erika VALDEZ RN, BSN  Interventional Radiology Nurse Coordinator   Phone: [phone]    "

## 2019-10-08 ENCOUNTER — HOSPITAL ENCOUNTER (OUTPATIENT)
Dept: CT IMAGING | Facility: HOSPITAL | Age: 69
Discharge: HOME | End: 2019-10-08
Payer: COMMERCIAL

## 2019-10-08 ENCOUNTER — HOSPITAL ENCOUNTER (OUTPATIENT)
Dept: MRI IMAGING | Facility: HOSPITAL | Age: 69
Discharge: HOME | End: 2019-10-08
Payer: COMMERCIAL

## 2019-10-08 DIAGNOSIS — C22.0 HEPATOCELLULAR CARCINOMA (CMS/HCC): ICD-10-CM

## 2019-10-08 PROCEDURE — 2550000100 HC RX 255: Performed by: NURSE PRACTITIONER

## 2019-10-08 PROCEDURE — 71260 CT THORAX DX C+: CPT

## 2019-10-08 PROCEDURE — 74183 MRI ABD W/O CNTR FLWD CNTR: CPT

## 2019-10-08 PROCEDURE — 2500000200 HC RX 250 WO HCPCS: Performed by: NURSE PRACTITIONER

## 2019-10-08 PROCEDURE — A9579 GAD-BASE MR CONTRAST NOS,1ML: HCPCS | Performed by: NURSE PRACTITIONER

## 2019-10-08 RX ORDER — IOPAMIDOL 755 MG/ML
80 INJECTION, SOLUTION INTRAVASCULAR ONCE
Status: COMPLETED | OUTPATIENT
Start: 2019-10-08 | End: 2019-10-08

## 2019-10-08 RX ADMIN — IOPAMIDOL 80 ML: 755 INJECTION, SOLUTION INTRAVENOUS at 09:15

## 2019-10-08 RX ADMIN — GADOTERIDOL 5586 MG: 279.3 INJECTION, SOLUTION INTRAVENOUS at 09:00

## 2019-10-11 ENCOUNTER — TEAM CONFERENCE (OUTPATIENT)
Dept: VASCULAR SURGERY | Facility: CLINIC | Age: 69
End: 2019-10-11

## 2019-10-11 ENCOUNTER — TELEPHONE (OUTPATIENT)
Dept: VASCULAR SURGERY | Facility: CLINIC | Age: 69
End: 2019-10-11

## 2019-10-11 NOTE — TELEPHONE ENCOUNTER
Called wife and informed her that I did have Dr Marquez review the images that pt recently had in which she is recommending Sirspheres for treatment.     [facility] and then plan for prior authorization for Radioembolizaton-Y90 Sirspheres treatment     Erika VALDEZ RN, BSN  Interventional Radiology Nurse Coordinator   Phone: [phone]

## 2019-10-11 NOTE — TELEPHONE ENCOUNTER
RECORDS RECEIVED FROM: New:  [facility], Possible Y90: [facility]   DATE RECEIVED: 11.1.19   NOTES STATUS DETAILS   OFFICE NOTE from referring provider Internal 9.30.19 Dr. Priyank Munoz   OFFICE NOTE from other specialist Care Everywhere    DISCHARGE SUMMARY from hospital N/A    DISCHARGE REPORT from the ER N/A    OPERATIVE REPORT N/A    MEDICATION LIST Internal    XRAYS (IMAGES & REPORTS) In Pacs    PATHOLOGY  Slides & report N/A

## 2019-10-11 NOTE — TELEPHONE ENCOUNTER
Called Emilia:   [facility]     Ph: [phone] ext: [phone]    She is inquiring on what is needed for referral for pt to come and see IR.     Informed her of plan regarding Y90 therapy.    Will also fax notes of Dr. Munoz regarding IR referral to her as well.     Erika VALDEZ RN, BSN  Interventional Radiology Nurse Coordinator   Phone: [phone]

## 2019-10-11 NOTE — TELEPHONE ENCOUNTER
Called and left a msg asking that they return my call regarding plan of care when they return to see Dr. Marquez for consult.     Informed them that we were able to see the images that they recently had.     Left my direct line for them to return my call.     Erika VALDEZ RN, BSN  Interventional Radiology Nurse Coordinator   Phone: [phone]

## 2019-10-11 NOTE — TELEPHONE ENCOUNTER
Wife did return my call.     1. They are VA referral in which they are seeing their referring provider next week to get referral in order.     2. They are meeting with OFELIA their VA coordinator and once referral is in, they will fax information to me.     3. Looking at future scheduling as they live 8 hours away, would like back to back scheduling.     Informed them that Dr Marquez did review the imaging. Plan of treatment would be : SIRS Y90     However if they are wanting back to back appts, this could be difficult due to her schedule.     Plan: to have them meet with their referring provider and get the VA referral in order first.     I will look at scheduling to see best options.     Will connect next week.     Erika VALDEZ RN, BSN  Interventional Radiology Nurse Coordinator   Phone: [phone]

## 2019-10-14 ENCOUNTER — APPOINTMENT (OUTPATIENT)
Dept: ONCOLOGY | Facility: CLINIC | Age: 69
End: 2019-10-14
Payer: COMMERCIAL

## 2019-10-14 ENCOUNTER — INFUSION (OUTPATIENT)
Dept: ONCOLOGY | Facility: CLINIC | Age: 69
End: 2019-10-14
Payer: COMMERCIAL

## 2019-10-14 ENCOUNTER — OFFICE VISIT (OUTPATIENT)
Dept: ONCOLOGY | Facility: CLINIC | Age: 69
End: 2019-10-14
Payer: COMMERCIAL

## 2019-10-14 VITALS
WEIGHT: 212.2 LBS | HEIGHT: 64 IN | RESPIRATION RATE: 16 BRPM | SYSTOLIC BLOOD PRESSURE: 137 MMHG | TEMPERATURE: 98.6 F | BODY MASS INDEX: 36.23 KG/M2 | OXYGEN SATURATION: 97 % | HEART RATE: 52 BPM | DIASTOLIC BLOOD PRESSURE: 74 MMHG

## 2019-10-14 DIAGNOSIS — C22.0 HEPATOCELLULAR CARCINOMA (CMS/HCC): Primary | ICD-10-CM

## 2019-10-14 DIAGNOSIS — C22.0 HEPATOCELLULAR CARCINOMA (CMS/HCC): ICD-10-CM

## 2019-10-14 LAB
ALBUMIN SERPL-MCNC: 4.1 G/DL (ref 3.5–5.3)
ALP SERPL-CCNC: 82 U/L (ref 45–115)
ALT SERPL-CCNC: 20 U/L (ref 0–52)
AMYLASE SERPL-CCNC: 44 U/L (ref 27–103)
ANION GAP SERPL CALC-SCNC: 7 MMOL/L (ref 3–11)
AST SERPL-CCNC: 26 U/L (ref 0–39)
BASOPHILS # BLD AUTO: 0 10*3/UL
BASOPHILS NFR BLD AUTO: 1 % (ref 0–2)
BILIRUB SERPL-MCNC: 0.71 MG/DL (ref 0–1.4)
BUN SERPL-MCNC: 24 MG/DL (ref 7–25)
CALCIUM ALBUM COR SERPL-MCNC: 9 MG/DL (ref 8.6–10.3)
CALCIUM SERPL-MCNC: 9.1 MG/DL (ref 8.6–10.3)
CHLORIDE SERPL-SCNC: 106 MMOL/L (ref 98–107)
CO2 SERPL-SCNC: 27 MMOL/L (ref 21–32)
CREAT SERPL-MCNC: 1.09 MG/DL (ref 0.7–1.3)
EOSINOPHIL # BLD AUTO: 0.2 10*3/UL
EOSINOPHIL NFR BLD AUTO: 4 % (ref 0–3)
ERYTHROCYTE [DISTWIDTH] IN BLOOD BY AUTOMATED COUNT: 13.2 % (ref 11.5–15)
GFR SERPL CREATININE-BSD FRML MDRD: 69 ML/MIN/1.73M*2
GLUCOSE SERPL-MCNC: 147 MG/DL (ref 70–105)
HCT VFR BLD AUTO: 43 % (ref 38–50)
HGB BLD-MCNC: 14.4 G/DL (ref 13.2–17.2)
LIPASE SERPL-CCNC: 22 U/L (ref 11–82)
LYMPHOCYTES # BLD AUTO: 1.3 10*3/UL
LYMPHOCYTES NFR BLD AUTO: 27 % (ref 15–47)
MCH RBC QN AUTO: 29.6 PG (ref 29–34)
MCHC RBC AUTO-ENTMCNC: 33.5 G/DL (ref 32–36)
MCV RBC AUTO: 88.2 FL (ref 82–97)
MONOCYTES # BLD AUTO: 0.4 10*3/UL
MONOCYTES NFR BLD AUTO: 8 % (ref 5–13)
NEUTROPHILS # BLD AUTO: 3 10*3/UL
NEUTROPHILS NFR BLD AUTO: 61 % (ref 46–70)
PHOSPHATE SERPL-MCNC: 2.8 MG/DL (ref 2.5–4.9)
PLATELET # BLD AUTO: 141 10*3/UL (ref 130–350)
PMV BLD AUTO: 7.8 FL (ref 6.9–10.8)
POTASSIUM SERPL-SCNC: 4.3 MMOL/L (ref 3.5–5.1)
PROT SERPL-MCNC: 6.7 G/DL (ref 6–8.3)
RBC # BLD AUTO: 4.87 10*6/ΜL (ref 4.1–5.8)
SODIUM SERPL-SCNC: 140 MMOL/L (ref 135–145)
TSH SERPL DL<=0.05 MIU/L-ACNC: 5.71 UIU/ML (ref 0.34–4.82)
WBC # BLD AUTO: 5 10*3/UL (ref 3.7–9.6)

## 2019-10-14 PROCEDURE — 82105 ALPHA-FETOPROTEIN SERUM: CPT

## 2019-10-14 PROCEDURE — 36415 COLL VENOUS BLD VENIPUNCTURE: CPT

## 2019-10-14 PROCEDURE — 84100 ASSAY OF PHOSPHORUS: CPT

## 2019-10-14 PROCEDURE — 83690 ASSAY OF LIPASE: CPT

## 2019-10-14 PROCEDURE — 99214 OFFICE O/P EST MOD 30 MIN: CPT | Performed by: INTERNAL MEDICINE

## 2019-10-14 PROCEDURE — 82150 ASSAY OF AMYLASE: CPT

## 2019-10-14 PROCEDURE — 84443 ASSAY THYROID STIM HORMONE: CPT

## 2019-10-14 PROCEDURE — 80053 COMPREHEN METABOLIC PANEL: CPT

## 2019-10-14 PROCEDURE — 85025 COMPLETE CBC W/AUTO DIFF WBC: CPT

## 2019-10-14 RX ORDER — SORAFENIB 200 MG/1
200 TABLET, FILM COATED ORAL 2 TIMES DAILY
Qty: 60 TABLET | Refills: 3 | Status: SHIPPED | OUTPATIENT
Start: 2019-10-14 | End: 2019-12-19 | Stop reason: ALTCHOICE

## 2019-10-14 ASSESSMENT — ENCOUNTER SYMPTOMS
LEG SWELLING: 1
MYALGIAS: 1
NERVOUS/ANXIOUS: 1
CHEST TIGHTNESS: 1
ABDOMINAL PAIN: 1
SHORTNESS OF BREATH: 1
FATIGUE: 1
ENDOCRINE NEGATIVE: 1
ABDOMINAL DISTENTION: 1
ARTHRALGIAS: 1
HEMATOLOGIC/LYMPHATIC NEGATIVE: 1
EYE PROBLEMS: 1

## 2019-10-14 ASSESSMENT — PAIN SCALES - GENERAL: PAINLEVEL: 4

## 2019-10-14 NOTE — PROGRESS NOTES
Medical Oncology Follow-Up    Subjective   HISTORY OF PRESENT ILLNESS:  Ben Lai is a 69 y.o. male who  was diagnosed with hepatocellular carcinoma in November 2018.  He has an underlying hepatitis C which was supposedly cured.    Due to the extent of his HCC per GI at [facility] it was not amenable to local therapy, resection or radiation, referred to medical oncology   He was started on sorafenib early December 2018 but this appears to have been stopped in February due to significant side effects.  Patient states unequivocally that he started to feel better after a 3-week break but the sorafenib was recently restarted and he is having significant quality of life compromising side effects again.  This includes severe joint and muscle pain and fatigue.     7/29/19 restaging scans showed progressive disease in the liver, however no sings of metastatic disease     8/14/19 s/p   CT-guided  right lobe of the liver radiofrequency ablation.     9/2019 seen hepatobiliary team at [facility], and was discussed for tumor board, planned to have Y90  Treatment     10/8/19 MRI abdomen showed Nodular appearing liver with 2 areas of hyper/early arterial enhancement suggestive of hepatocellular carcinoma are both adjacent to the gallbladder which contains filling defects which are enhancing suggestive of neoplasm. This could be primary cholangiocarcinoma or hepatocellular carcinoma which is invading the gallbladder. The liver and gallbladder masses are very similar to the study from 7/29/2019.  Wedge-shaped signal abnormality in the anterior right lobe of the liver similar to the prior study could represent hepatic infarct.        He feels well and feels he is fairly recovering from the side effects of the sorafenib.  However   Dizziness is new, no falls, with new headaches   Pain right upper quadrant likely post procedure (RFA) . Still stable   Diarrhea is stable   Energy is still low ECOG 2   appetite ist  stable, has gained weight   Takes care of his ADLS   Stable, abd pain, chest pain and neuropathy           Encounter Diagnosis   Name Primary?   • Hepatocellular carcinoma (CMS/HCC) (HCC)    .     Treatment History:     Hepatocellular carcinoma (CMS/HCC) (HCC)    1/23/2019 Initial Diagnosis     Hepatocellular carcinoma (CMS/HCC) (HCC)      7/30/2019 -  Adjuvant Therapy     Hepatocellular - Sorafenib (Nexavar)  Plan Provider: ANDERSON Bryan  Treatment goal: Palliative  Line of treatment: [No plan line of treatment]           RECENT EVENTS:    PAST MEDICAL HISTORY:   Past Medical History:   Diagnosis Date   • A-fib (CMS/HCC) (HCC)    • Allergy    • Anxiety    • Arthritis    • Asthma    • Blindness of right eye    • BPH (benign prostatic hyperplasia)    • Cardiovascular disease    • Cirrhosis (CMS/HCC) (HCC)     with possible liver mass by MRI 5/18, rec repeat in 8/2018   • Complication of anesthesia    • COPD (chronic obstructive pulmonary disease) (CMS/HCC) (HCC)    • Depression    • Endocrine disorder    • Gallstones    • Gastritis    • GERD (gastroesophageal reflux disease)    • Glaucoma    • Hearing loss     bilateral hearing aids   • Hemorrhoids    • Hepatitis C     Hep C, 2016 remission, complicated by cirrhosis.  MRI abd 5/30/18, rec 3 month follow up for focus of enhancement right and left hepatic lobe junction   • Hernia, abdominal    • High blood pressure    • IBS (irritable bowel syndrome)    • Infectious viral hepatitis    • Jaundice    • Kidney stones    • Obstructive sleep apnea syndrome    • Periodic limb movement disorder    • Persistent hypersomnia    • Persistent insomnia    • Pneumonia    • PONV (postoperative nausea and vomiting)    • Type 2 diabetes mellitus (CMS/HCC) (HCC)    • Urinary incontinence    • Wears partial dentures         GYNECOLOGICAL HISTORY (if applicable): NA     FAMILY HISTORY:   Family History   Problem Relation Age of Onset   • Arthritis Mother    • Rheum arthritis  Mother    • Diabetes Mother    • Rectal cancer Mother    • Other Paternal Grandmother    • Other Paternal Grandfather    • Diabetes Other    • Rheum arthritis Other    • Osteoporosis Other         SOCIAL HISTORY:   Social History     Socioeconomic History   • Marital status:      Spouse name: Not on file   • Number of children: Not on file   • Years of education: Not on file   • Highest education level: Not on file   Occupational History   • Not on file   Social Needs   • Financial resource strain: Not on file   • Food insecurity:     Worry: Not on file     Inability: Not on file   • Transportation needs:     Medical: Not on file     Non-medical: Not on file   Tobacco Use   • Smoking status: Never Smoker   • Smokeless tobacco: Never Used   Substance and Sexual Activity   • Alcohol use: No   • Drug use: No   • Sexual activity: Not on file   Lifestyle   • Physical activity:     Days per week: Not on file     Minutes per session: Not on file   • Stress: Not on file   Relationships   • Social connections:     Talks on phone: Not on file     Gets together: Not on file     Attends [employer] service: Not on file     Active member of club or organization: Not on file     Attends meetings of clubs or organizations: Not on file     Relationship status: Not on file   • Intimate partner violence:     Fear of current or ex partner: Not on file     Emotionally abused: Not on file     Physically abused: Not on file     Forced sexual activity: Not on file   Other Topics Concern   • Not on file   Social History Narrative   • Not on file        ALLERGIES:   Allergies as of 10/14/2019 - Reviewed 10/14/2019   Allergen Reaction Noted   • Penicillins Anaphylaxis 10/19/2017   • Metformin Hives    • Adhesive tape-silicones  04/06/2018   • Banana  02/05/2019   • Broccoli  02/05/2019   • Stoddard sprout  02/05/2019   • Cabbage  02/05/2019   • Cantaloupe  02/05/2019   • Celery  02/05/2019   • Cheese  02/05/2019   • Cranberry   02/05/2019   • Egg     • Interferon violetta-2a  02/05/2019   • Latex     • Legumes  02/05/2019   • Lettuce  02/05/2019   • Milk  02/05/2019   • Mometasone     • Mometasone furoate     • Mustard  02/05/2019   • Nut - unspecified  02/05/2019   • Oats  02/05/2019   • Omeprazole     • Ondansetron hcl Nausea And Vomiting 05/01/2019   • Onion  02/05/2019   • Pepper (genus capsicum)  02/05/2019   • Pineapple  02/05/2019   • Primidone  05/01/2019   • Ribavirin Itching 10/19/2017   • Rosuvastatin  02/05/2019   • Sorafenib  04/04/2019   • Spironolactone     • Statins-hmg-coa reductase inhibitors Itching and GI intolerance 04/06/2018   • Wheat  02/05/2019   • Yeast  02/05/2019   • Interferon violetta (human leuk. derived) Palpitations and Rash 09/20/2019   • Pantoprazole Itching and Rash 02/28/2018   • Peginterferon violetta-2b Palpitations 10/19/2017        MEDICATIONS:   Current Outpatient Medications   Medication Sig Dispense Refill   • potassium PHOSPHATE, monobasic, (K-Phos Original) 500 mg tablet Take 1 tablet (500 mg total) by mouth daily 30 tablet 0   • cetirizine (ZyrTEC) 10 mg tablet Take 10 mg by mouth daily     • loperamide (Imodium A-D) 2 mg tablet Take 2 tablets with first loose stool of the day, then up to 8 tablets daily 80 tablet 2   • SORAfenib (NexAVAR) 200 mg chemo tablet Take 1 tablet (200 mg total) by mouth 2 (two) times a day Swallow whole with water. Take at least 1 hour before or 2 hours after a meal. 60 tablet 3   • prochlorperazine (COMPAZINE) 10 mg tablet 1 tab PO q6h prn nausea/vomiting. Max 40 mg/day. 30 tablet 5   • LANTUS SOLOSTAR U-100 INSULIN 100 unit/mL (3 mL) insulin pen Inject 14 Units under the skin daily       • primidone (MYSOLINE) 50 mg tablet      • ezetimibe (ZETIA) 10 mg tablet Take 1 tablet (10 mg total) by mouth daily 90 tablet 2   • hydrOXYzine (ATARAX) 25 mg tablet Take 1 tablet (25 mg total) by mouth nightly 90 tablet 2   • carvedilol (COREG) 3.125 mg tablet Take 1 tablet (3.125 mg total)  by mouth 2 (two) times a day with meals 180 tablet 2   • buPROPion SR (WELLBUTRIN SR) 100 mg 12 hr tablet Take 1 tablet (100 mg total) by mouth daily 90 tablet 1   • lactulose (GENERLAC) 10 gram/15 mL solution Take 15 mL (10 g total) by mouth daily 473 mL 2   • terazosin (HYTRIN) 5 mg capsule Take 1 capsule (5 mg total) by mouth 2 (two) times a day 180 capsule 2   • HYDROmorphone (DILAUDID) 2 mg tablet Take 2 tab/cap by mouth 3 (three) times a day as needed for pain scale 1-3/10, pain scale 4-7/10 or pain scale 8-10/10 (Cancer and joint pain)      • blood-glucose meter (ACCU-CHEK ADELE PLUS METER) [facility] [place] to test blood sugars 3 times daily (E11.65, non insulin depedent) AccuChek Adele plus, meter is 8 years old (Patient taking differently: Use to test blood sugars 3 times daily (E11.65, non insulin dependent) ) 1 each 0   • lisinopril (PRINIVIL,ZESTRIL) 20 mg tablet Take 1 tablet (20 mg total) by mouth daily 90 tablet 2   • furosemide (LASIX) 20 mg tablet Take 1 tablet (20 mg total) by mouth 2 (two) times a day. 180 tablet 2   • lansoprazole (PREVACID) 30 mg capsule Take 30 mg by mouth daily      • liraglutide (VICTOZA 2-JASE) 0.6 mg/0.1 mL (18 mg/3 mL) injection Inject 1.8 mg under the skin daily       • capsaicin (ZOSTRIX) 0.025 % cream Apply 1 application topically as needed for pain scale 1-3/10, 1-3/8.     • sodium chloride (SALINE NASAL) 0.65 % nasal spray Administer 1 spray into each nostril as needed for congestion or rhinitis      • white petrolatum (AQUAPHOR HEALING) 41 % ointment ointment Apply 1 application topically as needed.     • pramoxine-menthol (GOLD BOND MEDICATED ANTI-ITCH) 1-1 % cream Apply topically.     • brimonidine (ALPHAGAN) 0.2 % ophthalmic solution every 12 hours.     • GLYCERIN/PROPYLENE GLYCOL (ARTIFICIAL TEARS,GLYCERIN-PEG, OPHT) Administer 1 drop into affected eye(s) as needed (Dry eyes)      • latanoprost (XALATAN) 0.005 % ophthalmic solution INSTILL 1 DROP INTO AFFECTED EYE(S) BY  "OPHTHALMIC ROUTE ONCE DAILY INTHE EVENING 10 0   • SORAfenib (NexAVAR) 200 mg chemo tablet Take 1 tablet (200 mg total) by mouth 2 (two) times a day Swallow whole with water. Take at least 1 hour before or 2 hours after a meal. 60 tablet 3   • SORAfenib (NexAVAR) 200 mg chemo tablet Take 1 tablet (200 mg total) by mouth 2 (two) times a day Swallow whole with water. Take at least 1 hour before or 2 hours after a meal. 60 tablet 3   • escitalopram (LEXAPRO) 10 mg tablet Take 1 tablet (10 mg total) by mouth daily 90 tablet 2     No current facility-administered medications for this visit.        REVIEW OF SYSTEMS:   Review of Systems   Constitutional: Positive for fatigue.   HENT:   Positive for hearing loss.    Eyes: Positive for eye problems.   Respiratory: Positive for chest tightness and shortness of breath.    Cardiovascular: Positive for chest pain and leg swelling.   Gastrointestinal: Positive for abdominal distention and abdominal pain.   Endocrine: Negative.    Genitourinary: Positive for bladder incontinence.    Musculoskeletal: Positive for arthralgias, gait problem and myalgias.   Skin: Negative.    Neurological: Positive for gait problem.   Hematological: Negative.    Psychiatric/Behavioral: The patient is nervous/anxious.        The ECOG performance status today is 1 - Restricted strenuous activity; able to carry out light work.          Objective    PHYSICAL EXAM:   /74   Pulse 52   Temp 37 °C (98.6 °F)   Resp 16   Ht 1.613 m (5' 3.5\")   Wt 96.3 kg (212 lb 3.2 oz)   SpO2 97%   BMI 37.00 kg/m²    Body mass index is 37 kg/m².       Physical Exam  Constitutional:       Appearance: He is well-developed.   HENT:      Head: Normocephalic and atraumatic.   Eyes:      Pupils: Pupils are equal, round, and reactive to light.      Comments: Right eye less responsive    Neck:      Musculoskeletal: Normal range of motion and neck supple.   Cardiovascular:      Rate and Rhythm: Normal rate and regular " rhythm.   Pulmonary:      Effort: No respiratory distress.      Breath sounds: Normal breath sounds. No wheezing.   Abdominal:      General: There is distension.      Palpations: Abdomen is soft.      Tenderness: There is no tenderness.   Musculoskeletal: Normal range of motion.   Lymphadenopathy:      Cervical: No cervical adenopathy.   Skin:     General: Skin is warm.   Neurological:      Mental Status: He is alert and oriented to person, place, and time.      reveals a generally healthy pleasant appearing gentleman who was not further examined based on the below.    DIAGNOSTIC REPORTS REVIEWED:   Imaging:  Reviewed     Assessment/Plan    IMPRESSION:   This is a pleasant 69-year-old male with diagnosis of hepatocellular carcinoma involving the right lobe, locally advanced disease, per the chart not amenable to local therapy, started sorafenib December 2018 however stopped in February 2019 due to intolerance without any dose reductions.  Has been off of treatment since then, restaging CAT scan and MRI  7/2019 show progressive disease in the liver.  Started  sorafenib 8/2019 at a lower dose 200 mg twice daily, s/p RFA 8/2019    Today he is tolerating sorafinib well without excessive toxicity      Plan:  -continue Sorafenib 200 mg p.o. twice daily, labs are stable 's  - given new headaches will order a brain MR   - Follow-up in 4 weeks with the labs and a visit toxicity check  - to continue with Y90 treatments at [facility]   - restaging scans should be in end of  1/2020             Physician: Milka Reynoso MD

## 2019-10-15 ENCOUNTER — TELEPHONE (OUTPATIENT)
Dept: VASCULAR SURGERY | Facility: CLINIC | Age: 69
End: 2019-10-15

## 2019-10-15 DIAGNOSIS — C22.0 HEPATOCELLULAR CARCINOMA (H): Primary | ICD-10-CM

## 2019-10-15 LAB — AFP SERPL-MCNC: 13 NG/ML

## 2019-10-15 NOTE — TELEPHONE ENCOUNTER
Called and spoke to wife regarding plan of care.    We did talk about scheduling them back to back in November as they drive from [place] and they would prefer treatment sooner rather than later.     I did inform them that I did schedule them with another provider as this way their scheduling and preference will be met per traveling wishes.     Informed Wife that we would plan for them to come down on Tues 11/5 and then be treated on the 11/6 and 11/7. This appt will be with Dr. Giordano.       I did advise that they remain in town for at least 24 hours post treatment so that should anything happen they can be closer to the hospital.     Wife agreed.   She states that she'll get in contact with their [facility] team and inform them.     I did inform her that I received a phone call from [facility] in which all information was faxed to her.     I asked Wife Tarsha to have them call me so that we can get all information in order and approved for authorization prior to their visit in November.     She agrees to plan.     Erika VALDEZ RN, BSN  Interventional Radiology Nurse Coordinator   Phone: [phone]

## 2019-10-15 NOTE — TELEPHONE ENCOUNTER
Received call from [facility] inquiring on appt details.     Fax: [phone]      Informed her regarding Radioembolization treatment with Interventional Radiology    Provider: Charo Giordano  Plan of treatment: Radioembolization     Consult already scheduled:   Tues 11/5/2019    Pending procedure dates.   Weds 11/6/2019- Mapping   Thursday 11/7/2019 - Delivery       CPT code: sheet enclosed along with fax.     Erika VALDEZ RN, BSN  Interventional Radiology Nurse Coordinator   Phone: [phone]

## 2019-10-18 ENCOUNTER — TEAM CONFERENCE (OUTPATIENT)
Dept: VASCULAR SURGERY | Facility: CLINIC | Age: 69
End: 2019-10-18

## 2019-10-18 NOTE — TELEPHONE ENCOUNTER
Antonietta from [facility] calling to inquire on information for Authorization for pt's visit.     I did provide her with [facility] NPI number for Interventional RAdiology.    Will fax all medical records back to :     Fax: [phone]        Y90 Sir[facility], CPT code given to Em[place] in which Antonietta is aware of this as well and It will be authorized.    Erika VALDEZ RN, BSN  Interventional Radiology Nurse Coordinator   Phone: [phone]

## 2019-10-24 NOTE — TELEPHONE ENCOUNTER
RECORDS RECEIVED FROM: [facility]   DATE RECEIVED: 11.5.19   NOTES STATUS DETAILS   OFFICE NOTE from referring provider [place] 9.20.19 Dr. Priyank Munoz   OFFICE NOTE from other specialist Received/CE    DISCHARGE SUMMARY from hospital N/A    DISCHARGE REPORT from the ER N/A    OPERATIVE REPORT N/A    MEDICATION LIST Internal    XRAYS (IMAGES & REPORTS) In Pacs    PATHOLOGY  Slides & report N/A

## 2019-11-01 ENCOUNTER — PRE VISIT (OUTPATIENT)
Dept: RADIOLOGY | Facility: CLINIC | Age: 69
End: 2019-11-01

## 2019-11-04 RX ORDER — HEPARIN SOD,PORCINE/0.9 % NACL 5K/1000 ML
1000-10000 INTRAVENOUS SOLUTION INTRAVENOUS
Status: CANCELLED | OUTPATIENT
Start: 2019-11-04

## 2019-11-05 ENCOUNTER — OFFICE VISIT (OUTPATIENT)
Dept: RADIOLOGY | Facility: CLINIC | Age: 69
End: 2019-11-05
Attending: RADIOLOGY
Payer: MEDICARE

## 2019-11-05 ENCOUNTER — PRE VISIT (OUTPATIENT)
Dept: RADIOLOGY | Facility: CLINIC | Age: 69
End: 2019-11-05

## 2019-11-05 ENCOUNTER — TELEPHONE (OUTPATIENT)
Dept: INTERVENTIONAL RADIOLOGY/VASCULAR | Facility: CLINIC | Age: 69
End: 2019-11-05

## 2019-11-05 VITALS
DIASTOLIC BLOOD PRESSURE: 78 MMHG | RESPIRATION RATE: 16 BRPM | WEIGHT: 211.2 LBS | OXYGEN SATURATION: 97 % | BODY MASS INDEX: 36.06 KG/M2 | TEMPERATURE: 97.7 F | HEIGHT: 64 IN | HEART RATE: 56 BPM | SYSTOLIC BLOOD PRESSURE: 114 MMHG

## 2019-11-05 DIAGNOSIS — C22.0 HEPATOCELLULAR CARCINOMA (H): ICD-10-CM

## 2019-11-05 DIAGNOSIS — C22.8 PRIMARY MALIGNANT NEOPLASM OF LIVER (H): Primary | ICD-10-CM

## 2019-11-05 DIAGNOSIS — C22.0 HEPATOCELLULAR CARCINOMA (H): Primary | ICD-10-CM

## 2019-11-05 LAB
ALBUMIN SERPL-MCNC: 4 G/DL (ref 3.4–5)
ALP SERPL-CCNC: 110 U/L (ref 40–150)
ALT SERPL W P-5'-P-CCNC: 32 U/L (ref 0–70)
ANION GAP SERPL CALCULATED.3IONS-SCNC: 4 MMOL/L (ref 3–14)
AST SERPL W P-5'-P-CCNC: 26 U/L (ref 0–45)
BASOPHILS # BLD AUTO: 0 10E9/L (ref 0–0.2)
BASOPHILS NFR BLD AUTO: 0.5 %
BILIRUB DIRECT SERPL-MCNC: 0.2 MG/DL (ref 0–0.2)
BILIRUB SERPL-MCNC: 0.7 MG/DL (ref 0.2–1.3)
BUN SERPL-MCNC: 20 MG/DL (ref 7–30)
CALCIUM SERPL-MCNC: 8.8 MG/DL (ref 8.5–10.1)
CHLORIDE SERPL-SCNC: 102 MMOL/L (ref 94–109)
CO2 SERPL-SCNC: 30 MMOL/L (ref 20–32)
CREAT SERPL-MCNC: 0.99 MG/DL (ref 0.66–1.25)
DIFFERENTIAL METHOD BLD: ABNORMAL
EOSINOPHIL # BLD AUTO: 0.2 10E9/L (ref 0–0.7)
EOSINOPHIL NFR BLD AUTO: 2.5 %
ERYTHROCYTE [DISTWIDTH] IN BLOOD BY AUTOMATED COUNT: 13.1 % (ref 10–15)
GFR SERPL CREATININE-BSD FRML MDRD: 78 ML/MIN/{1.73_M2}
GLUCOSE SERPL-MCNC: 170 MG/DL (ref 70–99)
HCT VFR BLD AUTO: 45.9 % (ref 40–53)
HGB BLD-MCNC: 15.1 G/DL (ref 13.3–17.7)
IMM GRANULOCYTES # BLD: 0 10E9/L (ref 0–0.4)
IMM GRANULOCYTES NFR BLD: 0.7 %
INR PPP: 1 (ref 0.86–1.14)
LYMPHOCYTES # BLD AUTO: 1.3 10E9/L (ref 0.8–5.3)
LYMPHOCYTES NFR BLD AUTO: 21.4 %
MCH RBC QN AUTO: 29.9 PG (ref 26.5–33)
MCHC RBC AUTO-ENTMCNC: 32.9 G/DL (ref 31.5–36.5)
MCV RBC AUTO: 91 FL (ref 78–100)
MONOCYTES # BLD AUTO: 0.5 10E9/L (ref 0–1.3)
MONOCYTES NFR BLD AUTO: 8.7 %
NEUTROPHILS # BLD AUTO: 4 10E9/L (ref 1.6–8.3)
NEUTROPHILS NFR BLD AUTO: 66.2 %
NRBC # BLD AUTO: 0 10*3/UL
NRBC BLD AUTO-RTO: 0 /100
PLATELET # BLD AUTO: 118 10E9/L (ref 150–450)
POTASSIUM SERPL-SCNC: 3.8 MMOL/L (ref 3.4–5.3)
PROT SERPL-MCNC: 7.1 G/DL (ref 6.8–8.8)
RBC # BLD AUTO: 5.05 10E12/L (ref 4.4–5.9)
SODIUM SERPL-SCNC: 136 MMOL/L (ref 133–144)
WBC # BLD AUTO: 6 10E9/L (ref 4–11)

## 2019-11-05 PROCEDURE — G0463 HOSPITAL OUTPT CLINIC VISIT: HCPCS | Mod: ZF

## 2019-11-05 RX ORDER — CETIRIZINE HYDROCHLORIDE 10 MG/1
10 TABLET ORAL EVERY MORNING
COMMUNITY

## 2019-11-05 ASSESSMENT — PAIN SCALES - GENERAL: PAINLEVEL: NO PAIN (0)

## 2019-11-05 ASSESSMENT — MIFFLIN-ST. JEOR: SCORE: 1638.68

## 2019-11-05 NOTE — PROGRESS NOTES
Mr. Mancia is a nice 69 year-old gentleman who is referred for evaluation for liver directed therapy of the liver lesions. The patient was considered not a candidate for any therapy at [facility] last year. He got a second opinion and was placed on Sorafenib since December 2018. He recently reduced the dose because of multiple side effects. He had an ablation in September 2018. I don't have the file to evaluate what lesion was treated in September.  The patient is doing well today. His ECOG is 1.   I have looked at his imaging reports and Labs. His liver function is good.  Imaging report: I was unable to see the images. The report mentions multifocal diffuse segment 5 and 4 HCC. Possibility of a sayda-gallbladder lesion with unclear extension to the Gallbladder.        Lab Results   Component Value Date    AST 26 11/05/2019     Lab Results   Component Value Date    ALT 32 11/05/2019     No results found for: BILICONJ   Lab Results   Component Value Date    BILITOTAL 0.7 11/05/2019     Lab Results   Component Value Date    ALBUMIN 4.0 11/05/2019     Lab Results   Component Value Date    PROTTOTAL 7.1 11/05/2019      Lab Results   Component Value Date    ALKPHOS 110 11/05/2019     No past medical history on file.    No past surgical history on file.    No family history on file.    Social History     Tobacco Use     Smoking status: Passive Smoke Exposure - Never Smoker     Smokeless tobacco: Never Used   Substance Use Topics     Alcohol use: Not Currently     Current Outpatient Medications   Medication     Brimonidine Tartrate 0.025 % SOLN     buPROPion (WELLBUTRIN) 100 MG tablet     carvedilol (COREG) 3.125 MG tablet     cetirizine (ZYRTEC) 10 MG tablet     ESCITALOPRAM OXALATE PO     furosemide (LASIX) 20 MG tablet     HYDROmorphone (DILAUDID) 2 MG tablet     hydrOXYzine (ATARAX) 25 MG tablet     LANsoprazole (PREVACID) 30 MG DR capsule     latanoprost (XALATAN) 0.005 % ophthalmic solution     lisinopril (PRINIVIL/ZESTRIL)  20 MG tablet     Propylene Glycol-Glycerin 0.6-0.6 % SOLN     SORAfenib (NEXAVAR) 200 MG tablet CHEMO     terazosin (HYTRIN) 5 MG capsule     VICTOZA PEN 18 MG/3ML soln     VOLTAREN 1 % topical gel     insulin glargine-lixisenatide (SOLIQUA 100/33) pen     INSULIN GLARGINE-LIXISENATIDE SC     Lancet Devices (LANCET DEVICE WITH EJECTOR) MISC     No current facility-administered medications for this visit.        Impression and Plan:    Unresectable HCC on Sorafenib for 10 months. History of liver ablation in September. Multifocal lesion in the right lobe of the liver. Possible HCC with gallbladder invasion per imaging report.   I have not been able to see the images. The patient is scheduled for mapping tomorrow. We will do an intraarterial CT at the time of the angiogram and mapping. The patient agreed and would like to proceed.

## 2019-11-05 NOTE — NURSING NOTE
"Oncology Rooming Note    November 5, 2019 12:34 PM   John Mancia is a 69 year old male who presents for:    Chief Complaint   Patient presents with     New Patient     NEW PT; HCC; VITALS TAKEN      Initial Vitals: /78   Pulse 56   Temp 97.7  F (36.5  C) (Oral)   Resp 16   Ht 1.625 m (5' 3.98\")   Wt 95.8 kg (211 lb 3.2 oz)   SpO2 97%   BMI 36.28 kg/m   Estimated body mass index is 36.28 kg/m  as calculated from the following:    Height as of this encounter: 1.625 m (5' 3.98\").    Weight as of this encounter: 95.8 kg (211 lb 3.2 oz). Body surface area is 2.08 meters squared.  No Pain (0) Comment: Data Unavailable   No LMP for male patient.  Allergies reviewed: Yes  Medications reviewed: Yes    Medications: Medication refills not needed today.  Pharmacy name entered into EPIC:    [facility] - [place], [place] - [address] [id number] - [place], [place] - [address]    Clinical concerns: No new concerns today  Dr Giordano  was notified.      Carly Logan              "

## 2019-11-05 NOTE — LETTER
11/5/2019       RE: John Mancia  [address] [place] [address]     Dear Colleague,    Thank you for referring your patient, John Mancia, to the [facility]. Please see a copy of my visit note below.    Mr. Mancia is a nice 69 year-old gentleman who is referred for evaluation for liver directed therapy of the liver lesions. The patient was considered not a candidate for any therapy at [place] [facility] last year. He got a second opinion and was placed on Sorafenib since December 2018. He recently reduced the dose because of multiple side effects. He had an ablation in September 2018. I don't have the file to evaluate what lesion was treated in September.  The patient is doing well today. His ECOG is 1.   I have looked at his imaging reports and Labs. His liver function is good.  Imaging report: I was unable to see the images. The report mentions multifocal diffuse segment 5 and 4 HCC. Possibility of a sayda-gallbladder lesion with unclear extension to the Gallbladder.        Lab Results   Component Value Date    AST 26 11/05/2019     Lab Results   Component Value Date    ALT 32 11/05/2019     No results found for: BILICONJ   Lab Results   Component Value Date    BILITOTAL 0.7 11/05/2019     Lab Results   Component Value Date    ALBUMIN 4.0 11/05/2019     Lab Results   Component Value Date    PROTTOTAL 7.1 11/05/2019      Lab Results   Component Value Date    ALKPHOS 110 11/05/2019     No past medical history on file.    No past surgical history on file.    No family history on file.    Social History     Tobacco Use     Smoking status: Passive Smoke Exposure - Never Smoker     Smokeless tobacco: Never Used   Substance Use Topics     Alcohol use: Not Currently     Current Outpatient Medications   Medication     Brimonidine Tartrate 0.025 % SOLN     buPROPion (WELLBUTRIN) 100 MG tablet     carvedilol (COREG) 3.125 MG tablet     cetirizine (ZYRTEC) 10 MG tablet     ESCITALOPRAM OXALATE PO      furosemide (LASIX) 20 MG tablet     HYDROmorphone (DILAUDID) 2 MG tablet     hydrOXYzine (ATARAX) 25 MG tablet     LANsoprazole (PREVACID) 30 MG DR capsule     latanoprost (XALATAN) 0.005 % ophthalmic solution     lisinopril (PRINIVIL/ZESTRIL) 20 MG tablet     Propylene Glycol-Glycerin 0.6-0.6 % SOLN     SORAfenib (NEXAVAR) 200 MG tablet CHEMO     terazosin (HYTRIN) 5 MG capsule     VICTOZA PEN 18 MG/3ML soln     VOLTAREN 1 % topical gel     insulin glargine-lixisenatide (SOLIQUA 100/33) pen     INSULIN GLARGINE-LIXISENATIDE SC     Lancet Devices (LANCET DEVICE WITH EJECTOR) MISC     No current facility-administered medications for this visit.        Impression and Plan:    Unresectable HCC on Sorafenib for 10 months. History of liver ablation in September. Multifocal lesion in the right lobe of the liver. Possible HCC with gallbladder invasion per imaging report.   I have not been able to see the images. The patient is scheduled for mapping tomorrow. We will do an intraarterial CT at the time of the angiogram and mapping. The patient agreed and would like to proceed.    Again, thank you for allowing me to participate in the care of your patient.      Sincerely,    Charo Giordano MD

## 2019-11-06 ENCOUNTER — APPOINTMENT (OUTPATIENT)
Dept: MEDSURG UNIT | Facility: CLINIC | Age: 69
End: 2019-11-06
Attending: RADIOLOGY
Payer: MEDICARE

## 2019-11-06 ENCOUNTER — APPOINTMENT (OUTPATIENT)
Dept: INTERVENTIONAL RADIOLOGY/VASCULAR | Facility: CLINIC | Age: 69
End: 2019-11-06
Attending: RADIOLOGY
Payer: MEDICARE

## 2019-11-06 ENCOUNTER — HOSPITAL ENCOUNTER (OUTPATIENT)
Dept: NUCLEAR MEDICINE | Facility: CLINIC | Age: 69
Setting detail: NUCLEAR MEDICINE
End: 2019-11-06
Attending: RADIOLOGY | Admitting: RADIOLOGY
Payer: MEDICARE

## 2019-11-06 ENCOUNTER — HOSPITAL ENCOUNTER (OUTPATIENT)
Facility: CLINIC | Age: 69
Discharge: HOME | End: 2019-11-06
Attending: RADIOLOGY | Admitting: RADIOLOGY
Payer: MEDICARE

## 2019-11-06 VITALS
WEIGHT: 211 LBS | SYSTOLIC BLOOD PRESSURE: 137 MMHG | BODY MASS INDEX: 36.02 KG/M2 | DIASTOLIC BLOOD PRESSURE: 73 MMHG | RESPIRATION RATE: 16 BRPM | HEIGHT: 64 IN | TEMPERATURE: 98.5 F | OXYGEN SATURATION: 96 %

## 2019-11-06 DIAGNOSIS — C22.0 HEPATOCELLULAR CARCINOMA (H): ICD-10-CM

## 2019-11-06 LAB
GLUCOSE BLDC GLUCOMTR-MCNC: 117 MG/DL (ref 70–99)
GLUCOSE BLDC GLUCOMTR-MCNC: 146 MG/DL (ref 70–99)

## 2019-11-06 PROCEDURE — 25000128 H RX IP 250 OP 636: Performed by: STUDENT IN AN ORGANIZED HEALTH CARE EDUCATION/TRAINING PROGRAM

## 2019-11-06 PROCEDURE — 40000168 ZZH STATISTIC PP CARE STAGE 3

## 2019-11-06 PROCEDURE — C1887 CATHETER, GUIDING: HCPCS

## 2019-11-06 PROCEDURE — 27210732 ZZH ACCESSORY CR1

## 2019-11-06 PROCEDURE — C1769 GUIDE WIRE: HCPCS

## 2019-11-06 PROCEDURE — 34300033 ZZH RX 343: Performed by: RADIOLOGY

## 2019-11-06 PROCEDURE — 25800030 ZZH RX IP 258 OP 636: Performed by: RADIOLOGY

## 2019-11-06 PROCEDURE — 25500064 ZZH RX 255 OP 636: Performed by: RADIOLOGY

## 2019-11-06 PROCEDURE — 27210889 ZZH ACCESSORY CR8

## 2019-11-06 PROCEDURE — 36247 INS CATH ABD/L-EXT ART 3RD: CPT

## 2019-11-06 PROCEDURE — 27210780 ZZH KIT CR3

## 2019-11-06 PROCEDURE — 99152 MOD SED SAME PHYS/QHP 5/>YRS: CPT

## 2019-11-06 PROCEDURE — 99153 MOD SED SAME PHYS/QHP EA: CPT

## 2019-11-06 PROCEDURE — 75726 ARTERY X-RAYS ABDOMEN: CPT

## 2019-11-06 PROCEDURE — 75774 ARTERY X-RAY EACH VESSEL: CPT

## 2019-11-06 PROCEDURE — 27210908 ZZH NEEDLE CR4

## 2019-11-06 PROCEDURE — 27210888 ZZH ACCESSORY CR7

## 2019-11-06 PROCEDURE — 78999 UNLISTED MISC PX DX NUC MED: CPT

## 2019-11-06 PROCEDURE — 27210804 ZZH SHEATH CR3

## 2019-11-06 PROCEDURE — 25000128 H RX IP 250 OP 636: Performed by: RADIOLOGY

## 2019-11-06 PROCEDURE — 82962 GLUCOSE BLOOD TEST: CPT | Mod: 91

## 2019-11-06 PROCEDURE — A9540 TC99M MAA: HCPCS | Performed by: RADIOLOGY

## 2019-11-06 PROCEDURE — 25000125 ZZHC RX 250: Performed by: STUDENT IN AN ORGANIZED HEALTH CARE EDUCATION/TRAINING PROGRAM

## 2019-11-06 RX ORDER — NALOXONE HYDROCHLORIDE 0.4 MG/ML
.1-.4 INJECTION, SOLUTION INTRAMUSCULAR; INTRAVENOUS; SUBCUTANEOUS
Status: DISCONTINUED | OUTPATIENT
Start: 2019-11-06 | End: 2019-11-06 | Stop reason: HOSPADM

## 2019-11-06 RX ORDER — LIDOCAINE 40 MG/G
CREAM TOPICAL
Status: DISCONTINUED | OUTPATIENT
Start: 2019-11-06 | End: 2019-11-06 | Stop reason: HOSPADM

## 2019-11-06 RX ORDER — DEXTROSE MONOHYDRATE 25 G/50ML
25-50 INJECTION, SOLUTION INTRAVENOUS
Status: CANCELLED | OUTPATIENT
Start: 2019-11-06

## 2019-11-06 RX ORDER — SODIUM CHLORIDE 9 MG/ML
INJECTION, SOLUTION INTRAVENOUS CONTINUOUS
Status: CANCELLED | OUTPATIENT
Start: 2019-11-06

## 2019-11-06 RX ORDER — NICOTINE POLACRILEX 4 MG
15-30 LOZENGE BUCCAL
Status: CANCELLED | OUTPATIENT
Start: 2019-11-06

## 2019-11-06 RX ORDER — FLUMAZENIL 0.1 MG/ML
0.2 INJECTION, SOLUTION INTRAVENOUS
Status: DISCONTINUED | OUTPATIENT
Start: 2019-11-06 | End: 2019-11-06 | Stop reason: HOSPADM

## 2019-11-06 RX ORDER — IODIXANOL 320 MG/ML
150 INJECTION, SOLUTION INTRAVASCULAR ONCE
Status: COMPLETED | OUTPATIENT
Start: 2019-11-06 | End: 2019-11-06

## 2019-11-06 RX ORDER — DEXTROSE MONOHYDRATE 25 G/50ML
25-50 INJECTION, SOLUTION INTRAVENOUS
Status: DISCONTINUED | OUTPATIENT
Start: 2019-11-06 | End: 2019-11-06 | Stop reason: HOSPADM

## 2019-11-06 RX ORDER — NICOTINE POLACRILEX 4 MG
15-30 LOZENGE BUCCAL
Status: DISCONTINUED | OUTPATIENT
Start: 2019-11-06 | End: 2019-11-06 | Stop reason: HOSPADM

## 2019-11-06 RX ORDER — XYLITOL/YERBA SANTA
3-5 AEROSOL, SPRAY WITH PUMP (ML) MUCOUS MEMBRANE SEE ADMIN INSTRUCTIONS
COMMUNITY

## 2019-11-06 RX ORDER — LOPERAMIDE HCL 2 MG
2 CAPSULE ORAL 4 TIMES DAILY PRN
Status: ON HOLD | COMMUNITY
End: 2019-12-12

## 2019-11-06 RX ORDER — SODIUM CHLORIDE 9 MG/ML
INJECTION, SOLUTION INTRAVENOUS CONTINUOUS
Status: DISCONTINUED | OUTPATIENT
Start: 2019-11-06 | End: 2019-11-06 | Stop reason: HOSPADM

## 2019-11-06 RX ORDER — HEPARIN SOD,PORCINE/0.9 % NACL 5K/1000 ML
1000-10000 INTRAVENOUS SOLUTION INTRAVENOUS
Status: COMPLETED | OUTPATIENT
Start: 2019-11-06 | End: 2019-11-06

## 2019-11-06 RX ORDER — LACTULOSE 10 G/15ML
10 SOLUTION ORAL PRN
COMMUNITY

## 2019-11-06 RX ORDER — FENTANYL CITRATE 50 UG/ML
25-50 INJECTION, SOLUTION INTRAMUSCULAR; INTRAVENOUS EVERY 5 MIN PRN
Status: DISCONTINUED | OUTPATIENT
Start: 2019-11-06 | End: 2019-11-06 | Stop reason: HOSPADM

## 2019-11-06 RX ORDER — ACETAMINOPHEN 500 MG
500 TABLET ORAL EVERY 6 HOURS PRN
Status: CANCELLED | OUTPATIENT
Start: 2019-11-06

## 2019-11-06 RX ORDER — PROCHLORPERAZINE MALEATE 10 MG
10 TABLET ORAL EVERY 6 HOURS PRN
COMMUNITY
End: 2020-09-01

## 2019-11-06 RX ADMIN — FENTANYL CITRATE 50 MCG: 50 INJECTION, SOLUTION INTRAMUSCULAR; INTRAVENOUS at 10:59

## 2019-11-06 RX ADMIN — KIT FOR THE PREPARATION OF TECHNETIUM TC 99M ALBUMIN AGGREGATED 5 MCI.: 2.5 INJECTION, POWDER, FOR SOLUTION INTRAVENOUS at 13:58

## 2019-11-06 RX ADMIN — HEPARIN SODIUM 10000 UNITS: 1000 INJECTION, SOLUTION INTRAVENOUS; SUBCUTANEOUS at 10:55

## 2019-11-06 RX ADMIN — MIDAZOLAM 1 MG: 1 INJECTION INTRAMUSCULAR; INTRAVENOUS at 10:58

## 2019-11-06 RX ADMIN — FENTANYL CITRATE 50 MCG: 50 INJECTION, SOLUTION INTRAMUSCULAR; INTRAVENOUS at 10:50

## 2019-11-06 RX ADMIN — MIDAZOLAM 1 MG: 1 INJECTION INTRAMUSCULAR; INTRAVENOUS at 10:49

## 2019-11-06 RX ADMIN — IODIXANOL 150 ML: 320 INJECTION, SOLUTION INTRAVASCULAR at 12:27

## 2019-11-06 RX ADMIN — LIDOCAINE HYDROCHLORIDE 6 ML: 10 INJECTION, SOLUTION EPIDURAL; INFILTRATION; INTRACAUDAL; PERINEURAL at 11:00

## 2019-11-06 ASSESSMENT — MIFFLIN-ST. JEOR: SCORE: 1638.09

## 2019-11-06 NOTE — PROCEDURES
[facility], [place]    Procedure: IR Procedure Note  Date/Time: 11/6/2019 12:51 PM  Performed by: Tobin Morrow MD  Authorized by: Tobin Morrow MD   IR Fellow Physician: Tobin Morrow  Other(s) attending procedure: Staff: Dr. Giordano    UNIVERSAL PROTOCOL   Site Marked: Yes  Prior Images Obtained and Reviewed:  Yes  Required items: Required blood products, implants, devices and special equipment available    Patient identity confirmed:  Verbally with patient  Patient was reevaluated immediately before administering moderate or deep sedation or anesthesia  Confirmation Checklist:  Patient's identity using two indicators, relevant allergies, procedure was appropriate and matched the consent or emergent situation and correct equipment/implants were available  Time out: Immediately prior to the procedure a time out was called    Preparation: Patient was prepped and draped in usual sterile fashion      SEDATION    Patient Sedated: Yes    Vital signs: Vital signs monitored during sedation    See dictated procedure note for full details.  Findings: -Hepatic angiogram and Tc MAA mapping for planned Y-90 treatment was performed from a right groin approach     Specimens: none    Complications: None    Condition: Stable    Plan: -Bed rest as ordered  -Patient to get SPECT/ CT before discharge     PROCEDURE   Patient Tolerance:  Patient tolerated the procedure well with no immediate complications    Time of Sedation in Minutes by Physician:  90

## 2019-11-06 NOTE — PROGRESS NOTES
Patient Name: John Mancia  Medical Record Number: [id number]  Today's Date: 11/6/2019    Procedure: y90 mapping  Proceduralist: Dr. Giordano, Dr. Tobin Morrow    Sedation start time: 1049  Sedation end time: 1222  Sedation medications administered: 2 mg Versed, 100 mcg Fentanyl.   Total sedation time: 1 hour 33 minutes    Patient in room:  1041  Procedure start time: 1057  Puncture time: 1100  Procedure end time: 1222  Patient out of room:  1231    Report given to: 2.A. R.N.    Other Notes: Pt arrived to IR room 1 from . Consent reviewed. Pt denies any questions or concerns regarding procedure. Pt positioned supine and monitored per protocol. Pt tolerated procedure without any noted complications. Pt transferred back to 2A.    Angioseal closure device used at 1220.  Patient must lay flat until 1420.

## 2019-11-06 NOTE — DISCHARGE INSTRUCTIONS
[facility]   Interventional Radiology  Discharge Instructions Post Mapping for Future  Angiography for Insertion of   Radioactive Microspheres to the Liver    AFTER YOU GO HOME          Relax and take it easy for 24 hours.       Drink plenty of fluids.       Resume your regular diet, unless otherwise instructed by your Primary Physician.       DO NOT smoke for at least 24 hours, if you were given any sedation.       DO NOT drink alcoholic beverages the day of your procedure.       Do not drive or operate machinery at home or at work for 24 hours.          DO NOT do any strenuous exercise or lifting for at least 2 days following your procedure.       DO NOT take a bath or shower for at least 12 hours following your procedure.       DO NOT make any important or legal decisions for 24 hours following your procedure.    CALL THE PHYSICIAN IF:       - You start bleeding from the procedure site--RIGHT GROIN; lie down flat and hold pressure on the site. A small lump or bruise is common at the puncture site. Your physician will tell you if you need to return to the hospital.      - You develop numbness, coolness or a change in color of the RIGHT leg      - You experience increased pain or redness at the puncture site.      - You develop hives or a rash or unexplained itching.      - You develop a temperature of 101 degrees F or greater.    Additional Information:           Support the puncture site for coughing, sneezing, or moving your bowels for the first 48 hours  No tub bath, hot tubs, or swimming for 5 days  No lotion or powder to the puncture site for 3 day     Closure Device:  Angioseal pamphlet given to patient.            [facility] INTERVENTIONAL RADIOLOGY DEPARTMENT         Procedure Physician:  Dr Giordano and Dr Morrow                             Date of Procedure:  November 6, 2019       Telephone Numbers:   [phone]     Monday-Friday 8:00 to 4:30 pm                                         [phone]     After 4:30 pm Monday-Friday, Weekend and Holidays.                                         Ask for the Interventional Radiologist on call. Someone is on call 24 hrs/day.

## 2019-11-06 NOTE — PROGRESS NOTES
Pt up walking after bedrest and Nuclear Medicine imaging complete; pt steady on his feet, walking; right groin site is soft, dry and intact. Tolerated PO, water and turkey sandwich.  Discharge instructions reviewed with pt and wife, stated understanding; copy to pt. IV discontinued, catheter intact. 1515--discharged to lobby with family per wheelchair; plan to return in am for delivery.

## 2019-11-06 NOTE — PROGRESS NOTES
Pt here post Y 90 mapping; right groin site is soft, dry and intact. Wife at bedside. Pt sleepy but arouses easily; taking water, declined food at this time. Will continue to monitor.

## 2019-11-06 NOTE — PRE-PROCEDURE
GENERAL PRE-PROCEDURE:   Procedure:  Mapping for SIRT to liver     Verbal consent obtained?: Yes    Written consent obtained?: Yes    Risks and benefits: Risks, benefits and alternatives were discussed    Consent given by:  Patient  Patient states understanding of procedure being performed: Yes    Patient's understanding of procedure matches consent: Yes    Procedure consent matches procedure scheduled: Yes    Appropriately NPO:  Yes  ASA Class:  Class 2- mild systemic disease, no acute problems, no functional limitations  Mallampati  :  Grade 2- soft palate, base of uvula, tonsillar pillars, and portion of posterior pharyngeal wall visible  Lungs:  Lungs clear with good breath sounds bilaterally  Heart:  Normal heart sounds and rate  History & Physical reviewed:  History and physical reviewed and no updates needed  Statement of review:  I have reviewed the lab findings, diagnostic data, medications, and the plan for sedation

## 2019-11-06 NOTE — IP AVS SNAPSHOT
[address] [address]                                    After Visit Summary   11/6/2019    John Mancia    MRN: [id number]           After Visit Summary Signature Page    I have received my discharge instructions, and my questions have been answered. I have discussed any challenges I see with this plan with the nurse or doctor.    ..........................................................................................................................................  Patient/Patient Representative Signature      ..........................................................................................................................................  Patient Representative Print Name and Relationship to Patient    ..................................................               ................................................  Date                                   Time    ..........................................................................................................................................  Reviewed by Signature/Title    ...................................................              ..............................................  Date                                               Time          22EPIC Rev 08/18

## 2019-11-07 ENCOUNTER — TELEPHONE (OUTPATIENT)
Dept: ONCOLOGY | Facility: CLINIC | Age: 69
End: 2019-11-07

## 2019-11-07 ENCOUNTER — APPOINTMENT (OUTPATIENT)
Dept: INTERVENTIONAL RADIOLOGY/VASCULAR | Facility: CLINIC | Age: 69
End: 2019-11-07
Attending: RADIOLOGY
Payer: MEDICARE

## 2019-11-07 ENCOUNTER — HOSPITAL ENCOUNTER (OUTPATIENT)
Facility: CLINIC | Age: 69
Discharge: HOME | End: 2019-11-07
Attending: RADIOLOGY | Admitting: RADIOLOGY
Payer: MEDICARE

## 2019-11-07 ENCOUNTER — APPOINTMENT (OUTPATIENT)
Dept: MEDSURG UNIT | Facility: CLINIC | Age: 69
End: 2019-11-07
Attending: RADIOLOGY
Payer: MEDICARE

## 2019-11-07 ENCOUNTER — HOSPITAL ENCOUNTER (OUTPATIENT)
Dept: NUCLEAR MEDICINE | Facility: CLINIC | Age: 69
Setting detail: NUCLEAR MEDICINE
End: 2019-11-07
Attending: RADIOLOGY | Admitting: RADIOLOGY
Payer: MEDICARE

## 2019-11-07 VITALS
OXYGEN SATURATION: 95 % | SYSTOLIC BLOOD PRESSURE: 152 MMHG | DIASTOLIC BLOOD PRESSURE: 73 MMHG | TEMPERATURE: 98.3 F | HEIGHT: 64 IN | RESPIRATION RATE: 18 BRPM | WEIGHT: 211 LBS | BODY MASS INDEX: 36.02 KG/M2 | HEART RATE: 57 BPM

## 2019-11-07 DIAGNOSIS — C22.0 HEPATOCELLULAR CARCINOMA (H): ICD-10-CM

## 2019-11-07 LAB — GLUCOSE BLDC GLUCOMTR-MCNC: 157 MG/DL (ref 70–99)

## 2019-11-07 PROCEDURE — 78999 UNLISTED MISC PX DX NUC MED: CPT

## 2019-11-07 PROCEDURE — 75726 ARTERY X-RAYS ABDOMEN: CPT | Mod: XU

## 2019-11-07 PROCEDURE — 75774 ARTERY X-RAY EACH VESSEL: CPT | Mod: XS

## 2019-11-07 PROCEDURE — 27210908 ZZH NEEDLE CR4

## 2019-11-07 PROCEDURE — 27210732 ZZH ACCESSORY CR1

## 2019-11-07 PROCEDURE — 25000128 H RX IP 250 OP 636: Performed by: PHYSICIAN ASSISTANT

## 2019-11-07 PROCEDURE — C1760 CLOSURE DEV, VASC: HCPCS

## 2019-11-07 PROCEDURE — C1769 GUIDE WIRE: HCPCS

## 2019-11-07 PROCEDURE — 27210888 ZZH ACCESSORY CR7

## 2019-11-07 PROCEDURE — 27210780 ZZH KIT CR3

## 2019-11-07 PROCEDURE — 37243 VASC EMBOLIZE/OCCLUDE ORGAN: CPT

## 2019-11-07 PROCEDURE — 25500064 ZZH RX 255 OP 636: Performed by: RADIOLOGY

## 2019-11-07 PROCEDURE — 27210889 ZZH ACCESSORY CR8

## 2019-11-07 PROCEDURE — 27210804 ZZH SHEATH CR3

## 2019-11-07 PROCEDURE — C2616 BRACHYTX, NON-STR,YTTRIUM-90: HCPCS | Performed by: RADIOLOGY

## 2019-11-07 PROCEDURE — 25000125 ZZHC RX 250: Performed by: STUDENT IN AN ORGANIZED HEALTH CARE EDUCATION/TRAINING PROGRAM

## 2019-11-07 PROCEDURE — C1887 CATHETER, GUIDING: HCPCS

## 2019-11-07 PROCEDURE — 25800030 ZZH RX IP 258 OP 636: Performed by: PHYSICIAN ASSISTANT

## 2019-11-07 PROCEDURE — 27210886 ZZH ACCESSORY CR5

## 2019-11-07 PROCEDURE — 27810301 ZZH RX 278: Performed by: RADIOLOGY

## 2019-11-07 PROCEDURE — 99153 MOD SED SAME PHYS/QHP EA: CPT | Mod: 59

## 2019-11-07 PROCEDURE — 40000168 ZZH STATISTIC PP CARE STAGE 3

## 2019-11-07 PROCEDURE — 25800030 ZZH RX IP 258 OP 636: Performed by: STUDENT IN AN ORGANIZED HEALTH CARE EDUCATION/TRAINING PROGRAM

## 2019-11-07 PROCEDURE — 36247 INS CATH ABD/L-EXT ART 3RD: CPT

## 2019-11-07 PROCEDURE — 25000128 H RX IP 250 OP 636: Performed by: STUDENT IN AN ORGANIZED HEALTH CARE EDUCATION/TRAINING PROGRAM

## 2019-11-07 PROCEDURE — 99152 MOD SED SAME PHYS/QHP 5/>YRS: CPT | Mod: 59

## 2019-11-07 PROCEDURE — 82962 GLUCOSE BLOOD TEST: CPT

## 2019-11-07 PROCEDURE — 25000125 ZZHC RX 250: Performed by: PHYSICIAN ASSISTANT

## 2019-11-07 RX ORDER — NALOXONE HYDROCHLORIDE 0.4 MG/ML
.1-.4 INJECTION, SOLUTION INTRAMUSCULAR; INTRAVENOUS; SUBCUTANEOUS
Status: DISCONTINUED | OUTPATIENT
Start: 2019-11-07 | End: 2019-11-07 | Stop reason: HOSPADM

## 2019-11-07 RX ORDER — SODIUM CHLORIDE 9 MG/ML
INJECTION, SOLUTION INTRAVENOUS CONTINUOUS
Status: DISCONTINUED | OUTPATIENT
Start: 2019-11-07 | End: 2019-11-07 | Stop reason: HOSPADM

## 2019-11-07 RX ORDER — DEXTROSE MONOHYDRATE 25 G/50ML
25-50 INJECTION, SOLUTION INTRAVENOUS
Status: DISCONTINUED | OUTPATIENT
Start: 2019-11-07 | End: 2019-11-07 | Stop reason: HOSPADM

## 2019-11-07 RX ORDER — CLINDAMYCIN PHOSPHATE 900 MG/50ML
900 INJECTION, SOLUTION INTRAVENOUS
Status: COMPLETED | OUTPATIENT
Start: 2019-11-07 | End: 2019-11-07

## 2019-11-07 RX ORDER — IODIXANOL 320 MG/ML
150 INJECTION, SOLUTION INTRAVASCULAR ONCE
Status: COMPLETED | OUTPATIENT
Start: 2019-11-07 | End: 2019-11-07

## 2019-11-07 RX ORDER — HEPARIN SOD,PORCINE/0.9 % NACL 5K/1000 ML
1000-10000 INTRAVENOUS SOLUTION INTRAVENOUS
Status: COMPLETED | OUTPATIENT
Start: 2019-11-07 | End: 2019-11-07

## 2019-11-07 RX ORDER — OXYCODONE HYDROCHLORIDE 5 MG/1
5-10 TABLET ORAL EVERY 6 HOURS PRN
Qty: 10 TABLET | Refills: 0 | Status: ON HOLD | OUTPATIENT
Start: 2019-11-07 | End: 2019-12-12

## 2019-11-07 RX ORDER — FLUMAZENIL 0.1 MG/ML
0.2 INJECTION, SOLUTION INTRAVENOUS
Status: DISCONTINUED | OUTPATIENT
Start: 2019-11-07 | End: 2019-11-07 | Stop reason: HOSPADM

## 2019-11-07 RX ORDER — NICOTINE POLACRILEX 4 MG
15-30 LOZENGE BUCCAL
Status: DISCONTINUED | OUTPATIENT
Start: 2019-11-07 | End: 2019-11-07 | Stop reason: HOSPADM

## 2019-11-07 RX ORDER — ACETAMINOPHEN 325 MG/1
650 TABLET ORAL EVERY 4 HOURS PRN
Status: DISCONTINUED | OUTPATIENT
Start: 2019-11-07 | End: 2019-11-07 | Stop reason: HOSPADM

## 2019-11-07 RX ORDER — FENTANYL CITRATE 50 UG/ML
25-50 INJECTION, SOLUTION INTRAMUSCULAR; INTRAVENOUS EVERY 5 MIN PRN
Status: DISCONTINUED | OUTPATIENT
Start: 2019-11-07 | End: 2019-11-07 | Stop reason: HOSPADM

## 2019-11-07 RX ORDER — PROCHLORPERAZINE MALEATE 10 MG
10 TABLET ORAL EVERY 8 HOURS PRN
Qty: 30 TABLET | Refills: 0 | Status: ON HOLD | OUTPATIENT
Start: 2019-11-07 | End: 2019-12-11

## 2019-11-07 RX ORDER — ONDANSETRON 2 MG/ML
4 INJECTION INTRAMUSCULAR; INTRAVENOUS ONCE
Status: DISCONTINUED | OUTPATIENT
Start: 2019-11-07 | End: 2019-11-07 | Stop reason: HOSPADM

## 2019-11-07 RX ORDER — LIDOCAINE 40 MG/G
CREAM TOPICAL
Status: DISCONTINUED | OUTPATIENT
Start: 2019-11-07 | End: 2019-11-07 | Stop reason: HOSPADM

## 2019-11-07 RX ORDER — METHYLPREDNISOLONE 4 MG
TABLET, DOSE PACK ORAL
Qty: 21 TABLET | Refills: 0 | Status: ON HOLD | OUTPATIENT
Start: 2019-11-08 | End: 2019-12-11

## 2019-11-07 RX ADMIN — FENTANYL CITRATE 50 MCG: 50 INJECTION, SOLUTION INTRAMUSCULAR; INTRAVENOUS at 08:44

## 2019-11-07 RX ADMIN — LIDOCAINE HYDROCHLORIDE 10 ML: 10 INJECTION, SOLUTION INFILTRATION; PERINEURAL at 09:05

## 2019-11-07 RX ADMIN — PROCHLORPERAZINE EDISYLATE 5 MG: 5 INJECTION INTRAMUSCULAR; INTRAVENOUS at 08:30

## 2019-11-07 RX ADMIN — HEPARIN SODIUM 5000 UNITS: 1000 INJECTION, SOLUTION INTRAVENOUS; SUBCUTANEOUS at 09:05

## 2019-11-07 RX ADMIN — CLINDAMYCIN PHOSPHATE 900 MG: 900 INJECTION, SOLUTION INTRAVENOUS at 08:01

## 2019-11-07 RX ADMIN — MIDAZOLAM 1 MG: 1 INJECTION INTRAMUSCULAR; INTRAVENOUS at 08:36

## 2019-11-07 RX ADMIN — FENTANYL CITRATE 50 MCG: 50 INJECTION, SOLUTION INTRAMUSCULAR; INTRAVENOUS at 08:35

## 2019-11-07 RX ADMIN — MIDAZOLAM 1 MG: 1 INJECTION INTRAMUSCULAR; INTRAVENOUS at 08:44

## 2019-11-07 RX ADMIN — IODIXANOL 60 ML: 320 INJECTION, SOLUTION INTRAVASCULAR at 09:23

## 2019-11-07 RX ADMIN — Medication 29.9 MCI.: at 13:40

## 2019-11-07 RX ADMIN — SODIUM CHLORIDE: 9 INJECTION, SOLUTION INTRAVENOUS at 07:53

## 2019-11-07 RX ADMIN — HYDROCORTISONE SODIUM SUCCINATE 100 MG: 100 INJECTION, POWDER, FOR SOLUTION INTRAMUSCULAR; INTRAVENOUS at 07:54

## 2019-11-07 ASSESSMENT — MIFFLIN-ST. JEOR: SCORE: 1638.09

## 2019-11-07 NOTE — PROGRESS NOTES
Patient returned to 2A post Y90 delivery.  VSS.  Denies pain.  R groin site C/D/I without hematoma.  Positive DP pulse with doppler, +2 PT pulse.  Tolerating po food and fluids.

## 2019-11-07 NOTE — DISCHARGE INSTRUCTIONS
[facility]   Interventional Radiology  Discharge Instructions Post Angiography for Insertion of   Radioactive Microspheres to the Liver    AFTER YOU GO HOME          Relax and take it easy for 24 hours.       Drink plenty of fluids.       Resume your regular diet, unless otherwise instructed by your Primary Physician.       DO NOT smoke for at least 24 hours, if you were given any sedation.       DO NOT drink alcoholic beverages the day of your procedure.       Do not drive or operate machinery at home or at work for 24 hours.          DO NOT do any strenuous exercise or lifting for at least 2 days following your                         Procedure.       DO NOT take a bath or shower for at least 12 hours following your procedure.       DO NOT make any important or legal decisions for 24 hours following your procedure.    CALL THE PHYSICIAN IF:       - You start bleeding from the procedure site. lie down flat and hold pressure on the site. A small lump or bruise is common at the puncture site. Your physician will tell you if you need to return to the hospital.      - You develop numbness, coolness or a change in color of the leg that was punctured.      - You experience increased pain or redness at the puncture site.      - You develop hives or a rash or unexplained itching.      - You develop a temperature of 101 degrees F or greater.    Additional Information:           Support the puncture site for coughing, sneezing, or moving your bowels for the first 48 hours  No tub bath, hot tubs, or swimming for 5 days  No lotion or powder to the puncture site for 3 day    Follow the Discharge Instructions for the Liver Brachytherapy; Contrast Instructions and Instructions for the [employer]iopharm[employer].     Closure Device: Mynx            [facility] INTERVENTIONAL RADIOLOGY DEPARTMENT         Procedure Physician: Dr. Barkley and Dr. Giordano                              Date of Procedure:November 7, 2019        Telephone Numbers:   [phone]     Monday-Friday 8:00 to 4:30 pm                                        [phone]     After 4:30 pm Monday-Friday, Weekend and Holidays. Ask for the Interventional Radiologist on call. Someone is on call 24 hrs/day.

## 2019-11-07 NOTE — PROGRESS NOTES
Patient and wife called to let us know that he had Radioactive Seeding done today in [place] and they told him that he cannot have the MRI that is scheduled for 11/12/19. Reviewed situation with Dr. Reynoso and he was ok with cancelling the MRI. Stated that if patient continues to have the headaches he will order a CT Brain. Let patient know this information. Patient stated that he thinks his headaches are getting better at this time. He continues to have them but not as often. He does not want to get the CT brain at this time. Patient will be here for his  Follow up with Dr. Reynoso on 11/12. No further questions or concerns at this time.      Pt rec to see Dr. Godfrey Younger for eval of [place] Oil removal since Dr. 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

## 2019-11-07 NOTE — PROCEDURES
[facility], [place]    Procedure: IR Procedure Note  Date/Time: 11/7/2019 9:47 AM  Performed by: Margarita Barkley MD  Authorized by: Margarita Barkley MD   IR Fellow Physician: Margarita Barkley MD  Other(s) attending procedure: IR staff: Charo Giordano MD    UNIVERSAL PROTOCOL   Site Marked: No  Prior Images Obtained and Reviewed:  Yes  Required items: Required blood products, implants, devices and special equipment available    Patient identity confirmed:  Verbally with patient  Patient was reevaluated immediately before administering moderate or deep sedation or anesthesia  Confirmation Checklist:  Patient's identity using two indicators, relevant allergies, procedure was appropriate and matched the consent or emergent situation and correct equipment/implants were available  Time out: Immediately prior to the procedure a time out was called    Preparation: Patient was prepped and draped in usual sterile fashion    ESBL (mL):  15     ANESTHESIA    Anesthesia: Local infiltration  Local Anesthetic:  Lidocaine 1% without epinephrine  Anesthetic Total (mL):  10      SEDATION    Patient Sedated: Yes    Sedation Type:  Moderate (conscious) sedation  Sedation:  Fentanyl and midazolam  Vital signs: Vital signs monitored during sedation    See dictated procedure note for full details.  Findings: Y-90 radioembolization of hcc, delivery to replaced right hepatic artery.      Specimens: none    Complications: None    Condition: Stable    Plan: 3 hours bedrest.  Return to clinic in one month with contrast enhanced MRI.      PROCEDURE   Patient Tolerance:  Patient tolerated the procedure well with no immediate complications    Time of Sedation in Minutes by Physician:  60

## 2019-11-07 NOTE — PROGRESS NOTES
Patient tolerated recovery stage well. VSS, R groin site clean/dry/intact, no hematoma, and denies pain. Patient tolerated PO food and fluids. Teaching was done and discharge instructions were given. Patient ambulated, voided, and PIV was removed. Patient discharged from the hospital via wheel chair to nuclear med scan and then to home with wife.  Prescriptions dropped off at pharmacy, patient to  after scan.

## 2019-11-07 NOTE — IP AVS SNAPSHOT
MRN:[id number]                      After Visit Summary   11/7/2019    John Mancia    MRN: [id number]           Visit Information        Department      11/7/2019  6:34 AM [address] [facility]          Review of your medicines      START taking       Dose / Directions   methylPREDNISolone 4 MG tablet therapy pack  Commonly known as:  MEDROL DOSEPAK  Used for:  Hepatocellular carcinoma (H)      Start taking on:  November 8, 2019  Take as directed on package.  Quantity:  21 tablet  Refills:  0     oxyCODONE 5 MG tablet  Commonly known as:  ROXICODONE  Used for:  Hepatocellular carcinoma (H)      Dose:  5-10 mg  Take 1-2 tablets (5-10 mg) by mouth every 6 hours as needed for moderate to severe pain  Quantity:  10 tablet  Refills:  0        CONTINUE these medicines which may have CHANGED, or have new prescriptions. If we are uncertain of the size of tablets/capsules you have at home, strength may be listed as something that might have changed.       Dose / Directions   * prochlorperazine 10 MG tablet  Commonly known as:  COMPAZINE  This may have changed:  Another medication with the same name was added. Make sure you understand how and when to take each.      Dose:  10 mg  Take 10 mg by mouth every 6 hours as needed for nausea or vomiting  Refills:  0     * prochlorperazine 10 MG tablet  Commonly known as:  COMPAZINE  This may have changed:  You were already taking a medication with the same name, and this prescription was added. Make sure you understand how and when to take each.  Used for:  Hepatocellular carcinoma (H)      Dose:  10 mg  Take 1 tablet (10 mg) by mouth every 8 hours as needed for nausea or vomiting  Quantity:  30 tablet  Refills:  0         * This list has 2 medication(s) that are the same as other medications prescribed for you. Read the directions carefully, and ask your doctor or other care provider to review them with you.            CONTINUE these medicines which have NOT  CHANGED       Dose / Directions   artificial tears Oint ophthalmic ointment      Place into both eyes 2 times daily as needed for dry eyes Also called SYSTANE  Refills:  0     Brimonidine Tartrate 0.025 % Soln      Place into both eyes 3 times daily  Refills:  0     buPROPion 100 MG tablet  Commonly known as:  WELLBUTRIN      TAKE ONE TABLET BY MOUTH TWICE A DAY IN THE MORNING AND AT NOON FOR MOOD  Refills:  0     carvedilol 3.125 MG tablet  Commonly known as:  COREG      TAKE ONE TABLET BY MOUTH TWICE A DAY WITH MEALS (AVOID GRAPEFRUIT ENTIRELY) TAKE WITH FOOD FOR CONGESTIVE HEART FAILURE  Refills:  0     cetirizine 10 MG tablet  Commonly known as:  zyrTEC      Dose:  10 mg  Take 10 mg by mouth daily  Refills:  0     ESCITALOPRAM OXALATE PO      Dose:  100 mg  Take 100 mg by mouth daily  Refills:  0     furosemide 20 MG tablet  Commonly known as:  LASIX      Dose:  20 mg  Take 20 mg by mouth 2 times daily  Refills:  0     HYDROmorphone 2 MG tablet  Commonly known as:  DILAUDID      Refills:  0     hydrOXYzine 25 MG tablet  Commonly known as:  ATARAX      TAKE ONE TABLET BY MOUTH AT BEDTIME FOR SLEEP  Refills:  0     * INSULIN GLARGINE-LIXISENATIDE SC      11/6/19--not taking  Refills:  0     * insulin glargine-lixisenatide pen  Commonly known as:  SOLIQUA 100/33      INJECT 10 UNITS SUBCUTANEOUS DAILY FOR DIABETES  Refills:  0     lactulose 10 GM/15ML solution  Commonly known as:  CHRONULAC      Dose:  10 g  Take 10 g by mouth 2 times daily  Refills:  0     LANCET DEVICE WITH EJECTOR Misc      USE LANCET TOPICALLY FOUR TIMES A DAY AS NEEDED TO CHECK GLUCOSE  Refills:  0     LANsoprazole 30 MG DR capsule  Commonly known as:  PREVACID      Dose:  15 mg  Take 15 mg by mouth 2 times daily  Refills:  11     latanoprost 0.005 % ophthalmic solution  Commonly known as:  XALATAN      Dose:  1 drop  Place 1 drop into both eyes At Bedtime  Refills:  0     lisinopril 20 MG tablet  Commonly known as:  PRINIVIL/ZESTRIL       Dose:  20 mg  Take 20 mg by mouth daily  Refills:  0     loperamide 2 MG capsule  Commonly known as:  IMODIUM      Dose:  2 mg  Take 2 mg by mouth 4 times daily as needed for diarrhea  Refills:  0     MOUTH KOTE Soln      Dose:  3-5 spray  Take 3-5 sprays by mouth See Admin Instructions 3-5 times daily as needed  Refills:  0     Propylene Glycol-Glycerin 0.6-0.6 % Soln      Dose:  1 drop  Apply 1 drop to eye  Refills:  0     SORAfenib 200 MG tablet  Commonly known as:  nexAVAR      Dose:  200 mg  Take 200 mg by mouth 2 times daily  Refills:  0     terazosin 5 MG capsule  Commonly known as:  HYTRIN      Dose:  5 mg  Take 5 mg by mouth 2 times daily  Refills:  0     VICTOZA PEN 18 MG/3ML solution  Generic drug:  liraglutide      Dose:  1.8 mg  Inject 1.8 mg Subcutaneous daily  Refills:  0     VOLTAREN 1 % topical gel  Generic drug:  diclofenac      Apply topically 4 times daily Uses on joints  Refills:  0         * This list has 2 medication(s) that are the same as other medications prescribed for you. Read the directions carefully, and ask your doctor or other care provider to review them with you.               Where to get your medicines      Some of these will need a paper prescription and others can be bought over the counter. Ask your nurse if you have questions.    Bring a paper prescription for each of these medications  methylPREDNISolone 4 MG tablet therapy pack  oxyCODONE 5 MG tablet  prochlorperazine 10 MG tablet           Prescriptions were sent or printed at these locations (3 Prescriptions)            [facility] - [place], [place] - [address] [address]    Telephone:  [phone]   Fax:  [phone]   Hours:                  Printed at Department/Unit printer (1 of 1)         prochlorperazine (COMPAZINE) 10 MG tablet                     Other Prescriptions                Printed at Department/Unit printer (2 of 2)         methylPREDNISolone  (MEDROL DOSEPAK) 4 MG tablet therapy pack               oxyCODONE (ROXICODONE) 5 MG tablet                Protect others around you: Learn how to safely use, store and throw away your medicines at www.disposemymeds.org.       Follow-ups after your visit       Follow-up Appointments     Follow Up - post Y90 with Interventional Radiologist      Instruct patient to follow-up with Interventional Radiologist   In one month.           Care Instructions       After Care Instructions     Discharge Instructions / education      *Review the medication instructions.  *Review patient education materials with the patient.   *Review Post-Y90 discharge instructions with patient and family.  *Review Contrast Discharge Instruction sheet with patient.  *If patient diabetic, discharge instruction sheet to be given to patient.         May discharge when      Discharge patient  when bed rest is completed IF:  *Vital signs are within the patient's normal limits.  *Patient is able to void and transfer with assistance.  *Patient is alert and tolerates oral fluids.  *Patient must be discharged with a  and have someone to stay to assist them with bathroom privileges at home.           Further instructions from your care team       [facility]   Interventional Radiology  Discharge Instructions Post Angiography for Insertion of   Radioactive Microspheres to the Liver    AFTER YOU GO HOME          Relax and take it easy for 24 hours.       Drink plenty of fluids.       Resume your regular diet, unless otherwise instructed by your Primary Physician.       DO NOT smoke for at least 24 hours, if you were given any sedation.       DO NOT drink alcoholic beverages the day of your procedure.       Do not drive or operate machinery at home or at work for 24 hours.          DO NOT do any strenuous exercise or lifting for at least 2 days following your                         Procedure.       DO NOT take a bath or shower for at  least 12 hours following your procedure.       DO NOT make any important or legal decisions for 24 hours following your procedure.    CALL THE PHYSICIAN IF:       - You start bleeding from the procedure site. lie down flat and hold pressure on the site. A small lump or bruise is common at the puncture site. Your physician will tell you if you need to return to the hospital.      - You develop numbness, coolness or a change in color of the leg that was punctured.      - You experience increased pain or redness at the puncture site.      - You develop hives or a rash or unexplained itching.      - You develop a temperature of 101 degrees F or greater.    Additional Information:           Support the puncture site for coughing, sneezing, or moving your bowels for the first 48 hours  No tub bath, hot tubs, or swimming for 5 days  No lotion or powder to the puncture site for 3 day    Follow the Discharge Instructions for the Liver Brachytherapy; Contrast Instructions and Instructions for the [employer].     Closure Device: Mynx            [facility] INTERVENTIONAL RADIOLOGY DEPARTMENT         Procedure Physician: Dr. Barkley and Dr. Giordano                              Date of Procedure:November 7, 2019       Telephone Numbers:   [phone]     Monday-Friday 8:00 to 4:30 pm                                        [phone]     After 4:30 pm Monday-Friday, Weekend and Holidays. Ask for the Interventional Radiologist on call. Someone is on call 24 hrs/day.              Information about OPIOIDS    PRESCRIPTION OPIOIDS: WHAT YOU NEED TO KNOW   We gave you an opioid (narcotic) pain medicine. It is important to manage your pain, but opioids are not always the best choice. You should first try all the other options your care team gave you. Take this medicine for as short a time (and as few doses) as possible.    Some activities can increase your pain, such as bandage changes or therapy sessions. It may help to take  your pain medicine 30 to 60 minutes before these activities. Reduce your stress by getting enough sleep, working on hobbies you enjoy and practicing relaxation or meditation. Talk to your care team about ways to manage your pain beyond prescription opioids.    These medicines have risks:    DO NOT drive when on new or higher doses of pain medicine. These medicines can affect your alertness and reaction times, and you could be arrested for driving under the influence (DUI). If you need to use opioids long-term, talk to your care team about driving.    DO NOT operate heavy machinery    DO NOT do any other dangerous activities while taking these medicines.    DO NOT drink any alcohol while taking these medicines.     If the opioid prescribed includes acetaminophen, DO NOT take with any other medicines that contain acetaminophen. Read all labels carefully. Look for the word  acetaminophen  or  Tylenol.  Ask your pharmacist if you have questions or are unsure.    You can get addicted to pain medicines, especially if you have a history of addiction (chemical, alcohol or substance dependence). Talk to your care team about ways to reduce this risk.    All opioids tend to cause constipation. Drink plenty of water and eat foods that have a lot of fiber, such as fruits, vegetables, prune juice, apple juice and high-fiber cereal. Take a laxative (Miralax, milk of magnesia, Colace, Senna) if you don t move your bowels at least every other day. Other side effects include upset stomach, sleepiness, dizziness, throwing up, tolerance (needing more of the medicine to have the same effect), physical dependence and slowed breathing.    Store your pills in a secure place, locked if possible. We will not replace any lost or stolen medicine. If you don t finish your medicine, please throw away (dispose) as directed by your pharmacist. The [place] [employer]ution [employer] has more information about safe disposal:  "[email or web address]       Additional Information About Your Visit       MyChart Information    [facility] gives you secure access to your electronic health record. If you see a primary care provider, you can also send messages to your care team and make appointments. If you have questions, please call your primary care clinic.  If you do not have a primary care provider, please call [phone] and they will assist you.       Care EveryWhere ID    This is your Care EveryWhere ID. This could be used by other organizations to access your [facility] medical records  [id number]       Your Vitals Were  Most recent update: 11/7/2019 12:06 PM    Blood Pressure   144/71            Pulse   57          Temperature   98.3  F (36.8  C) (Oral)          Respirations   20          Height   1.626 m (5' 4\")             Weight   95.7 kg (211 lb)    Pulse Oximetry   94%    BMI (Body Mass Index)   36.22 kg/m           Primary Care Provider Office Phone # Fax #    Vinay Martino [phone]      Equal Access to Services    [facility] : Hadii florin zamudio hadasho Sonicole, waaxda luqadaha, qaybta kaaloswaldo lyons, concepción ambrocio. So [facility] [phone].    ATENCIÓN: Si habla español, tiene a de la cruz disposición servicios gratuitos de asistencia lingüística. Meena al [phone].    We comply with applicable federal civil rights laws and [place] laws. We do not discriminate on the basis of race, color, national origin, age, disability, sex, sexual orientation, or gender identity.           Thank you!    Thank you for choosing [facility] for your care. Our goal is always to provide you with excellent care. Hearing back from our patients is one way we can continue to improve our services. Please take a few minutes to complete the written survey that you may receive in the mail after you visit with us. Thank you!            Medication List      Medications  "         Morning Afternoon Evening Bedtime As Needed    artificial tears Oint ophthalmic ointment  INSTRUCTIONS:  Place into both eyes 2 times daily as needed for dry eyes Also called SYSTANE                     Brimonidine Tartrate 0.025 % Soln  INSTRUCTIONS:  Place into both eyes 3 times daily                     buPROPion 100 MG tablet  Also known as:  WELLBUTRIN  INSTRUCTIONS:  TAKE ONE TABLET BY MOUTH TWICE A DAY IN THE MORNING AND AT NOON FOR MOOD                     carvedilol 3.125 MG tablet  Also known as:  COREG  INSTRUCTIONS:  TAKE ONE TABLET BY MOUTH TWICE A DAY WITH MEALS (AVOID GRAPEFRUIT ENTIRELY) TAKE WITH FOOD FOR CONGESTIVE HEART FAILURE                     cetirizine 10 MG tablet  Also known as:  zyrTEC  INSTRUCTIONS:  Take 10 mg by mouth daily                     ESCITALOPRAM OXALATE PO  INSTRUCTIONS:  Take 100 mg by mouth daily                     furosemide 20 MG tablet  Also known as:  LASIX  INSTRUCTIONS:  Take 20 mg by mouth 2 times daily                     HYDROmorphone 2 MG tablet  Also known as:  DILAUDID                     hydrOXYzine 25 MG tablet  Also known as:  ATARAX  INSTRUCTIONS:  TAKE ONE TABLET BY MOUTH AT BEDTIME FOR SLEEP                     * INSULIN GLARGINE-LIXISENATIDE SC  INSTRUCTIONS:  11/6/19--not taking                     * insulin glargine-lixisenatide pen  Also known as:  SOLIQUA 100/33  INSTRUCTIONS:  INJECT 10 UNITS SUBCUTANEOUS DAILY FOR DIABETES                     lactulose 10 GM/15ML solution  Also known as:  CHRONULAC  INSTRUCTIONS:  Take 10 g by mouth 2 times daily                     LANCET DEVICE WITH EJECTOR Misc  INSTRUCTIONS:  USE LANCET TOPICALLY FOUR TIMES A DAY AS NEEDED TO CHECK GLUCOSE                     LANsoprazole 30 MG DR capsule  Also known as:  PREVACID  INSTRUCTIONS:  Take 15 mg by mouth 2 times daily                     latanoprost 0.005 % ophthalmic solution  Also known as:  XALATAN  INSTRUCTIONS:  Place 1 drop into both eyes At Bedtime                      lisinopril 20 MG tablet  Also known as:  PRINIVIL/ZESTRIL  INSTRUCTIONS:  Take 20 mg by mouth daily                     loperamide 2 MG capsule  Also known as:  IMODIUM  INSTRUCTIONS:  Take 2 mg by mouth 4 times daily as needed for diarrhea                     methylPREDNISolone 4 MG tablet therapy pack  Also known as:  MEDROL DOSEPAK  Start taking on:  November 8, 2019  INSTRUCTIONS:  Take as directed on package.                     MOUTH KOTE Soln  INSTRUCTIONS:  Take 3-5 sprays by mouth See Admin Instructions 3-5 times daily as needed                     oxyCODONE 5 MG tablet  Also known as:  ROXICODONE  INSTRUCTIONS:  Take 1-2 tablets (5-10 mg) by mouth every 6 hours as needed for moderate to severe pain                     * prochlorperazine 10 MG tablet  Also known as:  COMPAZINE  INSTRUCTIONS:  Take 10 mg by mouth every 6 hours as needed for nausea or vomiting  LAST TAKEN:  Ask your nurse or doctor  What changed:  Another medication with the same name was added. Make sure you understand how and when to take each.                     * prochlorperazine 10 MG tablet  Also known as:  COMPAZINE  INSTRUCTIONS:  Take 1 tablet (10 mg) by mouth every 8 hours as needed for nausea or vomiting  LAST TAKEN:  Ask your nurse or doctor  What changed:  You were already taking a medication with the same name, and this prescription was added. Make sure you understand how and when to take each.                     Propylene Glycol-Glycerin 0.6-0.6 % Soln  INSTRUCTIONS:  Apply 1 drop to eye                     SORAfenib 200 MG tablet  Also known as:  nexAVAR  INSTRUCTIONS:  Take 200 mg by mouth 2 times daily                     terazosin 5 MG capsule  Also known as:  HYTRIN  INSTRUCTIONS:  Take 5 mg by mouth 2 times daily                     VICTOZA PEN 18 MG/3ML solution  INSTRUCTIONS:  Inject 1.8 mg Subcutaneous daily  Generic drug:  liraglutide                     VOLTAREN 1 % topical gel  INSTRUCTIONS:   Apply topically 4 times daily Uses on joints  Generic drug:  diclofenac                        * This list has 4 medication(s) that are the same as other medications prescribed for you. Read the directions carefully, and ask your doctor or other care provider to review them with you.

## 2019-11-07 NOTE — PRE-PROCEDURE
GENERAL PRE-PROCEDURE:   Procedure:  Yttrium 90 radioembolization  Date/Time:  11/7/2019 7:14 AM    Verbal consent obtained?: Yes    Written consent obtained?: Yes    Risks and benefits: Risks, benefits and alternatives were discussed    DC Plan: Appropriate discharge home plan in place for patients who are going home after procedure   Consent given by:  Patient  Patient states understanding of procedure being performed: Yes    Patient's understanding of procedure matches consent: Yes    Procedure consent matches procedure scheduled: Yes    Expected level of sedation:  Moderate  Appropriately NPO:  Yes  ASA Class:  Class 2- mild systemic disease, no acute problems, no functional limitations  Mallampati  :  Grade 2- soft palate, base of uvula, tonsillar pillars, and portion of posterior pharyngeal wall visible  Lungs:  Lungs clear with good breath sounds bilaterally  Heart:  Normal heart sounds and rate  History & Physical reviewed:  History and physical reviewed and no updates needed  Statement of review:  I have reviewed the lab findings, diagnostic data, medications, and the plan for sedation

## 2019-11-07 NOTE — PROGRESS NOTES
Prep and teaching complete for Y90 delivery; Tarsha, wife at bedside, phone: [phone]. Pt reports did not take any meds since 11/5 including prevacid, hytrin, lasix and coreg. Dr Barkley aware. Pt ready. IR Staff gave okay for lab results from 11/5, no labs needed today.

## 2019-11-07 NOTE — IR NOTE
Patient Name: John Mancia  Medical Record Number: [id number]  Today's Date: 11/7/2019    Procedure: yttrium 90 radioembolization  Proceduralist: Dr ADE Giordano MD, Dr DEEPAK Barkley MD    Sedation start time: 0835  Sedation end time: 0935  Sedation medications administered: 2 mg versed, 100 mcg fentanyl  Total sedation time: 60 min    Procedure start time: 0833  Puncture time: 0844  Procedure end time: 0940    Report given to: Geeta BARBOUR     Other Notes: Pt arrived to IR room 1 from . Consent reviewed, pt confirmed. Pt denies any questions or concerns regarding procedure. Pt positioned supine and monitored per protocol. Right groin accessed. Mynx closure device (Lot #[id number]) deployed at 0920. Hemostasis achieved at 0930. 3 hrs flat bedrest, ambulation time 1230. Site cleansed and dressed per protocol. Soft, no signs of hematoma. Pedal pulses dopplerable as pre procedure. Pt tolerated procedure without any noted complications. Pt transferred back to .

## 2019-11-07 NOTE — IP AVS SNAPSHOT
[address] [address]                                    After Visit Summary   11/7/2019    John Mancia    MRN: [id number]           After Visit Summary Signature Page    I have received my discharge instructions, and my questions have been answered. I have discussed any challenges I see with this plan with the nurse or doctor.    ..........................................................................................................................................  Patient/Patient Representative Signature      ..........................................................................................................................................  Patient Representative Print Name and Relationship to Patient    ..................................................               ................................................  Date                                   Time    ..........................................................................................................................................  Reviewed by Signature/Title    ...................................................              ..............................................  Date                                               Time          22EPIC Rev 08/18

## 2019-11-11 ENCOUNTER — TELEPHONE (OUTPATIENT)
Dept: VASCULAR SURGERY | Facility: CLINIC | Age: 69
End: 2019-11-11

## 2019-11-11 DIAGNOSIS — C22.0 HEPATOCELLULAR CARCINOMA (H): Primary | ICD-10-CM

## 2019-11-11 NOTE — TELEPHONE ENCOUNTER
"Called pt to f/u on him s/p Y90 treatment.     Inquired on how he is doing.     He states that his sx \"way different then anything he's had before\".     He states that he's at times dizzy, he does have at times nightsweats.     Asked about fevers? He denies.     N/v? Denies    At times nausea, but haven't taken any as of yet.     He is also taking steriods post procedure.     His appetite? Good also with good fluid intake.     Pain: denies.     He states that overall is doing o.k    He states that the area where it hurst is the right side shoulder as well as the right side of is liver.     We talked about 'referred shoulder pain' in which we talked about using a \"hot pack\" or \"cold pack\" as well as small amount of ibuprofen.     We talked about follow up as well as treatment again for TACE.    We talked about dates.     As well as referral to [facility]    Sun 12/8 through Sat 12/14.     MRI  and labs on Mon 12/9  appt with DR. walters on Tues 12/10  Treatment TACE on WEds 12/11    They both agreed to plan. Will schedule and then call them back.     Erika VALDEZ RN, BSN  Interventional Radiology Nurse Coordinator   Phone: [phone]          "

## 2019-11-12 ENCOUNTER — INFUSION (OUTPATIENT)
Dept: ONCOLOGY | Facility: CLINIC | Age: 69
End: 2019-11-12
Payer: COMMERCIAL

## 2019-11-12 ENCOUNTER — LAB (OUTPATIENT)
Dept: ONCOLOGY | Facility: CLINIC | Age: 69
End: 2019-11-12
Payer: COMMERCIAL

## 2019-11-12 ENCOUNTER — OFFICE VISIT (OUTPATIENT)
Dept: ONCOLOGY | Facility: CLINIC | Age: 69
End: 2019-11-12
Payer: COMMERCIAL

## 2019-11-12 VITALS
BODY MASS INDEX: 36.79 KG/M2 | WEIGHT: 211 LBS | HEART RATE: 72 BPM | DIASTOLIC BLOOD PRESSURE: 72 MMHG | TEMPERATURE: 98.3 F | SYSTOLIC BLOOD PRESSURE: 134 MMHG | OXYGEN SATURATION: 95 %

## 2019-11-12 DIAGNOSIS — C22.0 HEPATOCELLULAR CARCINOMA (CMS/HCC): ICD-10-CM

## 2019-11-12 DIAGNOSIS — N50.811 RIGHT TESTICULAR PAIN: ICD-10-CM

## 2019-11-12 DIAGNOSIS — C22.0 HEPATOCELLULAR CARCINOMA (CMS/HCC): Primary | ICD-10-CM

## 2019-11-12 DIAGNOSIS — E83.39 HYPOPHOSPHATEMIA: ICD-10-CM

## 2019-11-12 LAB
AFP SERPL-MCNC: 16 NG/ML
ALBUMIN SERPL-MCNC: 4.1 G/DL (ref 3.5–5.3)
ALP SERPL-CCNC: 114 U/L (ref 45–115)
ALT SERPL-CCNC: 27 U/L (ref 0–52)
AMYLASE SERPL-CCNC: 30 U/L (ref 27–103)
ANION GAP SERPL CALC-SCNC: 8 MMOL/L (ref 3–11)
AST SERPL-CCNC: 28 U/L (ref 0–39)
BASOPHILS # BLD AUTO: 0 10*3/UL
BASOPHILS NFR BLD AUTO: 0 % (ref 0–2)
BILIRUB SERPL-MCNC: 0.62 MG/DL (ref 0–1.4)
BUN SERPL-MCNC: 17 MG/DL (ref 7–25)
CALCIUM ALBUM COR SERPL-MCNC: 9.3 MG/DL (ref 8.6–10.3)
CALCIUM SERPL-MCNC: 9.4 MG/DL (ref 8.6–10.3)
CHLORIDE SERPL-SCNC: 102 MMOL/L (ref 98–107)
CO2 SERPL-SCNC: 30 MMOL/L (ref 21–32)
CREAT SERPL-MCNC: 0.87 MG/DL (ref 0.7–1.3)
EOSINOPHIL # BLD AUTO: 0.1 10*3/UL
EOSINOPHIL NFR BLD AUTO: 1 % (ref 0–3)
ERYTHROCYTE [DISTWIDTH] IN BLOOD BY AUTOMATED COUNT: 13.8 % (ref 11.5–15)
GFR SERPL CREATININE-BSD FRML MDRD: 89 ML/MIN/1.73M*2
GLUCOSE SERPL-MCNC: 179 MG/DL (ref 70–105)
HCT VFR BLD AUTO: 42.8 % (ref 38–50)
HGB BLD-MCNC: 14.4 G/DL (ref 13.2–17.2)
LIPASE SERPL-CCNC: 21 U/L (ref 11–82)
LYMPHOCYTES # BLD AUTO: 0.3 10*3/UL
LYMPHOCYTES NFR BLD AUTO: 5 % (ref 15–47)
MCH RBC QN AUTO: 29.4 PG (ref 29–34)
MCHC RBC AUTO-ENTMCNC: 33.6 G/DL (ref 32–36)
MCV RBC AUTO: 87.5 FL (ref 82–97)
MONOCYTES # BLD AUTO: 0.4 10*3/UL
MONOCYTES NFR BLD AUTO: 6 % (ref 5–13)
NEUTROPHILS # BLD AUTO: 5.6 10*3/UL
NEUTROPHILS NFR BLD AUTO: 88 % (ref 46–70)
PHOSPHATE SERPL-MCNC: 3.5 MG/DL (ref 2.5–4.9)
PLATELET # BLD AUTO: 141 10*3/UL (ref 130–350)
PMV BLD AUTO: 7.9 FL (ref 6.9–10.8)
POTASSIUM SERPL-SCNC: 4.3 MMOL/L (ref 3.5–5.1)
PROT SERPL-MCNC: 6.9 G/DL (ref 6–8.3)
RBC # BLD AUTO: 4.89 10*6/ΜL (ref 4.1–5.8)
SODIUM SERPL-SCNC: 140 MMOL/L (ref 135–145)
WBC # BLD AUTO: 6.3 10*3/UL (ref 3.7–9.6)

## 2019-11-12 PROCEDURE — 80053 COMPREHEN METABOLIC PANEL: CPT

## 2019-11-12 PROCEDURE — 99215 OFFICE O/P EST HI 40 MIN: CPT | Performed by: NURSE PRACTITIONER

## 2019-11-12 PROCEDURE — 84100 ASSAY OF PHOSPHORUS: CPT

## 2019-11-12 PROCEDURE — 85025 COMPLETE CBC W/AUTO DIFF WBC: CPT

## 2019-11-12 PROCEDURE — 82150 ASSAY OF AMYLASE: CPT

## 2019-11-12 PROCEDURE — 83690 ASSAY OF LIPASE: CPT

## 2019-11-12 PROCEDURE — 36415 COLL VENOUS BLD VENIPUNCTURE: CPT

## 2019-11-12 PROCEDURE — 82105 ALPHA-FETOPROTEIN SERUM: CPT

## 2019-11-12 RX ORDER — OXYCODONE HYDROCHLORIDE 5 MG/1
5-10 TABLET ORAL EVERY 4 HOURS PRN
Qty: 180 TABLET | Refills: 0 | Status: SHIPPED | OUTPATIENT
Start: 2019-11-12 | End: 2019-12-17 | Stop reason: ALTCHOICE

## 2019-11-12 NOTE — PROGRESS NOTES
Medical Oncology Progress Note  Patient Name: Ben Lai  YOB: 1950  Date of Service: 11/12/2019    Chief Complaint :  Ben Lai is a 69 y.o. male with locally advanced hepatocellular carcinoma involving the right lobe    HISTORY OF PRESENT ILLNESS:  Hepatocellular carcinoma (CMS/HCC) (HCC)     1/23/2019 Initial Diagnosis       Hepatocellular carcinoma (CMS/HCC) (HCC)        7/30/2019 -  Adjuvant Therapy       Hepatocellular - Sorafenib (Nexavar)  Plan Provider: ANDERSON Bryan  Treatment goal: Palliative  Line of treatment: [No plan line of treatment]           The patient was diagnosed with hepatocellular carcinoma November 2018.  He had an underlying hepatitis C which was supposedly cured.  Due to the extent of his hepatocellular carcinoma per GI at [facility] it was not amenable to local therapy, resection or radiation.  He was referred to medical oncology and started on sorafenib in early December 2018.  Due to intolerance of the medication and significant side effects with fatigue he stopped in February 2019.  He was seen in follow-up at our clinic in July 2019 and restaging scans showed progressive disease in the liver without any signs of metastatic disease.  On August 14 he had a CT-guided right lobe liver radiofrequency ablation.  In August 2019 he was also started on a lower dose of sorafenib 200 mg twice daily although fatigue and myalgias returned shortly after starting therapy.  He was seen in follow-up with Dr. Dr. Weeks who recommended the above and also recommended referral to [facility] for evaluation.October 8 MRI abdomen showed nodular appearing liver with 2 areas of hyper/early arterial enhancement suggestive of hepatocellular carcinoma are both adjacent to the gallbladder which contains filling defects which are enhancing suggestive of neoplasm. This could be primary cholangiocarcinoma or hepatocellular carcinoma which is invading the  "gallbladder. The liver and gallbladder masses are very similar to the study from 7/29/2019.  Wedge-shaped signal abnormality in the anterior right lobe of the liver similar to the prior study could represent hepatic infarct Was seen by hepatobiliary team at [facility] September 2019, was discussed at tumor board, planned to have Y90 treatment, this was completed November 7, 2019. Dr. Reynoso ordered brain MR to evaluate headaches, this was unable to be done due to Y-90, discussed with patient and he reported improvement of headaches, head CT was offered but patient declined.     He is seen today for follow-up on Sorafenib. Was told to hold Sorafenib 5 days after Y-90 treatment by interventional radiologist Dr. Cotton at [place], was advised to follow-up with Dr. Reynoso today to discuss if he needs to restart it or not.  Patient reports right testicular pain since yesterday, does not think it is swollen but it is tender to touch. Also reports very weak urine stream since Y-90 as well, no dysuria or blood in urine. Has experienced feeling \"hot flashes\" since Y-90, was told this is normal. Finished prescribed medrol dose pack after Y-90 in the next few days.  Was prescribed oxycodone for RUQ pain, is taking when he has pain but does not feel like it is helping much, \"I think I'm waiting till the pain gets too bad\". Used to be on tramadol but was told not to take this, has also been on dilaudid but was also advised to not take this. Reports 3 looser stools/day, gets stomach \"twinges\" and \"gurgling\" then has a bowel movement, does not think imodium helps at all. Has tried an OTC Isotonic supplement which has helped though.      Significant Comorbidity:  Past Medical History:   Diagnosis Date   • A-fib (CMS/HCC) (HCC)    • Allergy    • Anxiety    • Arthritis    • Asthma    • Blindness of right eye    • BPH (benign prostatic hyperplasia)    • Cardiovascular disease    • Cirrhosis (CMS/HCC) (HCC)     with " possible liver mass by MRI 5/18, rec repeat in 8/2018   • Complication of anesthesia    • COPD (chronic obstructive pulmonary disease) (CMS/HCC) (HCC)    • Depression    • Endocrine disorder    • Gallstones    • Gastritis    • GERD (gastroesophageal reflux disease)    • Glaucoma    • Hearing loss     bilateral hearing aids   • Hemorrhoids    • Hepatitis C     Hep C, 2016 remission, complicated by cirrhosis.  MRI abd 5/30/18, rec 3 month follow up for focus of enhancement right and left hepatic lobe junction   • Hernia, abdominal    • High blood pressure    • IBS (irritable bowel syndrome)    • Infectious viral hepatitis    • Jaundice    • Kidney stones    • Obstructive sleep apnea syndrome    • Periodic limb movement disorder    • Persistent hypersomnia    • Persistent insomnia    • Pneumonia    • PONV (postoperative nausea and vomiting)    • Type 2 diabetes mellitus (CMS/HCC) (HCC)    • Urinary incontinence    • Wears partial dentures      Past Surgical History:   Procedure Laterality Date   • COLONOSCOPY  10/27/2016    Pili, normal, repeat 10 years   • CT ABLATION LIVER RADIOFREQUENCY  8/14/2019    CT ABLATION LIVER RADIOFREQUENCY 8/14/2019 [facility] MIS CT SCAN   • ESOPHAGOGASTRODUODENOSCOPY N/A 2/22/2018    Procedure: EGD - ESOPHAGOGASTRODUODENOSCOPY with banding  x 2 with crna;  Surgeon: William Rivas MD;  Location: [facility];  Service: Endoscopy;  Laterality: N/A;   • ESOPHAGOGASTRODUODENOSCOPY N/A 4/6/2018    Procedure: EGD - ESOPHAGOGASTRODUODENOSCOPY with crna;  Surgeon: William Rivas MD;  Location: [facility];  Service: Endoscopy;  Laterality: N/A;   • ESOPHAGOGASTRODUODENOSCOPY N/A 5/7/2019    Procedure: EGD - ESOPHAGOGASTRODUODENOSCOPY;  Surgeon: William Rivas MD;  Location: [facility];  Service: Endoscopy;  Laterality: N/A;   • HAND SURGERY Left     thumb   • HERNIA REPAIR  01/01/1965   • KNEE ARTHROSCOPY  09/19/2017    left knee, with partial medial meniscectomy; limited chondroplasty,  patellofemoral joint   • LIVER BIOPSY      by Dr. Alejandre   • OTHER SURGICAL HISTORY      esophageal varices, banded   • VASECTOMY      at approx age of 25     Social History:   Social History     Socioeconomic History   • Marital status:      Spouse name: Not on file   • Number of children: Not on file   • Years of education: Not on file   • Highest education level: Not on file   Occupational History   • Not on file   Social Needs   • Financial resource strain: Not on file   • Food insecurity     Worry: Not on file     Inability: Not on file   • Transportation needs     Medical: Not on file     Non-medical: Not on file   Tobacco Use   • Smoking status: Never Smoker   • Smokeless tobacco: Never Used   Substance and Sexual Activity   • Alcohol use: No   • Drug use: No   • Sexual activity: Not on file   Lifestyle   • Physical activity     Days per week: Not on file     Minutes per session: Not on file   • Stress: Not on file   Relationships   • Social connections     Talks on phone: Not on file     Gets together: Not on file     Attends [employer] service: Not on file     Active member of club or organization: Not on file     Attends meetings of clubs or organizations: Not on file     Relationship status: Not on file   • Intimate partner violence     Fear of current or ex partner: Not on file     Emotionally abused: Not on file     Physically abused: Not on file     Forced sexual activity: Not on file   Other Topics Concern   • Not on file   Social History Narrative   • Not on file      Current Medications:    Current Outpatient Medications:   •  oxyCODONE (ROXICODONE) 5 mg immediate release tablet, Take 1-2 tablets (5-10 mg total) by mouth every 4 (four) hours as needed for pain scale 4-7/10 Max Daily Amount: 60 mg, Disp: 180 tablet, Rfl: 0  •  SORAfenib (NexAVAR) 200 mg chemo tablet, Take 1 tablet (200 mg total) by mouth 2 (two) times a day Swallow whole with water. Take at least 1 hour before or 2 hours  after a meal., Disp: 60 tablet, Rfl: 3  •  potassium PHOSPHATE, monobasic, (K-Phos Original) 500 mg tablet, Take 1 tablet (500 mg total) by mouth daily, Disp: 30 tablet, Rfl: 0  •  cetirizine (ZyrTEC) 10 mg tablet, Take 10 mg by mouth daily, Disp: , Rfl:   •  loperamide (Imodium A-D) 2 mg tablet, Take 2 tablets with first loose stool of the day, then up to 8 tablets daily, Disp: 80 tablet, Rfl: 2  •  SORAfenib (NexAVAR) 200 mg chemo tablet, Take 1 tablet (200 mg total) by mouth 2 (two) times a day Swallow whole with water. Take at least 1 hour before or 2 hours after a meal., Disp: 60 tablet, Rfl: 3  •  SORAfenib (NexAVAR) 200 mg chemo tablet, Take 1 tablet (200 mg total) by mouth 2 (two) times a day Swallow whole with water. Take at least 1 hour before or 2 hours after a meal., Disp: 60 tablet, Rfl: 3  •  prochlorperazine (COMPAZINE) 10 mg tablet, 1 tab PO q6h prn nausea/vomiting. Max 40 mg/day., Disp: 30 tablet, Rfl: 5  •  LANTUS SOLOSTAR U-100 INSULIN 100 unit/mL (3 mL) insulin pen, Inject 14 Units under the skin daily  , Disp: , Rfl:   •  primidone (MYSOLINE) 50 mg tablet, , Disp: , Rfl:   •  ezetimibe (ZETIA) 10 mg tablet, Take 1 tablet (10 mg total) by mouth daily, Disp: 90 tablet, Rfl: 2  •  hydrOXYzine (ATARAX) 25 mg tablet, Take 1 tablet (25 mg total) by mouth nightly, Disp: 90 tablet, Rfl: 2  •  escitalopram (LEXAPRO) 10 mg tablet, Take 1 tablet (10 mg total) by mouth daily, Disp: 90 tablet, Rfl: 2  •  carvedilol (COREG) 3.125 mg tablet, Take 1 tablet (3.125 mg total) by mouth 2 (two) times a day with meals, Disp: 180 tablet, Rfl: 2  •  buPROPion SR (WELLBUTRIN SR) 100 mg 12 hr tablet, Take 1 tablet (100 mg total) by mouth daily, Disp: 90 tablet, Rfl: 1  •  lactulose (GENERLAC) 10 gram/15 mL solution, Take 15 mL (10 g total) by mouth daily, Disp: 473 mL, Rfl: 2  •  terazosin (HYTRIN) 5 mg capsule, Take 1 capsule (5 mg total) by mouth 2 (two) times a day, Disp: 180 capsule, Rfl: 2  •  HYDROmorphone  (DILAUDID) 2 mg tablet, Take 2 tab/cap by mouth 3 (three) times a day as needed for pain scale 1-3/10, pain scale 4-7/10 or pain scale 8-10/10 (Cancer and joint pain) , Disp: , Rfl:   •  blood-glucose meter (ACCU-CHEK ADELE PLUS METER) [facility], Use to test blood sugars 3 times daily (E11.65, non insulin depedent) AccuChek Adele plus, meter is 8 years old (Patient taking differently: Use to test blood sugars 3 times daily (E11.65, non insulin dependent) ), Disp: 1 each, Rfl: 0  •  lisinopril (PRINIVIL,ZESTRIL) 20 mg tablet, Take 1 tablet (20 mg total) by mouth daily, Disp: 90 tablet, Rfl: 2  •  furosemide (LASIX) 20 mg tablet, Take 1 tablet (20 mg total) by mouth 2 (two) times a day., Disp: 180 tablet, Rfl: 2  •  lansoprazole (PREVACID) 30 mg capsule, Take 30 mg by mouth daily , Disp: , Rfl:   •  liraglutide (VICTOZA 2-JASE) 0.6 mg/0.1 mL (18 mg/3 mL) injection, Inject 1.8 mg under the skin daily  , Disp: , Rfl:   •  capsaicin (ZOSTRIX) 0.025 % cream, Apply 1 application topically as needed for pain scale 1-3/10, 1-3/8., Disp: , Rfl:   •  sodium chloride (SALINE NASAL) 0.65 % nasal spray, Administer 1 spray into each nostril as needed for congestion or rhinitis , Disp: , Rfl:   •  white petrolatum (AQUAPHOR HEALING) 41 % ointment ointment, Apply 1 application topically as needed., Disp: , Rfl:   •  pramoxine-menthol (GOLD BOND MEDICATED ANTI-ITCH) 1-1 % cream, Apply topically., Disp: , Rfl:   •  brimonidine (ALPHAGAN) 0.2 % ophthalmic solution, every 12 hours., Disp: , Rfl:   •  GLYCERIN/PROPYLENE GLYCOL (ARTIFICIAL TEARS,GLYCERIN-PEG, OPHT), Administer 1 drop into affected eye(s) as needed (Dry eyes) , Disp: , Rfl:   •  latanoprost (XALATAN) 0.005 % ophthalmic solution, INSTILL 1 DROP INTO AFFECTED EYE(S) BY OPHTHALMIC ROUTE ONCE DAILY INTHE EVENING, Disp: 10, Rfl: 0  Allergies:   Allergies as of 11/12/2019 - Reviewed 10/14/2019   Allergen Reaction Noted   • Penicillins Anaphylaxis 10/19/2017   • Metformin Hives    •  Adhesive tape-silicones  04/06/2018   • Banana  02/05/2019   • Broccoli  02/05/2019   • [employer] sprout  02/05/2019   • Cabbage  02/05/2019   • Cantaloupe  02/05/2019   • Celery  02/05/2019   • Cheese  02/05/2019   • Cranberry  02/05/2019   • Egg     • Interferon violetta-2a  02/05/2019   • Latex     • Legumes  02/05/2019   • Lettuce  02/05/2019   • Milk  02/05/2019   • Mometasone     • Mometasone furoate     • Mustard  02/05/2019   • Nut - unspecified  02/05/2019   • Oats  02/05/2019   • Omeprazole     • Ondansetron hcl Nausea And Vomiting 05/01/2019   • Onion  02/05/2019   • Pepper (genus capsicum)  02/05/2019   • Pineapple  02/05/2019   • Primidone  05/01/2019   • Ribavirin Itching 10/19/2017   • Rosuvastatin  02/05/2019   • Sorafenib  04/04/2019   • Spironolactone     • Statins-hmg-coa reductase inhibitors Itching and GI intolerance 04/06/2018   • Wheat  02/05/2019   • Yeast  02/05/2019   • Interferon violetta (human leuk. derived) Palpitations and Rash 09/20/2019   • Pantoprazole Itching and Rash 02/28/2018   • Peginterferon violetta-2b Palpitations 10/19/2017     REVIEW OF SYSTEMS:   The ECOG performance status today is .1 - Symptomatic but completely ambulatory  Review of Systems   Constitutional: Positive for fatigue. Negative for fever.   HENT:   Negative for mouth sores.    Respiratory: Negative for cough and shortness of breath.    Cardiovascular: Negative for chest pain and leg swelling.   Gastrointestinal: Positive for abdominal pain and diarrhea.   Endocrine: Positive for hot flashes.   Genitourinary: Positive for pelvic pain (right testicle pain ). Negative for discharge.    Musculoskeletal: Positive for arthralgias (generalized joints).   Skin: Negative for itching.   Neurological: Positive for dizziness. Negative for headaches.     Pain: 2/10      Objective    PHYSICAL EXAM:   /72   Pulse 72   Temp 36.8 °C (98.3 °F) (Oral)   Wt 95.7 kg (211 lb)   SpO2 95%   BMI 36.79 kg/m²    Body mass index is 36.79  kg/m².   Physical Exam  Constitutional:       Appearance: Normal appearance.      Comments: Pleasant, well-developed  male in no acute distress. Alert and cooperative with exam. Oriented to person, place, and time.  Accompanied by wife.    HENT:      Head: Normocephalic and atraumatic.   Eyes:      General: Lids are normal. No scleral icterus.     Conjunctiva/sclera: Conjunctivae normal.   Neck:      Musculoskeletal: Normal range of motion and neck supple.   Cardiovascular:      Rate and Rhythm: Normal rate and regular rhythm.      Heart sounds: S1 normal and S2 normal.   Pulmonary:      Effort: Pulmonary effort is normal.      Breath sounds: Normal breath sounds.   Abdominal:      General: Bowel sounds are normal.      Palpations: Abdomen is soft.      Tenderness: There is abdominal tenderness in the right upper quadrant.   Genitourinary:     Scrotum/Testes:         Right: Tenderness present. Mass or swelling not present. Cremasteric reflex is absent.          Left: Cremasteric reflex is present.    Musculoskeletal: Normal range of motion.      Right lower leg: No edema.      Left lower leg: No edema.   Skin:     General: Skin is warm and dry.      Comments: Right inguinal area bruised, no erythema or drainage at incision site. Entire area minimally tender to palpation at site of bruising   Psychiatric:         Speech: Speech normal.       Laboratory  CBC:  Lab Results   Component Value Date    WBC 6.3 11/12/2019    HGB 14.4 11/12/2019    HCT 42.8 11/12/2019    MCV 87.5 11/12/2019     11/12/2019    ANC 3.762 08/12/2019    NEUTROABS 5.60 11/12/2019     CMP:  Lab Results   Component Value Date    GLUCOSE 179 (H) 11/12/2019    CALCIUM 9.4 11/12/2019     11/12/2019    K 4.3 11/12/2019    CO2 30 11/12/2019     11/12/2019    BUN 17 11/12/2019    CREATININE 0.87 11/12/2019    ANIONGAP 8 11/12/2019    BILITOT 0.62 11/12/2019    AST 28 11/12/2019    ALT 27 11/12/2019    PROT 6.9 11/12/2019     ALBUMIN 4.1 11/12/2019    ALKPHOS 114 11/12/2019     Amylase  Component      Latest Ref Rng & Units 10/14/2019 11/12/2019   Amylase      27 - 103 U/L 44 30     Lipase  Component      Latest Ref Rng & Units 10/14/2019 11/12/2019   Lipase      11 - 82 U/L 22 21     Phosphorus   Component      Latest Ref Rng & Units 9/26/2019 10/14/2019 11/12/2019   Phosphorus      2.5 - 4.9 mg/dL 2.1 (L) 2.8 3.5     AFP   Component      Latest Ref Rng & Units 7/29/2019 8/30/2019 9/26/2019 10/14/2019   Alpha-Fetoprotein      <=9 ng/mL 271 (H) 61 (H) 17 (H) 13 (H)     Component      Latest Ref Rng & Units 11/12/2019   Alpha-Fetoprotein      <=9 ng/mL 16 (H)          Assessment/Plan    IMPRESSION/PLAN:   1. Locally advanced hepatocellular carcinoma involving the right lobe:  AFP stable at 16. Received Y90 treatment November 7, discussed with Dr. Reynoso after visit with patient and he wants patient to hold Sorafenib while he is undergoing localized treatment. Has follow-up MRI and labs on Mon 12/9 then appointment with Dr. Cotton on Tues 12/10 and TACE treatment on Wednesday 12/11. Will return for follow-up with Dr. Reynoso the following week.     2. Right testicular pain: Advised we should evaluate with right testicular US, discussed with Dr. Reynoso who agrees. Tried to reach out to Dr. Lula Doherty's nurse, to discuss if this could be related to recent Y-90 treatment and left a voicemail but have not heard back. Also asked Donna Brown RN to call and she has not heard back.     3. Hypophosphatemia: WNL, advised to continue same dose, will send in refills.     4. Abdominal pain/generalized arthralgias: Refilled oxycodone, advised to take more often to stay ahead of pain, do not wait until pain is unbearable, [facility]     Will return for follow-up in December after visits in [place]. Advised to call the clinic with any questions or concerns before next scheduled appointment;  patient verbalizes understanding and agrees to plan of care.     After visit: Called patient to inform him that Dr. Reynoso wants him to hold Sorafenib at this time and hat he agrees to right testicular US, will put order in and send message to Holly to get it scheduled, patient verbalized understanding and agreed to plan of care.        Signed by: MARY HERNADEZ CNP

## 2019-11-13 ENCOUNTER — TELEPHONE (OUTPATIENT)
Dept: VASCULAR SURGERY | Facility: CLINIC | Age: 69
End: 2019-11-13

## 2019-11-13 ENCOUNTER — HOSPITAL ENCOUNTER (OUTPATIENT)
Dept: ULTRASOUND IMAGING | Facility: HOSPITAL | Age: 69
Discharge: HOME | End: 2019-11-13
Payer: COMMERCIAL

## 2019-11-13 ENCOUNTER — TELEPHONE (OUTPATIENT)
Dept: ONCOLOGY | Facility: CLINIC | Age: 69
End: 2019-11-13

## 2019-11-13 DIAGNOSIS — N50.811 RIGHT TESTICULAR PAIN: ICD-10-CM

## 2019-11-13 DIAGNOSIS — N50.811 RIGHT TESTICULAR PAIN: Primary | ICD-10-CM

## 2019-11-13 PROCEDURE — 76870 US EXAM SCROTUM: CPT

## 2019-11-13 PROCEDURE — 93926 LOWER EXTREMITY STUDY: CPT | Mod: RT

## 2019-11-13 RX ORDER — POTASSIUM PHOSPHATE, MONOBASIC 500 MG/1
500 TABLET, SOLUBLE ORAL DAILY
Qty: 90 TABLET | Refills: 3 | Status: SHIPPED | OUTPATIENT
Start: 2019-11-13 | End: 2019-12-19 | Stop reason: ALTCHOICE

## 2019-11-13 ASSESSMENT — ENCOUNTER SYMPTOMS
DIZZINESS: 1
SHORTNESS OF BREATH: 0
ABDOMINAL PAIN: 1
DIARRHEA: 1
HEADACHES: 0
LEG SWELLING: 0
HOT FLASHES: 1
FEVER: 0
FATIGUE: 1
ARTHRALGIAS: 1
COUGH: 0

## 2019-11-13 NOTE — TELEPHONE ENCOUNTER
"Pt did return my call    He states that he is having \"right testicle \" pain. Started 3 days ago.     He states that it's In the right testicle then goes over to the right groin.   He states that it in the past it reminds him of a \"hernia\" type pain    The puncture site looks good,   It's not as swollen.   Asked about bruising: wife states that not in the testicle and not in the groin area.     Bruising is noted at the puncture site.     Asked about walking on the leg; if he does have some pain. He states that he does notice that when walking he does notice pain in the groin area, attributes it to a \"squeezing\" feeling.       He is not having any urination issues today.     He states that his right testicle feels \"numb\" also. X 3 days.    Wife  States that he has a  Testicular US at 230pm also.     Informed her that I'll consult with his local team to see if they can also do a right lower extremity arterial US to rule out psa.     She agrees to plan.     *talked to K[facility] Ramos regarding order.   They will also tack it onto his testicular Us.     I've also given them Dr. Giordano's pager so that they can call and f/u if needed.     Erika VALDEZ RN, BSN  Interventional Radiology Nurse Coordinator   Phone: [phone]                "

## 2019-11-13 NOTE — TELEPHONE ENCOUNTER
"Called pt and left a VM.  Inquired on msgs from [facility] [place] regarding urination issues as well as leg pain(?).    Asked him to return my call today to further assess.     Did receive phone call from Rachel BARBOUR from Cancer center.   Left her detailed VM regarding unsure as to why pt is having \"slow trickle of urine\" post Y90 treatment as well as following up on his right leg issue.     Informed of her possible PSA on the right leg however unsure as to why he would have \"urination\" issues.     I informed her that I did receive a phone call from LALITA Lincoln yesterday however the numbers that she provided did not work.     Gave Rachel my direct line to return my call to further assess.     Rachel PH: [phone]  "

## 2019-11-13 NOTE — PROGRESS NOTES
Per KW dictation no oral will be sent this cycle and FU apt was made as directed      1. Locally advanced hepatocellular carcinoma involving the right lobe:  AFP stable at 16. Received Y90 treatment November 7, discussed with Dr. Reynoso after visit with patient and he wants patient to hold Sorafenib while he is undergoing localized treatment. Has follow-up MRI and labs on Mon 12/9 then appointment with Dr. Cotton on Tues 12/10 and TACE treatment on Wednesday 12/11. Will return for follow-up with Dr. Reynoso the following week.

## 2019-11-14 NOTE — PROGRESS NOTES
Spoke with patient about results of US that he had done today. Both came back negative. Patient stated that his urine flow is better today. His pain has gotten a little better. He is taking his pain medication. Let him know that if his pain gets worse over night to call and we can get him in to see a CNP. No questions at this time.

## 2019-11-25 ENCOUNTER — TELEPHONE (OUTPATIENT)
Dept: VASCULAR SURGERY | Facility: CLINIC | Age: 69
End: 2019-11-25

## 2019-11-25 NOTE — TELEPHONE ENCOUNTER
Sent [employer] regarding pt's upcoming appts.     Erika VALDEZ RN, BSN  Interventional Radiology Nurse Coordinator   Phone: [phone]

## 2019-12-02 ENCOUNTER — TELEPHONE (OUTPATIENT)
Dept: VASCULAR SURGERY | Facility: CLINIC | Age: 69
End: 2019-12-02

## 2019-12-02 NOTE — TELEPHONE ENCOUNTER
Returning wife's phone call in regards to pt being around his grand children for this week.     Informed them that they are outside of their radiation window and that they can definitely be around family.     I also explained that after the TACE procedure they can also be around the grandkids also a there's no radiation risk.     She verbalized understanding.   Erika VALDEZ RN, BSN  Interventional Radiology Nurse Coordinator   Phone: [phone]

## 2019-12-05 DIAGNOSIS — C22.0 HEPATOCELLULAR CARCINOMA (H): Primary | ICD-10-CM

## 2019-12-09 ENCOUNTER — ANCILLARY PROCEDURE (OUTPATIENT)
Dept: MRI IMAGING | Facility: CLINIC | Age: 69
End: 2019-12-09
Attending: SURGERY
Payer: MEDICARE

## 2019-12-09 DIAGNOSIS — C22.0 HEPATOCELLULAR CARCINOMA (H): ICD-10-CM

## 2019-12-09 LAB
AFP SERPL-MCNC: 20.6 UG/L (ref 0–8)
ALBUMIN SERPL-MCNC: 3.8 G/DL (ref 3.4–5)
ALP SERPL-CCNC: 126 U/L (ref 40–150)
ALT SERPL W P-5'-P-CCNC: 39 U/L (ref 0–70)
ANION GAP SERPL CALCULATED.3IONS-SCNC: 3 MMOL/L (ref 3–14)
AST SERPL W P-5'-P-CCNC: 26 U/L (ref 0–45)
BILIRUB DIRECT SERPL-MCNC: 0.2 MG/DL (ref 0–0.2)
BILIRUB SERPL-MCNC: 0.6 MG/DL (ref 0.2–1.3)
BUN SERPL-MCNC: 11 MG/DL (ref 7–30)
CALCIUM SERPL-MCNC: 8.6 MG/DL (ref 8.5–10.1)
CHLORIDE SERPL-SCNC: 108 MMOL/L (ref 94–109)
CO2 SERPL-SCNC: 30 MMOL/L (ref 20–32)
CREAT SERPL-MCNC: 0.8 MG/DL (ref 0.66–1.25)
ERYTHROCYTE [DISTWIDTH] IN BLOOD BY AUTOMATED COUNT: 15.2 % (ref 10–15)
GFR SERPL CREATININE-BSD FRML MDRD: >90 ML/MIN/{1.73_M2}
GLUCOSE SERPL-MCNC: 95 MG/DL (ref 70–99)
HCT VFR BLD AUTO: 41.7 % (ref 40–53)
HGB BLD-MCNC: 13.3 G/DL (ref 13.3–17.7)
INR PPP: 1.14 (ref 0.86–1.14)
MCH RBC QN AUTO: 29.6 PG (ref 26.5–33)
MCHC RBC AUTO-ENTMCNC: 31.9 G/DL (ref 31.5–36.5)
MCV RBC AUTO: 93 FL (ref 78–100)
PLATELET # BLD AUTO: 91 10E9/L (ref 150–450)
POTASSIUM SERPL-SCNC: 4 MMOL/L (ref 3.4–5.3)
PROT SERPL-MCNC: 7 G/DL (ref 6.8–8.8)
RBC # BLD AUTO: 4.49 10E12/L (ref 4.4–5.9)
SODIUM SERPL-SCNC: 140 MMOL/L (ref 133–144)
WBC # BLD AUTO: 3.1 10E9/L (ref 4–11)

## 2019-12-09 PROCEDURE — 82105 ALPHA-FETOPROTEIN SERUM: CPT | Performed by: RADIOLOGY

## 2019-12-09 RX ORDER — GADOBUTROL 604.72 MG/ML
10 INJECTION INTRAVENOUS ONCE
Status: COMPLETED | OUTPATIENT
Start: 2019-12-09 | End: 2019-12-09

## 2019-12-09 RX ADMIN — GADOBUTROL 10 ML: 604.72 INJECTION INTRAVENOUS at 15:43

## 2019-12-10 ENCOUNTER — OFFICE VISIT (OUTPATIENT)
Dept: RADIOLOGY | Facility: CLINIC | Age: 69
End: 2019-12-10
Attending: RADIOLOGY
Payer: MEDICARE

## 2019-12-10 VITALS
HEART RATE: 62 BPM | SYSTOLIC BLOOD PRESSURE: 105 MMHG | OXYGEN SATURATION: 97 % | WEIGHT: 221.7 LBS | RESPIRATION RATE: 16 BRPM | DIASTOLIC BLOOD PRESSURE: 68 MMHG | HEIGHT: 64 IN | TEMPERATURE: 98.2 F | BODY MASS INDEX: 37.85 KG/M2

## 2019-12-10 DIAGNOSIS — C22.8 PRIMARY MALIGNANT NEOPLASM OF LIVER (H): Primary | ICD-10-CM

## 2019-12-10 PROCEDURE — G0463 HOSPITAL OUTPT CLINIC VISIT: HCPCS | Mod: ZF

## 2019-12-10 RX ORDER — ACETAMINOPHEN 500 MG
500-1000 TABLET ORAL EVERY 6 HOURS PRN
COMMUNITY
End: 2020-07-31

## 2019-12-10 ASSESSMENT — PAIN SCALES - GENERAL: PAINLEVEL: SEVERE PAIN (6)

## 2019-12-10 ASSESSMENT — MIFFLIN-ST. JEOR: SCORE: 1686.87

## 2019-12-10 NOTE — NURSING NOTE
"Oncology Rooming Note    December 10, 2019 12:06 PM   John Mancia is a 69 year old male who presents for:    Chief Complaint   Patient presents with     Oncology Clinic Visit     [facility]     Initial Vitals: /68 (BP Location: Right arm, Patient Position: Chair, Cuff Size: Adult Large)   Pulse 62   Temp 98.2  F (36.8  C)   Resp 16   Ht 1.626 m (5' 4.02\")   Wt 100.6 kg (221 lb 11.2 oz)   SpO2 97%   BMI 38.04 kg/m   Estimated body mass index is 38.04 kg/m  as calculated from the following:    Height as of this encounter: 1.626 m (5' 4.02\").    Weight as of this encounter: 100.6 kg (221 lb 11.2 oz). Body surface area is 2.13 meters squared.  Severe Pain (6) Comment: Data Unavailable   No LMP for male patient.  Allergies reviewed: Yes  Medications reviewed: Yes    Medications: Medication refills not needed today.  Pharmacy name entered into EPIC:    [facility] - [place], [place] - [address] [id number] - [facility] [address]    Clinical concerns: 7/10 back of head pain, legs go numb and hurt that is why he is using a walker patient stated. Has been taking tylenol daily for pain along with oxycodone, which hurts his stomach. Pasquale was notified.      Arvind Lorenzo LPN            "

## 2019-12-10 NOTE — PROGRESS NOTES
Interventional Radiology Clinic Visit    Date of this visit: 12/10/2019    John Mancia presents for consultation for follow-up 1 month status post Y 90 segment 5, consult for TACE of segment 4 lesion.    Referring Physician: Dr. Munoz       History Of Present Illness:    John Mancia is a 69 year old male with a history of cirrhosis secondary to chronic hepatitis C complicated by portal hypertension with history of esophageal varices status post endoscopic ligation and hepatocellular carcinoma diagnosed October 2018. Patient presented with a large segment 5/8 infiltrative mass involving the gallbladder with additional 2.5 cm anterior segment 4 lesion with associated right anterior portal and right anterior hepatic venous thrombus.  Status post CT-guided right lobe liver radiofrequency ablation 8/14/2019, likely to the lateral segment 5 given the wedge-shaped area of hyperperfusion on follow-up MRI 10/8/2019 within this region.  Patient was deemed nonresectable and started on sorafenib.    Patient evaluated for local regional liver directed therapy 11/5/2019 and subsequently received Y 90 radioembolization of hepatic segment 5 via  the replaced right hepatic artery originating from the superior mesenteric artery.  The anterior segment 4 lesion was noted to receive incomplete blood supply via the right replaced right hepatic artery at the time of the procedure.  Plan for the patient to return for TACE of the segment 4 lesion.    Patient has been doing well since the past scribes minimal pain associated with the Y 90 procedure.  Patient describes greater amount of pain from previous ablation.  No significant lower extremity edema.  Describes some gaseous distention of the stomach.  Has been seen by primary care for intermittent headaches.    Review of Systems:    10 Point ROS is negative other than described above.    Past Medical/Surgical History:    Past Medical History:   Diagnosis Date     Allergic  rhinitis      Cancer (H) 2019    History of Colon CA, HCC with mets to gallbladder     Congestive heart failure (H)      Depressive disorder      Diabetes (H)     TYPE II     Hypertension      Multiple food allergies     see epic     Sleep apnea     uses CPAP     Past Surgical History:   Procedure Laterality Date     HERNIA REPAIR       IR SIRT (SELECTIVE INTERNAL RADIO THERAPY)  11/6/2019     IR VISCERAL ANGIOGRAM  11/6/2019     IR VISCERAL EMBOLIZATION  11/7/2019     VASCULAR SURGERY      liver ablation in [place]     VASECTOMY         Current Medications:    Current Outpatient Medications   Medication Sig Dispense Refill     acetaminophen (TYLENOL) 500 MG tablet Take 500-1,000 mg by mouth every 6 hours as needed for mild pain       Artificial Saliva (MOUTH KOTE) SOLN Take 3-5 sprays by mouth See Admin Instructions 3-5 times daily as needed       artificial tears OINT ophthalmic ointment Place into both eyes 2 times daily as needed for dry eyes Also called SYSTANE       Brimonidine Tartrate 0.025 % SOLN Place into both eyes 3 times daily        buPROPion (WELLBUTRIN) 100 MG tablet TAKE ONE TABLET BY MOUTH TWICE A DAY IN THE MORNING AND AT NOON FOR MOOD       carvedilol (COREG) 3.125 MG tablet TAKE ONE TABLET BY MOUTH TWICE A DAY WITH MEALS (AVOID GRAPEFRUIT ENTIRELY) TAKE WITH FOOD FOR CONGESTIVE HEART FAILURE       ESCITALOPRAM OXALATE PO Take 100 mg by mouth daily        furosemide (LASIX) 20 MG tablet Take 20 mg by mouth 2 times daily        hydrOXYzine (ATARAX) 25 MG tablet TAKE ONE TABLET BY MOUTH AT BEDTIME FOR SLEEP       insulin glargine-lixisenatide (SOLIQUA 100/33) pen INJECT 10 UNITS SUBCUTANEOUS DAILY FOR DIABETES       lactulose (CHRONULAC) 10 GM/15ML solution Take 10 g by mouth 2 times daily       Lancet Devices (LANCET DEVICE WITH EJECTOR) MISC USE LANCET TOPICALLY FOUR TIMES A DAY AS NEEDED TO CHECK GLUCOSE       LANsoprazole (PREVACID) 30 MG DR capsule Take 15 mg by mouth 2 times daily   11      latanoprost (XALATAN) 0.005 % ophthalmic solution Place 1 drop into both eyes At Bedtime        lisinopril (PRINIVIL/ZESTRIL) 20 MG tablet Take 20 mg by mouth daily        loperamide (IMODIUM) 2 MG capsule Take 2 mg by mouth 4 times daily as needed for diarrhea       oxyCODONE (ROXICODONE) 5 MG tablet Take 1-2 tablets (5-10 mg) by mouth every 6 hours as needed for moderate to severe pain 10 tablet 0     prochlorperazine (COMPAZINE) 10 MG tablet Take 10 mg by mouth every 6 hours as needed for nausea or vomiting       Propylene Glycol-Glycerin 0.6-0.6 % SOLN Apply 1 drop to eye       SORAfenib (NEXAVAR) 200 MG tablet CHEMO Take 200 mg by mouth 2 times daily        terazosin (HYTRIN) 5 MG capsule Take 5 mg by mouth 2 times daily        VOLTAREN 1 % topical gel Apply topically 4 times daily Uses on joints       cetirizine (ZYRTEC) 10 MG tablet Take 10 mg by mouth daily        HYDROmorphone (DILAUDID) 2 MG tablet        INSULIN GLARGINE-LIXISENATIDE SC 11/6/19--not taking       methylPREDNISolone (MEDROL DOSEPAK) 4 MG tablet therapy pack Take as directed on package. (Patient not taking: Reported on 12/10/2019) 21 tablet 0     prochlorperazine (COMPAZINE) 10 MG tablet Take 1 tablet (10 mg) by mouth every 8 hours as needed for nausea or vomiting (Patient not taking: Reported on 12/10/2019) 30 tablet 0     VICTOZA PEN 18 MG/3ML soln Inject 1.8 mg Subcutaneous daily          Allergies:    Penicillins; Metformin; Adhesive tape; Allyl isothiocyanate; Boon oil; Banana; Brassica oleracea italica; Cabbage; Celery oil; Cheese; Chicken-derived products (egg); Corticosteroids; Cranberry extract; Food; Hmg-coa-r inhibitors; Interferon melvi; Lac bovis; Lactuca virosa; Latex; Melon; Mometasone; Oatmeal; Omeprazole; Ondansetron; Onion; Peginterferon melvi-2b; Pineapple; Piper; Primidone; Proton pump inhibitors; Ribavirin; Rosuvastatin; Sorafenib; Spironolactone; Wheat bran; and Yeast    Family History:    No family history on  "file.    Social History    Social History     Socioeconomic History     Marital status: Single     Spouse name: Not on file     Number of children: Not on file     Years of education: Not on file     Highest education level: Not on file   Occupational History     Not on file   Social Needs     Financial resource strain: Not on file     Food insecurity:     Worry: Not on file     Inability: Not on file     Transportation needs:     Medical: Not on file     Non-medical: Not on file   Tobacco Use     Smoking status: Passive Smoke Exposure - Never Smoker     Smokeless tobacco: Never Used   Substance and Sexual Activity     Alcohol use: Not Currently     Drug use: Yes     Types: Hydromorphone     Comment: Dilaudid po as needed     Sexual activity: Not Currently   Lifestyle     Physical activity:     Days per week: Not on file     Minutes per session: Not on file     Stress: Not on file   Relationships     Social connections:     Talks on phone: Not on file     Gets together: Not on file     Attends Judaism service: Not on file     Active member of club or organization: Not on file     Attends meetings of clubs or organizations: Not on file     Relationship status: Not on file     Intimate partner violence:     Fear of current or ex partner: Not on file     Emotionally abused: Not on file     Physically abused: Not on file     Forced sexual activity: Not on file   Other Topics Concern     Parent/sibling w/ CABG, MI or angioplasty before 65F 55M? Not Asked   Social History Narrative     Not on file       Physical Examination:   VITALS:   /68 (BP Location: Right arm, Patient Position: Chair, Cuff Size: Adult Large)   Pulse 62   Temp 98.2  F (36.8  C)   Resp 16   Ht 5' 4.02\"   Wt 221 lb 11.2 oz   SpO2 97%   BMI 38.04 kg/m    GENERAL APPEARANCE: healthy, alert and no distress  PSYCHIATRIC: mentation appears normal and affect normal.  EYES: No jaundice.  SKIN: No jaundice.   RESP: lungs clear to auscultation - " no rales, rhonchi or wheezes  CARDIOVASCULAR: regular rates and rhythm, normal S1 S2, no S3 or S4 and no murmur  ABDOMEN:  soft, nontender, no masses and bowel sounds normal.  MUSCULOSKELETAL: No edema in the lower extremities.      Laboratory Studies:    Lab Results   Component Value Date    HGB 13.3 12/09/2019     Lab Results   Component Value Date    PLT 91 12/09/2019     Lab Results   Component Value Date    WBC 3.1 12/09/2019       Lab Results   Component Value Date    INR 1.14 12/09/2019         Lab Results   Component Value Date    CR 0.80 12/09/2019     Lab Results   Component Value Date    BUN 11 12/09/2019       Alpha Fetoprotein   Date Value Ref Range Status   12/09/2019 20.6 (H) 0 - 8 ug/L Final     Comment:     Assay Method:  Chemiluminescence using Siemens Centaur XP       Imaging:     I reviewed the MRI from 12/9/2019.  Posttreatment changes of radioembolization with 5 with heterogeneous arterial enhancement, overall decrease in tumor bulk, most notably in tumoral involvement of the gallbladder.  Mild interval decreased size of the segment 4 partially exophytic OPTN 5B lesion with areas of decreased perfusion likely representing areas which were supplied by branches of the replaced right hepatic artery.  Significant residual enhancing tumor which is likely supplied by branches of the left hepatic artery.    ASSESSMENT/PLAN:        Mr Mancia is a 68 yo M with a history of cirrhosis secondary to chronic hepatitis C complicated by portal hypertension, esophageal varices status post endoscopic ligation and hepatocellular carcinoma with invasive/infiltrate segment 5 lesion involving the gallbladder with associated right hepatic and right portal vein thrombus; now s/p Y 90 segment 5 lesion via the replaced right hepatic artery from the SMA.  Posttreatment 1 month MRI is reassuring with decreased tumor bulk suggestive of treatment response, however will need to continue to follow on 3-month MRI, have and  increased concern for residual disease given the infiltrative and invasive nature of the mass.   Segment 4 lesions appears partially treated and decreased in size and partial necrosis secondary to partial supply via the replaced right hepatic artery. No significant decrease in liver function or functional status post Y90.    - We will plan for visceral/hepatic angiogram with cTACE of the residual segment 4 lesion via the left hepatic artery.    -  Follow up at 3 mo s/p Y90, TACE follow up can be done at the same time, w/ MRI ABDOMEN W/    Child-Preciado score: A  Native MELD score: 8  ECOG performance status: 1    I believe the patient is a candidate for a transarterial chemoembolization procedure.     I showed Mr. Mancia the imaging and discussed the findings. I discussed with Mr. Mancia that the goal of the procedure is disease control with a survival benefit rather than a cure given the nature of the disease, but that sometimes lesions will be cured in this manner. Alternatives were reviewed, including surgery, but the patient is not a surgical candidate.    I explained that the chemoembolization is performed under conscious sedation. We discussed what will happen before, during and after the procedure; what to expect in the post procedure recovery period; and what the follow-up will be. We discussed the risks of the procedure which include, but are not limited to, vascular injury causing bleeding or occlusion of blood vessels, groin hematoma, infection, liver failure, and non-target embolization. I explained that sometimes more than one treatment session is needed to gain control of the tumors, particularly with larger tumors. Risks are increased the greater the deviation from normal lab values, especially albumin and total bilirubin, and also the larger the area we must treat. We also discussed the post-embolization syndrome which is likely and consists of varying degrees of pain, nausea/vomiting, malaise, fever,  and elevated white blood cell counts for up to 2 weeks following the procedure. The patient also understands that new tumors may develop elsewhere given the nature of the disease. Many patients go home the next day, but occasionally circumstances are such that a longer admission may be required. I also informed the patient that I will be assisted during the procedure by a fellow and/or resident, and that sometimes a medical student may observe. All of the patient's questions were answered and the patient agreed to proceed.     A total of 45 minutes was spent in care for the patient, of which >50% was spent in counseling and co-ordination of care.    It was a pleasure seeing the patient.     Signed,    Faisal Gallegos MD  Interventional Radiology Fellow  IR pass pager: [phone]  Personal pager: [phone]    I have seen the patient with the fellow Dr. Gallegos and agree with the note.        CC  Patient Care Team:  Vinay Martino MD as PCP - General  SELF, REFERRED

## 2019-12-10 NOTE — LETTER
12/10/2019       RE: John Mancia  [address] [place] [address]     Dear Colleague,    Thank you for referring your patient, John Mancia, to the [facility]. Please see a copy of my visit note below.    Interventional Radiology Clinic Visit    Date of this visit: 12/10/2019    John Mancia presents for consultation for follow-up 1 month status post Y 90 segment 5, consult for TACE of segment 4 lesion.    Referring Physician: Dr. Munoz       History Of Present Illness:    John Mancia is a 69 year old male with a history of cirrhosis secondary to chronic hepatitis C complicated by portal hypertension with history of esophageal varices status post endoscopic ligation and hepatocellular carcinoma diagnosed October 2018. Patient presented with a large segment 5/8 infiltrative mass involving the gallbladder with additional 2.5 cm anterior segment 4 lesion with associated right anterior portal and right anterior hepatic venous thrombus.  Status post CT-guided right lobe liver radiofrequency ablation 8/14/2019, likely to the lateral segment 5 given the wedge-shaped area of hyperperfusion on follow-up MRI 10/8/2019 within this region.  Patient was deemed nonresectable and started on sorafenib.    Patient evaluated for local regional liver directed therapy 11/5/2019 and subsequently received Y 90 radioembolization of hepatic segment 5 via  the replaced right hepatic artery originating from the superior mesenteric artery.  The anterior segment 4 lesion was noted to receive incomplete blood supply via the right replaced right hepatic artery at the time of the procedure.  Plan for the patient to return for TACE of the segment 4 lesion.    Patient has been doing well since the past scribes minimal pain associated with the Y 90 procedure.  Patient describes greater amount of pain from previous ablation.  No significant lower extremity edema.  Describes some gaseous distention of the  stomach.  Has been seen by primary care for intermittent headaches.    Review of Systems:    10 Point ROS is negative other than described above.    Past Medical/Surgical History:    Past Medical History:   Diagnosis Date     Allergic rhinitis      Cancer (H) 2019    History of Colon CA, HCC with mets to gallbladder     Congestive heart failure (H)      Depressive disorder      Diabetes (H)     TYPE II     Hypertension      Multiple food allergies     see epic     Sleep apnea     uses CPAP     Past Surgical History:   Procedure Laterality Date     HERNIA REPAIR       IR SIRT (SELECTIVE INTERNAL RADIO THERAPY)  11/6/2019     IR VISCERAL ANGIOGRAM  11/6/2019     IR VISCERAL EMBOLIZATION  11/7/2019     VASCULAR SURGERY      liver ablation in [place]     VASECTOMY         Current Medications:    Current Outpatient Medications   Medication Sig Dispense Refill     acetaminophen (TYLENOL) 500 MG tablet Take 500-1,000 mg by mouth every 6 hours as needed for mild pain       Artificial Saliva (MOUTH KOTE) SOLN Take 3-5 sprays by mouth See Admin Instructions 3-5 times daily as needed       artificial tears OINT ophthalmic ointment Place into both eyes 2 times daily as needed for dry eyes Also called SYSTANE       Brimonidine Tartrate 0.025 % SOLN Place into both eyes 3 times daily        buPROPion (WELLBUTRIN) 100 MG tablet TAKE ONE TABLET BY MOUTH TWICE A DAY IN THE MORNING AND AT NOON FOR MOOD       carvedilol (COREG) 3.125 MG tablet TAKE ONE TABLET BY MOUTH TWICE A DAY WITH MEALS (AVOID GRAPEFRUIT ENTIRELY) TAKE WITH FOOD FOR CONGESTIVE HEART FAILURE       ESCITALOPRAM OXALATE PO Take 100 mg by mouth daily        furosemide (LASIX) 20 MG tablet Take 20 mg by mouth 2 times daily        hydrOXYzine (ATARAX) 25 MG tablet TAKE ONE TABLET BY MOUTH AT BEDTIME FOR SLEEP       insulin glargine-lixisenatide (SOLIQUA 100/33) pen INJECT 10 UNITS SUBCUTANEOUS DAILY FOR DIABETES       lactulose (CHRONULAC) 10 GM/15ML solution Take 10 g  by mouth 2 times daily       Lancet Devices (LANCET DEVICE WITH EJECTOR) MISC USE LANCET TOPICALLY FOUR TIMES A DAY AS NEEDED TO CHECK GLUCOSE       LANsoprazole (PREVACID) 30 MG DR capsule Take 15 mg by mouth 2 times daily   11     latanoprost (XALATAN) 0.005 % ophthalmic solution Place 1 drop into both eyes At Bedtime        lisinopril (PRINIVIL/ZESTRIL) 20 MG tablet Take 20 mg by mouth daily        loperamide (IMODIUM) 2 MG capsule Take 2 mg by mouth 4 times daily as needed for diarrhea       oxyCODONE (ROXICODONE) 5 MG tablet Take 1-2 tablets (5-10 mg) by mouth every 6 hours as needed for moderate to severe pain 10 tablet 0     prochlorperazine (COMPAZINE) 10 MG tablet Take 10 mg by mouth every 6 hours as needed for nausea or vomiting       Propylene Glycol-Glycerin 0.6-0.6 % SOLN Apply 1 drop to eye       SORAfenib (NEXAVAR) 200 MG tablet CHEMO Take 200 mg by mouth 2 times daily        terazosin (HYTRIN) 5 MG capsule Take 5 mg by mouth 2 times daily        VOLTAREN 1 % topical gel Apply topically 4 times daily Uses on joints       cetirizine (ZYRTEC) 10 MG tablet Take 10 mg by mouth daily        HYDROmorphone (DILAUDID) 2 MG tablet        INSULIN GLARGINE-LIXISENATIDE SC 11/6/19--not taking       methylPREDNISolone (MEDROL DOSEPAK) 4 MG tablet therapy pack Take as directed on package. (Patient not taking: Reported on 12/10/2019) 21 tablet 0     prochlorperazine (COMPAZINE) 10 MG tablet Take 1 tablet (10 mg) by mouth every 8 hours as needed for nausea or vomiting (Patient not taking: Reported on 12/10/2019) 30 tablet 0     VICTOZA PEN 18 MG/3ML soln Inject 1.8 mg Subcutaneous daily          Allergies:    Penicillins; Metformin; Adhesive tape; Allyl isothiocyanate; Marsing oil; Banana; Brassica oleracea italica; Cabbage; Celery oil; Cheese; Chicken-derived products (egg); Corticosteroids; Cranberry extract; Food; Hmg-coa-r inhibitors; Interferon melvi; Lac bovis; Lactuca virosa; Latex; Melon; Mometasone; Oatmeal;  "Omeprazole; Ondansetron; Onion; Peginterferon melvi-2b; Pineapple; Piper; Primidone; Proton pump inhibitors; Ribavirin; Rosuvastatin; Sorafenib; Spironolactone; Wheat bran; and Yeast    Family History:    No family history on file.    Social History    Social History     Socioeconomic History     Marital status: Single     Spouse name: Not on file     Number of children: Not on file     Years of education: Not on file     Highest education level: Not on file   Occupational History     Not on file   Social Needs     Financial resource strain: Not on file     Food insecurity:     Worry: Not on file     Inability: Not on file     Transportation needs:     Medical: Not on file     Non-medical: Not on file   Tobacco Use     Smoking status: Passive Smoke Exposure - Never Smoker     Smokeless tobacco: Never Used   Substance and Sexual Activity     Alcohol use: Not Currently     Drug use: Yes     Types: Hydromorphone     Comment: Dilaudid po as needed     Sexual activity: Not Currently   Lifestyle     Physical activity:     Days per week: Not on file     Minutes per session: Not on file     Stress: Not on file   Relationships     Social connections:     Talks on phone: Not on file     Gets together: Not on file     Attends Islam service: Not on file     Active member of club or organization: Not on file     Attends meetings of clubs or organizations: Not on file     Relationship status: Not on file     Intimate partner violence:     Fear of current or ex partner: Not on file     Emotionally abused: Not on file     Physically abused: Not on file     Forced sexual activity: Not on file   Other Topics Concern     Parent/sibling w/ CABG, MI or angioplasty before 65F 55M? Not Asked   Social History Narrative     Not on file       Physical Examination:   VITALS:   /68 (BP Location: Right arm, Patient Position: Chair, Cuff Size: Adult Large)   Pulse 62   Temp 98.2  F (36.8  C)   Resp 16   Ht 5' 4.02\"   Wt 221 lb 11.2 " oz   SpO2 97%   BMI 38.04 kg/m     GENERAL APPEARANCE: healthy, alert and no distress  PSYCHIATRIC: mentation appears normal and affect normal.  EYES: No jaundice.  SKIN: No jaundice.   RESP: lungs clear to auscultation - no rales, rhonchi or wheezes  CARDIOVASCULAR: regular rates and rhythm, normal S1 S2, no S3 or S4 and no murmur  ABDOMEN:  soft, nontender, no masses and bowel sounds normal.  MUSCULOSKELETAL: No edema in the lower extremities.      Laboratory Studies:    Lab Results   Component Value Date    HGB 13.3 12/09/2019     Lab Results   Component Value Date    PLT 91 12/09/2019     Lab Results   Component Value Date    WBC 3.1 12/09/2019       Lab Results   Component Value Date    INR 1.14 12/09/2019         Lab Results   Component Value Date    CR 0.80 12/09/2019     Lab Results   Component Value Date    BUN 11 12/09/2019       Alpha Fetoprotein   Date Value Ref Range Status   12/09/2019 20.6 (H) 0 - 8 ug/L Final     Comment:     Assay Method:  Chemiluminescence using Siemens Centaur XP       Imaging:     I reviewed the MRI from 12/9/2019.  Posttreatment changes of radioembolization with 5 with heterogeneous arterial enhancement, overall decrease in tumor bulk, most notably in tumoral involvement of the gallbladder.  Mild interval decreased size of the segment 4 partially exophytic OPTN 5B lesion with areas of decreased perfusion likely representing areas which were supplied by branches of the replaced right hepatic artery.  Significant residual enhancing tumor which is likely supplied by branches of the left hepatic artery.    ASSESSMENT/PLAN:        Mr Mancia is a 70 yo M with a history of cirrhosis secondary to chronic hepatitis C complicated by portal hypertension, esophageal varices status post endoscopic ligation and hepatocellular carcinoma with invasive/infiltrate segment 5 lesion involving the gallbladder with associated right hepatic and right portal vein thrombus; now s/p Y 90 segment 5  lesion via the replaced right hepatic artery from the SMA.  Posttreatment 1 month MRI is reassuring with decreased tumor bulk suggestive of treatment response, however will need to continue to follow on 3-month MRI, have and increased concern for residual disease given the infiltrative and invasive nature of the mass.   Segment 4 lesions appears partially treated and decreased in size and partial necrosis secondary to partial supply via the replaced right hepatic artery. No significant decrease in liver function or functional status post Y90.    - We will plan for visceral/hepatic angiogram with cTACE of the residual segment 4 lesion via the left hepatic artery.    -  Follow up at 3 mo s/p Y90, TACE follow up can be done at the same time, w/ MRI ABDOMEN W/    Child-Preciado score: A  Native MELD score: 8  ECOG performance status: 1    I believe the patient is a candidate for a transarterial chemoembolization procedure.     I showed Mr. Mancia the imaging and discussed the findings. I discussed with Mr. Mancia that the goal of the procedure is disease control with a survival benefit rather than a cure given the nature of the disease, but that sometimes lesions will be cured in this manner. Alternatives were reviewed, including surgery, but the patient is not a surgical candidate.    I explained that the chemoembolization is performed under conscious sedation. We discussed what will happen before, during and after the procedure; what to expect in the post procedure recovery period; and what the follow-up will be. We discussed the risks of the procedure which include, but are not limited to, vascular injury causing bleeding or occlusion of blood vessels, groin hematoma, infection, liver failure, and non-target embolization. I explained that sometimes more than one treatment session is needed to gain control of the tumors, particularly with larger tumors. Risks are increased the greater the deviation from normal lab values,  especially albumin and total bilirubin, and also the larger the area we must treat. We also discussed the post-embolization syndrome which is likely and consists of varying degrees of pain, nausea/vomiting, malaise, fever, and elevated white blood cell counts for up to 2 weeks following the procedure. The patient also understands that new tumors may develop elsewhere given the nature of the disease. Many patients go home the next day, but occasionally circumstances are such that a longer admission may be required. I also informed the patient that I will be assisted during the procedure by a fellow and/or resident, and that sometimes a medical student may observe. All of the patient's questions were answered and the patient agreed to proceed.     A total of 45 minutes was spent in care for the patient, of which >50% was spent in counseling and co-ordination of care.    It was a pleasure seeing the patient.     Signed,    Faisal Gallegos MD  Interventional Radiology Fellow  IR pass pager: [phone]  Personal pager: [phone]    I have seen the patient with the fellow Dr. Gallegos and agree with the note.        CC  Patient Care Team:  Vinay Martino MD as PCP - General  SELF, REFERRED    Again, thank you for allowing me to participate in the care of your patient.      Sincerely,    Charo Giordano MD

## 2019-12-11 ENCOUNTER — APPOINTMENT (OUTPATIENT)
Dept: MEDSURG UNIT | Facility: CLINIC | Age: 69
End: 2019-12-11
Attending: RADIOLOGY
Payer: MEDICARE

## 2019-12-11 ENCOUNTER — HOSPITAL ENCOUNTER (OUTPATIENT)
Facility: CLINIC | Age: 69
Setting detail: OBSERVATION
Discharge: HOME | End: 2019-12-12
Attending: RADIOLOGY | Admitting: RADIOLOGY
Payer: MEDICARE

## 2019-12-11 ENCOUNTER — APPOINTMENT (OUTPATIENT)
Dept: INTERVENTIONAL RADIOLOGY/VASCULAR | Facility: CLINIC | Age: 69
End: 2019-12-11
Attending: RADIOLOGY
Payer: MEDICARE

## 2019-12-11 DIAGNOSIS — C22.0 HEPATOCELLULAR CARCINOMA (H): ICD-10-CM

## 2019-12-11 LAB
ALBUMIN SERPL-MCNC: 3.7 G/DL (ref 3.4–5)
ALP SERPL-CCNC: 125 U/L (ref 40–150)
ALT SERPL W P-5'-P-CCNC: 40 U/L (ref 0–70)
ANION GAP SERPL CALCULATED.3IONS-SCNC: 4 MMOL/L (ref 3–14)
AST SERPL W P-5'-P-CCNC: 29 U/L (ref 0–45)
BILIRUB DIRECT SERPL-MCNC: 0.2 MG/DL (ref 0–0.2)
BILIRUB SERPL-MCNC: 0.6 MG/DL (ref 0.2–1.3)
BUN SERPL-MCNC: 13 MG/DL (ref 7–30)
CALCIUM SERPL-MCNC: 8.7 MG/DL (ref 8.5–10.1)
CHLORIDE SERPL-SCNC: 108 MMOL/L (ref 94–109)
CO2 SERPL-SCNC: 29 MMOL/L (ref 20–32)
CREAT SERPL-MCNC: 0.76 MG/DL (ref 0.66–1.25)
ERYTHROCYTE [DISTWIDTH] IN BLOOD BY AUTOMATED COUNT: 15.2 % (ref 10–15)
GFR SERPL CREATININE-BSD FRML MDRD: >90 ML/MIN/{1.73_M2}
GLUCOSE BLDC GLUCOMTR-MCNC: 173 MG/DL (ref 70–99)
GLUCOSE BLDC GLUCOMTR-MCNC: 199 MG/DL (ref 70–99)
GLUCOSE BLDC GLUCOMTR-MCNC: 237 MG/DL (ref 70–99)
GLUCOSE SERPL-MCNC: 174 MG/DL (ref 70–99)
HBA1C MFR BLD: 7.7 % (ref 0–5.6)
HCT VFR BLD AUTO: 41.5 % (ref 40–53)
HGB BLD-MCNC: 13.3 G/DL (ref 13.3–17.7)
INR PPP: 1.08 (ref 0.86–1.14)
MCH RBC QN AUTO: 29.8 PG (ref 26.5–33)
MCHC RBC AUTO-ENTMCNC: 32 G/DL (ref 31.5–36.5)
MCV RBC AUTO: 93 FL (ref 78–100)
PLATELET # BLD AUTO: 87 10E9/L (ref 150–450)
POTASSIUM SERPL-SCNC: 4.1 MMOL/L (ref 3.4–5.3)
PROT SERPL-MCNC: 6.9 G/DL (ref 6.8–8.8)
RBC # BLD AUTO: 4.46 10E12/L (ref 4.4–5.9)
SODIUM SERPL-SCNC: 141 MMOL/L (ref 133–144)
WBC # BLD AUTO: 3.1 10E9/L (ref 4–11)

## 2019-12-11 PROCEDURE — 25800030 ZZH RX IP 258 OP 636: Performed by: STUDENT IN AN ORGANIZED HEALTH CARE EDUCATION/TRAINING PROGRAM

## 2019-12-11 PROCEDURE — 25000128 H RX IP 250 OP 636: Performed by: PHYSICIAN ASSISTANT

## 2019-12-11 PROCEDURE — 27210908 ZZH NEEDLE CR4

## 2019-12-11 PROCEDURE — 93005 ELECTROCARDIOGRAM TRACING: CPT

## 2019-12-11 PROCEDURE — 25000128 H RX IP 250 OP 636: Performed by: STUDENT IN AN ORGANIZED HEALTH CARE EDUCATION/TRAINING PROGRAM

## 2019-12-11 PROCEDURE — G0378 HOSPITAL OBSERVATION PER HR: HCPCS

## 2019-12-11 PROCEDURE — C1769 GUIDE WIRE: HCPCS

## 2019-12-11 PROCEDURE — 74150 CT ABDOMEN W/O CONTRAST: CPT

## 2019-12-11 PROCEDURE — 85610 PROTHROMBIN TIME: CPT | Performed by: RADIOLOGY

## 2019-12-11 PROCEDURE — 00000146 ZZHCL STATISTIC GLUCOSE BY METER IP

## 2019-12-11 PROCEDURE — 25000125 ZZHC RX 250: Performed by: RADIOLOGY

## 2019-12-11 PROCEDURE — 27210804 ZZH SHEATH CR3

## 2019-12-11 PROCEDURE — 85027 COMPLETE CBC AUTOMATED: CPT | Performed by: RADIOLOGY

## 2019-12-11 PROCEDURE — 25000125 ZZHC RX 250: Performed by: PHYSICIAN ASSISTANT

## 2019-12-11 PROCEDURE — C1887 CATHETER, GUIDING: HCPCS

## 2019-12-11 PROCEDURE — 40000172 ZZH STATISTIC PROCEDURE PREP ONLY

## 2019-12-11 PROCEDURE — 96420 CHEMO IA PUSH TECNIQUE: CPT

## 2019-12-11 PROCEDURE — 83036 HEMOGLOBIN GLYCOSYLATED A1C: CPT | Performed by: PHYSICIAN ASSISTANT

## 2019-12-11 PROCEDURE — 96372 THER/PROPH/DIAG INJ SC/IM: CPT

## 2019-12-11 PROCEDURE — 27210889 ZZH ACCESSORY CR8

## 2019-12-11 PROCEDURE — 27210732 ZZH ACCESSORY CR1

## 2019-12-11 PROCEDURE — 25500064 ZZH RX 255 OP 636: Performed by: RADIOLOGY

## 2019-12-11 PROCEDURE — 27210888 ZZH ACCESSORY CR7

## 2019-12-11 PROCEDURE — 27810149 ZZH COIL/EMBOLIC DEVICE CR12

## 2019-12-11 PROCEDURE — 96374 THER/PROPH/DIAG INJ IV PUSH: CPT | Mod: 59

## 2019-12-11 PROCEDURE — 96376 TX/PRO/DX INJ SAME DRUG ADON: CPT | Mod: 59

## 2019-12-11 PROCEDURE — 25000125 ZZHC RX 250: Performed by: STUDENT IN AN ORGANIZED HEALTH CARE EDUCATION/TRAINING PROGRAM

## 2019-12-11 PROCEDURE — 25000128 H RX IP 250 OP 636: Performed by: INTERNAL MEDICINE

## 2019-12-11 PROCEDURE — 27210910 ZZH NEEDLE CR6

## 2019-12-11 PROCEDURE — 80076 HEPATIC FUNCTION PANEL: CPT | Performed by: RADIOLOGY

## 2019-12-11 PROCEDURE — 25000128 H RX IP 250 OP 636: Performed by: RADIOLOGY

## 2019-12-11 PROCEDURE — 27210780 ZZH KIT CR3

## 2019-12-11 PROCEDURE — 99152 MOD SED SAME PHYS/QHP 5/>YRS: CPT

## 2019-12-11 PROCEDURE — 74175 CTA ABDOMEN W/CONTRAST: CPT

## 2019-12-11 PROCEDURE — 25800030 ZZH RX IP 258 OP 636: Performed by: PHYSICIAN ASSISTANT

## 2019-12-11 PROCEDURE — 99153 MOD SED SAME PHYS/QHP EA: CPT

## 2019-12-11 PROCEDURE — 36415 COLL VENOUS BLD VENIPUNCTURE: CPT | Performed by: PHYSICIAN ASSISTANT

## 2019-12-11 PROCEDURE — 80048 BASIC METABOLIC PNL TOTAL CA: CPT | Performed by: RADIOLOGY

## 2019-12-11 PROCEDURE — 25000132 ZZH RX MED GY IP 250 OP 250 PS 637: Performed by: PHYSICIAN ASSISTANT

## 2019-12-11 PROCEDURE — C1760 CLOSURE DEV, VASC: HCPCS

## 2019-12-11 PROCEDURE — 93010 ELECTROCARDIOGRAM REPORT: CPT | Mod: 76 | Performed by: INTERNAL MEDICINE

## 2019-12-11 PROCEDURE — 75726 ARTERY X-RAYS ABDOMEN: CPT | Mod: XU

## 2019-12-11 PROCEDURE — 37243 VASC EMBOLIZE/OCCLUDE ORGAN: CPT

## 2019-12-11 PROCEDURE — 96375 TX/PRO/DX INJ NEW DRUG ADDON: CPT | Mod: 59

## 2019-12-11 PROCEDURE — 36247 INS CATH ABD/L-EXT ART 3RD: CPT

## 2019-12-11 RX ORDER — HEPARIN SOD,PORCINE/0.9 % NACL 5K/1000 ML
1000-10000 INTRAVENOUS SOLUTION INTRAVENOUS
Status: COMPLETED | OUTPATIENT
Start: 2019-12-11 | End: 2019-12-11

## 2019-12-11 RX ORDER — DEXTROSE MONOHYDRATE 25 G/50ML
25-50 INJECTION, SOLUTION INTRAVENOUS
Status: DISCONTINUED | OUTPATIENT
Start: 2019-12-11 | End: 2019-12-12 | Stop reason: HOSPADM

## 2019-12-11 RX ORDER — PROCHLORPERAZINE MALEATE 5 MG
5 TABLET ORAL EVERY 6 HOURS PRN
Status: DISCONTINUED | OUTPATIENT
Start: 2019-12-11 | End: 2019-12-11

## 2019-12-11 RX ORDER — TERAZOSIN 5 MG/1
5 CAPSULE ORAL 2 TIMES DAILY
Status: DISCONTINUED | OUTPATIENT
Start: 2019-12-11 | End: 2019-12-12 | Stop reason: HOSPADM

## 2019-12-11 RX ORDER — ONDANSETRON 2 MG/ML
4 INJECTION INTRAMUSCULAR; INTRAVENOUS ONCE
Status: DISCONTINUED | OUTPATIENT
Start: 2019-12-11 | End: 2019-12-11 | Stop reason: HOSPADM

## 2019-12-11 RX ORDER — ACETAMINOPHEN 500 MG
500-1000 TABLET ORAL EVERY 6 HOURS PRN
Status: DISCONTINUED | OUTPATIENT
Start: 2019-12-11 | End: 2019-12-12 | Stop reason: HOSPADM

## 2019-12-11 RX ORDER — ONDANSETRON 4 MG/1
8 TABLET, ORALLY DISINTEGRATING ORAL EVERY 8 HOURS PRN
Status: DISCONTINUED | OUTPATIENT
Start: 2019-12-11 | End: 2019-12-11

## 2019-12-11 RX ORDER — SODIUM CHLORIDE 9 MG/ML
INJECTION, SOLUTION INTRAVENOUS CONTINUOUS
Status: DISCONTINUED | OUTPATIENT
Start: 2019-12-11 | End: 2019-12-12 | Stop reason: HOSPADM

## 2019-12-11 RX ORDER — OXYCODONE HYDROCHLORIDE 5 MG/1
5 TABLET ORAL EVERY 4 HOURS PRN
Status: DISCONTINUED | OUTPATIENT
Start: 2019-12-11 | End: 2019-12-11

## 2019-12-11 RX ORDER — BUPROPION HYDROCHLORIDE 100 MG/1
100 TABLET, EXTENDED RELEASE ORAL 2 TIMES DAILY
Status: DISCONTINUED | OUTPATIENT
Start: 2019-12-11 | End: 2019-12-12 | Stop reason: HOSPADM

## 2019-12-11 RX ORDER — FUROSEMIDE 20 MG
20 TABLET ORAL 2 TIMES DAILY
Status: DISCONTINUED | OUTPATIENT
Start: 2019-12-11 | End: 2019-12-11

## 2019-12-11 RX ORDER — OXYCODONE HYDROCHLORIDE 5 MG/1
5-10 TABLET ORAL EVERY 4 HOURS PRN
Status: DISCONTINUED | OUTPATIENT
Start: 2019-12-11 | End: 2019-12-11

## 2019-12-11 RX ORDER — FUROSEMIDE 20 MG
20 TABLET ORAL DAILY
Status: DISCONTINUED | OUTPATIENT
Start: 2019-12-12 | End: 2019-12-12 | Stop reason: HOSPADM

## 2019-12-11 RX ORDER — BUPROPION HYDROCHLORIDE 100 MG/1
100 TABLET, EXTENDED RELEASE ORAL EVERY MORNING
COMMUNITY

## 2019-12-11 RX ORDER — SCOLOPAMINE TRANSDERMAL SYSTEM 1 MG/1
1 PATCH, EXTENDED RELEASE TRANSDERMAL ONCE
Status: COMPLETED | OUTPATIENT
Start: 2019-12-11 | End: 2019-12-11

## 2019-12-11 RX ORDER — LATANOPROST 50 UG/ML
1 SOLUTION/ DROPS OPHTHALMIC AT BEDTIME
Status: DISCONTINUED | OUTPATIENT
Start: 2019-12-12 | End: 2019-12-12 | Stop reason: HOSPADM

## 2019-12-11 RX ORDER — DEXTROSE MONOHYDRATE 25 G/50ML
25-50 INJECTION, SOLUTION INTRAVENOUS
Status: DISCONTINUED | OUTPATIENT
Start: 2019-12-11 | End: 2019-12-11

## 2019-12-11 RX ORDER — NICOTINE POLACRILEX 4 MG
15-30 LOZENGE BUCCAL
Status: DISCONTINUED | OUTPATIENT
Start: 2019-12-11 | End: 2019-12-11 | Stop reason: HOSPADM

## 2019-12-11 RX ORDER — ONDANSETRON 2 MG/ML
8 INJECTION INTRAMUSCULAR; INTRAVENOUS EVERY 8 HOURS PRN
Status: DISCONTINUED | OUTPATIENT
Start: 2019-12-11 | End: 2019-12-11

## 2019-12-11 RX ORDER — NALOXONE HYDROCHLORIDE 0.4 MG/ML
.1-.4 INJECTION, SOLUTION INTRAMUSCULAR; INTRAVENOUS; SUBCUTANEOUS
Status: DISCONTINUED | OUTPATIENT
Start: 2019-12-11 | End: 2019-12-12 | Stop reason: HOSPADM

## 2019-12-11 RX ORDER — BRIMONIDINE TARTRATE 2 MG/ML
1 SOLUTION/ DROPS OPHTHALMIC 3 TIMES DAILY
COMMUNITY

## 2019-12-11 RX ORDER — SODIUM CHLORIDE 9 MG/ML
INJECTION, SOLUTION INTRAVENOUS CONTINUOUS
Status: DISCONTINUED | OUTPATIENT
Start: 2019-12-11 | End: 2019-12-11 | Stop reason: HOSPADM

## 2019-12-11 RX ORDER — LISINOPRIL 20 MG/1
20 TABLET ORAL DAILY
Status: DISCONTINUED | OUTPATIENT
Start: 2019-12-12 | End: 2019-12-12 | Stop reason: HOSPADM

## 2019-12-11 RX ORDER — MECOBALAMIN 5000 MCG
15 TABLET,DISINTEGRATING ORAL
Status: DISCONTINUED | OUTPATIENT
Start: 2019-12-11 | End: 2019-12-12 | Stop reason: HOSPADM

## 2019-12-11 RX ORDER — LACTULOSE 10 G/15ML
10 SOLUTION ORAL 2 TIMES DAILY
Status: DISCONTINUED | OUTPATIENT
Start: 2019-12-11 | End: 2019-12-12 | Stop reason: HOSPADM

## 2019-12-11 RX ORDER — SORAFENIB 200 MG/1
200 TABLET, FILM COATED ORAL 2 TIMES DAILY
Status: DISCONTINUED | OUTPATIENT
Start: 2019-12-11 | End: 2019-12-11

## 2019-12-11 RX ORDER — PROCHLORPERAZINE 25 MG
12.5 SUPPOSITORY, RECTAL RECTAL EVERY 12 HOURS PRN
Status: DISCONTINUED | OUTPATIENT
Start: 2019-12-11 | End: 2019-12-11

## 2019-12-11 RX ORDER — ONDANSETRON 8 MG/1
8 TABLET, FILM COATED ORAL EVERY 8 HOURS PRN
Status: DISCONTINUED | OUTPATIENT
Start: 2019-12-11 | End: 2019-12-11

## 2019-12-11 RX ORDER — FLUMAZENIL 0.1 MG/ML
0.2 INJECTION, SOLUTION INTRAVENOUS
Status: DISCONTINUED | OUTPATIENT
Start: 2019-12-11 | End: 2019-12-11 | Stop reason: HOSPADM

## 2019-12-11 RX ORDER — BRIMONIDINE TARTRATE 2 MG/ML
1 SOLUTION/ DROPS OPHTHALMIC 3 TIMES DAILY
Status: DISCONTINUED | OUTPATIENT
Start: 2019-12-12 | End: 2019-12-12 | Stop reason: HOSPADM

## 2019-12-11 RX ORDER — PROCHLORPERAZINE MALEATE 5 MG
5 TABLET ORAL EVERY 6 HOURS PRN
Status: DISCONTINUED | OUTPATIENT
Start: 2019-12-11 | End: 2019-12-12 | Stop reason: HOSPADM

## 2019-12-11 RX ORDER — CLINDAMYCIN PHOSPHATE 900 MG/50ML
900 INJECTION, SOLUTION INTRAVENOUS
Status: COMPLETED | OUTPATIENT
Start: 2019-12-11 | End: 2019-12-11

## 2019-12-11 RX ORDER — PROCHLORPERAZINE MALEATE 10 MG
10 TABLET ORAL EVERY 6 HOURS PRN
Status: DISCONTINUED | OUTPATIENT
Start: 2019-12-11 | End: 2019-12-11

## 2019-12-11 RX ORDER — LIDOCAINE 40 MG/G
CREAM TOPICAL
Status: DISCONTINUED | OUTPATIENT
Start: 2019-12-11 | End: 2019-12-11 | Stop reason: HOSPADM

## 2019-12-11 RX ORDER — HEPARIN SOD,PORCINE/0.9 % NACL 5K/1000 ML
1000-10000 INTRAVENOUS SOLUTION INTRAVENOUS
Status: DISCONTINUED | OUTPATIENT
Start: 2019-12-11 | End: 2019-12-11

## 2019-12-11 RX ORDER — DEXTROSE MONOHYDRATE 25 G/50ML
25-50 INJECTION, SOLUTION INTRAVENOUS
Status: DISCONTINUED | OUTPATIENT
Start: 2019-12-11 | End: 2019-12-11 | Stop reason: HOSPADM

## 2019-12-11 RX ORDER — NALOXONE HYDROCHLORIDE 0.4 MG/ML
.1-.4 INJECTION, SOLUTION INTRAMUSCULAR; INTRAVENOUS; SUBCUTANEOUS
Status: DISCONTINUED | OUTPATIENT
Start: 2019-12-11 | End: 2019-12-11 | Stop reason: HOSPADM

## 2019-12-11 RX ORDER — NICOTINE POLACRILEX 4 MG
15-30 LOZENGE BUCCAL
Status: DISCONTINUED | OUTPATIENT
Start: 2019-12-11 | End: 2019-12-11

## 2019-12-11 RX ORDER — CARBOXYMETHYLCELLULOSE SODIUM 5 MG/ML
1 SOLUTION/ DROPS OPHTHALMIC DAILY
Status: DISCONTINUED | OUTPATIENT
Start: 2019-12-12 | End: 2019-12-12 | Stop reason: HOSPADM

## 2019-12-11 RX ORDER — HYDROMORPHONE HCL/0.9% NACL/PF 0.2MG/0.2
0.2 SYRINGE (ML) INTRAVENOUS
Status: DISCONTINUED | OUTPATIENT
Start: 2019-12-11 | End: 2019-12-12 | Stop reason: HOSPADM

## 2019-12-11 RX ORDER — NICOTINE POLACRILEX 4 MG
15-30 LOZENGE BUCCAL
Status: DISCONTINUED | OUTPATIENT
Start: 2019-12-11 | End: 2019-12-12 | Stop reason: HOSPADM

## 2019-12-11 RX ORDER — HYDRALAZINE HYDROCHLORIDE 20 MG/ML
10 INJECTION INTRAMUSCULAR; INTRAVENOUS EVERY 6 HOURS PRN
Status: DISCONTINUED | OUTPATIENT
Start: 2019-12-11 | End: 2019-12-12 | Stop reason: HOSPADM

## 2019-12-11 RX ORDER — HYDROMORPHONE HYDROCHLORIDE 2 MG/1
2 TABLET ORAL
Status: DISCONTINUED | OUTPATIENT
Start: 2019-12-11 | End: 2019-12-12 | Stop reason: HOSPADM

## 2019-12-11 RX ORDER — IODIXANOL 320 MG/ML
150 INJECTION, SOLUTION INTRAVASCULAR ONCE
Status: COMPLETED | OUTPATIENT
Start: 2019-12-11 | End: 2019-12-11

## 2019-12-11 RX ORDER — FENTANYL CITRATE 50 UG/ML
25-50 INJECTION, SOLUTION INTRAMUSCULAR; INTRAVENOUS EVERY 5 MIN PRN
Status: DISCONTINUED | OUTPATIENT
Start: 2019-12-11 | End: 2019-12-11 | Stop reason: HOSPADM

## 2019-12-11 RX ADMIN — SCOPALAMINE 1 PATCH: 1 PATCH, EXTENDED RELEASE TRANSDERMAL at 09:45

## 2019-12-11 RX ADMIN — LIDOCAINE HYDROCHLORIDE 10 ML: 10 INJECTION, SOLUTION EPIDURAL; INFILTRATION; INTRACAUDAL; PERINEURAL at 14:25

## 2019-12-11 RX ADMIN — HYDROCORTISONE SODIUM SUCCINATE 100 MG: 100 INJECTION, POWDER, FOR SOLUTION INTRAMUSCULAR; INTRAVENOUS at 10:30

## 2019-12-11 RX ADMIN — FENTANYL CITRATE 50 MCG: 50 INJECTION, SOLUTION INTRAMUSCULAR; INTRAVENOUS at 12:53

## 2019-12-11 RX ADMIN — Medication 0.2 MG: at 15:56

## 2019-12-11 RX ADMIN — IODIXANOL 50 ML: 320 INJECTION, SOLUTION INTRAVASCULAR at 14:36

## 2019-12-11 RX ADMIN — SODIUM CHLORIDE: 9 INJECTION, SOLUTION INTRAVENOUS at 17:52

## 2019-12-11 RX ADMIN — SODIUM CHLORIDE 5000 UNITS: 9 INJECTION, SOLUTION INTRAVENOUS at 12:55

## 2019-12-11 RX ADMIN — CLINDAMYCIN PHOSPHATE 900 MG: 900 INJECTION, SOLUTION INTRAVENOUS at 10:49

## 2019-12-11 RX ADMIN — Medication 0.2 MG: at 17:56

## 2019-12-11 RX ADMIN — SODIUM CHLORIDE: 9 INJECTION, SOLUTION INTRAVENOUS at 10:52

## 2019-12-11 RX ADMIN — MIDAZOLAM 1 MG: 1 INJECTION INTRAMUSCULAR; INTRAVENOUS at 12:25

## 2019-12-11 RX ADMIN — FENTANYL CITRATE 100 MCG: 50 INJECTION, SOLUTION INTRAMUSCULAR; INTRAVENOUS at 14:25

## 2019-12-11 RX ADMIN — MITOMYCIN: 5 INJECTION, POWDER, LYOPHILIZED, FOR SOLUTION INTRAVENOUS at 12:12

## 2019-12-11 RX ADMIN — TERAZOSIN HYDROCHLORIDE 5 MG: 5 CAPSULE ORAL at 19:13

## 2019-12-11 RX ADMIN — BUPROPION HYDROCHLORIDE 100 MG: 100 TABLET, FILM COATED, EXTENDED RELEASE ORAL at 16:26

## 2019-12-11 RX ADMIN — SODIUM CHLORIDE: 9 INJECTION, SOLUTION INTRAVENOUS at 22:06

## 2019-12-11 RX ADMIN — MIDAZOLAM 1 MG: 1 INJECTION INTRAMUSCULAR; INTRAVENOUS at 12:52

## 2019-12-11 RX ADMIN — PROCHLORPERAZINE EDISYLATE 5 MG: 5 INJECTION INTRAMUSCULAR; INTRAVENOUS at 15:57

## 2019-12-11 RX ADMIN — LANSOPRAZOLE 15 MG: 15 CAPSULE, DELAYED RELEASE ORAL at 17:52

## 2019-12-11 RX ADMIN — LACTULOSE 10 G: 20 SOLUTION ORAL at 19:13

## 2019-12-11 RX ADMIN — MIDAZOLAM 2 MG: 1 INJECTION INTRAMUSCULAR; INTRAVENOUS at 14:25

## 2019-12-11 RX ADMIN — FENTANYL CITRATE 50 MCG: 50 INJECTION, SOLUTION INTRAMUSCULAR; INTRAVENOUS at 12:25

## 2019-12-11 RX ADMIN — HEPARIN SODIUM 15000 UNITS: 1000 INJECTION, SOLUTION INTRAVENOUS; SUBCUTANEOUS at 14:26

## 2019-12-11 ASSESSMENT — COLUMBIA-SUICIDE SEVERITY RATING SCALE - C-SSRS
1. IN THE PAST MONTH, HAVE YOU WISHED YOU WERE DEAD OR WISHED YOU COULD GO TO SLEEP AND NOT WAKE UP?: NO
2. HAVE YOU ACTUALLY HAD ANY THOUGHTS OF KILLING YOURSELF IN THE PAST MONTH?: NO

## 2019-12-11 ASSESSMENT — MIFFLIN-ST. JEOR: SCORE: 1694.79

## 2019-12-11 NOTE — PRE-PROCEDURE
GENERAL PRE-PROCEDURE:   Procedure:  Transarterial chemoembolization  Date/Time:  12/11/2019 9:54 AM    Verbal consent obtained?: Yes    Written consent obtained?: Yes    Risks and benefits: Risks, benefits and alternatives were discussed    Consent given by:  Patient  Patient states understanding of procedure being performed: Yes    Patient's understanding of procedure matches consent: Yes    Procedure consent matches procedure scheduled: Yes    Expected level of sedation:  Moderate  Appropriately NPO:  Yes  ASA Class:  Class 3- Severe systemic disease, definite functional limitations  Mallampati  :  Grade 2- soft palate, base of uvula, tonsillar pillars, and portion of posterior pharyngeal wall visible  Lungs:  Lungs clear with good breath sounds bilaterally  Heart:  Normal heart sounds and rate  History & Physical reviewed:  History and physical reviewed and no updates needed  Statement of review:  I have reviewed the lab findings, diagnostic data, medications, and the plan for sedation

## 2019-12-11 NOTE — H&P
[facility], [place]    History and Physical  Hematology / Oncology     Date of Admission:  12/11/2019  Date of Service (when I saw the patient): 12/11/19    Assessment & Plan   John Mancia is a 69 year old male with a history of cirrhosis secondary to hepatitis C complicated by unresectable HCC, DM2, HTN, GERD, depression, BPH and glaucoma who presents after a TACE procedure to segment 4.    #Hepatocellular carcinoma   Diagnosed in October 2018. Follows with Dr. Ashton in [place]. Disease is locally advanced and deemed not a surgical candidate. Started Sorafenib in December 2018, but has been intermittently held due to side effects and has been held since November 2019 due to liver directed therapy. Has previously underwent CT-guided right lobe liver radiofrequency ablation on 8/14/19 and Y 90 radioembolization of hepatic segment 5 on 11/7/19. Follow-up abdominal MRI on 12/9/19 with decreased tumor bulk per IR team with partial treatment of segment 4. He is now s/p TACE to segment 4 on 12/11/19.    - Admit to observation overnight.  - Monitor for increased pain, nausea/vomiting, bleeding, fever/chills, confusion and decreased RLE distal pulse.  - IV/PO Dilaudid for pain control (does not tolerate Oxycodone)  - Compazine PO/IV for nausea.  - 0.9% NS at 75 cc/hr until tolerating diet.  - Bedrest with RLE straight x 3 hours post procedure.  Up with assistance/ad oliver as tolerated after.  - Non contrast abdominal CT and CBC and CMP in AM.  - IR clinic appointment after one month with pre-clinic MRI liver.    #Bradycardia  Noted during TACE procedure. 12 lead obtained with HR 56 and sinus bradycardia. Asymptomatic. Is on Carvedilol 3.25 mg BID.   - Hold Carvedilol  - Monitor on telemetry     #Cirrhosis  Secondary to hepatitis C complicated by portal hypertension with history of esophageal varices status post endoscopic ligation.   - Continue PTA Lasix and Lactulose    #DM2  PTA on  Victoza and Soliqua   - Hold PTA meds and start high sliding scale insulin   - Hypoglycemia protocol    #HTN  - Continue PTA Lisinopril, hold Coreg as above    #Glaucoma   - Continue PTA drops    #BPH  - Continue PTA Terazosin BID     #GERD  - Continue PTA Lansoprazole     #Depression  - Continue PTA Wellbutrin    Code status: Full code    Disposition:  Plan to discharge patient tomorrow if hemodynamically stable, without encephalopathy, and pain/nausea well controlled.      This plan of care has been discussed with Dr. Ortiz.    Nevin Curiel PA-C  Hematology/Oncology  Pager:  [phone]    I independently examined this patient and my assessment is in agreement with the above note.  I reviewed all tests and past medical history and my evaluation agrees with the findings in the note.     Russ Ortiz M.D.  Professor  Hematology, Oncology and Transplantation      Code Status   Full Code    Primary Care Physician   Provider Not In System    Chief Complaint   Observation post TACE    History is obtained from the patient, spouse and chart review    History of Present Illness   John Mancia is a 69 year old male with a history of cirrhosis secondary to hepatitis C complicated by unresectable HCC, DM2, HTN, BPH and cataracts who presents after a TACE procedure to segment 4.    He is accompanied by his wife. Drove from [place] on Sunday and are staying at [facility] until Saturday. Has some abdominal discomfort upon exam. No overt nausea. Is asking when he can eat. Was bradycardic during the procedure, but denies lightheadedness or syncope. Has remained afebrile.     Past Medical History    I have reviewed this patient's medical history and updated it with pertinent information if needed.   Past Medical History:   Diagnosis Date     Allergic rhinitis      Cancer (H) 2019    History of Colon CA, HCC with mets to gallbladder     Congestive heart failure (H)      Depressive disorder      Diabetes (H)     TYPE II      Hypertension      Multiple food allergies     see epic     Sleep apnea     uses CPAP       Past Surgical History   I have reviewed this patient's surgical history and updated it with pertinent information if needed.  Past Surgical History:   Procedure Laterality Date     HERNIA REPAIR       IR SIRT (SELECTIVE INTERNAL RADIO THERAPY)  11/6/2019     IR VISCERAL ANGIOGRAM  11/6/2019     IR VISCERAL EMBOLIZATION  11/7/2019     VASCULAR SURGERY      liver ablation in [place]     VASECTOMY         Prior to Admission Medications   Prior to Admission Medications   Prescriptions Last Dose Informant Patient Reported? Taking?   Artificial Saliva (MOUTH KOTE) SOLN 12/10/2019 at Unknown time Spouse/Significant Other Yes Yes   Sig: Take 3-5 sprays by mouth See Admin Instructions 3-5 times daily as needed   Brimonidine Tartrate 0.025 % SOLN 12/11/2019 at 0630 Spouse/Significant Other Yes Yes   Sig: Place into both eyes 3 times daily    ESCITALOPRAM OXALATE PO 12/11/2019 at 0630 Spouse/Significant Other Yes Yes   Sig: Take by mouth daily    HYDROmorphone (DILAUDID) 2 MG tablet Past Month at Unknown time Spouse/Significant Other Yes Yes   LANsoprazole (PREVACID) 30 MG DR capsule 12/11/2019 at 0630 Spouse/Significant Other Yes Yes   Sig: Take 15 mg by mouth 2 times daily    Lancet Devices (LANCET DEVICE WITH EJECTOR) MISC 12/10/2019 at Unknown time Spouse/Significant Other Yes Yes   Sig: USE LANCET TOPICALLY FOUR TIMES A DAY AS NEEDED TO CHECK GLUCOSE   Propylene Glycol-Glycerin 0.6-0.6 % SOLN 12/11/2019 at 0630 Spouse/Significant Other Yes Yes   Sig: Apply 1 drop to eye   SORAfenib (NEXAVAR) 200 MG tablet CHEMO More than a month at Unknown time Spouse/Significant Other Yes No   Sig: Take 200 mg by mouth 2 times daily    VICTOZA PEN 18 MG/3ML soln 12/11/2019 at 0630 Spouse/Significant Other Yes Yes   Sig: Inject 1.8 mg Subcutaneous daily    VOLTAREN 1 % topical gel Past Month at Unknown time Spouse/Significant Other Yes Yes   Sig:  Apply topically 4 times daily Uses on joints   acetaminophen (TYLENOL) 500 MG tablet 12/10/2019 at Unknown time Spouse/Significant Other Yes Yes   Sig: Take 500-1,000 mg by mouth every 6 hours as needed for mild pain   artificial tears OINT ophthalmic ointment 12/10/2019 at 0630 Spouse/Significant Other Yes Yes   Sig: Place into both eyes 2 times daily as needed for dry eyes Also called SYSTANE   buPROPion (WELLBUTRIN SR) 100 MG 12 hr tablet 12/10/2019 at 0630  Yes Yes   Sig: Take 100 mg by mouth 2 times daily qam and at noon   carvedilol (COREG) 3.125 MG tablet 2019 at 0630 Spouse/Significant Other Yes Yes   Sig: TAKE ONE TABLET BY MOUTH TWICE A DAY WITH MEALS (AVOID GRAPEFRUIT ENTIRELY) TAKE WITH FOOD FOR CONGESTIVE HEART FAILURE   cetirizine (ZYRTEC) 10 MG tablet Past Month at Unknown time Spouse/Significant Other Yes Yes   Sig: Take 10 mg by mouth daily    furosemide (LASIX) 20 MG tablet 2019 at 0630 Spouse/Significant Other Yes Yes   Sig: Take 20 mg by mouth 2 times daily    insulin glargine-lixisenatide (SOLIQUA 100/33) pen 2019 at 0630 Spouse/Significant Other Yes Yes   Si19  Pt took 4 units this AM in prep for precedure.  CTRN   lactulose (CHRONULAC) 10 GM/15ML solution Past Month at Unknown time Spouse/Significant Other Yes Yes   Sig: Take 10 g by mouth 2 times daily   latanoprost (XALATAN) 0.005 % ophthalmic solution 12/10/2019 at 2100 Spouse/Significant Other Yes Yes   Sig: Place 1 drop into both eyes At Bedtime    lisinopril (PRINIVIL/ZESTRIL) 20 MG tablet 2019 at 0630 Spouse/Significant Other Yes Yes   Sig: Take 20 mg by mouth daily    loperamide (IMODIUM) 2 MG capsule Past Month at Unknown time Spouse/Significant Other Yes Yes   Sig: Take 2 mg by mouth 4 times daily as needed for diarrhea   oxyCODONE (ROXICODONE) 5 MG tablet Past Week at Unknown time Spouse/Significant Other No Yes   Sig: Take 1-2 tablets (5-10 mg) by mouth every 6 hours as needed for moderate to severe  pain   prochlorperazine (COMPAZINE) 10 MG tablet Past Week at Unknown time Spouse/Significant Other Yes Yes   Sig: Take 10 mg by mouth every 6 hours as needed for nausea or vomiting   terazosin (HYTRIN) 5 MG capsule 12/11/2019 at 0630 Spouse/Significant Other Yes Yes   Sig: Take 5 mg by mouth 2 times daily       Facility-Administered Medications: None     Allergies   Allergies   Allergen Reactions     Penicillins Anaphylaxis and Shortness Of Breath     Metformin Diarrhea, GI Disturbance, Hives and Rash     Adhesive Tape Hives and Rash     Allyl Isothiocyanate GI Disturbance     [place] Oil GI Disturbance     Banana GI Disturbance     Brassica Oleracea Italica GI Disturbance     Cabbage GI Disturbance     Celery Oil GI Disturbance     Cheese GI Disturbance     Chicken-Derived Products (Egg) Itching, GI Disturbance and Rash     Corticosteroids Rash and GI Disturbance     Cranberry Extract GI Disturbance     Food GI Disturbance     Almonds; Beans     Hmg-Coa-R Inhibitors Itching and GI Disturbance            Interferon Samm Palpitations and Rash     Lac Bovis GI Disturbance     Lactuca Virosa GI Disturbance     Latex Hives and Rash     Melon GI Disturbance     Mometasone Itching, GI Disturbance and Rash     Oatmeal GI Disturbance     Omeprazole GI Disturbance, Itching, Cramps and Rash     Ondansetron Nausea and Vomiting     Onion GI Disturbance     Peginterferon Samm-2b Palpitations     Pineapple GI Disturbance     Piper      [place] pepper     Primidone      Proton Pump Inhibitors Itching and Rash     Ribavirin GI Disturbance, Itching and Palpitations     Rosuvastatin Nausea and Vomiting     Sorafenib Muscle Pain (Myalgia)            Spironolactone Itching and GI Disturbance     Wheat Bran GI Disturbance     Yeast GI Disturbance       Social History   I have reviewed this patient's social history and updated it with pertinent information if needed. John Mancia  reports that he is a non-smoker but has been exposed to  tobacco smoke. He has been exposed to 0.00 packs per day. He has never used smokeless tobacco. He reports previous alcohol use. He reports current drug use. Drug: Hydromorphone.    Family History   I have reviewed this patient's family history and updated it with pertinent information if needed.   History reviewed. No pertinent family history.    Review of Systems   The 10 point Review of Systems is negative other than noted in the HPI or here.     Physical Exam   Temp: 98  F (36.7  C) Temp src: Oral BP: (!) 142/81 Pulse: 55 Heart Rate: 55 Resp: 16 SpO2: 96 % O2 Device: None (Room air)    Vital Signs with Ranges  Temp:  [97.6  F (36.4  C)-98  F (36.7  C)] 98  F (36.7  C)  Pulse:  [44-60] 55  Heart Rate:  [34-60] 55  Resp:  [9-22] 16  BP: (131-183)/() 142/81  SpO2:  [95 %-99 %] 96 %  220 lbs 0 oz    Constitutional: Pleasant male seen laying in bed. No apparent distress, and appears stated age.  Eyes: Lids and lashes normal, sclera clear, conjunctiva normal.  ENT: Normocephalic, oral cavity without erythema or exudates, gums normal and good dentition.   Respiratory: No increased work of breathing, good air exchange, clear to auscultation bilaterally, no crackles or wheezing.  Cardiovascular: Regular rate and rhythm, normal S1 and S2, and no murmur noted.  GI: No masses or scars. +BS. Soft. Generalized tenderness on palpation.  Skin: Limited bruising, no bleeding, no redness, no warmth, no rashes, no lesions, no jaundice.  Extremities: Incision site clean without active bleeding. There is no redness, warmth, or swelling of the joints. No lower extremity edema. No cyanosis. Distal pulses 2+.  Neurologic: Awake, alert, oriented to name, place and time.    Vascular access: PIV    Data   ROUTINE IP LABS (Last four results)  BMP  Recent Labs   Lab 12/11/19  0955 12/09/19  1526    140   POTASSIUM 4.1 4.0   CHLORIDE 108 108   LISHA 8.7 8.6   CO2 29 30   BUN 13 11   CR 0.76 0.80   * 95     CBC  Recent Labs    Lab 12/11/19  0955 12/09/19  1526   WBC 3.1* 3.1*   RBC 4.46 4.49   HGB 13.3 13.3   HCT 41.5 41.7   MCV 93 93   MCH 29.8 29.6   MCHC 32.0 31.9   RDW 15.2* 15.2*   PLT 87* 91*     INR  Recent Labs   Lab 12/11/19  0955 12/09/19  1526   INR 1.08 1.14

## 2019-12-11 NOTE — IR NOTE
Interventional Radiology Intra-procedural Nursing Note    Patient Name: John Mancia  Medical Record Number: [id number]  Today's Date: December 11, 2019    Procedure: Classic trans-arterial chemo embolization    Attending MD in room during timeout: Dr Giordano  Proceduralists: Dr Giordano, Dr Avila    Procedure Start Time: 1220  Procedure Puncture time: 1300  Procedure End Time: 1415    Sedation start time: 1225  Sedation end time:1400  Sedation medications given: Fentanyl:200 mcg  Versed:4mg  Sedation notes: Patient denied having pain pre procedure. He appears calm and relaxed.    Time femoral artery closure device deployed: Terumo closure device deployed @ 1415  Ambulation time providing no complcations: 3 hours, 1715    Report given to: KM Hope    D: Patient brought to IR . Verified Patient's ID and informed consent using two identifiers. He denied having questions or concerns regarding the procedure. Prior to the start of the procedure, Patient's heart rate dropped to 30's. Patient denied having chest pain, pressure or being short of breath.   A: Patient was positioned supine and was monitored per IR conscious sedation protocol. Patient tolerated the procedure without apparent incident. The dressing over the puncture site is clean, dry and intact. Patient instructed on bedrest restrictions.   P: Patient transferred to     Kacie Camargo RN

## 2019-12-11 NOTE — PROGRESS NOTES
1045  Prep is complete for procedure.  Tirso is here for TACE.  C/O headache, in posterior of head/neck area which radiates into both sides of head.  States that this headache pain is moderate in nature.  Wife, Tarsha, is here as  home.  Consent, labs, & H&P are all complete.  Glucose was 174 at 0955 per lab.  Tirso did take Victoza & 4 units of his long acting Insulin this AM at 0630.  CTRN

## 2019-12-11 NOTE — PROCEDURES
[facility], [place]    Procedure: IR Procedure Note  Date/Time: 12/11/2019 2:43 PM  Performed by: Margarita Barkley MD  Authorized by: Margarita Barkley MD   IR Fellow Physician: Margarita Barkley MD  Other(s) attending procedure: IR staff: Charo Giordano MD    UNIVERSAL PROTOCOL   Site Marked: No  Prior Images Obtained and Reviewed:  Yes  Required items: Required blood products, implants, devices and special equipment available    Patient identity confirmed:  Verbally with patient  Patient was reevaluated immediately before administering moderate or deep sedation or anesthesia  Confirmation Checklist:  Patient's identity using two indicators, relevant allergies, procedure was appropriate and matched the consent or emergent situation and correct equipment/implants were available  Time out: Immediately prior to the procedure a time out was called    Preparation: Patient was prepped and draped in usual sterile fashion    ESBL (mL):  15     ANESTHESIA    Anesthesia: Local infiltration  Local Anesthetic:  Lidocaine 1% without epinephrine  Anesthetic Total (mL):  4      SEDATION    Patient Sedated: Yes    Vital signs: Vital signs monitored during sedation    See dictated procedure note for full details.  Findings: Tumor in segment 4, embolized with classic TACE technique.     Specimens: none    Complications: None    Condition: Stable    Plan: 3 hours bedrest.  Admit to hem/onc service.  AM CT w/o contrast.      PROCEDURE   Patient Tolerance:  Patient tolerated the procedure well with no immediate complications    Length of time physician/provider present for 1:1 monitoring during sedation: 90

## 2019-12-12 ENCOUNTER — APPOINTMENT (OUTPATIENT)
Dept: CT IMAGING | Facility: CLINIC | Age: 69
End: 2019-12-12
Attending: INTERNAL MEDICINE
Payer: MEDICARE

## 2019-12-12 VITALS
HEIGHT: 65 IN | RESPIRATION RATE: 16 BRPM | TEMPERATURE: 97.4 F | BODY MASS INDEX: 36.62 KG/M2 | DIASTOLIC BLOOD PRESSURE: 75 MMHG | WEIGHT: 219.8 LBS | HEART RATE: 66 BPM | OXYGEN SATURATION: 95 % | SYSTOLIC BLOOD PRESSURE: 134 MMHG

## 2019-12-12 LAB
ALBUMIN SERPL-MCNC: 3.2 G/DL (ref 3.4–5)
ALP SERPL-CCNC: 96 U/L (ref 40–150)
ALT SERPL W P-5'-P-CCNC: 42 U/L (ref 0–70)
ANION GAP SERPL CALCULATED.3IONS-SCNC: 4 MMOL/L (ref 3–14)
AST SERPL W P-5'-P-CCNC: 43 U/L (ref 0–45)
BASOPHILS # BLD AUTO: 0 10E9/L (ref 0–0.2)
BASOPHILS NFR BLD AUTO: 0.5 %
BILIRUB SERPL-MCNC: 0.8 MG/DL (ref 0.2–1.3)
BUN SERPL-MCNC: 11 MG/DL (ref 7–30)
CALCIUM SERPL-MCNC: 8.6 MG/DL (ref 8.5–10.1)
CHLORIDE SERPL-SCNC: 111 MMOL/L (ref 94–109)
CO2 SERPL-SCNC: 27 MMOL/L (ref 20–32)
CREAT SERPL-MCNC: 0.73 MG/DL (ref 0.66–1.25)
DIFFERENTIAL METHOD BLD: ABNORMAL
EOSINOPHIL # BLD AUTO: 0 10E9/L (ref 0–0.7)
EOSINOPHIL NFR BLD AUTO: 0.9 %
ERYTHROCYTE [DISTWIDTH] IN BLOOD BY AUTOMATED COUNT: 15.2 % (ref 10–15)
GFR SERPL CREATININE-BSD FRML MDRD: >90 ML/MIN/{1.73_M2}
GLUCOSE BLDC GLUCOMTR-MCNC: 106 MG/DL (ref 70–99)
GLUCOSE BLDC GLUCOMTR-MCNC: 115 MG/DL (ref 70–99)
GLUCOSE SERPL-MCNC: 101 MG/DL (ref 70–99)
HCT VFR BLD AUTO: 38.5 % (ref 40–53)
HGB BLD-MCNC: 12.3 G/DL (ref 13.3–17.7)
IMM GRANULOCYTES # BLD: 0.1 10E9/L (ref 0–0.4)
IMM GRANULOCYTES NFR BLD: 1.4 %
INTERPRETATION ECG - MUSE: NORMAL
INTERPRETATION ECG - MUSE: NORMAL
LYMPHOCYTES # BLD AUTO: 0.3 10E9/L (ref 0.8–5.3)
LYMPHOCYTES NFR BLD AUTO: 6.4 %
MCH RBC QN AUTO: 29.3 PG (ref 26.5–33)
MCHC RBC AUTO-ENTMCNC: 31.9 G/DL (ref 31.5–36.5)
MCV RBC AUTO: 92 FL (ref 78–100)
MONOCYTES # BLD AUTO: 0.5 10E9/L (ref 0–1.3)
MONOCYTES NFR BLD AUTO: 10.9 %
NEUTROPHILS # BLD AUTO: 3.4 10E9/L (ref 1.6–8.3)
NEUTROPHILS NFR BLD AUTO: 79.9 %
NRBC # BLD AUTO: 0 10*3/UL
NRBC BLD AUTO-RTO: 0 /100
PLATELET # BLD AUTO: 89 10E9/L (ref 150–450)
POTASSIUM SERPL-SCNC: 3.8 MMOL/L (ref 3.4–5.3)
PROT SERPL-MCNC: 6 G/DL (ref 6.8–8.8)
RBC # BLD AUTO: 4.2 10E12/L (ref 4.4–5.9)
SODIUM SERPL-SCNC: 142 MMOL/L (ref 133–144)
WBC # BLD AUTO: 4.2 10E9/L (ref 4–11)

## 2019-12-12 PROCEDURE — 74150 CT ABDOMEN W/O CONTRAST: CPT

## 2019-12-12 PROCEDURE — 25000132 ZZH RX MED GY IP 250 OP 250 PS 637: Performed by: PHYSICIAN ASSISTANT

## 2019-12-12 PROCEDURE — 36415 COLL VENOUS BLD VENIPUNCTURE: CPT | Performed by: INTERNAL MEDICINE

## 2019-12-12 PROCEDURE — 85025 COMPLETE CBC W/AUTO DIFF WBC: CPT | Performed by: INTERNAL MEDICINE

## 2019-12-12 PROCEDURE — 40000556 ZZH STATISTIC PERIPHERAL IV START W US GUIDANCE

## 2019-12-12 PROCEDURE — G0378 HOSPITAL OBSERVATION PER HR: HCPCS

## 2019-12-12 PROCEDURE — 00000146 ZZHCL STATISTIC GLUCOSE BY METER IP

## 2019-12-12 PROCEDURE — 80053 COMPREHEN METABOLIC PANEL: CPT | Performed by: INTERNAL MEDICINE

## 2019-12-12 RX ORDER — HYDROMORPHONE HYDROCHLORIDE 2 MG/1
2 TABLET ORAL EVERY 6 HOURS PRN
Qty: 10 TABLET | Refills: 0 | Status: SHIPPED | OUTPATIENT
Start: 2019-12-12

## 2019-12-12 RX ADMIN — TERAZOSIN HYDROCHLORIDE 5 MG: 5 CAPSULE ORAL at 07:36

## 2019-12-12 RX ADMIN — HYDROMORPHONE HYDROCHLORIDE 2 MG: 2 TABLET ORAL at 01:49

## 2019-12-12 RX ADMIN — LISINOPRIL 20 MG: 20 TABLET ORAL at 07:35

## 2019-12-12 RX ADMIN — BUPROPION HYDROCHLORIDE 100 MG: 100 TABLET, FILM COATED, EXTENDED RELEASE ORAL at 07:35

## 2019-12-12 RX ADMIN — LACTULOSE 10 G: 20 SOLUTION ORAL at 07:36

## 2019-12-12 RX ADMIN — HYDROMORPHONE HYDROCHLORIDE 2 MG: 2 TABLET ORAL at 06:20

## 2019-12-12 RX ADMIN — CARBOXYMETHYLCELLULOSE SODIUM 1 DROP: 5 SOLUTION/ DROPS OPHTHALMIC at 07:40

## 2019-12-12 RX ADMIN — LANSOPRAZOLE 15 MG: 15 CAPSULE, DELAYED RELEASE ORAL at 07:36

## 2019-12-12 RX ADMIN — BRIMONIDINE TARTRATE 1 DROP: 2 SOLUTION/ DROPS OPHTHALMIC at 07:40

## 2019-12-12 RX ADMIN — FUROSEMIDE 20 MG: 20 TABLET ORAL at 07:36

## 2019-12-12 ASSESSMENT — MIFFLIN-ST. JEOR: SCORE: 1693.88

## 2019-12-12 NOTE — PLAN OF CARE
Observation Goals Prior to Discharge:  1. Pain tolerated with PO medication: In progress, pt denies pain at this time  2. Tolerating PO intake: In progress  3. Ambulating at baseline: Yes  4. Mentation at baseline (no evidence of hepatic encephalopathy): No evidence of hepatic encephalopathy observed. Pt A & O x4    Pt sleeping. Woke up to look at groin site, WDL. No excess drainage from site, maroon/purple skin characteristics starting under dressing. Pt stated he was not in pain. Resting comfortably. Will continue to monitor mentation and R groin site.

## 2019-12-12 NOTE — PROGRESS NOTES
A&O. VSS. Tolerating diet. Voiding. Pt able to ambulate. DC instructions given to pt and verbalized understanding. Education provided on groin site care post discharge, pt understood. All belongings with pt, IV DC'd and documented. Pt discharged home with family member

## 2019-12-12 NOTE — PROGRESS NOTES
S: Patient had some nausea, but did not vomit.  Tolerating regular diet.  Voiding normally.  Some mild epigastric and right upper quadrant pain, but this is controlled with oral analgesics.      O: Temp: 97.4  F (36.3  C) Temp src: Oral BP: 134/75 Pulse: 66 Heart Rate: 56 Resp: 16 SpO2: 95 % O2 Device: None (Room air)     NAD; awake and alert  No increased work of breathing  Right groin puncture site c/d/i with minimal dried blood on dressing.    A/P: 70 y/o male with HCC s/p TACE to segment 4 lesion, doing well.  -Anticipate discharge today per hem/onc team.  -Can remove dressing and shower today.  -Patient to follow up with IR in Dr. Giordano's clinic in 6-7 weeks.  He will need an MRI prior to the clinic appointment.

## 2019-12-12 NOTE — PLAN OF CARE
Observation goals PER UNIT ROUTINE    Comments: 1. Pain tolerated with PO medication- not met, IV pain medications as needed.   2. Tolerating PO intake- met, tolerating PO intake.   3. Ambulating at baseline- met, pt ambulated to bathroom.   4. Mentation at baseline (no evidence of hepatic encephalopathy)- met, pt alert and oriented x 4.       Patient alert and oriented x 4. No confusion/hepatic encephalopathy noted.  Tolerating regular diet, reported of minimal nausea-improved with antiemetic and diet. No chest pain or SOB. Tele- sinus saray. Reports of abdominal discomfort, PRN medications as needed. IVF running. Will continue to monitor.

## 2019-12-12 NOTE — PLAN OF CARE
Observation Goals Prior to Discharge:  1. Pain tolerated with PO medication: Yes  2. Tolerating PO intake: Yes, pt ate breakfast  3. Ambulating at baseline: Yes  4. Mentation at baseline (no evidence of hepatic encephalopathy): Yes, A&O x4

## 2019-12-12 NOTE — DISCHARGE SUMMARY
[facility], [place]    Discharge Summary  Hematology / Oncology    Date of Admission:  12/11/2019  Date of Discharge:  12/12/2019 10:51 AM  Discharging Provider: Nevin Curiel  Date of Service (when I saw the patient): 12/12/19    Discharge Diagnoses   Hepatocellular carcinoma (H)    History of Present Illness   John Mancia is a 69 year old male with a history of cirrhosis secondary to hepatitis C complicated by unresectable HCC, DM2, HTN, GERD, depression, BPH and glaucoma who presented after a TACE procedure to segment 4.    Hospital Course   John Mancia was admitted on 12/11/2019.  The following problems were addressed during his hospitalization:     #Hepatocellular carcinoma   Diagnosed in October 2018. Follows with Dr. Ashton in [place]. Disease is locally advanced and deemed not a surgical candidate. Started Sorafenib in December 2018, but has been intermittently held due to side effects and has been held since November 2019 due to liver directed therapy. Has previously underwent CT-guided right lobe liver radiofrequency ablation on 8/14/19 and Y 90 radioembolization of hepatic segment 5 on 11/7/19. Follow-up abdominal MRI on 12/9/19 with decreased tumor bulk per IR team with partial treatment of segment 4.     Mr. Mancia underwent a TACE to segment 4 on 12/11/19. He tolerated the procedure well. Vitals and labs are stable the day after the procedure. Pain is managed with PO Dilaudid (does not tolerate Oxycodone). CT abdomen on 12/12/19 shows appropriate dispersement of TACE.   - Prescribed Dilaudid 2 mg q6hrs prn, #10 tabs  - IR to arrange follow-up in one month with a pre-clinic MRI.      #Bradycardia  Noted HR of 34 during TACE procedure. 12 lead obtained with HR 56 and sinus bradycardia. Asymptomatic. Is on Carvedilol 3.25 mg BID, which was held during hospital stay and HR remained in the 50s.  - Advised to HOLD Carvedilol x 1 week until follow-up with   [facility]. If HR >60 at that time, can restart Carvedilol.     #Cirrhosis  Secondary to hepatitis C complicated by portal hypertension with history of esophageal varices status post endoscopic ligation.   - Continue PTA Lasix and Lactulose     #DM2  - Continue PTA Victoza and Soliqua      #HTN  - Continue PTA Lisinopril, hold Coreg as above     #Glaucoma   - Continue PTA drops     #BPH  - Continue PTA Terazosin BID      #GERD  - Continue PTA Lansoprazole      #Depression  - Continue PTA Wellbutrin     Disposition: Discharged to home on 12/12/19. Patient and wife staying at [facility] until 12/14/19.     This plan of care has been discussed with Dr. Ortiz.     Nevin Curiel PA-C  Hematology/Oncology  Pager:  [phone]    I independently examined this patient and my assessment is in agreement with the above note.  I reviewed all tests and past medical history and my evaluation agrees with the findings in the note.   Feels OK to go w f/u per orders.    Russ Ortiz M.D.  Professor  Hematology, Oncology and Transplantation      Significant Results and Procedures   CT abdomen w/o contrast 12/12/19:  IMPRESSION:   1. Cirrhosis with posttreatment changes of recent transarterial  chemoembolization in segment 4 with dense lipiodol staining of the  entirety of the exophytic segment 4 lesion.  2. Poorly visualized mass within the gallbladder, grossly stable.  Cholelithiasis and biliary sludge.  3. Findings of portal hypertension including portosystemic collateral  vessels and splenomegaly.   4. Nonobstructive right nephrolithiasis.  5. Stable lingular nodule, unchanged since 11/21/2018.    Pending Results   None    Code Status   Full Code    Primary Care Physician   Provider Not In System    Physical Exam   Temp: 97.4  F (36.3  C) Temp src: Oral BP: 134/75 Pulse: 66 Heart Rate: 56 Resp: 16 SpO2: 95 % O2 Device: None (Room air)    Vitals:    12/11/19 0920 12/12/19 0800   Weight: 99.8 kg (220 lb) 99.7 kg (219 lb 12.8  oz)     Vital Signs with Ranges  Temp:  [97.4  F (36.3  C)-98.1  F (36.7  C)] 97.4  F (36.3  C)  Pulse:  [44-66] 66  Heart Rate:  [34-78] 56  Resp:  [9-22] 16  BP: (119-183)/() 134/75  SpO2:  [95 %-99 %] 95 %  No intake/output data recorded.    Time Spent on this Encounter   I, Nevin Curiel PA-C, personally saw the patient today and spent greater than 30 minutes discharging this patient.    Discharge Disposition   Discharged to home  Condition at discharge: Stable    Consultations This Hospital Stay   VASCULAR ACCESS CARE ADULT IP CONSULT    Discharge Orders      Comprehensive metabolic panel     CBC with platelets differential    Last Lab Result: Hemoglobin (g/dL)       Date                     Value                 12/12/2019               12.3 (L)         ----------     Reason for your hospital stay    You were admitted for observation after a TACE procedure. Your labs/vitals are stable and you are safe to discharge home.     Adult [facility] Follow-up and recommended labs and tests    - The Interventional Radiology team will contact you regarding follow-up in their clinic.  - Please follow-up with Dr. Ashton as previously scheduled.    Appointments on [place] and/or [facility] (with [facility] or [facility] provider or service). Call [phone] if you haven't heard regarding these appointments within 7 days of discharge.    See appointments below:     Activity    Your activity upon discharge: Regular activity as tolerated.     When to contact your care team    Please contact the Interventional Radiology team for any questions related to your procedure. You can call the [facility] 24-hour triage line at [phone] or [phone] for temp >100.4, uncontrolled nausea/vomiting/diarrhea/constipation, unrelieved pain, bleeding not relieved with pressure, dizziness, chest pain, shortness of breath, loss of consciousness, and any new or concerning symptoms.     Discharge Instructions    - Please  hold off on taking your Carvedilol until you are seen by Dr. Ashton. If your heart rate is greater than 60 at this visit, you can restart this.  - I have prescribed Dilaudid, #10 tabs for pain control after your TACE procedure.     Diet    Follow this diet upon discharge: Regular diet as tolerated.     Discharge Medications   Discharge Medication List as of 12/12/2019 10:16 AM      CONTINUE these medications which have CHANGED    Details   HYDROmorphone (DILAUDID) 2 MG tablet Take 1 tablet (2 mg) by mouth every 6 hours as needed for severe pain, Disp-10 tablet, R-0, Local Print         CONTINUE these medications which have NOT CHANGED    Details   acetaminophen (TYLENOL) 500 MG tablet Take 500-1,000 mg by mouth every 6 hours as needed for mild pain, Historical      Artificial Saliva (MOUTH KOTE) SOLN Take 3-5 sprays by mouth See Admin Instructions 3-5 times daily as needed, Historical      artificial tears OINT ophthalmic ointment Place into both eyes 2 times daily as needed for dry eyes Also called SYSTANE, Historical      brimonidine (ALPHAGAN) 0.2 % ophthalmic solution Place 1 drop into both eyes 3 times daily, Historical      buPROPion (WELLBUTRIN SR) 100 MG 12 hr tablet Take 100 mg by mouth 2 times daily qam and at noon, Historical      cetirizine (ZYRTEC) 10 MG tablet Take 10 mg by mouth daily , Historical      ESCITALOPRAM OXALATE PO Take by mouth daily , Historical      furosemide (LASIX) 20 MG tablet Take 20 mg by mouth 2 times daily , Historical      insulin glargine-lixisenatide (SOLIQUA 100/33) pen 12/11/19  Pt took 4 units this AM in prep for precedure.  CTRN, Historical      lactulose (CHRONULAC) 10 GM/15ML solution Take 10 g by mouth 2 times daily, Historical      Lancet Devices (LANCET DEVICE WITH EJECTOR) MISC USE LANCET TOPICALLY FOUR TIMES A DAY AS NEEDED TO CHECK GLUCOSE, Historical      LANsoprazole (PREVACID) 30 MG DR capsule Take 15 mg by mouth 2 times daily , R-11, Historical       latanoprost (XALATAN) 0.005 % ophthalmic solution Place 1 drop into both eyes At Bedtime , Historical      lisinopril (PRINIVIL/ZESTRIL) 20 MG tablet Take 20 mg by mouth daily , Historical      prochlorperazine (COMPAZINE) 10 MG tablet Take 10 mg by mouth every 6 hours as needed for nausea or vomiting, Historical      Propylene Glycol-Glycerin 0.6-0.6 % SOLN Apply 1 drop to eye, Historical      terazosin (HYTRIN) 5 MG capsule Take 5 mg by mouth 2 times daily , Historical      VICTOZA PEN 18 MG/3ML soln Inject 1.8 mg Subcutaneous daily , TRICE, Historical      VOLTAREN 1 % topical gel Apply topically 4 times daily Uses on joints, TRICE, Historical         STOP taking these medications       Brimonidine Tartrate 0.025 % SOLN Comments:   Reason for Stopping:         buPROPion (WELLBUTRIN) 100 MG tablet Comments:   Reason for Stopping:         carvedilol (COREG) 3.125 MG tablet Comments:   Reason for Stopping:         loperamide (IMODIUM) 2 MG capsule Comments:   Reason for Stopping:         oxyCODONE (ROXICODONE) 5 MG tablet Comments:   Reason for Stopping:         SORAfenib (NEXAVAR) 200 MG tablet CHEMO Comments:   Reason for Stopping:             Allergies   Allergies   Allergen Reactions     Penicillins Anaphylaxis and Shortness Of Breath     Metformin Diarrhea, GI Disturbance, Hives and Rash     Adhesive Tape Hives and Rash     Allyl Isothiocyanate GI Disturbance     Neche Oil GI Disturbance     Banana GI Disturbance     Brassica Oleracea Italica GI Disturbance     Cabbage GI Disturbance     Celery Oil GI Disturbance     Cheese GI Disturbance     Chicken-Derived Products (Egg) Itching, GI Disturbance and Rash     Corticosteroids Rash and GI Disturbance     Cranberry Extract GI Disturbance     Food GI Disturbance     Almonds; Beans     Hmg-Coa-R Inhibitors Itching and GI Disturbance            Interferon Samm Palpitations and Rash     Lac Bovis GI Disturbance     Lactuca Virosa GI Disturbance     Latex Hives and Rash      Melon GI Disturbance     Mometasone Itching, GI Disturbance and Rash     Oatmeal GI Disturbance     Omeprazole GI Disturbance, Itching, Cramps and Rash     Ondansetron Nausea and Vomiting     Onion GI Disturbance     Peginterferon Samm-2b Palpitations     Pineapple GI Disturbance     Piper      Narberth pepper     Primidone      Proton Pump Inhibitors Itching and Rash     Ribavirin GI Disturbance, Itching and Palpitations     Rosuvastatin Nausea and Vomiting     Sorafenib Muscle Pain (Myalgia)            Spironolactone Itching and GI Disturbance     Wheat Bran GI Disturbance     Yeast GI Disturbance     Data   ROUTINE IP LABS (Last four results)  BMP  Recent Labs   Lab 12/12/19  0537 12/11/19  0955 12/09/19  1526    141 140   POTASSIUM 3.8 4.1 4.0   CHLORIDE 111* 108 108   LISHA 8.6 8.7 8.6   CO2 27 29 30   BUN 11 13 11   CR 0.73 0.76 0.80   * 174* 95     CBC  Recent Labs   Lab 12/12/19  0537 12/11/19  0955 12/09/19  1526   WBC 4.2 3.1* 3.1*   RBC 4.20* 4.46 4.49   HGB 12.3* 13.3 13.3   HCT 38.5* 41.5 41.7   MCV 92 93 93   MCH 29.3 29.8 29.6   MCHC 31.9 32.0 31.9   RDW 15.2* 15.2* 15.2*   PLT 89* 87* 91*     INR  Recent Labs   Lab 12/11/19  0955 12/09/19  1526   INR 1.08 1.14

## 2019-12-12 NOTE — PLAN OF CARE
Observation goals PER UNIT ROUTINE    Comments: 1. Pain tolerated with PO medication- not met, IV pain medications as needed.   2. Tolerating PO intake- met, tolerating PO intake.   3. Ambulating at baseline- not met, pt on bedrest until 1730.   4. Mentation at baseline (no evidence of hepatic encephalopathy)- met, pt alert and oriented x 4.

## 2019-12-12 NOTE — PLAN OF CARE
Observation Goals Prior to Discharge:  1. Pain tolerated with PO medication: Yes, pt received 2 mg PO dilaudid at 0620.   2. Tolerating PO intake: Yes, pt stated he wanted to order breakfast but was unsure what could be served to him due to his allergies.   3. Ambulating at baseline: Yes  4. Mentation at baseline (no evidence of hepatic encephalopathy): Yes, A&O x4    Pt up throughout the night to use the restroom and receive pain medications. New PIV placed in R forearm due to pt pulling out PIV in L forearm. Pt stated his pain is under control with PO dilaudid.

## 2019-12-13 ENCOUNTER — TELEPHONE (OUTPATIENT)
Dept: VASCULAR SURGERY | Facility: CLINIC | Age: 69
End: 2019-12-13

## 2019-12-13 NOTE — TELEPHONE ENCOUNTER
Called to f/u on pt s/p TACe    Wife states that he's resting right now but was dizzy after breakfast today    Denies N/V/ Fevers and abd pain     Inquired about hydration in which she will try to push more fluids after he wakes up    I did provide her with my pager incase he doesn't get better  I've asked her to page me should she have concerns    We talked about follow up in which she wasn't sure as they come from [place].    Will f/u with Anel and then get back to them.    She agrees to plan.     Erika VALDEZ RN, BSN  Interventional Radiology Nurse Coordinator   Phone: [phone]

## 2019-12-16 DIAGNOSIS — C22.0 HEPATOCELLULAR CARCINOMA (CMS/HCC): Primary | ICD-10-CM

## 2019-12-17 ENCOUNTER — APPOINTMENT (OUTPATIENT)
Dept: ONCOLOGY | Facility: CLINIC | Age: 69
End: 2019-12-17
Payer: COMMERCIAL

## 2019-12-17 ENCOUNTER — TELEPHONE (OUTPATIENT)
Dept: ONCOLOGY | Facility: CLINIC | Age: 69
End: 2019-12-17

## 2019-12-17 ENCOUNTER — INFUSION (OUTPATIENT)
Dept: ONCOLOGY | Facility: CLINIC | Age: 69
End: 2019-12-17
Payer: COMMERCIAL

## 2019-12-17 ENCOUNTER — OFFICE VISIT (OUTPATIENT)
Dept: ONCOLOGY | Facility: CLINIC | Age: 69
End: 2019-12-17
Payer: COMMERCIAL

## 2019-12-17 VITALS
OXYGEN SATURATION: 93 % | SYSTOLIC BLOOD PRESSURE: 136 MMHG | DIASTOLIC BLOOD PRESSURE: 64 MMHG | WEIGHT: 220 LBS | HEART RATE: 76 BPM | TEMPERATURE: 98.3 F | BODY MASS INDEX: 38.36 KG/M2

## 2019-12-17 DIAGNOSIS — C22.0 HEPATOCELLULAR CARCINOMA (CMS/HCC): ICD-10-CM

## 2019-12-17 LAB
AFP SERPL-MCNC: 14 NG/ML
ALBUMIN SERPL-MCNC: 3.8 G/DL (ref 3.5–5.3)
ALP SERPL-CCNC: 103 U/L (ref 45–115)
ALT SERPL-CCNC: 31 U/L (ref 0–52)
ANION GAP SERPL CALC-SCNC: 7 MMOL/L (ref 3–11)
AST SERPL-CCNC: 34 U/L (ref 0–39)
BILIRUB SERPL-MCNC: 1.12 MG/DL (ref 0–1.4)
BUN SERPL-MCNC: 12 MG/DL (ref 7–25)
CALCIUM ALBUM COR SERPL-MCNC: 9.2 MG/DL (ref 8.6–10.3)
CALCIUM SERPL-MCNC: 9 MG/DL (ref 8.6–10.3)
CHLORIDE SERPL-SCNC: 102 MMOL/L (ref 98–107)
CO2 SERPL-SCNC: 28 MMOL/L (ref 21–32)
CREAT SERPL-MCNC: 0.87 MG/DL (ref 0.7–1.3)
EOSINOPHIL # BLD MANUAL: 0 10*3/UL
EOSINOPHIL NFR BLD MANUAL: 1 % (ref 0–3)
ERYTHROCYTE [DISTWIDTH] IN BLOOD BY AUTOMATED COUNT: 15.8 % (ref 11.5–15)
GFR SERPL CREATININE-BSD FRML MDRD: 89 ML/MIN/1.73M*2
GLUCOSE SERPL-MCNC: 271 MG/DL (ref 70–105)
HCT VFR BLD AUTO: 37.6 % (ref 38–50)
HGB BLD-MCNC: 12.6 G/DL (ref 13.2–17.2)
LYMPHOCYTES # BLD MANUAL: 0.3 10*3/UL
LYMPHOCYTES NFR BLD MANUAL: 8 % (ref 15–47)
MCH RBC QN AUTO: 31.2 PG (ref 29–34)
MCHC RBC AUTO-ENTMCNC: 33.6 G/DL (ref 32–36)
MCV RBC AUTO: 93 FL (ref 82–97)
MONOCYTES # BLD MANUAL: 0.2 10*3/UL
MONOCYTES NFR BLD MANUAL: 7 % (ref 5–13)
NEUTROPHILS # BLD MANUAL: 2.74 10*3/UL
NEUTS BAND # BLD MANUAL: 0 10*3/UL
NEUTS BAND NFR BLD MANUAL: 1 % (ref 0–10)
NEUTS SEG # BLD MANUAL: 2.7 10*3/UL
NEUTS SEG NFR BLD MANUAL: 82 % (ref 46–70)
PLATELET # BLD AUTO: 109 10*3/UL (ref 130–350)
PMV BLD AUTO: 8.5 FL (ref 6.9–10.8)
POTASSIUM SERPL-SCNC: 4 MMOL/L (ref 3.5–5.1)
PROT SERPL-MCNC: 6.4 G/DL (ref 6–8.3)
RBC # BLD AUTO: 4.05 10*6/ΜL (ref 4.1–5.8)
SODIUM SERPL-SCNC: 137 MMOL/L (ref 135–145)
TOTAL CELLS COUNTED BLD: 100 CELLS
VARIANT LYMPHS # BLD MANUAL: 0 10*3/UL
VARIANT LYMPHS NFR BLD: 1 % (ref 0–1)
WBC # BLD AUTO: 3.3 10*3/UL (ref 3.7–9.6)
WBC NRBC COR # BLD: 3.3 10*3/UL

## 2019-12-17 PROCEDURE — 36415 COLL VENOUS BLD VENIPUNCTURE: CPT

## 2019-12-17 PROCEDURE — 80053 COMPREHEN METABOLIC PANEL: CPT

## 2019-12-17 PROCEDURE — 85007 BL SMEAR W/DIFF WBC COUNT: CPT

## 2019-12-17 PROCEDURE — 99214 OFFICE O/P EST MOD 30 MIN: CPT | Performed by: INTERNAL MEDICINE

## 2019-12-17 PROCEDURE — 85027 COMPLETE CBC AUTOMATED: CPT

## 2019-12-17 PROCEDURE — G0463 HOSPITAL OUTPT CLINIC VISIT: HCPCS

## 2019-12-17 PROCEDURE — 82105 ALPHA-FETOPROTEIN SERUM: CPT

## 2019-12-17 RX ORDER — HYDROMORPHONE HYDROCHLORIDE 2 MG/1
4 TABLET ORAL 3 TIMES DAILY PRN
Qty: 90 TABLET | Refills: 0 | Status: SHIPPED | OUTPATIENT
Start: 2019-12-23 | End: 2020-01-16 | Stop reason: ALTCHOICE

## 2019-12-17 ASSESSMENT — ENCOUNTER SYMPTOMS
EYE PROBLEMS: 1
CHEST TIGHTNESS: 1
SHORTNESS OF BREATH: 1
FATIGUE: 1
LEG SWELLING: 1
HEMATOLOGIC/LYMPHATIC NEGATIVE: 1
ENDOCRINE NEGATIVE: 1
ARTHRALGIAS: 1
MYALGIAS: 1
ABDOMINAL PAIN: 1
ABDOMINAL DISTENTION: 1
NERVOUS/ANXIOUS: 1

## 2019-12-17 ASSESSMENT — PAIN SCALES - GENERAL: PAINLEVEL: 10-WORST PAIN EVER

## 2019-12-17 NOTE — PROGRESS NOTES
Medical Oncology Follow-Up    Subjective   HISTORY OF PRESENT ILLNESS:  Ben Lai is a 69 y.o. male who  was diagnosed with hepatocellular carcinoma in November 2018.  He has an underlying hepatitis C which was supposedly cured.    Due to the extent of his HCC per GI at [facility] it was not amenable to local therapy, resection or radiation, referred to medical oncology   He was started on sorafenib early December 2018 but this appears to have been stopped in February due to significant side effects.  Patient states unequivocally that he started to feel better after a 3-week break but the sorafenib was recently restarted and he is having significant quality of life compromising side effects again.  This includes severe joint and muscle pain and fatigue.     7/29/19 restaging scans showed progressive disease in the liver, however no sings of metastatic disease     8/14/19 s/p   CT-guided  right lobe of the liver radiofrequency ablation.     9/2019 seen hepatobiliary team at [facility], and was discussed for tumor board, planned to have Y90  Treatment     10/8/19 MRI abdomen showed Nodular appearing liver with 2 areas of hyper/early arterial enhancement suggestive of hepatocellular carcinoma are both adjacent to the gallbladder which contains filling defects which are enhancing suggestive of neoplasm. This could be primary cholangiocarcinoma or hepatocellular carcinoma which is invading the gallbladder. The liver and gallbladder masses are very similar to the study from 7/29/2019.  Wedge-shaped signal abnormality in the anterior right lobe of the liver similar to the prior study could represent hepatic infarct.    11/7/19 s/p Y90 treatment at [facility]     12/9/19 MRI abdomen showed Cirrhosis and evidence of portal hypertension. Posttreatment changes of radioembolization throughout the right hepatic lobe, with increased diffuse heterogeneous arterial enhancement. This is the patient's  first posttreatment MRI. LR-TR equivocal. Continued attention on follow-up.  Grossly stable size of the lobulated enhancing endoluminal gallbladder mass with decreased but persistent areas of enhancing viable tumor.  Stable segment 4 exophytic 2.6 cm OPTN-5b lesion.  Unchanged right hepatic vein and right portal vein branch thrombi, without convincing thrombus enhancement.  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 Attention on follow-up.    12/11/19 s/p chemo embolization for possible residual viable tumor in the liver.        He feels well and feels    Right sided pain post TACE is still there not well controlled with hydromorphone    Energy is  Improving ECOG 2   appetite ist stable, has gained weight   Takes care of his ADLS   Stable, abd pain, chest pain and neuropathy           Encounter Diagnosis   Name Primary?   • Hepatocellular carcinoma (CMS/HCC) (HCC)    .     Treatment History:     Hepatocellular carcinoma (CMS/HCC) (HCC)    1/23/2019 Initial Diagnosis     Hepatocellular carcinoma (CMS/HCC) (HCC)      7/30/2019 -  Adjuvant Therapy     Hepatocellular - Sorafenib (Nexavar)  Plan Provider: ANDERSON Bryan  Treatment goal: Palliative  Line of treatment: [No plan line of treatment]           RECENT EVENTS:    PAST MEDICAL HISTORY:   Past Medical History:   Diagnosis Date   • A-fib (CMS/HCC) (HCC)    • Allergy    • Anxiety    • Arthritis    • Asthma    • Blindness of right eye    • BPH (benign prostatic hyperplasia)    • Cardiovascular disease    • Cirrhosis (CMS/HCC) (HCC)     with possible liver mass by MRI 5/18, rec repeat in 8/2018   • Complication of anesthesia    • COPD (chronic obstructive pulmonary disease) (CMS/HCC) (HCC)    • Depression    • Endocrine disorder    • Gallstones    • Gastritis    • GERD (gastroesophageal reflux disease)    • Glaucoma    • Hearing loss     bilateral hearing aids   • Hemorrhoids    • Hepatitis C     Hep C, 2016 remission, complicated by  cirrhosis.  MRI abd 5/30/18, rec 3 month follow up for focus of enhancement right and left hepatic lobe junction   • Hernia, abdominal    • High blood pressure    • IBS (irritable bowel syndrome)    • Infectious viral hepatitis    • Jaundice    • Kidney stones    • Obstructive sleep apnea syndrome    • Periodic limb movement disorder    • Persistent hypersomnia    • Persistent insomnia    • Pneumonia    • PONV (postoperative nausea and vomiting)    • Type 2 diabetes mellitus (CMS/HCC) (HCC)    • Urinary incontinence    • Wears partial dentures         GYNECOLOGICAL HISTORY (if applicable): NA     FAMILY HISTORY:   Family History   Problem Relation Age of Onset   • Arthritis Mother    • Rheum arthritis Mother    • Diabetes Mother    • Rectal cancer Mother    • Other Paternal Grandmother    • Other Paternal Grandfather    • Diabetes Other    • Rheum arthritis Other    • Osteoporosis Other         SOCIAL HISTORY:   Social History     Socioeconomic History   • Marital status:      Spouse name: Not on file   • Number of children: Not on file   • Years of education: Not on file   • Highest education level: Not on file   Occupational History   • Not on file   Social Needs   • Financial resource strain: Not on file   • Food insecurity     Worry: Not on file     Inability: Not on file   • Transportation needs     Medical: Not on file     Non-medical: Not on file   Tobacco Use   • Smoking status: Never Smoker   • Smokeless tobacco: Never Used   Substance and Sexual Activity   • Alcohol use: No   • Drug use: No   • Sexual activity: Not on file   Lifestyle   • Physical activity     Days per week: Not on file     Minutes per session: Not on file   • Stress: Not on file   Relationships   • Social connections     Talks on phone: Not on file     Gets together: Not on file     Attends [employer] service: Not on file     Active member of club or organization: Not on file     Attends meetings of clubs or organizations: Not on  file     Relationship status: Not on file   • Intimate partner violence     Fear of current or ex partner: Not on file     Emotionally abused: Not on file     Physically abused: Not on file     Forced sexual activity: Not on file   Other Topics Concern   • Not on file   Social History Narrative   • Not on file        ALLERGIES:   Allergies as of 12/17/2019 - Reviewed 12/17/2019   Allergen Reaction Noted   • Penicillins Anaphylaxis 10/19/2017   • Metformin Hives    • Adhesive tape-silicones  04/06/2018   • Banana  02/05/2019   • Broccoli  02/05/2019   • [place] sprout  02/05/2019   • Cabbage  02/05/2019   • Cantaloupe  02/05/2019   • Celery  02/05/2019   • Cheese  02/05/2019   • Cranberry  02/05/2019   • Egg     • Interferon violetta-2a  02/05/2019   • Latex     • Legumes  02/05/2019   • Lettuce  02/05/2019   • Milk  02/05/2019   • Mometasone     • Mometasone furoate     • Mustard  02/05/2019   • Nut - unspecified  02/05/2019   • Oats  02/05/2019   • Omeprazole     • Ondansetron hcl Nausea And Vomiting 05/01/2019   • Onion  02/05/2019   • Pepper (genus capsicum)  02/05/2019   • Pineapple  02/05/2019   • Primidone  05/01/2019   • Ribavirin Itching 10/19/2017   • Rosuvastatin  02/05/2019   • Sorafenib  04/04/2019   • Spironolactone     • Statins-hmg-coa reductase inhibitors Itching and GI intolerance 04/06/2018   • Wheat  02/05/2019   • Yeast  02/05/2019   • Interferon violetta (human leuk. derived) Palpitations and Rash 09/20/2019   • Pantoprazole Itching and Rash 02/28/2018   • Peginterferon violetta-2b Palpitations 10/19/2017        MEDICATIONS:   Current Outpatient Medications   Medication Sig Dispense Refill   • potassium PHOSPHATE, monobasic, (K-Phos Original) 500 mg tablet Take 1 tablet (500 mg total) by mouth daily 90 tablet 3   • cetirizine (ZyrTEC) 10 mg tablet Take 10 mg by mouth daily     • loperamide (Imodium A-D) 2 mg tablet Take 2 tablets with first loose stool of the day, then up to 8 tablets daily 80 tablet 2   •  SORAfenib (NexAVAR) 200 mg chemo tablet Take 1 tablet (200 mg total) by mouth 2 (two) times a day Swallow whole with water. Take at least 1 hour before or 2 hours after a meal. 60 tablet 3   • SORAfenib (NexAVAR) 200 mg chemo tablet Take 1 tablet (200 mg total) by mouth 2 (two) times a day Swallow whole with water. Take at least 1 hour before or 2 hours after a meal. 60 tablet 3   • prochlorperazine (COMPAZINE) 10 mg tablet 1 tab PO q6h prn nausea/vomiting. Max 40 mg/day. 30 tablet 5   • LANTUS SOLOSTAR U-100 INSULIN 100 unit/mL (3 mL) insulin pen Inject 14 Units under the skin daily       • primidone (MYSOLINE) 50 mg tablet      • ezetimibe (ZETIA) 10 mg tablet Take 1 tablet (10 mg total) by mouth daily 90 tablet 2   • hydrOXYzine (ATARAX) 25 mg tablet Take 1 tablet (25 mg total) by mouth nightly 90 tablet 2   • carvedilol (COREG) 3.125 mg tablet Take 1 tablet (3.125 mg total) by mouth 2 (two) times a day with meals 180 tablet 2   • buPROPion SR (WELLBUTRIN SR) 100 mg 12 hr tablet Take 1 tablet (100 mg total) by mouth daily 90 tablet 1   • lactulose (GENERLAC) 10 gram/15 mL solution Take 15 mL (10 g total) by mouth daily 473 mL 2   • terazosin (HYTRIN) 5 mg capsule Take 1 capsule (5 mg total) by mouth 2 (two) times a day 180 capsule 2   • HYDROmorphone (DILAUDID) 2 mg tablet Take 2 tab/cap by mouth 3 (three) times a day as needed for pain scale 1-3/10, pain scale 4-7/10 or pain scale 8-10/10 (Cancer and joint pain)      • blood-glucose meter (ACCU-CHEK ADELE PLUS METER) [facility] Use to test blood sugars 3 times daily (E11.65, non insulin depedent) AccuChek Adele plus, meter is 8 years old (Patient taking differently: Use to test blood sugars 3 times daily (E11.65, non insulin dependent) ) 1 each 0   • lisinopril (PRINIVIL,ZESTRIL) 20 mg tablet Take 1 tablet (20 mg total) by mouth daily 90 tablet 2   • furosemide (LASIX) 20 mg tablet Take 1 tablet (20 mg total) by mouth 2 (two) times a day. 180 tablet 2   • lansoprazole  (PREVACID) 30 mg capsule Take 30 mg by mouth daily      • liraglutide (VICTOZA 2-JASE) 0.6 mg/0.1 mL (18 mg/3 mL) injection Inject 1.8 mg under the skin daily       • capsaicin (ZOSTRIX) 0.025 % cream Apply 1 application topically as needed for pain scale 1-3/10, 1-3/8.     • sodium chloride (SALINE NASAL) 0.65 % nasal spray Administer 1 spray into each nostril as needed for congestion or rhinitis      • white petrolatum (AQUAPHOR HEALING) 41 % ointment ointment Apply 1 application topically as needed.     • pramoxine-menthol (GOLD BOND MEDICATED ANTI-ITCH) 1-1 % cream Apply topically.     • brimonidine (ALPHAGAN) 0.2 % ophthalmic solution every 12 hours.     • GLYCERIN/PROPYLENE GLYCOL (ARTIFICIAL TEARS,GLYCERIN-PEG, OPHT) Administer 1 drop into affected eye(s) as needed (Dry eyes)      • latanoprost (XALATAN) 0.005 % ophthalmic solution INSTILL 1 DROP INTO AFFECTED EYE(S) BY OPHTHALMIC ROUTE ONCE DAILY INTHE EVENING 10 0   • oxyCODONE (ROXICODONE) 5 mg immediate release tablet Take 1-2 tablets (5-10 mg total) by mouth every 4 (four) hours as needed for pain scale 4-7/10 Max Daily Amount: 60 mg (Patient not taking: Reported on 12/17/2019 ) 180 tablet 0   • SORAfenib (NexAVAR) 200 mg chemo tablet Take 1 tablet (200 mg total) by mouth 2 (two) times a day Swallow whole with water. Take at least 1 hour before or 2 hours after a meal. (Patient not taking: Reported on 12/17/2019 ) 60 tablet 3   • escitalopram (LEXAPRO) 10 mg tablet Take 1 tablet (10 mg total) by mouth daily 90 tablet 2     No current facility-administered medications for this visit.        REVIEW OF SYSTEMS:   Review of Systems   Constitutional: Positive for fatigue.   HENT:   Positive for hearing loss.    Eyes: Positive for eye problems.   Respiratory: Positive for chest tightness and shortness of breath.    Cardiovascular: Positive for chest pain and leg swelling.   Gastrointestinal: Positive for abdominal distention and abdominal pain.   Endocrine:  Negative.    Genitourinary: Positive for bladder incontinence.    Musculoskeletal: Positive for arthralgias, gait problem and myalgias.   Skin: Negative.    Neurological: Positive for gait problem.   Hematological: Negative.    Psychiatric/Behavioral: The patient is nervous/anxious.        The ECOG performance status today is 1 - Restricted strenuous activity; able to carry out light work.          Objective    PHYSICAL EXAM:   /64   Pulse 76   Temp 36.8 °C (98.3 °F) (Temporal)   Wt 99.8 kg (220 lb)   SpO2 93%   BMI 38.36 kg/m²    Body mass index is 38.36 kg/m².       Physical Exam  Constitutional:       Appearance: He is well-developed.   HENT:      Head: Normocephalic and atraumatic.   Eyes:      Pupils: Pupils are equal, round, and reactive to light.      Comments: Right eye less responsive    Neck:      Musculoskeletal: Normal range of motion and neck supple.   Cardiovascular:      Rate and Rhythm: Normal rate and regular rhythm.   Pulmonary:      Effort: No respiratory distress.      Breath sounds: Normal breath sounds. No wheezing.   Abdominal:      General: There is distension.      Palpations: Abdomen is soft.      Tenderness: There is no abdominal tenderness.   Musculoskeletal: Normal range of motion.   Lymphadenopathy:      Cervical: No cervical adenopathy.   Skin:     General: Skin is warm.   Neurological:      Mental Status: He is alert and oriented to person, place, and time.      reveals a generally healthy pleasant appearing gentleman who was not further examined based on the below.    DIAGNOSTIC REPORTS REVIEWED:   Imaging:  Reviewed     Assessment/Plan    IMPRESSION:   This is a pleasant 69-year-old male with diagnosis of hepatocellular carcinoma involving the right lobe, locally advanced disease, per the chart not amenable to local therapy, started sorafenib December 2018 however stopped in February 2019 due to intolerance without any dose reductions.  Has been off of treatment since  then, restaging CAT scan and MRI  7/2019 show progressive disease in the liver.  Started  sorafenib 8/2019 at a lower dose 200 mg twice daily, s/p RFA 8/2019, was referred to [facility], s/p Y90 treatment followed by TACE, currently sorafenib on hold until evidence of disease progression         Plan:  - hold Sorafenib 200 mg p.o. twice daily, until evidence of disease progression   - Follow-up in 4 weeks with the labs and restaging MR per [facility]   - to continue follow up with IR at [facility]    - restaging scans  In January then every 3 months thereafter.         Physician: Milka Reynoso MD

## 2019-12-17 NOTE — TELEPHONE ENCOUNTER
Called to inform [facility] that [facility] and he will have to physically pick it up. Prescription at  at [facility].     ----- Message from Oma Witt RN sent at 12/17/2019  3:44 PM [facility] -----  Regarding: Dr. Reynoso  Contact: [phone]  Pt needs medication you just called them about to be sent to [facility] instead. If it can't be sent there for some reason, they ask that you call them.

## 2019-12-17 NOTE — PROGRESS NOTES
Per Jhoana - medication will continue to be on hold.      Plan:  - hold Sorafenib 200 mg p.o. twice daily, until evidence of disease progression   - Follow-up in 4 weeks with the labs and restaging MR per [facility]   - to continue follow up with IR at [facility]    - restaging scans  In January then every 3 months thereafter.

## 2019-12-19 ENCOUNTER — OFFICE VISIT (OUTPATIENT)
Dept: PALLIATIVE CARE | Facility: CLINIC | Age: 69
End: 2019-12-19
Payer: MEDICARE

## 2019-12-19 VITALS
RESPIRATION RATE: 16 BRPM | TEMPERATURE: 98.2 F | HEART RATE: 68 BPM | OXYGEN SATURATION: 95 % | DIASTOLIC BLOOD PRESSURE: 79 MMHG | WEIGHT: 219 LBS | BODY MASS INDEX: 38.19 KG/M2 | SYSTOLIC BLOOD PRESSURE: 157 MMHG

## 2019-12-19 DIAGNOSIS — C22.0 HEPATOCELLULAR CARCINOMA (CMS/HCC): ICD-10-CM

## 2019-12-19 DIAGNOSIS — R51.9 NONINTRACTABLE EPISODIC HEADACHE, UNSPECIFIED HEADACHE TYPE: ICD-10-CM

## 2019-12-19 DIAGNOSIS — B18.2 CHRONIC HEPATITIS C WITH CIRRHOSIS (CMS/HCC): Primary | ICD-10-CM

## 2019-12-19 DIAGNOSIS — R11.0 NAUSEA: ICD-10-CM

## 2019-12-19 DIAGNOSIS — G89.3 PAIN OF METASTATIC MALIGNANCY: ICD-10-CM

## 2019-12-19 DIAGNOSIS — K74.60 CHRONIC HEPATITIS C WITH CIRRHOSIS (CMS/HCC): Primary | ICD-10-CM

## 2019-12-19 DIAGNOSIS — G47.01 INSOMNIA DUE TO MEDICAL CONDITION: ICD-10-CM

## 2019-12-19 PROBLEM — G43.009 MIGRAINE WITHOUT AURA AND WITHOUT STATUS MIGRAINOSUS, NOT INTRACTABLE: Status: ACTIVE | Noted: 2019-12-19

## 2019-12-19 PROBLEM — G44.89 OTHER HEADACHE SYNDROME: Status: ACTIVE | Noted: 2019-12-19

## 2019-12-19 PROCEDURE — 99204 OFFICE O/P NEW MOD 45 MIN: CPT | Performed by: INTERNAL MEDICINE

## 2019-12-19 PROCEDURE — G0463 HOSPITAL OUTPT CLINIC VISIT: HCPCS

## 2019-12-19 RX ORDER — ONDANSETRON 4 MG/1
4 TABLET, FILM COATED ORAL EVERY 6 HOURS PRN
Qty: 30 TABLET | Refills: 4 | Status: SHIPPED | OUTPATIENT
Start: 2019-12-19 | End: 2019-12-26

## 2019-12-19 RX ORDER — SUMATRIPTAN SUCCINATE 100 MG/1
50 TABLET ORAL ONCE AS NEEDED
Qty: 9 TABLET | Refills: 0 | Status: SHIPPED | OUTPATIENT
Start: 2019-12-19 | End: 2020-01-16 | Stop reason: SDUPTHER

## 2019-12-19 RX ORDER — TRAZODONE HYDROCHLORIDE 50 MG/1
50 TABLET ORAL NIGHTLY
Qty: 30 TABLET | Refills: 0 | Status: SHIPPED | OUTPATIENT
Start: 2019-12-19 | End: 2020-01-16 | Stop reason: ALTCHOICE

## 2019-12-19 ASSESSMENT — ENCOUNTER SYMPTOMS
DIARRHEA: 1
CONSTIPATION: 0
LIGHT-HEADEDNESS: 1
ACTIVITY CHANGE: 1
DIZZINESS: 1
WEAKNESS: 1
HEADACHES: 1
DYSPHORIC MOOD: 1
VOMITING: 0
HEMATOLOGIC/LYMPHATIC NEGATIVE: 1
NECK PAIN: 1
ABDOMINAL PAIN: 1
NUMBNESS: 1
APPETITE CHANGE: 1
UNEXPECTED WEIGHT CHANGE: 1
NERVOUS/ANXIOUS: 1
ALLERGIC/IMMUNOLOGIC NEGATIVE: 1
FATIGUE: 1
NAUSEA: 1
SLEEP DISTURBANCE: 1
ENDOCRINE NEGATIVE: 1
SHORTNESS OF BREATH: 1

## 2019-12-19 ASSESSMENT — PAIN SCALES - GENERAL: PAINLEVEL: 4

## 2019-12-19 NOTE — PROGRESS NOTES
Reason for consultation: uncontrolled pain  Consult requested by: Dr. Reynoso    Chief complaint: establish care    HPI  Ben Lai is a 69 y.o. male with PMHx significant for CAD, cirrhosis, COPD, BPH, hepatitis C and hepatocellular carcinoma who presents today to establish care. He is troubled by headache, neck pain and RUQ/epigastric. The headache and neck pain are persistent. RUQ is more severe but intermittent. He has nausea often and almost throws up. He has compazine to take but it is minimally effective. He is sleeping poorly and was prescribed atarax which doesn't help and he is often up all night. His appetite is diminished and has lost some weight (about 30 lbs).       Past Medical History:   Diagnosis Date   • A-fib (CMS/HCC) (HCC)    • Allergy    • Anxiety    • Arthritis    • Asthma    • Blindness of right eye    • BPH (benign prostatic hyperplasia)    • Cardiovascular disease    • Cirrhosis (CMS/HCC) (HCC)     with possible liver mass by MRI 5/18, rec repeat in 8/2018   • Complication of anesthesia    • COPD (chronic obstructive pulmonary disease) (CMS/HCC) (HCC)    • Depression    • Endocrine disorder    • Gallstones    • Gastritis    • GERD (gastroesophageal reflux disease)    • Glaucoma    • Hearing loss     bilateral hearing aids   • Hemorrhoids    • Hepatitis C     Hep C, 2016 remission, complicated by cirrhosis.  MRI abd 5/30/18, rec 3 month follow up for focus of enhancement right and left hepatic lobe junction   • Hernia, abdominal    • High blood pressure    • IBS (irritable bowel syndrome)    • Infectious viral hepatitis    • Jaundice    • Kidney stones    • Obstructive sleep apnea syndrome    • Periodic limb movement disorder    • Persistent hypersomnia    • Persistent insomnia    • Pneumonia    • PONV (postoperative nausea and vomiting)    • Type 2 diabetes mellitus (CMS/HCC) (HCC)    • Urinary incontinence    • Wears partial dentures     .      Past Surgical History:   Procedure  Laterality Date   • COLONOSCOPY  10/27/2016    Pili, normal, repeat 10 years   • CT ABLATION LIVER RADIOFREQUENCY  8/14/2019    CT ABLATION LIVER RADIOFREQUENCY 8/14/2019 [facility] MIS CT SCAN   • ESOPHAGOGASTRODUODENOSCOPY N/A 2/22/2018    Procedure: EGD - ESOPHAGOGASTRODUODENOSCOPY with banding  x 2 with crna;  Surgeon: William Rivas MD;  Location: [facility] Endoscopy;  Service: Endoscopy;  Laterality: N/A;   • ESOPHAGOGASTRODUODENOSCOPY N/A 4/6/2018    Procedure: EGD - ESOPHAGOGASTRODUODENOSCOPY with crna;  Surgeon: William Rivas MD;  Location: [facility] Endoscopy;  Service: Endoscopy;  Laterality: N/A;   • ESOPHAGOGASTRODUODENOSCOPY N/A 5/7/2019    Procedure: EGD - ESOPHAGOGASTRODUODENOSCOPY;  Surgeon: William Rivas MD;  Location: [facility] Endoscopy;  Service: Endoscopy;  Laterality: N/A;   • HAND SURGERY Left     thumb   • HERNIA REPAIR  01/01/1965   • KNEE ARTHROSCOPY  09/19/2017    left knee, with partial medial meniscectomy; limited chondroplasty, patellofemoral joint   • LIVER BIOPSY      by Dr. Alejandre   • OTHER SURGICAL HISTORY      esophageal varices, banded   • VASECTOMY      at approx age of 25         Ben Lai  reports that he has never smoked. He has never used smokeless tobacco. He reports that he does not drink alcohol or use drugs.    Family History:   family history includes Arthritis in his mother; Diabetes in his mother and another family member; Osteoporosis in an other family member; Other in his paternal grandfather and paternal grandmother; Rectal cancer in his mother; Rheum arthritis in his mother and another family member.      Allergies:  Allergies   Allergen Reactions   • Penicillins Anaphylaxis   • Metformin Hives   • Adhesive Tape-Silicones    • Banana    • Broccoli    • Norwell Sprout    • Cabbage    • Cantaloupe    • Celery    • Cheese    • Cranberry    • Egg    • Interferon Jose Maria-2a    • Latex    • Legumes      Beans   • Lettuce    • Milk    • Mometasone    • Mometasone Furoate    • Mustard     • Nut - Unspecified      Almonds   • Oats    • Omeprazole    • Ondansetron Hcl Nausea And Vomiting   • Onion    • Pepper (Genus Capsicum)      Chili pepper   • Pineapple    • Primidone      Other reaction(s): HEADACHE   • Ribavirin Itching   • Rosuvastatin    • Sorafenib      Other reaction(s): BURNING SENSATION, HEADACHE, JOINT PAIN   • Spironolactone    • Statins-Hmg-Coa Reductase Inhibitors Itching and GI intolerance   • Wheat    • Yeast    • Interferon Jose Maria (Human Leuk. Derived) Palpitations and Rash   • Pantoprazole Itching and Rash   • Peginterferon Jose Maria-2b Palpitations       Medications:   Current Outpatient Medications   Medication Sig Dispense Refill   • loperamide (Imodium A-D) 2 mg tablet Take 2 tablets with first loose stool of the day, then up to 8 tablets daily 80 tablet 2   • carvedilol (COREG) 3.125 mg tablet Take 1 tablet (3.125 mg total) by mouth 2 (two) times a day with meals 180 tablet 2   • buPROPion SR (WELLBUTRIN SR) 100 mg 12 hr tablet Take 1 tablet (100 mg total) by mouth daily 90 tablet 1   • lactulose (GENERLAC) 10 gram/15 mL solution Take 15 mL (10 g total) by mouth daily 473 mL 2   • terazosin (HYTRIN) 5 mg capsule Take 1 capsule (5 mg total) by mouth 2 (two) times a day 180 capsule 2   • blood-glucose meter (ACCU-CHEK ADELE PLUS METER) [facility] [place] Use to test blood sugars 3 times daily (E11.65, non insulin depedent) AccuChek Adele plus, meter is 8 years old (Patient taking differently: Use to test blood sugars 3 times daily (E11.65, non insulin dependent) ) 1 each 0   • lisinopril (PRINIVIL,ZESTRIL) 20 mg tablet Take 1 tablet (20 mg total) by mouth daily 90 tablet 2   • furosemide (LASIX) 20 mg tablet Take 1 tablet (20 mg total) by mouth 2 (two) times a day. 180 tablet 2   • lansoprazole (PREVACID) 30 mg capsule Take 30 mg by mouth daily      • liraglutide (VICTOZA 2-JASE) 0.6 mg/0.1 mL (18 mg/3 mL) injection Inject 1.8 mg under the skin daily       • capsaicin (ZOSTRIX) 0.025 % cream Apply 1  application topically as needed for pain scale 1-3/10, 1-3/8.     • white petrolatum (AQUAPHOR HEALING) 41 % ointment ointment Apply 1 application topically as needed.     • pramoxine-menthol (GOLD BOND MEDICATED ANTI-ITCH) 1-1 % cream Apply topically.     • brimonidine (ALPHAGAN) 0.2 % ophthalmic solution every 12 hours.     • GLYCERIN/PROPYLENE GLYCOL (ARTIFICIAL TEARS,GLYCERIN-PEG, OPHT) Administer 1 drop into affected eye(s) as needed (Dry eyes)      • latanoprost (XALATAN) 0.005 % ophthalmic solution INSTILL 1 DROP INTO AFFECTED EYE(S) BY OPHTHALMIC ROUTE ONCE DAILY INTHE EVENING 10 0   • SUMAtriptan (IMITREX) 100 mg tablet Take 0.5 tablets (50 mg total) by mouth once as needed for migraine for up to 1 dose 9 tablet 0   • ondansetron (ZOFRAN) 4 mg tablet Take 1 tablet (4 mg total) by mouth every 6 (six) hours as needed for nausea or vomiting for up to 7 days 30 tablet 4   • traZODone (DESYREL) 50 mg tablet Take 1 tablet (50 mg total) by mouth nightly 30 tablet 0   • [START ON 12/23/2019] HYDROmorphone (Dilaudid) 2 mg tablet Take 2 tablets (4 mg total) by mouth 3 (three) times a day as needed for pain scale 1-3/10, pain scale 4-7/10 or pain scale 8-10/10 (Cancer and joint pain) Max Daily Amount: 12 mg 90 tablet 0   • cetirizine (ZyrTEC) 10 mg tablet Take 10 mg by mouth daily     • prochlorperazine (COMPAZINE) 10 mg tablet 1 tab PO q6h prn nausea/vomiting. Max 40 mg/day. (Patient not taking: Reported on 12/19/2019 ) 30 tablet 5   • hydrOXYzine (ATARAX) 25 mg tablet Take 1 tablet (25 mg total) by mouth nightly (Patient not taking: Reported on 12/19/2019 ) 90 tablet 2   • escitalopram (LEXAPRO) 10 mg tablet Take 1 tablet (10 mg total) by mouth daily 90 tablet 2   • sodium chloride (SALINE NASAL) 0.65 % nasal spray Administer 1 spray into each nostril as needed for congestion or rhinitis        No current facility-administered medications for this visit.        Review of Systems   Constitutional: Positive for  activity change, appetite change, fatigue and unexpected weight change.   HENT: Negative.    Respiratory: Positive for shortness of breath.    Cardiovascular: Positive for leg swelling. Negative for chest pain.   Gastrointestinal: Positive for abdominal pain, diarrhea and nausea. Negative for constipation and vomiting.   Endocrine: Negative.    Genitourinary: Negative.    Musculoskeletal: Positive for neck pain.   Skin: Negative.    Allergic/Immunologic: Negative.    Neurological: Positive for dizziness, weakness, light-headedness, numbness and headaches.   Hematological: Negative.    Psychiatric/Behavioral: Positive for dysphoric mood and sleep disturbance. The patient is nervous/anxious.        Objective     Vital signs:  Temp:  [36.8 °C (98.2 °F)] 36.8 °C (98.2 °F)  Heart Rate:  [68] 68  Resp:  [16] 16  BP: (157)/(79) 157/79    Genr'l: obese white male sitting up in chair in NAD  Lungs: diminshed but clear  Heart: RRR; HSM at RUSB  Abd: tender in epigastrium and RUQ  Ext: bilateral pitting LE edema, L > R    Patient Active Problem List   Diagnosis   • Chronic hepatitis C with cirrhosis (CMS/HCC) (HCC)   • Type 2 diabetes mellitus with neurologic complication (CMS/HCC) (HCC)   • Multiple food allergies   • Gastric erosion   • Noncompliance with therapeutic plan   • Essential hypertension   • History of esophageal varices   • No-show for appointment   • Hepatocellular carcinoma (CMS/HCC) (HCC)   • Pain of metastatic malignancy   • Nausea   • Insomnia due to medical condition   • Other headache syndrome   • Migraine without aura and without status migrainosus, not intractable       Assessment/Plan      Diagnoses and all orders for this visit:    Chronic hepatitis C with cirrhosis (CMS/HCC) (HCC)    Hepatocellular carcinoma (CMS/HCC) (HCC)    Pain of metastatic malignancy    Nausea  -     ondansetron (ZOFRAN) 4 mg tablet; Take 1 tablet (4 mg total) by mouth every 6 (six) hours as needed for nausea or vomiting for up  to 7 days    Insomnia due to medical condition  -     traZODone (DESYREL) 50 mg tablet; Take 1 tablet (50 mg total) by mouth nightly    Nonintractable episodic headache, unspecified headache type  -     SUMAtriptan (IMITREX) 100 mg tablet; Take 0.5 tablets (50 mg total) by mouth once as needed for migraine for up to 1 dose    I increased the dose and frequency of prn hydromorphone a bit. He was instructed to only take it AS NEEDED and not scheduled. He is to return to office in two weeks, or sooner if needed. He was also instructed to bring his [facility] advance directive with him to next visit so we can review it and scan it in    Code Status: No Order          Jose Gómez MD

## 2020-01-01 ENCOUNTER — TELEPHONE (OUTPATIENT)
Dept: ONCOLOGY | Facility: CLINIC | Age: 70
End: 2020-01-01

## 2020-01-01 ENCOUNTER — OFFICE VISIT (OUTPATIENT)
Dept: ONCOLOGY | Facility: CLINIC | Age: 70
End: 2020-01-01
Payer: COMMERCIAL

## 2020-01-01 ENCOUNTER — APPOINTMENT (OUTPATIENT)
Dept: ONCOLOGY | Facility: CLINIC | Age: 70
End: 2020-01-01
Payer: COMMERCIAL

## 2020-01-01 ENCOUNTER — INFUSION (OUTPATIENT)
Dept: ONCOLOGY | Facility: CLINIC | Age: 70
End: 2020-01-01
Payer: COMMERCIAL

## 2020-01-01 ENCOUNTER — TELEPHONE - BILLABLE (OUTPATIENT)
Dept: PALLIATIVE CARE | Facility: CLINIC | Age: 70
End: 2020-01-01
Payer: MEDICARE

## 2020-01-01 ENCOUNTER — HOSPITAL ENCOUNTER (OUTPATIENT)
Dept: MRI IMAGING | Facility: HOSPITAL | Age: 70
Discharge: HOME | End: 2020-12-21
Payer: COMMERCIAL

## 2020-01-01 ENCOUNTER — HOSPITAL ENCOUNTER (OUTPATIENT)
Dept: INTERVENTIONAL RADIOLOGY/VASCULAR | Facility: HOSPITAL | Age: 70
Discharge: HOME | End: 2020-12-31
Payer: COMMERCIAL

## 2020-01-01 VITALS
OXYGEN SATURATION: 91 % | BODY MASS INDEX: 33.99 KG/M2 | SYSTOLIC BLOOD PRESSURE: 119 MMHG | HEART RATE: 73 BPM | WEIGHT: 204 LBS | DIASTOLIC BLOOD PRESSURE: 68 MMHG | RESPIRATION RATE: 16 BRPM | HEIGHT: 65 IN

## 2020-01-01 VITALS
SYSTOLIC BLOOD PRESSURE: 110 MMHG | OXYGEN SATURATION: 94 % | BODY MASS INDEX: 36.25 KG/M2 | WEIGHT: 211.2 LBS | RESPIRATION RATE: 18 BRPM | TEMPERATURE: 98.2 F | HEART RATE: 75 BPM | DIASTOLIC BLOOD PRESSURE: 58 MMHG

## 2020-01-01 VITALS
SYSTOLIC BLOOD PRESSURE: 113 MMHG | HEART RATE: 77 BPM | DIASTOLIC BLOOD PRESSURE: 49 MMHG | TEMPERATURE: 97.4 F | RESPIRATION RATE: 18 BRPM | BODY MASS INDEX: 34.35 KG/M2 | WEIGHT: 206.4 LBS | OXYGEN SATURATION: 95 %

## 2020-01-01 DIAGNOSIS — C22.0 HEPATOCELLULAR CARCINOMA (CMS/HCC): ICD-10-CM

## 2020-01-01 DIAGNOSIS — G89.3 PAIN OF METASTATIC MALIGNANCY: Primary | ICD-10-CM

## 2020-01-01 DIAGNOSIS — C22.0 HEPATOCELLULAR CARCINOMA (CMS/HCC): Primary | ICD-10-CM

## 2020-01-01 LAB
AFP SERPL-MCNC: 6 NG/ML
ALBUMIN SERPL-MCNC: 3.6 G/DL (ref 3.5–5.3)
ALBUMIN SERPL-MCNC: 3.8 G/DL (ref 3.5–5.3)
ALP SERPL-CCNC: 151 U/L (ref 45–115)
ALP SERPL-CCNC: 173 U/L (ref 45–115)
ALT SERPL-CCNC: 21 U/L (ref 7–52)
ALT SERPL-CCNC: 25 U/L (ref 7–52)
ANION GAP SERPL CALC-SCNC: 7 MMOL/L (ref 3–11)
ANION GAP SERPL CALC-SCNC: 7 MMOL/L (ref 3–11)
AST SERPL-CCNC: 27 U/L
AST SERPL-CCNC: 29 U/L
BASOPHILS # BLD AUTO: 0 10*3/UL
BASOPHILS # BLD AUTO: 0.1 10*3/UL
BASOPHILS NFR BLD AUTO: 0 % (ref 0–2)
BASOPHILS NFR BLD AUTO: 1 % (ref 0–2)
BILIRUB SERPL-MCNC: 0.51 MG/DL (ref 0.2–1.4)
BILIRUB SERPL-MCNC: 0.99 MG/DL (ref 0.2–1.4)
BUN SERPL-MCNC: 12 MG/DL (ref 7–25)
BUN SERPL-MCNC: 14 MG/DL (ref 7–25)
CALCIUM ALBUM COR SERPL-MCNC: 9.2 MG/DL (ref 8.6–10.3)
CALCIUM ALBUM COR SERPL-MCNC: 9.4 MG/DL (ref 8.6–10.3)
CALCIUM SERPL-MCNC: 9 MG/DL (ref 8.6–10.3)
CALCIUM SERPL-MCNC: 9.1 MG/DL (ref 8.6–10.3)
CHLORIDE SERPL-SCNC: 100 MMOL/L (ref 98–107)
CHLORIDE SERPL-SCNC: 101 MMOL/L (ref 98–107)
CO2 SERPL-SCNC: 26 MMOL/L (ref 21–32)
CO2 SERPL-SCNC: 29 MMOL/L (ref 21–32)
CREAT SERPL-MCNC: 0.9 MG/DL (ref 0.7–1.3)
CREAT SERPL-MCNC: 1.08 MG/DL (ref 0.7–1.3)
EOSINOPHIL # BLD AUTO: 0.1 10*3/UL
EOSINOPHIL # BLD AUTO: 0.1 10*3/UL
EOSINOPHIL NFR BLD AUTO: 1 % (ref 0–3)
EOSINOPHIL NFR BLD AUTO: 1 % (ref 0–3)
ERYTHROCYTE [DISTWIDTH] IN BLOOD BY AUTOMATED COUNT: 14.1 % (ref 11.5–15)
ERYTHROCYTE [DISTWIDTH] IN BLOOD BY AUTOMATED COUNT: 14.2 % (ref 11.5–15)
GFR SERPL CREATININE-BSD FRML MDRD: 70 ML/MIN/1.73M*2
GFR SERPL CREATININE-BSD FRML MDRD: 87 ML/MIN/1.73M*2
GLUCOSE SERPL-MCNC: 191 MG/DL (ref 70–105)
GLUCOSE SERPL-MCNC: 216 MG/DL (ref 70–105)
HCT VFR BLD AUTO: 34.2 % (ref 38–50)
HCT VFR BLD AUTO: 38.1 % (ref 38–50)
HGB BLD-MCNC: 11.3 G/DL (ref 13.2–17.2)
HGB BLD-MCNC: 12.6 G/DL (ref 13.2–17.2)
LYMPHOCYTES # BLD AUTO: 0.4 10*3/UL
LYMPHOCYTES # BLD AUTO: 0.5 10*3/UL
LYMPHOCYTES NFR BLD AUTO: 8 % (ref 15–47)
LYMPHOCYTES NFR BLD AUTO: 9 % (ref 15–47)
MCH RBC QN AUTO: 28.6 PG (ref 29–34)
MCH RBC QN AUTO: 28.7 PG (ref 29–34)
MCHC RBC AUTO-ENTMCNC: 33 G/DL (ref 32–36)
MCHC RBC AUTO-ENTMCNC: 33 G/DL (ref 32–36)
MCV RBC AUTO: 86.8 FL (ref 82–97)
MCV RBC AUTO: 87.1 FL (ref 82–97)
MONOCYTES # BLD AUTO: 0.4 10*3/UL
MONOCYTES # BLD AUTO: 0.5 10*3/UL
MONOCYTES NFR BLD AUTO: 10 % (ref 5–13)
MONOCYTES NFR BLD AUTO: 7 % (ref 5–13)
NEUTROPHILS # BLD AUTO: 4.1 10*3/UL
NEUTROPHILS # BLD AUTO: 4.8 10*3/UL
NEUTROPHILS NFR BLD AUTO: 79 % (ref 46–70)
NEUTROPHILS NFR BLD AUTO: 83 % (ref 46–70)
PLATELET # BLD AUTO: 182 10*3/UL (ref 130–350)
PLATELET # BLD AUTO: 214 10*3/UL (ref 130–350)
PMV BLD AUTO: 7.2 FL (ref 6.9–10.8)
PMV BLD AUTO: 7.9 FL (ref 6.9–10.8)
POTASSIUM SERPL-SCNC: 3.6 MMOL/L (ref 3.5–5.1)
POTASSIUM SERPL-SCNC: 4.3 MMOL/L (ref 3.5–5.1)
PROT SERPL-MCNC: 7 G/DL (ref 6–8.3)
PROT SERPL-MCNC: 7.2 G/DL (ref 6–8.3)
RBC # BLD AUTO: 3.93 10*6/ΜL (ref 4.1–5.8)
RBC # BLD AUTO: 4.39 10*6/ΜL (ref 4.1–5.8)
SODIUM SERPL-SCNC: 133 MMOL/L (ref 135–145)
SODIUM SERPL-SCNC: 137 MMOL/L (ref 135–145)
TSH SERPL DL<=0.05 MIU/L-ACNC: 2.16 UIU/ML (ref 0.34–4.82)
WBC # BLD AUTO: 5.2 10*3/UL (ref 3.7–9.6)
WBC # BLD AUTO: 5.8 10*3/UL (ref 3.7–9.6)

## 2020-01-01 PROCEDURE — 85025 COMPLETE CBC W/AUTO DIFF WBC: CPT

## 2020-01-01 PROCEDURE — 2500000200 HC RX 250 WO HCPCS: Performed by: INTERNAL MEDICINE

## 2020-01-01 PROCEDURE — C1788 PORT, INDWELLING, IMP: HCPCS

## 2020-01-01 PROCEDURE — 82105 ALPHA-FETOPROTEIN SERUM: CPT

## 2020-01-01 PROCEDURE — 6360000200 HC RX 636 W HCPCS (ALT 250 FOR IP): Performed by: INTERNAL MEDICINE

## 2020-01-01 PROCEDURE — G1004 CDSM NDSC: HCPCS

## 2020-01-01 PROCEDURE — 99152 MOD SED SAME PHYS/QHP 5/>YRS: CPT

## 2020-01-01 PROCEDURE — 96413 CHEMO IV INFUSION 1 HR: CPT

## 2020-01-01 PROCEDURE — 76937 US GUIDE VASCULAR ACCESS: CPT | Performed by: RADIOLOGY

## 2020-01-01 PROCEDURE — 2550000100 HC RX 255: Performed by: INTERNAL MEDICINE

## 2020-01-01 PROCEDURE — 80053 COMPREHEN METABOLIC PANEL: CPT

## 2020-01-01 PROCEDURE — G0463 HOSPITAL OUTPT CLINIC VISIT: HCPCS

## 2020-01-01 PROCEDURE — 99153 MOD SED SAME PHYS/QHP EA: CPT

## 2020-01-01 PROCEDURE — 6360000200 HC RX 636 W HCPCS (ALT 250 FOR IP): Performed by: RADIOLOGY

## 2020-01-01 PROCEDURE — 96417 CHEMO IV INFUS EACH ADDL SEQ: CPT

## 2020-01-01 PROCEDURE — 36415 COLL VENOUS BLD VENIPUNCTURE: CPT

## 2020-01-01 PROCEDURE — 36561 INSERT TUNNELED CV CATH: CPT

## 2020-01-01 PROCEDURE — 99442 *INACTIVE DO NOT USE* PR PHYS/QHP TELEPHONE EVALUATION 11-20 MIN: CPT | Mod: 95 | Performed by: INTERNAL MEDICINE

## 2020-01-01 PROCEDURE — 99215 OFFICE O/P EST HI 40 MIN: CPT | Performed by: INTERNAL MEDICINE

## 2020-01-01 PROCEDURE — 99214 OFFICE O/P EST MOD 30 MIN: CPT | Performed by: INTERNAL MEDICINE

## 2020-01-01 PROCEDURE — A9579 GAD-BASE MR CONTRAST NOS,1ML: HCPCS | Performed by: INTERNAL MEDICINE

## 2020-01-01 PROCEDURE — 2580000300 HC RX 258: Performed by: INTERNAL MEDICINE

## 2020-01-01 PROCEDURE — 84443 ASSAY THYROID STIM HORMONE: CPT

## 2020-01-01 RX ORDER — HEPARIN 100 UNIT/ML
500 SYRINGE INTRAVENOUS AS NEEDED
Status: CANCELLED | OUTPATIENT
Start: 2020-01-01

## 2020-01-01 RX ORDER — SODIUM CHLORIDE 0.9 % (FLUSH) 0.9 %
10 SYRINGE (ML) INJECTION AS NEEDED
Status: DISCONTINUED | OUTPATIENT
Start: 2020-01-01 | End: 2020-01-01 | Stop reason: HOSPADM

## 2020-01-01 RX ORDER — SODIUM CHLORIDE 9 MG/ML
50 INJECTION, SOLUTION INTRAVENOUS AS NEEDED
Status: CANCELLED | OUTPATIENT
Start: 2020-01-01

## 2020-01-01 RX ORDER — HEPARIN 100 UNIT/ML
500 SYRINGE INTRAVENOUS 2 TIMES DAILY
Status: DISCONTINUED | OUTPATIENT
Start: 2020-01-01 | End: 2021-01-01 | Stop reason: HOSPADM

## 2020-01-01 RX ORDER — SODIUM CHLORIDE 0.9 % (FLUSH) 0.9 %
10 SYRINGE (ML) INJECTION AS NEEDED
Status: DISCONTINUED | OUTPATIENT
Start: 2020-01-01 | End: 2021-01-01 | Stop reason: HOSPADM

## 2020-01-01 RX ORDER — DIPHENHYDRAMINE HYDROCHLORIDE 50 MG/ML
50 INJECTION INTRAMUSCULAR; INTRAVENOUS ONCE AS NEEDED
Status: CANCELLED | OUTPATIENT
Start: 2020-01-01

## 2020-01-01 RX ORDER — HEPARIN 100 UNIT/ML
500 SYRINGE INTRAVENOUS AS NEEDED
Status: DISCONTINUED | OUTPATIENT
Start: 2020-01-01 | End: 2020-01-01 | Stop reason: HOSPADM

## 2020-01-01 RX ORDER — HEPARIN 100 UNIT/ML
500 SYRINGE INTRAVENOUS AS NEEDED
Status: DISCONTINUED | OUTPATIENT
Start: 2020-01-01 | End: 2021-01-01 | Stop reason: HOSPADM

## 2020-01-01 RX ORDER — SODIUM CHLORIDE 0.9 % (FLUSH) 0.9 %
10 SYRINGE (ML) INJECTION 2 TIMES DAILY
Status: DISCONTINUED | OUTPATIENT
Start: 2020-01-01 | End: 2021-01-01 | Stop reason: HOSPADM

## 2020-01-01 RX ORDER — SODIUM CHLORIDE 0.9 % (FLUSH) 0.9 %
10 SYRINGE (ML) INJECTION AS NEEDED
Status: CANCELLED | OUTPATIENT
Start: 2020-01-01

## 2020-01-01 RX ORDER — FENTANYL CITRATE/PF 50 MCG/ML
PLASTIC BAG, INJECTION (ML) INTRAVENOUS CODE/TRAUMA/SEDATION MEDICATION
Status: COMPLETED | OUTPATIENT
Start: 2020-01-01 | End: 2020-01-01

## 2020-01-01 RX ORDER — HEPARIN 100 UNIT/ML
5 SYRINGE INTRAVENOUS AS NEEDED
Status: DISCONTINUED | OUTPATIENT
Start: 2020-01-01 | End: 2021-01-01 | Stop reason: HOSPADM

## 2020-01-01 RX ORDER — HYDROMORPHONE HYDROCHLORIDE 2 MG/1
2 TABLET ORAL EVERY 4 HOURS PRN
Qty: 60 TABLET | Refills: 0
Start: 2020-01-01 | End: 2021-01-01 | Stop reason: SDUPTHER

## 2020-01-01 RX ORDER — MIDAZOLAM HYDROCHLORIDE 1 MG/ML
INJECTION INTRAMUSCULAR; INTRAVENOUS CODE/TRAUMA/SEDATION MEDICATION
Status: COMPLETED | OUTPATIENT
Start: 2020-01-01 | End: 2020-01-01

## 2020-01-01 RX ORDER — SODIUM CHLORIDE 9 MG/ML
50 INJECTION, SOLUTION INTRAVENOUS AS NEEDED
Status: DISCONTINUED | OUTPATIENT
Start: 2020-01-01 | End: 2020-01-01 | Stop reason: HOSPADM

## 2020-01-01 RX ORDER — ONDANSETRON HYDROCHLORIDE 2 MG/ML
INJECTION, SOLUTION INTRAVENOUS CODE/TRAUMA/SEDATION MEDICATION
Status: COMPLETED | OUTPATIENT
Start: 2020-01-01 | End: 2020-01-01

## 2020-01-01 RX ADMIN — FENTANYL CITRATE 50 MCG: 50 INJECTION, SOLUTION INTRAMUSCULAR; INTRAVENOUS at 09:15

## 2020-01-01 RX ADMIN — FENTANYL CITRATE 50 MCG: 50 INJECTION, SOLUTION INTRAMUSCULAR; INTRAVENOUS at 09:06

## 2020-01-01 RX ADMIN — Medication 5 ML: at 09:25

## 2020-01-01 RX ADMIN — ATEZOLIZUMAB 1200 MG: 1200 INJECTION, SOLUTION INTRAVENOUS at 12:21

## 2020-01-01 RX ADMIN — Medication 1 APPLICATION: at 09:30

## 2020-01-01 RX ADMIN — HEPARIN 5 ML: 100 SYRINGE at 13:33

## 2020-01-01 RX ADMIN — BEVACIZUMAB-AWWB 1400 MG: 400 INJECTION, SOLUTION INTRAVENOUS at 11:33

## 2020-01-01 RX ADMIN — Medication 10 ML: at 13:33

## 2020-01-01 RX ADMIN — ONDANSETRON 4 MG: 2 INJECTION INTRAMUSCULAR; INTRAVENOUS at 08:58

## 2020-01-01 RX ADMIN — MIDAZOLAM HYDROCHLORIDE 1 MG: 1 INJECTION, SOLUTION INTRAMUSCULAR; INTRAVENOUS at 09:15

## 2020-01-01 RX ADMIN — GADOTERIDOL 20 ML: 279.3 INJECTION, SOLUTION INTRAVENOUS at 10:06

## 2020-01-01 RX ADMIN — MIDAZOLAM HYDROCHLORIDE 1 MG: 1 INJECTION, SOLUTION INTRAMUSCULAR; INTRAVENOUS at 09:06

## 2020-01-01 ASSESSMENT — ENCOUNTER SYMPTOMS
APPETITE CHANGE: 0
DIZZINESS: 1
ABDOMINAL PAIN: 1
CONSTITUTIONAL NEGATIVE: 1
ENDOCRINE NEGATIVE: 1
ACTIVITY CHANGE: 0
ABDOMINAL DISTENTION: 1
ENDOCRINE NEGATIVE: 1
FATIGUE: 1
HEMATOLOGIC/LYMPHATIC NEGATIVE: 1
ALLERGIC/IMMUNOLOGIC NEGATIVE: 1
ARTHRALGIAS: 1
ARTHRALGIAS: 1
EYE PROBLEMS: 1
SLEEP DISTURBANCE: 1
HEMATOLOGIC/LYMPHATIC NEGATIVE: 1
MYALGIAS: 1
EYE PROBLEMS: 1
NERVOUS/ANXIOUS: 1
HEMATOLOGIC/LYMPHATIC NEGATIVE: 1
ABDOMINAL PAIN: 1
RESPIRATORY NEGATIVE: 1
FATIGUE: 1
VOMITING: 0
MUSCULOSKELETAL NEGATIVE: 1
NERVOUS/ANXIOUS: 1
MYALGIAS: 1
ABDOMINAL DISTENTION: 1
ABDOMINAL PAIN: 1
CARDIOVASCULAR NEGATIVE: 1
ENDOCRINE NEGATIVE: 1
NAUSEA: 0

## 2020-01-01 ASSESSMENT — PAIN SCALES - GENERAL
PAINLEVEL: 2
PAINLEVEL: 2

## 2020-01-06 ENCOUNTER — TELEPHONE (OUTPATIENT)
Dept: ONCOLOGY | Facility: CLINIC | Age: 70
End: 2020-01-06

## 2020-01-06 DIAGNOSIS — C22.0 HEPATOCELLULAR CARCINOMA (H): Primary | ICD-10-CM

## 2020-01-06 NOTE — TELEPHONE ENCOUNTER
Spoke with Jakub. He apologized for missing his appointment with us. Said that he had a mix up with calendars and he would like to reschedule. Advised next available is Jan 16. He was rescheduled to that day.       ----- Message from Kayy Still sent at 1/6/2020 10:15 AM MST -----  Regarding: Rico-Missed appt  Contact: [phone]  10:13 Pt missed appt on Thursday with Rico and just called to reschedule. Please call back at [phone]

## 2020-01-06 NOTE — PROGRESS NOTES
Called pt to f/u on their follow up s/p T ACE    I informed her that we would like to see them back in 1 mos time.     Wife states that they already have an MRI scheduled for 1/15 already.    I informed her that we will go ahead and have them do this MRI. We will review and then get back to them as they are being closely followed by their local Cancer doctor.     The plan would be to get the images pushed o us and then have Dr. Giordano review the images and should there be any concern then we will contact them and have them return for more treatment.     They are 10 hours away as well as will need [facility] [place] [facility] referral.     Wife agrees to plan.     Erika VALDEZ RN, BSN  Interventional Radiology Nurse Coordinator   Phone: [phone]

## 2020-01-07 ENCOUNTER — TELEPHONE (OUTPATIENT)
Dept: ADMINISTRATIVE | Facility: HOSPITAL | Age: 70
End: 2020-01-07

## 2020-01-08 NOTE — TELEPHONE ENCOUNTER
1/07/20 Per Angel, MR, PT needs to reschedule 1/15 8am MR Abdomen appt as Rm 1 is double booked w abdomens. Left vm for PT to contact PAC to reschedule. Awaiting response. ELI

## 2020-01-16 ENCOUNTER — OFFICE VISIT (OUTPATIENT)
Dept: PALLIATIVE CARE | Facility: CLINIC | Age: 70
End: 2020-01-16
Payer: COMMERCIAL

## 2020-01-16 ENCOUNTER — HOSPITAL ENCOUNTER (OUTPATIENT)
Dept: MRI IMAGING | Facility: HOSPITAL | Age: 70
Discharge: HOME | End: 2020-01-16
Payer: COMMERCIAL

## 2020-01-16 VITALS
BODY MASS INDEX: 37.49 KG/M2 | SYSTOLIC BLOOD PRESSURE: 126 MMHG | RESPIRATION RATE: 16 BRPM | TEMPERATURE: 97.8 F | OXYGEN SATURATION: 95 % | HEART RATE: 72 BPM | DIASTOLIC BLOOD PRESSURE: 65 MMHG | WEIGHT: 215 LBS

## 2020-01-16 DIAGNOSIS — C22.0 HEPATOCELLULAR CARCINOMA (CMS/HCC): ICD-10-CM

## 2020-01-16 DIAGNOSIS — G47.00 INSOMNIA, UNSPECIFIED TYPE: ICD-10-CM

## 2020-01-16 DIAGNOSIS — G43.009 MIGRAINE WITHOUT AURA AND WITHOUT STATUS MIGRAINOSUS, NOT INTRACTABLE: Primary | ICD-10-CM

## 2020-01-16 DIAGNOSIS — G89.3 PAIN OF METASTATIC MALIGNANCY: ICD-10-CM

## 2020-01-16 DIAGNOSIS — R51.9 NONINTRACTABLE EPISODIC HEADACHE, UNSPECIFIED HEADACHE TYPE: ICD-10-CM

## 2020-01-16 DIAGNOSIS — R11.0 NAUSEA: ICD-10-CM

## 2020-01-16 PROCEDURE — G0463 HOSPITAL OUTPT CLINIC VISIT: HCPCS

## 2020-01-16 PROCEDURE — 2550000100 HC RX 255: Performed by: INTERNAL MEDICINE

## 2020-01-16 PROCEDURE — 99214 OFFICE O/P EST MOD 30 MIN: CPT | Performed by: INTERNAL MEDICINE

## 2020-01-16 PROCEDURE — A9579 GAD-BASE MR CONTRAST NOS,1ML: HCPCS | Performed by: INTERNAL MEDICINE

## 2020-01-16 PROCEDURE — 74183 MRI ABD W/O CNTR FLWD CNTR: CPT

## 2020-01-16 RX ORDER — SUMATRIPTAN SUCCINATE 100 MG/1
100 TABLET ORAL ONCE AS NEEDED
Qty: 30 TABLET | Refills: 4 | Status: SHIPPED | OUTPATIENT
Start: 2020-01-16 | End: 2020-02-15

## 2020-01-16 RX ORDER — ESCITALOPRAM OXALATE 10 MG/1
10 TABLET ORAL DAILY
COMMUNITY
End: 2020-02-13 | Stop reason: ALTCHOICE

## 2020-01-16 RX ORDER — HYDROMORPHONE HYDROCHLORIDE 4 MG/1
4 TABLET ORAL EVERY 4 HOURS PRN
Qty: 180 TABLET | Refills: 0
Start: 2020-01-16 | End: 2020-01-01 | Stop reason: ALTCHOICE

## 2020-01-16 RX ORDER — TRAZODONE HYDROCHLORIDE 100 MG/1
100 TABLET ORAL NIGHTLY
Qty: 30 TABLET | Refills: 0 | Status: SHIPPED | OUTPATIENT
Start: 2020-01-16 | End: 2020-02-15

## 2020-01-16 RX ADMIN — GADOTERIDOL 19 ML: 279.3 INJECTION, SOLUTION INTRAVENOUS at 09:08

## 2020-01-16 ASSESSMENT — ENCOUNTER SYMPTOMS
UNEXPECTED WEIGHT CHANGE: 1
SHORTNESS OF BREATH: 1
NAUSEA: 0
SLEEP DISTURBANCE: 1
CONSTIPATION: 0
HEADACHES: 1
APPETITE CHANGE: 1
FATIGUE: 1
LIGHT-HEADEDNESS: 1
WEAKNESS: 1
ABDOMINAL PAIN: 1
DIARRHEA: 0
NUMBNESS: 1
ACTIVITY CHANGE: 1
DIZZINESS: 1

## 2020-01-16 ASSESSMENT — PAIN SCALES - GENERAL: PAINLEVEL: 0-NO PAIN

## 2020-01-16 NOTE — PROGRESS NOTES
SUBJECTIVE:     The pt presents today for follow up with his wife. He is feeling better in general, but still not great. Imitrex has been effective but at the 100mg dose. He is taking two of the two mg dilaudid at a time. No nausea. His wife feels that  He has more energy and less daytime sleepiness. He did not think the the trazodone was fully effective for sleep. His wife feels that he has somewhat more energy and sleeps less during the day.        Past Medical History:   Diagnosis Date   • A-fib (CMS/HCC) (HCC)    • Allergy    • Anxiety    • Arthritis    • Asthma    • Blindness of right eye    • BPH (benign prostatic hyperplasia)    • Cardiovascular disease    • Cirrhosis (CMS/HCC) (HCC)     with possible liver mass by MRI 5/18, rec repeat in 8/2018   • Complication of anesthesia    • COPD (chronic obstructive pulmonary disease) (CMS/HCC) (HCC)    • Depression    • Endocrine disorder    • Gallstones    • Gastritis    • GERD (gastroesophageal reflux disease)    • Glaucoma    • Hearing loss     bilateral hearing aids   • Hemorrhoids    • Hepatitis C     Hep C, 2016 remission, complicated by cirrhosis.  MRI abd 5/30/18, rec 3 month follow up for focus of enhancement right and left hepatic lobe junction   • Hernia, abdominal    • High blood pressure    • IBS (irritable bowel syndrome)    • Infectious viral hepatitis    • Jaundice    • Kidney stones    • Obstructive sleep apnea syndrome    • Periodic limb movement disorder    • Persistent hypersomnia    • Persistent insomnia    • Pneumonia    • PONV (postoperative nausea and vomiting)    • Type 2 diabetes mellitus (CMS/HCC) (HCC)    • Urinary incontinence    • Wears partial dentures      Past Surgical History:   Procedure Laterality Date   • COLONOSCOPY  10/27/2016    Pili, normal, repeat 10 years   • CT ABLATION LIVER RADIOFREQUENCY  8/14/2019    CT ABLATION LIVER RADIOFREQUENCY 8/14/2019 [facility] MIS CT SCAN   • ESOPHAGOGASTRODUODENOSCOPY N/A 2/22/2018    Procedure:  EGD - ESOPHAGOGASTRODUODENOSCOPY with banding  x 2 with crna;  Surgeon: William Rivas MD;  Location: [facility] Endoscopy;  Service: Endoscopy;  Laterality: N/A;   • ESOPHAGOGASTRODUODENOSCOPY N/A 4/6/2018    Procedure: EGD - ESOPHAGOGASTRODUODENOSCOPY with crna;  Surgeon: William Rivas MD;  Location: [facility] End[facility];  Service: Endoscopy;  Laterality: N/A;   • ESOPHAGOGASTRODUODENOSCOPY N/A 5/7/2019    Procedure: EGD - ESOPHAGOGASTRODUODENOSCOPY;  Surgeon: William Rivas MD;  Location: [facility] Endoscopy;  Service: Endoscopy;  Laterality: N/A;   • HAND SURGERY Left     thumb   • HERNIA REPAIR  01/01/1965   • KNEE ARTHROSCOPY  09/19/2017    left knee, with partial medial meniscectomy; limited chondroplasty, patellofemoral joint   • LIVER BIOPSY      by Dr. Alejandre   • OTHER SURGICAL HISTORY      esophageal varices, banded   • VASECTOMY      at approx age of 25     Family History   Problem Relation Age of Onset   • Arthritis Mother    • Rheum arthritis Mother    • Diabetes Mother    • Rectal cancer Mother    • Other Paternal Grandmother    • Other Paternal Grandfather    • Diabetes Other    • Rheum arthritis Other    • Osteoporosis Other      Current Outpatient Medications   Medication Sig Dispense Refill   • escitalopram (LEXAPRO) 10 mg tablet Take 10 mg by mouth daily     • SUMAtriptan (IMITREX) 100 mg tablet Take 1 tablet (100 mg total) by mouth once as needed for migraine 30 tablet 4   • carvedilol (COREG) 3.125 mg tablet Take 1 tablet (3.125 mg total) by mouth 2 (two) times a day with meals 180 tablet 2   • buPROPion SR (WELLBUTRIN SR) 100 mg 12 hr tablet Take 1 tablet (100 mg total) by mouth daily 90 tablet 1   • lactulose (GENERLAC) 10 gram/15 mL solution Take 15 mL (10 g total) by mouth daily 473 mL 2   • terazosin (HYTRIN) 5 mg capsule Take 1 capsule (5 mg total) by mouth 2 (two) times a day 180 capsule 2   • lisinopril (PRINIVIL,ZESTRIL) 20 mg tablet Take 1 tablet (20 mg total) by mouth daily 90 tablet 2   • furosemide  (LASIX) 20 mg tablet Take 1 tablet (20 mg total) by mouth 2 (two) times a day. 180 tablet 2   • lansoprazole (PREVACID) 30 mg capsule Take 30 mg by mouth daily      • liraglutide (VICTOZA 2-JASE) 0.6 mg/0.1 mL (18 mg/3 mL) injection Inject 1.8 mg under the skin daily       • brimonidine (ALPHAGAN) 0.2 % ophthalmic solution every 12 hours.     • latanoprost (XALATAN) 0.005 % ophthalmic solution INSTILL 1 DROP INTO AFFECTED EYE(S) BY OPHTHALMIC ROUTE ONCE DAILY INTHE EVENING 10 0   • HYDROmorphone (Dilaudid) 4 mg tablet Take 1 tablet (4 mg total) by mouth every 4 (four) hours as needed (pain) Max Daily Amount: 24 mg 180 tablet 0   • traZODone (DESYREL) 100 mg tablet Take 1 tablet (100 mg total) by mouth nightly 30 tablet 0   • cetirizine (ZyrTEC) 10 mg tablet Take 10 mg by mouth daily     • loperamide (Imodium A-D) 2 mg tablet Take 2 tablets with first loose stool of the day, then up to 8 tablets daily (Patient not taking: Reported on 1/16/2020 ) 80 tablet 2   • prochlorperazine (COMPAZINE) 10 mg tablet 1 tab PO q6h prn nausea/vomiting. Max 40 mg/day. (Patient not taking: Reported on 12/19/2019 ) 30 tablet 5   • hydrOXYzine (ATARAX) 25 mg tablet Take 1 tablet (25 mg total) by mouth nightly (Patient not taking: Reported on 12/19/2019 ) 90 tablet 2   • blood-glucose meter (ACCU-CHEK ADELE PLUS METER) [facility] [place] Use to test blood sugars 3 times daily (E11.65, non insulin depedent) AccuChek Adele plus, meter is 8 years old (Patient taking differently: Use to test blood sugars 3 times daily (E11.65, non insulin dependent) ) 1 each 0   • capsaicin (ZOSTRIX) 0.025 % cream Apply 1 application topically as needed for pain scale 1-3/10, 1-3/8.     • sodium chloride (SALINE NASAL) 0.65 % nasal spray Administer 1 spray into each nostril as needed for congestion or rhinitis      • white petrolatum (AQUAPHOR HEALING) 41 % ointment ointment Apply 1 application topically as needed.     • pramoxine-menthol (GOLD BOND MEDICATED ANTI-ITCH) 1-1  % cream Apply topically.     • GLYCERIN/PROPYLENE GLYCOL (ARTIFICIAL TEARS,GLYCERIN-PEG, OPHT) Administer 1 drop into affected eye(s) as needed (Dry eyes)        No current facility-administered medications for this visit.         Lab Results   Component Value Date    GLUCOSE 271 (H) 12/17/2019    BUN 12 12/17/2019     12/17/2019    K 4.0 12/17/2019     12/17/2019    CO2 28 12/17/2019    ANIONGAP 7 12/17/2019    CALCIUM 9.0 12/17/2019        @RESUFASTREFRESH(CHOL,HDL,LDL,TRIG,LDLDirect)@     Review of Systems   Constitutional: Positive for activity change, appetite change, fatigue and unexpected weight change.   Respiratory: Positive for shortness of breath.    Cardiovascular: Positive for chest pain. Negative for leg swelling.        Sharp   Gastrointestinal: Positive for abdominal pain. Negative for constipation, diarrhea and nausea.   Genitourinary: Positive for urgency.        Weak stream   Neurological: Positive for dizziness, weakness, light-headedness, numbness and headaches.   Psychiatric/Behavioral: Positive for sleep disturbance.        I have reviewed and agree with the review of systems that was completed by the Clinic Support Staff 1/16/2020.     OBJECTIVE:  /65 (BP Location: Right arm, Patient Position: Sitting, Cuff Size: Regular Adult)   Pulse 72   Temp 36.6 °C (97.8 °F) (Oral)   Resp 16   Wt 97.5 kg (215 lb)   SpO2 95%   BMI 37.49 kg/m²  No LMP for male patient.             Physical Exam   Constitutional: He is oriented to person, place, and time. He appears well-developed and well-nourished.   HENT:   Head: Normocephalic.   Eyes: Pupils are equal, round, and reactive to light.   Neck: Normal range of motion.   Cardiovascular: Normal rate, regular rhythm and normal heart sounds.   Pulmonary/Chest: Effort normal and breath sounds normal.   Abdominal: Soft. Bowel sounds are normal. There is abdominal tenderness.   Musculoskeletal: Normal range of motion.   Neurological: He is  alert and oriented to person, place, and time.   Skin: Skin is warm.   Psychiatric: He has a normal mood and affect.       ASSESSMENT/PLAN  Ben was seen today for follow-up.    Diagnoses and all orders for this visit:    Migraine without aura and without status migrainosus, not intractable    Pain of metastatic malignancy  -     HYDROmorphone (Dilaudid) 4 mg tablet; Take 1 tablet (4 mg total) by mouth every 4 (four) hours as needed (pain) Max Daily Amount: 24 mg    Hepatocellular carcinoma (CMS/HCC) (HCC)    Nausea    Nonintractable episodic headache, unspecified headache type  -     SUMAtriptan (IMITREX) 100 mg tablet; Take 1 tablet (100 mg total) by mouth once as needed for migraine    Insomnia, unspecified type  -     traZODone (DESYREL) 100 mg tablet; Take 1 tablet (100 mg total) by mouth nightly    Will continue imitrex at 100mg dose. He also got neck injections which he believes are meant to help with his headaches.      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

## 2020-01-21 ENCOUNTER — OFFICE VISIT (OUTPATIENT)
Dept: ONCOLOGY | Facility: CLINIC | Age: 70
End: 2020-01-21
Payer: COMMERCIAL

## 2020-01-21 ENCOUNTER — LAB (OUTPATIENT)
Dept: ONCOLOGY | Facility: CLINIC | Age: 70
End: 2020-01-21
Payer: COMMERCIAL

## 2020-01-21 VITALS
HEART RATE: 62 BPM | SYSTOLIC BLOOD PRESSURE: 160 MMHG | OXYGEN SATURATION: 95 % | WEIGHT: 217.4 LBS | DIASTOLIC BLOOD PRESSURE: 83 MMHG | TEMPERATURE: 97.9 F | BODY MASS INDEX: 37.91 KG/M2

## 2020-01-21 DIAGNOSIS — C22.0 HEPATOCELLULAR CARCINOMA (CMS/HCC): ICD-10-CM

## 2020-01-21 DIAGNOSIS — G89.3 CANCER RELATED PAIN: Primary | ICD-10-CM

## 2020-01-21 DIAGNOSIS — C22.0 HEPATOCELLULAR CARCINOMA (CMS/HCC): Primary | ICD-10-CM

## 2020-01-21 LAB
AFP SERPL-MCNC: 14 NG/ML
ALBUMIN SERPL-MCNC: 4.2 G/DL (ref 3.5–5.3)
ALP SERPL-CCNC: 134 U/L (ref 45–115)
ALT SERPL-CCNC: 32 U/L (ref 0–52)
ANION GAP SERPL CALC-SCNC: 9 MMOL/L (ref 3–11)
AST SERPL-CCNC: 33 U/L (ref 0–39)
BASOPHILS # BLD MANUAL: 0 10*3/UL
BASOPHILS NFR BLD MANUAL: 1 % (ref 0–2)
BILIRUB SERPL-MCNC: 0.82 MG/DL (ref 0–1.4)
BUN SERPL-MCNC: 18 MG/DL (ref 7–25)
CALCIUM ALBUM COR SERPL-MCNC: 8.8 MG/DL (ref 8.6–10.3)
CALCIUM SERPL-MCNC: 9 MG/DL (ref 8.6–10.3)
CHLORIDE SERPL-SCNC: 105 MMOL/L (ref 98–107)
CO2 SERPL-SCNC: 25 MMOL/L (ref 21–32)
CREAT SERPL-MCNC: 0.87 MG/DL (ref 0.7–1.3)
EOSINOPHIL # BLD MANUAL: 0.1 10*3/UL
EOSINOPHIL NFR BLD MANUAL: 3 % (ref 0–3)
ERYTHROCYTE [DISTWIDTH] IN BLOOD BY AUTOMATED COUNT: 14.7 % (ref 11.5–15)
GFR SERPL CREATININE-BSD FRML MDRD: 89 ML/MIN/1.73M*2
GLUCOSE SERPL-MCNC: 200 MG/DL (ref 70–105)
HCT VFR BLD AUTO: 43 % (ref 38–50)
HGB BLD-MCNC: 14 G/DL (ref 13.2–17.2)
LYMPHOCYTES # BLD MANUAL: 0.3 10*3/UL
LYMPHOCYTES NFR BLD MANUAL: 9 % (ref 15–47)
MCH RBC QN AUTO: 29.9 PG (ref 29–34)
MCHC RBC AUTO-ENTMCNC: 32.5 G/DL (ref 32–36)
MCV RBC AUTO: 92.1 FL (ref 82–97)
MONOCYTES # BLD MANUAL: 0.2 10*3/UL
MONOCYTES NFR BLD MANUAL: 5 % (ref 5–13)
NEUTROPHILS # BLD MANUAL: 2.71 10*3/UL
NEUTS SEG # BLD MANUAL: 2.7 10*3/UL
NEUTS SEG NFR BLD MANUAL: 82 % (ref 46–70)
PLATELET # BLD AUTO: 104 10*3/UL (ref 130–350)
PLATELET # BLD EST: ABNORMAL 10*3/UL
PMV BLD AUTO: 8.5 FL (ref 6.9–10.8)
POTASSIUM SERPL-SCNC: 4 MMOL/L (ref 3.5–5.1)
PROT SERPL-MCNC: 6.9 G/DL (ref 6–8.3)
RBC # BLD AUTO: 4.67 10*6/ΜL (ref 4.1–5.8)
RBC MORPH BLD: NORMAL
SODIUM SERPL-SCNC: 139 MMOL/L (ref 135–145)
TOTAL CELLS COUNTED BLD: 100 CELLS
WBC # BLD AUTO: 3.3 10*3/UL (ref 3.7–9.6)
WBC NRBC COR # BLD: 3.3 10*3/UL

## 2020-01-21 PROCEDURE — 36415 COLL VENOUS BLD VENIPUNCTURE: CPT

## 2020-01-21 PROCEDURE — 85027 COMPLETE CBC AUTOMATED: CPT

## 2020-01-21 PROCEDURE — G0463 HOSPITAL OUTPT CLINIC VISIT: HCPCS

## 2020-01-21 PROCEDURE — 85007 BL SMEAR W/DIFF WBC COUNT: CPT

## 2020-01-21 PROCEDURE — 80053 COMPREHEN METABOLIC PANEL: CPT

## 2020-01-21 PROCEDURE — 82105 ALPHA-FETOPROTEIN SERUM: CPT

## 2020-01-21 PROCEDURE — 99214 OFFICE O/P EST MOD 30 MIN: CPT | Performed by: INTERNAL MEDICINE

## 2020-01-21 RX ORDER — DICLOFENAC SODIUM 10 MG/G
4 GEL TOPICAL 4 TIMES DAILY
Status: ON HOLD | COMMUNITY
End: 2021-01-01

## 2020-01-21 RX ORDER — LIDOCAINE 50 MG/G
1 PATCH TOPICAL DAILY
Qty: 30 PATCH | Refills: 0 | Status: SHIPPED | OUTPATIENT
Start: 2020-01-21 | End: 2020-02-20

## 2020-01-21 ASSESSMENT — ENCOUNTER SYMPTOMS
MYALGIAS: 1
ABDOMINAL PAIN: 1
FATIGUE: 1
NERVOUS/ANXIOUS: 1
ENDOCRINE NEGATIVE: 1
ARTHRALGIAS: 1
HEMATOLOGIC/LYMPHATIC NEGATIVE: 1
EYE PROBLEMS: 1
ABDOMINAL DISTENTION: 1

## 2020-01-21 ASSESSMENT — PAIN SCALES - GENERAL: PAINLEVEL: 0-NO PAIN

## 2020-01-21 NOTE — PROGRESS NOTES
Medical Oncology Follow-Up    Subjective   HISTORY OF PRESENT ILLNESS:  Ben Lai is a 69 y.o. male who  was diagnosed with hepatocellular carcinoma in November 2018.  He has an underlying hepatitis C which was supposedly cured.    Due to the extent of his HCC per GI at [facility] it was not amenable to local therapy, resection or radiation, referred to medical oncology   He was started on sorafenib early December 2018 but this appears to have been stopped in February due to significant side effects.  Patient states unequivocally that he started to feel better after a 3-week break but the sorafenib was recently restarted and he is having significant quality of life compromising side effects again.  This includes severe joint and muscle pain and fatigue.     7/29/19 restaging scans showed progressive disease in the liver, however no sings of metastatic disease     8/14/19 s/p   CT-guided  right lobe of the liver radiofrequency ablation.     9/2019 seen hepatobiliary team at [facility], and was discussed for tumor board, planned to have Y90  Treatment     10/8/19 MRI abdomen showed Nodular appearing liver with 2 areas of hyper/early arterial enhancement suggestive of hepatocellular carcinoma are both adjacent to the gallbladder which contains filling defects which are enhancing suggestive of neoplasm. This could be primary cholangiocarcinoma or hepatocellular carcinoma which is invading the gallbladder. The liver and gallbladder masses are very similar to the study from 7/29/2019.  Wedge-shaped signal abnormality in the anterior right lobe of the liver similar to the prior study could represent hepatic infarct.    11/7/19 s/p Y90 treatment at [facility]     12/9/19 MRI abdomen showed Cirrhosis and evidence of portal hypertension. Posttreatment changes of radioembolization throughout the right hepatic lobe, with increased diffuse heterogeneous arterial enhancement. This is the patient's  first posttreatment MRI. LR-TR equivocal. Continued attention on follow-up.  Grossly stable size of the lobulated enhancing endoluminal gallbladder mass with decreased but persistent areas of enhancing viable tumor.  Stable segment 4 exophytic 2.6 cm OPTN-5b lesion.  Unchanged right hepatic vein and right portal vein branch thrombi, without convincing thrombus enhancement.  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 Attention on follow-up.    12/11/19 s/p chemo embolization for possible residual viable tumor in the liver.    1/16/20  restaging MR showed  The circumscribed mass is seen in the anterior aspect of the right lobe of the liver demonstrates positive response to treatment with significantly reduced enhancement, no change in overall size.2.  Post radiation ablation defect in the anterior inferior right lobe with surrounding heterogeneous nonmass-like enhancement. It would be difficult to exclude active tumor within this surrounding area of enhancement.  Significant change in appearance of the posterior segment of the right lobe of the liver. Previous infiltrative nodular enhancement, now geographic nonmass-like enhancement with parenchymal volume loss. This could represent posttreatment related change. Difficult to exclude residual/progressive tumor    Today:   He feels well and feels    Right sided pain post TACE is still there    Energy is  Improving ECOG 2   appetite ist stable, weight is stable   Takes care of his ADLS   Stable, abd pain, chest pain and neuropathy           Encounter Diagnosis   Name Primary?   • Hepatocellular carcinoma (CMS/HCC) (HCC)    .     Treatment History:     Hepatocellular carcinoma (CMS/HCC) (HCC)    1/23/2019 Initial Diagnosis     Hepatocellular carcinoma (CMS/HCC) (HCC)      7/30/2019 -  Adjuvant Therapy     Hepatocellular - Sorafenib (Nexavar)  Plan Provider: ANDERSON Bryan  Treatment goal: Palliative  Line of treatment: [No plan line of  treatment]           RECENT EVENTS:    PAST MEDICAL HISTORY:   Past Medical History:   Diagnosis Date   • A-fib (CMS/HCC) (HCC)    • Allergy    • Anxiety    • Arthritis    • Asthma    • Blindness of right eye    • BPH (benign prostatic hyperplasia)    • Cardiovascular disease    • Cirrhosis (CMS/HCC) (HCC)     with possible liver mass by MRI 5/18, rec repeat in 8/2018   • Complication of anesthesia    • COPD (chronic obstructive pulmonary disease) (CMS/HCC) (HCC)    • Depression    • Endocrine disorder    • Gallstones    • Gastritis    • GERD (gastroesophageal reflux disease)    • Glaucoma    • Hearing loss     bilateral hearing aids   • Hemorrhoids    • Hepatitis C     Hep C, 2016 remission, complicated by cirrhosis.  MRI abd 5/30/18, rec 3 month follow up for focus of enhancement right and left hepatic lobe junction   • Hernia, abdominal    • High blood pressure    • IBS (irritable bowel syndrome)    • Infectious viral hepatitis    • Jaundice    • Kidney stones    • Obstructive sleep apnea syndrome    • Periodic limb movement disorder    • Persistent hypersomnia    • Persistent insomnia    • Pneumonia    • PONV (postoperative nausea and vomiting)    • Type 2 diabetes mellitus (CMS/HCC) (HCC)    • Urinary incontinence    • Wears partial dentures         GYNECOLOGICAL HISTORY (if applicable): NA     FAMILY HISTORY:   Family History   Problem Relation Age of Onset   • Arthritis Mother    • Rheum arthritis Mother    • Diabetes Mother    • Rectal cancer Mother    • Other Paternal Grandmother    • Other Paternal Grandfather    • Diabetes Other    • Rheum arthritis Other    • Osteoporosis Other         SOCIAL HISTORY:   Social History     Socioeconomic History   • Marital status:      Spouse name: Not on file   • Number of children: Not on file   • Years of education: Not on file   • Highest education level: Not on file   Occupational History   • Not on file   Social Needs   • Financial resource strain: Not on  file   • Food insecurity     Worry: Not on file     Inability: Not on file   • Transportation needs     Medical: Not on file     Non-medical: Not on file   Tobacco Use   • Smoking status: Never Smoker   • Smokeless tobacco: Never Used   Substance and Sexual Activity   • Alcohol use: No   • Drug use: No   • Sexual activity: Not on file   Lifestyle   • Physical activity     Days per week: Not on file     Minutes per session: Not on file   • Stress: Not on file   Relationships   • Social connections     Talks on phone: Not on file     Gets together: Not on file     Attends [employer] service: Not on file     Active member of club or organization: Not on file     Attends meetings of clubs or organizations: Not on file     Relationship status: Not on file   • Intimate partner violence     Fear of current or ex partner: Not on file     Emotionally abused: Not on file     Physically abused: Not on file     Forced sexual activity: Not on file   Other Topics Concern   • Not on file   Social History Narrative   • Not on file        ALLERGIES:   Allergies as of 01/21/2020 - Reviewed 12/17/2019   Allergen Reaction Noted   • Penicillins Anaphylaxis 10/19/2017   • Metformin Hives    • Adhesive tape-silicones  04/06/2018   • Banana  02/05/2019   • Broccoli  02/05/2019   • [place] sprout  02/05/2019   • Cabbage  02/05/2019   • Cantaloupe  02/05/2019   • Celery  02/05/2019   • Cheese  02/05/2019   • Cranberry  02/05/2019   • Egg     • Interferon violetta-2a  02/05/2019   • Latex     • Legumes  02/05/2019   • Lettuce  02/05/2019   • Milk  02/05/2019   • Mometasone     • Mometasone furoate     • Mustard  02/05/2019   • Nut - unspecified  02/05/2019   • Oats  02/05/2019   • Omeprazole     • Ondansetron hcl Nausea And Vomiting 05/01/2019   • Onion  02/05/2019   • Pepper (genus capsicum)  02/05/2019   • Pineapple  02/05/2019   • Primidone  05/01/2019   • Ribavirin Itching 10/19/2017   • Rosuvastatin  02/05/2019   • Sorafenib  04/04/2019   •  Spironolactone     • Statins-hmg-coa reductase inhibitors Itching and GI intolerance 04/06/2018   • Wheat  02/05/2019   • Yeast  02/05/2019   • Interferon violetta (human leuk. derived) Palpitations and Rash 09/20/2019   • Pantoprazole Itching and Rash 02/28/2018   • Peginterferon violetta-2b Palpitations 10/19/2017        MEDICATIONS:   Current Outpatient Medications   Medication Sig Dispense Refill   • escitalopram (LEXAPRO) 10 mg tablet Take 10 mg by mouth daily     • HYDROmorphone (Dilaudid) 4 mg tablet Take 1 tablet (4 mg total) by mouth every 4 (four) hours as needed (pain) Max Daily Amount: 24 mg 180 tablet 0   • traZODone (DESYREL) 100 mg tablet Take 1 tablet (100 mg total) by mouth nightly 30 tablet 0   • SUMAtriptan (IMITREX) 100 mg tablet Take 1 tablet (100 mg total) by mouth once as needed for migraine 30 tablet 4   • cetirizine (ZyrTEC) 10 mg tablet Take 10 mg by mouth daily     • loperamide (Imodium A-D) 2 mg tablet Take 2 tablets with first loose stool of the day, then up to 8 tablets daily (Patient not taking: Reported on 1/16/2020 ) 80 tablet 2   • prochlorperazine (COMPAZINE) 10 mg tablet 1 tab PO q6h prn nausea/vomiting. Max 40 mg/day. (Patient not taking: Reported on 12/19/2019 ) 30 tablet 5   • hydrOXYzine (ATARAX) 25 mg tablet Take 1 tablet (25 mg total) by mouth nightly (Patient not taking: Reported on 12/19/2019 ) 90 tablet 2   • carvedilol (COREG) 3.125 mg tablet Take 1 tablet (3.125 mg total) by mouth 2 (two) times a day with meals 180 tablet 2   • buPROPion SR (WELLBUTRIN SR) 100 mg 12 hr tablet Take 1 tablet (100 mg total) by mouth daily 90 tablet 1   • lactulose (GENERLAC) 10 gram/15 mL solution Take 15 mL (10 g total) by mouth daily 473 mL 2   • terazosin (HYTRIN) 5 mg capsule Take 1 capsule (5 mg total) by mouth 2 (two) times a day 180 capsule 2   • blood-glucose meter (ACCU-CHEK ANNABELLA PLUS METER) [facility] Use to test blood sugars 3 times daily (E11.65, non insulin depedent) AccuChek Annabella plus,  meter is 8 years old (Patient taking differently: Use to test blood sugars 3 times daily (E11.65, non insulin dependent) ) 1 each 0   • lisinopril (PRINIVIL,ZESTRIL) 20 mg tablet Take 1 tablet (20 mg total) by mouth daily 90 tablet 2   • furosemide (LASIX) 20 mg tablet Take 1 tablet (20 mg total) by mouth 2 (two) times a day. 180 tablet 2   • lansoprazole (PREVACID) 30 mg capsule Take 30 mg by mouth daily      • liraglutide (VICTOZA 2-JASE) 0.6 mg/0.1 mL (18 mg/3 mL) injection Inject 1.8 mg under the skin daily       • capsaicin (ZOSTRIX) 0.025 % cream Apply 1 application topically as needed for pain scale 1-3/10, 1-3/8.     • sodium chloride (SALINE NASAL) 0.65 % nasal spray Administer 1 spray into each nostril as needed for congestion or rhinitis      • white petrolatum (AQUAPHOR HEALING) 41 % ointment ointment Apply 1 application topically as needed.     • pramoxine-menthol (GOLD BOND MEDICATED ANTI-ITCH) 1-1 % cream Apply topically.     • brimonidine (ALPHAGAN) 0.2 % ophthalmic solution every 12 hours.     • GLYCERIN/PROPYLENE GLYCOL (ARTIFICIAL TEARS,GLYCERIN-PEG, OPHT) Administer 1 drop into affected eye(s) as needed (Dry eyes)      • latanoprost (XALATAN) 0.005 % ophthalmic solution INSTILL 1 DROP INTO AFFECTED EYE(S) BY OPHTHALMIC ROUTE ONCE DAILY INTHE EVENING 10 0     No current facility-administered medications for this visit.        REVIEW OF SYSTEMS:   Review of Systems   Constitutional: Positive for fatigue.   HENT:   Positive for hearing loss.    Eyes: Positive for eye problems.   Gastrointestinal: Positive for abdominal distention and abdominal pain.   Endocrine: Negative.    Genitourinary: Positive for bladder incontinence.    Musculoskeletal: Positive for arthralgias, gait problem and myalgias.   Skin: Negative.    Neurological: Positive for gait problem.   Hematological: Negative.    Psychiatric/Behavioral: The patient is nervous/anxious.    All other systems reviewed and are negative.      The  ECOG performance status today is 1 - Restricted strenuous activity; able to carry out light work.          Objective    PHYSICAL EXAM:   Wt 98.6 kg (217 lb 6.4 oz)   BMI 37.91 kg/m²    Body mass index is 37.91 kg/m².       Physical Exam  Constitutional:       Appearance: He is well-developed.   HENT:      Head: Normocephalic and atraumatic.   Eyes:      Pupils: Pupils are equal, round, and reactive to light.      Comments: Right eye less responsive    Neck:      Musculoskeletal: Normal range of motion and neck supple.   Cardiovascular:      Rate and Rhythm: Normal rate and regular rhythm.   Pulmonary:      Effort: No respiratory distress.      Breath sounds: Normal breath sounds. No wheezing.   Abdominal:      General: There is distension.      Palpations: Abdomen is soft.      Tenderness: There is no abdominal tenderness.   Musculoskeletal: Normal range of motion.   Lymphadenopathy:      Cervical: No cervical adenopathy.   Skin:     General: Skin is warm.   Neurological:      Mental Status: He is alert and oriented to person, place, and time.      reveals a generally healthy pleasant appearing gentleman who was not further examined based on the below.    DIAGNOSTIC REPORTS REVIEWED:   Imaging:  Reviewed     Assessment/Plan    IMPRESSION:   This is a pleasant 69-year-old male with diagnosis of hepatocellular carcinoma involving the right lobe, locally advanced disease, per the chart not amenable to local therapy, started sorafenib December 2018 however stopped in February 2019 due to intolerance without any dose reductions.  Has been off of treatment since then, restaging CAT scan and MRI  7/2019 show progressive disease in the liver.  Started  sorafenib 8/2019 at a lower dose 200 mg twice daily, s/p RFA 8/2019, was referred to [facility], s/p Y90 treatment followed by TACE, MR showed response to treatment without progression on 1/16/20 currently sorafenib on hold until evidence of disease progression          Plan:  - hold Sorafenib 200 mg p.o. twice daily, until evidence of disease progression   - Follow-up in 12 weeks with the labs and restaging MR    - to continue follow up with IR at [facility]    - restaging scans  In April and every 3 months .         Physician: Milka Reynoso MD

## 2020-02-05 ENCOUNTER — TELEPHONE (OUTPATIENT)
Dept: VASCULAR SURGERY | Facility: CLINIC | Age: 70
End: 2020-02-05

## 2020-02-05 NOTE — TELEPHONE ENCOUNTER
Called wife Tarsha.     Informed her that I did consult with Dr Giordano regarding MRI.     Asked that they return my call to go over results.       *called wife again on home phone.   Dr. Ashton is planning on putting pt on new chemo drug.   Next appt is in April with Dr. Ashton.     I informed them that we will be discussing their case at the liver tumor board in regards to MRI to see what the plan of care would be.     If MRI looks clean then we will plan to see them back in 3 mos time  If not then we will see if he needs to continue chemo     Will call them after tumor board on Friday 2/7.       Erika VALDEZ RN, BSN  Interventional Radiology Nurse Coordinator   Phone: [phone]

## 2020-02-07 ENCOUNTER — TELEPHONE (OUTPATIENT)
Dept: VASCULAR SURGERY | Facility: CLINIC | Age: 70
End: 2020-02-07

## 2020-02-07 NOTE — TELEPHONE ENCOUNTER
Called wife and informed her that we did discuss pt at liver tumor conference and that since pt was not able to tolerate sorafenib then it was recommended for pt to perhaps connect with our Oncology team for recommendation regarding another chemo drug.     I did inform her that at times its quite hard to get a hold of our Oncology team for discussion. I will send a msg to Dr. Tucker in regards to what is the best option to have him and Dr. Ashton discuss.     She states that pt does have another follow up appt in April in which they are attending again for follow up.      Will continue to monitor.     Erika VALDEZ RN, BSN  Interventional Radiology Nurse Coordinator   Phone: [phone]

## 2020-02-13 ENCOUNTER — OFFICE VISIT (OUTPATIENT)
Dept: PALLIATIVE CARE | Facility: CLINIC | Age: 70
End: 2020-02-13
Payer: COMMERCIAL

## 2020-02-13 VITALS
HEART RATE: 60 BPM | TEMPERATURE: 98.4 F | SYSTOLIC BLOOD PRESSURE: 149 MMHG | BODY MASS INDEX: 38.53 KG/M2 | WEIGHT: 221 LBS | DIASTOLIC BLOOD PRESSURE: 69 MMHG | RESPIRATION RATE: 18 BRPM | OXYGEN SATURATION: 96 %

## 2020-02-13 DIAGNOSIS — C22.0 HEPATOCELLULAR CARCINOMA (CMS/HCC): ICD-10-CM

## 2020-02-13 DIAGNOSIS — G43.009 MIGRAINE WITHOUT AURA AND WITHOUT STATUS MIGRAINOSUS, NOT INTRACTABLE: Primary | ICD-10-CM

## 2020-02-13 DIAGNOSIS — G89.3 PAIN OF METASTATIC MALIGNANCY: ICD-10-CM

## 2020-02-13 PROCEDURE — G0463 HOSPITAL OUTPT CLINIC VISIT: HCPCS

## 2020-02-13 PROCEDURE — 99213 OFFICE O/P EST LOW 20 MIN: CPT | Performed by: INTERNAL MEDICINE

## 2020-02-13 ASSESSMENT — ENCOUNTER SYMPTOMS
NAUSEA: 0
RESPIRATORY NEGATIVE: 1
ALLERGIC/IMMUNOLOGIC NEGATIVE: 1
DIZZINESS: 1
HEADACHES: 1
SLEEP DISTURBANCE: 1
ABDOMINAL PAIN: 1
DIARRHEA: 0
ROS GI COMMENTS: HARD STOOLS
HEMATOLOGIC/LYMPHATIC NEGATIVE: 1
APPETITE CHANGE: 1
VOMITING: 0
ENDOCRINE NEGATIVE: 1
FATIGUE: 1
ACTIVITY CHANGE: 1
MUSCULOSKELETAL NEGATIVE: 1
CARDIOVASCULAR NEGATIVE: 1
ABDOMINAL DISTENTION: 1

## 2020-02-13 ASSESSMENT — PAIN SCALES - GENERAL: PAINLEVEL: 2

## 2020-02-13 NOTE — PROGRESS NOTES
"SUBJECTIVE:     The pt presents today for follow up. He is most bothered by migraines. He usually doesn't have a chance to take imitrex. He also notes spontaneous dizziness, usually with position change (sit, stand, lying down, turning, etc). He has decreased fluid intake stating \"I just don't think of it\". He is due to get an injection in the back of his neck.     His wife reports that he has been sleeping \"an awful lot\" over the past week. He says that he is sleeping ok at night, especially if he takes trazodone. He is doing better with insomnia since increase in trazodone. He was evaluated by [facility], who are thinking about putting him on an oral chemo treatment. This is to be coordinated with Dr. Reynoso.        Past Medical History:   Diagnosis Date   • A-fib (CMS/HCC) (HCC)    • Allergy    • Anxiety    • Arthritis    • Asthma    • Blindness of right eye    • BPH (benign prostatic hyperplasia)    • Cardiovascular disease    • Cirrhosis (CMS/HCC) (HCC)     with possible liver mass by MRI 5/18, rec repeat in 8/2018   • Complication of anesthesia    • COPD (chronic obstructive pulmonary disease) (CMS/HCC) (HCC)    • Depression    • Endocrine disorder    • Gallstones    • Gastritis    • GERD (gastroesophageal reflux disease)    • Glaucoma    • Hearing loss     bilateral hearing aids   • Hemorrhoids    • Hepatitis C     Hep C, 2016 remission, complicated by cirrhosis.  MRI abd 5/30/18, rec 3 month follow up for focus of enhancement right and left hepatic lobe junction   • Hernia, abdominal    • High blood pressure    • IBS (irritable bowel syndrome)    • Infectious viral hepatitis    • Jaundice    • Kidney stones    • Obstructive sleep apnea syndrome    • Periodic limb movement disorder    • Persistent hypersomnia    • Persistent insomnia    • Pneumonia    • PONV (postoperative nausea and vomiting)    • Type 2 diabetes mellitus (CMS/HCC) (HCC)    • Urinary incontinence    • Wears partial dentures      Past Surgical " History:   Procedure Laterality Date   • COLONOSCOPY  10/27/2016    Pili, normal, repeat 10 years   • CT ABLATION LIVER RADIOFREQUENCY  8/14/2019    CT ABLATION LIVER RADIOFREQUENCY 8/14/2019 [facility] MIS CT SCAN   • ESOPHAGOGASTRODUODENOSCOPY N/A 2/22/2018    Procedure: EGD - ESOPHAGOGASTRODUODENOSCOPY with banding  x 2 with crna;  Surgeon: William Rivas MD;  Location: [facility];  Service: Endoscopy;  Laterality: N/A;   • ESOPHAGOGASTRODUODENOSCOPY N/A 4/6/2018    Procedure: EGD - ESOPHAGOGASTRODUODENOSCOPY with crna;  Surgeon: William Rivas MD;  Location: [facility];  Service: Endoscopy;  Laterality: N/A;   • ESOPHAGOGASTRODUODENOSCOPY N/A 5/7/2019    Procedure: EGD - ESOPHAGOGASTRODUODENOSCOPY;  Surgeon: William Rivas MD;  Location: [facility];  Service: Endoscopy;  Laterality: N/A;   • HAND SURGERY Left     thumb   • HERNIA REPAIR  01/01/1965   • KNEE ARTHROSCOPY  09/19/2017    left knee, with partial medial meniscectomy; limited chondroplasty, patellofemoral joint   • LIVER BIOPSY      by Dr. Alejandre   • OTHER SURGICAL HISTORY      esophageal varices, banded   • VASECTOMY      at approx age of 25     Family History   Problem Relation Age of Onset   • Arthritis Mother    • Rheum arthritis Mother    • Diabetes Mother    • Rectal cancer Mother    • Other Paternal Grandmother    • Other Paternal Grandfather    • Diabetes Other    • Rheum arthritis Other    • Osteoporosis Other      Current Outpatient Medications   Medication Sig Dispense Refill   • diclofenac sodium (VOLTAREN) 1 % gel Apply 1 Dose topically as needed     • escitalopram (LEXAPRO) 10 mg tablet Take 10 mg by mouth daily     • HYDROmorphone (Dilaudid) 4 mg tablet Take 1 tablet (4 mg total) by mouth every 4 (four) hours as needed (pain) Max Daily Amount: 24 mg 180 tablet 0   • traZODone (DESYREL) 100 mg tablet Take 1 tablet (100 mg total) by mouth nightly 30 tablet 0   • SUMAtriptan (IMITREX) 100 mg tablet Take 1 tablet (100 mg total) by mouth  once as needed for migraine 30 tablet 4   • carvedilol (COREG) 3.125 mg tablet Take 1 tablet (3.125 mg total) by mouth 2 (two) times a day with meals 180 tablet 2   • buPROPion SR (WELLBUTRIN SR) 100 mg 12 hr tablet Take 1 tablet (100 mg total) by mouth daily 90 tablet 1   • lactulose (GENERLAC) 10 gram/15 mL solution Take 15 mL (10 g total) by mouth daily 473 mL 2   • terazosin (HYTRIN) 5 mg capsule Take 1 capsule (5 mg total) by mouth 2 (two) times a day 180 capsule 2   • lisinopril (PRINIVIL,ZESTRIL) 20 mg tablet Take 1 tablet (20 mg total) by mouth daily 90 tablet 2   • liraglutide (VICTOZA 2-JASE) 0.6 mg/0.1 mL (18 mg/3 mL) injection Inject 1.8 mg under the skin daily       • sodium chloride (SALINE NASAL) 0.65 % nasal spray Administer 1 spray into each nostril as needed for congestion or rhinitis      • brimonidine (ALPHAGAN) 0.2 % ophthalmic solution every 12 hours.     • GLYCERIN/PROPYLENE GLYCOL (ARTIFICIAL TEARS,GLYCERIN-PEG, OPHT) Administer 1 drop into affected eye(s) as needed (Dry eyes)      • latanoprost (XALATAN) 0.005 % ophthalmic solution INSTILL 1 DROP INTO AFFECTED EYE(S) BY OPHTHALMIC ROUTE ONCE DAILY INTHE EVENING 10 0   • lidocaine (LIDODERM) 5 % patch Apply 1 patch topically daily Remove & discard patch within 12 hours or as directed by MD. 30 patch 0   • cetirizine (ZyrTEC) 10 mg tablet Take 10 mg by mouth daily     • loperamide (Imodium A-D) 2 mg tablet Take 2 tablets with first loose stool of the day, then up to 8 tablets daily (Patient not taking: Reported on 2/13/2020 ) 80 tablet 2   • prochlorperazine (COMPAZINE) 10 mg tablet 1 tab PO q6h prn nausea/vomiting. Max 40 mg/day. (Patient not taking: Reported on 2/13/2020 ) 30 tablet 5   • hydrOXYzine (ATARAX) 25 mg tablet Take 1 tablet (25 mg total) by mouth nightly (Patient not taking: Reported on 2/13/2020 ) 90 tablet 2   • blood-glucose meter (ACCU-CHEK ADELE PLUS METER) misc Use to test blood sugars 3 times daily (E11.65, non insulin  depedent) AccuChskyler Annabella plus, meter is 8 years old (Patient taking differently: Use to test blood sugars 3 times daily (E11.65, non insulin dependent) ) 1 each 0   • furosemide (LASIX) 20 mg tablet Take 1 tablet (20 mg total) by mouth 2 (two) times a day. 180 tablet 2   • lansoprazole (PREVACID) 30 mg capsule Take 30 mg by mouth daily      • capsaicin (ZOSTRIX) 0.025 % cream Apply 1 application topically as needed for pain scale 1-3/10, 1-3/8.     • white petrolatum (AQUAPHOR HEALING) 41 % ointment ointment Apply 1 application topically as needed.     • pramoxine-menthol (GOLD BOND MEDICATED ANTI-ITCH) 1-1 % cream Apply topically.       No current facility-administered medications for this visit.         Lab Results   Component Value Date    GLUCOSE 200 (H) 01/21/2020    BUN 18 01/21/2020     01/21/2020    K 4.0 01/21/2020     01/21/2020    CO2 25 01/21/2020    ANIONGAP 9 01/21/2020    CALCIUM 9.0 01/21/2020        @RESUFASTREFRESH(CHOL,HDL,LDL,TRIG,LDLDirect)@     Review of Systems   Constitutional: Positive for activity change, appetite change and fatigue.   HENT: Negative.    Respiratory: Negative.    Cardiovascular: Negative.    Gastrointestinal: Positive for abdominal distention and abdominal pain. Negative for diarrhea, nausea and vomiting.        Hard stools   Endocrine: Negative.    Genitourinary: Negative.    Musculoskeletal: Negative.    Skin: Negative.    Allergic/Immunologic: Negative.    Neurological: Positive for dizziness and headaches.   Hematological: Negative.    Psychiatric/Behavioral: Positive for sleep disturbance.        I have reviewed and agree with the review of systems that was completed by the Clinic Support Staff 2/13/2020.     OBJECTIVE:  /69 (BP Location: Right arm, Patient Position: Sitting, Cuff Size: Regular Adult)   Pulse 60   Temp 36.9 °C (98.4 °F) (Temporal)   Resp 18   Wt 100 kg (221 lb)   SpO2 96%   BMI 38.53 kg/m²  No LMP for male patient.              Physical Exam   Constitutional: He is oriented to person, place, and time. He appears well-developed and well-nourished.   HENT:   Head: Normocephalic.   Eyes: Pupils are equal, round, and reactive to light.   Neck: Normal range of motion.   Cardiovascular: Normal rate, regular rhythm and normal heart sounds.   Pulmonary/Chest: Effort normal and breath sounds normal.   Abdominal: Soft. Bowel sounds are normal. He exhibits no mass. There is abdominal tenderness. There is no rebound.   Musculoskeletal: Normal range of motion.         General: No edema.   Neurological: He is alert and oriented to person, place, and time.   Skin: Skin is warm.   Psychiatric: He has a normal mood and affect.       ASSESSMENT/PLAN  Ben was seen today for follow-up.    Diagnoses and all orders for this visit:    Migraine without aura and without status migrainosus, not intractable    Pain of metastatic malignancy    Hepatocellular carcinoma (CMS/HCC) (HCC)    Dilaudid is doing well so far with pain. Will continue without change. Lidoderm patches were also ordered and work well.   Trazodone is effective for insomnia.   He was encouraged to keep imitrex in his wallet so that it is with him all the time.      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

## 2020-03-11 ENCOUNTER — HEALTH MAINTENANCE LETTER (OUTPATIENT)
Age: 70
End: 2020-03-11

## 2020-03-18 DIAGNOSIS — I48.91 ATRIAL FIBRILLATION, UNSPECIFIED TYPE (CMS/HCC): ICD-10-CM

## 2020-03-18 RX ORDER — CARVEDILOL 3.12 MG/1
3.12 TABLET ORAL 2 TIMES DAILY WITH MEALS
Qty: 180 TABLET | Refills: 0 | Status: SHIPPED | OUTPATIENT
Start: 2020-03-18

## 2020-03-30 ENCOUNTER — TELEPHONE (OUTPATIENT)
Dept: ONCOLOGY | Facility: CLINIC | Age: 70
End: 2020-03-30

## 2020-03-30 NOTE — PROGRESS NOTES
Called patient to let him know that he is authorized to get his MRI done at [facility] prior to his appt. Patient will get his labs drawn at the main lab before his scan then will come to [facility] for appt. No questions at this time. JOVON Thompson

## 2020-03-30 NOTE — PROGRESS NOTES
Patient called wanting to let [facility] know [facility] and [facility] and his lab and wants to know if he can have them down here at the hospital since [facility]. Please call

## 2020-04-21 ENCOUNTER — HOSPITAL ENCOUNTER (OUTPATIENT)
Dept: MRI IMAGING | Facility: HOSPITAL | Age: 70
Discharge: HOME | End: 2020-04-21
Payer: COMMERCIAL

## 2020-04-21 ENCOUNTER — LAB (OUTPATIENT)
Dept: LAB | Facility: HOSPITAL | Age: 70
End: 2020-04-21
Payer: COMMERCIAL

## 2020-04-21 ENCOUNTER — TRANSFERRED RECORDS (OUTPATIENT)
Dept: HEALTH INFORMATION MANAGEMENT | Facility: CLINIC | Age: 70
End: 2020-04-21

## 2020-04-21 DIAGNOSIS — C22.0 HEPATOCELLULAR CARCINOMA (CMS/HCC): ICD-10-CM

## 2020-04-21 LAB
CREAT BLD-MCNC: 0.8 MG/DL (ref 0.7–1.3)
POCT EGFR, VENOUS (CALCULATED): >60 ML/M/1.73M*2

## 2020-04-21 PROCEDURE — 36415 COLL VENOUS BLD VENIPUNCTURE: CPT | Performed by: INTERNAL MEDICINE

## 2020-04-21 PROCEDURE — 82565 ASSAY OF CREATININE: CPT | Mod: QW

## 2020-04-21 PROCEDURE — A9579 GAD-BASE MR CONTRAST NOS,1ML: HCPCS | Performed by: INTERNAL MEDICINE

## 2020-04-21 PROCEDURE — 2550000100 HC RX 255: Performed by: INTERNAL MEDICINE

## 2020-04-21 PROCEDURE — G1004 CDSM NDSC: HCPCS

## 2020-04-21 RX ADMIN — GADOTERIDOL 20 ML: 279.3 INJECTION, SOLUTION INTRAVENOUS at 08:21

## 2020-04-23 ENCOUNTER — APPOINTMENT (OUTPATIENT)
Dept: ONCOLOGY | Facility: CLINIC | Age: 70
End: 2020-04-23
Payer: COMMERCIAL

## 2020-04-23 ENCOUNTER — TELEPHONE - BILLABLE (OUTPATIENT)
Dept: ONCOLOGY | Facility: CLINIC | Age: 70
End: 2020-04-23
Payer: COMMERCIAL

## 2020-04-23 ENCOUNTER — TELEPHONE (OUTPATIENT)
Dept: VASCULAR SURGERY | Facility: CLINIC | Age: 70
End: 2020-04-23

## 2020-04-23 VITALS
HEART RATE: 55 BPM | SYSTOLIC BLOOD PRESSURE: 124 MMHG | DIASTOLIC BLOOD PRESSURE: 49 MMHG | BODY MASS INDEX: 38.05 KG/M2 | OXYGEN SATURATION: 96 % | WEIGHT: 218.2 LBS | TEMPERATURE: 98.3 F

## 2020-04-23 DIAGNOSIS — C22.0 HEPATOCELLULAR CARCINOMA (CMS/HCC): ICD-10-CM

## 2020-04-23 LAB
AFP SERPL-MCNC: 16 NG/ML
ALBUMIN SERPL-MCNC: 4.6 G/DL (ref 3.5–5.3)
ALP SERPL-CCNC: 135 U/L (ref 45–115)
ALT SERPL-CCNC: 27 U/L (ref 7–52)
ANION GAP SERPL CALC-SCNC: 7 MMOL/L (ref 3–11)
AST SERPL-CCNC: 35 U/L
BILIRUB SERPL-MCNC: 1.01 MG/DL (ref 0.2–1.4)
BUN SERPL-MCNC: 22 MG/DL (ref 7–25)
CALCIUM ALBUM COR SERPL-MCNC: 9.1 MG/DL (ref 8.6–10.3)
CALCIUM SERPL-MCNC: 9.6 MG/DL (ref 8.6–10.3)
CHLORIDE SERPL-SCNC: 102 MMOL/L (ref 98–107)
CO2 SERPL-SCNC: 26 MMOL/L (ref 21–32)
CREAT SERPL-MCNC: 0.84 MG/DL (ref 0.7–1.3)
EOSINOPHIL # BLD MANUAL: 0 10*3/UL
EOSINOPHIL NFR BLD MANUAL: 1 % (ref 0–3)
ERYTHROCYTE [DISTWIDTH] IN BLOOD BY AUTOMATED COUNT: 15.2 % (ref 11.5–15)
GFR SERPL CREATININE-BSD FRML MDRD: 89 ML/MIN/1.73M*2
GLUCOSE SERPL-MCNC: 229 MG/DL (ref 70–105)
HCT VFR BLD AUTO: 40.8 % (ref 38–50)
HGB BLD-MCNC: 13.6 G/DL (ref 13.2–17.2)
LYMPHOCYTES # BLD MANUAL: 0.4 10*3/UL
LYMPHOCYTES NFR BLD MANUAL: 11 % (ref 15–47)
MCH RBC QN AUTO: 29.1 PG (ref 29–34)
MCHC RBC AUTO-ENTMCNC: 33.4 G/DL (ref 32–36)
MCV RBC AUTO: 87.1 FL (ref 82–97)
MONOCYTES # BLD MANUAL: 0.2 10*3/UL
MONOCYTES NFR BLD MANUAL: 7 % (ref 5–13)
NEUTROPHILS # BLD MANUAL: 2.64 10*3/UL
NEUTS BAND # BLD MANUAL: 0 10*3/UL
NEUTS BAND NFR BLD MANUAL: 1 % (ref 0–10)
NEUTS SEG # BLD MANUAL: 2.6 10*3/UL
NEUTS SEG NFR BLD MANUAL: 79 % (ref 46–70)
PLATELET # BLD AUTO: 111 10*3/UL (ref 130–350)
PLATELET CLUMP BLD QL SMEAR: ABNORMAL
PLATELET MORPHOLOGY IN BLOOD: NORMAL
PMV BLD AUTO: 8 FL (ref 6.9–10.8)
POTASSIUM SERPL-SCNC: 4.3 MMOL/L (ref 3.5–5.1)
PROT SERPL-MCNC: 7.2 G/DL (ref 6–8.3)
RBC # BLD AUTO: 4.68 10*6/ΜL (ref 4.1–5.8)
RBC MORPH BLD: NORMAL
SODIUM SERPL-SCNC: 135 MMOL/L (ref 135–145)
TOTAL CELLS COUNTED BLD: 100 CELLS
VARIANT LYMPHS # BLD MANUAL: 0 10*3/UL
VARIANT LYMPHS NFR BLD: 1 % (ref 0–1)
WBC # BLD AUTO: 3.3 10*3/UL (ref 3.7–9.6)
WBC NRBC COR # BLD: 3.3 10*3/UL

## 2020-04-23 PROCEDURE — 99443 *INACTIVE DO NOT USE* PR PHYS/QHP TELEPHONE EVALUATION 21-30 MIN: CPT | Mod: 95 | Performed by: INTERNAL MEDICINE

## 2020-04-23 PROCEDURE — 85007 BL SMEAR W/DIFF WBC COUNT: CPT

## 2020-04-23 PROCEDURE — 85027 COMPLETE CBC AUTOMATED: CPT

## 2020-04-23 PROCEDURE — 36415 COLL VENOUS BLD VENIPUNCTURE: CPT

## 2020-04-23 PROCEDURE — 80053 COMPREHEN METABOLIC PANEL: CPT

## 2020-04-23 PROCEDURE — 82105 ALPHA-FETOPROTEIN SERUM: CPT

## 2020-04-23 ASSESSMENT — ENCOUNTER SYMPTOMS
FATIGUE: 1
ENDOCRINE NEGATIVE: 1
NERVOUS/ANXIOUS: 1
ABDOMINAL PAIN: 1
ABDOMINAL DISTENTION: 1
EYE PROBLEMS: 1
MYALGIAS: 1
ARTHRALGIAS: 1
HEMATOLOGIC/LYMPHATIC NEGATIVE: 1

## 2020-04-23 NOTE — PROGRESS NOTES
Subjective   Per discussion with Milka Reynoso MD, Ben, has verbally consented to be treated via a telephone based visit: Yes. A total of 30 minutes were required for this telephone based visit.   Patient Location: Home  Provider Location: home  Technology used by Provider: Phone    Medical Oncology Follow-Up    Subjective   HISTORY OF PRESENT ILLNESS:  Ben Lai is a 70 y.o. male who  was diagnosed with hepatocellular carcinoma in November 2018.  He has an underlying hepatitis C which was supposedly cured.    Due to the extent of his HCC per GI at [facility] it was not amenable to local therapy, resection or radiation, referred to medical oncology   He was started on sorafenib early December 2018 but this appears to have been stopped in February due to significant side effects.  Patient states unequivocally that he started to feel better after a 3-week break but the sorafenib was recently restarted and he is having significant quality of life compromising side effects again.  This includes severe joint and muscle pain and fatigue.     7/29/19 restaging scans showed progressive disease in the liver, however no sings of metastatic disease     8/14/19 s/p   CT-guided  right lobe of the liver radiofrequency ablation.     9/2019 seen hepatobiliary team at [facility], and was discussed for tumor board, planned to have Y90  Treatment     10/8/19 MRI abdomen showed Nodular appearing liver with 2 areas of hyper/early arterial enhancement suggestive of hepatocellular carcinoma are both adjacent to the gallbladder which contains filling defects which are enhancing suggestive of neoplasm. This could be primary cholangiocarcinoma or hepatocellular carcinoma which is invading the gallbladder. The liver and gallbladder masses are very similar to the study from 7/29/2019.  Wedge-shaped signal abnormality in the anterior right lobe of the liver similar to the prior study could represent hepatic  infarct.    11/7/19 s/p Y90 treatment at [facility]     12/9/19 MRI abdomen showed Cirrhosis and evidence of portal hypertension. Posttreatment changes of radioembolization throughout the right hepatic lobe, with increased diffuse heterogeneous arterial enhancement. This is the patient's first posttreatment MRI. LR-TR equivocal. Continued attention on follow-up.  Grossly stable size of the lobulated enhancing endoluminal gallbladder mass with decreased but persistent areas of enhancing viable tumor.  Stable segment 4 exophytic 2.6 cm OPTN-5b lesion.  Unchanged right hepatic vein and right portal vein branch thrombi, without convincing thrombus enhancement.  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 Attention on follow-up.    12/11/19 s/p chemo embolization for possible residual viable tumor in the liver.    1/16/20  restaging MR showed  The circumscribed mass is seen in the anterior aspect of the right lobe of the liver demonstrates positive response to treatment with significantly reduced enhancement, no change in overall size.2.  Post radiation ablation defect in the anterior inferior right lobe with surrounding heterogeneous nonmass-like enhancement. It would be difficult to exclude active tumor within this surrounding area of enhancement.  Significant change in appearance of the posterior segment of the right lobe of the liver. Previous infiltrative nodular enhancement, now geographic nonmass-like enhancement with parenchymal volume loss. This could represent posttreatment related change. Difficult to exclude residual/progressive tumor    4/21/20 MR abdomen showed Continued evolution of post embolic/radiotherapy changes to the right hepatic lobe with large region of volume loss of enhancement likely representing fibrosis.  Previous HCC anteriorly along this region continues to regress with no evidence of enhancement to suggest recurrent or residual tumor. Previous nodular  lesion inferiorly along the treatment bed which also likely represented a site of HCC also continues to regress with no evidence of recurrence or residual disease. No new suspicious liver lesions are demonstrated. Cirrhosis. Splenomegaly. Portosystemic collaterals.    Today:   He feels well and feels    Right sided pain post TACE is still there, managed by palliative medicine.   Energy is  Improving ECOG 2   appetite ist stable, weight is stable   Takes care of his ADLS   Stable, abd pain, chest pain and neuropathy           Encounter Diagnosis   Name Primary?   • Hepatocellular carcinoma (CMS/HCC) (HCC)    .     Treatment History:     Hepatocellular carcinoma (CMS/HCC) (HCC)    1/23/2019 Initial Diagnosis     Hepatocellular carcinoma (CMS/HCC) (HCC)      7/30/2019 - 12/9/2019 Adjuvant Therapy     Hepatocellular - Sorafenib (Nexavar)  Plan Provider: ANDERSON Bryan  Treatment goal: Palliative  Line of treatment: [No plan line of treatment]           RECENT EVENTS:    PAST MEDICAL HISTORY:   Past Medical History:   Diagnosis Date   • A-fib (CMS/HCC) (HCC)    • Allergy    • Anxiety    • Arthritis    • Asthma    • Blindness of right eye    • BPH (benign prostatic hyperplasia)    • Cardiovascular disease    • Cirrhosis (CMS/HCC) (HCC)     with possible liver mass by MRI 5/18, rec repeat in 8/2018   • Complication of anesthesia    • COPD (chronic obstructive pulmonary disease) (CMS/HCC) (HCC)    • Depression    • Endocrine disorder    • Gallstones    • Gastritis    • GERD (gastroesophageal reflux disease)    • Glaucoma    • Hearing loss     bilateral hearing aids   • Hemorrhoids    • Hepatitis C     Hep C, 2016 remission, complicated by cirrhosis.  MRI abd 5/30/18, rec 3 month follow up for focus of enhancement right and left hepatic lobe junction   • Hernia, abdominal    • High blood pressure    • IBS (irritable bowel syndrome)    • Infectious viral hepatitis    • Jaundice    • Kidney stones    • Obstructive  sleep apnea syndrome    • Periodic limb movement disorder    • Persistent hypersomnia    • Persistent insomnia    • Pneumonia    • PONV (postoperative nausea and vomiting)    • Type 2 diabetes mellitus (CMS/HCC) ([facility])    • Urinary incontinence    • Wears partial dentures         GYNECOLOGICAL HISTORY (if applicable): NA     FAMILY HISTORY:   Family History   Problem Relation Age of Onset   • Arthritis Mother    • Rheum arthritis Mother    • Diabetes Mother    • Rectal cancer Mother    • Other Paternal Grandmother    • Other Paternal Grandfather    • Diabetes Other    • Rheum arthritis Other    • Osteoporosis Other         SOCIAL HISTORY:   Social History     Socioeconomic History   • Marital status:      Spouse name: Not on file   • Number of children: Not on file   • Years of education: Not on file   • Highest education level: Not on file   Occupational History   • Not on file   Social Needs   • Financial resource strain: Not on file   • Food insecurity     Worry: Not on file     Inability: Not on file   • Transportation needs     Medical: Not on file     Non-medical: Not on file   Tobacco Use   • Smoking status: Never Smoker   • Smokeless tobacco: Never Used   Substance and Sexual Activity   • Alcohol use: No   • Drug use: No   • Sexual activity: Not on file   Lifestyle   • Physical activity     Days per week: Not on file     Minutes per session: Not on file   • Stress: Not on file   Relationships   • Social connections     Talks on phone: Not on file     Gets together: Not on file     Attends [employer] witness service: Not on file     Active member of club or organization: Not on file     Attends meetings of clubs or organizations: Not on file     Relationship status: Not on file   • Intimate partner violence     Fear of current or ex partner: Not on file     Emotionally abused: Not on file     Physically abused: Not on file     Forced sexual activity: Not on file   Other Topics Concern   • Not on file   Social  History Narrative   • Not on file        ALLERGIES:   Allergies as of 04/23/2020 - Reviewed 01/21/2020   Allergen Reaction Noted   • Penicillins Anaphylaxis 10/19/2017   • Metformin Hives    • Adhesive tape-silicones  04/06/2018   • Banana  02/05/2019   • Broccoli  02/05/2019   • [place] sprout  02/05/2019   • Cabbage  02/05/2019   • Cantaloupe  02/05/2019   • Celery  02/05/2019   • Cheese  02/05/2019   • Cranberry  02/05/2019   • Egg     • Interferon violetta-2a  02/05/2019   • Latex     • Legumes  02/05/2019   • Lettuce  02/05/2019   • Milk  02/05/2019   • Mometasone     • Mometasone furoate     • Mustard  02/05/2019   • Nut - unspecified  02/05/2019   • Oats  02/05/2019   • Omeprazole     • Ondansetron hcl Nausea And Vomiting 05/01/2019   • Onion  02/05/2019   • Pepper (genus capsicum)  02/05/2019   • Pineapple  02/05/2019   • Primidone  05/01/2019   • Ribavirin Itching 10/19/2017   • Rosuvastatin  02/05/2019   • Sorafenib  04/04/2019   • Spironolactone     • Statins-hmg-coa reductase inhibitors Itching and GI intolerance 04/06/2018   • Wheat  02/05/2019   • Yeast  02/05/2019   • Interferon violetta (human leuk. derived) Palpitations and Rash 09/20/2019   • Pantoprazole Itching and Rash 02/28/2018   • Peginterferon violetta-2b Palpitations 10/19/2017        MEDICATIONS:   Current Outpatient Medications   Medication Sig Dispense Refill   • carvediloL (COREG) 3.125 mg tablet Take 1 tablet (3.125 mg total) by mouth 2 (two) times a day with meals 180 tablet 0   • diclofenac sodium (VOLTAREN) 1 % gel Apply 1 Dose topically as needed     • HYDROmorphone (Dilaudid) 4 mg tablet Take 1 tablet (4 mg total) by mouth every 4 (four) hours as needed (pain) Max Daily Amount: 24 mg 180 tablet 0   • cetirizine (ZyrTEC) 10 mg tablet Take 10 mg by mouth daily     • loperamide (Imodium A-D) 2 mg tablet Take 2 tablets with first loose stool of the day, then up to 8 tablets daily 80 tablet 2   • buPROPion SR (WELLBUTRIN SR) 100 mg 12 hr tablet  Take 1 tablet (100 mg total) by mouth daily 90 tablet 1   • lactulose (GENERLAC) 10 gram/15 mL solution Take 15 mL (10 g total) by mouth daily 473 mL 2   • terazosin (HYTRIN) 5 mg capsule Take 1 capsule (5 mg total) by mouth 2 (two) times a day 180 capsule 2   • blood-glucose meter (ACCU-CHEK ADELE PLUS METER) [facility] Use to test blood sugars 3 times daily (E11.65, non insulin depedent) AccuChek Adele plus, meter is 8 years old (Patient taking differently: Use to test blood sugars 3 times daily (E11.65, non insulin dependent) ) 1 each 0   • lisinopril (PRINIVIL,ZESTRIL) 20 mg tablet Take 1 tablet (20 mg total) by mouth daily 90 tablet 2   • furosemide (LASIX) 20 mg tablet Take 1 tablet (20 mg total) by mouth 2 (two) times a day. 180 tablet 2   • lansoprazole (PREVACID) 30 mg capsule Take 30 mg by mouth daily      • liraglutide (VICTOZA 2-JASE) 0.6 mg/0.1 mL (18 mg/3 mL) injection Inject 1.8 mg under the skin daily       • capsaicin (ZOSTRIX) 0.025 % cream Apply 1 application topically as needed for pain scale 1-3/10, 1-3/8.     • sodium chloride (SALINE NASAL) 0.65 % nasal spray Administer 1 spray into each nostril as needed for congestion or rhinitis      • white petrolatum (AQUAPHOR HEALING) 41 % ointment ointment Apply 1 application topically as needed.     • pramoxine-menthol (GOLD BOND MEDICATED ANTI-ITCH) 1-1 % cream Apply topically.     • brimonidine (ALPHAGAN) 0.2 % ophthalmic solution every 12 hours.     • GLYCERIN/PROPYLENE GLYCOL (ARTIFICIAL TEARS,GLYCERIN-PEG, OPHT) Administer 1 drop into affected eye(s) as needed (Dry eyes)      • latanoprost (XALATAN) 0.005 % ophthalmic solution INSTILL 1 DROP INTO AFFECTED EYE(S) BY OPHTHALMIC ROUTE ONCE DAILY INTHE EVENING 10 0   • hydrOXYzine (ATARAX) 25 mg tablet Take 1 tablet (25 mg total) by mouth nightly (Patient not taking: Reported on 2/13/2020 ) 90 tablet 2     No current facility-administered medications for this visit.        REVIEW OF SYSTEMS:   Review of  Systems   Constitutional: Positive for fatigue.   HENT:   Positive for hearing loss.    Eyes: Positive for eye problems.   Gastrointestinal: Positive for abdominal distention and abdominal pain.   Endocrine: Negative.    Genitourinary: Positive for bladder incontinence.    Musculoskeletal: Positive for arthralgias, gait problem and myalgias.   Skin: Negative.    Neurological: Positive for gait problem.   Hematological: Negative.    Psychiatric/Behavioral: The patient is nervous/anxious.    All other systems reviewed and are negative.      The ECOG performance status today is 2 - Ambulatory care of self; up and about >50% of waking hours.          Objective    PHYSICAL EXAM:   There were no vitals taken for this visit.   There is no height or weight on file to calculate BMI.       Physical Exam r  Exam not done, complying with prevention methods of coronavirus spread     LABS:   Hospital Outpatient Visit on 04/21/2020   Component Date Value Ref Range Status   • POC Creatinine 04/21/2020 0.8  0.7 - 1.3 mg/dL Final   • POC eGFR (calculated) 04/21/2020 >60  >60 mL/m/1.73m*2 Final       DIAGNOSTIC REPORTS REVIEWED:   Imaging:  Reviewed     Assessment/Plan    IMPRESSION:   This is a pleasant 69-year-old male with diagnosis of hepatocellular carcinoma involving the right lobe, locally advanced disease, per the chart not amenable to local therapy, started sorafenib December 2018 however stopped in February 2019 due to intolerance without any dose reductions.  Has been off of treatment since then, restaging CAT scan and MRI  7/2019 show progressive disease in the liver.  Started  sorafenib 8/2019 at a lower dose 200 mg twice daily, s/p RFA 8/2019, was referred to [facility], s/p Y90 treatment followed by TACE, MR showed response to treatment without progression on 1/16/20 and 4/21/20  currently sorafenib on hold until evidence of disease progression         Plan:  - labs today   - no need for systemic treatment  at this point given stable disease, when he progresses will discuss sorafenib vs. lenvatinib   - Follow-up in 12 weeks with the labs and restaging MR    - to continue follow up with IR at [facility]    - restaging scans  In July and every 3 months.        Physician: Milka Reynoso MD

## 2020-04-23 NOTE — TELEPHONE ENCOUNTER
Returning pt's wife's VM    She states that pt had an MRI on 4/21. They were told that there was one lesion that has decreased but another that there's been no change in size.    I inquired if we can have a copy of the images sent over to us for review.    She states that pt is also going to get labs drawn today  I do see this in CareEverywhere but also informed her that we'd like to make sure they get an AFP also.    Will work on getting images sent to us and then schedule a f/up appt with Dr. Giordano    She agrees to plan.    Erika VALDEZ RN, BSN  Interventional Radiology Nurse Coordinator   Phone: [phone]

## 2020-05-19 ENCOUNTER — VIRTUAL VISIT (OUTPATIENT)
Dept: VASCULAR SURGERY | Facility: CLINIC | Age: 70
End: 2020-05-19
Payer: MEDICARE

## 2020-05-19 DIAGNOSIS — C22.8 PRIMARY MALIGNANT NEOPLASM OF LIVER (H): Primary | ICD-10-CM

## 2020-05-19 ASSESSMENT — PAIN SCALES - GENERAL: PAINLEVEL: MILD PAIN (2)

## 2020-05-19 NOTE — NURSING NOTE
Vascular Rooming Note     John Mancia's goals for this visit include:   Chief Complaint   Patient presents with     SOFIE Lopez, is participating in a telephone visit today for follow up HCC, feeling ok good days and some bad days, as reported by patient.     Karen Joel LPN

## 2020-05-19 NOTE — PROGRESS NOTES
"Mr. Mancia is a 70 year-old patient with HCC treated with Y90 and TACE.    This is a telephone consult. The patient and his wife were available on the phone and agreed to proceed.    The patient is doing well since we last met in December 2019. He is not on any chemotherapy. He mentions that he has \"Gallbladder\" pain. The pain is located in the mid axillary portion of the right upper quadrant with no radiation. His performance status is 0.    I have reviewed the patient's imaging and labs. MRI showed a good response after TACE procedure. There is no viable tumor including the treated segments by Y90.  Labs:    Lab Results   Component Value Date    AST 43 12/12/2019     Lab Results   Component Value Date    ALT 42 12/12/2019     No results found for: BILICONJ   Lab Results   Component Value Date    BILITOTAL 0.8 12/12/2019     Lab Results   Component Value Date    ALBUMIN 3.2 12/12/2019     Lab Results   Component Value Date    PROTTOTAL 6.0 12/12/2019      Lab Results   Component Value Date    ALKPHOS 96 12/12/2019     AFP : Continues to drop. . 4/23: 16    Current Outpatient Medications   Medication     Artificial Saliva (MOUTH KOTE) SOLN     artificial tears OINT ophthalmic ointment     brimonidine (ALPHAGAN) 0.2 % ophthalmic solution     buPROPion (WELLBUTRIN SR) 100 MG 12 hr tablet     cetirizine (ZYRTEC) 10 MG tablet     furosemide (LASIX) 20 MG tablet     HYDROmorphone (DILAUDID) 2 MG tablet     insulin glargine-lixisenatide (SOLIQUA 100/33) pen     lactulose (CHRONULAC) 10 GM/15ML solution     Lancet Devices (LANCET DEVICE WITH EJECTOR) MISC     LANsoprazole (PREVACID) 30 MG DR capsule     latanoprost (XALATAN) 0.005 % ophthalmic solution     lisinopril (PRINIVIL/ZESTRIL) 20 MG tablet     prochlorperazine (COMPAZINE) 10 MG tablet     Propylene Glycol-Glycerin 0.6-0.6 % SOLN     terazosin (HYTRIN) 5 MG capsule     VICTOZA PEN 18 MG/3ML soln     VOLTAREN 1 % topical gel     acetaminophen (TYLENOL) 500 MG tablet "     ESCITALOPRAM OXALATE PO     No current facility-administered medications for this visit.        Impression and Plan:    Good response of the liver tumor to the endovascular therapies. Persistence of an enhancing mass in the gallbladder that could represent endocavitary HCC or a different tumor. I have discussed with Dr. Tucker and Dr. Munoz. We would discuss potential options at this week tumor board.  We will then contact Dr. Ashton and will update the patient.  I spent 20 minutes counseling the patient.

## 2020-05-19 NOTE — PROGRESS NOTES
"John Mancia is a 70 year old male who is being evaluated via a billable telephone visit.      The patient has been notified of following:     \"This telephone visit will be conducted via a call between you and your physician/provider. We have found that certain health care needs can be provided without the need for a physical exam.  This service lets us provide the care you need with a short phone conversation.  If a prescription is necessary we can send it directly to your pharmacy.  If lab work is needed we can place an order for that and you can then stop by our lab to have the test done at a later time.    Telephone visits are billed at different rates depending on your insurance coverage. During this emergency period, for some insurers they may be billed the same as an in-person visit.  Please reach out to your insurance provider with any questions.    If during the course of the call the physician/provider feels a telephone visit is not appropriate, you will not be charged for this service.\"    Patient has given verbal consent for Telephone visit?  Yes    What phone number would you like to be contacted at? [phone]    How would you like to obtain your AVS? Jerome    Phone call duration: 30 minutes          "

## 2020-06-03 ENCOUNTER — TELEPHONE (OUTPATIENT)
Dept: ONCOLOGY | Facility: CLINIC | Age: 70
End: 2020-06-03

## 2020-06-03 NOTE — PROGRESS NOTES
Patient called and was wondering if there was an update from Dr. Reynoso about his conversation with the patient's provider in [place]. The patient said he was waiting on a decision about doing surgery. Please call

## 2020-06-08 ENCOUNTER — TELEPHONE (OUTPATIENT)
Dept: ONCOLOGY | Facility: CLINIC | Age: 70
End: 2020-06-08

## 2020-06-08 NOTE — PROGRESS NOTES
Patient called and said that some results from his provider team in [place] were sent in and he was told to call our facility and talk to his provider here about the results. Please call. If he can't be reached on his cell phone, he can be reached on his wife's phone [phone]

## 2020-06-09 NOTE — PROGRESS NOTES
Spoke with patient. He agrees with the recommendation to have an appointment with [facility] to discuss specifics further. Inneydaet sent to scheduling to contact him.

## 2020-06-12 ENCOUNTER — TELEPHONE - BILLABLE (OUTPATIENT)
Dept: ONCOLOGY | Facility: CLINIC | Age: 70
End: 2020-06-12
Payer: COMMERCIAL

## 2020-06-12 DIAGNOSIS — C22.0 HEPATOCELLULAR CARCINOMA (CMS/HCC): ICD-10-CM

## 2020-06-12 PROCEDURE — 99443 *INACTIVE DO NOT USE* PR PHYS/QHP TELEPHONE EVALUATION 21-30 MIN: CPT | Mod: 95 | Performed by: INTERNAL MEDICINE

## 2020-06-12 ASSESSMENT — ENCOUNTER SYMPTOMS
ABDOMINAL PAIN: 1
EYE PROBLEMS: 1
ENDOCRINE NEGATIVE: 1
MYALGIAS: 1
NERVOUS/ANXIOUS: 1
ARTHRALGIAS: 1
HEMATOLOGIC/LYMPHATIC NEGATIVE: 1
FATIGUE: 1
ABDOMINAL DISTENTION: 1

## 2020-06-12 NOTE — PROGRESS NOTES
Subjective   Per discussion with Milka Reynoso MD, Ben, has verbally consented to be treated via a telephone based visit: Yes. A total of 25 minutes were required for this telephone based visit.   Patient Location: Home  Provider Location: home  Technology used by Provider: Phone      Medical Oncology Follow-Up    Subjective   HISTORY OF PRESENT ILLNESS:  Ben Lai is a 70 y.o. male who  was diagnosed with hepatocellular carcinoma in November 2018.  He has an underlying hepatitis C which was supposedly cured.    Due to the extent of his HCC per GI at [facility] it was not amenable to local therapy, resection or radiation, referred to medical oncology   He was started on sorafenib early December 2018 but this appears to have been stopped in February due to significant side effects.  Patient states unequivocally that he started to feel better after a 3-week break but the sorafenib was recently restarted and he is having significant quality of life compromising side effects again.  This includes severe joint and muscle pain and fatigue.     7/29/19 restaging scans showed progressive disease in the liver, however no sings of metastatic disease     8/14/19 s/p   CT-guided  right lobe of the liver radiofrequency ablation.     9/2019 seen hepatobiliary team at [facility], and was discussed for tumor board, planned to have Y90  Treatment     10/8/19 MRI abdomen showed Nodular appearing liver with 2 areas of hyper/early arterial enhancement suggestive of hepatocellular carcinoma are both adjacent to the gallbladder which contains filling defects which are enhancing suggestive of neoplasm. This could be primary cholangiocarcinoma or hepatocellular carcinoma which is invading the gallbladder. The liver and gallbladder masses are very similar to the study from 7/29/2019.  Wedge-shaped signal abnormality in the anterior right lobe of the liver similar to the prior study could represent hepatic  infarct.    11/7/19 s/p Y90 treatment at [facility]     12/9/19 MRI abdomen showed Cirrhosis and evidence of portal hypertension. Posttreatment changes of radioembolization throughout the right hepatic lobe, with increased diffuse heterogeneous arterial enhancement. This is the patient's first posttreatment MRI. LR-TR equivocal. Continued attention on follow-up.  Grossly stable size of the lobulated enhancing endoluminal gallbladder mass with decreased but persistent areas of enhancing viable tumor.  Stable segment 4 exophytic 2.6 cm OPTN-5b lesion.  Unchanged right hepatic vein and right portal vein branch thrombi, without convincing thrombus enhancement.  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 Attention on follow-up.    12/11/19 s/p chemo embolization for possible residual viable tumor in the liver.    1/16/20  restaging MR showed  The circumscribed mass is seen in the anterior aspect of the right lobe of the liver demonstrates positive response to treatment with significantly reduced enhancement, no change in overall size.2.  Post radiation ablation defect in the anterior inferior right lobe with surrounding heterogeneous nonmass-like enhancement. It would be difficult to exclude active tumor within this surrounding area of enhancement.  Significant change in appearance of the posterior segment of the right lobe of the liver. Previous infiltrative nodular enhancement, now geographic nonmass-like enhancement with parenchymal volume loss. This could represent posttreatment related change. Difficult to exclude residual/progressive tumor    4/21/20 MR abdomen showed Continued evolution of post embolic/radiotherapy changes to the right hepatic lobe with large region of volume loss of enhancement likely representing fibrosis.  Previous HCC anteriorly along this region continues to regress with no evidence of enhancement to suggest recurrent or residual tumor. Previous nodular  lesion inferiorly along the treatment bed which also likely represented a site of HCC also continues to regress with no evidence of recurrence or residual disease. No new suspicious liver lesions are demonstrated. Cirrhosis. Splenomegaly. Portosystemic collaterals.    Today:   No major change in symptoms   He feels well and feels.   Right sided pain post TACE is still there, managed by palliative medicine.   Energy is  Improving ECOG 2   appetite ist stable, weight is stable   Takes care of his ADLS   Stable, abd pain, chest pain and neuropathy         Encounter Diagnosis   Name Primary?   • Hepatocellular carcinoma (CMS/HCC) (HCC)    .     Treatment History:  Oncology History   Hepatocellular carcinoma (CMS/HCC) (HCC)   1/23/2019 Initial Diagnosis    Hepatocellular carcinoma (CMS/HCC) (HCC)     7/30/2019 - 12/9/2019 Adjuvant Therapy    Hepatocellular - Sorafenib (Nexavar)  Plan Provider: ANDERSON Bryan  Treatment goal: Palliative  Line of treatment: [No plan line of treatment]     6/12/2020 Cancer Staged    Staging form: Liver, AJCC 8th Edition  - Clinical: Stage IIIB (cT4, cN0, cM0) - Signed by Milka Reynoso MD on 6/12/2020               RECENT EVENTS:    PAST MEDICAL HISTORY:   Past Medical History:   Diagnosis Date   • A-fib (CMS/HCC) (HCC)    • Allergy    • Anxiety    • Arthritis    • Asthma    • Blindness of right eye    • BPH (benign prostatic hyperplasia)    • Cardiovascular disease    • Cirrhosis (CMS/HCC) (HCC)     with possible liver mass by MRI 5/18, rec repeat in 8/2018   • Complication of anesthesia    • COPD (chronic obstructive pulmonary disease) (CMS/HCC) (HCC)    • Depression    • Endocrine disorder    • Gallstones    • Gastritis    • GERD (gastroesophageal reflux disease)    • Glaucoma    • Hearing loss     bilateral hearing aids   • Hemorrhoids    • Hepatitis C     Hep C, 2016 remission, complicated by cirrhosis.  MRI abd 5/30/18, rec 3 month follow up for focus of enhancement  right and left hepatic lobe junction   • Hernia, abdominal    • High blood pressure    • IBS (irritable bowel syndrome)    • Infectious viral hepatitis    • Jaundice    • Kidney stones    • Obstructive sleep apnea syndrome    • Periodic limb movement disorder    • Persistent hypersomnia    • Persistent insomnia    • Pneumonia    • PONV (postoperative nausea and vomiting)    • Type 2 diabetes mellitus (CMS/HCC) (HCC)    • Urinary incontinence    • Wears partial dentures         GYNECOLOGICAL HISTORY (if applicable): NA     FAMILY HISTORY:   Family History   Problem Relation Age of Onset   • Arthritis Mother    • Rheum arthritis Mother    • Diabetes Mother    • Rectal cancer Mother    • Other Paternal Grandmother    • Other Paternal Grandfather    • Diabetes Other    • Rheum arthritis Other    • Osteoporosis Other         SOCIAL HISTORY:   Social History     Socioeconomic History   • Marital status:      Spouse name: Not on file   • Number of children: Not on file   • Years of education: Not on file   • Highest education level: Not on file   Occupational History   • Not on file   Social Needs   • Financial resource strain: Not on file   • Food insecurity     Worry: Not on file     Inability: Not on file   • Transportation needs     Medical: Not on file     Non-medical: Not on file   Tobacco Use   • Smoking status: Never Smoker   • Smokeless tobacco: Never Used   Substance and Sexual Activity   • Alcohol use: No   • Drug use: No   • Sexual activity: Not on file   Lifestyle   • Physical activity     Days per week: Not on file     Minutes per session: Not on file   • Stress: Not on file   Relationships   • Social connections     Talks on phone: Not on file     Gets together: Not on file     Attends Holiness service: Not on file     Active member of club or organization: Not on file     Attends meetings of clubs or organizations: Not on file     Relationship status: Not on file   • Intimate partner violence      Fear of current or ex partner: Not on file     Emotionally abused: Not on file     Physically abused: Not on file     Forced sexual activity: Not on file   Other Topics Concern   • Not on file   Social History Narrative   • Not on file        ALLERGIES:   Allergies as of 06/12/2020 - Reviewed 06/12/2020   Allergen Reaction Noted   • Penicillins Anaphylaxis 10/19/2017   • Metformin Hives    • Adhesive tape-silicones  04/06/2018   • Banana  02/05/2019   • Broccoli  02/05/2019   • [place] sprout  02/05/2019   • Cabbage  02/05/2019   • Cantaloupe  02/05/2019   • Celery  02/05/2019   • Cheese  02/05/2019   • Cranberry  02/05/2019   • Egg     • Interferon violetta-2a  02/05/2019   • Latex     • Legumes  02/05/2019   • Lettuce  02/05/2019   • Milk  02/05/2019   • Mometasone     • Mometasone furoate     • Mustard  02/05/2019   • Nut - unspecified  02/05/2019   • Oats  02/05/2019   • Omeprazole     • Ondansetron hcl Nausea And Vomiting 05/01/2019   • Onion  02/05/2019   • Pepper (genus capsicum)  02/05/2019   • Pineapple  02/05/2019   • Primidone  05/01/2019   • Ribavirin Itching 10/19/2017   • Rosuvastatin  02/05/2019   • Sorafenib  04/04/2019   • Spironolactone     • Statins-hmg-coa reductase inhibitors Itching and GI intolerance 04/06/2018   • Wheat  02/05/2019   • Yeast  02/05/2019   • Interferon violetta (human leuk. derived) Palpitations and Rash 09/20/2019   • Pantoprazole Itching and Rash 02/28/2018   • Peginterferon violetta-2b Palpitations 10/19/2017        MEDICATIONS:   Current Outpatient Medications   Medication Sig Dispense Refill   • carvediloL (COREG) 3.125 mg tablet Take 1 tablet (3.125 mg total) by mouth 2 (two) times a day with meals 180 tablet 0   • diclofenac sodium (VOLTAREN) 1 % gel Apply 1 Dose topically as needed     • HYDROmorphone (Dilaudid) 4 mg tablet Take 1 tablet (4 mg total) by mouth every 4 (four) hours as needed (pain) Max Daily Amount: 24 mg 180 tablet 0   • cetirizine (ZyrTEC) 10 mg tablet Take 10 mg  by mouth daily     • loperamide (Imodium A-D) 2 mg tablet Take 2 tablets with first loose stool of the day, then up to 8 tablets daily 80 tablet 2   • buPROPion SR (WELLBUTRIN SR) 100 mg 12 hr tablet Take 1 tablet (100 mg total) by mouth daily 90 tablet 1   • lactulose (GENERLAC) 10 gram/15 mL solution Take 15 mL (10 g total) by mouth daily 473 mL 2   • terazosin (HYTRIN) 5 mg capsule Take 1 capsule (5 mg total) by mouth 2 (two) times a day 180 capsule 2   • blood-glucose meter (ACCU-CHEK ADELE PLUS METER) [facility] Use to test blood sugars 3 times daily (E11.65, non insulin depedent) AccuChek Adele plus, meter is 8 years old (Patient taking differently: Use to test blood sugars 3 times daily (E11.65, non insulin dependent) ) 1 each 0   • lisinopril (PRINIVIL,ZESTRIL) 20 mg tablet Take 1 tablet (20 mg total) by mouth daily 90 tablet 2   • furosemide (LASIX) 20 mg tablet Take 1 tablet (20 mg total) by mouth 2 (two) times a day. 180 tablet 2   • lansoprazole (PREVACID) 30 mg capsule Take 30 mg by mouth daily      • liraglutide (VICTOZA 2-JASE) 0.6 mg/0.1 mL (18 mg/3 mL) injection Inject 1.8 mg under the skin daily       • capsaicin (ZOSTRIX) 0.025 % cream Apply 1 application topically as needed for pain scale 1-3/10, 1-3/8.     • sodium chloride (SALINE NASAL) 0.65 % nasal spray Administer 1 spray into each nostril as needed for congestion or rhinitis      • white petrolatum (AQUAPHOR HEALING) 41 % ointment ointment Apply 1 application topically as needed.     • pramoxine-menthol (GOLD BOND MEDICATED ANTI-ITCH) 1-1 % cream Apply topically.     • brimonidine (ALPHAGAN) 0.2 % ophthalmic solution every 12 hours.     • GLYCERIN/PROPYLENE GLYCOL (ARTIFICIAL TEARS,GLYCERIN-PEG, OPHT) Administer 1 drop into affected eye(s) as needed (Dry eyes)      • latanoprost (XALATAN) 0.005 % ophthalmic solution INSTILL 1 DROP INTO AFFECTED EYE(S) BY OPHTHALMIC ROUTE ONCE DAILY INTHE EVENING 10 0   • hydrOXYzine (ATARAX) 25 mg tablet Take 1  tablet (25 mg total) by mouth nightly (Patient not taking: Reported on 2/13/2020 ) 90 tablet 2     No current facility-administered medications for this visit.        REVIEW OF SYSTEMS:   Review of Systems   Constitutional: Positive for fatigue.   HENT:   Positive for hearing loss.    Eyes: Positive for eye problems.   Gastrointestinal: Positive for abdominal distention and abdominal pain.   Endocrine: Negative.    Genitourinary: Positive for bladder incontinence.    Musculoskeletal: Positive for arthralgias, gait problem and myalgias.   Skin: Negative.    Neurological: Positive for gait problem.   Hematological: Negative.    Psychiatric/Behavioral: The patient is nervous/anxious.    All other systems reviewed and are negative.      The ECOG performance status today is 2 - Ambulatory care of self; up and about >50% of waking hours.          Objective    PHYSICAL EXAM:   There were no vitals taken for this visit.   There is no height or weight on file to calculate BMI.       Physical Exam r  Exam not done, complying with prevention methods of coronavirus spread     LABS:   No visits with results within 1 Week(s) from this visit.   Latest known visit with results is:   Appointment on 04/23/2020   Component Date Value Ref Range Status   • Sodium 04/23/2020 135  135 - 145 mmol/L Final   • Potassium 04/23/2020 4.3  3.5 - 5.1 mmol/L Final   • Chloride 04/23/2020 102  98 - 107 mmol/L Final   • CO2 04/23/2020 26  21 - 32 mmol/L Final   • Anion Gap 04/23/2020 7  3 - 11 mmol/L Final   • BUN 04/23/2020 22  7 - 25 mg/dL Final   • Creatinine 04/23/2020 0.84  0.70 - 1.30 mg/dL Final   • Glucose 04/23/2020 229* 70 - 105 mg/dL Final   • Calcium 04/23/2020 9.6  8.6 - 10.3 mg/dL Final   • AST 04/23/2020 35  <=40 U/L Final   • ALT (SGPT) 04/23/2020 27  7 - 52 U/L Final   • Alkaline Phosphatase 04/23/2020 135* 45 - 115 U/L Final   • Total Protein 04/23/2020 7.2  6.0 - 8.3 g/dL Final   • Albumin 04/23/2020 4.6  3.5 - 5.3 g/dL Final   •  Total Bilirubin 04/23/2020 1.01  0.20 - 1.40 mg/dL Final   • eGFR 04/23/2020 89  >60 mL/min/1.73m*2 Final   • Corrected Calcium 04/23/2020 9.1  8.6 - 10.3 mg/dL Final       DIAGNOSTIC REPORTS REVIEWED:   Imaging:  Reviewed     Assessment/Plan    IMPRESSION:   This is a pleasant 69-year-old male with diagnosis of hepatocellular carcinoma involving the right lobe, locally advanced disease, per the chart not amenable to local therapy, started sorafenib December 2018 however stopped in February 2019 due to intolerance without any dose reductions.  Has been off of treatment since then, restaging CAT scan and MRI  7/2019 show progressive disease in the liver.  Started  sorafenib 8/2019 at a lower dose 200 mg twice daily, s/p RFA 8/2019, was referred to [facility], s/p Y90 treatment followed by TACE, MR showed response to treatment without progression on 1/16/20 and 4/21/20  currently sorafenib on hold until evidence of disease progression      It seems like vascular surgery at  were concerned about enhancement of the gallbladder and they wanted to discuss at tumor board and inform me of their decision.        Plan:   - no need for systemic treatment at this point given stable disease, when he progresses will discuss sorafenib vs. lenvatinib   - Follow-up in 2  weeks with  labs and restaging MR to rule out progression     - to continue follow up with IR at [facility]    - restaging scans in a few weeks and if no progression will repeat in 3 months       Physician: Milka Reynoso MD

## 2020-06-16 ENCOUNTER — TELEPHONE (OUTPATIENT)
Dept: ONCOLOGY | Facility: CLINIC | Age: 70
End: 2020-06-16

## 2020-06-16 ENCOUNTER — TELEPHONE (OUTPATIENT)
Dept: VASCULAR SURGERY | Facility: CLINIC | Age: 70
End: 2020-06-16

## 2020-06-16 NOTE — TELEPHONE ENCOUNTER
Wife calling to inquire on if Dr. Giordano and Dr Asthon has connected yet.     She states that Dr. Ashton has tried to connect with Dr Giordano however this has been unsuccessful    Wife states that pt have been having more pain in the gallblader in which they have an MRI scheduled for 6/26.     They are not sure in regards to what the direction is and is wondering if Dr. Giordano has planned anything.     I will connect with Dr Giordano and then see if I can get an answer and get back to them today.    She agrees to plan     *called Dr. Ashton's office again.  Provided Dr. Giordano's number for MD to call him today to discuss plan of care.       Erika VALDEZ RN, BSN  Interventional Radiology Nurse Coordinator   Phone: [phone] g

## 2020-06-16 NOTE — PROGRESS NOTES
Bessy cason nurse from the [facility] wanted to pass on a message from Dr. Cotton to Donna or Dr. Reynoso. Bessy said Dr. Cotton would like a call back on [phone].

## 2020-06-25 ENCOUNTER — HOSPITAL ENCOUNTER (OUTPATIENT)
Dept: MRI IMAGING | Facility: HOSPITAL | Age: 70
Discharge: HOME | End: 2020-06-25
Payer: COMMERCIAL

## 2020-06-25 ENCOUNTER — APPOINTMENT (OUTPATIENT)
Dept: ONCOLOGY | Facility: CLINIC | Age: 70
End: 2020-06-25
Payer: COMMERCIAL

## 2020-06-25 ENCOUNTER — TRANSFERRED RECORDS (OUTPATIENT)
Dept: HEALTH INFORMATION MANAGEMENT | Facility: CLINIC | Age: 70
End: 2020-06-25

## 2020-06-25 DIAGNOSIS — C22.0 HEPATOCELLULAR CARCINOMA (CMS/HCC): ICD-10-CM

## 2020-06-25 LAB
AFP SERPL-MCNC: 15 NG/ML
ALBUMIN SERPL-MCNC: 4.1 G/DL (ref 3.5–5.3)
ALP SERPL-CCNC: 97 U/L (ref 45–115)
ALT SERPL-CCNC: 21 U/L (ref 7–52)
ANION GAP SERPL CALC-SCNC: 8 MMOL/L (ref 3–11)
AST SERPL-CCNC: 27 U/L
BASOPHILS # BLD AUTO: 0 10*3/UL
BASOPHILS NFR BLD AUTO: 1 % (ref 0–2)
BILIRUB SERPL-MCNC: 0.98 MG/DL (ref 0.2–1.4)
BUN SERPL-MCNC: 16 MG/DL (ref 7–25)
CALCIUM ALBUM COR SERPL-MCNC: 8.8 MG/DL (ref 8.6–10.3)
CALCIUM SERPL-MCNC: 8.9 MG/DL (ref 8.6–10.3)
CHLORIDE SERPL-SCNC: 105 MMOL/L (ref 98–107)
CO2 SERPL-SCNC: 27 MMOL/L (ref 21–32)
CREAT SERPL-MCNC: 0.91 MG/DL (ref 0.7–1.3)
EOSINOPHIL # BLD AUTO: 0 10*3/UL
EOSINOPHIL NFR BLD AUTO: 1 % (ref 0–3)
ERYTHROCYTE [DISTWIDTH] IN BLOOD BY AUTOMATED COUNT: 13.6 % (ref 11.5–15)
GFR SERPL CREATININE-BSD FRML MDRD: 86 ML/MIN/1.73M*2
GLUCOSE SERPL-MCNC: 162 MG/DL (ref 70–105)
HCT VFR BLD AUTO: 40.3 % (ref 38–50)
HGB BLD-MCNC: 13.7 G/DL (ref 13.2–17.2)
LYMPHOCYTES # BLD AUTO: 0.4 10*3/UL
LYMPHOCYTES NFR BLD AUTO: 11 % (ref 15–47)
MCH RBC QN AUTO: 30.3 PG (ref 29–34)
MCHC RBC AUTO-ENTMCNC: 33.9 G/DL (ref 32–36)
MCV RBC AUTO: 89.6 FL (ref 82–97)
MONOCYTES # BLD AUTO: 0.3 10*3/UL
MONOCYTES NFR BLD AUTO: 9 % (ref 5–13)
NEUTROPHILS # BLD AUTO: 3 10*3/UL
NEUTROPHILS NFR BLD AUTO: 78 % (ref 46–70)
PLATELET # BLD AUTO: 123 10*3/UL (ref 130–350)
PMV BLD AUTO: 8 FL (ref 6.9–10.8)
POTASSIUM SERPL-SCNC: 3.7 MMOL/L (ref 3.5–5.1)
PROT SERPL-MCNC: 6.8 G/DL (ref 6–8.3)
RBC # BLD AUTO: 4.5 10*6/ΜL (ref 4.1–5.8)
SODIUM SERPL-SCNC: 140 MMOL/L (ref 135–145)
WBC # BLD AUTO: 3.8 10*3/UL (ref 3.7–9.6)

## 2020-06-25 PROCEDURE — A9579 GAD-BASE MR CONTRAST NOS,1ML: HCPCS | Performed by: INTERNAL MEDICINE

## 2020-06-25 PROCEDURE — 2550000100 HC RX 255: Performed by: INTERNAL MEDICINE

## 2020-06-25 PROCEDURE — 74183 MRI ABD W/O CNTR FLWD CNTR: CPT | Mod: MG

## 2020-06-25 PROCEDURE — 80053 COMPREHEN METABOLIC PANEL: CPT

## 2020-06-25 PROCEDURE — 82105 ALPHA-FETOPROTEIN SERUM: CPT

## 2020-06-25 PROCEDURE — 36415 COLL VENOUS BLD VENIPUNCTURE: CPT

## 2020-06-25 PROCEDURE — 85025 COMPLETE CBC W/AUTO DIFF WBC: CPT

## 2020-06-25 RX ADMIN — GADOTERIDOL 20 ML: 279.3 INJECTION, SOLUTION INTRAVENOUS at 10:45

## 2020-06-26 ENCOUNTER — TELEPHONE - BILLABLE (OUTPATIENT)
Dept: ONCOLOGY | Facility: CLINIC | Age: 70
End: 2020-06-26
Payer: COMMERCIAL

## 2020-06-26 ENCOUNTER — TRANSFERRED RECORDS (OUTPATIENT)
Dept: HEALTH INFORMATION MANAGEMENT | Facility: CLINIC | Age: 70
End: 2020-06-26

## 2020-06-26 DIAGNOSIS — C22.0 HEPATOCELLULAR CARCINOMA (CMS/HCC): ICD-10-CM

## 2020-06-26 PROCEDURE — 99443 *INACTIVE DO NOT USE* PR PHYS/QHP TELEPHONE EVALUATION 21-30 MIN: CPT | Mod: 95 | Performed by: INTERNAL MEDICINE

## 2020-06-26 RX ORDER — HYDROMORPHONE HYDROCHLORIDE 2 MG/1
2 TABLET ORAL EVERY 4 HOURS PRN
COMMUNITY
End: 2020-01-01 | Stop reason: SDUPTHER

## 2020-06-26 RX ORDER — TRAZODONE HYDROCHLORIDE 100 MG/1
100 TABLET ORAL NIGHTLY PRN
COMMUNITY

## 2020-06-26 RX ORDER — SILDENAFIL 100 MG/1
100 TABLET, FILM COATED ORAL DAILY PRN
COMMUNITY
End: 2021-01-01 | Stop reason: ALTCHOICE

## 2020-06-26 RX ORDER — INSULIN GLARGINE 100 [IU]/ML
24 INJECTION, SOLUTION SUBCUTANEOUS EVERY MORNING
COMMUNITY

## 2020-06-26 RX ORDER — SUMATRIPTAN SUCCINATE 100 MG/1
100 TABLET ORAL ONCE AS NEEDED
COMMUNITY
End: 2021-01-01

## 2020-06-26 RX ORDER — TRAZODONE HYDROCHLORIDE 100 MG/1
100 TABLET ORAL NIGHTLY
Status: ON HOLD | COMMUNITY
End: 2020-08-05 | Stop reason: SDUPTHER

## 2020-06-26 RX ORDER — ONDANSETRON 4 MG/1
4 TABLET, FILM COATED ORAL EVERY 8 HOURS PRN
COMMUNITY
End: 2021-01-01 | Stop reason: ALTCHOICE

## 2020-06-26 RX ORDER — FLUTICASONE PROPIONATE 50 MCG
2 SPRAY, SUSPENSION (ML) NASAL DAILY
COMMUNITY

## 2020-06-26 RX ORDER — LIDOCAINE 50 MG/G
1 PATCH TOPICAL DAILY
COMMUNITY
End: 2021-01-01

## 2020-06-26 ASSESSMENT — ENCOUNTER SYMPTOMS
ABDOMINAL PAIN: 1
ARTHRALGIAS: 1
NERVOUS/ANXIOUS: 1
EYE PROBLEMS: 1
ENDOCRINE NEGATIVE: 1
MYALGIAS: 1
HEMATOLOGIC/LYMPHATIC NEGATIVE: 1
FATIGUE: 1
ABDOMINAL DISTENTION: 1

## 2020-06-26 NOTE — PROGRESS NOTES
Subjective   Per discussion with Milka Reynoso MD, Ben, has verbally consented to be treated via a telephone based visit: Yes. A total of 40 minutes were required for this telephone based visit.   Patient Location: Home  Provider Location: home  Technology used by Provider: Phone              Medical Oncology Follow-Up    Subjective   HISTORY OF PRESENT ILLNESS:  Ben Lai Sr is a 70 y.o. male who  was diagnosed with hepatocellular carcinoma in November 2018.  He has an underlying hepatitis C which was supposedly cured.    Due to the extent of his HCC per GI at [facility] it was not amenable to local therapy, resection or radiation, referred to medical oncology   He was started on sorafenib early December 2018 but this appears to have been stopped in February due to significant side effects.  Patient states unequivocally that he started to feel better after a 3-week break but the sorafenib was recently restarted and he is having significant quality of life compromising side effects again.  This includes severe joint and muscle pain and fatigue.     7/29/19 restaging scans showed progressive disease in the liver, however no sings of metastatic disease     8/14/19 s/p   CT-guided  right lobe of the liver radiofrequency ablation.     9/2019 seen hepatobiliary team at [facility], and was discussed for tumor board, planned to have Y90  Treatment     10/8/19 MRI abdomen showed Nodular appearing liver with 2 areas of hyper/early arterial enhancement suggestive of hepatocellular carcinoma are both adjacent to the gallbladder which contains filling defects which are enhancing suggestive of neoplasm. This could be primary cholangiocarcinoma or hepatocellular carcinoma which is invading the gallbladder. The liver and gallbladder masses are very similar to the study from 7/29/2019.  Wedge-shaped signal abnormality in the anterior right lobe of the liver similar to the prior study could represent  hepatic infarct.    11/7/19 s/p Y90 treatment at [facility]     12/9/19 MRI abdomen showed Cirrhosis and evidence of portal hypertension. Posttreatment changes of radioembolization throughout the right hepatic lobe, with increased diffuse heterogeneous arterial enhancement. This is the patient's first posttreatment MRI. LR-TR equivocal. Continued attention on follow-up.  Grossly stable size of the lobulated enhancing endoluminal gallbladder mass with decreased but persistent areas of enhancing viable tumor.  Stable segment 4 exophytic 2.6 cm OPTN-5b lesion.  Unchanged right hepatic vein and right portal vein branch thrombi, without convincing thrombus enhancement.  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 Attention on follow-up.    12/11/19 s/p chemo embolization for possible residual viable tumor in the liver.    1/16/20  restaging MR showed  The circumscribed mass is seen in the anterior aspect of the right lobe of the liver demonstrates positive response to treatment with significantly reduced enhancement, no change in overall size.2.  Post radiation ablation defect in the anterior inferior right lobe with surrounding heterogeneous nonmass-like enhancement. It would be difficult to exclude active tumor within this surrounding area of enhancement.  Significant change in appearance of the posterior segment of the right lobe of the liver. Previous infiltrative nodular enhancement, now geographic nonmass-like enhancement with parenchymal volume loss. This could represent posttreatment related change. Difficult to exclude residual/progressive tumor    4/21/20 MR abdomen showed Continued evolution of post embolic/radiotherapy changes to the right hepatic lobe with large region of volume loss of enhancement likely representing fibrosis.  Previous HCC anteriorly along this region continues to regress with no evidence of enhancement to suggest recurrent or residual tumor. Previous  nodular lesion inferiorly along the treatment bed which also likely represented a site of HCC also continues to regress with no evidence of recurrence or residual disease. No new suspicious liver lesions are demonstrated. Cirrhosis. Splenomegaly. Portosystemic collaterals.    6/26/20 MR No significant MRI change in the treatment-related appearance of the right hepatic lobe since 4/21/2020. No MR evidence of recurrent hepatoma. Treated anterior right hepatic lobe lesion is redemonstrated. Treated lesion at right more posterior hepatic lobe is also seen.   Hepatic fibrosis.Cirrhosis, splenomegaly and portosystemic collaterals.    Today:   No major change in symptoms   He feels well and feels.   Pain improved in the abdomine  Energy is  Improving ECOG 2   appetite ist stable, weight is stable   Takes care of his ADLS   Stable,back pain , chest pain and neuropathy         Encounter Diagnosis   Name Primary?   • Hepatocellular carcinoma (CMS/HCC) (HCC)    .     Treatment History:  Oncology History   Hepatocellular carcinoma (CMS/HCC) (HCC)   1/23/2019 Initial Diagnosis    Hepatocellular carcinoma (CMS/HCC) (HCC)     7/30/2019 - 12/9/2019 Adjuvant Therapy    Hepatocellular - Sorafenib (Nexavar)  Plan Provider: ANDERSON Bryan  Treatment goal: Palliative  Line of treatment: [No plan line of treatment]     6/12/2020 Cancer Staged    Staging form: Liver, AJCC 8th Edition  - Clinical: Stage IIIB (cT4, cN0, cM0) - Signed by Milka Reynoso MD on 6/12/2020               RECENT EVENTS:    PAST MEDICAL HISTORY:   Past Medical History:   Diagnosis Date   • A-fib (CMS/HCC) (HCC)    • Allergy    • Anxiety    • Arthritis    • Asthma    • Blindness of right eye    • BPH (benign prostatic hyperplasia)    • Cardiovascular disease    • Cirrhosis (CMS/HCC) (HCC)     with possible liver mass by MRI 5/18, rec repeat in 8/2018   • Complication of anesthesia    • COPD (chronic obstructive pulmonary disease) (CMS/HCC) (HCC)    •  Depression    • Endocrine disorder    • Gallstones    • Gastritis    • GERD (gastroesophageal reflux disease)    • Glaucoma    • Hearing loss     bilateral hearing aids   • Hemorrhoids    • Hepatitis C     Hep C, 2016 remission, complicated by cirrhosis.  MRI abd 5/30/18, rec 3 month follow up for focus of enhancement right and left hepatic lobe junction   • Hernia, abdominal    • High blood pressure    • IBS (irritable bowel syndrome)    • Infectious viral hepatitis    • Jaundice    • Kidney stones    • Obstructive sleep apnea syndrome    • Periodic limb movement disorder    • Persistent hypersomnia    • Persistent insomnia    • Pneumonia    • PONV (postoperative nausea and vomiting)    • Type 2 diabetes mellitus (CMS/HCC) (HCC)    • Urinary incontinence    • Wears partial dentures         GYNECOLOGICAL HISTORY (if applicable): NA     FAMILY HISTORY:   Family History   Problem Relation Age of Onset   • Arthritis Mother    • Rheum arthritis Mother    • Diabetes Mother    • Rectal cancer Mother    • Other Paternal Grandmother    • Other Paternal Grandfather    • Diabetes Other    • Rheum arthritis Other    • Osteoporosis Other         SOCIAL HISTORY:   Social History     Socioeconomic History   • Marital status:      Spouse name: Not on file   • Number of children: Not on file   • Years of education: Not on file   • Highest education level: Not on file   Occupational History   • Not on file   Social Needs   • Financial resource strain: Not on file   • Food insecurity     Worry: Not on file     Inability: Not on file   • Transportation needs     Medical: Not on file     Non-medical: Not on file   Tobacco Use   • Smoking status: Never Smoker   • Smokeless tobacco: Never Used   Substance and Sexual Activity   • Alcohol use: No   • Drug use: No   • Sexual activity: Not on file   Lifestyle   • Physical activity     Days per week: Not on file     Minutes per session: Not on file   • Stress: Not on file    Relationships   • Social connections     Talks on phone: Not on file     Gets together: Not on file     Attends [employer] service: Not on file     Active member of club or organization: Not on file     Attends meetings of clubs or organizations: Not on file     Relationship status: Not on file   • Intimate partner violence     Fear of current or ex partner: Not on file     Emotionally abused: Not on file     Physically abused: Not on file     Forced sexual activity: Not on file   Other Topics Concern   • Not on file   Social History Narrative   • Not on file        ALLERGIES:   Allergies as of 06/26/2020 - Reviewed 06/26/2020   Allergen Reaction Noted   • Penicillins Anaphylaxis 10/19/2017   • Metformin Hives    • Adhesive tape-silicones  04/06/2018   • Banana  02/05/2019   • Broccoli  02/05/2019   • [place] sprout  02/05/2019   • Cabbage  02/05/2019   • Cantaloupe  02/05/2019   • Celery  02/05/2019   • Cheese  02/05/2019   • Cranberry  02/05/2019   • Egg     • Interferon violetta-2a  02/05/2019   • Latex     • Legumes  02/05/2019   • Lettuce  02/05/2019   • Milk  02/05/2019   • Mometasone     • Mometasone furoate     • Mustard  02/05/2019   • Nut - unspecified  02/05/2019   • Oats  02/05/2019   • Omeprazole     • Ondansetron hcl Nausea And Vomiting 05/01/2019   • Onion  02/05/2019   • Pepper (genus capsicum)  02/05/2019   • Pineapple  02/05/2019   • Primidone  05/01/2019   • Ribavirin Itching 10/19/2017   • Rosuvastatin  02/05/2019   • Sorafenib  04/04/2019   • Spironolactone     • Statins-hmg-coa reductase inhibitors Itching and GI intolerance 04/06/2018   • Wheat  02/05/2019   • Yeast  02/05/2019   • Interferon violetta (human leuk. derived) Palpitations and Rash 09/20/2019   • Pantoprazole Itching and Rash 02/28/2018   • Peginterferon violetta-2b Palpitations 10/19/2017        MEDICATIONS:   Current Outpatient Medications   Medication Sig Dispense Refill   • lidocaine (Lidoderm) 5 % patch Apply 1 patch topically daily  Remove & discard patch within 12 hours or as directed by MD.     • psyllium husk (METAMUCIL ORAL) Take by mouth daily     • hypromellose (SYSTANE GEL OPHT) Administer into affected eye(s) as needed     • multivit-minerals/folic acid (ADULT MULTIVITAMIN GUMMIES ORAL) Take 1 gummy by mouth daily     • fluticasone propionate (FLONASE) 50 mcg/actuation nasal spray Administer 1 spray into each nostril daily     • CLOTRIMAZOLE, BULK, MISC as needed To feet     • naloxone HCl (NARCAN NASL) Administer into affected nostril(s) as needed     • HYDROmorphone (DILAUDID) 2 mg tablet Take 2 mg by mouth every 4 (four) hours as needed     • sildenafiL (VIAGRA) 100 mg tablet Take 100 mg by mouth daily as needed for erectile dysfunction     • traZODone (DESYREL) 50 mg tablet Take 50 mg by mouth nightly     • traZODone (DESYREL) 100 mg tablet Take 100 mg by mouth nightly     • ondansetron (ZOFRAN) 4 mg tablet Take 4 mg by mouth every 8 (eight) hours as needed for nausea or vomiting     • SUMAtriptan (IMITREX) 100 mg tablet Take 100 mg by mouth once as needed for migraine     • insulin glargine (Lantus Solostar U-100 Insulin) 100 unit/mL (3 mL) insulin pen injection pen Inject 10 Units under the skin every morning     • miconazole nitrate (SECURA ANTIFUNGAL T) Apply topically     • carvediloL (COREG) 3.125 mg tablet Take 1 tablet (3.125 mg total) by mouth 2 (two) times a day with meals 180 tablet 0   • diclofenac sodium (VOLTAREN) 1 % gel Apply 1 Dose topically as needed     • HYDROmorphone (Dilaudid) 4 mg tablet Take 1 tablet (4 mg total) by mouth every 4 (four) hours as needed (pain) Max Daily Amount: 24 mg 180 tablet 0   • cetirizine (ZyrTEC) 10 mg tablet Take 10 mg by mouth daily     • loperamide (Imodium A-D) 2 mg tablet Take 2 tablets with first loose stool of the day, then up to 8 tablets daily 80 tablet 2   • hydrOXYzine (ATARAX) 25 mg tablet Take 1 tablet (25 mg total) by mouth nightly 90 tablet 2   • buPROPion SR (WELLBUTRIN  SR) 100 mg 12 hr tablet Take 1 tablet (100 mg total) by mouth daily 90 tablet 1   • lactulose (GENERLAC) 10 gram/15 mL solution Take 15 mL (10 g total) by mouth daily 473 mL 2   • terazosin (HYTRIN) 5 mg capsule Take 1 capsule (5 mg total) by mouth 2 (two) times a day 180 capsule 2   • blood-glucose meter (ACCU-CHEK ADELE PLUS METER) [facility] Use to test blood sugars 3 times daily (E11.65, non insulin depedent) AccuChek Adele plus, meter is 8 years old (Patient taking differently: Use to test blood sugars 3 times daily (E11.65, non insulin dependent) ) 1 each 0   • lisinopril (PRINIVIL,ZESTRIL) 20 mg tablet Take 1 tablet (20 mg total) by mouth daily 90 tablet 2   • furosemide (LASIX) 20 mg tablet Take 1 tablet (20 mg total) by mouth 2 (two) times a day. 180 tablet 2   • lansoprazole (PREVACID) 30 mg capsule Take 30 mg by mouth daily      • liraglutide (VICTOZA 2-JASE) 0.6 mg/0.1 mL (18 mg/3 mL) injection Inject 1.8 mg under the skin daily       • capsaicin (ZOSTRIX) 0.025 % cream Apply 1 application topically as needed for pain scale 1-3/10, 1-3/8.     • sodium chloride (SALINE NASAL) 0.65 % nasal spray Administer 1 spray into each nostril as needed for congestion or rhinitis      • white petrolatum (AQUAPHOR HEALING) 41 % ointment ointment Apply 1 application topically as needed.     • pramoxine-menthol (GOLD BOND MEDICATED ANTI-ITCH) 1-1 % cream Apply topically.     • brimonidine (ALPHAGAN) 0.2 % ophthalmic solution Administer into both eyes every 12 hours       • GLYCERIN/PROPYLENE GLYCOL (ARTIFICIAL TEARS,GLYCERIN-PEG, OPHT) Administer 1 drop into affected eye(s) as needed (Dry eyes)      • latanoprost (XALATAN) 0.005 % ophthalmic solution Administer into both eyes nightly   10 0     No current facility-administered medications for this visit.        REVIEW OF SYSTEMS:   Review of Systems   Constitutional: Positive for fatigue.   HENT:   Positive for hearing loss.    Eyes: Positive for eye problems.    Gastrointestinal: Positive for abdominal distention and abdominal pain.   Endocrine: Negative.    Genitourinary: Positive for bladder incontinence.    Musculoskeletal: Positive for arthralgias, gait problem and myalgias.   Skin: Negative.    Neurological: Positive for gait problem.   Hematological: Negative.    Psychiatric/Behavioral: The patient is nervous/anxious.    All other systems reviewed and are negative.      The ECOG performance status today is 2 - Ambulatory care of self; up and about >50% of waking hours.          Objective    PHYSICAL EXAM:   There were no vitals taken for this visit.   There is no height or weight on file to calculate BMI.       Physical Exam r  Exam not done, complying with prevention methods of coronavirus spread     LABS:   Appointment on 06/25/2020   Component Date Value Ref Range Status   • Sodium 06/25/2020 140  135 - 145 mmol/L Final   • Potassium 06/25/2020 3.7  3.5 - 5.1 mmol/L Final   • Chloride 06/25/2020 105  98 - 107 mmol/L Final   • CO2 06/25/2020 27  21 - 32 mmol/L Final   • Anion Gap 06/25/2020 8  3 - 11 mmol/L Final   • BUN 06/25/2020 16  7 - 25 mg/dL Final   • Creatinine 06/25/2020 0.91  0.70 - 1.30 mg/dL Final   • Glucose 06/25/2020 162* 70 - 105 mg/dL Final   • Calcium 06/25/2020 8.9  8.6 - 10.3 mg/dL Final   • AST 06/25/2020 27  <40 U/L Final   • ALT (SGPT) 06/25/2020 21  7 - 52 U/L Final   • Alkaline Phosphatase 06/25/2020 97  45 - 115 U/L Final   • Total Protein 06/25/2020 6.8  6.0 - 8.3 g/dL Final   • Albumin 06/25/2020 4.1  3.5 - 5.3 g/dL Final   • Total Bilirubin 06/25/2020 0.98  0.20 - 1.40 mg/dL Final   • eGFR 06/25/2020 86  >60 mL/min/1.73m*2 Final   • Corrected Calcium 06/25/2020 8.8  8.6 - 10.3 mg/dL Final   • Alpha-Fetoprotein 06/25/2020 15* <=9 ng/mL Final   • WBC 06/25/2020 3.8  3.7 - 9.6 10*3/uL Final   • RBC 06/25/2020 4.50  4.10 - 5.80 10*6/µL Final   • Hemoglobin 06/25/2020 13.7  13.2 - 17.2 g/dL Final   • Hematocrit 06/25/2020 40.3  38.0 - 50.0  % Final   • MCV 06/25/2020 89.6  82.0 - 97.0 fL Final   • MCH 06/25/2020 30.3  29.0 - 34.0 pg Final   • MCHC 06/25/2020 33.9  32.0 - 36.0 g/dL Final   • RDW 06/25/2020 13.6  11.5 - 15.0 % Final   • Platelets 06/25/2020 123* 130 - 350 10*3/uL Final   • MPV 06/25/2020 8.0  6.9 - 10.8 fL Final   • Neutrophils% 06/25/2020 78* 46 - 70 % Final   • Lymphocytes% 06/25/2020 11* 15 - 47 % Final   • Monocytes% 06/25/2020 9  5 - 13 % Final   • Eosinophils% 06/25/2020 1  0 - 3 % Final   • Basophils% 06/25/2020 1  0 - 2 % Final   • Neutrophils Absolute 06/25/2020 3.00  10*3/uL Final   • Lymphocytes Absolute 06/25/2020 0.40  10*3/uL Final   • Monocytes Absolute 06/25/2020 0.30  10*3/uL Final   • Eosinophils Absolute 06/25/2020 0.00  10*3/uL Final   • Basophils Absolute 06/25/2020 0.00  10*3/uL Final       DIAGNOSTIC REPORTS REVIEWED:   Imaging:  Reviewed     Assessment/Plan    IMPRESSION:   This is a pleasant 69-year-old male with diagnosis of hepatocellular carcinoma involving the right lobe, locally advanced disease, per the chart not amenable to local therapy, started sorafenib December 2018 however stopped in February 2019 due to intolerance without any dose reductions.  Has been off of treatment since then, restaging CAT scan and MRI  7/2019 show progressive disease in the liver.  Started  sorafenib 8/2019 at a lower dose 200 mg twice daily, s/p RFA 8/2019, was referred to [facility], s/p Y90 treatment followed by [facility], MR showed response to treatment without progression on 1/16/20 and 4/21/20  currently treatment on hold until evidence of disease progression      It seems like vascular surgery at  were concerned about enhancement of the gallbladder and they wanted to discuss at tumor board and inform me of their decision. There is no rule for surgery, and they discussed possibly starting atezolizumab and bevacizumab, however I dont agree given no evidence of disease progression, and normal AFP        Plan:   -  no need for systemic treatment at this point given stable disease, when he progresses will discuss sorafenib vs. lenvatinib vs. atezolizomab and bevacizumab   - referral to surgery for gallbalder distention and ? Sludge.   - Follow-up in 3 months with  labs and restaging MR to rule out progression     - to continue follow up with IR at [facility]           Physician: Milka Reynoso MD

## 2020-06-29 ENCOUNTER — TELEPHONE - BILLABLE (OUTPATIENT)
Dept: PALLIATIVE CARE | Facility: CLINIC | Age: 70
End: 2020-06-29
Payer: MEDICARE

## 2020-06-29 DIAGNOSIS — G89.3 PAIN OF METASTATIC MALIGNANCY: Primary | ICD-10-CM

## 2020-06-29 DIAGNOSIS — M17.0 PRIMARY OSTEOARTHRITIS OF BOTH KNEES: ICD-10-CM

## 2020-06-29 DIAGNOSIS — C22.0 HEPATOCELLULAR CARCINOMA (CMS/HCC): ICD-10-CM

## 2020-06-29 DIAGNOSIS — G47.01 INSOMNIA DUE TO MEDICAL CONDITION: ICD-10-CM

## 2020-06-29 PROCEDURE — PBTELEVISIT PR PLACEHOLDER CHARGE FOR ALL NON INOFFICE VISITS: Performed by: INTERNAL MEDICINE

## 2020-06-29 PROCEDURE — 99441 *INACTIVE DO NOT USE* PR PHYS/QHP TELEPHONE EVALUATION 5-10 MIN: CPT | Mod: 95,NC | Performed by: INTERNAL MEDICINE

## 2020-06-29 ASSESSMENT — ENCOUNTER SYMPTOMS
UNEXPECTED WEIGHT CHANGE: 1
COUGH: 0
WHEEZING: 0
ENDOCRINE NEGATIVE: 1
APPETITE CHANGE: 1
ABDOMINAL PAIN: 1
DIARRHEA: 0
CONSTIPATION: 0
ARTHRALGIAS: 1
ALLERGIC/IMMUNOLOGIC NEGATIVE: 1
VOMITING: 0
HEMATOLOGIC/LYMPHATIC NEGATIVE: 1
LIGHT-HEADEDNESS: 1
SLEEP DISTURBANCE: 1
SHORTNESS OF BREATH: 1
DIZZINESS: 1
HEADACHES: 1
ACTIVITY CHANGE: 1
NAUSEA: 1

## 2020-06-29 NOTE — PROGRESS NOTES
"SUBJECTIVE:     The pt presents today for follow up of his hepatocellular carcinoma and related symptoms. He is doing overall well but still continues with joint and back pain . He feels that his gallbladder is acting up lately. Yesterday, he had very severe acute pain and states \"I didn't think I was going to make it\". He was very nauseated with this, but did not vomit.   He states that he couldn't stand up. He felt very scared. He sees a general surgeon about this in 2 days. He tends to eat a very fatty diet, and we discussed the importance of a low fat, not fried diet. We discussed some strategies for his diet. Outside of that, he is feeling generally ok. His back is \"really bad\" as are his joints. He has had joint injections in the past, which helped but only temporarily. We discussed the importance of weight loss in treatment of arthritis since he is not an optimal surgical candidate.        Past Medical History:   Diagnosis Date   • A-fib (CMS/HCC) (HCC)    • Allergy    • Anxiety    • Arthritis    • Asthma    • Blindness of right eye    • BPH (benign prostatic hyperplasia)    • Cardiovascular disease    • Cirrhosis (CMS/HCC) (HCC)     with possible liver mass by MRI 5/18, rec repeat in 8/2018   • Complication of anesthesia    • COPD (chronic obstructive pulmonary disease) (CMS/HCC) (HCC)    • Depression    • Endocrine disorder    • Gallstones    • Gastritis    • GERD (gastroesophageal reflux disease)    • Glaucoma    • Hearing loss     bilateral hearing aids   • Hemorrhoids    • Hepatitis C     Hep C, 2016 remission, complicated by cirrhosis.  MRI abd 5/30/18, rec 3 month follow up for focus of enhancement right and left hepatic lobe junction   • Hernia, abdominal    • High blood pressure    • IBS (irritable bowel syndrome)    • Infectious viral hepatitis    • Jaundice    • Kidney stones    • Obstructive sleep apnea syndrome    • Periodic limb movement disorder    • Persistent hypersomnia    • Persistent " insomnia    • Pneumonia    • PONV (postoperative nausea and vomiting)    • Type 2 diabetes mellitus (CMS/HCC) (HCC)    • Urinary incontinence    • Wears partial dentures      Past Surgical History:   Procedure Laterality Date   • COLONOSCOPY  10/27/2016    Pili, normal, repeat 10 years   • CT ABLATION LIVER RADIOFREQUENCY  8/14/2019    CT ABLATION LIVER RADIOFREQUENCY 8/14/2019 [facility] MIS CT SCAN   • ESOPHAGOGASTRODUODENOSCOPY N/A 2/22/2018    Procedure: EGD - ESOPHAGOGASTRODUODENOSCOPY with banding  x 2 with crna;  Surgeon: William Rivas MD;  Location: [facility] Endoscopy;  Service: Endoscopy;  Laterality: N/A;   • ESOPHAGOGASTRODUODENOSCOPY N/A 4/6/2018    Procedure: EGD - ESOPHAGOGASTRODUODENOSCOPY with crna;  Surgeon: William Rivas MD;  Location: [facility] Endoscopy;  Service: Endoscopy;  Laterality: N/A;   • ESOPHAGOGASTRODUODENOSCOPY N/A 5/7/2019    Procedure: EGD - ESOPHAGOGASTRODUODENOSCOPY;  Surgeon: William Rivas MD;  Location: [facility] Endoscopy;  Service: Endoscopy;  Laterality: N/A;   • HAND SURGERY Left     thumb   • HERNIA REPAIR  01/01/1965   • KNEE ARTHROSCOPY  09/19/2017    left knee, with partial medial meniscectomy; limited chondroplasty, patellofemoral joint   • LIVER BIOPSY      by Dr. Alejandre   • OTHER SURGICAL HISTORY      esophageal varices, banded   • VASECTOMY      at approx age of 25     Family History   Problem Relation Age of Onset   • Arthritis Mother    • Rheum arthritis Mother    • Diabetes Mother    • Rectal cancer Mother    • Other Paternal Grandmother    • Other Paternal Grandfather    • Diabetes Other    • Rheum arthritis Other    • Osteoporosis Other      Current Outpatient Medications   Medication Sig Dispense Refill   • lidocaine (Lidoderm) 5 % patch Apply 1 patch topically daily Remove & discard patch within 12 hours or as directed by MD.     • psyllium husk (METAMUCIL ORAL) Take by mouth daily     • hypromellose (SYSTANE GEL OPHT) Administer into affected eye(s) as needed     •  multivit-minerals/folic acid (ADULT MULTIVITAMIN GUMMIES ORAL) Take 1 gummy by mouth daily     • fluticasone propionate (FLONASE) 50 mcg/actuation nasal spray Administer 1 spray into each nostril daily     • CLOTRIMAZOLE, BULK, MISC as needed To feet     • naloxone HCl (NARCAN NASL) Administer into affected nostril(s) as needed     • HYDROmorphone (DILAUDID) 2 mg tablet Take 2 mg by mouth every 4 (four) hours as needed     • sildenafiL (VIAGRA) 100 mg tablet Take 100 mg by mouth daily as needed for erectile dysfunction     • traZODone (DESYREL) 50 mg tablet Take 50 mg by mouth nightly     • traZODone (DESYREL) 100 mg tablet Take 100 mg by mouth nightly     • ondansetron (ZOFRAN) 4 mg tablet Take 4 mg by mouth every 8 (eight) hours as needed for nausea or vomiting     • SUMAtriptan (IMITREX) 100 mg tablet Take 100 mg by mouth once as needed for migraine     • insulin glargine (Lantus Solostar U-100 Insulin) 100 unit/mL (3 mL) insulin pen injection pen Inject 10 Units under the skin every morning     • miconazole nitrate (SECURA ANTIFUNGAL T) Apply topically     • carvediloL (COREG) 3.125 mg tablet Take 1 tablet (3.125 mg total) by mouth 2 (two) times a day with meals 180 tablet 0   • diclofenac sodium (VOLTAREN) 1 % gel Apply 1 Dose topically as needed     • HYDROmorphone (Dilaudid) 4 mg tablet Take 1 tablet (4 mg total) by mouth every 4 (four) hours as needed (pain) Max Daily Amount: 24 mg 180 tablet 0   • cetirizine (ZyrTEC) 10 mg tablet Take 10 mg by mouth daily     • loperamide (Imodium A-D) 2 mg tablet Take 2 tablets with first loose stool of the day, then up to 8 tablets daily 80 tablet 2   • hydrOXYzine (ATARAX) 25 mg tablet Take 1 tablet (25 mg total) by mouth nightly 90 tablet 2   • buPROPion SR (WELLBUTRIN SR) 100 mg 12 hr tablet Take 1 tablet (100 mg total) by mouth daily 90 tablet 1   • lactulose (GENERLAC) 10 gram/15 mL solution Take 15 mL (10 g total) by mouth daily 473 mL 2   • terazosin (HYTRIN) 5 mg  capsule Take 1 capsule (5 mg total) by mouth 2 (two) times a day 180 capsule 2   • blood-glucose meter (ACCU-CHEK ADELE PLUS METER) [facility] Use to test blood sugars 3 times daily (E11.65, non insulin depedent) AccuChek Adele plus, meter is 8 years old (Patient taking differently: Use to test blood sugars 3 times daily (E11.65, non insulin dependent) ) 1 each 0   • lisinopril (PRINIVIL,ZESTRIL) 20 mg tablet Take 1 tablet (20 mg total) by mouth daily 90 tablet 2   • furosemide (LASIX) 20 mg tablet Take 1 tablet (20 mg total) by mouth 2 (two) times a day. 180 tablet 2   • lansoprazole (PREVACID) 30 mg capsule Take 30 mg by mouth daily      • liraglutide (VICTOZA 2-JASE) 0.6 mg/0.1 mL (18 mg/3 mL) injection Inject 1.8 mg under the skin daily       • capsaicin (ZOSTRIX) 0.025 % cream Apply 1 application topically as needed for pain scale 1-3/10, 1-3/8.     • sodium chloride (SALINE NASAL) 0.65 % nasal spray Administer 1 spray into each nostril as needed for congestion or rhinitis      • white petrolatum (AQUAPHOR HEALING) 41 % ointment ointment Apply 1 application topically as needed.     • pramoxine-menthol (GOLD BOND MEDICATED ANTI-ITCH) 1-1 % cream Apply topically.     • brimonidine (ALPHAGAN) 0.2 % ophthalmic solution Administer into both eyes every 12 hours       • GLYCERIN/PROPYLENE GLYCOL (ARTIFICIAL TEARS,GLYCERIN-PEG, OPHT) Administer 1 drop into affected eye(s) as needed (Dry eyes)      • latanoprost (XALATAN) 0.005 % ophthalmic solution Administer into both eyes nightly   10 0     No current facility-administered medications for this visit.         Lab Results   Component Value Date    GLUCOSE 162 (H) 06/25/2020    BUN 16 06/25/2020     06/25/2020    K 3.7 06/25/2020     06/25/2020    CO2 27 06/25/2020    ANIONGAP 8 06/25/2020    CALCIUM 8.9 06/25/2020        @RESUFASTREFRESH(CHOL,HDL,LDL,TRIG,LDLDirect)@     Review of Systems   Constitutional: Positive for activity change, appetite change and  unexpected weight change.   HENT: Negative.    Respiratory: Positive for shortness of breath. Negative for cough and wheezing.    Cardiovascular: Positive for leg swelling.   Gastrointestinal: Positive for abdominal pain and nausea. Negative for constipation, diarrhea and vomiting.   Endocrine: Negative.    Genitourinary: Negative.    Musculoskeletal: Positive for arthralgias.   Skin: Negative.    Allergic/Immunologic: Negative.    Neurological: Positive for dizziness, light-headedness and headaches.   Hematological: Negative.    Psychiatric/Behavioral: Positive for sleep disturbance.        I have reviewed and agree with the review of systems that was completed by the Clinic Support Staff 6/29/2020.       ASSESSMENT/PLAN  Ben was seen today for follow-up.    Diagnoses and all orders for this visit:    Pain of metastatic malignancy    Hepatocellular carcinoma (CMS/HCC) (HCC)    Insomnia due to medical condition    Primary osteoarthritis of both knees    For pain, he is going to continue with prn dilaudid. He also has voltaren gel and lidoderm patches for his arthritic pain. We also discussed the importance of weight loss in this regard.His cancer is being followed closely by Dr. Reynoso. Trazodone should be continued at the 100mg dose. We discussed the fact that he can cut those in half. Finally, he was advised to eat a low fat, low cholesterol diet, and we discussed what types of food that would entail.      Patient Active Problem List   Diagnosis   • Chronic hepatitis C with cirrhosis (CMS/HCC) (HCC)   • Type 2 diabetes mellitus with neurologic complication (CMS/HCC) (HCC)   • Multiple food allergies   • Gastric erosion   • Noncompliance with therapeutic plan   • Essential hypertension   • History of esophageal varices   • No-show for appointment   • Hepatocellular carcinoma (CMS/HCC) (HCC)   • Pain of metastatic malignancy   • Nausea   • Insomnia due to medical condition   • Other headache syndrome   •  Migraine without aura and without status migrainosus, not intractable   • Primary osteoarthritis of both knees

## 2020-06-29 NOTE — PROGRESS NOTES
TC to patient as reminder of scheduled telephone apptmt with Dr Gómez for today. Pt continues to have joint pain and migraines. Using Lidoderm patches and wanting to discuss adjustment in use of patches. Appetite good. No bowel or bladder issues. Medications reviewed on 6/26/2020 Report to Dr. Rico wang

## 2020-06-30 ENCOUNTER — TELEPHONE (OUTPATIENT)
Dept: SURGERY | Facility: CLINIC | Age: 70
End: 2020-06-30

## 2020-06-30 NOTE — TELEPHONE ENCOUNTER
Pt returned my call of earlier this morning. I informed pt that dr. Broderick reviewed his chart and because of previous RF ablations to liver and Hepatocellular carcinoma, dr. Broderick will not be able to offer surgery here in [place]. Pt voiced understanding and appointment will be cancelled.    I will send this message to  for a referral to another surgery group, [place] or perhaps [place].

## 2020-07-01 ENCOUNTER — TELEPHONE (OUTPATIENT)
Dept: ONCOLOGY | Facility: CLINIC | Age: 70
End: 2020-07-01

## 2020-07-01 ENCOUNTER — MEDICAL CORRESPONDENCE (OUTPATIENT)
Dept: HEALTH INFORMATION MANAGEMENT | Facility: CLINIC | Age: 70
End: 2020-07-01

## 2020-07-01 ENCOUNTER — TRANSFERRED RECORDS (OUTPATIENT)
Dept: HEALTH INFORMATION MANAGEMENT | Facility: CLINIC | Age: 70
End: 2020-07-01

## 2020-07-01 DIAGNOSIS — C22.0 HEPATOCELLULAR CARCINOMA (CMS/HCC): Primary | ICD-10-CM

## 2020-07-01 NOTE — PROGRESS NOTES
Patient called and said that his appointments that were set up by Dr. Reynoso in [place] were cancelled by Dr. Broderick. Patient was told that he needed to go to a bigger facility and see a specialist. Please call

## 2020-07-01 NOTE — PROGRESS NOTES
Spoke with pt per Dr. Reynoso. Pt referred to [facility] for consult for surgery. JOVON Thompson

## 2020-07-02 ENCOUNTER — TRANSCRIBE ORDERS (OUTPATIENT)
Dept: OTHER | Age: 70
End: 2020-07-02

## 2020-07-02 DIAGNOSIS — C22.0 HEPATOCELLULAR CARCINOMA (H): Primary | ICD-10-CM

## 2020-07-07 ENCOUNTER — PATIENT OUTREACH (OUTPATIENT)
Dept: SURGERY | Facility: CLINIC | Age: 70
End: 2020-07-07

## 2020-07-07 NOTE — TELEPHONE ENCOUNTER
Surgical Oncology RN Care Coordination Note:     Called and spoke with RN at local oncology clinic to confirm what is being requested for the surgical referral. Informed her that the referral states HCC but that in the notes indicate that they are wanting surgical consult for gallbladder distention. Informed her I just need clarification on what is being requested as he has already been seen and evaluated for surgical treatment of HCC and was not a candidate and was referred to IR for treatment.     Informed her if that if they want consult for gallbladder we are happy to set up but if he had progression of disease would recommend getting patient back in with IR to discuss additional liver directed treatment.     She states she was not certain on the request and would discuss with provider. Provided her my direct number to call back once she confirm plan and request.     Jerilyn Walker RN, BSN  Care Coordinator   [phone]

## 2020-07-07 NOTE — TELEPHONE ENCOUNTER
Surgical Oncology RN Care Coordination Note:     Received a call back from oncology office and confirmed they are requesting consult to see if gallbladder can be removed not for treatment of HCC. Informed her we will need the most recent imaging to be sent and she confirmed that was sent to medical records to have it mailed.     Will reach out to patient and informed him that once we get the imaging we will contact him to schedule a visit.     Jerilyn Walker RN, BSN  Care Coordinator   [phone]

## 2020-07-08 ENCOUNTER — TELEPHONE (OUTPATIENT)
Dept: SURGERY | Facility: CLINIC | Age: 70
End: 2020-07-08

## 2020-07-08 ENCOUNTER — TELEPHONE (OUTPATIENT)
Dept: ONCOLOGY | Facility: CLINIC | Age: 70
End: 2020-07-08

## 2020-07-08 NOTE — PROGRESS NOTES
Marnie from [facility] called asking to speak to loreto in regards to the patient's referral for general surgery. Please call

## 2020-07-08 NOTE — TELEPHONE ENCOUNTER
[facility] and [facility] Broderick reviewed pts chart and that pt should be seen at a speciality center, because of previous RF ablations.

## 2020-07-08 NOTE — PROGRESS NOTES
Attempted to call Marnie, voicemail left for her on number provided asking her to return our call.

## 2020-07-09 ENCOUNTER — PATIENT OUTREACH (OUTPATIENT)
Dept: SURGERY | Facility: CLINIC | Age: 70
End: 2020-07-09

## 2020-07-09 NOTE — TELEPHONE ENCOUNTER
Surgical Oncology RN Care Coordination Note:     Called and left a message for patient regarding request we received for him to [facility]. Left my direct number for call back.     Currently awaiting most recent imaging before scheduling, will need to review with Dr. Munoz, request for consult is for gallbladder surgery not surgical treatment of patients known HCC.     Jerilyn Walker, RN, BSN  Care Coordinator   [phone]

## 2020-07-13 ENCOUNTER — PATIENT OUTREACH (OUTPATIENT)
Dept: SURGERY | Facility: CLINIC | Age: 70
End: 2020-07-13

## 2020-07-13 NOTE — TELEPHONE ENCOUNTER
Surgical Oncology RN Care Coordination Note:     Called and spoke with patient and wife regarding request for consult with surgery. Informed him that we are just waiting on getting the imaging study sent to us and that we will then get it reviewed and arrange for consult with surgeon. Informed him that our team would be in touch to discuss appointment info once records were received.     Informed them we did speak with the referring office last week to confirm they have mailed the imaging.     Jerilyn Walker RN, BSN  Care Coordinator   [phone]

## 2020-07-21 ENCOUNTER — PATIENT OUTREACH (OUTPATIENT)
Dept: SURGERY | Facility: CLINIC | Age: 70
End: 2020-07-21

## 2020-07-21 DIAGNOSIS — C22.0 HEPATOCELLULAR CARCINOMA (H): Primary | ICD-10-CM

## 2020-07-21 DIAGNOSIS — R93.2 ABNORMAL FINDINGS ON DIAGNOSTIC IMAGING OF GALL BLADDER: ICD-10-CM

## 2020-07-21 NOTE — TELEPHONE ENCOUNTER
Surgical Oncology RN Care Coordination Note:     Per review of imaging with Dr. Munoz, will request overread of MRI by Dr. Silva for evaluation of possible tumor invasion of the gallbladder prior to visit.     Will place orders once visit is confirmed with patient.     Jerilyn Walker RN, BSN  Care Coordinator   [phone]

## 2020-07-21 NOTE — TELEPHONE ENCOUNTER
Surgical Oncology RN Care Coordination Note:     Called to try and speak with patient regarding referral and plan for appointment with Dr. Munoz. Unable to reach patient. Line was busy multiple times and then rang but no answer or option to leave a message.     Called referring office and left message for Antonietta and updated her that we were attempted to reach patient to schedule him for a consult with Dr. Munoz. Informed her that I cant reach the patient or leave him a message to call back. Requested she call back to confirm if there is another contact that we can reach patient at and provide information needed for referral.     Jerilyn Walker, RN, BSN  Care Coordinator   [phone]

## 2020-07-21 NOTE — TELEPHONE ENCOUNTER
Surgical Oncology RN Care Coordination Note:     Patient and wife called back and spoke with them regarding plan. Informed them that we will arrange for a consult and a PAC visit on the same day. Informed them they will get a call from scheduling and that once they are set up writer will update [facility] with appointment info. They agreed with plan and will await call from scheduling.     Jerilyn Walker RN, BSN  Care Coordinator   [phone]

## 2020-07-22 ENCOUNTER — HOSPITAL ENCOUNTER (INPATIENT)
Dept: GENERAL RADIOLOGY | Facility: CLINIC | Age: 70
End: 2020-07-22
Attending: SURGERY
Payer: MEDICARE

## 2020-07-22 ENCOUNTER — TELEPHONE (OUTPATIENT)
Dept: SURGERY | Facility: CLINIC | Age: 70
End: 2020-07-22

## 2020-07-22 DIAGNOSIS — C22.0 HEPATOCELLULAR CARCINOMA (H): ICD-10-CM

## 2020-07-22 DIAGNOSIS — R93.2 ABNORMAL FINDINGS ON DIAGNOSTIC IMAGING OF GALL BLADDER: ICD-10-CM

## 2020-07-22 NOTE — TELEPHONE ENCOUNTER
FUTURE VISIT INFORMATION      SURGERY INFORMATION:    H&P; surgery with Dr. Munoz date TBD    RECORDS REQUESTED FROM:       Primary Care Provider: Linn Mccarthy MD - [facility]    Pertinent Medical History: yeni, hypertension, congestive heart failure    Most recent EKG+ Tracin19    Most recent Cardiac Stress Test: 6/8/15- [facility]

## 2020-07-23 ENCOUNTER — PATIENT OUTREACH (OUTPATIENT)
Dept: SURGERY | Facility: CLINIC | Age: 70
End: 2020-07-23

## 2020-07-23 NOTE — TELEPHONE ENCOUNTER
Surgical Oncology RN Care Coordination Note:     Call[facility] information for visits. Informed her of the visit with Dr. Munoz and the pre op clinic. She stated both will be covered with authorization along with other services needed for appointment and work up.     [facility] is [id number].     Jerilyn Walker RN, BSN  Care Coordinator   [phone]

## 2020-07-28 ASSESSMENT — ENCOUNTER SYMPTOMS
BACK PAIN: 0
MYALGIAS: 0
STIFFNESS: 1
ARTHRALGIAS: 1
JOINT SWELLING: 0
MUSCLE CRAMPS: 0
MUSCLE WEAKNESS: 0
NECK PAIN: 0

## 2020-07-29 PROBLEM — M75.41 IMPINGEMENT SYNDROME OF RIGHT SHOULDER REGION: Status: ACTIVE | Noted: 2020-07-29

## 2020-07-29 PROBLEM — M17.0 PRIMARY OSTEOARTHRITIS OF BOTH KNEES: Status: ACTIVE | Noted: 2020-06-29

## 2020-07-29 PROBLEM — G44.89 OTHER HEADACHE SYNDROME: Status: ACTIVE | Noted: 2019-12-19

## 2020-07-29 PROBLEM — Z96.1 HISTORY OF POSTERIOR CHAMBER INTRAOCULAR LENS IMPLANTATION: Status: ACTIVE | Noted: 2020-07-29

## 2020-07-29 PROBLEM — Z86.69 HISTORY OF RETINAL DETACHMENT: Status: ACTIVE | Noted: 2020-07-29

## 2020-07-29 PROBLEM — G47.01 INSOMNIA DUE TO MEDICAL CONDITION: Status: ACTIVE | Noted: 2019-12-19

## 2020-07-29 PROBLEM — S83.419A SPRAIN OF MEDIAL COLLATERAL LIGAMENT OF KNEE: Status: ACTIVE | Noted: 2017-05-26

## 2020-07-29 PROBLEM — G89.3 PAIN OF METASTATIC MALIGNANCY: Status: ACTIVE | Noted: 2019-12-19

## 2020-07-29 PROBLEM — H52.209 ASTIGMATISM: Status: ACTIVE | Noted: 2020-07-29

## 2020-07-29 PROBLEM — G43.009 MIGRAINE WITHOUT AURA AND WITHOUT STATUS MIGRAINOSUS, NOT INTRACTABLE: Status: ACTIVE | Noted: 2019-12-19

## 2020-07-29 PROBLEM — R11.0 NAUSEA: Status: ACTIVE | Noted: 2019-12-19

## 2020-07-31 ENCOUNTER — OFFICE VISIT (OUTPATIENT)
Dept: SURGERY | Facility: CLINIC | Age: 70
End: 2020-07-31
Payer: MEDICARE

## 2020-07-31 ENCOUNTER — ANESTHESIA EVENT (OUTPATIENT)
Dept: SURGERY | Facility: CLINIC | Age: 70
End: 2020-07-31

## 2020-07-31 ENCOUNTER — PRE VISIT (OUTPATIENT)
Dept: SURGERY | Facility: CLINIC | Age: 70
End: 2020-07-31

## 2020-07-31 ENCOUNTER — OFFICE VISIT (OUTPATIENT)
Dept: SURGERY | Facility: CLINIC | Age: 70
End: 2020-07-31
Attending: SURGERY
Payer: MEDICARE

## 2020-07-31 VITALS
RESPIRATION RATE: 16 BRPM | OXYGEN SATURATION: 96 % | WEIGHT: 209.5 LBS | HEIGHT: 65 IN | BODY MASS INDEX: 34.91 KG/M2 | TEMPERATURE: 98 F | DIASTOLIC BLOOD PRESSURE: 70 MMHG | HEART RATE: 68 BPM | SYSTOLIC BLOOD PRESSURE: 117 MMHG

## 2020-07-31 VITALS
HEART RATE: 68 BPM | DIASTOLIC BLOOD PRESSURE: 70 MMHG | TEMPERATURE: 98 F | SYSTOLIC BLOOD PRESSURE: 117 MMHG | OXYGEN SATURATION: 96 % | RESPIRATION RATE: 19 BRPM | BODY MASS INDEX: 36.32 KG/M2 | WEIGHT: 218 LBS | HEIGHT: 65 IN

## 2020-07-31 DIAGNOSIS — C22.0 HEPATOCELLULAR CARCINOMA (H): ICD-10-CM

## 2020-07-31 DIAGNOSIS — C22.0 HCC (HEPATOCELLULAR CARCINOMA) (H): Primary | ICD-10-CM

## 2020-07-31 DIAGNOSIS — Z01.818 PREOP EXAMINATION: Primary | ICD-10-CM

## 2020-07-31 DIAGNOSIS — Z01.818 PREOP EXAMINATION: ICD-10-CM

## 2020-07-31 PROBLEM — Z87.19 HISTORY OF PORTAL HYPERTENSION: Status: ACTIVE | Noted: 2020-07-31

## 2020-07-31 PROBLEM — H33.21: Status: ACTIVE | Noted: 2020-07-31

## 2020-07-31 PROBLEM — R60.9 EDEMA: Status: ACTIVE | Noted: 2020-07-31

## 2020-07-31 PROBLEM — H04.129 TEAR FILM INSUFFICIENCY: Status: ACTIVE | Noted: 2020-07-31

## 2020-07-31 PROBLEM — N40.0 HYPERTROPHY OF PROSTATE WITHOUT URINARY OBSTRUCTION AND OTHER LOWER URINARY TRACT SYMPTOMS (LUTS): Status: ACTIVE | Noted: 2020-07-31

## 2020-07-31 PROBLEM — R18.8 ASCITES: Status: ACTIVE | Noted: 2020-07-31

## 2020-07-31 LAB
ABO + RH BLD: NORMAL
ABO + RH BLD: NORMAL
ALBUMIN SERPL-MCNC: 3.7 G/DL (ref 3.4–5)
ALP SERPL-CCNC: 143 U/L (ref 40–150)
ALT SERPL W P-5'-P-CCNC: 34 U/L (ref 0–70)
ANION GAP SERPL CALCULATED.3IONS-SCNC: 2 MMOL/L (ref 3–14)
AST SERPL W P-5'-P-CCNC: 28 U/L (ref 0–45)
BILIRUB SERPL-MCNC: 0.4 MG/DL (ref 0.2–1.3)
BLD GP AB SCN SERPL QL: NORMAL
BLOOD BANK CMNT PATIENT-IMP: NORMAL
BLOOD BANK CMNT PATIENT-IMP: NORMAL
BUN SERPL-MCNC: 17 MG/DL (ref 7–30)
CALCIUM SERPL-MCNC: 8.6 MG/DL (ref 8.5–10.1)
CHLORIDE SERPL-SCNC: 107 MMOL/L (ref 94–109)
CO2 SERPL-SCNC: 30 MMOL/L (ref 20–32)
CREAT SERPL-MCNC: 0.94 MG/DL (ref 0.66–1.25)
ERYTHROCYTE [DISTWIDTH] IN BLOOD BY AUTOMATED COUNT: 12.8 % (ref 10–15)
GFR SERPL CREATININE-BSD FRML MDRD: 82 ML/MIN/{1.73_M2}
GLUCOSE SERPL-MCNC: 122 MG/DL (ref 70–99)
HBA1C MFR BLD: 7.5 % (ref 0–5.6)
HCT VFR BLD AUTO: 39 % (ref 40–53)
HGB BLD-MCNC: 12.4 G/DL (ref 13.3–17.7)
INR PPP: 1.12 (ref 0.86–1.14)
MCH RBC QN AUTO: 30.5 PG (ref 26.5–33)
MCHC RBC AUTO-ENTMCNC: 31.8 G/DL (ref 31.5–36.5)
MCV RBC AUTO: 96 FL (ref 78–100)
NT-PROBNP SERPL-MCNC: 39 PG/ML (ref 0–125)
PLATELET # BLD AUTO: 113 10E9/L (ref 150–450)
POTASSIUM SERPL-SCNC: 4 MMOL/L (ref 3.4–5.3)
PROT SERPL-MCNC: 6.9 G/DL (ref 6.8–8.8)
RBC # BLD AUTO: 4.07 10E12/L (ref 4.4–5.9)
SODIUM SERPL-SCNC: 138 MMOL/L (ref 133–144)
SPECIMEN EXP DATE BLD: NORMAL
WBC # BLD AUTO: 3.3 10E9/L (ref 4–11)

## 2020-07-31 PROCEDURE — G0463 HOSPITAL OUTPT CLINIC VISIT: HCPCS | Mod: ZF

## 2020-07-31 RX ORDER — CARBOXYMETHYLCELLULOSE SODIUM 5 MG/ML
1 SOLUTION/ DROPS OPHTHALMIC 3 TIMES DAILY PRN
COMMUNITY
End: 2020-09-01

## 2020-07-31 ASSESSMENT — MIFFLIN-ST. JEOR
SCORE: 1642.17
SCORE: 1680.72

## 2020-07-31 ASSESSMENT — PAIN SCALES - GENERAL
PAINLEVEL: NO PAIN (0)
PAINLEVEL: NO PAIN (0)

## 2020-07-31 ASSESSMENT — ENCOUNTER SYMPTOMS: SEIZURES: 0

## 2020-07-31 ASSESSMENT — LIFESTYLE VARIABLES: TOBACCO_USE: 0

## 2020-07-31 NOTE — PATIENT INSTRUCTIONS
Preparing for Your Surgery      Name:  John Mancia   MRN:  [id number]   :  1950   Today's Date:  2020       Arriving for surgery:  Surgery is not scheduled at this time.  You will be called 1-2 days prior to surgery by Preadmission Nursing with Date, Time, Location and Diet for surgery.     Please come to:       [facility] [address]  [address] [place]  [address]     -    Please proceed to the Surgery Lounge on the 3rd floor. [phone]?     - ?If you are in need of directions, wheelchair or escort please stop at the Information Desk in the lobby.  Inform the information person that you are here for surgery; a wheelchair and escort will be provided to the Surgery Lounge .?     Restrictions due to COVID 19:  Patients are allowed one visitor in the pre-op period  All visitors must wear a mask  No visitors under 18  No ill visitors   parking is not available     What can I eat or drink?  -  You may eat and drink normally for up to 8 hours before your surgery.  -  You may have clear liquids until 2 hours before surgery.   Examples of clear liquids:  Water  Clear broth  Juices (apple, white grape, white cranberry  and cider) without pulp  Noncarbonated, powder based beverages  (lemonade and Eddie-Aid)  Sodas (Sprite, 7-Up, ginger ale and seltzer)  Coffee or tea (without milk or cream)  Gatorade    -  No Alcohol for at least 24 hours before surgery     Which medicines can I take?    Hold Aspirin for 7 days before surgery.   Hold Multivitamins for 7 days before surgery.  Hold Supplements for 7 days before surgery.  Hold Voltaren and Ibuprofen (Advil, Motrin) for 1 day before surgery--unless otherwise directed by surgeon.  Hold Naproxen (Aleve) for 4 days before surgery.    -  DO NOT take these medications the day of surgery:    Furosemide(Lasix)   Lactulose(Chronulac)    Lisinopril    Terazosin(Hytrin)    Victoza    -  PLEASE TAKE these medications the day  of surgery:    Alphagan eye drops   Bupropion(Wellbutrin)    Escitalopram    Lansoprazole(Prevacid)    Please take 11 Units of Glargine-Lixisenatide(Soliqua) Insulin the morning of surgery  -As Needed:    Mouth Kote Spray   Artificial Tears    Cetirizine(Zyrtec)   Hydromorphone(Dilaudid)    Prochlorperazine(Compazine)    How do I prepare myself?  - Please shower the evening prior to and the morning of surgery using Scrubcare or Hibiclens soap.    Use this soap only from the neck to your toes.     Leave the soap on your skin for one minute--then rinse thoroughly.      You may use your own shampoo and conditioner; no other hair products.   - Please remove all jewelry and body piercings.  - No lotions, deodorants or fragrance.  - Bring your ID and insurance card.    - All patients are required to have a Covid-19 test within 4 days of surgery/procedure.      -Patients will be contacted by the [facility] scheduling team within 1 week of surgery to make an appointment.      - Patients may call the Scheduling team at [phone] if they have not been scheduled within 4 days of  surgery.      ALL PATIENTS GOING HOME THE SAME DAY OF SURGERY ARE REQUIRED TO HAVE A RESPONSIBLE ADULT TO DRIVE AND BE IN ATTENDANCE WITH THEM FOR 24 HOURS FOLLOWING SURGERY     Questions or Concerns:    - For any questions regarding the day of surgery or your hospital stay, please contact the Pre Admission Nursing Office at [phone].       - If you have health changes between today and your surgery please call your surgeon.       For questions after surgery please call your surgeons office.

## 2020-07-31 NOTE — LETTER
7/31/2020         RE: John Mancia  [address] [place] [address]        Dear Colleague,    Thank you for referring your patient, John Mancia, to the [facility]. Please see a copy of my visit note below.    Reason for Visit: Abnormal Gallbladder; Hepatocellular Carcinoma      70 M w/ multiple medical problems including liver cirrhosis and unresectable HCC due to size, clinically significant portal hypertension, and the presence of tumor thrombus in the right anterior portal vein and a right anterior branch of the right hepatic vein.  He has intra-abdominal collaterals and splenomegaly, but no ascites.  Most recent platelets 123.  Child A5.  SKY Grade 1.  He has undergone TARE for treatment of his HCC.  Gallbladder has become more enlarged and atypical appearing over time.  In addition, the patient has also been reportedly having right upper quadrant pain.  The patient is now being referred for consideration of cholecystectomy.  He has had 3 episodes of post-prandial, classic, right upper quadrant abdominal pain that have been relatively severe, but only lasting an hour or so.     PMH: HTN, afib, hep C, liver cirrhosis, portal HTN sequelae including esophageal varices, abdominal collaterals, and splenomegaly.       PSH: R groin hernia repair with mesh in 1960's, esophageal varices ligation, TARE      FHX: colon cancer      SHX: lives with [place] in [place]      Medications and imaging reviewed.  Imaging findings described in HPI above.  In addition, he has multiple intra-abdominal varices and recanalization of the umbilical vein indicative of significant portal HTN.     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  He has intra-abdominal collaterals  and splenomegaly, but no ascites.  Most recent platelets 123.  Child A5.  SKY Grade 1.  He has undergone TARE for treatment of his HCC.  Gallbladder has become more enlarged and atypical appearing over time.  In addition, the patient has also been having right upper quadrant pain.  I discussed the situation with the patient and that it is very conceivable that his pain is due to his gallbladder.  As such, I have offered laparoscopic, possible open cholecystectomy for palliation of his symptoms.  I would like to understand whether the tumor is invading the gallbladder before proceeding, but we have ordered [facility] MRI over read.  I discussed with the patient that he is at a higher risk of needing open surgery given the size of his gallbladder, but we would try to avoid this if possible.  In addition, given his liver disease, any surgery carries some risk of liver decompensation or liver failure, development of refractory ascites, and mortality related to surgery.  Given his Child class, he is a reasonable risk given the appearance of his gallbladder and the amount of pain he is having.  I did discuss that although unlikely, it is possible surgery does not fix his pain and is related to his liver disease and HCC.  He was in understanding of all of this.  I discussed risks related to cholecystectomy apart from what has already been mentioned including but not limited to death, bleeding, infection, pneumonia, COVID-19, cardiac events, renal failure, stroke, UTI, DVT/PE, damage to surrounding organs, bile leak, cystic duct leak, retained stone, common bile duct injury, and liver vascular injury.  Again, he was in complete understanding and would like to pursue surgery.  We will work on a surgery date and set him up for PACS visit.  In the meantime, we have ordered MRI overread for our radiologists to evaluate whether or not the tumor directly invades the wall of the gallbladder.  All questions were answered and the  patient was in agreement with the above assessment and plan.     A total of 30 minutes of face-to-face time was spent with the patient today, more than 50% of which was spent in counseling and coordination of care.    Again, thank you for allowing me to participate in the care of your patient.        Sincerely,        Priyank Munoz MD

## 2020-07-31 NOTE — H&P
"  Pre-Operative H & P     CC:  Preoperative exam to assess for increased cardiopulmonary risk while undergoing surgery and anesthesia.    Date of Encounter: 7/31/2020  Primary Care Physician:  System, Provider Not In  Reason for Visit: History of colon cancer, hepatocellular carcinoma w/ metastasis to gallbladder      HPI   John Mancia is a 69 y/o male who presents for pre-operative H&P in preparation for a laparoscopic vs. open cholecystectomy with Priyank Munoz MD at [facility] for treatment of History of colon cancer, hepatocellular carcinoma w/ metastasis to gallbladder. Surgery date is TBD.      Mr. Mancia has a history of cirrhosis secondary to hepatitis C complicated by unresectable HCC. He c/o of \"Gallbladder\" pain, located in the mid axillary portion of the right upper quadrant with no radiation. He has had 3 episodes of post-prandial, classic, right upper quadrant abdominal pain that have been relatively severe, but only lasting an hour or so. Imaging shows persistence of an enhancing mass in the gallbladder. He now presents for the above procedure.      PMH is also significant for a history of esophageal varices status post endoscopic ligation, insulin dependent DM2, JAIR w/ CPAP, allergic rhinitis, HTN, HLD, GERD, depression, BPH, glaucoma, OA in knees, hx right shoulder impingement.      History was obtained from patient & chart review. He is accompanied by his wife.      Past Medical History  Past Medical History:   Diagnosis Date     Allergic rhinitis      Cancer (H) 2019    History of Colon CA, HCC with mets to gallbladder     Congestive heart failure (H)      Depressive disorder      Diabetes (H)     TYPE II     Hypertension      Multiple food allergies     see epic     Sleep apnea     uses CPAP       Past Surgical History  Past Surgical History:   Procedure Laterality Date     HERNIA REPAIR       IR CHEMO EMBOLIZATION  12/11/2019     IR SIRT (SELECTIVE " INTERNAL RADIO THERAPY)  11/6/2019     IR VISCERAL ANGIOGRAM  11/6/2019     IR VISCERAL EMBOLIZATION  11/7/2019     VASCULAR SURGERY      liver ablation in [place]     VASECTOMY         Hx of Blood transfusions/reactions: no     Hx of abnormal bleeding or anti-platelet use: no    Menstrual history: No LMP for male patient.:      Steroid use in the last year: no    Personal or FH with difficulty with Anesthesia:  no    Prior to Admission Medications  Current Outpatient Medications   Medication Sig Dispense Refill     artificial tears OINT ophthalmic ointment Place into both eyes 2 times daily as needed for dry eyes Also called SYSTANE       brimonidine (ALPHAGAN) 0.2 % ophthalmic solution Place 1 drop into both eyes 3 times daily       buPROPion (WELLBUTRIN SR) 100 MG 12 hr tablet Take 100 mg by mouth every morning qam and at noon        carboxymethylcellulose PF (REFRESH PLUS) 0.5 % ophthalmic solution 1 drop 3 times daily as needed for dry eyes       cetirizine (ZYRTEC) 10 MG tablet Take 10 mg by mouth as needed        ESCITALOPRAM OXALATE PO Take by mouth every morning        furosemide (LASIX) 20 MG tablet Take 20 mg by mouth 2 times daily        HYDROmorphone (DILAUDID) 2 MG tablet Take 1 tablet (2 mg) by mouth every 6 hours as needed for severe pain 10 tablet 0     insulin glargine-lixisenatide (SOLIQUA 100/33) pen Inject 14 Units Subcutaneous every morning (before breakfast) 12/11/19  Pt took 4 units this AM in prep for precedure.  CTRN       lactulose (CHRONULAC) 10 GM/15ML solution Take 10 g by mouth as needed        latanoprost (XALATAN) 0.005 % ophthalmic solution Place 1 drop into both eyes At Bedtime        lisinopril (PRINIVIL/ZESTRIL) 20 MG tablet Take 20 mg by mouth every morning        Propylene Glycol-Glycerin 0.6-0.6 % SOLN Apply 1 drop to eye       terazosin (HYTRIN) 5 MG capsule Take 5 mg by mouth 2 times daily        VICTOZA PEN 18 MG/3ML soln Inject 1.8 mg Subcutaneous every morning         Artificial Saliva (MOUTH KOTE) SOLN Take 3-5 sprays by mouth See Admin Instructions 3-5 times daily as needed       Lancet Devices (LANCET DEVICE WITH EJECTOR) MISC USE LANCET TOPICALLY FOUR TIMES A DAY AS NEEDED TO CHECK GLUCOSE       LANsoprazole (PREVACID) 30 MG DR capsule Take 15 mg by mouth 2 times daily   11     prochlorperazine (COMPAZINE) 10 MG tablet Take 10 mg by mouth every 6 hours as needed for nausea or vomiting       VOLTAREN 1 % topical gel Apply topically 4 times daily Uses on joints         Allergies  Allergies   Allergen Reactions     Penicillins Anaphylaxis and Shortness Of Breath     Metformin Diarrhea, GI Disturbance, Hives and Rash     Adhesive Tape Hives and Rash     Allyl Isothiocyanate GI Disturbance     [place] Oil GI Disturbance     Banana GI Disturbance     Brassica Oleracea Italica GI Disturbance     Cabbage GI Disturbance     Celery Oil GI Disturbance     Cheese GI Disturbance     Chicken-Derived Products (Egg) Itching, GI Disturbance and Rash     Corticosteroids Rash and GI Disturbance     Cranberry Extract GI Disturbance     Food GI Disturbance     Almonds; Beans     Hmg-Coa-R Inhibitors Itching and GI Disturbance            Interferon Samm Palpitations and Rash     Lac Bovis GI Disturbance     Lactuca Virosa GI Disturbance     Latex Hives and Rash     Melon GI Disturbance     Mometasone Itching, GI Disturbance and Rash     Oatmeal GI Disturbance     Omeprazole GI Disturbance, Itching, Cramps and Rash     Ondansetron Nausea and Vomiting     Onion GI Disturbance     Peginterferon Samm-2b Palpitations     Pineapple GI Disturbance     Piper      [place] pepper     Primidone      Proton Pump Inhibitors Itching and Rash     Ribavirin GI Disturbance, Itching and Palpitations     Rosuvastatin Nausea and Vomiting     Sorafenib Muscle Pain (Myalgia)            Spironolactone Itching and GI Disturbance     Wheat Bran GI Disturbance     Yeast GI Disturbance       Social History  Social History      Socioeconomic History     Marital status: Single     Spouse name: Not on file     Number of children: Not on file     Years of education: Not on file     Highest education level: Not on file   Occupational History     Not on file   Social Needs     Financial resource strain: Not on file     Food insecurity     Worry: Not on file     Inability: Not on file     Transportation needs     Medical: Not on file     Non-medical: Not on file   Tobacco Use     Smoking status: Passive Smoke Exposure - Never Smoker     Smokeless tobacco: Never Used   Substance and Sexual Activity     Alcohol use: Not Currently     Drug use: Yes     Types: Hydromorphone     Comment: Dilaudid po as needed     Sexual activity: Not Currently   Lifestyle     Physical activity     Days per week: Not on file     Minutes per session: Not on file     Stress: Not on file   Relationships     Social connections     Talks on phone: Not on file     Gets together: Not on file     Attends [employer] service: Not on file     Active member of club or organization: Not on file     Attends meetings of clubs or organizations: Not on file     Relationship status: Not on file     Intimate partner violence     Fear of current or ex partner: Not on file     Emotionally abused: Not on file     Physically abused: Not on file     Forced sexual activity: Not on file   Other Topics Concern     Parent/sibling w/ CABG, MI or angioplasty before 65F 55M? Not Asked   Social History Narrative     Not on file       Family History  Family History   Problem Relation Age of Onset     Anesthesia Reaction No family hx of      Cardiovascular No family hx of      Deep Vein Thrombosis (DVT) No family hx of           Preop Vitals    BP Readings from Last 3 Encounters:   07/31/20 117/70   07/31/20 117/70   12/12/19 134/75    Pulse Readings from Last 3 Encounters:   07/31/20 68   07/31/20 68   12/11/19 66      Resp Readings from Last 3 Encounters:   07/31/20 19   07/31/20 16   12/12/19 16  "   SpO2 Readings from Last 3 Encounters:   07/31/20 96%   07/31/20 96%   12/12/19 95%      Temp Readings from Last 1 Encounters:   07/31/20 98  F (36.7  C)    Ht Readings from Last 1 Encounters:   07/31/20 1.651 m (5' 5\")      Wt Readings from Last 1 Encounters:   07/31/20 98.9 kg (218 lb)    Estimated body mass index is 36.28 kg/m  as calculated from the following:    Height as of this encounter: 1.651 m (5' 5\").    Weight as of this encounter: 98.9 kg (218 lb).          ROS/MED HX  The complete review of systems is negative other than noted in the HPI or here.  Patient denies recent illness, fever and respiratory infection during past month.  Pt denies steroid use during past year.    ENT/Pulmonary:     (+)sleep apnea, allergic rhinitis, uses CPAP , . .   (-) tobacco use   Neurologic:  - neg neurologic ROS    (-) seizures, CVA and Neuropathy   Cardiovascular:     (+) hypertension----. : . . . :. . Previous cardiac testing date:results:date: results:ECG reviewed date:12/11/19 results: date: results:          METS/Exercise Tolerance: Comment: Uses walker for balance due to left knee pain/instability. Able to walk 2 blocks w/o stopping. 3 - Able to walk 1-2 blocks without stopping   Hematologic:        (-) history of blood clots and History of Transfusion   Musculoskeletal: Comment: Diffuse arthritis, boo in knees    Hx right shoulder impingement  (+) arthritis,  -       GI/Hepatic: Comment: History of colon cancer, hepatocellular carcinoma w/ metastasis to gallbladder     (+) GERD Asymptomatic on medication, hepatitis type C, liver disease,       Renal/Genitourinary:  - ROS Renal section negative       Endo:     (+) type II DM Using insulin - not using insulin pump Obesity, .      Psychiatric:     (+) psychiatric history depression      Infectious Disease:  - neg infectious disease ROS       Malignancy:   (+) Malignancy History of GI  GI CA Active status post,         Other: Comment: Taking Dilaudid, 1 tab/day prn, " "not daily   (+) H/O Chronic Pain,               PHYSICAL EXAM:   Mental Status/Neuro: A/A/O; Age Appropriate   Airway: Facies: Feasible  Mallampati: II  Mouth/Opening: Full  TM distance: > 6 cm  Neck ROM: Limited   Respiratory: Auscultation: CTAB     Resp. Rate: Normal     Resp. Effort: Normal      CV: Rhythm: Regular  Rate: Age appropriate  Heart: Normal Sounds  Edema: None   Comments:      Dental: Normal Dentition              Temp: 98  F (36.7  C)   BP: 117/70 Pulse: 68   Resp: 19 SpO2: 96 %         218 lbs 0 oz  5' 5\"   Body mass index is 36.28 kg/m .    Physical Exam  Constitutional: Awake, alert, cooperative, no apparent distress, and appears stated age.  Eyes: Pupils equal, round and reactive to light, extra ocular muscles intact, sclera clear, conjunctiva normal.  HENT: Normocephalic, oral pharynx with moist mucus membranes, good dentition. No goiter appreciated. No removable dental hardware.  Respiratory: Clear to auscultation bilaterally, no crackles or wheezing. No SOB when supine.  Cardiovascular: Regular rate and rhythm, normal S1 and S2, and no murmur noted.  Carotids +2, no bruits. Mild non-pitting edema. Palpable pulses to radial & DP arteries.   GI: Normal bowel sounds, soft, obese, non-tender, no masses palpated.    Lymph/Hematologic: No cervical lymphadenopathy and no supraclavicular lymphadenopathy.  Genitourinary:  deferred  Skin: Warm and dry.  No rashes.   Musculoskeletal: Mildly limited extension ROM of neck. There is no redness, warmth, or swelling of the joints. Gross motor strength is normal.    Neurologic: Awake, alert, oriented to name, place and time. Cranial nerves II-XII are grossly intact. Gait is normal. Ambulates from chair to exam table, seats self, lies supine and sits back up w/o assistance.  Neuropsychiatric: Calm, cooperative. Normal affect. Pleasant.  Answers questions appropriately, follows commands w/o difficulty.    PRIOR LABS/DIAGNOSTIC STUDIES:   All labs and imaging " personally reviewed      CT ABDOMEN W/O CONTRAST, 12/12/2019   IMPRESSION:    1. Cirrhosis with posttreatment changes of recent transarterial   chemoembolization in segment 4 with dense lipiodol staining of the   entirety of the exophytic segment 4 lesion.   2. Poorly visualized mass within the gallbladder, grossly stable.   Cholelithiasis and biliary sludge.   3. Findings of portal hypertension including portosystemic collateral   vessels and splenomegaly.    4. Nonobstructive right nephrolithiasis.   5. Stable lingular nodule, unchanged since 11/21/2018.      MRI of the abdomen with and without contrast , liver protocol, from 06/25/2020  Findings:  Cirrhotic appearance of liver. Reticular T2 hyperintensity compatible with hepatic fibrosis.    Capsular contraction and wedge-shaped progressive enhancement compatible with fibrosis at the right hepatic lobe. In addition, there is a right hepatic lobe anterior nonenhancing structure which is unchanged since 4/21/2019 (series 13, image 44), which measures up to 2.2 cm. This is a treated lesion. Additionally, there is a treated lesion at the posterior lateral right hepatic lobe without concerning enhancement features at this time.    Cholelithiasis and gallbladder sludge.  Mild splenomegaly.  No hydronephrosis.  No pancreatic mass.  No adrenal masses.    LABS 6/25/20  WBC  3.7 - 9.6 10*3/uL  3.8     RBC  4.10 - 5.80 10*6/ L  4.50     Hemoglobin  13.2 - 17.2 g/dL  13.7     Hematocrit  38.0 - 50.0 %  40.3     MCV  82.0 - 97.0 fL  89.6     MCH  29.0 - 34.0 pg  30.3     MCHC  32.0 - 36.0 g/dL  33.9     RDW  11.5 - 15.0 %  13.6     Platelets  130 - 350 10*3/uL  123Low       MPV  6.9 - 10.8 fL  8.0     Neutrophils%  46 - 70 %  78High       Lymphocytes%  15 - 47 %  11Low       Monocytes%  5 - 13 %  9     Eosinophils%  0 - 3 %  1     Basophils%  0 - 2 %  1     Neutrophils Absolute   10*3/uL  3.00     Lymphocytes Absolute   10*3/uL  0.40     Monocytes Absolute   10*3/uL  0.30      Eosinophils Absolute   10*3/uL  0.00     Basophils Absolute   10*3/uL  0.00      Sodium  135 - 145 mmol/L  140     Potassium  3.5 - 5.1 mmol/L  3.7     Chloride  98 - 107 mmol/L  105     CO2  21 - 32 mmol/L  27     Anion Gap  3 - 11 mmol/L  8     BUN  7 - 25 mg/dL  16     Creatinine  0.70 - 1.30 mg/dL  0.91     Glucose  70 - 105 mg/dL  162High       Calcium  8.6 - 10.3 mg/dL  8.9     AST  <40 U/L  27     ALT (SGPT)  7 - 52 U/L  21     Alkaline Phosphatase  45 - 115 U/L  97     Total Protein  6.0 - 8.3 g/dL  6.8     Albumin  3.5 - 5.3 g/dL  4.1     Total Bilirubin  0.20 - 1.40 mg/dL  0.98     eGFR  >60 mL/min/1.73m*2  86     Corrected Calcium  8.6 - 10.3 mg/dL  8.8      Alpha-Fetoprotein  <=9 ng/mL  15High      Labs today: (personally reviewed)  CMP, CBC, BNP, A1c, INR, T&S    Sodium  133 - 144 mmol/L  138         Potassium  3.4 - 5.3 mmol/L  4.0        Chloride  94 - 109 mmol/L  107        Carbon Dioxide  20 - 32 mmol/L  30        Anion Gap  3 - 14 mmol/L  2Low          Glucose  70 - 99 mg/dL  122High     115High         Urea Nitrogen  7 - 30 mg/dL  17        Creatinine  0.66 - 1.25 mg/dL  0.94        GFR Estimate  >60 mL/min/  82      Comment: Non  GFR Calc   Starting 12/18/2018, serum creatinine based estimated GFR (eGFR) will be   calculated using the Chronic Kidney Disease Epidemiology Collaboration   (CKD-EPI) equation.      GFR Estimate If Black  >60 mL/min/  >90      Comment:  GFR Calc   Starting 12/18/2018, serum creatinine based estimated GFR (eGFR) will be   calculated using the Chronic Kidney Disease Epidemiology Collaboration   (CKD-EPI) equation.      Calcium  8.5 - 10.1 mg/dL  8.6        Bilirubin Total  0.2 - 1.3 mg/dL  0.4        Albumin  3.4 - 5.0 g/dL  3.7        Protein Total  6.8 - 8.8 g/dL  6.9        Alkaline Phosphatase  40 - 150 U/L  143        ALT  0 - 70 U/L  34        AST  0 - 45 U/L  28         WBC  4.0 - 11.0 10e9/L  3.3Low     4.2        RBC Count  4.4  - 5.9 10e12/L  4.07Low     4.20Low         Hemoglobin  13.3 - 17.7 g/dL  12.4Low     12.3Low         Hematocrit  40.0 - 53.0 %  39.0Low     38.5Low         MCV  78 - 100 fl  96   92       MCH  26.5 - 33.0 pg  30.5   29.3       MCHC  31.5 - 36.5 g/dL  31.8   31.9       RDW  10.0 - 15.0 %  12.8   15.2High         Platelet Count  150 - 450 10e9/L  113Low     89Low          INR  0.86 - 1.14  1.12   1.08        Hemoglobin A1C  0 - 5.6 %  7.5High     7.7High        N-Terminal Pro Bnp  0 - 125 pg/mL  39        Outside records reviewed from: Care Everywhere    ASSESSMENT and PLAN  John Mancia is a 70 year old male scheduled to undergo laparoscopic vs. open cholecystectomy with Priyank Munoz MD at [facility] for treatment of History of colon cancer, hepatocellular carcinoma w/ metastasis to gallbladder. Surgery date is TBD.     Pt has had prior anesthetic.     No history of anesthetic complications    He has the following specific operative considerations:   # VTE risk: 3%  # Risk of PONV score = 2.  If > 2, anti-emetic intervention recommended.  # Anesthesia considerations:  Refer to PAC assessment in anesthesia records      CARDIAC: METS 3, Uses walker for balance due to left knee pain/instability. Able to walk 2 blocks w/o stopping.      # RCRI : Moderate risk surgery. Diabetes Mellitus (on Insulin).  0.9% risk of major adverse cardiac event.     #  EKG 12/11/19: sinus saray, 56 bpm     #  BNP today 39       #  HTN, will hold lasix & lisinopril on DOS    PULMONARY:     # JAIR w/ CPAP    # Never smoked    # No asthma or inhaler use    GI:  GERD, asymptomatic on prevacid     ENDO: BMI 35    # Insulin dependent DM2, A1c today 7.5. Taking glargine-lixisenatide & Victoza.    HEME:    # Thrombocytopenia, platelets today 113     ORTHO: mildly limited extension ROM of neck, no TMJ    # diffuse arthritis, boo knees. Fall risk. Uses walker for balance.    Patient is optimized and is  acceptable candidate for the proposed procedure. No further diagnostic evaluation is needed.    Arrival time, NPO, shower and medication instructions provided by nursing staff today.  Preparing For Your Surgery handout given.    Izabela Pittman PA-C  Preoperative Assessment Center  [facility]  Clinic and [facility] Center  Phone: [phone]  Fax: [phone]

## 2020-07-31 NOTE — ANESTHESIA PREPROCEDURE EVALUATION
"Anesthesia Pre-Procedure Evaluation    Patient: John Mancia   MRN:     [id number] Gender:   male   Age:    70 year old :      1950        Preoperative Diagnosis: * No surgery found *        LABS:  CBC:   Lab Results   Component Value Date    WBC 4.2 2019    WBC 3.1 (L) 2019    HGB 12.3 (L) 2019    HGB 13.3 2019    HCT 38.5 (L) 2019    HCT 41.5 2019    PLT 89 (L) 2019    PLT 87 (L) 2019     BMP:   Lab Results   Component Value Date     2019     2019    POTASSIUM 3.8 2019    POTASSIUM 4.1 2019    CHLORIDE 111 (H) 2019    CHLORIDE 108 2019    CO2 27 2019    CO2 29 2019    BUN 11 2019    BUN 13 2019    CR 0.73 2019    CR 0.76 2019     (H) 2019     (H) 2019     COAGS:   Lab Results   Component Value Date    INR 1.08 2019     POC:   Lab Results   Component Value Date     (H) 2019     OTHER:   Lab Results   Component Value Date    A1C 7.7 (H) 2019    LISHA 8.6 2019    ALBUMIN 3.2 (L) 2019    PROTTOTAL 6.0 (L) 2019    ALT 42 2019    AST 43 2019    ALKPHOS 96 2019    BILITOTAL 0.8 2019        Preop Vitals    BP Readings from Last 3 Encounters:   20 117/70   20 117/70   19 134/75    Pulse Readings from Last 3 Encounters:   20 68   20 68   19 66      Resp Readings from Last 3 Encounters:   20 19   20 16   19 16    SpO2 Readings from Last 3 Encounters:   20 96%   20 96%   19 95%      Temp Readings from Last 1 Encounters:   20 98  F (36.7  C)    Ht Readings from Last 1 Encounters:   20 1.651 m (5' 5\")      Wt Readings from Last 1 Encounters:   20 98.9 kg (218 lb)    Estimated body mass index is 36.28 kg/m  as calculated from the following:    Height as of this encounter: 1.651 m (5' 5\").    Weight as of this " encounter: 98.9 kg (218 lb).     LDA:        Past Medical History:   Diagnosis Date     Allergic rhinitis      Cancer (H) 2019    History of Colon CA, HCC with mets to gallbladder     Congestive heart failure (H)      Depressive disorder      Diabetes (H)     TYPE II     Hypertension      Multiple food allergies     see epic     Sleep apnea     uses CPAP      Past Surgical History:   Procedure Laterality Date     HERNIA REPAIR       IR CHEMO EMBOLIZATION  12/11/2019     IR SIRT (SELECTIVE INTERNAL RADIO THERAPY)  11/6/2019     IR VISCERAL ANGIOGRAM  11/6/2019     IR VISCERAL EMBOLIZATION  11/7/2019     VASCULAR SURGERY      liver ablation in [place]     VASECTOMY        Allergies   Allergen Reactions     Penicillins Anaphylaxis and Shortness Of Breath     Metformin Diarrhea, GI Disturbance, Hives and Rash     Adhesive Tape Hives and Rash     Allyl Isothiocyanate GI Disturbance     Teller Oil GI Disturbance     Banana GI Disturbance     Brassica Oleracea Italica GI Disturbance     Cabbage GI Disturbance     Celery Oil GI Disturbance     Cheese GI Disturbance     Chicken-Derived Products (Egg) Itching, GI Disturbance and Rash     Corticosteroids Rash and GI Disturbance     Cranberry Extract GI Disturbance     Food GI Disturbance     Almonds; Beans     Hmg-Coa-R Inhibitors Itching and GI Disturbance            Interferon Samm Palpitations and Rash     Lac Bovis GI Disturbance     Lactuca Virosa GI Disturbance     Latex Hives and Rash     Melon GI Disturbance     Mometasone Itching, GI Disturbance and Rash     Oatmeal GI Disturbance     Omeprazole GI Disturbance, Itching, Cramps and Rash     Ondansetron Nausea and Vomiting     Onion GI Disturbance     Peginterferon Samm-2b Palpitations     Pineapple GI Disturbance     Piper      [place] pepper     Primidone      Proton Pump Inhibitors Itching and Rash     Ribavirin GI Disturbance, Itching and Palpitations     Rosuvastatin Nausea and Vomiting     Sorafenib Muscle Pain  (Myalgia)            Spironolactone Itching and GI Disturbance     Wheat Bran GI Disturbance     Yeast GI Disturbance        Anesthesia Evaluation     . Pt has had prior anesthetic.     No history of anesthetic complications          ROS/MED HX    ENT/Pulmonary:     (+)sleep apnea, allergic rhinitis, uses CPAP , . .   (-) tobacco use   Neurologic:  - neg neurologic ROS    (-) seizures, CVA and Neuropathy   Cardiovascular:     (+) hypertension----. : . . . :. . Previous cardiac testing date:results:date: results:ECG reviewed date:12/11/19 results: date: results:          METS/Exercise Tolerance: Comment: Uses walker for balance due to left knee pain/instability. Able to walk 2 blocks w/o stopping. 3 - Able to walk 1-2 blocks without stopping   Hematologic:        (-) history of blood clots and History of Transfusion   Musculoskeletal: Comment: Diffuse arthritis, boo in knees    Hx right shoulder impingement  (+) arthritis,  -       GI/Hepatic: Comment: History of colon cancer, hepatocellular carcinoma w/ metastasis to gallbladder     (+) GERD Asymptomatic on medication, hepatitis type C, liver disease,       Renal/Genitourinary:  - ROS Renal section negative       Endo:     (+) type II DM Using insulin - not using insulin pump Obesity, .      Psychiatric:     (+) psychiatric history depression      Infectious Disease:  - neg infectious disease ROS       Malignancy:   (+) Malignancy History of GI  GI CA Active status post,         Other: Comment: Taking Dilaudid, 1 tab/day prn, not daily   (+) H/O Chronic Pain,                       PHYSICAL EXAM:   Mental Status/Neuro: A/A/O; Age Appropriate   Airway: Facies: Feasible  Mallampati: II  Mouth/Opening: Full  TM distance: > 6 cm  Neck ROM: Limited   Respiratory: Auscultation: CTAB     Resp. Rate: Normal     Resp. Effort: Normal      CV: Rhythm: Regular  Rate: Age appropriate  Heart: Normal Sounds  Edema: None   Comments:      Dental: Normal Dentition                MADAI  FV AN PLAN NO PONV RULE       PAC Discussion and Assessment    ASA Classification: 3  Case is suitable for: [place]  Anesthetic techniques and relevant risks discussed:   Invasive monitoring and risk discussed:   Types:   Possibility and Risk of blood transfusion discussed:   NPO instructions given:   Additional anesthetic preparation and risks discussed:   Needs early admission to pre-op area:   Other:     PAC Resident/NP Anesthesia Assessment:  John Mancia is a 70 year old male scheduled to undergo laparoscopic vs. open cholecystectomy with Priyank Munoz MD at [facility] for treatment of History of colon cancer, hepatocellular carcinoma w/ metastasis to gallbladder. Surgery date is TBD.     Pt has had prior anesthetic.     No history of anesthetic complications    He has the following specific operative considerations:   # VTE risk: 3%  # Risk of PONV score = 2.  If > 2, anti-emetic intervention recommended.  # Anesthesia considerations:  Refer to PAC assessment in anesthesia records      CARDIAC: METS 3, Uses walker for balance due to left knee pain/instability. Able to walk 2 blocks w/o stopping.      # RCRI : Moderate risk surgery. Diabetes Mellitus (on Insulin).  0.9% risk of major adverse cardiac event.     #  EKG 12/11/19: sinus saray, 56 bpm     #  BNP today 39       #  HTN, will hold lasix & lisinopril on DOS    PULMONARY:     # JAIR w/ CPAP    # Never smoked    # No asthma or inhaler use    GI:  GERD, asymptomatic on prevacid     ENDO: BMI 35    # Insulin dependent DM2, A1c today 7.5. Taking glargine-lixisenatide & Victoza.    HEME:    # Thrombocytopenia, platelets today 113     ORTHO: mildly limited extension ROM of neck, no TMJ    # diffuse arthritis, boo knees. Fall risk. Uses walker for balance.    Patient is optimized and is acceptable candidate for the proposed procedure. No further diagnostic evaluation is needed.        Reviewed and Signed by PAC  Mid-Level Provider/Resident  Mid-Level Provider/Resident: Izabela Pittman PA-C  Date: 7/31/20  Time: 1729    Attending Anesthesiologist Anesthesia Assessment:        Anesthesiologist:   Date:   Time:   Pass/Fail:   Disposition:     PAC Pharmacist Assessment:        Pharmacist:   Date:   Time:    Izabela Pittman PA-C

## 2020-07-31 NOTE — NURSING NOTE
"Oncology Rooming Note    July 31, 2020 2:15 PM   John Mancia is a 70 year old male who presents for:    Chief Complaint   Patient presents with     Oncology Clinic Visit     HCC     Initial Vitals: /70 (BP Location: Right arm, Patient Position: Chair, Cuff Size: Adult Regular)   Pulse 68   Temp 98  F (36.7  C) (Oral)   Resp 16   Ht 1.651 m (5' 5\")   Wt 95 kg (209 lb 8 oz)   SpO2 96%   BMI 34.86 kg/m   Estimated body mass index is 34.86 kg/m  as calculated from the following:    Height as of this encounter: 1.651 m (5' 5\").    Weight as of this encounter: 95 kg (209 lb 8 oz). Body surface area is 2.09 meters squared.  No Pain (0) Comment: Data Unavailable   No LMP for male patient.  Allergies reviewed: Yes  Medications reviewed: Yes    Medications: Medication refills not needed today.  Pharmacy name entered into EPIC:    [facility] - [place], [place] - [address] [id number] - [place], [place] - [address] OF [address]    Clinical concerns: No new concerns. Tammy was notified.      Arvind Lorenzo LPN            "

## 2020-07-31 NOTE — PROGRESS NOTES
Reason for Visit: Abnormal Gallbladder; Hepatocellular Carcinoma      70 M w/ multiple medical problems including liver cirrhosis and unresectable HCC due to size, clinically significant portal hypertension, and the presence of tumor thrombus in the right anterior portal vein and a right anterior branch of the right hepatic vein.  He has intra-abdominal collaterals and splenomegaly, but no ascites.  Most recent platelets 123.  Child A5.  SKY Grade 1.  He has undergone TARE for treatment of his HCC.  Gallbladder has become more enlarged and atypical appearing over time.  In addition, the patient has also been reportedly having right upper quadrant pain.  The patient is now being referred for consideration of cholecystectomy.  He has had 3 episodes of post-prandial, classic, right upper quadrant abdominal pain that have been relatively severe, but only lasting an hour or so.     PMH: HTN, afib, hep C, liver cirrhosis, portal HTN sequelae including esophageal varices, abdominal collaterals, and splenomegaly.       PSH: R groin hernia repair with mesh in 1960's, esophageal varices ligation, TARE      FHX: colon cancer      SHX: lives with [place] in [place]      Medications and imaging reviewed.  Imaging findings described in HPI above.  In addition, he has multiple intra-abdominal varices and recanalization of the umbilical vein indicative of significant portal HTN.     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  He has intra-abdominal collaterals and splenomegaly, but no ascites.  Most recent platelets 123.  Child A5.  SKY Grade 1.  He has undergone TARE for treatment of his HCC.  Gallbladder has become more enlarged and atypical appearing over time.  In addition, the patient has also been  having right upper quadrant pain.  I discussed the situation with the patient and that it is very conceivable that his pain is due to his gallbladder.  As such, I have offered laparoscopic, possible open cholecystectomy for palliation of his symptoms.  I would like to understand whether the tumor is invading the gallbladder before proceeding, but we have ordered [facility] MRI over read.  I discussed with the patient that he is at a higher risk of needing open surgery given the size of his gallbladder, but we would try to avoid this if possible.  In addition, given his liver disease, any surgery carries some risk of liver decompensation or liver failure, development of refractory ascites, and mortality related to surgery.  Given his Child class, he is a reasonable risk given the appearance of his gallbladder and the amount of pain he is having.  I did discuss that although unlikely, it is possible surgery does not fix his pain and is related to his liver disease and HCC.  He was in understanding of all of this.  I discussed risks related to cholecystectomy apart from what has already been mentioned including but not limited to death, bleeding, infection, pneumonia, COVID-19, cardiac events, renal failure, stroke, UTI, DVT/PE, damage to surrounding organs, bile leak, cystic duct leak, retained stone, common bile duct injury, and liver vascular injury.  Again, he was in complete understanding and would like to pursue surgery.  We will work on a surgery date and set him up for PACS visit.  In the meantime, we have ordered MRI overread for our radiologists to evaluate whether or not the tumor directly invades the wall of the gallbladder.  All questions were answered and the patient was in agreement with the above assessment and plan.     A total of 30 minutes of face-to-face time was spent with the patient today, more than 50% of which was spent in counseling and coordination of care.

## 2020-08-04 ENCOUNTER — APPOINTMENT (OUTPATIENT)
Dept: RADIOLOGY | Facility: HOSPITAL | Age: 70
End: 2020-08-04
Payer: OTHER GOVERNMENT

## 2020-08-04 ENCOUNTER — HOSPITAL ENCOUNTER (OUTPATIENT)
Facility: HOSPITAL | Age: 70
Setting detail: OBSERVATION
Discharge: HOME | End: 2020-08-05
Attending: EMERGENCY MEDICINE | Admitting: FAMILY MEDICINE
Payer: OTHER GOVERNMENT

## 2020-08-04 ENCOUNTER — TRANSFERRED RECORDS (OUTPATIENT)
Dept: HEALTH INFORMATION MANAGEMENT | Facility: CLINIC | Age: 70
End: 2020-08-04

## 2020-08-04 DIAGNOSIS — K21.9 GASTROESOPHAGEAL REFLUX DISEASE WITHOUT ESOPHAGITIS: ICD-10-CM

## 2020-08-04 DIAGNOSIS — C22.0 HEPATOCELLULAR CARCINOMA (CMS/HCC): ICD-10-CM

## 2020-08-04 DIAGNOSIS — I10 HYPERTENSION: ICD-10-CM

## 2020-08-04 DIAGNOSIS — I10 ESSENTIAL HYPERTENSION: ICD-10-CM

## 2020-08-04 DIAGNOSIS — E11.9 DIABETES (CMS/HCC): ICD-10-CM

## 2020-08-04 DIAGNOSIS — E11.42 TYPE 2 DIABETES MELLITUS WITH DIABETIC POLYNEUROPATHY, WITHOUT LONG-TERM CURRENT USE OF INSULIN (CMS/HCC): ICD-10-CM

## 2020-08-04 DIAGNOSIS — R07.9 CHEST PAIN: Primary | ICD-10-CM

## 2020-08-04 DIAGNOSIS — K74.60 CHRONIC HEPATITIS C WITH CIRRHOSIS (CMS/HCC): ICD-10-CM

## 2020-08-04 DIAGNOSIS — B18.2 CHRONIC HEPATITIS C WITH CIRRHOSIS (CMS/HCC): ICD-10-CM

## 2020-08-04 LAB
ALBUMIN SERPL-MCNC: 3.6 G/DL (ref 3.5–5.3)
ALP SERPL-CCNC: 134 U/L (ref 45–115)
ALT SERPL-CCNC: 48 U/L (ref 7–52)
ANION GAP SERPL CALC-SCNC: 7 MMOL/L (ref 3–11)
AST SERPL-CCNC: 85 U/L
BACTERIA #/AREA URNS AUTO: NORMAL /HPF
BASOPHILS # BLD AUTO: 0 10*3/UL
BASOPHILS NFR BLD AUTO: 0 % (ref 0–2)
BILIRUB SERPL-MCNC: 0.85 MG/DL (ref 0.2–1.4)
BILIRUB UR QL STRIP.AUTO: NEGATIVE
BUN SERPL-MCNC: 22 MG/DL (ref 7–25)
CALCIUM ALBUM COR SERPL-MCNC: 8.9 MG/DL (ref 8.6–10.3)
CALCIUM SERPL-MCNC: 8.6 MG/DL (ref 8.6–10.3)
CHLORIDE SERPL-SCNC: 108 MMOL/L (ref 98–107)
CLARITY UR: CLEAR
CO2 SERPL-SCNC: 25 MMOL/L (ref 21–32)
COLOR UR: YELLOW
CREAT SERPL-MCNC: 0.87 MG/DL (ref 0.7–1.3)
EOSINOPHIL # BLD AUTO: 0 10*3/UL
EOSINOPHIL NFR BLD AUTO: 1 % (ref 0–3)
ERYTHROCYTE [DISTWIDTH] IN BLOOD BY AUTOMATED COUNT: 13.5 % (ref 11.5–15)
GFR SERPL CREATININE-BSD FRML MDRD: 88 ML/MIN/1.73M*2
GLUCOSE SERPL-MCNC: 178 MG/DL (ref 70–105)
GLUCOSE UR STRIP.AUTO-MCNC: NEGATIVE MG/DL
HCT VFR BLD AUTO: 35.7 % (ref 38–50)
HGB BLD-MCNC: 12.2 G/DL (ref 13.2–17.2)
HGB UR QL STRIP.AUTO: NEGATIVE
KETONES UR STRIP.AUTO-MCNC: NEGATIVE MG/DL
LEUKOCYTE ESTERASE UR QL STRIP: NEGATIVE
LIPASE SERPL-CCNC: 38 U/L (ref 11–82)
LYMPHOCYTES # BLD AUTO: 0.4 10*3/UL
LYMPHOCYTES NFR BLD AUTO: 9 % (ref 15–47)
MAGNESIUM SERPL-MCNC: 2 MG/DL (ref 1.8–2.4)
MCH RBC QN AUTO: 31.1 PG (ref 29–34)
MCHC RBC AUTO-ENTMCNC: 34.1 G/DL (ref 32–36)
MCV RBC AUTO: 91.1 FL (ref 82–97)
MONOCYTES # BLD AUTO: 0.4 10*3/UL
MONOCYTES NFR BLD AUTO: 9 % (ref 5–13)
NEUTROPHILS # BLD AUTO: 3.4 10*3/UL
NEUTROPHILS NFR BLD AUTO: 81 % (ref 46–70)
NITRITE UR QL STRIP.AUTO: NEGATIVE
PH UR STRIP.AUTO: 7 PH
PLATELET # BLD AUTO: 106 10*3/UL (ref 130–350)
PMV BLD AUTO: 8.6 FL (ref 6.9–10.8)
POTASSIUM SERPL-SCNC: 4 MMOL/L (ref 3.5–5.1)
PROT SERPL-MCNC: 6.2 G/DL (ref 6–8.3)
PROT UR STRIP.AUTO-MCNC: NEGATIVE MG/DL
RBC # BLD AUTO: 3.91 10*6/ΜL (ref 4.1–5.8)
RBC #/AREA URNS AUTO: NEGATIVE /HPF
SODIUM SERPL-SCNC: 140 MMOL/L (ref 135–145)
SP GR UR STRIP.AUTO: 1.02 (ref 1–1.03)
SQUAMOUS #/AREA URNS AUTO: NEGATIVE /HPF
TROPONIN I SERPL-MCNC: 4.4 PG/ML
UROBILINOGEN UR STRIP.AUTO-MCNC: <2 E.U./DL
WBC # BLD AUTO: 4.1 10*3/UL (ref 3.7–9.6)
WBC #/AREA URNS AUTO: NORMAL /HPF

## 2020-08-04 PROCEDURE — 81001 URINALYSIS AUTO W/SCOPE: CPT | Performed by: EMERGENCY MEDICINE

## 2020-08-04 PROCEDURE — 36415 COLL VENOUS BLD VENIPUNCTURE: CPT | Performed by: EMERGENCY MEDICINE

## 2020-08-04 PROCEDURE — 83036 HEMOGLOBIN GLYCOSYLATED A1C: CPT | Performed by: FAMILY MEDICINE

## 2020-08-04 PROCEDURE — 83690 ASSAY OF LIPASE: CPT | Performed by: EMERGENCY MEDICINE

## 2020-08-04 PROCEDURE — 83735 ASSAY OF MAGNESIUM: CPT | Performed by: EMERGENCY MEDICINE

## 2020-08-04 PROCEDURE — 71045 X-RAY EXAM CHEST 1 VIEW: CPT

## 2020-08-04 PROCEDURE — 84484 ASSAY OF TROPONIN QUANT: CPT | Performed by: EMERGENCY MEDICINE

## 2020-08-04 PROCEDURE — 85025 COMPLETE CBC W/AUTO DIFF WBC: CPT | Performed by: EMERGENCY MEDICINE

## 2020-08-04 PROCEDURE — 80053 COMPREHEN METABOLIC PANEL: CPT | Performed by: EMERGENCY MEDICINE

## 2020-08-04 PROCEDURE — 93005 ELECTROCARDIOGRAM TRACING: CPT

## 2020-08-04 PROCEDURE — 99284 EMERGENCY DEPT VISIT MOD MDM: CPT | Performed by: EMERGENCY MEDICINE

## 2020-08-04 PROCEDURE — 96372 THER/PROPH/DIAG INJ SC/IM: CPT

## 2020-08-04 ASSESSMENT — HEART SCORE
ECG: NORMAL
AGE: >64
HISTORY: MODERATELY SUSPICIOUS
TROPONIN: LESS THAN OR EQUAL TO NORMAL LIMIT
RISK FACTORS: >2 RISK FACTORS OR HX OF ATHEROSCLEROTIC DISEASE
HEART SCORE: 5

## 2020-08-04 ASSESSMENT — PAIN DESCRIPTION - DESCRIPTORS: DESCRIPTORS: PRESSURE

## 2020-08-05 ENCOUNTER — TELEPHONE (OUTPATIENT)
Dept: SURGERY | Facility: CLINIC | Age: 70
End: 2020-08-05

## 2020-08-05 ENCOUNTER — APPOINTMENT (OUTPATIENT)
Dept: ULTRASOUND IMAGING | Facility: HOSPITAL | Age: 70
End: 2020-08-05
Payer: OTHER GOVERNMENT

## 2020-08-05 ENCOUNTER — APPOINTMENT (OUTPATIENT)
Dept: NUCLEAR MEDICINE | Facility: HOSPITAL | Age: 70
End: 2020-08-05
Payer: OTHER GOVERNMENT

## 2020-08-05 ENCOUNTER — HOSPITAL ENCOUNTER (OUTPATIENT)
Facility: CLINIC | Age: 70
End: 2020-08-05
Attending: SURGERY | Admitting: SURGERY
Payer: MEDICARE

## 2020-08-05 VITALS
OXYGEN SATURATION: 97 % | SYSTOLIC BLOOD PRESSURE: 147 MMHG | DIASTOLIC BLOOD PRESSURE: 68 MMHG | BODY MASS INDEX: 36.81 KG/M2 | TEMPERATURE: 98.1 F | RESPIRATION RATE: 15 BRPM | HEART RATE: 58 BPM | WEIGHT: 215.61 LBS | HEIGHT: 64 IN

## 2020-08-05 DIAGNOSIS — Z11.59 ENCOUNTER FOR SCREENING FOR OTHER VIRAL DISEASES: Primary | ICD-10-CM

## 2020-08-05 PROBLEM — R07.89 OTHER CHEST PAIN: Status: ACTIVE | Noted: 2020-08-05

## 2020-08-05 PROBLEM — R07.89 OTHER CHEST PAIN: Status: RESOLVED | Noted: 2020-08-05 | Resolved: 2020-08-05

## 2020-08-05 PROBLEM — R07.9 CHEST PAIN: Status: RESOLVED | Noted: 2020-08-05 | Resolved: 2020-08-05

## 2020-08-05 PROBLEM — C22.0 HCC (HEPATOCELLULAR CARCINOMA) (H): Status: ACTIVE | Noted: 2019-12-11

## 2020-08-05 PROBLEM — K21.9 GASTROESOPHAGEAL REFLUX DISEASE WITHOUT ESOPHAGITIS: Status: ACTIVE | Noted: 2020-08-05

## 2020-08-05 PROBLEM — R07.9 CHEST PAIN: Status: ACTIVE | Noted: 2020-08-05

## 2020-08-05 LAB
ALBUMIN SERPL-MCNC: 3.6 G/DL (ref 3.5–5.3)
ALP SERPL-CCNC: 132 U/L (ref 45–115)
ALT SERPL-CCNC: 56 U/L (ref 7–52)
ANION GAP SERPL CALC-SCNC: 7 MMOL/L (ref 3–11)
AST SERPL-CCNC: 79 U/L
BILIRUB SERPL-MCNC: 0.72 MG/DL (ref 0.2–1.4)
BUN SERPL-MCNC: 21 MG/DL (ref 7–25)
CALCIUM ALBUM COR SERPL-MCNC: 8.8 MG/DL (ref 8.6–10.3)
CALCIUM SERPL-MCNC: 8.5 MG/DL (ref 8.6–10.3)
CHLORIDE SERPL-SCNC: 109 MMOL/L (ref 98–107)
CHOLEST SERPL-MCNC: 156 MG/DL (ref 0–199)
CO2 SERPL-SCNC: 24 MMOL/L (ref 21–32)
CREAT SERPL-MCNC: 0.77 MG/DL (ref 0.7–1.3)
DELTA HIGH SENSITIVITY TROPONIN I, 1 HOUR: 0.3
DELTA HIGH SENSITIVITY TROPONIN I, 2 HOUR: 0.2
EST. AVERAGE GLUCOSE BLD GHB EST-MCNC: 168.6 MG/DL
GFR SERPL CREATININE-BSD FRML MDRD: 93 ML/MIN/1.73M*2
GLUCOSE SERPL-MCNC: 134 MG/DL (ref 70–105)
HBA1C MFR BLD: 7.5 % (ref 4–6)
HDLC SERPL-MCNC: 49 MG/DL
LDLC SERPL CALC-MCNC: 82 MG/DL (ref 20–99)
LEFT VENTRICLE DIASTOLIC VOLUME: 93 ML
LEFT VENTRICLE SYSTOLIC VOLUME: 37 ML
MAGNESIUM SERPL-MCNC: 2 MG/DL (ref 1.8–2.4)
NUC STRESS EJECTION FRACTION: 60 %
NUC STRESS TID: 1.01
NUC STRESS TPD: 2 %
POTASSIUM SERPL-SCNC: 3.8 MMOL/L (ref 3.5–5.1)
PROT SERPL-MCNC: 6.1 G/DL (ref 6–8.3)
RH CV BASELINE VITALS COMMENTS: NORMAL
RH CV NM REST DOSE: 7.2
RH CV NM STRESS DOSE: 23
RH CV PEAK STRESS VITALS COMMENTS: NORMAL
SODIUM SERPL-SCNC: 140 MMOL/L (ref 135–145)
STRESS BASELINE BP: NORMAL MMHG
STRESS BASELINE HR: 59 BPM
STRESS O2 SAT REST: 96 %
STRESS PERCENT HR: 56 %
STRESS POST O2 SAT PEAK: 99 %
STRESS POST PEAK BP: NORMAL MMHG
STRESS POST PEAK HR: 84 BPM
STRESS TARGET HR: 128 BPM
SUMMED DIFFERENCE: 2
SUMMED REST SCORE: 0
SUMMED STRESS SCORE: 2
TRIGL SERPL-MCNC: 123 MG/DL
TROPONIN I SERPL-MCNC: 4.1 PG/ML
TROPONIN I SERPL-MCNC: 4.6 PG/ML
TSH SERPL DL<=0.05 MIU/L-ACNC: 4.14 UIU/ML (ref 0.34–4.82)

## 2020-08-05 PROCEDURE — 78452 HT MUSCLE IMAGE SPECT MULT: CPT | Mod: 26,MG | Performed by: INTERNAL MEDICINE

## 2020-08-05 PROCEDURE — 80061 LIPID PANEL: CPT | Performed by: FAMILY MEDICINE

## 2020-08-05 PROCEDURE — 6360000200 HC RX 636 W HCPCS (ALT 250 FOR IP): Performed by: FAMILY MEDICINE

## 2020-08-05 PROCEDURE — 6370000100 HC RX 637 (ALT 250 FOR IP): Performed by: FAMILY MEDICINE

## 2020-08-05 PROCEDURE — G1004 CDSM NDSC: HCPCS | Performed by: INTERNAL MEDICINE

## 2020-08-05 PROCEDURE — 36415 COLL VENOUS BLD VENIPUNCTURE: CPT | Performed by: EMERGENCY MEDICINE

## 2020-08-05 PROCEDURE — 76705 ECHO EXAM OF ABDOMEN: CPT

## 2020-08-05 PROCEDURE — 93017 CV STRESS TEST TRACING ONLY: CPT

## 2020-08-05 PROCEDURE — 84443 ASSAY THYROID STIM HORMONE: CPT | Performed by: FAMILY MEDICINE

## 2020-08-05 PROCEDURE — 80053 COMPREHEN METABOLIC PANEL: CPT | Performed by: FAMILY MEDICINE

## 2020-08-05 PROCEDURE — 84484 ASSAY OF TROPONIN QUANT: CPT | Performed by: EMERGENCY MEDICINE

## 2020-08-05 PROCEDURE — 93016 CV STRESS TEST SUPVJ ONLY: CPT | Performed by: INTERNAL MEDICINE

## 2020-08-05 PROCEDURE — G0378 HOSPITAL OBSERVATION PER HR: HCPCS

## 2020-08-05 PROCEDURE — A9500 TC99M SESTAMIBI: HCPCS

## 2020-08-05 PROCEDURE — 99236 HOSP IP/OBS SAME DATE HI 85: CPT | Performed by: FAMILY MEDICINE

## 2020-08-05 PROCEDURE — 83735 ASSAY OF MAGNESIUM: CPT | Performed by: FAMILY MEDICINE

## 2020-08-05 PROCEDURE — 99236 HOSP IP/OBS SAME DATE HI 85: CPT | Mod: NC | Performed by: NURSE PRACTITIONER

## 2020-08-05 PROCEDURE — 93018 CV STRESS TEST I&R ONLY: CPT | Performed by: INTERNAL MEDICINE

## 2020-08-05 PROCEDURE — 36415 COLL VENOUS BLD VENIPUNCTURE: CPT | Performed by: FAMILY MEDICINE

## 2020-08-05 RX ORDER — SODIUM CHLORIDE 0.9 % (FLUSH) 0.9 %
3 SYRINGE (ML) INJECTION AS NEEDED
Status: DISCONTINUED | OUTPATIENT
Start: 2020-08-05 | End: 2020-08-05 | Stop reason: HOSPADM

## 2020-08-05 RX ORDER — ALUMINUM HYDROXIDE, MAGNESIUM HYDROXIDE, AND SIMETHICONE 1200; 120; 1200 MG/30ML; MG/30ML; MG/30ML
30 SUSPENSION ORAL 3 TIMES DAILY PRN
Status: DISCONTINUED | OUTPATIENT
Start: 2020-08-05 | End: 2020-08-05 | Stop reason: HOSPADM

## 2020-08-05 RX ORDER — HYDROMORPHONE HYDROCHLORIDE 4 MG/1
4 TABLET ORAL EVERY 4 HOURS PRN
Status: DISCONTINUED | OUTPATIENT
Start: 2020-08-05 | End: 2020-08-05 | Stop reason: HOSPADM

## 2020-08-05 RX ORDER — LANSOPRAZOLE 30 MG/1
30 CAPSULE, DELAYED RELEASE ORAL
Status: DISCONTINUED | OUTPATIENT
Start: 2020-08-05 | End: 2020-08-05 | Stop reason: HOSPADM

## 2020-08-05 RX ORDER — MORPHINE SULFATE 2 MG/ML
1 INJECTION, SOLUTION INTRAMUSCULAR; INTRAVENOUS
Status: DISCONTINUED | OUTPATIENT
Start: 2020-08-05 | End: 2020-08-05 | Stop reason: HOSPADM

## 2020-08-05 RX ORDER — ONDANSETRON HYDROCHLORIDE 2 MG/ML
4 INJECTION, SOLUTION INTRAVENOUS EVERY 6 HOURS PRN
Status: DISCONTINUED | OUTPATIENT
Start: 2020-08-05 | End: 2020-08-05 | Stop reason: HOSPADM

## 2020-08-05 RX ORDER — BUPROPION HYDROCHLORIDE 100 MG/1
100 TABLET, EXTENDED RELEASE ORAL DAILY
Status: DISCONTINUED | OUTPATIENT
Start: 2020-08-05 | End: 2020-08-05 | Stop reason: HOSPADM

## 2020-08-05 RX ORDER — NITROGLYCERIN 0.4 MG/1
0.4 TABLET SUBLINGUAL EVERY 5 MIN PRN
Status: DISCONTINUED | OUTPATIENT
Start: 2020-08-05 | End: 2020-08-05 | Stop reason: HOSPADM

## 2020-08-05 RX ORDER — LISINOPRIL 20 MG/1
20 TABLET ORAL DAILY
Status: DISCONTINUED | OUTPATIENT
Start: 2020-08-05 | End: 2020-08-05 | Stop reason: HOSPADM

## 2020-08-05 RX ORDER — ASPIRIN 81 MG/1
81 TABLET ORAL DAILY
Status: DISCONTINUED | OUTPATIENT
Start: 2020-08-05 | End: 2020-08-05 | Stop reason: HOSPADM

## 2020-08-05 RX ORDER — LACTULOSE 10 G/15ML
10 SOLUTION ORAL; RECTAL DAILY
Status: DISCONTINUED | OUTPATIENT
Start: 2020-08-05 | End: 2020-08-05 | Stop reason: HOSPADM

## 2020-08-05 RX ORDER — INSULIN GLARGINE 100 [IU]/ML
10 INJECTION, SOLUTION SUBCUTANEOUS EVERY MORNING
Status: DISCONTINUED | OUTPATIENT
Start: 2020-08-05 | End: 2020-08-05 | Stop reason: HOSPADM

## 2020-08-05 RX ORDER — FUROSEMIDE 20 MG/1
20 TABLET ORAL
Status: DISCONTINUED | OUTPATIENT
Start: 2020-08-05 | End: 2020-08-05 | Stop reason: HOSPADM

## 2020-08-05 RX ORDER — HYDROCODONE BITARTRATE AND ACETAMINOPHEN 5; 325 MG/1; MG/1
1 TABLET ORAL EVERY 4 HOURS PRN
Status: DISCONTINUED | OUTPATIENT
Start: 2020-08-05 | End: 2020-08-05 | Stop reason: HOSPADM

## 2020-08-05 RX ORDER — ONDANSETRON 4 MG/1
4 TABLET, FILM COATED ORAL EVERY 6 HOURS PRN
Status: DISCONTINUED | OUTPATIENT
Start: 2020-08-05 | End: 2020-08-05 | Stop reason: HOSPADM

## 2020-08-05 RX ORDER — ACETAMINOPHEN 325 MG/1
325-650 TABLET ORAL EVERY 4 HOURS PRN
Status: DISCONTINUED | OUTPATIENT
Start: 2020-08-05 | End: 2020-08-05 | Stop reason: HOSPADM

## 2020-08-05 RX ORDER — REGADENOSON 0.08 MG/ML
0.4 INJECTION, SOLUTION INTRAVENOUS ONCE
Status: COMPLETED | OUTPATIENT
Start: 2020-08-05 | End: 2020-08-05

## 2020-08-05 RX ORDER — TERAZOSIN 5 MG/1
5 CAPSULE ORAL 2 TIMES DAILY
Status: DISCONTINUED | OUTPATIENT
Start: 2020-08-05 | End: 2020-08-05 | Stop reason: HOSPADM

## 2020-08-05 RX ORDER — HEPARIN SODIUM 5000 [USP'U]/ML
5000 INJECTION, SOLUTION INTRAVENOUS; SUBCUTANEOUS EVERY 8 HOURS SCHEDULED
Status: DISCONTINUED | OUTPATIENT
Start: 2020-08-05 | End: 2020-08-05 | Stop reason: HOSPADM

## 2020-08-05 RX ORDER — TRAZODONE HYDROCHLORIDE 50 MG/1
100 TABLET ORAL NIGHTLY
Status: DISCONTINUED | OUTPATIENT
Start: 2020-08-05 | End: 2020-08-05 | Stop reason: HOSPADM

## 2020-08-05 RX ORDER — LIRAGLUTIDE 6 MG/ML
1.8 INJECTION SUBCUTANEOUS DAILY
Status: DISCONTINUED | OUTPATIENT
Start: 2020-08-05 | End: 2020-08-05 | Stop reason: HOSPADM

## 2020-08-05 RX ORDER — ACETAMINOPHEN 650 MG/1
650 SUPPOSITORY RECTAL EVERY 4 HOURS PRN
Status: DISCONTINUED | OUTPATIENT
Start: 2020-08-05 | End: 2020-08-05 | Stop reason: HOSPADM

## 2020-08-05 RX ORDER — CARVEDILOL 3.12 MG/1
3.12 TABLET ORAL 2 TIMES DAILY WITH MEALS
Status: DISCONTINUED | OUTPATIENT
Start: 2020-08-05 | End: 2020-08-05 | Stop reason: HOSPADM

## 2020-08-05 RX ADMIN — LISINOPRIL 20 MG: 20 TABLET ORAL at 14:31

## 2020-08-05 RX ADMIN — CARVEDILOL 3.12 MG: 3.12 TABLET, FILM COATED ORAL at 14:31

## 2020-08-05 RX ADMIN — FUROSEMIDE 20 MG: 20 TABLET ORAL at 14:31

## 2020-08-05 RX ADMIN — REGADENOSON 0.4 MG: 0.08 INJECTION, SOLUTION INTRAVENOUS at 11:12

## 2020-08-05 RX ADMIN — HEPARIN SODIUM 5000 UNITS: 5000 INJECTION INTRAVENOUS; SUBCUTANEOUS at 06:09

## 2020-08-05 RX ADMIN — TERAZOSIN HYDROCHLORIDE 5 MG: 5 CAPSULE ORAL at 14:31

## 2020-08-05 RX ADMIN — HYDROMORPHONE HYDROCHLORIDE 4 MG: 4 TABLET ORAL at 09:51

## 2020-08-05 RX ADMIN — INSULIN GLARGINE 10 UNITS: 100 INJECTION, SOLUTION SUBCUTANEOUS at 14:31

## 2020-08-05 RX ADMIN — LIRAGLUTIDE 1.8 MG: 6 INJECTION SUBCUTANEOUS at 14:32

## 2020-08-05 RX ADMIN — BUPROPION HYDROCHLORIDE 100 MG: 100 TABLET, EXTENDED RELEASE ORAL at 14:31

## 2020-08-05 RX ADMIN — LACTULOSE 10 G: 20 SOLUTION ORAL at 14:30

## 2020-08-05 ASSESSMENT — ACTIVITIES OF DAILY LIVING (ADL)
PATIENT'S MEMORY ADEQUATE TO SAFELY COMPLETE DAILY ACTIVITIES?: YES
ADEQUATE_TO_COMPLETE_ADL: YES

## 2020-08-05 NOTE — ED PROVIDER NOTES
CC: Chest pain  HPI:    Patient is a 70 y.o. male presenting to the emergency department with 4/10, pressure, right lower chest pain which radiates across his chest for the past 3 hours. This started while he was mowing his lawn and he was unable to finish due to his pain. He reports associated nausea. No vomiting. Patient has a history of COPD, hypertension, and diabetes. He has had gallbladder issues in the past and did eat pork chops prior to his pain starting. He also has a history of liver cancer and is in the process of scheduling a cholecystectomy but his liver tumor is near his gallbladder so he is unsure if they will be able to do this.    Past Medical History:   Diagnosis Date   • A-fib (CMS/HCC) (HCC)    • Allergy    • Anxiety    • Arthritis    • Asthma    • Blindness of right eye    • BPH (benign prostatic hyperplasia)    • Cardiovascular disease    • Cirrhosis (CMS/HCC) (HCC)     with possible liver mass by MRI 5/18, rec repeat in 8/2018   • Complication of anesthesia    • COPD (chronic obstructive pulmonary disease) (CMS/HCC) (HCC)    • Depression    • Endocrine disorder    • Gallstones    • Gastritis    • GERD (gastroesophageal reflux disease)    • Glaucoma    • Hearing loss     bilateral hearing aids   • Hemorrhoids    • Hepatitis C     Hep C, 2016 remission, complicated by cirrhosis.  MRI abd 5/30/18, rec 3 month follow up for focus of enhancement right and left hepatic lobe junction   • Hernia, abdominal    • High blood pressure    • IBS (irritable bowel syndrome)    • Infectious viral hepatitis    • Jaundice    • Kidney stones    • Obstructive sleep apnea syndrome    • Periodic limb movement disorder    • Persistent hypersomnia    • Persistent insomnia    • Pneumonia    • PONV (postoperative nausea and vomiting)    • Type 2 diabetes mellitus (CMS/HCC) (HCC)    • Urinary incontinence    • Wears partial dentures        Past Surgical History:   Procedure Laterality Date   • COLONOSCOPY  10/27/2016     Pili, normal, repeat 10 years   • CT ABLATION LIVER RADIOFREQUENCY  8/14/2019    CT ABLATION LIVER RADIOFREQUENCY 8/14/2019 [facility] MIS CT SCAN   • ESOPHAGOGASTRODUODENOSCOPY N/A 2/22/2018    Procedure: EGD - ESOPHAGOGASTRODUODENOSCOPY with banding  x 2 with crna;  Surgeon: William Rivas MD;  Location: [facility];  Service: Endoscopy;  Laterality: N/A;   • ESOPHAGOGASTRODUODENOSCOPY N/A 4/6/2018    Procedure: EGD - ESOPHAGOGASTRODUODENOSCOPY with crna;  Surgeon: William Rivas MD;  Location: [facility];  Service: Endoscopy;  Laterality: N/A;   • ESOPHAGOGASTRODUODENOSCOPY N/A 5/7/2019    Procedure: EGD - ESOPHAGOGASTRODUODENOSCOPY;  Surgeon: William Rivas MD;  Location: [facility];  Service: Endoscopy;  Laterality: N/A;   • HAND SURGERY Left     thumb   • HERNIA REPAIR  01/01/1965   • KNEE ARTHROSCOPY  09/19/2017    left knee, with partial medial meniscectomy; limited chondroplasty, patellofemoral joint   • LIVER BIOPSY      by Dr. Alejandre   • OTHER SURGICAL HISTORY      esophageal varices, banded   • VASECTOMY      at approx age of 25       Social History     Socioeconomic History   • Marital status:      Spouse name: Not on file   • Number of children: Not on file   • Years of education: Not on file   • Highest education level: Not on file   Occupational History   • Not on file   Social Needs   • Financial resource strain: Not on file   • Food insecurity     Worry: Not on file     Inability: Not on file   • Transportation needs     Medical: Not on file     Non-medical: Not on file   Tobacco Use   • Smoking status: Never Smoker   • Smokeless tobacco: Never Used   Substance and Sexual Activity   • Alcohol use: No   • Drug use: No   • Sexual activity: Defer   Lifestyle   • Physical activity     Days per week: Not on file     Minutes per session: Not on file   • Stress: Not on file   Relationships   • Social connections     Talks on phone: Not on file     Gets together: Not on file     Attends Oriental orthodox  service: Not on file     Active member of club or organization: Not on file     Attends meetings of clubs or organizations: Not on file     Relationship status: Not on file   • Intimate partner violence     Fear of current or ex partner: Not on file     Emotionally abused: Not on file     Physically abused: Not on file     Forced sexual activity: Not on file   Other Topics Concern   • Not on file   Social History Narrative   • Not on file       Family History   Problem Relation Age of Onset   • Arthritis Mother    • Rheum arthritis Mother    • Diabetes Mother    • Rectal cancer Mother    • Other Paternal Grandmother    • Other Paternal Grandfather    • Diabetes Other    • Rheum arthritis Other    • Osteoporosis Other        Allergies   Allergen Reactions   • Penicillins Anaphylaxis   • Metformin Hives   • Adhesive Tape-Silicones    • Banana    • Broccoli    • [place] Sprout    • Cabbage    • Cantaloupe    • Celery    • Cheese    • Cranberry    • Egg    • Interferon Jose Maria-2a    • Latex    • Legumes      Beans   • Lettuce    • Milk    • Mometasone    • Mometasone Furoate    • Mustard    • Nut - Unspecified      Almonds   • Oats    • Omeprazole    • Ondansetron Hcl Nausea And Vomiting   • Onion    • Pepper (Genus Capsicum)      Chili pepper   • Pineapple    • Primidone      Other reaction(s): HEADACHE   • Ribavirin Itching   • Rosuvastatin    • Sorafenib      Other reaction(s): BURNING SENSATION, HEADACHE, JOINT PAIN   • Spironolactone    • Statins-Hmg-Coa Reductase Inhibitors Itching and GI intolerance   • Wheat    • Yeast    • Interferon Jose Maria (Human Leuk. Derived) Palpitations and Rash   • Pantoprazole Itching and Rash   • Peginterferon Jose Maria-2b Palpitations         Current Outpatient Medications:   •  lidocaine (Lidoderm) 5 % patch, Apply 1 patch topically daily Remove & discard patch within 12 hours or as directed by MD., Disp: , Rfl:   •  psyllium husk (METAMUCIL ORAL), Take by mouth daily, Disp: , Rfl:   •   hypromellose (SYSTANE GEL OPHT), Administer into affected eye(s) as needed, Disp: , Rfl:   •  multivit-minerals/folic acid (ADULT MULTIVITAMIN GUMMIES ORAL), Take 1 gummy by mouth daily, Disp: , Rfl:   •  fluticasone propionate (FLONASE) 50 mcg/actuation nasal spray, Administer 1 spray into each nostril daily, Disp: , Rfl:   •  CLOTRIMAZOLE, BULK, MISC, as needed To feet, Disp: , Rfl:   •  naloxone HCl (NARCAN NASL), Administer into affected nostril(s) as needed, Disp: , Rfl:   •  HYDROmorphone (DILAUDID) 2 mg tablet, Take 2 mg by mouth every 4 (four) hours as needed, Disp: , Rfl:   •  sildenafiL (VIAGRA) 100 mg tablet, Take 100 mg by mouth daily as needed for erectile dysfunction, Disp: , Rfl:   •  traZODone (DESYREL) 50 mg tablet, Take 50 mg by mouth nightly, Disp: , Rfl:   •  traZODone (DESYREL) 100 mg tablet, Take 100 mg by mouth nightly, Disp: , Rfl:   •  ondansetron (ZOFRAN) 4 mg tablet, Take 4 mg by mouth every 8 (eight) hours as needed for nausea or vomiting, Disp: , Rfl:   •  SUMAtriptan (IMITREX) 100 mg tablet, Take 100 mg by mouth once as needed for migraine, Disp: , Rfl:   •  insulin glargine (Lantus Solostar U-100 Insulin) 100 unit/mL (3 mL) insulin pen injection pen, Inject 10 Units under the skin every morning, Disp: , Rfl:   •  miconazole nitrate (SECURA ANTIFUNGAL T), Apply topically, Disp: , Rfl:   •  carvediloL (COREG) 3.125 mg tablet, Take 1 tablet (3.125 mg total) by mouth 2 (two) times a day with meals, Disp: 180 tablet, Rfl: 0  •  diclofenac sodium (VOLTAREN) 1 % gel, Apply 1 Dose topically as needed, Disp: , Rfl:   •  HYDROmorphone (Dilaudid) 4 mg tablet, Take 1 tablet (4 mg total) by mouth every 4 (four) hours as needed (pain) Max Daily Amount: 24 mg, Disp: 180 tablet, Rfl: 0  •  cetirizine (ZyrTEC) 10 mg tablet, Take 10 mg by mouth daily, Disp: , Rfl:   •  loperamide (Imodium A-D) 2 mg tablet, Take 2 tablets with first loose stool of the day, then up to 8 tablets daily, Disp: 80 tablet,  Rfl: 2  •  hydrOXYzine (ATARAX) 25 mg tablet, Take 1 tablet (25 mg total) by mouth nightly, Disp: 90 tablet, Rfl: 2  •  buPROPion SR (WELLBUTRIN SR) 100 mg 12 hr tablet, Take 1 tablet (100 mg total) by mouth daily, Disp: 90 tablet, Rfl: 1  •  lactulose (GENERLAC) 10 gram/15 mL solution, Take 15 mL (10 g total) by mouth daily, Disp: 473 mL, Rfl: 2  •  terazosin (HYTRIN) 5 mg capsule, Take 1 capsule (5 mg total) by mouth 2 (two) times a day, Disp: 180 capsule, Rfl: 2  •  blood-glucose meter (ACCU-CHEK ADELE PLUS METER) [facility] [place], Use to test blood sugars 3 times daily (E11.65, non insulin depedent) AccuChek Adele plus, meter is 8 years old (Patient taking differently: Use to test blood sugars 3 times daily (E11.65, non insulin dependent) ), Disp: 1 each, Rfl: 0  •  lisinopril (PRINIVIL,ZESTRIL) 20 mg tablet, Take 1 tablet (20 mg total) by mouth daily, Disp: 90 tablet, Rfl: 2  •  furosemide (LASIX) 20 mg tablet, Take 1 tablet (20 mg total) by mouth 2 (two) times a day., Disp: 180 tablet, Rfl: 2  •  lansoprazole (PREVACID) 30 mg capsule, Take 30 mg by mouth daily , Disp: , Rfl:   •  liraglutide (VICTOZA 2-JASE) 0.6 mg/0.1 mL (18 mg/3 mL) injection, Inject 1.8 mg under the skin daily  , Disp: , Rfl:   •  capsaicin (ZOSTRIX) 0.025 % cream, Apply 1 application topically as needed for pain scale 1-3/10, 1-3/8., Disp: , Rfl:   •  sodium chloride (SALINE NASAL) 0.65 % nasal spray, Administer 1 spray into each nostril as needed for congestion or rhinitis , Disp: , Rfl:   •  white petrolatum (AQUAPHOR HEALING) 41 % ointment ointment, Apply 1 application topically as needed., Disp: , Rfl:   •  pramoxine-menthol (GOLD BOND MEDICATED ANTI-ITCH) 1-1 % cream, Apply topically., Disp: , Rfl:   •  brimonidine (ALPHAGAN) 0.2 % ophthalmic solution, Administer into both eyes every 12 hours  , Disp: , Rfl:   •  GLYCERIN/PROPYLENE GLYCOL (ARTIFICIAL TEARS,GLYCERIN-PEG, OPHT), Administer 1 drop into affected eye(s) as needed (Dry eyes) , Disp: ,  Rfl:   •  latanoprost (XALATAN) 0.005 % ophthalmic solution, Administer into both eyes nightly  , Disp: 10, Rfl: 0      ROS:    Constitutional: negative for fever  Neuro: negative for headache  Eyes: negative for blurred vision  ENT: negative for sore throat  Cardiovascular: positive for chest pain  Respiratory: negative for shortness of breath  GI: negative for abdominal pain, negative for vomiting, positive for nausea.  : negative for burning with urination  Musculoskeletal: negative for new joint pain  Rheumatologic: negative for new joint pain        ED Triage Vitals   Temp Heart Rate Resp BP SpO2   08/04/20 2033 08/04/20 2033 08/04/20 2033 08/04/20 2033 08/04/20 2033   36.6 °C (97.9 °F) 74 20 164/69 95 %      Temp Source Heart Rate Source Patient Position BP Location FiO2 (%)   08/04/20 2033 -- 08/04/20 2200 -- --   Oral  Head of bed 30 degrees or higher           Physical Exam:    Nursing note and vitals reviewed.  Constitutional: No acute distress. Elevated BMI.  HENT: Conjunctiva is normal and non-injected. Mucous membranes moist.   Eyes: Pupils are equal, round, and reactive to light.   Neck: Trachea midline.   Cardiovascular: Well perfused, no cyanosis.   Pulmonary/Chest: No respiratory distress, equal chest rise, full sentences between breaths.   Abdominal: Tender right upper quadrant and epigastric region without peritonitis.  Musculoskeletal: 1-2+ pitting edema bilateral lower extremities.  Neurological: Alert. Normal mentation.   Skin: Exposed areas of skin without rash or lesion.  Psychiatric: Normal affect.    Labs Reviewed   CBC WITH AUTO DIFFERENTIAL - Abnormal       Result Value    WBC 4.1      RBC 3.91 (*)     Hemoglobin 12.2 (*)     Hematocrit 35.7 (*)     MCV 91.1      MCH 31.1      MCHC 34.1      RDW 13.5      Platelets 106 (*)     MPV 8.6      Neutrophils% 81 (*)     Lymphocytes% 9 (*)     Monocytes% 9      Eosinophils% 1      Basophils% 0      Neutrophils Absolute 3.40      Lymphocytes  Absolute 0.40      Monocytes Absolute 0.40      Eosinophils Absolute 0.00      Basophils Absolute 0.00     COMPREHENSIVE METABOLIC PANEL - Abnormal    Sodium 140      Potassium 4.0      Chloride 108 (*)     CO2 25      Anion Gap 7      BUN 22      Creatinine 0.87      Glucose 178 (*)     Calcium 8.6      AST 85 (*)     ALT (SGPT) 48      Alkaline Phosphatase 134 (*)     Total Protein 6.2      Albumin 3.6      Total Bilirubin 0.85      eGFR 88      Corrected Calcium 8.9      Narrative:     Estimated GFR calculated using the 2009 CKD-EPI creatinine equation.   LIPASE - Normal    Lipase 38     MAGNESIUM - Normal    Magnesium 2.0     HIGH SENSITIVE TROPONIN I - Normal    hsTnI 0 Hour 4.4     URINALYSIS, MICROSCOPIC ONLY - Normal    RBC, Urine Negative      WBC, Urine 0-4      Squamous Epithelial, Urine Negative      Bacteria, Urine None seen     URINALYSIS, DIPSTICK ONLY, FOR USE WITH MICROSCOPIC PANEL - Normal    Color, Urine Yellow      Clarity, Urine Clear      Specific Gravity, Urine 1.020      Leukocytes, Urine Negative      Nitrite, Urine Negative      Protein, Urine Negative      Ketones, Urine Negative      Urobilinogen, Urine <2.0      Bilirubin, Urine Negative      Blood, Urine Negative      Glucose, Urine Negative      pH, Urine 7.0     URINALYSIS WITH MICROSCOPIC    Narrative:     The following orders were created for panel order Urinalysis w/microscopic Urine, Clean Catch.  Procedure                               Abnormality         Status                     ---------                               -----------         ------                     Urinalysis, microscopic U...[[id number]]  Normal              Final result               Urinalysis, dipstick Urin...[[id number]]  Normal              Final result                 Please view results for these tests on the individual orders.   HIGH SENSITIVE TROPONIN I PANEL (0HR, 1HR, 2HR)    Narrative:     The following orders were created for panel order HS Troponin I  Panel (0HR, 1HR, 2HR) Blood, Venous.  Procedure                               Abnormality         Status                     ---------                               -----------         ------                     HS Troponin I[[id number]]                 Normal              Final result               1HR High Sensitive Trop I[[id number]]                         In process                 2HR High Sensitive Tropon...[[id number]]                                                   Please view results for these tests on the individual orders.   HIGH SENSITIVE TROPONIN I, 1 HOUR   HIGH SENSITIVE TROPONIN I, 2 HOUR       XR chest portable 1 view    (Results Pending)   Chest x-ray reading: No acute abnormality.      Procedures:  ECG 12 lead - Chest pain    Date/Time: 8/4/2020 10:23 PM  Performed by: Abdirahman Arrington MD  Authorized by: Abdirahman Arrington MD     ECG reviewed by ED Physician in the absence of a cardiologist: yes    Previous ECG:     Previous ECG:  Compared to current    Comparison ECG info:  9/05/2019    Similarity:  No change  Rate:     ECG rate:  77    ECG rate assessment: normal    Rhythm:     Rhythm: sinus rhythm    Ectopy:     Ectopy: none    QRS:     QRS axis:  Normal    QRS intervals:  Normal  Conduction:     Conduction: normal    ST segments:     ST segments:  Normal  T waves:     T waves: normal    Q waves:     Q waves:  III and aVF  Other findings:     Other findings comment:  Impression: Old inferior MI, unchanged from prior.          MDM:    Patient presents with chest pain. Records were reviewed. Lexiscan in 2015 which was negative for ischemia. Patient does have multiple cardiac risk factors and a HEART score of 5. He will require hospitalization for further cardiac work-up. His WBC was normal and liver enzymes look good although he does have known gallbladder disease. Case was discussed with the hospitalist Dr. Rider who agreed to hospitalize the patient for further evaluation and management. Patient remained  stable.    HEART Score: 5             Clinical Impression:  Final diagnoses:   [R07.9] Chest pain   [I10] Hypertension   [E11.9] Diabetes (CMS/HCC) (HCC)         By signing my name, I, Matthew Forbes, attest that this documentation has been prepared under the direction and in the presence of Dr. Arrington, 8/4/2020, 10:26 PM.    I, Dr. Arrington personally performed the services described in this document as written by the scribe in my presence.  It has been reviewed and is both accurate and complete.    A voice recognition program was used to aid in the documentation of this record. Sometimes words are not printed exactly as they were spoken. While efforts were made to carefully edit and correct any inaccuracies, some errors may be present; please take these into context. Please contact the provider if errors are identified.      Abdirahman Arrington MD  08/09/20 [id number]

## 2020-08-05 NOTE — DISCHARGE SUMMARY
[facility]ist Services  [address] [place] [address]    Hospitalist Discharge Summary    Date of service: 08/05/20  Time of service: 2:15 PM    Admission Date: 8/4/2020   Admitting Provider: Félix Rider MD    Discharge Date: 8/5/2020  Discharge Provider: Krista Adams MD    Primary Care Provider at Discharge: Collin Ramírez [phone]     Discharge Disposition  01 - Home or Self-Care  Code Status at Discharge: Full Code  Diet: Low fat, low cholesterol diet  Activity: As tolerated    Discharge Diagnosis/Hospital Problems  Active Problems:    Chronic hepatitis C with cirrhosis (CMS/HCC) (HCC)    Type 2 diabetes mellitus with neurologic complication (CMS/HCC) (HCC)    Essential hypertension    Hepatocellular carcinoma (CMS/HCC) (HCC)    Insomnia due to medical condition    Gastroesophageal reflux disease without esophagitis         Jakub Lai    Home Medication Instructions ELI:[id number]    Printed on:08/05/20 1051   Medication Information                      blood-glucose meter (ACCU-CHEK ANNABELLA PLUS METER) [facility]  Use to test blood sugars 3 times daily (E11.65, non insulin depedent) AccuChek Annabella plus, meter is 8 years old             brimonidine (ALPHAGAN) 0.2 % ophthalmic solution  Administer into both eyes every 12 hours               buPROPion SR (WELLBUTRIN SR) 100 mg 12 hr tablet  Take 1 tablet (100 mg total) by mouth daily             capsaicin (ZOSTRIX) 0.025 % cream  Apply 1 application topically as needed for pain scale 1-3/10, 1-3/8.             carvediloL (COREG) 3.125 mg tablet  Take 1 tablet (3.125 mg total) by mouth 2 (two) times a day with meals             cetirizine (ZyrTEC) 10 mg tablet  Take 10 mg by mouth daily             CLOTRIMAZOLE, BULK, MISC  as needed To feet             diclofenac sodium (VOLTAREN) 1 % gel  Apply 1 Dose topically as needed             fluticasone propionate (FLONASE) 50 mcg/actuation nasal spray  Administer 1 spray into  each nostril daily             furosemide (LASIX) 20 mg tablet  Take 1 tablet (20 mg total) by mouth 2 (two) times a day.             GLYCERIN/PROPYLENE GLYCOL (ARTIFICIAL TEARS,GLYCERIN-PEG, OPHT)  Administer 1 drop into affected eye(s) as needed (Dry eyes)              HYDROmorphone (DILAUDID) 2 mg tablet  Take 2 mg by mouth every 4 (four) hours as needed             HYDROmorphone (Dilaudid) 4 mg tablet  Take 1 tablet (4 mg total) by mouth every 4 (four) hours as needed (pain) Max Daily Amount: 24 mg             hydrOXYzine (ATARAX) 25 mg tablet  Take 1 tablet (25 mg total) by mouth nightly             hypromellose (SYSTANE GEL OPHT)  Administer into affected eye(s) as needed             insulin glargine (Lantus Solostar U-100 Insulin) 100 unit/mL (3 mL) insulin pen injection pen  Inject 10 Units under the skin every morning             lactulose (GENERLAC) 10 gram/15 mL solution  Take 15 mL (10 g total) by mouth daily             lansoprazole (PREVACID) 30 mg capsule  Take 30 mg by mouth daily              latanoprost (XALATAN) 0.005 % ophthalmic solution  Administer into both eyes nightly               lidocaine (Lidoderm) 5 % patch  Apply 1 patch topically daily Remove & discard patch within 12 hours or as directed by MD.             liraglutide (VICTOZA 2-JASE) 0.6 mg/0.1 mL (18 mg/3 mL) injection  Inject 1.8 mg under the skin daily               lisinopril (PRINIVIL,ZESTRIL) 20 mg tablet  Take 1 tablet (20 mg total) by mouth daily             loperamide (Imodium A-D) 2 mg tablet  Take 2 tablets with first loose stool of the day, then up to 8 tablets daily             miconazole nitrate (SECURA ANTIFUNGAL T)  Apply topically             multivit-minerals/folic acid (ADULT MULTIVITAMIN GUMMIES ORAL)  Take 1 gummy by mouth daily             naloxone HCl (NARCAN NASL)  Administer into affected nostril(s) as needed             ondansetron (ZOFRAN) 4 mg tablet  Take 4 mg by mouth every 8 (eight) hours as needed for  nausea or vomiting             pramoxine-menthol (GOLD BOND MEDICATED ANTI-ITCH) 1-1 % cream  Apply topically.             psyllium husk (METAMUCIL ORAL)  Take by mouth daily             sildenafiL (VIAGRA) 100 mg tablet  Take 100 mg by mouth daily as needed for erectile dysfunction             sodium chloride (SALINE NASAL) 0.65 % nasal spray  Administer 1 spray into each nostril as needed for congestion or rhinitis              SUMAtriptan (IMITREX) 100 mg tablet  Take 100 mg by mouth once as needed for migraine             terazosin (HYTRIN) 5 mg capsule  Take 1 capsule (5 mg total) by mouth 2 (two) times a day             traZODone (DESYREL) 100 mg tablet  Take 100 mg by mouth nightly             traZODone (DESYREL) 50 mg tablet  Take 50 mg by mouth nightly             white petrolatum (AQUAPHOR HEALING) 41 % ointment ointment  Apply 1 application topically as needed.                    Outpatient Follow-Up  Future Appointments   Date Time Provider Department Center   8/10/2020  1:30 PM Jose Gómez MD [facility] PAL    10/5/2020  7:00 AM ONCOLOGY [facility], LAB [facility] MEDONC    10/5/2020  8:00 AM [facility] MR 1 [facility] MRI    10/5/2020  1:30 PM Milka Reynoso MD [facility]     -Follow up with general surgery at the [facility] as scheduled for further treatment for gallbladder  -Follow up with PCP in about 1 week        DETAILS OF HOSPITAL STAY    Presenting Problem/History of Present Illness  Diabetes (CMS/HCC) (HCC) [E11.9]  Chest pain [R07.9]  Hypertension [I10]    Hospital Course  Atypical chest pain: Patient presented to the emergency department early in the morning of 8/5 with a chief complaint of right upper quadrant and right lower chest pain.  Was somewhat acute onset and radiated across to his lower chest and upper abdomen.  He had recently been out to eat a steak house with his wife and was at home mowing the grass when he had acute onset of pain.  He did have associated nausea but no  emesis or shortness of breath.  Upon presentation, his high-sensitivity troponin levels were unremarkable.  EKG showed sinus rhythm with no acute ST or T wave changes.  Chest x-ray showed no acute findings.  Patient underwent Lexiscan Cardiolite stress test on 8/5 for further evaluation, which was negative for ischemia.  Suspect that his chest less abdominal pain is related to his gallbladder and hepatocellular carcinoma rather than cardiac pain.    Right upper quadrant pain  Hepatocellular carcinoma  Cholecystolithiasis with gallbladder distention  Chronic: Patient has a known history of hepatocellular carcinoma and follows with Dr. Reynoso from oncology at [facility] and the [facility].  He is currently not on active treatment as he has showed response to previous treatment and had no progression of disease on surveillance imaging.  He has been evaluated by general surgery at the [facility] for possible cholecystectomy due to gallbladder distention with gallstones, which seems to be symptomatic and has been causing him some nausea, vomiting and abdominal pain, especially after eating fatty foods.  Surgery is complicated by his history of hepatocellular carcinoma possible tumor invasion.  Suspect this is contributing to his pain as discussed above.  Abdominal ultrasound was completed on 8/5 and showed fibrofatty changes of his liver with a recanalized periumbilical vein, distended gallbladder with gallstones, negative sonographic Almeida sign and no gallbladder wall thickening.  He also follows with Dr. Gómez from palliative care as an outpatient for pain management.  Resumed home pain management regimen.  Recommended that he continue follow-up with the [facility] for further treatment of his gallbladder issues, which he has surgery scheduled on 8/18/2020, as well as continued follow-up with oncology for surveillance imaging.    Type 2 diabetes mellitus  Essential  hypertension  GERD  Insomnia  Anxiety  BPH  Chronic, stable conditions. No active issues. Resumed home medication regimen.    History of hepatitis C: Currently in remission.    Procedures Performed  Lexiscan cardiolite stress test on 8/5 (per report):  · Mild nonspecific symptoms with Lexiscan infusion.  · Normal blood pressure response.  · Lexiscan infusion EKG is interpreted with reduced accuracy, no ischemic changes are noted with this caveat.  · Normal left ventricular size with borderline apical hypokinesis, EF 60%.  · Essentially normal myocardial perfusion study other than a minor apical lateral fixed defect that is likely artifact.    Physical Exam at Discharge  Discharge Condition: stable  Heart Rate: 58  Resp: 15  BP: 147/68  Temp: 36.7 °C (98.1 °F)  Weight: 97.8 kg (215 lb 9.8 oz)  Physical Exam  Vitals signs and nursing note reviewed.   Constitutional:       General: He is not in acute distress.  HENT:      Head: Normocephalic.      Nose: Nose normal.      Mouth/Throat:      Mouth: Mucous membranes are moist.      Pharynx: Oropharynx is clear. No oropharyngeal exudate.   Eyes:      General: No scleral icterus.     Extraocular Movements: Extraocular movements intact.      Conjunctiva/sclera: Conjunctivae normal.      Pupils: Pupils are equal, round, and reactive to light.   Neck:      Musculoskeletal: Normal range of motion and neck supple.   Cardiovascular:      Rate and Rhythm: Normal rate and regular rhythm.      Pulses: Normal pulses.      Heart sounds: Normal heart sounds. No murmur. No friction rub. No gallop.    Pulmonary:      Effort: Pulmonary effort is normal. No respiratory distress.      Breath sounds: Normal breath sounds. No wheezing or rales.   Abdominal:      General: Bowel sounds are normal. There is no distension.      Palpations: Abdomen is soft.      Tenderness: There is abdominal tenderness.      Comments: Mild RUQ/epigastric tenderness to palpation.   Musculoskeletal: Normal range of  motion.         General: No swelling or tenderness.   Skin:     General: Skin is warm and dry.      Capillary Refill: Capillary refill takes less than 2 seconds.      Findings: No rash.   Neurological:      General: No focal deficit present.      Mental Status: He is alert and oriented to person, place, and time. Mental status is at baseline.      Cranial Nerves: No cranial nerve deficit.      Sensory: No sensory deficit.      Motor: No weakness.      Coordination: Coordination normal.      Gait: Gait normal.   Psychiatric:         Mood and Affect: Mood normal.         Behavior: Behavior normal.         Thought Content: Thought content normal.         Judgment: Judgment normal.         Discussed with Dr. Adams    Time spent coordinating discharge: 39 minutes with >50% spent in care coordination    Kayy Francois CNP    Date: 08/05/20  Time: 2:15 PM      A voice recognition program was used to aid in documentation of this record.  Sometimes words are not printed exactly as they were spoken.  While efforts were made to carefully edit and correct any inaccuracies, some errors may be present; please take these into context.  Please contact the provider if errors are identified.

## 2020-08-05 NOTE — MEDICATION HISTORY SPECIALIST NOTES
[facility] [id number]    CSN: [id number]  : [id number]    Information sources:  EPIC  Complete Dispense Report  Go Reconcile  [facility]    Allergies verified.    Patient interviewed via phone who provided all history. Patient verified medications and provided last doses. Verified with Complete Dispense Report. Verified with medication list provided [facility].    Discrepancies:  Lantus - patient states dose was increased to 14 units daily  Lactulose - patient states only takes as needed    See  for medication resources.

## 2020-08-05 NOTE — TELEPHONE ENCOUNTER
Surgery is scheduled with Dr. Munoz on 8/18 at [facility].  Scheduled per MD.    H&P: to be completed by PAC - already completed on 7/31.  PAC: 7/31  POST-OP: 9/4    The surgery packet was provided via [employer].        Pt is aware that they will be contacted to schedule a COVID-19 test before surgery.    They are aware that they will receive a call  ~2 days prior to the scheduled procedure and will be given an exact arrival/start time.    I contacted the patient and left a message and sent a [employer] to confirm the scheduled dates.

## 2020-08-05 NOTE — H&P
History and Physical     Patient:Ben Lai Sr  YOB: 1950   MRN:[id number] PCP: Collin Ramírez DO   Code Status: Full Service:  Hospitalist   Admitting Physician: Dr. Félix Rider Date of Admission: 8/4/2020     Chief complaint: Chest pain    Transferred from: Home  History of Present Illness:     Source: chart review, the patient and Dr. Arrington    Ben Lai Sr is a 70 y.o. male with PMH of A. fib, asthma, anxiety, COPD, liver cirrhosis, hepatitis C, hepatocellular carcinoma, depression, DM 2, BPH, JENIFFER, GERD, presents with right lower external chest pain of acute onset, radiating across his chest, nonradiating, 4/10 pressure-like.  States he was mowing his lawn unable to complete mowing his lawn, associated nausea without emesis.  He recently had a visit in [place] for liver cancer and what sounds like may be scheduling for cholecystectomy.    In ER Vitals: Temp 97.7, pulse 60s, RR 14-20, -140s/60s-70s, 97% on RA  Imaging: CXR: No acute findings clear per my read.  EKG: Sinus rhythm, questionable T wave  Labs: CMP noted for chloride 108, alk phos 134, AST 85, glucose 178, troponin negative x1, WBC 4.1, H/H 12 and 35, , UA unremarkable,  Txt in ER: None      Past Medical History:   Diagnosis Date   • A-fib (CMS/HCC) (HCC)    • Allergy    • Anxiety    • Arthritis    • Asthma    • Blindness of right eye    • BPH (benign prostatic hyperplasia)    • Cardiovascular disease    • Cirrhosis (CMS/HCC) (HCC)     with possible liver mass by MRI 5/18, rec repeat in 8/2018   • Complication of anesthesia    • COPD (chronic obstructive pulmonary disease) (CMS/HCC) (HCC)    • Depression    • Endocrine disorder    • Gallstones    • Gastritis    • GERD (gastroesophageal reflux disease)    • Glaucoma    • Hearing loss     bilateral hearing aids   • Hemorrhoids    • Hepatitis C     Hep C, 2016 remission, complicated by cirrhosis.  MRI abd 5/30/18, rec 3 month follow up for focus of  enhancement right and left hepatic lobe junction   • Hernia, abdominal    • High blood pressure    • IBS (irritable bowel syndrome)    • Infectious viral hepatitis    • Jaundice    • Kidney stones    • Obstructive sleep apnea syndrome    • Periodic limb movement disorder    • Persistent hypersomnia    • Persistent insomnia    • Pneumonia    • PONV (postoperative nausea and vomiting)    • Type 2 diabetes mellitus (CMS/HCC) (HCC)    • Urinary incontinence    • Wears partial dentures          Past Surgical History:   Procedure Laterality Date   • COLONOSCOPY  10/27/2016    Pili, normal, repeat 10 years   • CT ABLATION LIVER RADIOFREQUENCY  8/14/2019    CT ABLATION LIVER RADIOFREQUENCY 8/14/2019 [facility] MIS CT SCAN   • ESOPHAGOGASTRODUODENOSCOPY N/A 2/22/2018    Procedure: EGD - ESOPHAGOGASTRODUODENOSCOPY with banding  x 2 with crna;  Surgeon: William Rivas MD;  Location: [facility];  Service: Endoscopy;  Laterality: N/A;   • ESOPHAGOGASTRODUODENOSCOPY N/A 4/6/2018    Procedure: EGD - ESOPHAGOGASTRODUODENOSCOPY with crna;  Surgeon: William Rivas MD;  Location: [facility];  Service: Endoscopy;  Laterality: N/A;   • ESOPHAGOGASTRODUODENOSCOPY N/A 5/7/2019    Procedure: EGD - ESOPHAGOGASTRODUODENOSCOPY;  Surgeon: William Rivas MD;  Location: [facility];  Service: Endoscopy;  Laterality: N/A;   • HAND SURGERY Left     thumb   • HERNIA REPAIR  01/01/1965   • KNEE ARTHROSCOPY  09/19/2017    left knee, with partial medial meniscectomy; limited chondroplasty, patellofemoral joint   • LIVER BIOPSY      by Dr. Alejandre   • OTHER SURGICAL HISTORY      esophageal varices, banded   • VASECTOMY      at approx age of 25         Family History   Problem Relation Age of Onset   • Arthritis Mother    • Rheum arthritis Mother    • Diabetes Mother    • Rectal cancer Mother    • Other Paternal Grandmother    • Other Paternal Grandfather    • Diabetes Other    • Rheum arthritis Other    • Osteoporosis Other           reports that he  has never smoked. He has never used smokeless tobacco. He reports that he does not drink alcohol or use drugs.      Medications:     No current facility-administered medications for this encounter.     Current Outpatient Medications:   •  lidocaine (Lidoderm) 5 % patch, Apply 1 patch topically daily Remove & discard patch within 12 hours or as directed by MD., Disp: , Rfl:   •  psyllium husk (METAMUCIL ORAL), Take by mouth daily, Disp: , Rfl:   •  hypromellose (SYSTANE GEL OPHT), Administer into affected eye(s) as needed, Disp: , Rfl:   •  multivit-minerals/folic acid (ADULT MULTIVITAMIN GUMMIES ORAL), Take 1 gummy by mouth daily, Disp: , Rfl:   •  fluticasone propionate (FLONASE) 50 mcg/actuation nasal spray, Administer 1 spray into each nostril daily, Disp: , Rfl:   •  CLOTRIMAZOLE, BULK, MISC, as needed To feet, Disp: , Rfl:   •  naloxone HCl (NARCAN NASL), Administer into affected nostril(s) as needed, Disp: , Rfl:   •  HYDROmorphone (DILAUDID) 2 mg tablet, Take 2 mg by mouth every 4 (four) hours as needed, Disp: , Rfl:   •  sildenafiL (VIAGRA) 100 mg tablet, Take 100 mg by mouth daily as needed for erectile dysfunction, Disp: , Rfl:   •  traZODone (DESYREL) 50 mg tablet, Take 50 mg by mouth nightly, Disp: , Rfl:   •  traZODone (DESYREL) 100 mg tablet, Take 100 mg by mouth nightly, Disp: , Rfl:   •  ondansetron (ZOFRAN) 4 mg tablet, Take 4 mg by mouth every 8 (eight) hours as needed for nausea or vomiting, Disp: , Rfl:   •  SUMAtriptan (IMITREX) 100 mg tablet, Take 100 mg by mouth once as needed for migraine, Disp: , Rfl:   •  insulin glargine (Lantus Solostar U-100 Insulin) 100 unit/mL (3 mL) insulin pen injection pen, Inject 10 Units under the skin every morning, Disp: , Rfl:   •  miconazole nitrate (SECURA ANTIFUNGAL T), Apply topically, Disp: , Rfl:   •  carvediloL (COREG) 3.125 mg tablet, Take 1 tablet (3.125 mg total) by mouth 2 (two) times a day with meals, Disp: 180 tablet, Rfl: 0  •  diclofenac sodium  (VOLTAREN) 1 % gel, Apply 1 Dose topically as needed, Disp: , Rfl:   •  HYDROmorphone (Dilaudid) 4 mg tablet, Take 1 tablet (4 mg total) by mouth every 4 (four) hours as needed (pain) Max Daily Amount: 24 mg, Disp: 180 tablet, Rfl: 0  •  cetirizine (ZyrTEC) 10 mg tablet, Take 10 mg by mouth daily, Disp: , Rfl:   •  loperamide (Imodium A-D) 2 mg tablet, Take 2 tablets with first loose stool of the day, then up to 8 tablets daily, Disp: 80 tablet, Rfl: 2  •  hydrOXYzine (ATARAX) 25 mg tablet, Take 1 tablet (25 mg total) by mouth nightly, Disp: 90 tablet, Rfl: 2  •  buPROPion SR (WELLBUTRIN SR) 100 mg 12 hr tablet, Take 1 tablet (100 mg total) by mouth daily, Disp: 90 tablet, Rfl: 1  •  lactulose (GENERLAC) 10 gram/15 mL solution, Take 15 mL (10 g total) by mouth daily, Disp: 473 mL, Rfl: 2  •  terazosin (HYTRIN) 5 mg capsule, Take 1 capsule (5 mg total) by mouth 2 (two) times a day, Disp: 180 capsule, Rfl: 2  •  blood-glucose meter (ACCU-CHEK ADELE PLUS METER) [facility], Use to test blood sugars 3 times daily (E11.65, non insulin depedent) AccuChek Adele plus, meter is 8 years old (Patient taking differently: Use to test blood sugars 3 times daily (E11.65, non insulin dependent) ), Disp: 1 each, Rfl: 0  •  lisinopril (PRINIVIL,ZESTRIL) 20 mg tablet, Take 1 tablet (20 mg total) by mouth daily, Disp: 90 tablet, Rfl: 2  •  furosemide (LASIX) 20 mg tablet, Take 1 tablet (20 mg total) by mouth 2 (two) times a day., Disp: 180 tablet, Rfl: 2  •  lansoprazole (PREVACID) 30 mg capsule, Take 30 mg by mouth daily , Disp: , Rfl:   •  liraglutide (VICTOZA 2-JASE) 0.6 mg/0.1 mL (18 mg/3 mL) injection, Inject 1.8 mg under the skin daily  , Disp: , Rfl:   •  capsaicin (ZOSTRIX) 0.025 % cream, Apply 1 application topically as needed for pain scale 1-3/10, 1-3/8., Disp: , Rfl:   •  sodium chloride (SALINE NASAL) 0.65 % nasal spray, Administer 1 spray into each nostril as needed for congestion or rhinitis , Disp: , Rfl:   •  white petrolatum  (AQUAPHOR HEALING) 41 % ointment ointment, Apply 1 application topically as needed., Disp: , Rfl:   •  pramoxine-menthol (GOLD BOND MEDICATED ANTI-ITCH) 1-1 % cream, Apply topically., Disp: , Rfl:   •  brimonidine (ALPHAGAN) 0.2 % ophthalmic solution, Administer into both eyes every 12 hours  , Disp: , Rfl:   •  GLYCERIN/PROPYLENE GLYCOL (ARTIFICIAL TEARS,GLYCERIN-PEG, OPHT), Administer 1 drop into affected eye(s) as needed (Dry eyes) , Disp: , Rfl:   •  latanoprost (XALATAN) 0.005 % ophthalmic solution, Administer into both eyes nightly  , Disp: 10, Rfl: 0    Allergies   Allergen Reactions   • Penicillins Anaphylaxis   • Metformin Hives   • Adhesive Tape-Silicones    • Banana    • Broccoli    • [place] Sprout    • Cabbage    • Cantaloupe    • Celery    • Cheese    • Cranberry    • Egg    • Interferon Jose Maria-2a    • Latex    • Legumes      Beans   • Lettuce    • Milk    • Mometasone    • Mometasone Furoate    • Mustard    • Nut - Unspecified      Almonds   • Oats    • Omeprazole    • Ondansetron Hcl Nausea And Vomiting   • Onion    • Pepper (Genus Capsicum)      Chili pepper   • Pineapple    • Primidone      Other reaction(s): HEADACHE   • Ribavirin Itching   • Rosuvastatin    • Sorafenib      Other reaction(s): BURNING SENSATION, HEADACHE, JOINT PAIN   • Spironolactone    • Statins-Hmg-Coa Reductase Inhibitors Itching and GI intolerance   • Wheat    • Yeast    • Interferon Jose Maria (Human Leuk. Derived) Palpitations and Rash   • Pantoprazole Itching and Rash   • Peginterferon Jose Maria-2b Palpitations         Review of Systems    As per HPI, otherwise:all 14 point ROS is negative  HEENT:  Negative for recent hearing or visual acuity change.  No oral pain, exudate, cervical adenopathy.  HEART: Right lower sternal chest pain radiating across the chest while mowing his lawn  LUNGS:  Negative for cough, wheeze, hemoptysis, or recent respiratory tract infection  GASTROINTESTINAL: Right upper quadrant pain, nausea without emesis,  no hematemesis, melena, diarrhea  GENITOURINARY:  Negative for hematuria, burning, urgency, or dysuria.  MUSCULOSKELETAL:  Negative for recent sprain, strain, or fracture.  NEUROLOGIC:  Negative for new numbness, tingling, loss of consciousness or loss of continence.  SKIN/LYMPHATICS:  Negative for new rash, mass, node, or lesion.      Findings:     PHYSICAL EXAM:  Temp:  [36.6 °C (97.9 °F)] 36.6 °C (97.9 °F)  Heart Rate:  [58-74] 58  Resp:  [14-22] 18  BP: (122-164)/(62-82) 136/70      Physical Exam    GENERAL: well-developed, alert, awake, oriented, NAD, cooperative  HEENT: NC/AT, PERRLA, EOMI, sclera not icterus, no nasal discharge or epistasis, oropharynx clear, MMM, uvula midline  NECK: Supple, no JVD, intact ROM, no thyromegaly  LUNGS: CTA B no rales, wheezing, rhonchi.  No respiratory distress  CHEST: S1, S2, RRR, no murmurs  ABDOMEN: Mildly obese, TTP RUQ soft, nondistended, + BS, no guarding  EXTREMITIES: Trace LE edema, NO cyanosis, or calf tenderness.   NEURO: Alert, awake, oriented x3, speech clear, grossly intact  PSYCH: Mood and affect normal  SKIN: warm and dry, normal skin turgor. No rashes or lesion.    ECG:   EKG: Sinus rhythm, questionable Q waves.  No previous study for comparison although Dr. Arrington was able to compare current EKG versus old 1 and reported no significant changes from previous study..     Labs:  CBC with Platelet:    Lab Results   Component Value Date    WBC 4.1 08/04/2020    HGB 12.2 (L) 08/04/2020    HCT 35.7 (L) 08/04/2020     (L) 08/04/2020    RBC 3.91 (L) 08/04/2020    MCV 91.1 08/04/2020    MCH 31.1 08/04/2020    MCHC 34.1 08/04/2020    RDW 13.5 08/04/2020    MPV 8.6 08/04/2020     Comp:   Lab Results   Component Value Date     08/04/2020    K 4.0 08/04/2020     (H) 08/04/2020    CO2 25 08/04/2020    BUN 22 08/04/2020    CREATININE 0.87 08/04/2020    GLUCOSE 178 (H) 08/04/2020    CALCIUM 8.6 08/04/2020    PROT 6.2 08/04/2020    ALBUMIN 3.6 08/04/2020     AST 85 (H) 08/04/2020    ALT 48 08/04/2020    ALKPHOS 134 (H) 08/04/2020    BILITOT 0.85 08/04/2020     U/A Macroscopic:    Lab Results   Component Value Date    COLORU Yellow 08/04/2020    SPECGRAVU 1.020 08/04/2020    MIKEY 7.0 08/04/2020    LEUKOCYTESU Negative 08/04/2020    NITRITEU Negative 08/04/2020    PROTUR Negative 08/04/2020    GLUCOSEU Negative 08/04/2020    KETONESU Negative 08/04/2020    UROBILINOGEN <2.0 08/04/2020    BLOODU Negative 08/04/2020     Lab Results   Component Value Date    TROPHS 4.4 08/04/2020    WYOKDF4XU 4.1 08/04/2020    HSDELTA1 0.3 08/04/2020    KKURNO2RE 4.6 08/05/2020    HSDELTA2 0.2 08/05/2020     Lab Results   Component Value Date    MG 2.0 08/04/2020       Imaging:  CXR: Clear per my independent review    Problem list:   Principal Problem:    Other chest pain  Active Problems:    Chronic hepatitis C with cirrhosis (CMS/HCC) (HCC)    Type 2 diabetes mellitus with neurologic complication (CMS/HCC) (HCC)    Essential hypertension    Hepatocellular carcinoma (CMS/HCC) (HCC)    Insomnia due to medical condition    Gastroesophageal reflux disease without esophagitis    Chest pain      Assessment & Plan:  1. Chest pain: EKG, initial troponin, CXR, CBC, CMP unremarkable, monitor on telemetry, Lexiscan in a.m., n.p.o., check lipid panel, TSH, A1c.  Patient states unable to take aspirin due to his cancer.  2. Right upper quadrant pain/hx of hepatocellular carcinoma: Being managed on outpatient basis.  Lipase 38, mild elevation of AST of 85 and alk phos of 134.  Will obtain abdominal ultrasound, consider CT abdomen if pain persists or ultrasound inconclusive  3. DM2: Lantus and Victoza, SSI  4. Essential hypertension: Coreg, lisinopril  5. GERD: PPI  6. Insomnia: Trazodone  7. History of hepatitis C: In remission  8. Anxiety: Wellbutrin  9. BPH: Hytrin    DVT Prophylaxis: Heparin subcu    Code Status: Full   this was reviewed with the patient/family/caregiver at the time of  admission.    Expected Length of Stay: 1- 3 days    Spent approximately 70 minutes in total admitting patient, >50% time spent face to face in discussion with patient, ER physician, reviewing chart/records, coordination of care, history gathering, physical exam, admitting orders and answering questions to patient's/family satisfaction.     A voice recognition program was used to aid in documentation of this record. Sometimes words are not printed exactly as they were spoken.  While efforts were made to carefully edit and correct any inaccuracies, some areas may be present; please take these into context.  Please contact the provider if areas are identified.    Electronically signed by Félix Rider on 8/5/20 at 1:41 AM

## 2020-08-10 ENCOUNTER — TELEPHONE - BILLABLE (OUTPATIENT)
Dept: PALLIATIVE CARE | Facility: CLINIC | Age: 70
End: 2020-08-10
Payer: COMMERCIAL

## 2020-08-10 DIAGNOSIS — R11.0 NAUSEA: ICD-10-CM

## 2020-08-10 DIAGNOSIS — G89.3 PAIN OF METASTATIC MALIGNANCY: ICD-10-CM

## 2020-08-10 DIAGNOSIS — C22.0 HEPATOCELLULAR CARCINOMA (CMS/HCC): Primary | ICD-10-CM

## 2020-08-10 DIAGNOSIS — G47.01 INSOMNIA DUE TO MEDICAL CONDITION: ICD-10-CM

## 2020-08-10 PROCEDURE — 99443 *INACTIVE DO NOT USE* PR PHYS/QHP TELEPHONE EVALUATION 21-30 MIN: CPT | Mod: 95 | Performed by: INTERNAL MEDICINE

## 2020-08-10 RX ORDER — OXYCODONE HYDROCHLORIDE 5 MG/1
5-10 TABLET ORAL EVERY 4 HOURS PRN
Qty: 168 TABLET | Refills: 0 | Status: SHIPPED | OUTPATIENT
Start: 2020-08-10 | End: 2020-08-24

## 2020-08-10 ASSESSMENT — ENCOUNTER SYMPTOMS
BACK PAIN: 1
SLEEP DISTURBANCE: 1
ALLERGIC/IMMUNOLOGIC NEGATIVE: 1
DIARRHEA: 0
ENDOCRINE NEGATIVE: 1
DIZZINESS: 0
CONSTIPATION: 0
VOMITING: 0
NAUSEA: 0
HEMATOLOGIC/LYMPHATIC NEGATIVE: 1
SHORTNESS OF BREATH: 0
ACTIVITY CHANGE: 1
RESPIRATORY NEGATIVE: 1
NEUROLOGICAL NEGATIVE: 1
ABDOMINAL PAIN: 1
APPETITE CHANGE: 0
FATIGUE: 1
UNEXPECTED WEIGHT CHANGE: 0
LIGHT-HEADEDNESS: 0

## 2020-08-10 NOTE — PROGRESS NOTES
Subjective   Per discussion with Jose Gómez MD, Ben, has verbally consented to be treated via a telephone based visit: Yes. A total of 30 minutes were required for this telephone based visit.   Patient Location: Home  Provider Location: Clinic  Technology used by Provider: Phone    HPI  Ben Lai Sr is a 70 y.o. male who presents for follow up of his HCC.    The pt is being seen today via telehealth due to covid 19 pandemic. He has a h/o HCC. He was hospitalized recently with chest pain but passed his stress test. He was due to have his gallbladder out 8/18, but he just heard from his surgeon who said that after a tumor board there, they have opted not to proceed with surgery. We started trazodone after last visit, which works well except when he takes his po dilaudid. The dilaudid works well for the pain and he is pain free but cannot sleep if he takes it. The trazodone works well for sleep unless he takes dilaudid, then he cannot sleep at all. We discussed different options for pain control, and we have arrived at trying oxycodone 5 to 10mg.       The following have been reviewed and updated as appropriate in this visit:    Allergies   Allergen Reactions   • Penicillins Anaphylaxis   • Metformin Hives   • Adhesive Tape-Silicones    • Interferon Jose Maria-2a    • Latex    • Milk      Cow milk/goat milk   • Mometasone    • Mometasone Furoate    • Omeprazole    • Ondansetron Hcl Nausea And Vomiting   • Pepper (Genus Capsicum)      Any Hot Peppers - can eat bell peppers   • Primidone      Other reaction(s): HEADACHE   • Ribavirin Itching   • Rosuvastatin    • Sorafenib      Other reaction(s): BURNING SENSATION, HEADACHE, JOINT PAIN   • Spironolactone    • Statins-Hmg-Coa Reductase Inhibitors Itching and GI intolerance   • Interferon Jose Maria (Human Leuk. Derived) Palpitations and Rash   • Pantoprazole Itching and Rash   • Peginterferon Jose Maria-2b Palpitations     Current Outpatient Medications   Medication Sig  Dispense Refill   • oxyCODONE (ROXICODONE) 5 mg immediate release tablet Take 1-2 tablets (5-10 mg total) by mouth every 4 (four) hours as needed (pain) for up to 14 days Max Daily Amount: 60 mg 168 tablet 0   • artificial saliva (YERBA GLADYS AND LYTES) aerosol,spray spray See administration instructions Take 3 to 5 sprays up to 3 to 5 times daily     • lidocaine (Lidoderm) 5 % patch Apply 1 patch topically daily Remove & discard patch within 12 hours or as directed by MD.     • psyllium husk (METAMUCIL ORAL) Take 1 Scoop by mouth daily       • hypromellose (SYSTANE GEL OPHT) Administer into affected eye(s) as needed     • fluticasone propionate (FLONASE) 50 mcg/actuation nasal spray Administer 2 sprays into each nostril daily       • CLOTRIMAZOLE, BULK, MISC as needed To feet     • naloxone HCl (NARCAN NASL) Administer into affected nostril(s) as needed     • HYDROmorphone (DILAUDID) 2 mg tablet Take 2 mg by mouth every 4 (four) hours as needed     • sildenafiL (VIAGRA) 100 mg tablet Take 100 mg by mouth daily as needed for erectile dysfunction     • traZODone (DESYREL) 100 mg tablet Take 100 mg by mouth nightly       • ondansetron (ZOFRAN) 4 mg tablet Take 4 mg by mouth every 8 (eight) hours as needed for nausea or vomiting     • SUMAtriptan (IMITREX) 100 mg tablet Take 100 mg by mouth once as needed for migraine     • insulin glargine (Lantus Solostar U-100 Insulin) 100 unit/mL (3 mL) insulin pen injection pen Inject 10 Units under the skin every morning     • miconazole nitrate (SECURA ANTIFUNGAL T) Apply topically as needed       • carvediloL (COREG) 3.125 mg tablet Take 1 tablet (3.125 mg total) by mouth 2 (two) times a day with meals 180 tablet 0   • diclofenac sodium (VOLTAREN) 1 % gel Apply 4 g topically 4 (four) times a day       • HYDROmorphone (Dilaudid) 4 mg tablet Take 1 tablet (4 mg total) by mouth every 4 (four) hours as needed (pain) Max Daily Amount: 24 mg 180 tablet 0   • cetirizine (ZyrTEC) 10 mg  tablet Take 10 mg by mouth daily     • loperamide (Imodium A-D) 2 mg tablet Take 2 tablets with first loose stool of the day, then up to 8 tablets daily 80 tablet 2   • hydrOXYzine (ATARAX) 25 mg tablet Take 1 tablet (25 mg total) by mouth nightly 90 tablet 2   • buPROPion SR (WELLBUTRIN SR) 100 mg 12 hr tablet Take 1 tablet (100 mg total) by mouth daily 90 tablet 1   • lactulose (GENERLAC) 10 gram/15 mL solution Take 15 mL (10 g total) by mouth daily 473 mL 2   • terazosin (HYTRIN) 5 mg capsule Take 1 capsule (5 mg total) by mouth 2 (two) times a day 180 capsule 2   • blood-glucose meter (ACCU-CHEK ADELE PLUS METER) [facility] Use to test blood sugars 3 times daily (E11.65, non insulin depedent) AccuChek Adele plus, meter is 8 years old (Patient taking differently: Use to test blood sugars 3 times daily (E11.65, non insulin dependent) ) 1 each 0   • lisinopril (PRINIVIL,ZESTRIL) 20 mg tablet Take 1 tablet (20 mg total) by mouth daily 90 tablet 2   • furosemide (LASIX) 20 mg tablet Take 1 tablet (20 mg total) by mouth 2 (two) times a day. 180 tablet 2   • lansoprazole (PREVACID) 30 mg capsule Take 30 mg by mouth daily      • liraglutide (VICTOZA 2-JASE) 0.6 mg/0.1 mL (18 mg/3 mL) injection Inject 1.8 mg under the skin daily       • capsaicin (ZOSTRIX) 0.025 % cream Apply 1 application topically as needed for pain scale 1-3/10, 1-3/8.     • sodium chloride (SALINE NASAL) 0.65 % nasal spray Administer 1 spray into each nostril as needed for congestion or rhinitis      • white petrolatum (AQUAPHOR HEALING) 41 % ointment ointment Apply 1 application topically as needed.     • pramoxine-menthol (GOLD BOND MEDICATED ANTI-ITCH) 1-1 % cream Apply topically as needed       • GLYCERIN/PROPYLENE GLYCOL (ARTIFICIAL TEARS,GLYCERIN-PEG, OPHT) Administer 1 drop into affected eye(s) as needed (Dry eyes)      • latanoprost (XALATAN) 0.005 % ophthalmic solution Administer 1 drop into both eyes nightly   10 0     No current  facility-administered medications for this visit.      Past Medical History:   Diagnosis Date   • A-fib (CMS/HCC) (HCC)    • Allergy    • Anxiety    • Arthritis    • Asthma    • Blindness of right eye    • BPH (benign prostatic hyperplasia)    • Cardiovascular disease    • Cirrhosis (CMS/HCC) (HCC)     with possible liver mass by MRI 5/18, rec repeat in 8/2018   • Complication of anesthesia    • COPD (chronic obstructive pulmonary disease) (CMS/HCC) (HCC)    • Depression    • Endocrine disorder    • Gallstones    • Gastritis    • GERD (gastroesophageal reflux disease)    • Glaucoma    • Hearing loss     bilateral hearing aids   • Hemorrhoids    • Hepatitis C     Hep C, 2016 remission, complicated by cirrhosis.  MRI abd 5/30/18, rec 3 month follow up for focus of enhancement right and left hepatic lobe junction   • Hernia, abdominal    • High blood pressure    • IBS (irritable bowel syndrome)    • Infectious viral hepatitis    • Jaundice    • Kidney stones    • Obstructive sleep apnea syndrome    • Periodic limb movement disorder    • Persistent hypersomnia    • Persistent insomnia    • Pneumonia    • PONV (postoperative nausea and vomiting)    • Type 2 diabetes mellitus (CMS/HCC) (HCC)    • Urinary incontinence    • Wears partial dentures      Past Surgical History:   Procedure Laterality Date   • COLONOSCOPY  10/27/2016    Pili, normal, repeat 10 years   • CT ABLATION LIVER RADIOFREQUENCY  8/14/2019    CT ABLATION LIVER RADIOFREQUENCY 8/14/2019 [facility] MIS CT SCAN   • ESOPHAGOGASTRODUODENOSCOPY N/A 2/22/2018    Procedure: EGD - ESOPHAGOGASTRODUODENOSCOPY with banding  x 2 with crna;  Surgeon: William Rivas MD;  Location: [facility] Endoscopy;  Service: Endoscopy;  Laterality: N/A;   • ESOPHAGOGASTRODUODENOSCOPY N/A 4/6/2018    Procedure: EGD - ESOPHAGOGASTRODUODENOSCOPY with crna;  Surgeon: William Rivas MD;  Location: [facility] Endoscopy;  Service: Endoscopy;  Laterality: N/A;   • ESOPHAGOGASTRODUODENOSCOPY N/A 5/7/2019     Procedure: EGD - ESOPHAGOGASTRODUODENOSCOPY;  Surgeon: William Rivas MD;  Location: [facility] Endoscopy;  Service: Endoscopy;  Laterality: N/A;   • HAND SURGERY Left     thumb   • HERNIA REPAIR  01/01/1965   • KNEE ARTHROSCOPY  09/19/2017    left knee, with partial medial meniscectomy; limited chondroplasty, patellofemoral joint   • LIVER BIOPSY      by Dr. Alejandre   • OTHER SURGICAL HISTORY      esophageal varices, banded   • VASECTOMY      at approx age of 25     Family History   Problem Relation Age of Onset   • Arthritis Mother    • Rheum arthritis Mother    • Diabetes Mother    • Rectal cancer Mother    • Other Paternal Grandmother    • Other Paternal Grandfather    • Diabetes Other    • Rheum arthritis Other    • Osteoporosis Other      Social History     Occupational History   • Not on file   Tobacco Use   • Smoking status: Never Smoker   • Smokeless tobacco: Never Used   Substance and Sexual Activity   • Alcohol use: No   • Drug use: No   • Sexual activity: Defer   Social History Narrative   • Not on file       Review of Systems   Constitutional: Positive for activity change and fatigue. Negative for appetite change and unexpected weight change.   HENT: Negative.    Respiratory: Negative.  Negative for shortness of breath.    Cardiovascular: Positive for leg swelling.   Gastrointestinal: Positive for abdominal pain. Negative for constipation, diarrhea, nausea and vomiting.   Endocrine: Negative.    Genitourinary: Negative.    Musculoskeletal: Positive for back pain.   Skin: Negative.    Allergic/Immunologic: Negative.    Neurological: Negative.  Negative for dizziness and light-headedness.   Hematological: Negative.    Psychiatric/Behavioral: Positive for sleep disturbance.         Assessment/Plan   Diagnoses and all orders for this visit:    Hepatocellular carcinoma (CMS/HCC) (HCC)    Pain of metastatic malignancy  -     oxyCODONE (ROXICODONE) 5 mg immediate release tablet; Take 1-2 tablets (5-10 mg total) by  mouth every 4 (four) hours as needed (pain) for up to 14 days Max Daily Amount: 60 mg    Nausea    Insomnia due to medical condition    Will have him continue trazodone for isomnia as it has been effective.

## 2020-08-11 ENCOUNTER — TELEPHONE (OUTPATIENT)
Dept: SURGERY | Facility: CLINIC | Age: 70
End: 2020-08-11

## 2020-08-11 RX ORDER — CLINDAMYCIN PHOSPHATE 900 MG/50ML
900 INJECTION, SOLUTION INTRAVENOUS SEE ADMIN INSTRUCTIONS
Status: CANCELLED | OUTPATIENT
Start: 2020-08-11

## 2020-08-11 RX ORDER — CLINDAMYCIN PHOSPHATE 900 MG/50ML
900 INJECTION, SOLUTION INTRAVENOUS
Status: CANCELLED | OUTPATIENT
Start: 2020-08-11

## 2020-08-11 NOTE — TELEPHONE ENCOUNTER
I called to inform the patient that further review of his imaging is concerning for HCC involving his gallbladder.  Thus, it is not safe to attempt laparoscopic cholecystectomy.  Given he has had another recent pain episode, I reached out to our GI colleagues about alternatives.  I discussed that they would be willing to attempt an endoscopic transpapillary cystic duct stent placement in order to assist the drainage of his gallbladder.  He was in agreement with proceeding with this alternative, so we will have GI reach out to him for scheduling and discussion of the procedure.  The surgery for cholecystectomy this upcoming week will be cancelled.  All questions answered.  He was in agreement with and understanding of the plan.

## 2020-08-12 ENCOUNTER — PREP FOR PROCEDURE (OUTPATIENT)
Dept: GASTROENTEROLOGY | Facility: CLINIC | Age: 70
End: 2020-08-12

## 2020-08-12 ENCOUNTER — PATIENT OUTREACH (OUTPATIENT)
Dept: GASTROENTEROLOGY | Facility: CLINIC | Age: 70
End: 2020-08-12

## 2020-08-12 ENCOUNTER — TELEPHONE (OUTPATIENT)
Dept: SURGERY | Facility: CLINIC | Age: 70
End: 2020-08-12

## 2020-08-12 DIAGNOSIS — K80.50 BILIARY COLIC: Primary | ICD-10-CM

## 2020-08-12 NOTE — INTERDISCIPLINARY/THERAPY
Physical Therapy Discharge Note      Patient Name: Ben Lai   Referring Doctor: Lizet Booth CNP  Date of Service: 10/22/2018    Primary Medical Diagnosis: Left shoulder pain     Treatment Diagnosis.Impaired Joint mobility, motor function, muscle performance, and range of motion associated with connective tissue dysfunction.     DC home with HEP. Patient only seen for eval than cancelled or no showed remaining appointments.   See note for 11/13/2018      Thank you for allowing us to share in the care of this patient. If you have any questions, recommendations, or further concerns regarding this patient, please feel free to contact us.      Signed by: Chelsea Mirza, PT  8/11/2020  7:40 PM

## 2020-08-12 NOTE — TELEPHONE ENCOUNTER
----- Message from Jerilyn Walker RN sent at 8/11/2020  5:17 PM CDT -----  Regarding: FW: Procedure plan?  Cancel OR - cant do surgery.     Pt is aware. ;)     JIGNESH   ----- Message -----  From: Priyank Munoz MD  Sent: 8/11/2020   4:14 PM CDT  To: Jerilyn Walker RN  Subject: RE: Procedure plan?                              I talked with him.  GI is going to schedule him for a cystic duct stent to help his gallbladder drain.  They should reach out to him soon.  Okay to cancel surgery.  I am not sure what date will be chosen, so he is going to hold on to hot for a little longer and not cancel his COVID yet on the chance that the procedure ended up being on the same day, which I doubt.    Ilia  ----- Message -----  From: Jerilyn Walker RN  Sent: 8/11/2020   3:35 PM CDT  To: Priyank Munoz MD  Subject: Procedure plan?                                  [facility] Morillo - they are making travel plans and reservations. Do you think you are going to operate? They are waiting to hear from you.     Thanks,  JIGNESH

## 2020-08-12 NOTE — TELEPHONE ENCOUNTER
Per Dr. Denise  Procedure/Imaging/Clinic: ERCP with transpapillary gallbladder stenting   Physician: Dr. Denise  Timin2020  Procedure length: 60 min   Anesthesia: Gen   Dx: biliary colic   Tier: 3   Location: [facility]       Called to discuss with patient. Explained they can expect a call for date and time for procedure, will need a , someone to stay with them for 24 hours and should stay in town for 24 hours (within 45 min of Hospital) post procedure    Patient needs to get pre-op physical completed and will fax a copy to us along with bringing a hard copy with them. Fax number given. [phone] *If you do not get a preop physical, your procedure could be cancelled, patient voiced understanding*    Preop Plan: Had preop for cancelled surgery on , advised patient will need new one, requests that it be done on  at PAC at 1:30    Med Review    Blood thinner -  no  ASA - no  Diabetic -  yes    COVID test discussed:will get done locally prior to procedure    Patient Education r/t procedure:done      A pre-op nurse will call 1-2 days prior to the procedure. Is advised to be NPO/no solid food 8 hours before the procedure. Ok to drink clear liquids (Water, Apple Juice or Gatorade) up to 2 hours prior to procedure.     Other specific details/comments:none       Verbalized understanding of all instructions. All questions answered.

## 2020-08-12 NOTE — LETTER
August 12, 2020    John Mancia                              [address] [place] [address]    Dear John,    You are scheduled to receive an ERCP (Endoscopic Retrograde Cholangiopancreatography) under general anesthesia.     DATE: 9/2/2020  ARRIVAL TIME: To Be Determined  PHYSICIAN: Dr. Denise        Your procedure is taking place at   [facility], [address] [address]     *Go to Station 3C, 3rd floor of the Hospital*     **A pre-surgery nurse will call you to go over your medications, confirm details & answer questions 1-2 days prior to exam.**      For your procedure to go on as scheduled, we require:    Preop physical:  You are scheduled for a preop physical at our PAC clinic on September 1, 2020 at 1:30 pm With SANDRA Pittman    Location is listed below:  From Sign In Desk:  PAC ADULT - 5th Floor    Department Address: [address] [place] [address]   Department Phone: [phone]       COVID test: every patient coming to the OR MUST have a Covid/Coronavirus test 3 days prior to procedure. This test must be complete and resulted as negative for your procedure to happen as scheduled. Your procedure will be canceled if this is not completed.If you choose to get tested outside the [facility] system, results must be faxed to [phone].       **No solid foods after midnight. You can have clear liquids until 2 hours prior to procedure. (Clear liquids- apple juice, water)        You are scheduled to go home this same day and you will need someone to drive you home (no cabs or buses) and a  24 hour   around you after receiving General Anesthesia.  You and/or the Doctor(s) may want you to spend the night for observation, depending on your recovery process.     Please prepare for this possibility, just in case.       If you take blood thinning medication, you should get your DrKarels approval to be OFF of  COUMADIN/WARFARIN for 5 days (if it s switched to Lovenox, that should be held the night before and morning of ERCP), PLAVIX should be held for 5-7 days, ASPIRIN for 2-3 days.  Please call the nurse if you are taking PERSANTINE / DIPYRIDAMOLE.    If you are diabetic, ask your doctor how much medicine, if any, you should take on the day of the exam. You may need to stop taking your medicine.    If you are on heart or blood pressure medicines, take them with   glass of water.  DO NOT TAKE  ACE inhibitors like ENALAPRIL, LISINOPRIL, CAPTOPRIL or Angiotensin II receptor blockers like LOSARTAN, COZAAR or DIOVAN, unless instructed differently by the pre-admission surgery staff member that will call you 1-2 days prior to your procedure.  WHAT IS AN ERCP (Endoscopic Retrograde CholangioPancreatography)?  ERCP is a specialized technique used to study the ducts of the gallbladder, pancreas and liver.  Ducts are drainage routes; the drainage channels from the liver are called bile or biliary ducts.  It is a valuable exam for diagnosing - and sometimes treating - disorders of the pancreas, liver, gallbladder and bile ducts.     Using both x-rays and a thin, flexible, lighted tube (endoscope), we can see inside the pancreas, liver, gallbladder and bile ducts. The endoscope is put through the mouth and throat into the stomach.  You will receive General Anesthesia for this exam.     FOLLOW-UP, POST PROCEDURE:   You will likely have a sore throat, some nausea and a belly ache from trapped air and the involvement of the digestive tract during the ERCP. It isn t unusual to have  a temporary stent placed into your biliary &/or pancreatic duct(s) that will require further Endoscopic intervention in the future. You will be notified of this, if this is the case, post procedure.      Please follow a clear liquid diet for 12-24 hours post procedure. Once this is tolerated, you may advance to full liquids, soups, smoothies and protein  "shakes and then advance your diet as tolerated.     A nurse will call you within 3-7 days of your procedure. Please be aware of concerning symptoms listed below.   ~ Advised to go to the [facility] Emergency Room if develops:    Fevers over 101 +/- chills,    Significant nausea/vomiting causing inability to take in fluids depleting hydration.    severe pain, ongoing symptoms (pain, nausea) that cannot be controlled with prescribed or OTC medication.    Signs of dehydration (pale skin, low blood pressure, lightheadedness, dark urine, \"sticky\" skin when pinched, rapid heart rate, little to no urine output).    Please feel free to call the clinic Triage Line with any questions at [phone].         Sincerely,     Jen Mcnultyved  Advanced Endoscopy Scheduling  Phone: [phone]     "

## 2020-08-13 NOTE — TELEPHONE ENCOUNTER
FUTURE VISIT INFORMATION      SURGERY INFORMATION:    Date: 20    Location:[facility]    Surgeon:  Gary Denise MD     Anesthesia Type:  general    Procedure: ENDOSCOPIC RETROGRADE CHOLANGIOPANCREATOGRAPHY with transpapillary gallbladder stenting     RECORDS REQUESTED FROM:       Primary Care Provider: Linn Mccarthy MD -     [facility]       Pertinent Medical History: yeni, hypertension, congestive heart failure    Most recent EKG+ Tracin19    Most recent ECHO:    Most recent Cardiac Stress Test: 6/8/15-[facility]

## 2020-08-14 NOTE — TELEPHONE ENCOUNTER
Received TC from patient. States [facility] he is allergic to Oxycodone. Review of Epic EMR does not note Oxycodone as allergy. Pt states he will not take the Oxycodone and will continue to take they Hydromorphone prescribed for pain. This writer will follow-up with the [facility] to verify allergy.  Will update provider and patient once medication allergy list received verified with [facility] clinic.kathleen  TC to [facility] clinic, s/w [facility] in Pharmacy. No verified allergy or adverse reactions listed to Oxycodone in Pharmacy record. Fax request for clinical records sent to [facility] to verify no history of Oxycodone allergy or adverse reaction. Will update Dr. Gómez and patient. kathleen

## 2020-08-24 ENCOUNTER — TELEPHONE - BILLABLE (OUTPATIENT)
Dept: PALLIATIVE CARE | Facility: CLINIC | Age: 70
End: 2020-08-24
Payer: MEDICARE

## 2020-08-24 DIAGNOSIS — G43.009 MIGRAINE WITHOUT AURA AND WITHOUT STATUS MIGRAINOSUS, NOT INTRACTABLE: Primary | ICD-10-CM

## 2020-08-24 DIAGNOSIS — C22.0 HEPATOCELLULAR CARCINOMA (CMS/HCC): ICD-10-CM

## 2020-08-24 DIAGNOSIS — G89.3 PAIN OF METASTATIC MALIGNANCY: ICD-10-CM

## 2020-08-24 PROCEDURE — 99442 *INACTIVE DO NOT USE* PR PHYS/QHP TELEPHONE EVALUATION 11-20 MIN: CPT | Mod: 95 | Performed by: INTERNAL MEDICINE

## 2020-08-24 ASSESSMENT — ENCOUNTER SYMPTOMS
RESPIRATORY NEGATIVE: 1
ENDOCRINE NEGATIVE: 1
APPETITE CHANGE: 1
CARDIOVASCULAR NEGATIVE: 1
ALLERGIC/IMMUNOLOGIC NEGATIVE: 1
ACTIVITY CHANGE: 1
HEMATOLOGIC/LYMPHATIC NEGATIVE: 1
NAUSEA: 0
ABDOMINAL PAIN: 1
HEADACHES: 1
MUSCULOSKELETAL NEGATIVE: 1
CONSTIPATION: 0
VOMITING: 0

## 2020-08-24 NOTE — PROGRESS NOTES
"Subjective   Per discussion with Jose Gómez MD, Ben, has verbally consented to be treated via a telephone based visit: Yes. A total of 15 minutes were required for this telephone based visit.   Patient Location: Home  Provider Location: Clinic  Technology used by Provider: Phone    HPI  Ben Lai Sr is a 70 y.o. male who presents for follow up on his HCC and related symptoms.    The pt is being seen today via telehealth due to covid 19 pandemic. The pt states \"I'm doing pretty danggone good\". On Friday he has to do a covid 19 test in anticipation to have an ERCP with stent. He is hopeful that this will be helpful. The procedure is scheduled for 9/2. His pain is \"doing just fine\" and feels that he's managing it well. He is concerned that he is allergic to oxycodone, but we have reviewed [facility] and [place] records and cannot find any evidence of this. He feels that the dilaudid is helping and is satisfied with this. He is sleeping better, but states that he cannot use his CPAP. He is using a full face mask which is helping some. He states \"It's pretty good\".       The following have been reviewed and updated as appropriate in this visit:    Allergies   Allergen Reactions   • Penicillins Anaphylaxis   • Metformin Hives   • Adhesive Tape-Silicones    • Interferon Jose Maria-2a    • Latex    • Milk      Cow milk/goat milk   • Mometasone    • Mometasone Furoate    • Omeprazole    • Ondansetron Hcl Nausea And Vomiting   • Pepper (Genus Capsicum)      Any Hot Peppers - can eat bell peppers   • Primidone      Other reaction(s): HEADACHE   • Ribavirin Itching   • Rosuvastatin    • Sorafenib      Other reaction(s): BURNING SENSATION, HEADACHE, JOINT PAIN   • Spironolactone    • Statins-Hmg-Coa Reductase Inhibitors Itching and GI intolerance   • Interferon Jose Maria (Human Leuk. Derived) Palpitations and Rash   • Pantoprazole Itching and Rash   • Peginterferon Jose Maria-2b Palpitations     Current Outpatient Medications "   Medication Sig Dispense Refill   • oxyCODONE (ROXICODONE) 5 mg immediate release tablet Take 1-2 tablets (5-10 mg total) by mouth every 4 (four) hours as needed (pain) for up to 14 days Max Daily Amount: 60 mg 168 tablet 0   • artificial saliva (YERBA GLADYS AND LYTES) aerosol,spray spray See administration instructions Take 3 to 5 sprays up to 3 to 5 times daily     • lidocaine (Lidoderm) 5 % patch Apply 1 patch topically daily Remove & discard patch within 12 hours or as directed by MD.     • psyllium husk (METAMUCIL ORAL) Take 1 Scoop by mouth daily       • hypromellose (SYSTANE GEL OPHT) Administer into affected eye(s) as needed     • fluticasone propionate (FLONASE) 50 mcg/actuation nasal spray Administer 2 sprays into each nostril daily       • CLOTRIMAZOLE, BULK, MISC as needed To feet     • naloxone HCl (NARCAN NASL) Administer into affected nostril(s) as needed     • HYDROmorphone (DILAUDID) 2 mg tablet Take 2 mg by mouth every 4 (four) hours as needed     • sildenafiL (VIAGRA) 100 mg tablet Take 100 mg by mouth daily as needed for erectile dysfunction     • traZODone (DESYREL) 100 mg tablet Take 100 mg by mouth nightly       • ondansetron (ZOFRAN) 4 mg tablet Take 4 mg by mouth every 8 (eight) hours as needed for nausea or vomiting     • SUMAtriptan (IMITREX) 100 mg tablet Take 100 mg by mouth once as needed for migraine     • insulin glargine (Lantus Solostar U-100 Insulin) 100 unit/mL (3 mL) insulin pen injection pen Inject 10 Units under the skin every morning     • miconazole nitrate (SECURA ANTIFUNGAL T) Apply topically as needed       • carvediloL (COREG) 3.125 mg tablet Take 1 tablet (3.125 mg total) by mouth 2 (two) times a day with meals 180 tablet 0   • diclofenac sodium (VOLTAREN) 1 % gel Apply 4 g topically 4 (four) times a day       • HYDROmorphone (Dilaudid) 4 mg tablet Take 1 tablet (4 mg total) by mouth every 4 (four) hours as needed (pain) Max Daily Amount: 24 mg 180 tablet 0   •  cetirizine (ZyrTEC) 10 mg tablet Take 10 mg by mouth daily     • loperamide (Imodium A-D) 2 mg tablet Take 2 tablets with first loose stool of the day, then up to 8 tablets daily 80 tablet 2   • hydrOXYzine (ATARAX) 25 mg tablet Take 1 tablet (25 mg total) by mouth nightly 90 tablet 2   • buPROPion SR (WELLBUTRIN SR) 100 mg 12 hr tablet Take 1 tablet (100 mg total) by mouth daily 90 tablet 1   • lactulose (GENERLAC) 10 gram/15 mL solution Take 15 mL (10 g total) by mouth daily 473 mL 2   • terazosin (HYTRIN) 5 mg capsule Take 1 capsule (5 mg total) by mouth 2 (two) times a day 180 capsule 2   • blood-glucose meter (ACCU-CHEK ADELE PLUS METER) [facility] Use to test blood sugars 3 times daily (E11.65, non insulin depedent) AccuChek Adele plus, meter is 8 years old (Patient taking differently: Use to test blood sugars 3 times daily (E11.65, non insulin dependent) ) 1 each 0   • lisinopril (PRINIVIL,ZESTRIL) 20 mg tablet Take 1 tablet (20 mg total) by mouth daily 90 tablet 2   • furosemide (LASIX) 20 mg tablet Take 1 tablet (20 mg total) by mouth 2 (two) times a day. 180 tablet 2   • lansoprazole (PREVACID) 30 mg capsule Take 30 mg by mouth daily      • liraglutide (VICTOZA 2-JASE) 0.6 mg/0.1 mL (18 mg/3 mL) injection Inject 1.8 mg under the skin daily       • capsaicin (ZOSTRIX) 0.025 % cream Apply 1 application topically as needed for pain scale 1-3/10, 1-3/8.     • sodium chloride (SALINE NASAL) 0.65 % nasal spray Administer 1 spray into each nostril as needed for congestion or rhinitis      • white petrolatum (AQUAPHOR HEALING) 41 % ointment ointment Apply 1 application topically as needed.     • pramoxine-menthol (GOLD BOND MEDICATED ANTI-ITCH) 1-1 % cream Apply topically as needed       • GLYCERIN/PROPYLENE GLYCOL (ARTIFICIAL TEARS,GLYCERIN-PEG, OPHT) Administer 1 drop into affected eye(s) as needed (Dry eyes)      • latanoprost (XALATAN) 0.005 % ophthalmic solution Administer 1 drop into both eyes nightly   10 0     No  current facility-administered medications for this visit.      Past Medical History:   Diagnosis Date   • A-fib (CMS/HCC) (HCC)    • Allergy    • Anxiety    • Arthritis    • Asthma    • Blindness of right eye    • BPH (benign prostatic hyperplasia)    • Cardiovascular disease    • Cirrhosis (CMS/HCC) (HCC)     with possible liver mass by MRI 5/18, rec repeat in 8/2018   • Complication of anesthesia    • COPD (chronic obstructive pulmonary disease) (CMS/HCC) (HCC)    • Depression    • Endocrine disorder    • Gallstones    • Gastritis    • GERD (gastroesophageal reflux disease)    • Glaucoma    • Hearing loss     bilateral hearing aids   • Hemorrhoids    • Hepatitis C     Hep C, 2016 remission, complicated by cirrhosis.  MRI abd 5/30/18, rec 3 month follow up for focus of enhancement right and left hepatic lobe junction   • Hernia, abdominal    • High blood pressure    • IBS (irritable bowel syndrome)    • Infectious viral hepatitis    • Jaundice    • Kidney stones    • Obstructive sleep apnea syndrome    • Periodic limb movement disorder    • Persistent hypersomnia    • Persistent insomnia    • Pneumonia    • PONV (postoperative nausea and vomiting)    • Type 2 diabetes mellitus (CMS/HCC) (HCC)    • Urinary incontinence    • Wears partial dentures      Past Surgical History:   Procedure Laterality Date   • COLONOSCOPY  10/27/2016    Pili, normal, repeat 10 years   • CT ABLATION LIVER RADIOFREQUENCY  8/14/2019    CT ABLATION LIVER RADIOFREQUENCY 8/14/2019 [facility] MIS CT SCAN   • ESOPHAGOGASTRODUODENOSCOPY N/A 2/22/2018    Procedure: EGD - ESOPHAGOGASTRODUODENOSCOPY with banding  x 2 with crna;  Surgeon: William Rivas MD;  Location: [facility] Endoscopy;  Service: Endoscopy;  Laterality: N/A;   • ESOPHAGOGASTRODUODENOSCOPY N/A 4/6/2018    Procedure: EGD - ESOPHAGOGASTRODUODENOSCOPY with crna;  Surgeon: William Rivas MD;  Location: [facility] Endoscopy;  Service: Endoscopy;  Laterality: N/A;   • ESOPHAGOGASTRODUODENOSCOPY N/A 5/7/2019     Procedure: EGD - ESOPHAGOGASTRODUODENOSCOPY;  Surgeon: William Rivas MD;  Location: [facility] Endoscopy;  Service: Endoscopy;  Laterality: N/A;   • HAND SURGERY Left     thumb   • HERNIA REPAIR  01/01/1965   • KNEE ARTHROSCOPY  09/19/2017    left knee, with partial medial meniscectomy; limited chondroplasty, patellofemoral joint   • LIVER BIOPSY      by Dr. Alejandre   • OTHER SURGICAL HISTORY      esophageal varices, banded   • VASECTOMY      at approx age of 25     Family History   Problem Relation Age of Onset   • Arthritis Mother    • Rheum arthritis Mother    • Diabetes Mother    • Rectal cancer Mother    • Other Paternal Grandmother    • Other Paternal Grandfather    • Diabetes Other    • Rheum arthritis Other    • Osteoporosis Other      Social History     Occupational History   • Not on file   Tobacco Use   • Smoking status: Never Smoker   • Smokeless tobacco: Never Used   Substance and Sexual Activity   • Alcohol use: No   • Drug use: No   • Sexual activity: Defer   Social History Narrative   • Not on file       Review of Systems   Constitutional: Positive for activity change and appetite change.   HENT: Negative.    Respiratory: Negative.    Cardiovascular: Negative.    Gastrointestinal: Positive for abdominal pain. Negative for constipation, nausea and vomiting.   Endocrine: Negative.    Genitourinary: Negative.    Musculoskeletal: Negative.    Skin: Negative.    Allergic/Immunologic: Negative.    Neurological: Positive for headaches.   Hematological: Negative.          Assessment/Plan   Diagnoses and all orders for this visit:    Migraine without aura and without status migrainosus, not intractable    Pain of metastatic malignancy    Hepatocellular carcinoma (CMS/HCC) (HCC)    The pt is doing overall well. He doesn't need any refills. He is happy with his pain control on prn dilaudid, and he feels that he is sleeping better. Will not make any changes at this time. Will see him in 4 weeks for follow up or  sooner, if needed.

## 2020-08-28 ENCOUNTER — TRANSFERRED RECORDS (OUTPATIENT)
Dept: HEALTH INFORMATION MANAGEMENT | Facility: CLINIC | Age: 70
End: 2020-08-28

## 2020-09-01 ENCOUNTER — ANESTHESIA EVENT (OUTPATIENT)
Dept: SURGERY | Facility: CLINIC | Age: 70
End: 2020-09-01
Payer: COMMERCIAL

## 2020-09-01 ENCOUNTER — PRE VISIT (OUTPATIENT)
Dept: SURGERY | Facility: CLINIC | Age: 70
End: 2020-09-01

## 2020-09-01 ENCOUNTER — OFFICE VISIT (OUTPATIENT)
Dept: SURGERY | Facility: CLINIC | Age: 70
End: 2020-09-01
Payer: COMMERCIAL

## 2020-09-01 ENCOUNTER — TELEPHONE (OUTPATIENT)
Dept: SURGERY | Facility: CLINIC | Age: 70
End: 2020-09-01

## 2020-09-01 VITALS
HEIGHT: 65 IN | TEMPERATURE: 97.9 F | RESPIRATION RATE: 16 BRPM | SYSTOLIC BLOOD PRESSURE: 106 MMHG | DIASTOLIC BLOOD PRESSURE: 68 MMHG | BODY MASS INDEX: 35.32 KG/M2 | OXYGEN SATURATION: 98 % | HEART RATE: 63 BPM | WEIGHT: 212 LBS

## 2020-09-01 DIAGNOSIS — Z01.818 PREOP EXAMINATION: Primary | ICD-10-CM

## 2020-09-01 DIAGNOSIS — Z11.59 ENCOUNTER FOR SCREENING FOR OTHER VIRAL DISEASES: ICD-10-CM

## 2020-09-01 LAB
LABORATORY COMMENT REPORT: NORMAL
SARS-COV-2 RNA SPEC QL NAA+PROBE: NEGATIVE
SARS-COV-2 RNA SPEC QL NAA+PROBE: NORMAL
SPECIMEN SOURCE: NORMAL
SPECIMEN SOURCE: NORMAL

## 2020-09-01 PROCEDURE — U0003 INFECTIOUS AGENT DETECTION BY NUCLEIC ACID (DNA OR RNA); SEVERE ACUTE RESPIRATORY SYNDROME CORONAVIRUS 2 (SARS-COV-2) (CORONAVIRUS DISEASE [COVID-19]), AMPLIFIED PROBE TECHNIQUE, MAKING USE OF HIGH THROUGHPUT TECHNOLOGIES AS DESCRIBED BY CMS-2020-01-R: HCPCS | Performed by: INTERNAL MEDICINE

## 2020-09-01 RX ORDER — CARVEDILOL 3.12 MG/1
1 TABLET ORAL 2 TIMES DAILY
COMMUNITY
Start: 2020-04-08

## 2020-09-01 RX ORDER — FLUTICASONE PROPIONATE 50 MCG
1 SPRAY, SUSPENSION (ML) NASAL EVERY MORNING
COMMUNITY
Start: 2020-03-31

## 2020-09-01 RX ORDER — LOPERAMIDE HCL 2 MG
1 CAPSULE ORAL PRN
COMMUNITY
Start: 2019-09-26

## 2020-09-01 RX ORDER — ONDANSETRON 4 MG/1
1 TABLET, FILM COATED ORAL PRN
Status: ON HOLD | COMMUNITY
Start: 2019-12-19 | End: 2020-10-27

## 2020-09-01 RX ORDER — TRAZODONE HYDROCHLORIDE 100 MG/1
1 TABLET ORAL AT BEDTIME
COMMUNITY
Start: 2020-07-09

## 2020-09-01 RX ORDER — SUMATRIPTAN 100 MG/1
1 TABLET, FILM COATED ORAL PRN
COMMUNITY
Start: 2020-01-17

## 2020-09-01 ASSESSMENT — LIFESTYLE VARIABLES: TOBACCO_USE: 0

## 2020-09-01 ASSESSMENT — PAIN SCALES - GENERAL: PAINLEVEL: NO PAIN (0)

## 2020-09-01 ASSESSMENT — ENCOUNTER SYMPTOMS: SEIZURES: 0

## 2020-09-01 ASSESSMENT — MIFFLIN-ST. JEOR: SCORE: 1653.51

## 2020-09-01 NOTE — TELEPHONE ENCOUNTER
[facility] Call Center    Phone Message    May a detailed message be left on voicemail: yes     Reason for Call: Other: Pt requesting call back to discuss a message he received via [place] regarding his COVID test being done too soon, pt needing to discuss     Action Taken: Message routed to:  [facility] ([facility]): PAC

## 2020-09-01 NOTE — TELEPHONE ENCOUNTER
I spoke with this pt; he will have the COVID test done later today.  The pt stated understanding and was ok with this plan.    ARIADNA Hunter LPN

## 2020-09-01 NOTE — ANESTHESIA PREPROCEDURE EVALUATION
"Anesthesia Pre-Procedure Evaluation    Patient: John Mancia   MRN:     [id number] Gender:   male   Age:    70 year old :      1950        Preoperative Diagnosis: Biliary colic [K80.50]   Procedure(s):  ENDOSCOPIC RETROGRADE CHOLANGIOPANCREATOGRAPHY with transpapillary gallbladder stenting **Latex Allergy**     LABS:  CBC:   Lab Results   Component Value Date    WBC 3.3 (L) 2020    WBC 4.2 2019    HGB 12.4 (L) 2020    HGB 12.3 (L) 2019    HCT 39.0 (L) 2020    HCT 38.5 (L) 2019     (L) 2020    PLT 89 (L) 2019     BMP:   Lab Results   Component Value Date     2020     2019    POTASSIUM 4.0 2020    POTASSIUM 3.8 2019    CHLORIDE 107 2020    CHLORIDE 111 (H) 2019    CO2 30 2020    CO2 27 2019    BUN 17 2020    BUN 11 2019    CR 0.94 2020    CR 0.73 2019     (H) 2020     (H) 2019     COAGS:   Lab Results   Component Value Date    INR 1.12 2020     POC:   Lab Results   Component Value Date     (H) 2019     OTHER:   Lab Results   Component Value Date    A1C 7.5 (H) 2020    LISHA 8.6 2020    ALBUMIN 3.7 2020    PROTTOTAL 6.9 2020    ALT 34 2020    AST 28 2020    ALKPHOS 143 2020    BILITOTAL 0.4 2020        Preop Vitals    BP Readings from Last 3 Encounters:   20 106/68   20 117/70   20 117/70    Pulse Readings from Last 3 Encounters:   20 63   20 68   20 68      Resp Readings from Last 3 Encounters:   20 16   20 19   20 16    SpO2 Readings from Last 3 Encounters:   20 98%   20 96%   20 96%      Temp Readings from Last 1 Encounters:   20 97.9  F (36.6  C) (Oral)    Ht Readings from Last 1 Encounters:   20 1.651 m (5' 5\")      Wt Readings from Last 1 Encounters:   20 96.2 kg (212 lb)    Estimated body " "mass index is 35.28 kg/m  as calculated from the following:    Height as of this encounter: 1.651 m (5' 5\").    Weight as of this encounter: 96.2 kg (212 lb).     LDA:        Past Medical History:   Diagnosis Date     Allergic rhinitis      Cancer (H) 2019    History of Colon CA, HCC with mets to gallbladder     Congestive heart failure (H)      Depressive disorder      Diabetes (H)     TYPE II     Hypertension      Multiple food allergies     see epic     Sleep apnea     uses CPAP      Past Surgical History:   Procedure Laterality Date     HERNIA REPAIR       IR CHEMO EMBOLIZATION  12/11/2019     IR SIRT (SELECTIVE INTERNAL RADIO THERAPY)  11/6/2019     IR VISCERAL ANGIOGRAM  11/6/2019     IR VISCERAL EMBOLIZATION  11/7/2019     VASCULAR SURGERY      liver ablation in [place]     VASECTOMY        Allergies   Allergen Reactions     Penicillins Anaphylaxis and Shortness Of Breath     Metformin Diarrhea, GI Disturbance, Hives and Rash     Adhesive Tape Hives and Rash     Allyl Isothiocyanate GI Disturbance     [place]s Oil GI Disturbance     Banana GI Disturbance     Brassica Oleracea Italica GI Disturbance     Cabbage GI Disturbance     Celery Oil GI Disturbance     Cheese GI Disturbance     Chicken-Derived Products (Egg) Itching, GI Disturbance and Rash     Corticosteroids Rash and GI Disturbance     Cranberry Extract GI Disturbance     Food GI Disturbance     Almonds; Beans     Hmg-Coa-R Inhibitors Itching and GI Disturbance            Interferon Samm Palpitations and Rash     Lac Bovis GI Disturbance     Lactuca Virosa GI Disturbance     Latex Hives and Rash     Melon GI Disturbance     Mometasone Itching, GI Disturbance and Rash     Oatmeal GI Disturbance     Omeprazole GI Disturbance, Itching, Cramps and Rash     Ondansetron Nausea and Vomiting     Onion GI Disturbance     Peginterferon Samm-2b Palpitations     Pineapple GI Disturbance     Piper      [place] pepper     Primidone      Proton Pump Inhibitors Itching " and Rash     Ribavirin GI Disturbance, Itching and Palpitations     Rosuvastatin Nausea and Vomiting     Sorafenib Muscle Pain (Myalgia)            Spironolactone Itching and GI Disturbance     Wheat Bran GI Disturbance     Yeast GI Disturbance        Anesthesia Evaluation     . Pt has had prior anesthetic.     No history of anesthetic complications          ROS/MED HX    ENT/Pulmonary:     (+)sleep apnea, allergic rhinitis, uses CPAP , . .   (-) tobacco use and asthma   Neurologic:  - neg neurologic ROS    (-) seizures, CVA and Neuropathy   Cardiovascular:     (+) hypertension----. : . . . :. . Previous cardiac testing date:results:Stress Testdate:8/5/20 results:EF 60%, no ischemiaECG reviewed date:12/11/19 results: date: results:          METS/Exercise Tolerance: Comment: Uses walker for balance due to left knee pain/instability. Able to walk 2 blocks w/o stopping. 3 - Able to walk 1-2 blocks without stopping   Hematologic:        (-) history of blood clots and History of Transfusion   Musculoskeletal: Comment: Diffuse arthritis, boo in knees    Hx right shoulder impingement  (+) arthritis,  -       GI/Hepatic: Comment: History of colon cancer, hepatocellular carcinoma w/ metastasis to gallbladder     (+) GERD Asymptomatic on medication, hepatitis type C, liver disease,       Renal/Genitourinary:  - ROS Renal section negative       Endo:     (+) type II DM Using insulin - not using insulin pump Normal glucose range: 100-150 Obesity, .      Psychiatric:     (+) psychiatric history depression      Infectious Disease:  - neg infectious disease ROS       Malignancy:   (+) Malignancy History of GI  GI CA Active status post,         Other: Comment: Taking Dilaudid, 1 tab/day prn, not daily   (+) H/O Chronic Pain,                       PHYSICAL EXAM:   Mental Status/Neuro: A/A/O; Age Appropriate   Airway: Facies: Feasible  Mallampati: II  Mouth/Opening: Full  TM distance: > 6 cm  Neck ROM: Limited   Respiratory:  Auscultation: CTAB     Resp. Rate: Normal     Resp. Effort: Normal      CV: Rhythm: Regular  Rate: Age appropriate  Heart: Normal Sounds  Edema: None   Comments:      Dental: Normal Dentition                Assessment:   ASA SCORE: 3    H&P: History and physical reviewed and following examination; no interval change.   Smoking Status:  Non-Smoker/Unknown   NPO Status: NPO Appropriate     Plan:   Anes. Type:  General   Pre-Medication: None   Induction:  IV (Standard)   Airway: ETT; Oral   Access/Monitoring: PIV   Maintenance: Balanced     Postop Plan:   Postop Pain: None  Postop Sedation/Airway: Not planned  Disposition: Outpatient     PONV Management:  NO PONV Prophylaxis Required     CONSENT: Direct conversation   Plan and risks discussed with: Patient                   PAC Discussion and Assessment    ASA Classification: 3  Case is suitable for: [place]  Anesthetic techniques and relevant risks discussed: GA  Invasive monitoring and risk discussed: No  Types:   Possibility and Risk of blood transfusion discussed: No  NPO instructions given:   Additional anesthetic preparation and risks discussed:   Needs early admission to pre-op area:   Other:     PAC Resident/NP Anesthesia Assessment:  John Mancia is a 70 year old male scheduled to undergo ENDOSCOPIC RETROGRADE CHOLANGIOPANCREATOGRAPHY with transpapillary gallbladder stenting with aGry Denise MD on 9/2/20 at [facility] for treatment of Biliary colic.      Pt has had prior anesthetic.     No history of anesthetic complications    He has the following specific operative considerations:   # VTE risk: 3%  # Risk of PONV score = 2.  If > 2, anti-emetic intervention recommended.  # Anesthesia considerations:  Refer to PAC assessment in anesthesia records        CARDIAC: METS 3, Uses walker for balance due to left knee pain/instability. Able to walk 2 blocks w/o stopping.      # RCRI : Moderate risk surgery.  Diabetes Mellitus (on Insulin).  0.9% risk of major adverse cardiac event.     #  NM Stress test 8/5/20:  No ischemia, EF 60%     #  EKG 12/11/19: sinus saray, 56 bpm     #  BNP on 7/31/20:  39       #  HTN, will hold lasix & lisinopril on DOS, take Coreg    PULMONARY:     # JAIR w/ CPAP    # Never smoked    # No asthma or inhaler use     GI:  GERD, asymptomatic on prevacid     # Biliary colic, enhancing mass in the gallbladder on imaging    # cirrhosis secondary to hepatitis C complicated by unresectable HCC    ENDO: BMI 35    # Insulin dependent DM2, A1c on 8/4/20 was 7.5. On lantus & Victoza.      ORTHO: mildly limited extension ROM of neck, no TMJ    # diffuse arthritis, boo knees. Fall risk. Uses walker for balance.     Patient is optimized and is acceptable candidate for the proposed procedure. No further diagnostic evaluation is needed.        Reviewed and Signed by PAC Mid-Level Provider/Resident  Mid-Level Provider/Resident: Izabela Pittman PA-C  Date: 9/1/20  Time: 1555    Attending Anesthesiologist Anesthesia Assessment:        Anesthesiologist:   Date:   Time:   Pass/Fail:   Disposition:     PAC Pharmacist Assessment:        Pharmacist:   Date:   Time:    Izabela Pittman PA-C

## 2020-09-01 NOTE — H&P
"  Pre-Operative H & P     CC:  Preoperative exam to assess for increased cardiopulmonary risk while undergoing surgery and anesthesia.    Date of Encounter: 9/1/2020  Primary Care Physician:  System, Provider Not In  Reason for Visit: Biliary colic    HPI  John Mancia is a 69 y/o male who presents for pre-operative H&P in preparation for ENDOSCOPIC RETROGRADE CHOLANGIOPANCREATOGRAPHY with transpapillary gallbladder stenting with Gary Denise MD on 9/2/20 at [facility] for treatment of Biliary colic.    Mr. Mancia has a history of cirrhosis secondary to hepatitis C complicated by unresectable HCC. He c/o of \"Gallbladder\" pain, located in the mid axillary portion of the right upper quadrant with no radiation. He has had 3 episodes of post-prandial, classic, right upper quadrant abdominal pain that have been relatively severe, but only lasting an hour or so. Imaging shows persistence of an enhancing mass in the gallbladder. He now presents for the above procedure to assist in the drainage of his gallbladder.      PMH is also significant for a history of esophageal varices status post endoscopic ligation, insulin dependent DM2, JAIR w/ CPAP, allergic rhinitis, HTN, HLD, GERD, depression, BPH, glaucoma, OA in knees, hx right shoulder impingement.      History was obtained from patient & chart review. He is accompanied by his wife.    Past Medical History  Past Medical History:   Diagnosis Date     Allergic rhinitis      Cancer (H) 2019    History of Colon CA, HCC with mets to gallbladder     Congestive heart failure (H)      Depressive disorder      Diabetes (H)     TYPE II     Hypertension      Multiple food allergies     see epic     Sleep apnea     uses CPAP       Past Surgical History  Past Surgical History:   Procedure Laterality Date     HERNIA REPAIR       IR CHEMO EMBOLIZATION  12/11/2019     IR SIRT (SELECTIVE INTERNAL RADIO THERAPY)  11/6/2019     IR VISCERAL " ANGIOGRAM  11/6/2019     IR VISCERAL EMBOLIZATION  11/7/2019     VASCULAR SURGERY      liver ablation in [place]     VASECTOMY         Hx of Blood transfusions/reactions: no     Hx of abnormal bleeding or anti-platelet use: no    Menstrual history: No LMP for male patient.:      Steroid use in the last year: no    Personal or FH with difficulty with Anesthesia:  no    Prior to Admission Medications  Current Outpatient Medications   Medication Sig Dispense Refill     Artificial Saliva (MOUTH KOTE) SOLN Take 3-5 sprays by mouth See Admin Instructions 3-5 times daily as needed       artificial tears OINT ophthalmic ointment Place into both eyes 2 times daily as needed for dry eyes Also called SYSTANE       brimonidine (ALPHAGAN) 0.2 % ophthalmic solution Place 1 drop into both eyes 3 times daily       buPROPion (WELLBUTRIN SR) 100 MG 12 hr tablet Take 100 mg by mouth every morning qam and at noon        carvedilol (COREG) 3.125 MG tablet Take 1 tablet by mouth 2 times daily       cetirizine (ZYRTEC) 10 MG tablet Take 10 mg by mouth every morning        fluticasone (FLONASE) 50 MCG/ACT nasal spray Spray 1 spray in nostril every morning       furosemide (LASIX) 20 MG tablet Take 20 mg by mouth 2 times daily        HYDROmorphone (DILAUDID) 2 MG tablet Take 1 tablet (2 mg) by mouth every 6 hours as needed for severe pain 10 tablet 0     insulin glargine (LANTUS PEN) 100 UNIT/ML pen Inject 16 Units Subcutaneous every morning       lactulose (CHRONULAC) 10 GM/15ML solution Take 10 g by mouth as needed        Lancet Devices (LANCET DEVICE WITH EJECTOR) MISC USE LANCET TOPICALLY FOUR TIMES A DAY AS NEEDED TO CHECK GLUCOSE       LANsoprazole (PREVACID) 30 MG DR capsule Take 15 mg by mouth 2 times daily   11     latanoprost (XALATAN) 0.005 % ophthalmic solution Place 1 drop into both eyes At Bedtime        lisinopril (PRINIVIL/ZESTRIL) 20 MG tablet Take 20 mg by mouth every morning        loperamide (IMODIUM) 2 MG capsule Take  1 capsule by mouth daily       psyllium (METAMUCIL/KONSYL) Packet Take 1 packet by mouth daily       terazosin (HYTRIN) 5 MG capsule Take 5 mg by mouth 2 times daily        traZODone (DESYREL) 100 MG tablet Take 1 tablet by mouth At Bedtime       VICTOZA PEN 18 MG/3ML soln Inject 1.8 mg Subcutaneous every morning        ondansetron (ZOFRAN) 4 MG tablet Take 1 tablet by mouth as needed       SUMAtriptan (IMITREX) 100 MG tablet Take 1 tablet by mouth as needed         Allergies  Allergies   Allergen Reactions     Penicillins Anaphylaxis and Shortness Of Breath     Metformin Diarrhea, GI Disturbance, Hives and Rash     Adhesive Tape Hives and Rash     Allyl Isothiocyanate GI Disturbance     [place] Oil GI Disturbance     Banana GI Disturbance     Brassica Oleracea Italica GI Disturbance     Cabbage GI Disturbance     Celery Oil GI Disturbance     Cheese GI Disturbance     Chicken-Derived Products (Egg) Itching, GI Disturbance and Rash     Corticosteroids Rash and GI Disturbance     Cranberry Extract GI Disturbance     Food GI Disturbance     Almonds; Beans     Hmg-Coa-R Inhibitors Itching and GI Disturbance            Interferon Samm Palpitations and Rash     Lac Bovis GI Disturbance     Lactuca Virosa GI Disturbance     Latex Hives and Rash     Melon GI Disturbance     Mometasone Itching, GI Disturbance and Rash     Oatmeal GI Disturbance     Omeprazole GI Disturbance, Itching, Cramps and Rash     Ondansetron Nausea and Vomiting     Onion GI Disturbance     Peginterferon Samm-2b Palpitations     Pineapple GI Disturbance     Piper      [place] pepper     Primidone      Proton Pump Inhibitors Itching and Rash     Ribavirin GI Disturbance, Itching and Palpitations     Rosuvastatin Nausea and Vomiting     Sorafenib Muscle Pain (Myalgia)            Spironolactone Itching and GI Disturbance     Wheat Bran GI Disturbance     Yeast GI Disturbance       Social History  Social History     Socioeconomic History     Marital status:  Single     Spouse name: Not on file     Number of children: Not on file     Years of education: Not on file     Highest education level: Not on file   Occupational History     Not on file   Social Needs     Financial resource strain: Not on file     Food insecurity     Worry: Not on file     Inability: Not on file     Transportation needs     Medical: Not on file     Non-medical: Not on file   Tobacco Use     Smoking status: Passive Smoke Exposure - Never Smoker     Smokeless tobacco: Never Used   Substance and Sexual Activity     Alcohol use: Not Currently     Drug use: Yes     Types: Hydromorphone     Comment: Dilaudid po as needed     Sexual activity: Not Currently   Lifestyle     Physical activity     Days per week: Not on file     Minutes per session: Not on file     Stress: Not on file   Relationships     Social connections     Talks on phone: Not on file     Gets together: Not on file     Attends Anabaptist service: Not on file     Active member of club or organization: Not on file     Attends meetings of clubs or organizations: Not on file     Relationship status: Not on file     Intimate partner violence     Fear of current or ex partner: Not on file     Emotionally abused: Not on file     Physically abused: Not on file     Forced sexual activity: Not on file   Other Topics Concern     Parent/sibling w/ CABG, MI or angioplasty before 65F 55M? Not Asked   Social History Narrative     Not on file       Family History  Family History   Problem Relation Age of Onset     Anesthesia Reaction No family hx of      Cardiovascular No family hx of      Deep Vein Thrombosis (DVT) No family hx of          Preop Vitals    BP Readings from Last 3 Encounters:   09/01/20 106/68   07/31/20 117/70   07/31/20 117/70    Pulse Readings from Last 3 Encounters:   09/01/20 63   07/31/20 68   07/31/20 68      Resp Readings from Last 3 Encounters:   09/01/20 16   07/31/20 19   07/31/20 16    SpO2 Readings from Last 3 Encounters:  "  09/01/20 98%   07/31/20 96%   07/31/20 96%      Temp Readings from Last 1 Encounters:   09/01/20 97.9  F (36.6  C) (Oral)    Ht Readings from Last 1 Encounters:   09/01/20 1.651 m (5' 5\")      Wt Readings from Last 1 Encounters:   09/01/20 96.2 kg (212 lb)    Estimated body mass index is 35.28 kg/m  as calculated from the following:    Height as of this encounter: 1.651 m (5' 5\").    Weight as of this encounter: 96.2 kg (212 lb).         ROS/MED HX  The complete review of systems is negative other than noted in the HPI or here.  Patient denies recent illness, fever and respiratory infection during past month.  Pt denies steroid use during past year.    ENT/Pulmonary:     (+)sleep apnea, allergic rhinitis, uses CPAP , . .   (-) tobacco use and asthma   Neurologic:  - neg neurologic ROS    (-) seizures, CVA and Neuropathy   Cardiovascular:     (+) hypertension----. : . . . :. . Previous cardiac testing date:results:Stress Testdate:8/5/20 results:EF 60%, no ischemiaECG reviewed date:12/11/19 results: date: results:          METS/Exercise Tolerance: Comment: Uses walker for balance due to left knee pain/instability. Able to walk 2 blocks w/o stopping. 3 - Able to walk 1-2 blocks without stopping   Hematologic:        (-) history of blood clots and History of Transfusion   Musculoskeletal: Comment: Diffuse arthritis, boo in knees    Hx right shoulder impingement  (+) arthritis,  -       GI/Hepatic: Comment: History of colon cancer, hepatocellular carcinoma w/ metastasis to gallbladder     (+) GERD Asymptomatic on medication, hepatitis type C, liver disease,       Renal/Genitourinary:  - ROS Renal section negative       Endo:     (+) type II DM Using insulin - not using insulin pump Obesity, .      Psychiatric:     (+) psychiatric history depression      Infectious Disease:  - neg infectious disease ROS       Malignancy:   (+) Malignancy History of GI  GI CA Active status post,         Other: Comment: Taking Dilaudid, 1 " "tab/day prn, not daily   (+) H/O Chronic Pain,               PHYSICAL EXAM:   Mental Status/Neuro: A/A/O; Age Appropriate   Airway: Facies: Feasible  Mallampati: II  Mouth/Opening: Full  TM distance: > 6 cm  Neck ROM: Limited   Respiratory: Auscultation: CTAB     Resp. Rate: Normal     Resp. Effort: Normal      CV: Rhythm: Regular  Rate: Age appropriate  Heart: Normal Sounds  Edema: None   Comments:      Dental: Normal Dentition            Temp: 97.9  F (36.6  C) Temp src: Oral BP: 106/68 Pulse: 63   Resp: 16 SpO2: 98 %         212 lbs 0 oz  5' 5\"   Body mass index is 35.28 kg/m .    Physical Exam  Constitutional: Awake, alert, cooperative, no apparent distress, and appears stated age.  Eyes: Pupils equal, round and reactive to light, extra ocular muscles intact, sclera clear, conjunctiva normal.  HENT: Normocephalic, oral pharynx with moist mucus membranes, good dentition. No goiter appreciated. No removable dental hardware.  Respiratory: Clear to auscultation bilaterally, no crackles or wheezing. No SOB when supine.  Cardiovascular: Regular rate and rhythm, normal S1 and S2, and no murmur noted.  Carotids +2, no bruits. No edema. Mild non-pitting edema. Palpable pulses to radial & DP arteries.  GI: Normal bowel sounds, soft, obese, non-tender, no masses palpated.    Lymph/Hematologic: No cervical lymphadenopathy and no supraclavicular lymphadenopathy.  Genitourinary:  deferred  Skin: Warm and dry.  No rashes.   Musculoskeletal: Mildly limited extension ROM of neck. There is no redness, warmth, or swelling of the joints. Gross motor strength is normal.    Neurologic: Awake, alert, oriented to name, place and time. Cranial nerves II-XII are grossly intact. Gait is normal. Ambulates from chair to exam table, seats self, lies supine and sits back up w/o assistance.  Neuropsychiatric: Calm, cooperative. Normal affect. Pleasant. Answers questions appropriately, follows commands w/o difficulty.    PRIOR LABS/DIAGNOSTIC " STUDIES:  All labs and imaging personally reviewed    US ABDOMEN 8/5/20  IMPRESSION:   Fibrofatty change of the liver. Recanalized periumbilical vein. Previous MRI shows treated appearance of a hepatoma, and these regions are not well evaluated on today's ultrasound.    Distended gallbladder with gallstones. Negative sonographic Edmonds sign per the technologist. No gallbladder wall thickening.    NM STRESS TEST 8/5/20  Mild nonspecific symptoms with Lexiscan infusion.    Normal blood pressure response.    Lexiscan infusion EKG is interpreted with reduced accuracy, no ischemic   changes are noted with this caveat.    Normal left ventricular size with borderline apical hypokinesis, EF 60%.    Essentially normal myocardial perfusion study other than a minor apical   lateral fixed defect that is likely artifact.    CT ABDOMEN W/O CONTRAST, 12/12/2019   IMPRESSION:    1. Cirrhosis with posttreatment changes of recent transarterial   chemoembolization in segment 4 with dense lipiodol staining of the   entirety of the exophytic segment 4 lesion.   2. Poorly visualized mass within the gallbladder, grossly stable.   Cholelithiasis and biliary sludge.   3. Findings of portal hypertension including portosystemic collateral   vessels and splenomegaly.    4. Nonobstructive right nephrolithiasis.   5. Stable lingular nodule, unchanged since 11/21/2018.      MRI of the abdomen with and without contrast , liver protocol, from 06/25/2020  Findings:  Cirrhotic appearance of liver. Reticular T2 hyperintensity compatible with hepatic fibrosis.    Capsular contraction and wedge-shaped progressive enhancement compatible with fibrosis at the right hepatic lobe. In addition, there is a right hepatic lobe anterior nonenhancing structure which is unchanged since 4/21/2019 (series 13, image 44), which measures up to 2.2 cm. This is a treated lesion. Additionally, there is a treated lesion at the posterior lateral right hepatic lobe without  concerning enhancement features at this time.    Cholelithiasis and gallbladder sludge.  Mild splenomegaly.  No hydronephrosis.  No pancreatic mass.  No adrenal masses.      LABS 8/5/20  Sodium  135 - 145 mmol/L  140     Potassium  3.5 - 5.1 mmol/L  3.8     Chloride  98 - 107 mmol/L  109High       CO2  21 - 32 mmol/L  24     Anion Gap  3 - 11 mmol/L  7     BUN  7 - 25 mg/dL  21     Creatinine  0.70 - 1.30 mg/dL  0.77     Glucose  70 - 105 mg/dL  134High       Calcium  8.6 - 10.3 mg/dL  8.5Low       AST  <40 U/L  79High       ALT (SGPT)  7 - 52 U/L  56High       Alkaline Phosphatase  45 - 115 U/L  132High       Total Protein  6.0 - 8.3 g/dL  6.1     Albumin  3.5 - 5.3 g/dL  3.6     Total Bilirubin  0.20 - 1.40 mg/dL  0.72     eGFR  >60 mL/min/1.73m*2  93     Corrected Calcium  8.6 - 10.3 mg/dL  8.8     Resulting [facility]      Hemoglobin A1C  4.0 - 6.0 %  7.5High       Estimated Average Glucose   mg/dL  168.6     Resulting [facility]        WBC  3.7 - 9.6 10*3/uL  4.1     RBC  4.10 - 5.80 10*6/ L  3.91Low       Hemoglobin  13.2 - 17.2 g/dL  12.2Low       Hematocrit  38.0 - 50.0 %  35.7Low       MCV  82.0 - 97.0 fL  91.1     MCH  29.0 - 34.0 pg  31.1     MCHC  32.0 - 36.0 g/dL  34.1     RDW  11.5 - 15.0 %  13.5     Platelets  130 - 350 10*3/uL  106Low       MPV  6.9 - 10.8 fL  8.6     Neutrophils%  46 - 70 %  81High       Lymphocytes%  15 - 47 %  9Low       Monocytes%  5 - 13 %  9     Eosinophils%  0 - 3 %  1     Basophils%  0 - 2 %  0     Neutrophils Absolute   10*3/uL  3.40     Lymphocytes Absolute   10*3/uL  0.40     Monocytes Absolute   10*3/uL  0.40     Eosinophils Absolute   10*3/uL  0.00     Basophils Absolute   10*3/uL  0.00     [facility]       Labs signed & held for DOS: CMP, CBC, INR, lipase, amylase  COVID19 testing completed 8/28/20 in [facility], [place]:   Negative  COVID19 testing scheduled for today (9/1)    Outside records reviewed from: Care Everywhere    ASSESSMENT and PLAN  John Mancia is a 70 year old male scheduled to undergo ENDOSCOPIC RETROGRADE CHOLANGIOPANCREATOGRAPHY with transpapillary gallbladder stenting with Gary Denise MD on 9/2/20 at [facility] for treatment of Biliary colic.      Pt has had prior anesthetic.     No history of anesthetic complications    He has the following specific operative considerations:   # VTE risk: 3%  # Risk of PONV score = 2.  If > 2, anti-emetic intervention recommended.  # Anesthesia considerations:  Refer to PAC assessment in anesthesia records        CARDIAC: METS 3, Uses walker for balance due to left knee pain/instability. Able to walk 2 blocks w/o stopping.      # RCRI : Moderate risk surgery. Diabetes Mellitus (on Insulin).  0.9% risk of major adverse cardiac event.     #  NM Stress test 8/5/20:  No ischemia, EF 60%     #  EKG 12/11/19: sinus saray, 56 bpm     #  BNP on 7/31/20:  39       #  HTN, will hold lasix & lisinopril on DOS, take Coreg    PULMONARY:     # JAIR w/ CPAP    # Never smoked    # No asthma or inhaler use     GI:  GERD, asymptomatic on prevacid     # Biliary colic, enhancing mass in the gallbladder on imaging    # cirrhosis secondary to hepatitis C complicated by unresectable HCC    ENDO: BMI 35    # Insulin dependent DM2, A1c on 8/4/20 was 7.5. On lantus & Victoza.      ORTHO: mildly limited extension ROM of neck, no TMJ    # diffuse arthritis, boo knees. Fall risk. Uses walker for balance.     Patient is optimized and is acceptable candidate for the proposed procedure. No further diagnostic evaluation is needed.      Arrival time, NPO, shower and medication instructions provided by nursing staff today.  Preparing For Your Surgery handout given.    Izabela Pittman PA-C  Preoperative Assessment Center  [facility]  Clinic and  Surgery Center  Phone: [phone]  Fax: [phone]

## 2020-09-02 ENCOUNTER — HOSPITAL ENCOUNTER (OUTPATIENT)
Facility: CLINIC | Age: 70
Discharge: HOME | End: 2020-09-02
Attending: INTERNAL MEDICINE | Admitting: INTERNAL MEDICINE
Payer: COMMERCIAL

## 2020-09-02 ENCOUNTER — APPOINTMENT (OUTPATIENT)
Dept: GENERAL RADIOLOGY | Facility: CLINIC | Age: 70
End: 2020-09-02
Attending: INTERNAL MEDICINE
Payer: COMMERCIAL

## 2020-09-02 ENCOUNTER — ANESTHESIA (OUTPATIENT)
Dept: SURGERY | Facility: CLINIC | Age: 70
End: 2020-09-02
Payer: COMMERCIAL

## 2020-09-02 VITALS
BODY MASS INDEX: 36.17 KG/M2 | SYSTOLIC BLOOD PRESSURE: 155 MMHG | HEART RATE: 52 BPM | WEIGHT: 211.86 LBS | OXYGEN SATURATION: 99 % | DIASTOLIC BLOOD PRESSURE: 71 MMHG | HEIGHT: 64 IN | RESPIRATION RATE: 16 BRPM | TEMPERATURE: 97.7 F

## 2020-09-02 DIAGNOSIS — K80.50 BILIARY COLIC: ICD-10-CM

## 2020-09-02 LAB
ALBUMIN SERPL-MCNC: 3.4 G/DL (ref 3.4–5)
ALP SERPL-CCNC: 149 U/L (ref 40–150)
ALT SERPL W P-5'-P-CCNC: 36 U/L (ref 0–70)
AMYLASE SERPL-CCNC: 53 U/L (ref 30–110)
AMYLASE SERPL-CCNC: 56 U/L (ref 30–110)
ANION GAP SERPL CALCULATED.3IONS-SCNC: 3 MMOL/L (ref 3–14)
AST SERPL W P-5'-P-CCNC: 32 U/L (ref 0–45)
BILIRUB SERPL-MCNC: 0.6 MG/DL (ref 0.2–1.3)
BUN SERPL-MCNC: 18 MG/DL (ref 7–30)
CALCIUM SERPL-MCNC: 8.8 MG/DL (ref 8.5–10.1)
CHLORIDE SERPL-SCNC: 108 MMOL/L (ref 94–109)
CO2 SERPL-SCNC: 28 MMOL/L (ref 20–32)
CREAT SERPL-MCNC: 0.8 MG/DL (ref 0.66–1.25)
ERCP: NORMAL
ERYTHROCYTE [DISTWIDTH] IN BLOOD BY AUTOMATED COUNT: 13.1 % (ref 10–15)
GFR SERPL CREATININE-BSD FRML MDRD: >90 ML/MIN/{1.73_M2}
GLUCOSE BLDC GLUCOMTR-MCNC: 135 MG/DL (ref 70–99)
GLUCOSE BLDC GLUCOMTR-MCNC: 149 MG/DL (ref 70–99)
GLUCOSE SERPL-MCNC: 132 MG/DL (ref 70–99)
HCT VFR BLD AUTO: 38.7 % (ref 40–53)
HGB BLD-MCNC: 12.3 G/DL (ref 13.3–17.7)
INR PPP: 1.12 (ref 0.86–1.14)
INTERPRETATION ECG - MUSE: NORMAL
LIPASE SERPL-CCNC: 139 U/L (ref 73–393)
LIPASE SERPL-CCNC: 220 U/L (ref 73–393)
MCH RBC QN AUTO: 30 PG (ref 26.5–33)
MCHC RBC AUTO-ENTMCNC: 31.8 G/DL (ref 31.5–36.5)
MCV RBC AUTO: 94 FL (ref 78–100)
PLATELET # BLD AUTO: 111 10E9/L (ref 150–450)
POTASSIUM SERPL-SCNC: 4.2 MMOL/L (ref 3.4–5.3)
PROT SERPL-MCNC: 6.6 G/DL (ref 6.8–8.8)
RBC # BLD AUTO: 4.1 10E12/L (ref 4.4–5.9)
SODIUM SERPL-SCNC: 139 MMOL/L (ref 133–144)
WBC # BLD AUTO: 3.4 10E9/L (ref 4–11)

## 2020-09-02 PROCEDURE — 40000279 XR SURGERY CARM FLUORO GREATER THAN 5 MIN W STILLS: Mod: TC

## 2020-09-02 PROCEDURE — 85027 COMPLETE CBC AUTOMATED: CPT | Performed by: STUDENT IN AN ORGANIZED HEALTH CARE EDUCATION/TRAINING PROGRAM

## 2020-09-02 PROCEDURE — 80053 COMPREHEN METABOLIC PANEL: CPT | Performed by: STUDENT IN AN ORGANIZED HEALTH CARE EDUCATION/TRAINING PROGRAM

## 2020-09-02 PROCEDURE — 82150 ASSAY OF AMYLASE: CPT | Performed by: STUDENT IN AN ORGANIZED HEALTH CARE EDUCATION/TRAINING PROGRAM

## 2020-09-02 PROCEDURE — 36415 COLL VENOUS BLD VENIPUNCTURE: CPT | Performed by: STUDENT IN AN ORGANIZED HEALTH CARE EDUCATION/TRAINING PROGRAM

## 2020-09-02 PROCEDURE — 25000125 ZZHC RX 250: Performed by: INTERNAL MEDICINE

## 2020-09-02 PROCEDURE — 40000170 ZZH STATISTIC PRE-PROCEDURE ASSESSMENT II: Performed by: INTERNAL MEDICINE

## 2020-09-02 PROCEDURE — C1877 STENT, NON-COAT/COV W/O DEL: HCPCS | Performed by: INTERNAL MEDICINE

## 2020-09-02 PROCEDURE — 71000014 ZZH RECOVERY PHASE 1 LEVEL 2 FIRST HR: Performed by: INTERNAL MEDICINE

## 2020-09-02 PROCEDURE — 83690 ASSAY OF LIPASE: CPT | Performed by: STUDENT IN AN ORGANIZED HEALTH CARE EDUCATION/TRAINING PROGRAM

## 2020-09-02 PROCEDURE — 93010 ELECTROCARDIOGRAM REPORT: CPT | Mod: 59 | Performed by: INTERNAL MEDICINE

## 2020-09-02 PROCEDURE — C1769 GUIDE WIRE: HCPCS | Performed by: INTERNAL MEDICINE

## 2020-09-02 PROCEDURE — 27210794 ZZH OR GENERAL SUPPLY STERILE: Performed by: INTERNAL MEDICINE

## 2020-09-02 PROCEDURE — 25000128 H RX IP 250 OP 636: Performed by: ANESTHESIOLOGY

## 2020-09-02 PROCEDURE — 36000061 ZZH SURGERY LEVEL 3 W FLUORO 1ST 30 MIN - UMMC: Performed by: INTERNAL MEDICINE

## 2020-09-02 PROCEDURE — 25000566 ZZH SEVOFLURANE, EA 15 MIN: Performed by: INTERNAL MEDICINE

## 2020-09-02 PROCEDURE — 40000065 ZZH STATISTIC EKG NON-CHARGEABLE

## 2020-09-02 PROCEDURE — 25000128 H RX IP 250 OP 636: Performed by: NURSE ANESTHETIST, CERTIFIED REGISTERED

## 2020-09-02 PROCEDURE — 36000059 ZZH SURGERY LEVEL 3 EA 15 ADDTL MIN UMMC: Performed by: INTERNAL MEDICINE

## 2020-09-02 PROCEDURE — 82962 GLUCOSE BLOOD TEST: CPT | Mod: 91

## 2020-09-02 PROCEDURE — 25500064 ZZH RX 255 OP 636: Performed by: INTERNAL MEDICINE

## 2020-09-02 PROCEDURE — 85610 PROTHROMBIN TIME: CPT | Performed by: STUDENT IN AN ORGANIZED HEALTH CARE EDUCATION/TRAINING PROGRAM

## 2020-09-02 PROCEDURE — 71000027 ZZH RECOVERY PHASE 2 EACH 15 MINS: Performed by: INTERNAL MEDICINE

## 2020-09-02 PROCEDURE — 37000008 ZZH ANESTHESIA TECHNICAL FEE, 1ST 30 MIN: Performed by: INTERNAL MEDICINE

## 2020-09-02 PROCEDURE — 25800030 ZZH RX IP 258 OP 636: Performed by: STUDENT IN AN ORGANIZED HEALTH CARE EDUCATION/TRAINING PROGRAM

## 2020-09-02 PROCEDURE — 25000125 ZZHC RX 250: Performed by: NURSE ANESTHETIST, CERTIFIED REGISTERED

## 2020-09-02 PROCEDURE — 25800030 ZZH RX IP 258 OP 636: Performed by: ANESTHESIOLOGY

## 2020-09-02 PROCEDURE — 25000125 ZZHC RX 250: Performed by: STUDENT IN AN ORGANIZED HEALTH CARE EDUCATION/TRAINING PROGRAM

## 2020-09-02 PROCEDURE — 25800030 ZZH RX IP 258 OP 636: Performed by: NURSE ANESTHETIST, CERTIFIED REGISTERED

## 2020-09-02 PROCEDURE — 37000009 ZZH ANESTHESIA TECHNICAL FEE, EACH ADDTL 15 MIN: Performed by: INTERNAL MEDICINE

## 2020-09-02 DEVICE — STENT BILIARY COMPASS BDS 7FRX15CM DBL PIGTAIL G55521: Type: IMPLANTABLE DEVICE | Site: BILE DUCT | Status: FUNCTIONAL

## 2020-09-02 DEVICE — STENT FREEMAN PANCREA FLEX 4FRX11CM W/O FLANGE SGL PIGTAIL
Type: IMPLANTABLE DEVICE | Site: PANCREATIC DUCT | Status: NON-FUNCTIONAL
Removed: 2020-10-27

## 2020-09-02 DEVICE — IMPLANTABLE DEVICE
Type: IMPLANTABLE DEVICE | Site: BILE DUCT | Status: NON-FUNCTIONAL
Removed: 2020-10-27

## 2020-09-02 RX ORDER — LIDOCAINE HYDROCHLORIDE 20 MG/ML
INJECTION, SOLUTION INFILTRATION; PERINEURAL PRN
Status: DISCONTINUED | OUTPATIENT
Start: 2020-09-02 | End: 2020-09-02

## 2020-09-02 RX ORDER — FENTANYL CITRATE 50 UG/ML
25-50 INJECTION, SOLUTION INTRAMUSCULAR; INTRAVENOUS
Status: DISCONTINUED | OUTPATIENT
Start: 2020-09-02 | End: 2020-09-02 | Stop reason: HOSPADM

## 2020-09-02 RX ORDER — IOPAMIDOL 510 MG/ML
INJECTION, SOLUTION INTRAVASCULAR PRN
Status: DISCONTINUED | OUTPATIENT
Start: 2020-09-02 | End: 2020-09-02 | Stop reason: HOSPADM

## 2020-09-02 RX ORDER — EPHEDRINE SULFATE 50 MG/ML
INJECTION, SOLUTION INTRAMUSCULAR; INTRAVENOUS; SUBCUTANEOUS PRN
Status: DISCONTINUED | OUTPATIENT
Start: 2020-09-02 | End: 2020-09-02

## 2020-09-02 RX ORDER — ONDANSETRON 4 MG/1
4 TABLET, ORALLY DISINTEGRATING ORAL EVERY 30 MIN PRN
Status: DISCONTINUED | OUTPATIENT
Start: 2020-09-02 | End: 2020-09-02 | Stop reason: HOSPADM

## 2020-09-02 RX ORDER — PROPOFOL 10 MG/ML
INJECTION, EMULSION INTRAVENOUS PRN
Status: DISCONTINUED | OUTPATIENT
Start: 2020-09-02 | End: 2020-09-02

## 2020-09-02 RX ORDER — FENTANYL CITRATE 50 UG/ML
INJECTION, SOLUTION INTRAMUSCULAR; INTRAVENOUS PRN
Status: DISCONTINUED | OUTPATIENT
Start: 2020-09-02 | End: 2020-09-02

## 2020-09-02 RX ORDER — ONDANSETRON 2 MG/ML
4 INJECTION INTRAMUSCULAR; INTRAVENOUS EVERY 30 MIN PRN
Status: DISCONTINUED | OUTPATIENT
Start: 2020-09-02 | End: 2020-09-02 | Stop reason: HOSPADM

## 2020-09-02 RX ORDER — MEPERIDINE HYDROCHLORIDE 25 MG/ML
12.5 INJECTION INTRAMUSCULAR; INTRAVENOUS; SUBCUTANEOUS
Status: DISCONTINUED | OUTPATIENT
Start: 2020-09-02 | End: 2020-09-02 | Stop reason: HOSPADM

## 2020-09-02 RX ORDER — NALOXONE HYDROCHLORIDE 0.4 MG/ML
.1-.4 INJECTION, SOLUTION INTRAMUSCULAR; INTRAVENOUS; SUBCUTANEOUS
Status: DISCONTINUED | OUTPATIENT
Start: 2020-09-02 | End: 2020-09-02 | Stop reason: HOSPADM

## 2020-09-02 RX ORDER — GLYCOPYRROLATE 0.2 MG/ML
INJECTION, SOLUTION INTRAMUSCULAR; INTRAVENOUS PRN
Status: DISCONTINUED | OUTPATIENT
Start: 2020-09-02 | End: 2020-09-02

## 2020-09-02 RX ORDER — LIDOCAINE 40 MG/G
CREAM TOPICAL
Status: DISCONTINUED | OUTPATIENT
Start: 2020-09-02 | End: 2020-09-02 | Stop reason: HOSPADM

## 2020-09-02 RX ORDER — CIPROFLOXACIN 2 MG/ML
INJECTION, SOLUTION INTRAVENOUS PRN
Status: DISCONTINUED | OUTPATIENT
Start: 2020-09-02 | End: 2020-09-02

## 2020-09-02 RX ORDER — FLUMAZENIL 0.1 MG/ML
0.2 INJECTION, SOLUTION INTRAVENOUS
Status: DISCONTINUED | OUTPATIENT
Start: 2020-09-02 | End: 2020-09-02 | Stop reason: HOSPADM

## 2020-09-02 RX ORDER — INDOMETHACIN 50 MG/1
100 SUPPOSITORY RECTAL
Status: COMPLETED | OUTPATIENT
Start: 2020-09-02 | End: 2020-09-02

## 2020-09-02 RX ORDER — CIPROFLOXACIN 500 MG/1
500 TABLET, FILM COATED ORAL 2 TIMES DAILY
Qty: 10 TABLET | Refills: 0 | Status: SHIPPED | OUTPATIENT
Start: 2020-09-02 | End: 2020-09-07

## 2020-09-02 RX ORDER — SODIUM CHLORIDE, SODIUM LACTATE, POTASSIUM CHLORIDE, CALCIUM CHLORIDE 600; 310; 30; 20 MG/100ML; MG/100ML; MG/100ML; MG/100ML
INJECTION, SOLUTION INTRAVENOUS CONTINUOUS
Status: DISCONTINUED | OUTPATIENT
Start: 2020-09-02 | End: 2020-09-02 | Stop reason: HOSPADM

## 2020-09-02 RX ADMIN — LIDOCAINE HYDROCHLORIDE 100 MG: 20 INJECTION, SOLUTION INFILTRATION; PERINEURAL at 09:55

## 2020-09-02 RX ADMIN — FENTANYL CITRATE 50 MCG: 50 INJECTION, SOLUTION INTRAMUSCULAR; INTRAVENOUS at 09:55

## 2020-09-02 RX ADMIN — SUGAMMADEX 200 MG: 100 INJECTION, SOLUTION INTRAVENOUS at 11:10

## 2020-09-02 RX ADMIN — Medication 1 UNITS: at 10:43

## 2020-09-02 RX ADMIN — CIPROFLOXACIN 400 MG: 2 INJECTION INTRAVENOUS at 10:01

## 2020-09-02 RX ADMIN — SODIUM CHLORIDE, POTASSIUM CHLORIDE, SODIUM LACTATE AND CALCIUM CHLORIDE 1000 ML: 600; 310; 30; 20 INJECTION, SOLUTION INTRAVENOUS at 11:33

## 2020-09-02 RX ADMIN — PHENYLEPHRINE HYDROCHLORIDE 100 MCG: 10 INJECTION INTRAVENOUS at 10:47

## 2020-09-02 RX ADMIN — PHENYLEPHRINE HYDROCHLORIDE 100 MCG: 10 INJECTION INTRAVENOUS at 10:18

## 2020-09-02 RX ADMIN — FENTANYL CITRATE 50 MCG: 50 INJECTION, SOLUTION INTRAMUSCULAR; INTRAVENOUS at 11:45

## 2020-09-02 RX ADMIN — PROPOFOL 150 MG: 10 INJECTION, EMULSION INTRAVENOUS at 09:55

## 2020-09-02 RX ADMIN — Medication 1 UNITS: at 10:31

## 2020-09-02 RX ADMIN — Medication 5 MG: at 10:15

## 2020-09-02 RX ADMIN — PHENYLEPHRINE HYDROCHLORIDE 100 MCG: 10 INJECTION INTRAVENOUS at 10:21

## 2020-09-02 RX ADMIN — FENTANYL CITRATE 50 MCG: 50 INJECTION, SOLUTION INTRAMUSCULAR; INTRAVENOUS at 11:33

## 2020-09-02 RX ADMIN — GLYCOPYRROLATE 0.2 MG: 0.2 INJECTION, SOLUTION INTRAMUSCULAR; INTRAVENOUS at 10:17

## 2020-09-02 RX ADMIN — SODIUM CHLORIDE, POTASSIUM CHLORIDE, SODIUM LACTATE AND CALCIUM CHLORIDE: 600; 310; 30; 20 INJECTION, SOLUTION INTRAVENOUS at 09:46

## 2020-09-02 RX ADMIN — Medication 5 MG: at 10:12

## 2020-09-02 RX ADMIN — ONDANSETRON 4 MG: 2 INJECTION INTRAMUSCULAR; INTRAVENOUS at 11:40

## 2020-09-02 RX ADMIN — PHENYLEPHRINE HYDROCHLORIDE 100 MCG: 10 INJECTION INTRAVENOUS at 10:19

## 2020-09-02 RX ADMIN — ROCURONIUM BROMIDE 50 MG: 10 INJECTION INTRAVENOUS at 09:56

## 2020-09-02 ASSESSMENT — MIFFLIN-ST. JEOR: SCORE: 1637

## 2020-09-02 NOTE — DISCHARGE INSTRUCTIONS
[facility], [place]  Same-Day Surgery   Adult Discharge Orders & Instructions     Take it easy when you get home.  Remember, same day surgery DOES NOT MEAN SAME DAY RECOVERY! Healing is a gradual process.   You will need some time to recover- you may be more tired than you realize at first.  Rest and relax for at least the first 24 hours at home.  You'll feel better and heal faster if you take good care of yourself.   For 24 hours after surgery    1. Get plenty of rest.  A responsible adult must stay with you for at least 24 hours after you leave the hospital.   2. Do not drive or use heavy equipment.  If you have weakness or tingling, don't drive or use heavy equipment until this feeling goes away.  3. Do not drink alcohol.  4. Avoid strenuous or risky activities.  Ask for help when climbing stairs.   5. You may feel lightheaded.  IF so, sit for a few minutes before standing.  Have someone help you get up.   6. If you have nausea (feel sick to your stomach): Drink only clear liquids such as apple juice, ginger ale, broth or 7-Up.  Rest may also help.  Be sure to drink enough fluids.  Move to a regular diet as you feel able.  7. You may have a slight fever. Call the doctor if your fever is over 100 F (37.7 C) (taken under the tongue) or lasts longer than 24 hours.  8. You may have a dry mouth, a sore throat, muscle aches or trouble sleeping.  These should go away after 24 hours.  9. Do not make important or legal decisions.   Call your doctor for any of the followin.  Signs of infection (fever, growing tenderness at the surgery site, a large amount of drainage or bleeding, severe pain, foul-smelling drainage, redness, swelling).    2. It has been over 8 to 10 hours since surgery and you are still not able to urinate (pass water).    3.  Headache for over 24 hours.    To contact a doctor during office hours, call Dr Howie lozano at [phone] or:        [phone] and ask for  the resident on call for Gatroenterology (answered 24 hours a day)      Emergency Department:    [facility] [place]: [phone]       (TTY for hearing impaired: [phone])    [facility]: [phone]       (TTY for hearing impaired: [phone])

## 2020-09-02 NOTE — OP NOTE
ERCP  09/02/2020  9:28 AM  [facility], [address] [place] [address] [phone]     Endoscopy Department   _______________________________________________________________________________   Patient Name: John Mancia           Procedure Date: 9/2/2020 9:28 AM   MRN: [id number]                       Account Number: [id number]   YOB: 1950               Admit Type: Outpatient   Age: 70                               Room: OR   Gender: Male                          Note Status: Finalized   Attending MD: Gary Denise MD   Total Sedation Time:   _______________________________________________________________________________       Procedure:           ERCP   Indications:         Abdominal pain of suspected biliary origin,                        Cholecystitis, nonsurgical candidate at this time   Providers:           Gary Denise MD, David Cerda MD   Patient Profile:     Mr Mancia is a 69yo gentleman with 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 With ongoing RUQ pain, though normal liver                        studies, he now proceeds to ERCP for transpapillary                        stent placement.   Referring MD:        Priyank Munoz   Medicines:           See the Anesthesia note for documentation of the                        administered medications, Indomethacin 100 mg MN, Cipro                        400 mg IV   Complications:       No immediate complications.   _______________________________________________________________________________   Procedure:           Pre-Anesthesia Assessment:                        - Prior to the procedure, a History and Physical was                        performed, and patient medications and allergies were                        reviewed. The patient is competent. The risks and                        benefits of the procedure and  the sedation options and                        risks were discussed with the patient. All questions                        were answered and informed consent was obtained. Patient                        identification and proposed procedure were verified by                        the nurse in the pre-procedure area. Mental Status                        Examination: alert and oriented. Airway Examination:                        Mallampati Class II (the uvula but not tonsillar pillars                        visualized). Respiratory Examination: clear to                        auscultation. CV Examination: systolic murmur. ASA Grade                        Assessment: III - A patient with severe systemic                        disease. After reviewing the risks and benefits, the                        patient was deemed in satisfactory condition to undergo                        the procedure. The anesthesia plan was to use general                        anesthesia. Immediately prior to administration of                        medications, the patient was re-assessed for adequacy to                        receive sedatives. The heart rate, respiratory rate,                        oxygen saturations, blood pressure, adequacy of                        pulmonary ventilation, and response to care were                        monitored throughout the procedure. The physical status                        of the patient was re-assessed after the procedure.                        After obtaining informed consent, the scope was passed                        under direct vision. Throughout the procedure, the                        patient's blood pressure, pulse, and oxygen saturations                        were monitored continuously. The duodensocope was                        introduced through the mouth, and used to inject                        contrast into and used to inject contrast into the bile                        " duct and ventral pancreatic duct. The ERCP was                        accomplished without difficulty. The patient tolerated                        the procedure well.                                                                                     Findings:        A  film of the right upper quadrant demonstrated some old contrast        in the region of the liver. Limited white light imaging of the foregut        was unremarkable. The ventral pancreatic duct was initially deeply        cannulated with the short-nosed traction sphincterotome in concert with        an 0.025\" Visiglide wire. Contrast was injected and I personally        interpreted the pancreatic duct images. Ductal flow of contrast was        adequate, image quality was excellen and contrast extended throughout        the pancreatic duct which was unremarkable. Dual wire technique was then        used to cannulated the bile duct again with the short-nosed traction        sphincterotome and second Visi. Contrast was injected. The intrahepatics        were diffusely mildly dilated, though the bifurcation and proximal        common duct were stenotic with possible extrinsic compression. The        common duct below this was normal in caliber though again tight just        above the ampulla. The cystic eventually opacified and the gallbladder        was found enlarged and full of solid content. A 6 mm biliary        sphincterotomy was made with a monofilament traction (standard)        sphincterotome using ERBE electrocautery. There was no        post-sphincterotomy bleeding. A 4 Fr by 11 cm prophylactic Potter stent        with a single external pigtail and no internal flaps was placed 11 cm        into the ventral pancreatic duct. Clear fluid flowed through the stent        and the stent was in good position. The biliary tree was swept with an        11.5 mm balloon starting at the bifurcation. Stone debris was swept        clear from the " duct. A 7 Fr by 15 cm transpapillary Compass stent with a        single external pigtail and a single internal pigtail was placed 15 cm        into the gallbladder. Sludge and thick brown contents flowed through the        stent. A 10 Fr by 5 cm SF stent with a single external flap and a single        internal flap was placed 5 cm into the common bile duct. Bile flowed        through the stent. All stents were in good position.                                                                                     Impression:          - Dual wire technique required with placement of a                        prophylactic pancreatic stent deployed to a normal                        appearing main duct                        - Indeterminate distal common duct stenosis, favor                        inflammatory, managed by biliary sphincterotomy and                        common duct stent placement                        - Stenosis of the proximal common likely secondary to                        extrinsic compression of the gallbladder, suspected to                        improve with gallbladder drainage                        - Enlarged gallbladder with complex contents status post                        transpapillary gallbladder stent as above with drainage                        of thick sludge and debris                        - Uncomplicated removal of small stone fragments   Recommendation:      - General anesthesia recovery with 2h observation                        followed by lipase study with disposition determined by                        course and results; IV LR and nil per os for now                        - Avoid antithrombotics for at least three days; other                        medications and diet may resume on discharge                        - Complete a short course of antibiotic as prescribed                        - Plain film in 4 weeks to ensure spontaneous ejection                        of  the pancreatic stent                        - GB stent to remain indefinitely                        - Repeat ERCP in 8 weeks for further interrogation of                        the distal common duct stenosis which may resolve if                        inflammatory in nature                        - The findings and recommendations were discussed with                        the patient and their family                                                                                       electronically signed by SUSI Denise

## 2020-09-02 NOTE — ANESTHESIA POSTPROCEDURE EVALUATION
Anesthesia POST Procedure Evaluation    Patient: John Mancia   MRN:     [id number] Gender:   male   Age:    70 year old :      1950        Preoperative Diagnosis: Biliary colic [K80.50]   Procedure(s):  ENDOSCOPIC RETROGRADE CHOLANGIOPANCREATOGRAPHY with transpapillary gallbladder stenting **Latex Allergy** with biliary sphincterotomy, stent placement, pancreatic stent placement, gallbladder stent placement   Postop Comments: No value filed.     Anesthesia Type: General       Disposition: Outpatient   Postop Pain Control: Uneventful            Sign Out: Well controlled pain   PONV: No   Neuro/Psych: Uneventful            Sign Out: Acceptable/Baseline neuro status   Airway/Respiratory: Uneventful            Sign Out: Acceptable/Baseline resp. status   CV/Hemodynamics: Uneventful            Sign Out: Acceptable CV status   Other NRE: NONE   DID A NON-ROUTINE EVENT OCCUR? No         Last Anesthesia Record Vitals:  CRNA VITALS  2020 1046 - 2020 1146      2020             Pulse:  73    Ht Rate:  75    SpO2:  97 %          Last PACU Vitals:  Vitals Value Taken Time   /61 2020 11:50 AM   Temp 36.4  C (97.5  F) 2020 11:45 AM   Pulse 53 2020 11:51 AM   Resp 13 2020 11:45 AM   SpO2 97 % 2020 11:51 AM   Temp src     NIBP     Pulse     SpO2     Resp     Temp     Ht Rate     Temp 2     Vitals shown include unvalidated device data.      Electronically Signed By: Kasandra Yates MD, 2020, 11:52 AM

## 2020-09-02 NOTE — OR NURSING
Per Dr. Cerda and Dr. Denise, pt does not need to be seen in phase 2 prior to discharge. Labs have been reviewed per MD.

## 2020-09-02 NOTE — ANESTHESIA CARE TRANSFER NOTE
Patient: John Mancia    Procedure(s):  ENDOSCOPIC RETROGRADE CHOLANGIOPANCREATOGRAPHY with transpapillary gallbladder stenting **Latex Allergy** with biliary sphincterotomy, stent placement, pancreatic stent placement, gallbladder stent placement    Diagnosis: Biliary colic [K80.50]  Diagnosis Additional Information: No value filed.    Anesthesia Type:   General     Note:  Airway :Face Mask  Patient transferred to:PACU  Comments: Patient suctioned prior to extubation. Following commands, breathing spontaneously, opening eyes. Extubated to 6L facemask. Transferred to PACU. Report to RN. Vital signs stable. Report to RN. Awake and talking in PACU. Handoff Report: Identifed the Patient, Identified the Reponsible Provider, Reviewed the pertinent medical history, Discussed the surgical course, Reviewed Intra-OP anesthesia mangement and issues during anesthesia, Set expectations for post-procedure period and Allowed opportunity for questions and acknowledgement of understanding      Vitals: (Last set prior to Anesthesia Care Transfer)    CRNA VITALS  9/2/2020 1046 - 9/2/2020 1125      9/2/2020             Pulse:  73    Ht Rate:  75    SpO2:  97 %                Electronically Signed By: YEE Mayen CRNA  September 2, 2020  11:25 AM

## 2020-09-04 ENCOUNTER — PREP FOR PROCEDURE (OUTPATIENT)
Dept: GASTROENTEROLOGY | Facility: CLINIC | Age: 70
End: 2020-09-04

## 2020-09-04 ENCOUNTER — PATIENT OUTREACH (OUTPATIENT)
Dept: GASTROENTEROLOGY | Facility: CLINIC | Age: 70
End: 2020-09-04

## 2020-09-04 DIAGNOSIS — R10.84 ABDOMINAL PAIN, GENERALIZED: Primary | ICD-10-CM

## 2020-09-04 DIAGNOSIS — Z46.89 ENCOUNTER FOR REMOVAL OF PANCREATIC STENT: Primary | ICD-10-CM

## 2020-09-04 NOTE — TELEPHONE ENCOUNTER
Post ERCP (2020) with Dr. Denise: Follow-up    Post procedure recommendations:   Plain film in 4 weeks to ensure spontaneous ejection  of the pancreatic stent     - GB stent to remain indefinitely     - Repeat ERCP in 8 weeks for further interrogation of the distal common duct stenosis which may resolve if    inflammatory in nature    Orders placed: abd xray, due   Please assist in scheduling:     Procedure/Imaging/Clinic: ERCP  Physician: Dr. Denise  Timin weeks  Procedure length:75 min  Anesthesia:gen  Dx: abd pain  Tier:2  Location: [facility]     Patient states some abd soreness, advised tylenol. C/o constipation, advised hydration, ambulation and Miralax. No fever. No concerning symptoms at this time.     Clinic contact and scheduling numbers verified for future questions/concerns.    Violetta Goldberg RN Care Coordinator

## 2020-09-15 ENCOUNTER — TELEPHONE (OUTPATIENT)
Dept: ONCOLOGY | Facility: CLINIC | Age: 70
End: 2020-09-15

## 2020-09-21 ENCOUNTER — TELEPHONE - BILLABLE (OUTPATIENT)
Dept: PALLIATIVE CARE | Facility: CLINIC | Age: 70
End: 2020-09-21
Payer: MEDICARE

## 2020-09-21 DIAGNOSIS — G89.3 PAIN OF METASTATIC MALIGNANCY: ICD-10-CM

## 2020-09-21 DIAGNOSIS — C22.0 HEPATOCELLULAR CARCINOMA (CMS/HCC): ICD-10-CM

## 2020-09-21 DIAGNOSIS — G47.01 INSOMNIA DUE TO MEDICAL CONDITION: Primary | ICD-10-CM

## 2020-09-21 PROCEDURE — 99443 *INACTIVE DO NOT USE* PR PHYS/QHP TELEPHONE EVALUATION 21-30 MIN: CPT | Mod: 95 | Performed by: INTERNAL MEDICINE

## 2020-09-21 ASSESSMENT — ENCOUNTER SYMPTOMS
HEMATOLOGIC/LYMPHATIC NEGATIVE: 1
VOMITING: 0
APPETITE CHANGE: 0
NAUSEA: 0
SLEEP DISTURBANCE: 0
NEUROLOGICAL NEGATIVE: 1
FATIGUE: 0
ACTIVITY CHANGE: 1
ENDOCRINE NEGATIVE: 1
GASTROINTESTINAL NEGATIVE: 1
RESPIRATORY NEGATIVE: 1
CARDIOVASCULAR NEGATIVE: 1
CONSTIPATION: 0
UNEXPECTED WEIGHT CHANGE: 0
PSYCHIATRIC NEGATIVE: 1
MUSCULOSKELETAL NEGATIVE: 1
DIARRHEA: 0
HEADACHES: 0

## 2020-09-21 NOTE — PROGRESS NOTES
"Subjective   Per discussion with Jose Gómez MD, Ben, has verbally consented to be treated via a telephone based visit: Yes. A total of 25 minutes were required for this telephone based visit.   Patient Location: Home  Provider Location: Clinic  Technology used by Provider: Phone    HPI  Ben Lai Sr is a 70 y.o. male who presents for followup on his HCC and related symptoms.    The pt is being seen today via telehealth due to covid 19 pandemic. He had a procedure at [facility] and it went ok \"but I'm still sore from it.\". They put three stents in - gallbladder, pancreas and liver. He is getting two taken out in about 6 weeks. He is taking the dilaudid \"not very often\". He can go 4 to 5 days without taking it but will occasionally overdo things and then has a spike in pain. He has not had any nausea or diarrhea. He had constipation after his surgery but that has since resolved. His appetite has been fair but is afraid to eat certain foods for fear of \"attacks.\" His weight is stable. He would like to lose weight. He is sleeping ok. He has been trying to use the CPAP at night. He has not needed any medication for sleep in \"a real long time.\" He continues to have pain in his RUQ/R lower chest.       The following have been reviewed and updated as appropriate in this visit:    Allergies   Allergen Reactions   • Penicillins Anaphylaxis   • Metformin Hives   • Adhesive Tape-Silicones    • Interferon Jose Maria-2a    • Latex    • Milk      Cow milk/goat milk   • Mometasone    • Mometasone Furoate    • Omeprazole    • Ondansetron Hcl Nausea And Vomiting   • Pepper (Genus Capsicum)      Any Hot Peppers - can eat bell peppers   • Primidone      Other reaction(s): HEADACHE   • Ribavirin Itching   • Rosuvastatin    • Sorafenib      Other reaction(s): BURNING SENSATION, HEADACHE, JOINT PAIN   • Spironolactone    • Statins-Hmg-Coa Reductase Inhibitors Itching and GI intolerance   • Interferon Jose Maria (Human Leuk. Derived) " Palpitations and Rash   • Pantoprazole Itching and Rash   • Peginterferon Jose Maria-2b Palpitations     Current Outpatient Medications   Medication Sig Dispense Refill   • artificial saliva (YERBA GLADYS AND LYTES) aerosol,spray spray See administration instructions Take 3 to 5 sprays up to 3 to 5 times daily     • lidocaine (Lidoderm) 5 % patch Apply 1 patch topically daily Remove & discard patch within 12 hours or as directed by MD.     • psyllium husk (METAMUCIL ORAL) Take 1 Scoop by mouth daily       • hypromellose (SYSTANE GEL OPHT) Administer into affected eye(s) as needed     • fluticasone propionate (FLONASE) 50 mcg/actuation nasal spray Administer 2 sprays into each nostril daily       • CLOTRIMAZOLE, BULK, MISC as needed To feet     • naloxone HCl (NARCAN NASL) Administer into affected nostril(s) as needed     • HYDROmorphone (DILAUDID) 2 mg tablet Take 2 mg by mouth every 4 (four) hours as needed     • sildenafiL (VIAGRA) 100 mg tablet Take 100 mg by mouth daily as needed for erectile dysfunction     • traZODone (DESYREL) 100 mg tablet Take 100 mg by mouth nightly       • ondansetron (ZOFRAN) 4 mg tablet Take 4 mg by mouth every 8 (eight) hours as needed for nausea or vomiting     • SUMAtriptan (IMITREX) 100 mg tablet Take 100 mg by mouth once as needed for migraine     • insulin glargine (Lantus Solostar U-100 Insulin) 100 unit/mL (3 mL) insulin pen injection pen Inject 10 Units under the skin every morning     • miconazole nitrate (SECURA ANTIFUNGAL T) Apply topically as needed       • carvediloL (COREG) 3.125 mg tablet Take 1 tablet (3.125 mg total) by mouth 2 (two) times a day with meals 180 tablet 0   • diclofenac sodium (VOLTAREN) 1 % gel Apply 4 g topically 4 (four) times a day       • HYDROmorphone (Dilaudid) 4 mg tablet Take 1 tablet (4 mg total) by mouth every 4 (four) hours as needed (pain) Max Daily Amount: 24 mg 180 tablet 0   • cetirizine (ZyrTEC) 10 mg tablet Take 10 mg by mouth daily     •  loperamide (Imodium A-D) 2 mg tablet Take 2 tablets with first loose stool of the day, then up to 8 tablets daily 80 tablet 2   • hydrOXYzine (ATARAX) 25 mg tablet Take 1 tablet (25 mg total) by mouth nightly 90 tablet 2   • buPROPion SR (WELLBUTRIN SR) 100 mg 12 hr tablet Take 1 tablet (100 mg total) by mouth daily 90 tablet 1   • lactulose (GENERLAC) 10 gram/15 mL solution Take 15 mL (10 g total) by mouth daily 473 mL 2   • terazosin (HYTRIN) 5 mg capsule Take 1 capsule (5 mg total) by mouth 2 (two) times a day 180 capsule 2   • blood-glucose meter (ACCU-CHEK ADELE PLUS METER) [facility] Use to test blood sugars 3 times daily (E11.65, non insulin depedent) AccuChek Adele plus, meter is 8 years old (Patient taking differently: Use to test blood sugars 3 times daily (E11.65, non insulin dependent) ) 1 each 0   • lisinopril (PRINIVIL,ZESTRIL) 20 mg tablet Take 1 tablet (20 mg total) by mouth daily 90 tablet 2   • furosemide (LASIX) 20 mg tablet Take 1 tablet (20 mg total) by mouth 2 (two) times a day. 180 tablet 2   • lansoprazole (PREVACID) 30 mg capsule Take 30 mg by mouth daily      • liraglutide (VICTOZA 2-JASE) 0.6 mg/0.1 mL (18 mg/3 mL) injection Inject 1.8 mg under the skin daily       • capsaicin (ZOSTRIX) 0.025 % cream Apply 1 application topically as needed for pain scale 1-3/10, 1-3/8.     • sodium chloride (SALINE NASAL) 0.65 % nasal spray Administer 1 spray into each nostril as needed for congestion or rhinitis      • white petrolatum (AQUAPHOR HEALING) 41 % ointment ointment Apply 1 application topically as needed.     • pramoxine-menthol (GOLD BOND MEDICATED ANTI-ITCH) 1-1 % cream Apply topically as needed       • GLYCERIN/PROPYLENE GLYCOL (ARTIFICIAL TEARS,GLYCERIN-PEG, OPHT) Administer 1 drop into affected eye(s) as needed (Dry eyes)      • latanoprost (XALATAN) 0.005 % ophthalmic solution Administer 1 drop into both eyes nightly   10 0     No current facility-administered medications for this visit.       Past Medical History:   Diagnosis Date   • A-fib (CMS/HCC) (HCC)    • Allergy    • Anxiety    • Arthritis    • Asthma    • Blindness of right eye    • BPH (benign prostatic hyperplasia)    • Cardiovascular disease    • Cirrhosis (CMS/HCC) (HCC)     with possible liver mass by MRI 5/18, rec repeat in 8/2018   • Complication of anesthesia    • COPD (chronic obstructive pulmonary disease) (CMS/HCC) (HCC)    • Depression    • Endocrine disorder    • Gallstones    • Gastritis    • GERD (gastroesophageal reflux disease)    • Glaucoma    • Hearing loss     bilateral hearing aids   • Hemorrhoids    • Hepatitis C     Hep C, 2016 remission, complicated by cirrhosis.  MRI abd 5/30/18, rec 3 month follow up for focus of enhancement right and left hepatic lobe junction   • Hernia, abdominal    • High blood pressure    • IBS (irritable bowel syndrome)    • Infectious viral hepatitis    • Jaundice    • Kidney stones    • Obstructive sleep apnea syndrome    • Periodic limb movement disorder    • Persistent hypersomnia    • Persistent insomnia    • Pneumonia    • PONV (postoperative nausea and vomiting)    • Type 2 diabetes mellitus (CMS/HCC) (HCC)    • Urinary incontinence    • Wears partial dentures      Past Surgical History:   Procedure Laterality Date   • COLONOSCOPY  10/27/2016    Pili, normal, repeat 10 years   • CT ABLATION LIVER RADIOFREQUENCY  8/14/2019    CT ABLATION LIVER RADIOFREQUENCY 8/14/2019 [facility] MIS CT SCAN   • ESOPHAGOGASTRODUODENOSCOPY N/A 2/22/2018    Procedure: EGD - ESOPHAGOGASTRODUODENOSCOPY with banding  x 2 with crna;  Surgeon: William Rivas MD;  Location: [facility] Endoscopy;  Service: Endoscopy;  Laterality: N/A;   • ESOPHAGOGASTRODUODENOSCOPY N/A 4/6/2018    Procedure: EGD - ESOPHAGOGASTRODUODENOSCOPY with crna;  Surgeon: William Rivas MD;  Location: [facility] Endoscopy;  Service: Endoscopy;  Laterality: N/A;   • ESOPHAGOGASTRODUODENOSCOPY N/A 5/7/2019    Procedure: EGD - ESOPHAGOGASTRODUODENOSCOPY;  Surgeon:  William Rivas MD;  Location: [facility] End[facility];  Service: Endoscopy;  Laterality: N/A;   • HAND SURGERY Left     thumb   • HERNIA REPAIR  01/01/1965   • KNEE ARTHROSCOPY  09/19/2017    left knee, with partial medial meniscectomy; limited chondroplasty, patellofemoral joint   • LIVER BIOPSY      by Dr. Alejandre   • OTHER SURGICAL HISTORY      esophageal varices, banded   • VASECTOMY      at approx age of 25     Family History   Problem Relation Age of Onset   • Arthritis Mother    • Rheum arthritis Mother    • Diabetes Mother    • Rectal cancer Mother    • Other Paternal Grandmother    • Other Paternal Grandfather    • Diabetes Other    • Rheum arthritis Other    • Osteoporosis Other      Social History     Occupational History   • Not on file   Tobacco Use   • Smoking status: Never Smoker   • Smokeless tobacco: Never Used   Substance and Sexual Activity   • Alcohol use: No   • Drug use: No   • Sexual activity: Defer   Social History Narrative   • Not on file       Review of Systems   Constitutional: Positive for activity change. Negative for appetite change, fatigue and unexpected weight change.   HENT: Negative.    Respiratory: Negative.    Cardiovascular: Negative.    Gastrointestinal: Negative.  Negative for constipation, diarrhea, nausea and vomiting.   Endocrine: Negative.    Genitourinary: Negative.    Musculoskeletal: Negative.    Skin: Negative.    Allergic/Immunologic: Positive for food allergies.   Neurological: Negative.  Negative for headaches.   Hematological: Negative.    Psychiatric/Behavioral: Negative.  Negative for sleep disturbance.         Assessment/Plan   Diagnoses and all orders for this visit:    Insomnia due to medical condition    Pain of metastatic malignancy    Hepatocellular carcinoma (CMS/HCC) (HCC)    The pt continues to do well and does not need any changes to his medical regimen. We will follow up in two months. He does not need any refills at this time.

## 2020-09-23 ENCOUNTER — TELEPHONE (OUTPATIENT)
Dept: NURSING | Facility: CLINIC | Age: 70
End: 2020-09-23

## 2020-09-23 ENCOUNTER — TELEPHONE (OUTPATIENT)
Dept: ONCOLOGY | Facility: CLINIC | Age: 70
End: 2020-09-23

## 2020-09-23 NOTE — TELEPHONE ENCOUNTER
"Called pt r/t triage note. Could not sleep at all last night, on right side, from nipple to bellybutton, under ribs, same location on the back. Throbs and \"flashes\", \"blows up\", \"fingers coming out\". No Temperature. Hurts with belching. Is nauseated with pain, did not vomit.  \"Can put my hand on it and feel it, 2-3 inches, tender, swollen a little\" Taking 2-4 mg of Hydromorphone, is helping but last night didn't do much. Ice helping. Has had BM.    Pain feels similar to pain he had prior to procedure, but is in a slightly different location. Feels similar to prior gall bladder attack.     Has also alerted local oncologist asking about follow up. Wanting to know what they should do now or with future pain flare from our team. Will query Dr. Denise.    Violetta Goldberg, RN Care Coordinator    "

## 2020-09-23 NOTE — TELEPHONE ENCOUNTER
.  [facility]: Nurse Triage Note  SITUATION/BACKGROUND                                                      John Mancia is a 70 year old male s/pENDOSCOPIC RETROGRADE CHOLANGIOPANCREATOGRAPHY with transpapillary gallbladder stenting **Latex Allergy** with biliary sphincterotomy, stent placement, pancreatic stent placement, gallbladder stent placement procedure on 9/2/20.   Patient calling to report having extreme pain last night on the right side -  below breast and above waist.    He stated that site is slightly swollen. Has been icing, with  application of lidocaine patch.   Pain and intensity has varied from 7/10/-10/10. Pain has been sharp/ burning pain, constant to intermittent, tends to get worse when lying down. Patient has taken hydromorphone 4mg about 20 min ago. Pain currently 7/10.     Intermittent nausea without vomiting. Patient has been able to eat and drink small amounts.   Denies fever or other symptoms at this time.       Routed to Gastroenterology as High Priority to review and follow up call. In addition, patient instructed to notify his outside Oncologist regarding concerns.

## 2020-09-24 ENCOUNTER — PATIENT OUTREACH (OUTPATIENT)
Dept: GASTROENTEROLOGY | Facility: CLINIC | Age: 70
End: 2020-09-24

## 2020-09-24 ENCOUNTER — TELEPHONE (OUTPATIENT)
Dept: ONCOLOGY | Facility: CLINIC | Age: 70
End: 2020-09-24

## 2020-09-24 DIAGNOSIS — R10.84 ABDOMINAL PAIN, GENERALIZED: Primary | ICD-10-CM

## 2020-09-24 NOTE — TELEPHONE ENCOUNTER
Patient does not think it was time specific so [facility].    ----- Message from Elena Fulton CNP sent at 9/24/2020 12:05 PM MDT -----  So for this pain the [place] providers recommended a blood test and x-ray? I would recommend getting those done sooner then  ----- Message -----  From: Yaima Monsalve RN  Sent: 9/24/2020  11:36 AM MDT  To: Elena Fulton CNP    Patient called stating that he has 3 stents placed in the gall bladder region on 9/2 at [place]. He has been experiencing more pain that is reminding him of a gall bladder attack. He states he has been using ice packs and hydromorphone 2mg and 4 mg. He denies being jaundice. He wonders what else he can do? [place] providers recommended an HIV/A test and a x-ray for stent placement. The patient is set up for this on 9/30 at [facility]. Please advise.

## 2020-09-24 NOTE — TELEPHONE ENCOUNTER
Per Dr. Denise  If we really want to work this up we can get a HIDA and if negative a CT of the abdomen with IV contrast. Likely this will exclude GB/biliary issues, but if the GB is again inflamed Id be willing to modify my stent.     Called to discuss with patient.     [facility]  fax # [phone]  attention  Dr Paul Collier team  [phone]  ext  [id number]  [facility] [place]      Will follow.    Violetta Goldberg RN Care Coordinator

## 2020-09-25 ENCOUNTER — TELEPHONE (OUTPATIENT)
Dept: GASTROENTEROLOGY | Facility: CLINIC | Age: 70
End: 2020-09-25

## 2020-09-25 DIAGNOSIS — Z11.59 ENCOUNTER FOR SCREENING FOR OTHER VIRAL DISEASES: Primary | ICD-10-CM

## 2020-09-25 PROBLEM — R10.84 ABDOMINAL PAIN, GENERALIZED: Status: ACTIVE | Noted: 2020-09-25

## 2020-09-25 NOTE — TELEPHONE ENCOUNTER
LVM for patient in regards to scheduled procedure with Dr. Denise on 10/27/2020. Left direct line for patient to call to go over scheduling details.

## 2020-09-28 ENCOUNTER — PATIENT OUTREACH (OUTPATIENT)
Dept: GASTROENTEROLOGY | Facility: CLINIC | Age: 70
End: 2020-09-28

## 2020-09-28 NOTE — TELEPHONE ENCOUNTER
LVM for patient in regards to scheduled procedure with Dr. Denise on 10/27. Left direct line for patient to call to go over scheduling details.

## 2020-09-29 NOTE — TELEPHONE ENCOUNTER
LVM for patient in regards to scheduled procedure with Dr. Denise. Left direct line for patient to call to go over scheduling details. Will send letter and MyChart message with confirmation.

## 2020-10-05 ENCOUNTER — TRANSFERRED RECORDS (OUTPATIENT)
Dept: HEALTH INFORMATION MANAGEMENT | Facility: CLINIC | Age: 70
End: 2020-10-05

## 2020-10-05 ENCOUNTER — HOSPITAL ENCOUNTER (OUTPATIENT)
Dept: MRI IMAGING | Facility: HOSPITAL | Age: 70
Discharge: HOME | End: 2020-10-05
Payer: COMMERCIAL

## 2020-10-05 ENCOUNTER — LAB (OUTPATIENT)
Dept: ONCOLOGY | Facility: CLINIC | Age: 70
End: 2020-10-05
Payer: COMMERCIAL

## 2020-10-05 DIAGNOSIS — C22.0 HEPATOCELLULAR CARCINOMA (CMS/HCC): ICD-10-CM

## 2020-10-05 LAB
ALBUMIN SERPL-MCNC: 3.8 G/DL (ref 3.5–5.3)
ALP SERPL-CCNC: 128 U/L (ref 45–115)
ALT SERPL-CCNC: 23 U/L (ref 7–52)
ANION GAP SERPL CALC-SCNC: 8 MMOL/L (ref 3–11)
AST SERPL-CCNC: 28 U/L
BASOPHILS # BLD AUTO: 0 10*3/UL
BASOPHILS NFR BLD AUTO: 1 % (ref 0–2)
BILIRUB SERPL-MCNC: 0.76 MG/DL (ref 0.2–1.4)
BUN SERPL-MCNC: 16 MG/DL (ref 7–25)
CALCIUM ALBUM COR SERPL-MCNC: 9.3 MG/DL (ref 8.6–10.3)
CALCIUM SERPL-MCNC: 9.1 MG/DL (ref 8.6–10.3)
CHLORIDE SERPL-SCNC: 109 MMOL/L (ref 98–107)
CO2 SERPL-SCNC: 24 MMOL/L (ref 21–32)
CREAT SERPL-MCNC: 0.79 MG/DL (ref 0.7–1.3)
EOSINOPHIL # BLD AUTO: 0.1 10*3/UL
EOSINOPHIL NFR BLD AUTO: 2 % (ref 0–3)
ERYTHROCYTE [DISTWIDTH] IN BLOOD BY AUTOMATED COUNT: 13.3 % (ref 11.5–15)
GFR SERPL CREATININE-BSD FRML MDRD: 92 ML/MIN/1.73M*2
GLUCOSE SERPL-MCNC: 181 MG/DL (ref 70–105)
HCT VFR BLD AUTO: 37.3 % (ref 38–50)
HGB BLD-MCNC: 12.4 G/DL (ref 13.2–17.2)
LYMPHOCYTES # BLD AUTO: 0.4 10*3/UL
LYMPHOCYTES NFR BLD AUTO: 13 % (ref 15–47)
MCH RBC QN AUTO: 29.5 PG (ref 29–34)
MCHC RBC AUTO-ENTMCNC: 33.4 G/DL (ref 32–36)
MCV RBC AUTO: 88.3 FL (ref 82–97)
MONOCYTES # BLD AUTO: 0.3 10*3/UL
MONOCYTES NFR BLD AUTO: 9 % (ref 5–13)
NEUTROPHILS # BLD AUTO: 2.4 10*3/UL
NEUTROPHILS NFR BLD AUTO: 75 % (ref 46–70)
PLATELET # BLD AUTO: 108 10*3/UL (ref 130–350)
PMV BLD AUTO: 7.9 FL (ref 6.9–10.8)
POTASSIUM SERPL-SCNC: 4.3 MMOL/L (ref 3.5–5.1)
PROT SERPL-MCNC: 6.1 G/DL (ref 6–8.3)
RBC # BLD AUTO: 4.22 10*6/ΜL (ref 4.1–5.8)
SODIUM SERPL-SCNC: 141 MMOL/L (ref 135–145)
WBC # BLD AUTO: 3.2 10*3/UL (ref 3.7–9.6)

## 2020-10-05 PROCEDURE — 85025 COMPLETE CBC W/AUTO DIFF WBC: CPT

## 2020-10-05 PROCEDURE — A9579 GAD-BASE MR CONTRAST NOS,1ML: HCPCS | Performed by: INTERNAL MEDICINE

## 2020-10-05 PROCEDURE — 2550000100 HC RX 255: Performed by: INTERNAL MEDICINE

## 2020-10-05 PROCEDURE — 82105 ALPHA-FETOPROTEIN SERUM: CPT

## 2020-10-05 PROCEDURE — 80053 COMPREHEN METABOLIC PANEL: CPT

## 2020-10-05 PROCEDURE — G1004 CDSM NDSC: HCPCS

## 2020-10-05 PROCEDURE — 36415 COLL VENOUS BLD VENIPUNCTURE: CPT

## 2020-10-05 RX ADMIN — GADOTERIDOL 19 ML: 279.3 INJECTION, SOLUTION INTRAVENOUS at 08:25

## 2020-10-05 NOTE — TELEPHONE ENCOUNTER
FUTURE VISIT INFORMATION      SURGERY INFORMATION:    Date: 10/27/20    [facility]    Surgeon:  Gary Denise MD    Anesthesia Type:  general    Procedure: ENDOSCOPIC RETROGRADE CHOLANGIOPANCREATOGRAPHY    RECORDS REQUESTED FROM:       Primary Care Provider: Linn Mccarthy MD -     [facility]         Pertinent Medical History: yeni, hypertension, congestive heart failure     Most recent EKG+ Tracin20     Most recent Cardiac Stress Test: 6/8/15-[facility]

## 2020-10-06 LAB — AFP SERPL-MCNC: 15 NG/ML

## 2020-10-07 ENCOUNTER — OFFICE VISIT (OUTPATIENT)
Dept: ONCOLOGY | Facility: CLINIC | Age: 70
End: 2020-10-07
Payer: COMMERCIAL

## 2020-10-07 VITALS
DIASTOLIC BLOOD PRESSURE: 70 MMHG | TEMPERATURE: 98.8 F | WEIGHT: 210.8 LBS | BODY MASS INDEX: 36.18 KG/M2 | OXYGEN SATURATION: 95 % | HEART RATE: 72 BPM | SYSTOLIC BLOOD PRESSURE: 130 MMHG | RESPIRATION RATE: 16 BRPM

## 2020-10-07 DIAGNOSIS — C22.0 HEPATOCELLULAR CARCINOMA (CMS/HCC): ICD-10-CM

## 2020-10-07 PROCEDURE — 99215 OFFICE O/P EST HI 40 MIN: CPT | Performed by: INTERNAL MEDICINE

## 2020-10-07 PROCEDURE — G0463 HOSPITAL OUTPT CLINIC VISIT: HCPCS

## 2020-10-07 ASSESSMENT — ENCOUNTER SYMPTOMS
ABDOMINAL DISTENTION: 1
HEMATOLOGIC/LYMPHATIC NEGATIVE: 1
ENDOCRINE NEGATIVE: 1
ABDOMINAL PAIN: 1
FATIGUE: 1
NERVOUS/ANXIOUS: 1
MYALGIAS: 1
ARTHRALGIAS: 1
EYE PROBLEMS: 1

## 2020-10-07 ASSESSMENT — PAIN SCALES - GENERAL: PAINLEVEL: 5

## 2020-10-08 ENCOUNTER — TELEPHONE (OUTPATIENT)
Dept: GASTROENTEROLOGY | Facility: CLINIC | Age: 70
End: 2020-10-08

## 2020-10-08 NOTE — TELEPHONE ENCOUNTER
[facility] Call Center    Phone Message    May a detailed message be left on voicemail: yes     Reason for Call: Other: Catrina, with [facility], calling in to speak with Violetta regarding some labs and imaging that she sent over on Monday 10/5/2020. Catrina wouldlike a call back when available. Thank you      Action Taken: Message routed to:  [facility] ([facility]): Tracey Robb    Travel Screening: Not Applicable

## 2020-10-09 ENCOUNTER — TRANSFERRED RECORDS (OUTPATIENT)
Dept: HEALTH INFORMATION MANAGEMENT | Facility: CLINIC | Age: 70
End: 2020-10-09

## 2020-10-09 ENCOUNTER — PATIENT OUTREACH (OUTPATIENT)
Dept: GASTROENTEROLOGY | Facility: CLINIC | Age: 70
End: 2020-10-09

## 2020-10-09 NOTE — TELEPHONE ENCOUNTER
Called Tirso/Tarsha to discuss pain and follow up. Per Dr. Denise after MRI  Unfortunately, I still need a HIDA to push us towards (or away) another invasive ERCP     Pt [facility]. Still having pain, has increased amount of pain meds he's taking Discussed importance of bowel meds to avoid constipation, recommended aysha lax, he took 1 dose today    Will f/ up with patient on Monday.    ML

## 2020-10-13 ENCOUNTER — TRANSFERRED RECORDS (OUTPATIENT)
Dept: HEALTH INFORMATION MANAGEMENT | Facility: CLINIC | Age: 70
End: 2020-10-13

## 2020-10-14 ENCOUNTER — TRANSFERRED RECORDS (OUTPATIENT)
Dept: HEALTH INFORMATION MANAGEMENT | Facility: CLINIC | Age: 70
End: 2020-10-14

## 2020-10-23 LAB — COVID-19 VIRUS PCR: NORMAL

## 2020-10-26 ENCOUNTER — ANESTHESIA EVENT (OUTPATIENT)
Dept: SURGERY | Facility: CLINIC | Age: 70
End: 2020-10-26
Payer: COMMERCIAL

## 2020-10-26 ENCOUNTER — PRE VISIT (OUTPATIENT)
Dept: SURGERY | Facility: CLINIC | Age: 70
End: 2020-10-26

## 2020-10-26 ENCOUNTER — OFFICE VISIT (OUTPATIENT)
Dept: SURGERY | Facility: CLINIC | Age: 70
End: 2020-10-26
Payer: COMMERCIAL

## 2020-10-26 VITALS
DIASTOLIC BLOOD PRESSURE: 54 MMHG | OXYGEN SATURATION: 95 % | WEIGHT: 207 LBS | HEIGHT: 64 IN | SYSTOLIC BLOOD PRESSURE: 90 MMHG | RESPIRATION RATE: 14 BRPM | BODY MASS INDEX: 35.34 KG/M2 | HEART RATE: 74 BPM

## 2020-10-26 DIAGNOSIS — Z01.818 PREOP EXAMINATION: Primary | ICD-10-CM

## 2020-10-26 DIAGNOSIS — Z11.59 ENCOUNTER FOR SCREENING FOR OTHER VIRAL DISEASES: ICD-10-CM

## 2020-10-26 DIAGNOSIS — Z01.818 PREOP EXAMINATION: ICD-10-CM

## 2020-10-26 LAB
ALBUMIN SERPL-MCNC: 3.2 G/DL (ref 3.4–5)
ALP SERPL-CCNC: 160 U/L (ref 40–150)
ALT SERPL W P-5'-P-CCNC: 21 U/L (ref 0–70)
AMYLASE SERPL-CCNC: 36 U/L (ref 30–110)
ANION GAP SERPL CALCULATED.3IONS-SCNC: 5 MMOL/L (ref 3–14)
AST SERPL W P-5'-P-CCNC: 18 U/L (ref 0–45)
BILIRUB SERPL-MCNC: 0.9 MG/DL (ref 0.2–1.3)
BUN SERPL-MCNC: 20 MG/DL (ref 7–30)
CALCIUM SERPL-MCNC: 8.5 MG/DL (ref 8.5–10.1)
CHLORIDE SERPL-SCNC: 102 MMOL/L (ref 94–109)
CO2 SERPL-SCNC: 27 MMOL/L (ref 20–32)
CREAT SERPL-MCNC: 0.98 MG/DL (ref 0.66–1.25)
ERYTHROCYTE [DISTWIDTH] IN BLOOD BY AUTOMATED COUNT: 12.7 % (ref 10–15)
GFR SERPL CREATININE-BSD FRML MDRD: 78 ML/MIN/{1.73_M2}
GLUCOSE SERPL-MCNC: 249 MG/DL (ref 70–99)
HBA1C MFR BLD: 7.8 % (ref 0–5.6)
HCT VFR BLD AUTO: 35.7 % (ref 40–53)
HGB BLD-MCNC: 11.2 G/DL (ref 13.3–17.7)
INR PPP: 1.25 (ref 0.86–1.14)
LIPASE SERPL-CCNC: 141 U/L (ref 73–393)
MCH RBC QN AUTO: 28.9 PG (ref 26.5–33)
MCHC RBC AUTO-ENTMCNC: 31.4 G/DL (ref 31.5–36.5)
MCV RBC AUTO: 92 FL (ref 78–100)
PLATELET # BLD AUTO: 136 10E9/L (ref 150–450)
POTASSIUM SERPL-SCNC: 4.2 MMOL/L (ref 3.4–5.3)
PROT SERPL-MCNC: 6.9 G/DL (ref 6.8–8.8)
RBC # BLD AUTO: 3.87 10E12/L (ref 4.4–5.9)
SODIUM SERPL-SCNC: 134 MMOL/L (ref 133–144)
WBC # BLD AUTO: 5.4 10E9/L (ref 4–11)

## 2020-10-26 PROCEDURE — 36415 COLL VENOUS BLD VENIPUNCTURE: CPT | Performed by: PATHOLOGY

## 2020-10-26 PROCEDURE — 80053 COMPREHEN METABOLIC PANEL: CPT | Performed by: PATHOLOGY

## 2020-10-26 PROCEDURE — 85610 PROTHROMBIN TIME: CPT | Performed by: PATHOLOGY

## 2020-10-26 PROCEDURE — 82150 ASSAY OF AMYLASE: CPT | Performed by: PATHOLOGY

## 2020-10-26 PROCEDURE — 99214 OFFICE O/P EST MOD 30 MIN: CPT | Performed by: PHYSICIAN ASSISTANT

## 2020-10-26 PROCEDURE — 83036 HEMOGLOBIN GLYCOSYLATED A1C: CPT | Performed by: PATHOLOGY

## 2020-10-26 PROCEDURE — 83690 ASSAY OF LIPASE: CPT | Performed by: PATHOLOGY

## 2020-10-26 PROCEDURE — 85027 COMPLETE CBC AUTOMATED: CPT | Performed by: PATHOLOGY

## 2020-10-26 ASSESSMENT — LIFESTYLE VARIABLES: TOBACCO_USE: 0

## 2020-10-26 ASSESSMENT — MIFFLIN-ST. JEOR: SCORE: 1614.95

## 2020-10-26 ASSESSMENT — PAIN SCALES - GENERAL: PAINLEVEL: SEVERE PAIN (6)

## 2020-10-26 ASSESSMENT — ENCOUNTER SYMPTOMS: SEIZURES: 0

## 2020-10-26 NOTE — H&P
"  Pre-Operative H & P     CC:  Preoperative exam to assess for increased cardiopulmonary risk while undergoing surgery and anesthesia.    Date of Encounter: 10/26/2020  Primary Care Physician:  System, Provider Not In  Associated Diagnosis: abdominal pain, Hepatocellular carcinoma with gallbladder involvement    HPI  John Mancia is a 70 year old male who presents for pre-operative H & P in preparation for ERCP with Dr. Denise on 10/27/2020 at [facility]. General anesthesia.    Mr. Mancia has a history of cirrhosis secondary to hepatitis C complicated by unresectable HCC. He c/o of \"Gallbladder\" pain, located in the mid axillary portion of the right upper quadrant with no radiation. He has had 3 episodes of post-prandial, classic, right upper quadrant abdominal pain that have been relatively severe, but only lasting an hour or so. Imaging shows persistence of an enhancing mass in the gallbladder.  He Is s/p ERCP with transpapillary gallbladder stenting 9/2/2020.  Patient did well for a couple of weeks but towards the end of September began having increased pain with some intermittent nausea, no vomiting.  He continued to have pain and underwent HIDA scan at an outside facility which did confirm acute cholecystitis.  The above procedure is now planned.    History is obtained from the patient and the medical record.    Past Medical History  Past Medical History:   Diagnosis Date     Allergic rhinitis      Cancer (H) 2019    History of Colon CA, HCC with mets to gallbladder     Congestive heart failure (H)      Depressive disorder      Diabetes (H)     TYPE II     Hypertension      Multiple food allergies     see epic     Sleep apnea     uses CPAP       Past Surgical History  Past Surgical History:   Procedure Laterality Date     ENDOSCOPIC RETROGRADE CHOLANGIOPANCREATOGRAM N/A 9/2/2020    Procedure: ENDOSCOPIC RETROGRADE CHOLANGIOPANCREATOGRAPHY with transpapillary " gallbladder stenting **Latex Allergy** with biliary sphincterotomy, stent placement, pancreatic stent placement, gallbladder stent placement;  Surgeon: Gary Denise MD;  [facility]     HERNIA REPAIR       IR CHEMO EMBOLIZATION  12/11/2019     IR SIRT (SELECTIVE INTERNAL RADIO THERAPY)  11/6/2019     IR VISCERAL ANGIOGRAM  11/6/2019     IR VISCERAL EMBOLIZATION  11/7/2019     VASCULAR SURGERY      liver ablation in [place]     VASECTOMY         Hx of Blood transfusions/reactions: denies     Hx of abnormal bleeding or anti-platelet use: denies    Menstrual history: No LMP for male patient.:     Steroid use in the last year: denies    Personal or FH with difficulty with Anesthesia:  PONV    Prior to Admission Medications  Current Outpatient Medications   Medication Sig Dispense Refill     Artificial Saliva (MOUTH KOTE) SOLN Take 3-5 sprays by mouth See Admin Instructions 3-5 times daily as needed       artificial tears OINT ophthalmic ointment Place into both eyes 2 times daily as needed for dry eyes Also called SYSTANE       brimonidine (ALPHAGAN) 0.2 % ophthalmic solution Place 1 drop into both eyes 3 times daily       buPROPion (WELLBUTRIN SR) 100 MG 12 hr tablet Take 100 mg by mouth every morning qam and at noon        carvedilol (COREG) 3.125 MG tablet Take 1 tablet by mouth 2 times daily       cetirizine (ZYRTEC) 10 MG tablet Take 10 mg by mouth every morning        fluticasone (FLONASE) 50 MCG/ACT nasal spray Spray 1 spray in nostril every morning       furosemide (LASIX) 20 MG tablet Take 20 mg by mouth 2 times daily        HYDROmorphone (DILAUDID) 2 MG tablet Take 1 tablet (2 mg) by mouth every 6 hours as needed for severe pain 10 tablet 0     insulin glargine (LANTUS PEN) 100 UNIT/ML pen Inject 16 Units Subcutaneous every morning       LANsoprazole (PREVACID) 30 MG DR capsule Take 15 mg by mouth 2 times daily   11     latanoprost (XALATAN) 0.005 % ophthalmic solution Place 1 drop into both  eyes At Bedtime        lisinopril (PRINIVIL/ZESTRIL) 20 MG tablet Take 20 mg by mouth every morning        psyllium (METAMUCIL/KONSYL) Packet Take 1 packet by mouth daily       terazosin (HYTRIN) 5 MG capsule Take 5 mg by mouth 2 times daily        traZODone (DESYREL) 100 MG tablet Take 1 tablet by mouth At Bedtime       VICTOZA PEN 18 MG/3ML soln Inject 1.8 mg Subcutaneous every morning        lactulose (CHRONULAC) 10 GM/15ML solution Take 10 g by mouth as needed        Lancet Devices (LANCET DEVICE WITH EJECTOR) MISC USE LANCET TOPICALLY FOUR TIMES A DAY AS NEEDED TO CHECK GLUCOSE       loperamide (IMODIUM) 2 MG capsule Take 1 capsule by mouth as needed        ondansetron (ZOFRAN) 4 MG tablet Take 1 tablet by mouth as needed       SUMAtriptan (IMITREX) 100 MG tablet Take 1 tablet by mouth as needed         Allergies  Allergies   Allergen Reactions     Penicillins Anaphylaxis and Shortness Of Breath     Metformin Diarrhea, GI Disturbance, Hives and Rash     Adhesive Tape Hives and Rash     Allyl Isothiocyanate GI Disturbance     Brassica Oleracea Italica GI Disturbance     Chicken-Derived Products (Egg) Itching, GI Disturbance and Rash     Corticosteroids Rash and GI Disturbance     Hmg-Coa-R Inhibitors Itching and GI Disturbance            Interferon Samm Palpitations and Rash     Lac Bovis GI Disturbance     Lactuca Virosa GI Disturbance     Latex Hives and Rash     Mometasone Itching, GI Disturbance and Rash     Omeprazole GI Disturbance, Itching, Cramps and Rash     Ondansetron Nausea and Vomiting     Peginterferon Samm-2b Palpitations     Piper      Chili pepper     Primidone      Proton Pump Inhibitors Itching and Rash     Ribavirin GI Disturbance, Itching and Palpitations     Rosuvastatin Nausea and Vomiting     Sorafenib Muscle Pain (Myalgia)            Spironolactone Itching and GI Disturbance       Social History  Social History     Socioeconomic History     Marital status: Single     Spouse name: Not on  file     Number of children: Not on file     Years of education: Not on file     Highest education level: Not on file   Occupational History     Not on file   Social Needs     Financial resource strain: Not on file     Food insecurity     Worry: Not on file     Inability: Not on file     Transportation needs     Medical: Not on file     Non-medical: Not on file   Tobacco Use     Smoking status: Passive Smoke Exposure - Never Smoker     Smokeless tobacco: Never Used   Substance and Sexual Activity     Alcohol use: Not Currently     Drug use: Yes     Types: Hydromorphone     Comment: Dilaudid po as needed     Sexual activity: Not Currently   Lifestyle     Physical activity     Days per week: Not on file     Minutes per session: Not on file     Stress: Not on file   Relationships     Social connections     Talks on phone: Not on file     Gets together: Not on file     Attends [employer] service: Not on file     Active member of club or organization: Not on file     Attends meetings of clubs or organizations: Not on file     Relationship status: Not on file     Intimate partner violence     Fear of current or ex partner: Not on file     Emotionally abused: Not on file     Physically abused: Not on file     Forced sexual activity: Not on file   Other Topics Concern     Parent/sibling w/ CABG, MI or angioplasty before 65F 55M? Not Asked   Social History Narrative     Not on file       Family History  Family History   Problem Relation Age of Onset     Anesthesia Reaction No family hx of      Cardiovascular No family hx of      Deep Vein Thrombosis (DVT) No family hx of            Anesthesia Evaluation     . Pt has had prior anesthetic.     History of anesthetic complications   - PONV        ROS/MED HX  The complete review of systems is negative other than noted in the HPI or here.   ENT/Pulmonary:     (+)sleep apnea, allergic rhinitis, uses CPAP , . .   (-) tobacco use and asthma   Neurologic:  - neg neurologic ROS    (-)  seizures, CVA and Neuropathy   Cardiovascular:     (+) hypertension----. : . . . :. . Previous cardiac testing date:results:Stress Testdate:8/5/20 results:EF 60%, no ischemiaECG reviewed date:9/2/2020 results:Sinus bradycardia with Premature atrial complexes  Inferior infarct (cited on or before 11-DEC-2019)  Abnormal ECG  When compared with ECG of 11-DEC-2019 12:39,  Premature atrial complexes are now Present date: results:          METS/Exercise Tolerance: Comment: Uses walker for balance due to left knee pain/instability. Able to walk 2 blocks w/o stopping. 3 - Able to walk 1-2 blocks without stopping   Hematologic:        (-) history of blood clots and History of Transfusion   Musculoskeletal: Comment: Diffuse arthritis, boo in knees    Hx right shoulder impingement  (+) arthritis,  -       GI/Hepatic: Comment: History of colon cancer, hepatocellular carcinoma w/gallbladder involvement    (+) GERD Asymptomatic on medication, hepatitis type C, liver disease,       Renal/Genitourinary:  - ROS Renal section negative   (+) BPH,       Endo:     (+) type II DM Last HgA1c: 7.5 date: 7/31/2020 Using insulin - not using insulin pump Normal glucose range: 100-150 Obesity, .      Psychiatric:     (+) psychiatric history depression      Infectious Disease:  - neg infectious disease ROS       Malignancy:   (+) Malignancy History of GI  GI CA Active status post,         Other: Comment: Taking Dilaudid, 1 tab/day prn, not daily   (+) H/O Chronic Pain,H/O chronic opiod use ,            PHYSICAL EXAM:   Mental Status/Neuro: A/A/O; Age Appropriate   Airway: Facies: Feasible  Mallampati: I  Mouth/Opening: Full  TM distance: > 6 cm  Neck ROM: Full   Respiratory: Auscultation: CTAB     Resp. Rate: Normal     Resp. Effort: Normal      CV: Rhythm: Regular  Rate: Age appropriate  Heart: Normal Sounds  Edema: None   Comments:      Dental: Normal Dentition                    BP: 90/54 Pulse: 74   Resp: 14 SpO2: 95 %         207 lbs 0 oz   "5' 4\"   Body mass index is 35.53 kg/m .       Physical Exam  Constitutional: Awake, alert, cooperative, no apparent distress, and appears stated age.  Eyes: Pupils equal, round and reactive to light, extra ocular muscles intact, sclera clear, conjunctiva normal. No icterus.  HENT: Normocephalic, oral pharynx with moist mucus membranes, good dentition. No goiter appreciated.   Respiratory: Clear to auscultation bilaterally, no crackles or wheezing.  Cardiovascular: Regular rate and rhythm, normal S1 and S2, and no murmur noted.  Carotids +2, no bruits. Bilateral LE edema, not pitting. Palpable pulses to radial  DP and PT arteries.   GI: Normal bowel sounds, soft, tender to palpation of RUQ/RLQ, no guarding.  Lymph/Hematologic: No cervical lymphadenopathy and no supraclavicular lymphadenopathy.  Genitourinary:  deferred  Skin: Warm and dry.    Musculoskeletal: Full ROM of neck. There is no redness, warmth, or swelling of the joints. Gross motor strength is normal.    Neurologic: Awake, alert, oriented to name, place and time. Cranial nerves II-XII are grossly intact.    Neuropsychiatric: Calm, cooperative. Normal affect.       Labs: (personally reviewed)  Component      Latest Ref Rng & Units 10/26/2020   INR      0.86 - 1.14 1.25 (H)     Component      Latest Ref Rng & Units 10/26/2020   WBC      4.0 - 11.0 10e9/L 5.4   RBC Count      4.4 - 5.9 10e12/L 3.87 (L)   Hemoglobin      13.3 - 17.7 g/dL 11.2 (L)   Hematocrit      40.0 - 53.0 % 35.7 (L)   MCV      78 - 100 fl 92   MCH      26.5 - 33.0 pg 28.9   MCHC      31.5 - 36.5 g/dL 31.4 (L)   RDW      10.0 - 15.0 % 12.7   Platelet Count      150 - 450 10e9/L 136 (L)     Component      Latest Ref Rng & Units 10/26/2020   Sodium      133 - 144 mmol/L 134   Potassium      3.4 - 5.3 mmol/L 4.2   Chloride      94 - 109 mmol/L 102   Carbon Dioxide      20 - 32 mmol/L 27   Anion Gap      3 - 14 mmol/L 5   Glucose      70 - 99 mg/dL 249 (H)   Urea Nitrogen      7 - 30 mg/dL 20 "   Creatinine      0.66 - 1.25 mg/dL 0.98   GFR Estimate      >60 mL/min/1.73:m2 78   GFR Estimate If Black      >60 mL/min/1.73:m2 >90   Calcium      8.5 - 10.1 mg/dL 8.5   Bilirubin Total      0.2 - 1.3 mg/dL 0.9   Albumin      3.4 - 5.0 g/dL 3.2 (L)   Protein Total      6.8 - 8.8 g/dL 6.9   Alkaline Phosphatase      40 - 150 U/L 160 (H)   ALT      0 - 70 U/L 21   AST      0 - 45 U/L 18     Component      Latest Ref Rng & Units 10/26/2020   Lipase      73 - 393 U/L 141     Component      Latest Ref Rng & Units 10/26/2020   Amylase      30 - 110 U/L 36     Component      Latest Ref Rng & Units 10/26/2020   Hemoglobin A1C      0 - 5.6 % 7.8 (H)     EKG: Personally reviewed but formal cardiology read pendin2020  Sinus bradycardia with Premature atrial complexes  Inferior infarct (cited on or before 11-DEC-2019)  Abnormal ECG  When compared with ECG of 11-DEC-2019 12:39,  Premature atrial complexes are now Present     Stress test: 2020:    Mild nonspecific symptoms with Lexiscan infusion.    Normal blood pressure response.    Lexiscan infusion EKG is interpreted with reduced accuracy, no ischemic   changes are noted with this caveat.    Normal left ventricular size with borderline apical hypokinesis, EF 60%.    Essentially normal myocardial perfusion study other than a minor apical   lateral fixed defect that is likely artifact.    Outside records reviewed from: care everywhere    ASSESSMENT and PLAN  John Mancia is a 70 year old male scheduled for ERCP on 10/27/2020 by Dr. Denise in treatment of abdominal pain, HCC with gallbladder involement.  PAC referral for risk assessment and optimization for anesthesia with comorbid conditions of esophageal varices status post endoscopic ligation, insulin dependent DM2, JAIR w/ CPAP, allergic rhinitis, HTN, HLD, GERD, depression, BPH, glaucoma, OA in knees, hx right shoulder impingement:    Pre-operative considerations:  He has the following specific operative  considerations:   # VTE risk: 3%  # Risk of PONV score = 3.  If > 2, anti-emetic intervention recommended.  H/o PONV.  # Anesthesia considerations:  Refer to PAC assessment in anesthesia records        CARDIAC: METS 3, Uses walker for balance due to left knee pain/instability. Able to walk 2 blocks w/o stopping.      # RCRI : Intermediate risk surgery. Diabetes Mellitus (on Insulin).  0.9% risk of major adverse cardiac event.     #  NM Stress test 8/5/20:     Lexiscan infusion EKG is interpreted with reduced accuracy, no ischemic   changes are noted with this caveat.    Normal left ventricular size with borderline apical hypokinesis, EF 60%.    Essentially normal myocardial perfusion study other than a minor apical   lateral fixed defect that is likely artifact.     #  EKG 9/2/2020: SB w/PAC, inferior infarct on or before 12/11/2019     #  BNP on 7/31/20:  39       #  HTN, will hold lasix & lisinopril on DOS, take Coreg     PULMONARY:     # JAIR w/ CPAP    # Never smoked    # No asthma or inhaler use     GI:  GERD, asymptomatic on prevacid, will take DOS    # Biliary colic, enhancing mass in the gallbladder on imaging    # cirrhosis secondary to hepatitis C complicated by unresectable HCC     ENDO: BMI 35    # Insulin dependent DM2, A1c on 10/26/20 was 7.8. On lantus & Victoza.  Will hold Victoza and take 80% of Lantus DOS.      ORTHO: mildly limited extension ROM of neck, no TMJ    # diffuse arthritis, boo knees. Fall risk. Uses walker for balance.    : h/o BPH w/LUTS.  Continue terazosin, will take DOS    Psych:    # depression, will take Wellbutrin DOS    Pain    # Taking Dilaudid for palliation     Patient is optimized and is acceptable candidate for the proposed procedure. No further diagnostic evaluation is needed.        Ida Diop PA-C  Preoperative Assessment Center  [facility]  [facility]  Phone: [phone]  Fax: [phone]

## 2020-10-26 NOTE — PATIENT INSTRUCTIONS
Preparing for Your Surgery      Name:  John Mancia   MRN:  [id number]   :  1950   Today's Date:  10/26/2020       Arriving for surgery:  Surgery date:  10/27/20  Arrival time:  6:15 am    Restrictions due to COVID 19:  Patients are allowed one visitor in the pre-op period  All visitors must wear a mask  No visitors under 18  No ill visitors   parking is available for anyone with mobility limitations or disabilities.  ([place]  24 hours/ 7 days a week; [facility]  7 am- 3:30 pm, Mon- Fri)    Please come to:       [facility] [place] [address]  [address] [place]  [address]    - ? parking is available in front of the hospital      -    Please proceed to the Surgery Lounge on the 3rd floor. [phone]?     - ?If you are in need of directions, wheelchair or escort please stop at the Information Desk in the lobby.  Inform the information person that you are here for surgery; a wheelchair and escort will be provided to the Surgery Lounge .?     What can I eat or drink?  -  You may eat and drink normally for up to 8 hours before your surgery. (Until midnight)  -  You may have clear liquids until 2 hours before surgery. (Until 6:15 am)  Examples of clear liquids:  Water  Clear broth  Juices (apple, white grape, white cranberry  and cider) without pulp  Noncarbonated, powder based beverages  (lemonade and Eddie-Aid)  Sodas (Sprite, 7-Up, ginger ale and seltzer)  Coffee or tea (without milk or cream)  Gatorade    -  No Alcohol for at least 24 hours before surgery     Which medicines can I take?    Hold Aspirin for 7 days before surgery.   Hold Multivitamins for 7 days before surgery.  Hold Supplements for 7 days before surgery.  Hold Ibuprofen (Advil, Motrin) for 1 day before surgery--unless otherwise directed by surgeon.  Hold Naproxen (Aleve) for 4 days before surgery.  **Reduce insulin glargine (Lantus) to 12 units the morning of surgery.**  **Hold Sumatriptan  (Imitrex) 24 hours before surgery.**    -  DO NOT take these medications the day of surgery:  Lisinopril (Prinivil/Zestril)  Psyllium (Metamucil)  Victoza  Loperamide (Imodium)  Lactulose (Chronulac)  Furosemide (Lasix)      -  PLEASE TAKE these medications the day of surgery:  Bupropion (Wellbutrin)  Carvedilol (Coreg)  Cetirizine (Zyrtec)  Fluticasone (Flonase)   Hydromorphone (Dilaudid)  Lansoprazole (Prevacid)  Ondansetron (Zofran)    How do I prepare myself?  - Please take 2 showers before surgery using Scrubcare or Hibiclens soap.    Use this soap only from the neck to your toes.     Leave the soap on your skin for one minute--then rinse thoroughly.      You may use your own shampoo and conditioner; no other hair products.   - Please remove all jewelry and body piercings.  - No lotions, deodorants or fragrance.  - Bring your ID and insurance card.    - All patients are required to have a Covid-19 test within 4 days of surgery/procedure.      -Patients will be contacted by the [facility] scheduling team within 1 week of surgery to make an appointment.      - Patients may call the Scheduling team at [phone] if they have not been scheduled within 4 days of  surgery.      ALL PATIENTS GOING HOME THE SAME DAY OF SURGERY ARE REQUIRED TO HAVE A RESPONSIBLE ADULT TO DRIVE AND BE IN ATTENDANCE WITH THEM FOR 24 HOURS FOLLOWING SURGERY     Questions or Concerns:    - For any questions regarding the day of surgery or your hospital stay, please contact the Pre Admission Nursing Office at [phone].       - If you have health changes between today and your surgery please call your surgeon.       For questions after surgery please call your surgeons office.

## 2020-10-26 NOTE — ANESTHESIA PREPROCEDURE EVALUATION
"Anesthesia Pre-Procedure Evaluation    Patient: John Mancia   MRN:     [id number] Gender:   male   Age:    70 year old :      1950        Preoperative Diagnosis: Abdominal pain, generalized [R10.84]   Procedure(s):  ENDOSCOPIC RETROGRADE CHOLANGIOPANCREATOGRAPHY  **Latex Allergy**     LABS:  CBC:   Lab Results   Component Value Date    WBC 3.4 (L) 2020    WBC 3.3 (L) 2020    HGB 12.3 (L) 2020    HGB 12.4 (L) 2020    HCT 38.7 (L) 2020    HCT 39.0 (L) 2020     (L) 2020     (L) 2020     BMP:   Lab Results   Component Value Date     2020     2020    POTASSIUM 4.2 2020    POTASSIUM 4.0 2020    CHLORIDE 108 2020    CHLORIDE 107 2020    CO2 28 2020    CO2 30 2020    BUN 18 2020    BUN 17 2020    CR 0.80 2020    CR 0.94 2020     (H) 2020     (H) 2020     COAGS:   Lab Results   Component Value Date    INR 1.12 2020     POC:   Lab Results   Component Value Date     (H) 2020     OTHER:   Lab Results   Component Value Date    A1C 7.5 (H) 2020    LISHA 8.8 2020    ALBUMIN 3.4 2020    PROTTOTAL 6.6 (L) 2020    ALT 36 2020    AST 32 2020    ALKPHOS 149 2020    BILITOTAL 0.6 2020    LIPASE 220 2020    AMYLASE 53 2020        Preop Vitals    BP Readings from Last 3 Encounters:   20 (!) 155/71   20 106/68   20 117/70    Pulse Readings from Last 3 Encounters:   20 52   20 63   20 68      Resp Readings from Last 3 Encounters:   20 16   20 16   20 19    SpO2 Readings from Last 3 Encounters:   20 99%   20 98%   20 96%      Temp Readings from Last 1 Encounters:   20 97.7  F (36.5  C) (Oral)    Ht Readings from Last 1 Encounters:   20 1.626 m (5' 4\")      Wt Readings from Last 1 Encounters:   20 " "96.1 kg (211 lb 13.8 oz)    Estimated body mass index is 36.37 kg/m  as calculated from the following:    Height as of 9/2/20: 1.626 m (5' 4\").    Weight as of 9/2/20: 96.1 kg (211 lb 13.8 oz).     LDA:        Past Medical History:   Diagnosis Date     Allergic rhinitis      Cancer (H) 2019    History of Colon CA, HCC with mets to gallbladder     Congestive heart failure (H)      Depressive disorder      Diabetes (H)     TYPE II     Hypertension      Multiple food allergies     see epic     Sleep apnea     uses CPAP      Past Surgical History:   Procedure Laterality Date     ENDOSCOPIC RETROGRADE CHOLANGIOPANCREATOGRAM N/A 9/2/2020    Procedure: ENDOSCOPIC RETROGRADE CHOLANGIOPANCREATOGRAPHY with transpapillary gallbladder stenting **Latex Allergy** with biliary sphincterotomy, stent placement, pancreatic stent placement, gallbladder stent placement;  Surgeon: Gary Denise MD;  Location: [facility] OR     HERNIA REPAIR       IR CHEMO EMBOLIZATION  12/11/2019     IR SIRT (SELECTIVE INTERNAL RADIO THERAPY)  11/6/2019     IR VISCERAL ANGIOGRAM  11/6/2019     IR VISCERAL EMBOLIZATION  11/7/2019     VASCULAR SURGERY      liver ablation in [place]     VASECTOMY        Allergies   Allergen Reactions     Penicillins Anaphylaxis and Shortness Of Breath     Metformin Diarrhea, GI Disturbance, Hives and Rash     Adhesive Tape Hives and Rash     Allyl Isothiocyanate GI Disturbance     Brassica Oleracea Italica GI Disturbance     Chicken-Derived Products (Egg) Itching, GI Disturbance and Rash     Corticosteroids Rash and GI Disturbance     Hmg-Coa-R Inhibitors Itching and GI Disturbance            Interferon Samm Palpitations and Rash     Lac Bovis GI Disturbance     Lactuca Virosa GI Disturbance     Latex Hives and Rash     Mometasone Itching, GI Disturbance and Rash     Omeprazole GI Disturbance, Itching, Cramps and Rash     Ondansetron Nausea and Vomiting     Peginterferon Samm-2b Palpitations     Piper      Chili pepper "     Primidone      Proton Pump Inhibitors Itching and Rash     Ribavirin GI Disturbance, Itching and Palpitations     Rosuvastatin Nausea and Vomiting     Sorafenib Muscle Pain (Myalgia)            Spironolactone Itching and GI Disturbance        Anesthesia Evaluation     . Pt has had prior anesthetic.     History of anesthetic complications   - PONV        ROS/MED HX    ENT/Pulmonary:     (+)sleep apnea, allergic rhinitis, uses CPAP , . .   (-) tobacco use and asthma   Neurologic:  - neg neurologic ROS    (-) seizures, CVA and Neuropathy   Cardiovascular:     (+) hypertension----. : . . . :. . Previous cardiac testing date:results:Stress Testdate:8/5/20 results:EF 60%, no ischemiaECG reviewed date:9/2/2020 results:Sinus bradycardia with Premature atrial complexes  Inferior infarct (cited on or before 11-DEC-2019)  Abnormal ECG  When compared with ECG of 11-DEC-2019 12:39,  Premature atrial complexes are now Present date: results:          METS/Exercise Tolerance: Comment: Uses walker for balance due to left knee pain/instability. Able to walk 2 blocks w/o stopping. 3 - Able to walk 1-2 blocks without stopping   Hematologic:        (-) history of blood clots and History of Transfusion   Musculoskeletal: Comment: Diffuse arthritis, boo in knees    Hx right shoulder impingement  (+) arthritis,  -       GI/Hepatic: Comment: History of colon cancer, hepatocellular carcinoma w/gallbladder involvement    (+) GERD Asymptomatic on medication, hepatitis type C, liver disease,       Renal/Genitourinary:  - ROS Renal section negative   (+) BPH,       Endo:     (+) type II DM Last HgA1c: 7.5 date: 7/31/2020 Using insulin - not using insulin pump Normal glucose range: 100-150 Obesity, .      Psychiatric:     (+) psychiatric history depression      Infectious Disease:  - neg infectious disease ROS       Malignancy:   (+) Malignancy History of GI  GI CA Active status post,         Other: Comment: Taking Dilaudid, 1 tab/day prn,  not daily   (+) H/O Chronic Pain,H/O chronic opiod use ,                        PHYSICAL EXAM:   Mental Status/Neuro: A/A/O; Age Appropriate   Airway: Facies: Feasible  Mallampati: I  Mouth/Opening: Full  TM distance: > 6 cm  Neck ROM: Full   Respiratory: Auscultation: CTAB     Resp. Rate: Normal     Resp. Effort: Normal      CV: Rhythm: Regular  Rate: Age appropriate  Heart: Normal Sounds  Edema: None   Comments:      Dental: Normal Dentition                Assessment:   ASA SCORE: 3    H&P: History and physical reviewed and following examination; no interval change.   Smoking Status:  Non-Smoker/Unknown   NPO Status: NPO Appropriate     Plan:   Anes. Type:  General   Pre-Medication: None   Induction:  IV (Standard)   Airway: ETT; Oral   Access/Monitoring: PIV   Maintenance: Balanced     Postop Plan:   Postop Pain: Opioids  Postop Sedation/Airway: Not planned  Disposition: Outpatient     PONV Management:  NO PONV Prophylaxis Required     CONSENT: Direct conversation   Plan and risks discussed with: Patient                   PAC Discussion and Assessment    ASA Classification: 3  Case is suitable for: [place]  Anesthetic techniques and relevant risks discussed: GA  Invasive monitoring and risk discussed: No  Types:   Possibility and Risk of blood transfusion discussed: No  NPO instructions given:   Additional anesthetic preparation and risks discussed:   Needs early admission to pre-op area:   Other:     PAC Resident/NP Anesthesia Assessment:  John Mancia is a 70 year old male scheduled for ERCP on 10/27/2020 by Dr. Denise in treatment of abdominal pain, HCC with gallbladder involement.  PAC referral for risk assessment and optimization for anesthesia with comorbid conditions of esophageal varices status post endoscopic ligation, insulin dependent DM2, JAIR w/ CPAP, allergic rhinitis, HTN, HLD, GERD, depression, BPH, glaucoma, OA in knees, hx right shoulder impingement:    Pre-operative considerations:  He has  the following specific operative considerations:   # VTE risk: 3%  # Risk of PONV score = 3.  If > 2, anti-emetic intervention recommended.  H/o PONV.  # Anesthesia considerations:  Refer to PAC assessment in anesthesia records        CARDIAC: METS 3, Uses walker for balance due to left knee pain/instability. Able to walk 2 blocks w/o stopping.      # RCRI : Intermediate risk surgery. Diabetes Mellitus (on Insulin).  0.9% risk of major adverse cardiac event.     #  NM Stress test 8/5/20:     Lexiscan infusion EKG is interpreted with reduced accuracy, no ischemic   changes are noted with this caveat.    Normal left ventricular size with borderline apical hypokinesis, EF 60%.    Essentially normal myocardial perfusion study other than a minor apical   lateral fixed defect that is likely artifact.     #  EKG 9/2/2020: SB w/PAC, inferior infarct on or before 12/11/2019     #  BNP on 7/31/20:  39       #  HTN, will hold lasix & lisinopril on DOS, take Coreg     PULMONARY:     # JAIR w/ CPAP    # Never smoked    # No asthma or inhaler use     GI:  GERD, asymptomatic on prevacid, will take DOS    # Biliary colic, enhancing mass in the gallbladder on imaging    # cirrhosis secondary to hepatitis C complicated by unresectable HCC     ENDO: BMI 35    # Insulin dependent DM2, A1c on 7/31/20 was 7.5. On lantus & Victoza.  Will hold Victoza and take 80% of Lantus DOS.      ORTHO: mildly limited extension ROM of neck, no TMJ    # diffuse arthritis, boo knees. Fall risk. Uses walker for balance.    : h/o BPH w/LUTS.  Continue terazosin, will take DOS    Psych:    # depression, will take Wellbutrin DOS    Pain    # Taking Dilaudid for palliation     Patient is optimized and is acceptable candidate for the proposed procedure. No further diagnostic evaluation is needed.        **For further details of assessment, testing, and physical exam please see H and P completed on same date.      Ida Diop PA-C, [facility]      Reviewed and  Signed by PAC Mid-Level Provider/Resident  Mid-Level Provider/Resident: Ida Diop  Date: 10/26/2020  Time:     Attending Anesthesiologist Anesthesia Assessment:        Anesthesiologist:   Date:   Time:   Pass/Fail:   Disposition:     PAC Pharmacist Assessment:        Pharmacist:   Date:   Time:    Ida Diop PA-C

## 2020-10-27 ENCOUNTER — ANESTHESIA (OUTPATIENT)
Dept: SURGERY | Facility: CLINIC | Age: 70
End: 2020-10-27
Payer: COMMERCIAL

## 2020-10-27 ENCOUNTER — APPOINTMENT (OUTPATIENT)
Dept: GENERAL RADIOLOGY | Facility: CLINIC | Age: 70
End: 2020-10-27
Attending: INTERNAL MEDICINE
Payer: COMMERCIAL

## 2020-10-27 ENCOUNTER — HOSPITAL ENCOUNTER (OUTPATIENT)
Facility: CLINIC | Age: 70
Discharge: HOME | End: 2020-10-27
Attending: INTERNAL MEDICINE | Admitting: INTERNAL MEDICINE
Payer: COMMERCIAL

## 2020-10-27 VITALS
OXYGEN SATURATION: 94 % | SYSTOLIC BLOOD PRESSURE: 91 MMHG | HEIGHT: 65 IN | BODY MASS INDEX: 35.04 KG/M2 | DIASTOLIC BLOOD PRESSURE: 69 MMHG | WEIGHT: 210.32 LBS | RESPIRATION RATE: 16 BRPM | HEART RATE: 81 BPM | TEMPERATURE: 98.8 F

## 2020-10-27 DIAGNOSIS — R10.84 ABDOMINAL PAIN, GENERALIZED: ICD-10-CM

## 2020-10-27 LAB
ALBUMIN SERPL-MCNC: 3.1 G/DL (ref 3.4–5)
ALP SERPL-CCNC: 183 U/L (ref 40–150)
ALT SERPL W P-5'-P-CCNC: 24 U/L (ref 0–70)
AMYLASE SERPL-CCNC: 37 U/L (ref 30–110)
ANION GAP SERPL CALCULATED.3IONS-SCNC: 4 MMOL/L (ref 3–14)
AST SERPL W P-5'-P-CCNC: 21 U/L (ref 0–45)
BILIRUB SERPL-MCNC: 0.7 MG/DL (ref 0.2–1.3)
BUN SERPL-MCNC: 18 MG/DL (ref 7–30)
CALCIUM SERPL-MCNC: 8.6 MG/DL (ref 8.5–10.1)
CHLORIDE SERPL-SCNC: 105 MMOL/L (ref 94–109)
CO2 SERPL-SCNC: 26 MMOL/L (ref 20–32)
CREAT SERPL-MCNC: 0.92 MG/DL (ref 0.66–1.25)
ERCP: NORMAL
ERYTHROCYTE [DISTWIDTH] IN BLOOD BY AUTOMATED COUNT: 12.6 % (ref 10–15)
GFR SERPL CREATININE-BSD FRML MDRD: 84 ML/MIN/{1.73_M2}
GLUCOSE BLDC GLUCOMTR-MCNC: 156 MG/DL (ref 70–99)
GLUCOSE BLDC GLUCOMTR-MCNC: 164 MG/DL (ref 70–99)
GLUCOSE SERPL-MCNC: 164 MG/DL (ref 70–99)
HCT VFR BLD AUTO: 35.3 % (ref 40–53)
HGB BLD-MCNC: 10.9 G/DL (ref 13.3–17.7)
INR PPP: 1.19 (ref 0.86–1.14)
LIPASE SERPL-CCNC: 104 U/L (ref 73–393)
MCH RBC QN AUTO: 28.8 PG (ref 26.5–33)
MCHC RBC AUTO-ENTMCNC: 30.9 G/DL (ref 31.5–36.5)
MCV RBC AUTO: 93 FL (ref 78–100)
PLATELET # BLD AUTO: 156 10E9/L (ref 150–450)
POTASSIUM SERPL-SCNC: 4.7 MMOL/L (ref 3.4–5.3)
PROT SERPL-MCNC: 7 G/DL (ref 6.8–8.8)
RBC # BLD AUTO: 3.78 10E12/L (ref 4.4–5.9)
SODIUM SERPL-SCNC: 135 MMOL/L (ref 133–144)
WBC # BLD AUTO: 4.9 10E9/L (ref 4–11)

## 2020-10-27 PROCEDURE — 83690 ASSAY OF LIPASE: CPT | Performed by: STUDENT IN AN ORGANIZED HEALTH CARE EDUCATION/TRAINING PROGRAM

## 2020-10-27 PROCEDURE — 250N000009 HC RX 250: Performed by: NURSE ANESTHETIST, CERTIFIED REGISTERED

## 2020-10-27 PROCEDURE — 761N000007 HC RECOVERY PHASE 2 EACH 15 MINS: Performed by: INTERNAL MEDICINE

## 2020-10-27 PROCEDURE — 761N000003 HC RECOVERY PHASE 1 LEVEL 2 FIRST HR: Performed by: INTERNAL MEDICINE

## 2020-10-27 PROCEDURE — C1726 CATH, BAL DIL, NON-VASCULAR: HCPCS | Performed by: INTERNAL MEDICINE

## 2020-10-27 PROCEDURE — 250N000003 HC SEVOFLURANE, EA 15 MIN: Performed by: INTERNAL MEDICINE

## 2020-10-27 PROCEDURE — 272N000001 HC OR GENERAL SUPPLY STERILE: Performed by: INTERNAL MEDICINE

## 2020-10-27 PROCEDURE — 250N000009 HC RX 250: Performed by: STUDENT IN AN ORGANIZED HEALTH CARE EDUCATION/TRAINING PROGRAM

## 2020-10-27 PROCEDURE — 43276 ERCP STENT EXCHANGE W/DILATE: CPT | Performed by: INTERNAL MEDICINE

## 2020-10-27 PROCEDURE — 250N000009 HC RX 250: Performed by: DENTIST

## 2020-10-27 PROCEDURE — C1769 GUIDE WIRE: HCPCS | Performed by: INTERNAL MEDICINE

## 2020-10-27 PROCEDURE — 999N001017 HC STATISTIC GLUCOSE BY METER IP

## 2020-10-27 PROCEDURE — 258N000003 HC RX IP 258 OP 636: Performed by: NURSE ANESTHETIST, CERTIFIED REGISTERED

## 2020-10-27 PROCEDURE — 370N000001 HC ANESTHESIA TECHNICAL FEE, 1ST 30 MIN: Performed by: INTERNAL MEDICINE

## 2020-10-27 PROCEDURE — 85610 PROTHROMBIN TIME: CPT | Performed by: STUDENT IN AN ORGANIZED HEALTH CARE EDUCATION/TRAINING PROGRAM

## 2020-10-27 PROCEDURE — 255N000002 HC RX 255 OP 636: Performed by: INTERNAL MEDICINE

## 2020-10-27 PROCEDURE — 258N000003 HC RX IP 258 OP 636: Performed by: ANESTHESIOLOGY

## 2020-10-27 PROCEDURE — 370N000002 HC ANESTHESIA TECHNICAL FEE, EACH ADDTL 15 MIN: Performed by: INTERNAL MEDICINE

## 2020-10-27 PROCEDURE — 250N000011 HC RX IP 250 OP 636: Performed by: NURSE ANESTHETIST, CERTIFIED REGISTERED

## 2020-10-27 PROCEDURE — 85027 COMPLETE CBC AUTOMATED: CPT | Performed by: STUDENT IN AN ORGANIZED HEALTH CARE EDUCATION/TRAINING PROGRAM

## 2020-10-27 PROCEDURE — 82150 ASSAY OF AMYLASE: CPT | Performed by: STUDENT IN AN ORGANIZED HEALTH CARE EDUCATION/TRAINING PROGRAM

## 2020-10-27 PROCEDURE — 360N000025 HC SURGERY LEVEL 3 W FLUORO 1ST 30 MIN - UMMC: Performed by: INTERNAL MEDICINE

## 2020-10-27 PROCEDURE — 999N000139 HC STATISTIC PRE-PROCEDURE ASSESSMENT II: Performed by: INTERNAL MEDICINE

## 2020-10-27 PROCEDURE — 999N000179 XR SURGERY CARM FLUORO LESS THAN 5 MIN W STILLS: Mod: TC

## 2020-10-27 PROCEDURE — 74328 X-RAY BILE DUCT ENDOSCOPY: CPT | Mod: 26 | Performed by: INTERNAL MEDICINE

## 2020-10-27 PROCEDURE — 360N000023 HC SURGERY LEVEL 3 EA 15 ADDTL MIN UMMC: Performed by: INTERNAL MEDICINE

## 2020-10-27 PROCEDURE — C1877 STENT, NON-COAT/COV W/O DEL: HCPCS | Performed by: INTERNAL MEDICINE

## 2020-10-27 PROCEDURE — 80053 COMPREHEN METABOLIC PANEL: CPT | Performed by: STUDENT IN AN ORGANIZED HEALTH CARE EDUCATION/TRAINING PROGRAM

## 2020-10-27 PROCEDURE — 36415 COLL VENOUS BLD VENIPUNCTURE: CPT | Performed by: STUDENT IN AN ORGANIZED HEALTH CARE EDUCATION/TRAINING PROGRAM

## 2020-10-27 PROCEDURE — 250N000009 HC RX 250: Performed by: INTERNAL MEDICINE

## 2020-10-27 DEVICE — STENT ZIMMON BILIARY 07FRX15CM DBL PIGTAIL: Type: IMPLANTABLE DEVICE | Site: BILE DUCT | Status: FUNCTIONAL

## 2020-10-27 RX ORDER — INDOMETHACIN 50 MG/1
100 SUPPOSITORY RECTAL
Status: COMPLETED | OUTPATIENT
Start: 2020-10-27 | End: 2020-10-27

## 2020-10-27 RX ORDER — LIDOCAINE 40 MG/G
CREAM TOPICAL
Status: DISCONTINUED | OUTPATIENT
Start: 2020-10-27 | End: 2020-10-27 | Stop reason: HOSPADM

## 2020-10-27 RX ORDER — SODIUM CHLORIDE, SODIUM LACTATE, POTASSIUM CHLORIDE, CALCIUM CHLORIDE 600; 310; 30; 20 MG/100ML; MG/100ML; MG/100ML; MG/100ML
INJECTION, SOLUTION INTRAVENOUS CONTINUOUS
Status: DISCONTINUED | OUTPATIENT
Start: 2020-10-27 | End: 2020-10-27 | Stop reason: HOSPADM

## 2020-10-27 RX ORDER — ONDANSETRON 4 MG/1
4 TABLET, ORALLY DISINTEGRATING ORAL EVERY 30 MIN PRN
Status: DISCONTINUED | OUTPATIENT
Start: 2020-10-27 | End: 2020-10-27 | Stop reason: HOSPADM

## 2020-10-27 RX ORDER — FENTANYL CITRATE 50 UG/ML
INJECTION, SOLUTION INTRAMUSCULAR; INTRAVENOUS PRN
Status: DISCONTINUED | OUTPATIENT
Start: 2020-10-27 | End: 2020-10-27

## 2020-10-27 RX ORDER — NALOXONE HYDROCHLORIDE 0.4 MG/ML
.1-.4 INJECTION, SOLUTION INTRAMUSCULAR; INTRAVENOUS; SUBCUTANEOUS
Status: DISCONTINUED | OUTPATIENT
Start: 2020-10-27 | End: 2020-10-27 | Stop reason: HOSPADM

## 2020-10-27 RX ORDER — FENTANYL CITRATE 50 UG/ML
25-50 INJECTION, SOLUTION INTRAMUSCULAR; INTRAVENOUS
Status: DISCONTINUED | OUTPATIENT
Start: 2020-10-27 | End: 2020-10-27 | Stop reason: HOSPADM

## 2020-10-27 RX ORDER — ONDANSETRON 2 MG/ML
4 INJECTION INTRAMUSCULAR; INTRAVENOUS EVERY 30 MIN PRN
Status: DISCONTINUED | OUTPATIENT
Start: 2020-10-27 | End: 2020-10-27 | Stop reason: HOSPADM

## 2020-10-27 RX ORDER — LIDOCAINE HYDROCHLORIDE 20 MG/ML
INJECTION, SOLUTION INFILTRATION; PERINEURAL PRN
Status: DISCONTINUED | OUTPATIENT
Start: 2020-10-27 | End: 2020-10-27

## 2020-10-27 RX ORDER — IOPAMIDOL 510 MG/ML
INJECTION, SOLUTION INTRAVASCULAR PRN
Status: DISCONTINUED | OUTPATIENT
Start: 2020-10-27 | End: 2020-10-27 | Stop reason: HOSPADM

## 2020-10-27 RX ORDER — PROPOFOL 10 MG/ML
INJECTION, EMULSION INTRAVENOUS PRN
Status: DISCONTINUED | OUTPATIENT
Start: 2020-10-27 | End: 2020-10-27

## 2020-10-27 RX ORDER — CIPROFLOXACIN 2 MG/ML
INJECTION, SOLUTION INTRAVENOUS PRN
Status: DISCONTINUED | OUTPATIENT
Start: 2020-10-27 | End: 2020-10-27

## 2020-10-27 RX ADMIN — SUGAMMADEX 200 MG: 100 INJECTION, SOLUTION INTRAVENOUS at 08:48

## 2020-10-27 RX ADMIN — PROPOFOL 130 MG: 10 INJECTION, EMULSION INTRAVENOUS at 08:01

## 2020-10-27 RX ADMIN — SODIUM CHLORIDE, POTASSIUM CHLORIDE, SODIUM LACTATE AND CALCIUM CHLORIDE: 600; 310; 30; 20 INJECTION, SOLUTION INTRAVENOUS at 07:51

## 2020-10-27 RX ADMIN — PHENYLEPHRINE HYDROCHLORIDE 200 MCG: 10 INJECTION INTRAVENOUS at 08:46

## 2020-10-27 RX ADMIN — CIPROFLOXACIN 400 MG: 2 INJECTION INTRAVENOUS at 08:07

## 2020-10-27 RX ADMIN — ROCURONIUM BROMIDE 50 MG: 10 INJECTION INTRAVENOUS at 08:01

## 2020-10-27 RX ADMIN — Medication 1 UNITS: at 08:10

## 2020-10-27 RX ADMIN — PHENYLEPHRINE HYDROCHLORIDE 200 MCG: 10 INJECTION INTRAVENOUS at 08:41

## 2020-10-27 RX ADMIN — FENTANYL CITRATE 50 MCG: 50 INJECTION, SOLUTION INTRAMUSCULAR; INTRAVENOUS at 08:01

## 2020-10-27 RX ADMIN — PHENYLEPHRINE HYDROCHLORIDE 100 MCG: 10 INJECTION INTRAVENOUS at 08:10

## 2020-10-27 RX ADMIN — Medication 1 UNITS: at 08:24

## 2020-10-27 RX ADMIN — LIDOCAINE HYDROCHLORIDE 100 MG: 20 INJECTION, SOLUTION INFILTRATION; PERINEURAL at 08:01

## 2020-10-27 ASSESSMENT — MIFFLIN-ST. JEOR: SCORE: 1645.88

## 2020-10-27 NOTE — ANESTHESIA PROCEDURE NOTES
Airway   Date/Time: 10/27/2020 8:15 AM   Patient location during procedure: OR    Staff -   CRNA: Nissa Newman APRN CRNA  Performed By: CRNA    Consent for Airway   Urgency: elective    Indications and Patient Condition  Indications for airway management: sayda-procedural  Induction type:intravenousMask difficulty assessment: 2 - vent by mask + OA or adjuvant +/- NMBA    Final Airway Details  Final airway type: endotracheal airway  Successful airway:ETT - single  Endotracheal Airway Details   ETT size (mm): 8.0  Cuffed: yes  Cuff volume (mL): 10  Successful intubation technique: direct laryngoscopy  Grade View of Cords: 1  Adjucts: stylet  Measured from: lips  Secured at (cm): 23  Secured with: commercial tube colon  Bite block used: Soft    Post intubation assessment   Placement verified by: capnometry, equal breath sounds and chest rise   Number of attempts at approach: 1  Number of other approaches attempted: 0  Secured with:commercial tube colon  Ease of procedure: easy  Dentition: Intact and Unchanged

## 2020-10-27 NOTE — DISCHARGE INSTRUCTIONS
[facility], [place]  Same-Day Surgery ERCP Procedure   Adult Discharge Orders & Instructions     You had a procedure known as an Endoscopic Retrograde Cholangiopancreatography (ERCP). Your healthcare provider performed the ERCP to look at your bile or pancreatic ducts, and to locate and/or treat blockages (dilation, stenting, removal, etc.) in these ducts. Often biopsies, small samples of tissue, are taken to help diagnose and/or classify stages of disease growth. This procedure is used to diagnose diseases of the pancreas, bile ducts, pancreatic duct, liver, and gallbladder.     After your procedure   1. Make sure to clarify with your healthcare provider any diet restrictions (For example, clear liquid, low fat, no caffeine, etc.)   2. Do NOT take aspirin containing medications or any other blood-thinning medicines (anticoagulants) until your healthcare provider says it's OK.   3. You MAY be prescribed antibiotics, depending on what was done and/or found during your EUS, make sure to take antibiotics as prescribed by your healthcare provider    For 24 hours after surgery  1. Get plenty of rest.  A responsible adult must stay with you for at least 24 hours after you leave the hospital.   2. Do not drive or use heavy equipment.  If you have weakness or tingling, don't drive or use heavy equipment until this feeling goes away.  3. Do not drink alcohol.  4. Avoid strenuous or risky activities (gym, yoga, cycling, etc.).  Ask for help when climbing stairs.   5. You may feel lightheaded.  IF so, sit for a few minutes before standing.  Have someone help you get up.   6. If you have nausea (feel sick to your stomach): Drink only clear liquids such as apple juice, ginger ale, broth or 7-Up.  Rest may also help.  Be sure to drink enough fluids.  Move to a regular diet as you feel able.  7. If you feel bloated or have too much gas, use a heating pad on your belly to help reduce the discomfort.  This should help you feel better.   8. You may have a slight fever. This is normal for the first 24 hours.   9. You may have a dry mouth, a sore throat, muscle aches or trouble sleeping.  These are normal and will go away after 24 hours. A sore throat is most common. Use lozenges or gargle with salt water to ease the discomfort.   10. Do not make important or legal decisions.      Call your doctor for any of the followin. Chest pain, and/or shortness of breath  2. Abdominal  pain, bloating or cramping that has not improved or does not respond to pain reliving medications (Tylenol or narcotics if prescribed)   3. Difficulty swallowing or feeling as though food or liquids are stuck in your throat   4. Sore throat lasting more than 2 days or pain that has worsened over time   5. Black or tarry stools   6. Nausea and/or vomiting that is not resolving or has not responded to anti-nausea medications prescribed to you   7. It has been over 8 to 10 hours since surgery and you are still not able to urinate (pass water)   8. Headache for over 24 hours   9. Fever over 100.5 F (38 C) lasting more than 24 hours after the procedure   10. Signs of jaundice or blockage (fever, chills, abdominal pain, yellowing of the whites of your eyes, yellowing of your skin, and/or passing darker than normal urine)     To contact a doctor, call:   [ ] Dr Denise at [phone] (clinic) (Monday thru Friday 8:00am to 4:30pm)   [ ] [phone] and ask for the Gastroenterology resident on call (answered 24 hours a day)   [ ] Emergency Department: [facility]: [phone]     Take it easy when you get home.  Remember, same day surgery DOES NOT MEAN SAME DAY RECOVERY!  Healing is a gradual process.  You will need some time to recover - you may be more tired than you realize at first.  Rest and relax for at least the first 24 hours at home.  You'll feel better and heal faster if you take good care of yourself.     s      .

## 2020-10-27 NOTE — ANESTHESIA POSTPROCEDURE EVALUATION
Anesthesia POST Procedure Evaluation    Patient: John Mancia .   MRN:     [id number] Gender:   male   Age:    70 year old :      1950        Preoperative Diagnosis: Abdominal pain, generalized [R10.84]   Procedure(s):  ENDOSCOPIC RETROGRADE CHOLANGIOPANCREATOGRAPHY  **Latex Allergy** with Stent Exchange   Postop Comments: No value filed.     Anesthesia Type: General       Disposition: Outpatient   Postop Pain Control: Uneventful            Sign Out: Well controlled pain   PONV: No   Neuro/Psych: Uneventful            Sign Out: Acceptable/Baseline neuro status   Airway/Respiratory: Uneventful            Sign Out: Acceptable/Baseline resp. status   CV/Hemodynamics: Uneventful            Sign Out: Acceptable CV status   Other NRE: NONE   DID A NON-ROUTINE EVENT OCCUR? No         Last Anesthesia Record Vitals:  CRNA VITALS  10/27/2020 0825 - 10/27/2020 0925      10/27/2020             Pulse:  75    Ht Rate:  71    SpO2:  100 %    Resp Rate (observed):  (!) 5          Last PACU Vitals:  Vitals Value Taken Time   /58 10/27/20 0920   Temp 37.3  C (99.14  F) 10/27/20 0928   Pulse 72 10/27/20 0928   Resp 16 10/27/20 0915   SpO2 94 % 10/27/20 0928   Temp src     NIBP     Pulse     SpO2     Resp     Temp     Ht Rate     Temp 2     Vitals shown include unvalidated device data.      Electronically Signed By: Kasandra Yates MD, 2020, 9:29 AM

## 2020-10-27 NOTE — ANESTHESIA CARE TRANSFER NOTE
Patient: John Mancia Sr.    Procedure(s):  ENDOSCOPIC RETROGRADE CHOLANGIOPANCREATOGRAPHY  **Latex Allergy** with Stent Exchange    Diagnosis: Abdominal pain, generalized [R10.84]  Diagnosis Additional Information: No value filed.    Anesthesia Type:   No value filed.     Note:  Airway :Nasal Cannula  Patient transferred to:PACU  Comments: 134/63 98.2Handoff Report: Identifed the Patient, Identified the Reponsible Provider, Reviewed the pertinent medical history, Discussed the surgical course, Reviewed Intra-OP anesthesia mangement and issues during anesthesia, Set expectations for post-procedure period and Allowed opportunity for questions and acknowledgement of understanding      Vitals: (Last set prior to Anesthesia Care Transfer)    CRNA VITALS  10/27/2020 0825 - 10/27/2020 0904      10/27/2020             Pulse:  75    Ht Rate:  71    SpO2:  100 %    Resp Rate (observed):  (!) 5                Electronically Signed By: YEE Martinez CRNA  October 27, 2020  9:04 AM

## 2020-10-27 NOTE — OP NOTE
ERCP 10/27/2020  7:59 AM [facility], [address] [place] [address] [phone]     Endoscopy Department   _______________________________________________________________________________   Patient Name: John Mancia           Procedure Date: 10/27/2020 7:59 AM   MRN: [id number]                       Account Number: [id number]   YOB: 1950               Admit Type: Outpatient   Age: 70                               Room: OR   Gender: Male                          Note Status: Finalized   Attending MD: Gary Denise MD   Total Sedation Time:   _______________________________________________________________________________       Procedure:           ERCP   Indications:         Cholecystitis, nonsurgical candidate at this time   Providers:           Gary Denise MD   Patient Profile:     Mr Mancia is a 69yo gentleman with HCV to HCC under                        various therapies though now with involvment of the                        gallbladder. With ongoing RUQ pain and suspected                        cholecystitis he underwent ERCP with placement of a                        transpapillary stent gallbladder and common duct stent,                        the latter for a suspect common duct stenosis. Symptoms                        improved, though more recently recurred with inteval                        imaging demonstrating recurrent or persistent                        cholecystitis. He now proceeds to repeat ERCP.   Referring MD:        Priyank Munoz   Medicines:           General Anesthesia, Indomethacin 100 mg [place]   Complications:       No immediate complications.   _______________________________________________________________________________   Procedure:           Pre-Anesthesia Assessment:                        - Prior to the procedure, a History and Physical was                        performed, and patient medications and allergies were                         reviewed. The patient is competent. The risks and                        benefits of the procedure and the sedation options and                        risks were discussed with the patient. All questions                        were answered and informed consent was obtained. Patient                        identification and proposed procedure were verified by                        the nurse in the pre-procedure area. Mental Status                        Examination: alert and oriented. Airway Examination:                        Mallampati Class II (the uvula but not tonsillar pillars                        visualized). Respiratory Examination: clear to                        auscultation. CV Examination: normal. ASA Grade                        Assessment: III - A patient with severe systemic                        disease. After reviewing the risks and benefits, the                        patient was deemed in satisfactory condition to undergo                        the procedure. The anesthesia plan was to use general                        anesthesia. Immediately prior to administration of                        medications, the patient was re-assessed for adequacy to                        receive sedatives. The heart rate, respiratory rate,                        oxygen saturations, blood pressure, adequacy of                        pulmonary ventilation, and response to care were                        monitored throughout the procedure. The physical status                        of the patient was re-assessed after the procedure.                        After obtaining informed consent, the scope was passed                        under direct vision. Throughout the procedure, the                        patient's blood pressure, pulse, and oxygen saturations                        were monitored continuously. The duodensocope was                        introduced through the mouth, and used  "to inject                        contrast into and used to inject contrast into the bile                        duct. The ERCP was accomplished without difficulty. The                        patient tolerated the procedure well.                                                                                     Findings:         films demonstrated spontaneous ejection of the pancreatic stent        and stable position of the comon duct and galbladder stents. This was        confirmed on limited white light imaging with the latter two        internalized across a patent biliary sphincterotomy. The common duct        stent was removed from the biliary tree using a snare. The bile duct was        deeply cannulated with the short-nosed traction sphincterotome in        concert with a straight 0.0254\" Visiglide wire. Contrast was injected        and I personally interpreted the bile duct images. Ductal flow of        contrast was adequate, image quality was excellent and contrast extended        throughout the intrahepatics. The intrahepatics were decompressed and        grossly unremarkable, as was the bifurcation and the common duct. The        previously noted common duct stenosis appeared resolved. Contrast did        not fill the cystic. The wire was passed along the existing gallbladder        stent and over this the cystic was passage dilated. A 7 Fr by 15 cm        transpapillary Zimon stent with a single external pigtail and a single        internal pigtail was placed 15 cm into the gallbladder along the in situ        stent. Pus flowed through the stent and the stent was in good position.        The ventral pancreatic duct and orifice were selectively not        interrogated.                                                                                     Impression:          - Spontaneous ejection of the pancreatic stent                        - Uncomplicated removal of the common duct stent from a     "                    patent biliary sphincterotomy                        - Passage dilation of the cystic                        - Successful placement of a second transpapillary                        gallbladder stent adjacent to the in situ stent with                        drainage of pus                        - Resolution of previous common duct stenosis   Recommendation:      - General anesthesia recovery with probable discharge                        home this morning                        - All medications and diet may resume without delay                        - Gallbladder stents to remain indefinitely, therefore                        no plans for repeat ERCP at this juncture, though                        symptoms and imaging may dictate otherwise                        - The findings and recommendations were discussed with                        the patient and their family                                                                                       electronically signed by SUSI Denise

## 2020-10-30 ENCOUNTER — PATIENT OUTREACH (OUTPATIENT)
Dept: GASTROENTEROLOGY | Facility: CLINIC | Age: 70
End: 2020-10-30

## 2020-10-30 NOTE — TELEPHONE ENCOUNTER
Post ERCP (10/27/2020) with Dr. Denise: Follow-up    Post procedure recommendations:   - Gallbladder stents to remain indefinitely, therefore     no plans for repeat ERCP at this juncture, though    symptoms and imaging may dictate otherwise                                                       Orders placed: none    Patient states pt a little sore, but no fever or jaundice, no concerning symptoms. Advised to call with any further questions or symptoms and asked that they send us copies of any imaging done for anything locally.     Clinic contact and scheduling numbers verified for future questions/concerns.    Violetta Goldberg RN Care Coordinator

## 2020-11-16 NOTE — PROGRESS NOTES
"Subjective   Per discussion with Jose Gómez MD, Ben, has verbally consented to be treated via a telephone based visit: Yes. A total of 20 minutes were required for this telephone based visit.   Patient Location: Home  Provider Location: Clinic  Technology used by Provider: Phone    HPI  Ben Lai Sr is a 70 y.o. male who presents for follow up of HCC and related symptoms.    The pt is being seen today via telehealth due to covid 19 pandemic. He had two more stents placed at [facility] and he feels that his pain is much better. He only occasionally needs to take dilaudid, about twice a week. He is on lidocaine patches for back pain and got a new arthritis pain. He feels like the 4mg dilaudid is too much, and would like to decrease to 2mg prn. His appetite is \"Staying pretty good'. He is still not sleeping well. Trazodone is not helping.       The following have been reviewed and updated as appropriate in this visit:    Allergies   Allergen Reactions   • Penicillins Anaphylaxis   • Metformin Hives   • Adhesive Tape-Silicones    • Interferon Jose Maria-2a    • Latex    • Milk      Cow milk/goat milk   • Mometasone    • Mometasone Furoate    • Omeprazole    • Ondansetron Hcl Nausea And Vomiting   • Pepper (Genus Capsicum)      Any Hot Peppers - can eat bell peppers   • Primidone      Other reaction(s): HEADACHE   • Ribavirin Itching   • Rosuvastatin    • Sorafenib      Other reaction(s): BURNING SENSATION, HEADACHE, JOINT PAIN   • Spironolactone    • Statins-Hmg-Coa Reductase Inhibitors Itching and GI intolerance   • Interferon Jose Maria (Human Leuk. Derived) Palpitations and Rash   • Pantoprazole Itching and Rash   • Peginterferon Jose Maria-2b Palpitations     Current Outpatient Medications   Medication Sig Dispense Refill   • HYDROmorphone (DILAUDID) 2 mg tablet Take 1 tablet (2 mg total) by mouth every 4 (four) hours as needed (pain) Max Daily Amount: 12 mg 60 tablet 0   • artificial saliva (YERBA GLADYS AND LYTES) " aerosol,spray spray See administration instructions Take 3 to 5 sprays up to 3 to 5 times daily     • lidocaine (Lidoderm) 5 % patch Apply 1 patch topically daily Remove & discard patch within 12 hours or as directed by MD.     • psyllium husk (METAMUCIL ORAL) Take 1 Scoop by mouth daily       • hypromellose (SYSTANE GEL OPHT) Administer into affected eye(s) as needed     • fluticasone propionate (FLONASE) 50 mcg/actuation nasal spray Administer 2 sprays into each nostril daily       • CLOTRIMAZOLE, BULK, MISC as needed To feet     • naloxone HCl (NARCAN NASL) Administer into affected nostril(s) as needed     • sildenafiL (VIAGRA) 100 mg tablet Take 100 mg by mouth daily as needed for erectile dysfunction     • traZODone (DESYREL) 100 mg tablet Take 100 mg by mouth nightly       • ondansetron (ZOFRAN) 4 mg tablet Take 4 mg by mouth every 8 (eight) hours as needed for nausea or vomiting     • SUMAtriptan (IMITREX) 100 mg tablet Take 100 mg by mouth once as needed for migraine     • insulin glargine (Lantus Solostar U-100 Insulin) 100 unit/mL (3 mL) insulin pen injection pen Inject 10 Units under the skin every morning     • miconazole nitrate (SECURA ANTIFUNGAL T) Apply topically as needed       • carvediloL (COREG) 3.125 mg tablet Take 1 tablet (3.125 mg total) by mouth 2 (two) times a day with meals 180 tablet 0   • diclofenac sodium (VOLTAREN) 1 % gel Apply 4 g topically 4 (four) times a day       • cetirizine (ZyrTEC) 10 mg tablet Take 10 mg by mouth daily     • loperamide (Imodium A-D) 2 mg tablet Take 2 tablets with first loose stool of the day, then up to 8 tablets daily 80 tablet 2   • hydrOXYzine (ATARAX) 25 mg tablet Take 1 tablet (25 mg total) by mouth nightly 90 tablet 2   • buPROPion SR (WELLBUTRIN SR) 100 mg 12 hr tablet Take 1 tablet (100 mg total) by mouth daily 90 tablet 1   • lactulose (GENERLAC) 10 gram/15 mL solution Take 15 mL (10 g total) by mouth daily 473 mL 2   • terazosin (HYTRIN) 5 mg  capsule Take 1 capsule (5 mg total) by mouth 2 (two) times a day 180 capsule 2   • blood-glucose meter (ACCU-CHEK ADELE PLUS METER) [facility] Use to test blood sugars 3 times daily (E11.65, non insulin depedent) AccuChek Adele plus, meter is 8 years old (Patient taking differently: Use to test blood sugars 3 times daily (E11.65, non insulin dependent) ) 1 each 0   • lisinopril (PRINIVIL,ZESTRIL) 20 mg tablet Take 1 tablet (20 mg total) by mouth daily 90 tablet 2   • furosemide (LASIX) 20 mg tablet Take 1 tablet (20 mg total) by mouth 2 (two) times a day. 180 tablet 2   • lansoprazole (PREVACID) 30 mg capsule Take 30 mg by mouth daily      • liraglutide (VICTOZA 2-JASE) 0.6 mg/0.1 mL (18 mg/3 mL) injection Inject 1.8 mg under the skin daily       • capsaicin (ZOSTRIX) 0.025 % cream Apply 1 application topically as needed for pain scale 1-3/10, 1-3/8.     • sodium chloride (SALINE NASAL) 0.65 % nasal spray Administer 1 spray into each nostril as needed for congestion or rhinitis      • white petrolatum (AQUAPHOR HEALING) 41 % ointment ointment Apply 1 application topically as needed.     • pramoxine-menthol (GOLD BOND MEDICATED ANTI-ITCH) 1-1 % cream Apply topically as needed       • GLYCERIN/PROPYLENE GLYCOL (ARTIFICIAL TEARS,GLYCERIN-PEG, OPHT) Administer 1 drop into affected eye(s) as needed (Dry eyes)      • latanoprost (XALATAN) 0.005 % ophthalmic solution Administer 1 drop into both eyes nightly   10 0     No current facility-administered medications for this visit.      Past Medical History:   Diagnosis Date   • A-fib (CMS/HCC) (HCC)    • Allergy    • Anxiety    • Arthritis    • Asthma    • Blindness of right eye    • BPH (benign prostatic hyperplasia)    • Cardiovascular disease    • Cirrhosis (CMS/HCC) (HCC)     with possible liver mass by MRI 5/18, rec repeat in 8/2018   • Complication of anesthesia    • COPD (chronic obstructive pulmonary disease) (CMS/HCC) (HCC)    • Depression    • Endocrine disorder    •  Gallstones    • Gastritis    • GERD (gastroesophageal reflux disease)    • Glaucoma    • Hearing loss     bilateral hearing aids   • Hemorrhoids    • Hepatitis C     Hep C, 2016 remission, complicated by cirrhosis.  MRI abd 5/30/18, rec 3 month follow up for focus of enhancement right and left hepatic lobe junction   • Hernia, abdominal    • High blood pressure    • IBS (irritable bowel syndrome)    • Infectious viral hepatitis    • Jaundice    • Kidney stones    • Obstructive sleep apnea syndrome    • Periodic limb movement disorder    • Persistent hypersomnia    • Persistent insomnia    • Pneumonia    • PONV (postoperative nausea and vomiting)    • Type 2 diabetes mellitus (CMS/HCC) (HCC)    • Urinary incontinence    • Wears partial dentures      Past Surgical History:   Procedure Laterality Date   • COLONOSCOPY  10/27/2016    Pili, normal, repeat 10 years   • CT ABLATION LIVER RADIOFREQUENCY  8/14/2019    CT ABLATION LIVER RADIOFREQUENCY 8/14/2019 [facility] MIS CT SCAN   • ESOPHAGOGASTRODUODENOSCOPY N/A 2/22/2018    Procedure: EGD - ESOPHAGOGASTRODUODENOSCOPY with banding  x 2 with crna;  Surgeon: William Rivas MD;  Location: [facility] End[facility];  Service: Endoscopy;  Laterality: N/A;   • ESOPHAGOGASTRODUODENOSCOPY N/A 4/6/2018    Procedure: EGD - ESOPHAGOGASTRODUODENOSCOPY with crna;  Surgeon: William Rivas MD;  Location: [facility] End[facility];  Service: Endoscopy;  Laterality: N/A;   • ESOPHAGOGASTRODUODENOSCOPY N/A 5/7/2019    Procedure: EGD - ESOPHAGOGASTRODUODENOSCOPY;  Surgeon: William Rivas MD;  Location: [facility] Endoscopy;  Service: Endoscopy;  Laterality: N/A;   • HAND SURGERY Left     thumb   • HERNIA REPAIR  01/01/1965   • KNEE ARTHROSCOPY  09/19/2017    left knee, with partial medial meniscectomy; limited chondroplasty, patellofemoral joint   • LIVER BIOPSY      by Dr. Alejandre   • OTHER SURGICAL HISTORY      esophageal varices, banded   • VASECTOMY      at approx age of 25     Family History   Problem Relation Age of  Onset   • Arthritis Mother    • Rheum arthritis Mother    • Diabetes Mother    • Rectal cancer Mother    • Other Paternal Grandmother    • Other Paternal Grandfather    • Diabetes Other    • Rheum arthritis Other    • Osteoporosis Other      Social History     Occupational History   • Not on file   Tobacco Use   • Smoking status: Never Smoker   • Smokeless tobacco: Never Used   Substance and Sexual Activity   • Alcohol use: No   • Drug use: No   • Sexual activity: Defer   Social History Narrative   • Not on file       Review of Systems   Constitutional: Negative.  Negative for activity change and appetite change.   HENT: Negative.    Respiratory: Negative.    Cardiovascular: Negative.  Negative for leg swelling.   Gastrointestinal: Positive for abdominal pain. Negative for nausea and vomiting.   Endocrine: Negative.    Genitourinary: Negative.    Musculoskeletal: Negative.    Skin: Negative.    Allergic/Immunologic: Negative.    Neurological: Positive for dizziness.   Hematological: Negative.    Psychiatric/Behavioral: Positive for sleep disturbance.         Assessment/Plan   Diagnoses and all orders for this visit:    Pain of metastatic malignancy  -     HYDROmorphone (DILAUDID) 2 mg tablet; Take 1 tablet (2 mg total) by mouth every 4 (four) hours as needed (pain) Max Daily Amount: 12 mg    Hepatocellular carcinoma (CMS/HCC) (HCC)    The pt is doing very well. Will refill his dilaudid 2mg and d/c the 4mg. Will follow up in 3 months or sooner, if needed.

## 2020-12-18 NOTE — PROGRESS NOTES
Patient called and wanted to know if they can do a full body scan of cancer on Monday. Please call

## 2020-12-18 NOTE — PROGRESS NOTES
I spoke with Jakub about how Dr. Reynoso follows NCCN guidelines for imaging and could discuss a little more at his appointment on Monday about his recommendations with this. He states his stomach pain is worse but knows the MRI is of his abdomen so he is fine discussing with Dr. Reynoso about this further on Monday. No further questions or concerns.

## 2020-12-21 NOTE — PATIENT INSTRUCTIONS
DISCHARGE INSTRCTIONS FOR PROCEDURES WITH MRI CONTRAST:    If you have any of the following conditions listed below or if you are on fluid restrictions please contact your primary care doctor for instructions.    • Heart failure  • Dialysis patients  • Liver disease  • Low sodium patients  • Any other fluid restriction diet    You have had a MRI procedure in which you have received an IV injection of MRI dye.  The kidneys will excrete this IV contrast within the next 24 hours. It is always important to maintain good hydration, and is especially important following IV contrast administration. As such, we encourage you to drink at least 4 to 8 extra 8 ounce glasses of water during the next 24 hours to help your kidneys clear the contrast from your body.

## 2020-12-21 NOTE — PATIENT INSTRUCTIONS
Patient Education   Bevacizumab injection  What is this medicine?  BEVACIZUMAB (be va SIZ maame mab) is a monoclonal antibody. It is used to treat many types of cancer.  This medicine may be used for other purposes; ask your health care provider or pharmacist if you have questions.  COMMON BRAND NAME(S): Avastin, MVASI, Zirabev  What should I tell my health care provider before I take this medicine?  They need to know if you have any of these conditions:  · diabetes  · heart disease  · high blood pressure  · history of coughing up blood  · prior anthracycline chemotherapy (e.g., doxorubicin, daunorubicin, epirubicin)  · recent or ongoing radiation therapy  · recent or planning to have surgery  · stroke  · an unusual or allergic reaction to bevacizumab, hamster proteins, mouse proteins, other medicines, foods, dyes, or preservatives  · pregnant or trying to get pregnant  · breast-feeding  How should I use this medicine?  This medicine is for infusion into a vein. It is given by a health care professional in a hospital or clinic setting.  Talk to your pediatrician regarding the use of this medicine in children. Special care may be needed.  Overdosage: If you think you have taken too much of this medicine contact a poison control center or emergency room at once.  NOTE: This medicine is only for you. Do not share this medicine with others.  What if I miss a dose?  It is important not to miss your dose. Call your doctor or health care professional if you are unable to keep an appointment.  What may interact with this medicine?  Interactions are not expected.  This list may not describe all possible interactions. Give your health care provider a list of all the medicines, herbs, non-prescription drugs, or dietary supplements you use. Also tell them if you smoke, drink alcohol, or use illegal drugs. Some items may interact with your medicine.  What should I watch for while using this medicine?  Your condition will be  monitored carefully while you are receiving this medicine. You will need important blood work and urine testing done while you are taking this medicine.  This medicine may increase your risk to bruise or bleed. Call your doctor or health care professional if you notice any unusual bleeding.  Before having surgery, talk to your health care provider to make sure it is ok. This drug can increase the risk of poor healing of your surgical site or wound. You will need to stop this drug for 28 days before surgery. After surgery, wait at least 28 days before restarting this drug. Make sure the surgical site or wound is healed enough before restarting this drug. Talk to your health care provider if questions.  Do not become pregnant while taking this medicine or for 6 months after stopping it. Women should inform their doctor if they wish to become pregnant or think they might be pregnant. There is a potential for serious side effects to an unborn child. Talk to your health care professional or pharmacist for more information. Do not breast-feed an infant while taking this medicine and for 6 months after the last dose.  This medicine has caused ovarian failure in some women. This medicine may interfere with the ability to have a child. You should talk to your doctor or health care professional if you are concerned about your fertility.  What side effects may I notice from receiving this medicine?  Side effects that you should report to your doctor or health care professional as soon as possible:  · allergic reactions like skin rash, itching or hives, swelling of the face, lips, or tongue  · chest pain or chest tightness  · chills  · coughing up blood  · high fever  · seizures  · severe constipation  · signs and symptoms of bleeding such as bloody or black, tarry stools; red or dark-brown urine; spitting up blood or brown material that looks like coffee grounds; red spots on the skin; unusual bruising or bleeding from the  eye, gums, or nose  · signs and symptoms of a blood clot such as breathing problems; chest pain; severe, sudden headache; pain, swelling, warmth in the leg  · signs and symptoms of a stroke like changes in vision; confusion; trouble speaking or understanding; severe headaches; sudden numbness or weakness of the face, arm or leg; trouble walking; dizziness; loss of balance or coordination  · stomach pain  · sweating  · swelling of legs or ankles  · vomiting  · weight gain  Side effects that usually do not require medical attention (report to your doctor or health care professional if they continue or are bothersome):  · back pain  · changes in taste  · decreased appetite  · dry skin  · nausea  · tiredness  This list may not describe all possible side effects. Call your doctor for medical advice about side effects. You may report side effects to FDA at 0-220-FDA-0061.  Where should I keep my medicine?  This drug is given in a hospital or clinic and will not be stored at home.  NOTE: This sheet is a summary. It may not cover all possible information. If you have questions about this medicine, talk to your doctor, pharmacist, or health care provider.  © 2020 [employer] (2020-10-14 10:50:46)       Patient Education   Atezolizumab injection  What is this medicine?  ATEZOLIZUMAB (a te carmen DULCE ue mab) is a monoclonal antibody. It is used to treat bladder cancer (urothelial cancer), liver cancer, lung cancer, breast cancer, and melanoma.  This medicine may be used for other purposes; ask your health care provider or pharmacist if you have questions.  COMMON BRAND NAME(S): Tecentriq  What should I tell my health care provider before I take this medicine?  They need to know if you have any of these conditions:  · diabetes  · immune system problems  · infection  · inflammatory bowel disease  · liver disease  · lung or breathing disease  · lupus  · nervous system problems like myasthenia gravis or Guillain-[place]i  syndrome  · organ transplant  · an unusual or allergic reaction to atezolizumab, other medicines, foods, dyes, or preservatives  · pregnant or trying to get pregnant  · breast-feeding  How should I use this medicine?  This medicine is for infusion into a vein. It is given by a health care professional in a hospital or clinic setting.  A special MedGuide will be given to you before each treatment. Be sure to read this information carefully each time.  Talk to your pediatrician regarding the use of this medicine in children. Special care may be needed.  Overdosage: If you think you have taken too much of this medicine contact a poison control center or emergency room at once.  NOTE: This medicine is only for you. Do not share this medicine with others.  What if I miss a dose?  It is important not to miss your dose. Call your doctor or health care professional if you are unable to keep an appointment.  What may interact with this medicine?  Interactions have not been studied.  This list may not describe all possible interactions. Give your health care provider a list of all the medicines, herbs, non-prescription drugs, or dietary supplements you use. Also tell them if you smoke, drink alcohol, or use illegal drugs. Some items may interact with your medicine.  What should I watch for while using this medicine?  Your condition will be monitored carefully while you are receiving this medicine.  You may need blood work done while you are taking this medicine.  Do not become pregnant while taking this medicine or for at least 5 months after stopping it. Women should inform their doctor if they wish to become pregnant or think they might be pregnant. There is a potential for serious side effects to an unborn child. Talk to your health care professional or pharmacist for more information. Do not breast-feed an infant while taking this medicine or for at least 5 months after the last dose.  What side effects may I notice from  receiving this medicine?  Side effects that you should report to your doctor or health care professional as soon as possible:  · allergic reactions like skin rash, itching or hives, swelling of the face, lips, or tongue  · black, tarry stools  · bloody or watery diarrhea  · breathing problems  · changes in vision  · chest pain or chest tightness  · chills  · facial flushing  · fever  · headache  · signs and symptoms of high blood sugar such as dizziness; dry mouth; dry skin; fruity breath; nausea; stomach pain; increased hunger or thirst; increased urination  · signs and symptoms of liver injury like dark yellow or brown urine; general ill feeling or flu-like symptoms; light-colored stools; loss of appetite; nausea; right upper belly pain; unusually weak or tired; yellowing of the eyes or skin  · stomach pain  · trouble passing urine or change in the amount of urine  Side effects that usually do not require medical attention (report to your doctor or health care professional if they continue or are bothersome):  · bone pain  · cough  · diarrhea  · joint pain  · muscle pain  · muscle weakness  · swelling of arms or legs  · tiredness  · weight loss  This list may not describe all possible side effects. Call your doctor for medical advice about side effects. You may report side effects to FDA at 2-364-FDA-1020.  Where should I keep my medicine?  This drug is given in a hospital or clinic and will not be stored at home.  NOTE: This sheet is a summary. It may not cover all possible information. If you have questions about this medicine, talk to your doctor, pharmacist, or health care provider.  © 2020 [employer] (2020-08-07 13:11:14)       Patient Education   Lenvatinib oral capsule  What is this medicine?  LENVATINIB (sadie VA ti nib) is a medicine that targets proteins in cancer cells and stops the cancer cells from growing. It is used to treat endometrial cancer, kidney cancer, liver cancer, and thyroid  cancer.  This medicine may be used for other purposes; ask your health care provider or pharmacist if you have questions.  COMMON BRAND NAME(S): LENVIMA  What should I tell my health care provider before I take this medicine?  They need to know if you have any of these conditions:  · diabetes  · high blood pressure  · heart disease  · history of blood clots  · history of irregular heartbeat  · history of low levels of calcium, magnesium, or potassium in the blood  · inflammatory bowel disease  · kidney disease  · liver disease  · protein in your urine  · recent or planning to have surgery  · thyroid disease  · an unusual or allergic reaction to lenvatinib, other medicines, foods, dyes, or preservatives  · pregnant or trying to get pregnant  · breast-feeding  How should I use this medicine?  Take this medicine by mouth with a glass of water. Follow the directions on the prescription label. Swallow the capsules whole. Do not cut, crush or chew this medicine. Take your medicine at regular intervals. Do not take it more often than directed. Do not stop taking except on your doctor's advice.  Talk to your pediatrician regarding the use of this medicine in children. Special care may be needed.  Overdosage: If you think you have taken too much of this medicine contact a poison control center or emergency room at once.  NOTE: This medicine is only for you. Do not share this medicine with others.  What if I miss a dose?  If you miss a dose, take it as soon as you can. If your next dose is to be taken in less than 12 hours, then do not take the missed dose. Take the next dose at your regular time. Do not take double or extra doses.  What may interact with this medicine?  Do not take this medicine with any of the following medications:  · cisapride  · dronedarone  · pimozide  · thioridazine  This medicine may also interact with the following medications:  · alfuzosin  · bedaquiline  · certain antibiotics like azithromycin,  clarithromycin or erythromycin  · certain medicines for bladder problems like solifenacin, tolterodine  · certain medicines for depression, anxiety, or psychotic disturbances  · certain medicines for fungal infections like ketoconazole, posaconazole, voriconazole, fluconazole, and itraconazole  · certain medicines for irregular heart beat like amiodarone, dofetilide, flecainide, propafenone, quinidine  · chloroquine  · ciprofloxacin  · dofetilide  · ezogabine  · fingolimod  · granisetron  · leuprolide  · lopinavir; ritonavir  · methadone  · metronidazole  · mifepristone  · octreotide  · ondansetron  · other medicines that prolong the QT interval (cause an abnormal heart rhythm) like ziprasidone  · pasireotide  · pentamidine  · promethazine  · quinine  · ranolazine  · rifampin  · rilpivirine  · romidepsin  · saquinavir  · tacrolimus  · telavancin  · telithromycin  · tetrabenazine  · tizanidine  · toremifene  · vardenafil  · vorinostat  This list may not describe all possible interactions. Give your health care provider a list of all the medicines, herbs, non-prescription drugs, or dietary supplements you use. Also tell them if you smoke, drink alcohol, or use illegal drugs. Some items may interact with your medicine.  What should I watch for while using this medicine?  Visit your doctor for regular check-ups. Report any side effects. Continue your course of treatment unless your doctor tells you to stop. You will need blood work done while you are taking this medicine.  Do not become pregnant while taking this medicine or for 30 days after stopping it. Women should inform their doctor if they wish to become pregnant or think they might be pregnant. There is a potential for serious side effects to an unborn child. Talk to your health care professional or pharmacist for more information. Do not breast-feed an infant while taking this medicine or for 1 week after stopping it. This medicine may interfere with the ability  to have a child. Talk with your doctor or health care professional if you are concerned about your fertility.  Before having surgery, talk to your health care provider to make sure it is ok. This drug can increase the risk of poor healing of your surgical site or wound. You will need to stop this drug for 1 week before surgery. After surgery, wait at least 2 weeks before restarting this drug. Make sure the surgical site or wound is healed enough before restarting this drug. Talk to your health care provider if questions.  Be careful brushing and flossing your teeth or using a toothpick because you may get an infection or bleed more easily. If you have any dental work done, tell your dentist you are receiving this medicine.  This medicine may increase your risk to bruise or bleed. Call your doctor or health care professional if you notice any unusual bleeding.  This drug may make you feel generally unwell. This is not uncommon, as chemotherapy can affect healthy cells as well as cancer cells. Report any side effects. Continue your course of treatment even though you feel ill unless your doctor tells you to stop.  This medicine may interfere with the ability to have a child. You should talk with your doctor or health care professional if you are concerned about your fertility.  What side effects may I notice from receiving this medicine?  Side effects that you should report to your doctor or health care professional as soon as possible:  · allergic reactions like skin rash, itching or hives, swelling of the face, lips, or tongue  · breathing problems  · chest pain or palpitations  · dizziness  · feeling faint or lightheaded, falls  · headache  · high blood pressure  · seizures  · signs and symptoms of bleeding such as bloody or black, tarry stools; red or dark-brown urine; spitting up blood or brown material that looks like coffee grounds; red spots on the skin; unusual bruising or bleeding from the eye, gums, or  nose  · signs and symptoms of a dangerous change in heartbeat or heart rhythm like chest pain; dizziness; fast or irregular heartbeat; palpitations; feeling faint or lightheaded, falls; breathing problems  · signs and symptoms of kidney injury like trouble passing urine or change in the amount of urine  · signs and symptoms of liver injury like dark yellow or brown urine; general ill feeling or flu-like symptoms; light-colored stools; loss of appetite; nausea; right upper belly pain; unusually weak or tired; yellowing of the eyes or skin  · signs and symptoms of low potassium like muscle cramps or muscle pain; chest pain; dizziness; feeling faint or lightheaded, falls; palpitations; breathing problems; or fast, irregular heartbeat  · signs and symptoms of a stroke like changes in vision; confusion; trouble speaking or understanding; severe headaches; sudden numbness or weakness of the face, arm or leg; trouble walking; dizziness; loss of balance or coordination  · stomach pain  · swelling of the legs or ankles  · unusually weak or tired  Side effects that usually do not require medical attention (report to your doctor or health care professional if they continue or are bothersome):  · diarrhea  · joint pain  · loss of appetite  · mouth sores  · muscle pain  · nausea, vomiting  · weight loss  This list may not describe all possible side effects. Call your doctor for medical advice about side effects. You may report side effects to FDA at 6-270-FDA-0712.  Where should I keep my medicine?  Keep out of the reach of children.  Store between 20 and 25 degrees C (68 and 77 degrees F). Throw away any unused medicine after the expiration date.  NOTE: This sheet is a summary. It may not cover all possible information. If you have questions about this medicine, talk to your doctor, pharmacist, or health care provider.  © 2020 [employer] (2020-10-14 11:11:13)

## 2020-12-21 NOTE — PROGRESS NOTES
Medical Oncology Follow-Up    Subjective   HISTORY OF PRESENT ILLNESS:  Ben Lai Sr is a 70 y.o. male who  was diagnosed with hepatocellular carcinoma in November 2018.  He has an underlying hepatitis C which was supposedly cured.    Due to the extent of his HCC per GI at [facility] it was not amenable to local therapy, resection or radiation, referred to medical oncology   He was started on sorafenib early December 2018 but this appears to have been stopped in February due to significant side effects.  Patient states unequivocally that he started to feel better after a 3-week break but the sorafenib was recently restarted and he is having significant quality of life compromising side effects again.  This includes severe joint and muscle pain and fatigue.     7/29/19 restaging scans showed progressive disease in the liver, however no sings of metastatic disease     8/14/19 s/p   CT-guided  right lobe of the liver radiofrequency ablation.     9/2019 seen hepatobiliary team at [facility], and was discussed for tumor board, planned to have Y90  Treatment     10/8/19 MRI abdomen showed Nodular appearing liver with 2 areas of hyper/early arterial enhancement suggestive of hepatocellular carcinoma are both adjacent to the gallbladder which contains filling defects which are enhancing suggestive of neoplasm. This could be primary cholangiocarcinoma or hepatocellular carcinoma which is invading the gallbladder. The liver and gallbladder masses are very similar to the study from 7/29/2019.  Wedge-shaped signal abnormality in the anterior right lobe of the liver similar to the prior study could represent hepatic infarct.    11/7/19 s/p Y90 treatment at [facility]     12/9/19 MRI abdomen showed Cirrhosis and evidence of portal hypertension. Posttreatment changes of radioembolization throughout the right hepatic lobe, with increased diffuse heterogeneous arterial enhancement. This is the  patient's first posttreatment MRI. LR-TR equivocal. Continued attention on follow-up.  Grossly stable size of the lobulated enhancing endoluminal gallbladder mass with decreased but persistent areas of enhancing viable tumor.  Stable segment 4 exophytic 2.6 cm OPTN-5b lesion.  Unchanged right hepatic vein and right portal vein branch thrombi, without convincing thrombus enhancement.  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 Attention on follow-up.    12/11/19 s/p chemo embolization for possible residual viable tumor in the liver.    1/16/20  restaging MR showed  The circumscribed mass is seen in the anterior aspect of the right lobe of the liver demonstrates positive response to treatment with significantly reduced enhancement, no change in overall size.2.  Post radiation ablation defect in the anterior inferior right lobe with surrounding heterogeneous nonmass-like enhancement. It would be difficult to exclude active tumor within this surrounding area of enhancement.  Significant change in appearance of the posterior segment of the right lobe of the liver. Previous infiltrative nodular enhancement, now geographic nonmass-like enhancement with parenchymal volume loss. This could represent posttreatment related change. Difficult to exclude residual/progressive tumor    4/21/20 MR abdomen showed Continued evolution of post embolic/radiotherapy changes to the right hepatic lobe with large region of volume loss of enhancement likely representing fibrosis.  Previous HCC anteriorly along this region continues to regress with no evidence of enhancement to suggest recurrent or residual tumor. Previous nodular lesion inferiorly along the treatment bed which also likely represented a site of HCC also continues to regress with no evidence of recurrence or residual disease. No new suspicious liver lesions are demonstrated. Cirrhosis. Splenomegaly. Portosystemic collaterals.    6/26/20 MR No significant  MRI change in the treatment-related appearance of the right hepatic lobe since 4/21/2020. No MR evidence of recurrent hepatoma. Treated anterior right hepatic lobe lesion is redemonstrated. Treated lesion at right more posterior hepatic lobe is also seen.   Hepatic fibrosis.Cirrhosis, splenomegaly and portosystemic collaterals.    10/5/20 MR abdomen showed 2 treated right hepatic lobe lesions and associated scarring and capsular retraction. Since the previous examination, there is a new 4 mm enhancing nodule at the posterior aspect of the right anterior hepatic lobe treated lesion.  The enhancement characteristics are progressive and indeterminate and there is the hint of small restricted diffusion. Overall, a change in the appearance of this treated lesion which has otherwise been stable since 1/16/2020 is suspicious for recurrent tumor. However, the imaging characteristics themselves are nonspecific of the 4 mm nodule (with differential diagnosis including rounded scar), and attention on short-term follow-up MRI of the abdomen with IV contrast in 3 months is recommended, along with correlation with AFP levels.     12/21/20 MR showed Amorphous enhancement has increased within the lower right hepatic region area of previous treated tumor. This enhancement pattern is worrisome for tumor progression the area. Correlation with recent AFP would be helpful. Considerable scar tissue and areas well.. In addition the lesion overlying the gallbladder fossa may involve the gallbladder which now appears to be markedly thickened and demonstrates considerable wall enhancement which appears to have changed since the previous study. Differential would include direct tumor invasion gallbladder and acute cholecystitis.       Today:   Pain  Is worsening in the right upper quadrent   Has had symptoms of grade 1 Hepatic encephalopathy     Energy is  Stable ECOG 2   appetite ist stable, weight is stable   Takes care of his ADLS    Stable,back pain , chest pain and neuropathy         Encounter Diagnosis   Name Primary?   • Hepatocellular carcinoma (CMS/HCC) (HCC)    .     Treatment History:  Oncology History   Hepatocellular carcinoma (CMS/HCC) (HCC)   1/23/2019 Initial Diagnosis    Hepatocellular carcinoma (CMS/HCC) (HCC)     7/30/2019 - 12/9/2019 Adjuvant Therapy    Hepatocellular - Sorafenib (Nexavar)  Plan Provider: ANDERSON Bryan  Treatment goal: Palliative  Line of treatment: [No plan line of treatment]     6/12/2020 Cancer Staged    Staging form: Liver, AJCC 8th Edition  - Clinical: Stage IIIB (cT4, cN0, cM0) - Signed by Milka Reynoso MD on 6/12/2020               RECENT EVENTS:    PAST MEDICAL HISTORY:   Past Medical History:   Diagnosis Date   • A-fib (CMS/HCC) (HCC)    • Allergy    • Anxiety    • Arthritis    • Asthma    • Blindness of right eye    • BPH (benign prostatic hyperplasia)    • Cardiovascular disease    • Cirrhosis (CMS/HCC) (HCC)     with possible liver mass by MRI 5/18, rec repeat in 8/2018   • Complication of anesthesia    • COPD (chronic obstructive pulmonary disease) (CMS/HCC) (HCC)    • Depression    • Endocrine disorder    • Gallstones    • Gastritis    • GERD (gastroesophageal reflux disease)    • Glaucoma    • Hearing loss     bilateral hearing aids   • Hemorrhoids    • Hepatitis C     Hep C, 2016 remission, complicated by cirrhosis.  MRI abd 5/30/18, rec 3 month follow up for focus of enhancement right and left hepatic lobe junction   • Hernia, abdominal    • High blood pressure    • IBS (irritable bowel syndrome)    • Infectious viral hepatitis    • Jaundice    • Kidney stones    • Obstructive sleep apnea syndrome    • Periodic limb movement disorder    • Persistent hypersomnia    • Persistent insomnia    • Pneumonia    • PONV (postoperative nausea and vomiting)    • Type 2 diabetes mellitus (CMS/HCC) (HCC)    • Urinary incontinence    • Wears partial dentures         GYNECOLOGICAL HISTORY  (if applicable): NA     FAMILY HISTORY:   Family History   Problem Relation Age of Onset   • Arthritis Mother    • Rheum arthritis Mother    • Diabetes Mother    • Rectal cancer Mother    • Other Paternal Grandmother    • Other Paternal Grandfather    • Diabetes Other    • Rheum arthritis Other    • Osteoporosis Other         SOCIAL HISTORY:   Social History     Socioeconomic History   • Marital status:      Spouse name: Not on file   • Number of children: Not on file   • Years of education: Not on file   • Highest education level: Not on file   Occupational History   • Not on file   Social Needs   • Financial resource strain: Not on file   • Food insecurity     Worry: Not on file     Inability: Not on file   • Transportation needs     Medical: Not on file     Non-medical: Not on file   Tobacco Use   • Smoking status: Never Smoker   • Smokeless tobacco: Never Used   Substance and Sexual Activity   • Alcohol use: No   • Drug use: No   • Sexual activity: Defer   Lifestyle   • Physical activity     Days per week: Not on file     Minutes per session: Not on file   • Stress: Not on file   Relationships   • Social connections     Talks on phone: Not on file     Gets together: Not on file     Attends Jewish service: Not on file     Active member of club or organization: Not on file     Attends meetings of clubs or organizations: Not on file     Relationship status: Not on file   • Intimate partner violence     Fear of current or ex partner: Not on file     Emotionally abused: Not on file     Physically abused: Not on file     Forced sexual activity: Not on file   Other Topics Concern   • Not on file   Social History Narrative   • Not on file        ALLERGIES:   Allergies as of 12/21/2020 - Reviewed 10/07/2020   Allergen Reaction Noted   • Penicillins Anaphylaxis 10/19/2017   • Metformin Hives    • Adhesive tape-silicones  04/06/2018   • Interferon violetta-2a  02/05/2019   • Latex     • Milk  02/05/2019   •  Mometasone     • Mometasone furoate     • Omeprazole     • Ondansetron hcl Nausea And Vomiting 05/01/2019   • Pepper (genus capsicum)  02/05/2019   • Primidone  05/01/2019   • Ribavirin Itching 10/19/2017   • Rosuvastatin  02/05/2019   • Sorafenib  04/04/2019   • Spironolactone     • Statins-hmg-coa reductase inhibitors Itching and GI intolerance 04/06/2018   • Interferon violetta (human leuk. derived) Palpitations and Rash 09/20/2019   • Pantoprazole Itching and Rash 02/28/2018   • Peginterferon violetta-2b Palpitations 10/19/2017        MEDICATIONS:   Current Outpatient Medications   Medication Sig Dispense Refill   • HYDROmorphone (DILAUDID) 2 mg tablet Take 1 tablet (2 mg total) by mouth every 4 (four) hours as needed (pain) Max Daily Amount: 12 mg 60 tablet 0   • artificial saliva (YERBA GLADYS AND LYTES) aerosol,spray spray See administration instructions Take 3 to 5 sprays up to 3 to 5 times daily     • lidocaine (Lidoderm) 5 % patch Apply 1 patch topically daily Remove & discard patch within 12 hours or as directed by MD.     • psyllium husk (METAMUCIL ORAL) Take 1 Scoop by mouth daily       • hypromellose (SYSTANE GEL OPHT) Administer into affected eye(s) as needed     • fluticasone propionate (FLONASE) 50 mcg/actuation nasal spray Administer 2 sprays into each nostril daily       • CLOTRIMAZOLE, BULK, MISC as needed To feet     • naloxone HCl (NARCAN NASL) Administer into affected nostril(s) as needed     • sildenafiL (VIAGRA) 100 mg tablet Take 100 mg by mouth daily as needed for erectile dysfunction     • traZODone (DESYREL) 100 mg tablet Take 100 mg by mouth nightly       • ondansetron (ZOFRAN) 4 mg tablet Take 4 mg by mouth every 8 (eight) hours as needed for nausea or vomiting     • SUMAtriptan (IMITREX) 100 mg tablet Take 100 mg by mouth once as needed for migraine     • insulin glargine (Lantus Solostar U-100 Insulin) 100 unit/mL (3 mL) insulin pen injection pen Inject 10 Units under the skin every morning      • miconazole nitrate (SECURA ANTIFUNGAL T) Apply topically as needed       • carvediloL (COREG) 3.125 mg tablet Take 1 tablet (3.125 mg total) by mouth 2 (two) times a day with meals 180 tablet 0   • diclofenac sodium (VOLTAREN) 1 % gel Apply 4 g topically 4 (four) times a day       • cetirizine (ZyrTEC) 10 mg tablet Take 10 mg by mouth daily     • loperamide (Imodium A-D) 2 mg tablet Take 2 tablets with first loose stool of the day, then up to 8 tablets daily 80 tablet 2   • hydrOXYzine (ATARAX) 25 mg tablet Take 1 tablet (25 mg total) by mouth nightly 90 tablet 2   • buPROPion SR (WELLBUTRIN SR) 100 mg 12 hr tablet Take 1 tablet (100 mg total) by mouth daily 90 tablet 1   • lactulose (GENERLAC) 10 gram/15 mL solution Take 15 mL (10 g total) by mouth daily 473 mL 2   • terazosin (HYTRIN) 5 mg capsule Take 1 capsule (5 mg total) by mouth 2 (two) times a day 180 capsule 2   • blood-glucose meter (ACCU-CHEK ADELE PLUS METER) [facility] Use to test blood sugars 3 times daily (E11.65, non insulin depedent) AccuChek Adele plus, meter is 8 years old (Patient taking differently: Use to test blood sugars 3 times daily (E11.65, non insulin dependent) ) 1 each 0   • lisinopril (PRINIVIL,ZESTRIL) 20 mg tablet Take 1 tablet (20 mg total) by mouth daily 90 tablet 2   • furosemide (LASIX) 20 mg tablet Take 1 tablet (20 mg total) by mouth 2 (two) times a day. 180 tablet 2   • lansoprazole (PREVACID) 30 mg capsule Take 30 mg by mouth daily      • liraglutide (VICTOZA 2-JASE) 0.6 mg/0.1 mL (18 mg/3 mL) injection Inject 1.8 mg under the skin daily       • capsaicin (ZOSTRIX) 0.025 % cream Apply 1 application topically as needed for pain scale 1-3/10, 1-3/8.     • sodium chloride (SALINE NASAL) 0.65 % nasal spray Administer 1 spray into each nostril as needed for congestion or rhinitis      • white petrolatum (AQUAPHOR HEALING) 41 % ointment ointment Apply 1 application topically as needed.     • pramoxine-menthol (GOLD BOND MEDICATED  ANTI-ITCH) 1-1 % cream Apply topically as needed       • GLYCERIN/PROPYLENE GLYCOL (ARTIFICIAL TEARS,GLYCERIN-PEG, OPHT) Administer 1 drop into affected eye(s) as needed (Dry eyes)      • latanoprost (XALATAN) 0.005 % ophthalmic solution Administer 1 drop into both eyes nightly   10 0     No current facility-administered medications for this visit.        REVIEW OF SYSTEMS:   Review of Systems   Constitutional: Positive for fatigue.   HENT:   Positive for hearing loss.    Eyes: Positive for eye problems.   Gastrointestinal: Positive for abdominal distention and abdominal pain.   Endocrine: Negative.    Genitourinary: Positive for bladder incontinence.    Musculoskeletal: Positive for arthralgias, gait problem and myalgias.   Skin: Negative.    Neurological: Positive for gait problem.   Hematological: Negative.    Psychiatric/Behavioral: The patient is nervous/anxious.    All other systems reviewed and are negative.      The ECOG performance status today is 2 - Ambulatory care of self; up and about >50% of waking hours.          Objective    PHYSICAL EXAM:   /58   Pulse 75   Temp 36.8 °C (98.2 °F) (Temporal)   Resp 18   Wt 95.8 kg (211 lb 3.2 oz)   SpO2 94%   BMI 36.25 kg/m²    Body mass index is 36.25 kg/m².       Physical Exam  HENT:      Head: Normocephalic.      Nose: Nose normal.      Mouth/Throat:      Mouth: Mucous membranes are moist.   Eyes:      Pupils: Pupils are equal, round, and reactive to light.   Neck:      Musculoskeletal: Normal range of motion.   Cardiovascular:      Rate and Rhythm: Normal rate.   Pulmonary:      Effort: Pulmonary effort is normal.   Abdominal:      General: Abdomen is flat. There is no distension.   Musculoskeletal: Normal range of motion.   Skin:     General: Skin is dry.   Neurological:      General: No focal deficit present.      Mental Status: He is alert.   Psychiatric:         Mood and Affect: Mood normal.         LABS:   Appointment on 12/21/2020   Component  Date Value Ref Range Status   • WBC 12/21/2020 5.2  3.7 - 9.6 10*3/uL Final   • RBC 12/21/2020 3.93* 4.10 - 5.80 10*6/µL Final   • Hemoglobin 12/21/2020 11.3* 13.2 - 17.2 g/dL Final   • Hematocrit 12/21/2020 34.2* 38.0 - 50.0 % Final   • MCV 12/21/2020 87.1  82.0 - 97.0 fL Final   • MCH 12/21/2020 28.7* 29.0 - 34.0 pg Final   • MCHC 12/21/2020 33.0  32.0 - 36.0 g/dL Final   • RDW 12/21/2020 14.1  11.5 - 15.0 % Final   • Platelets 12/21/2020 182  130 - 350 10*3/uL Final   • MPV 12/21/2020 7.9  6.9 - 10.8 fL Final   • Neutrophils% 12/21/2020 79* 46 - 70 % Final   • Lymphocytes% 12/21/2020 9* 15 - 47 % Final   • Monocytes% 12/21/2020 10  5 - 13 % Final   • Eosinophils% 12/21/2020 1  0 - 3 % Final   • Basophils% 12/21/2020 0  0 - 2 % Final   • Neutrophils Absolute 12/21/2020 4.10  10*3/uL Final   • Lymphocytes Absolute 12/21/2020 0.50  10*3/uL Final   • Monocytes Absolute 12/21/2020 0.50  10*3/uL Final   • Eosinophils Absolute 12/21/2020 0.10  10*3/uL Final   • Basophils Absolute 12/21/2020 0.00  10*3/uL Final   • Sodium 12/21/2020 133* 135 - 145 mmol/L Final   • Potassium 12/21/2020 3.6  3.5 - 5.1 mmol/L Final   • Chloride 12/21/2020 100  98 - 107 mmol/L Final   • CO2 12/21/2020 26  21 - 32 mmol/L Final   • Anion Gap 12/21/2020 7  3 - 11 mmol/L Final   • BUN 12/21/2020 14  7 - 25 mg/dL Final   • Creatinine 12/21/2020 1.08  0.70 - 1.30 mg/dL Final   • Glucose 12/21/2020 191* 70 - 105 mg/dL Final   • Calcium 12/21/2020 9.1  8.6 - 10.3 mg/dL Final   • AST 12/21/2020 29  <40 U/L Final   • ALT (SGPT) 12/21/2020 25  7 - 52 U/L Final   • Alkaline Phosphatase 12/21/2020 151* 45 - 115 U/L Final   • Total Protein 12/21/2020 7.0  6.0 - 8.3 g/dL Final   • Albumin 12/21/2020 3.6  3.5 - 5.3 g/dL Final   • Total Bilirubin 12/21/2020 0.99  0.20 - 1.40 mg/dL Final   • eGFR 12/21/2020 70  >60 mL/min/1.73m*2 Final   • Corrected Calcium 12/21/2020 9.4  8.6 - 10.3 mg/dL Final       DIAGNOSTIC REPORTS REVIEWED:   Imaging:  Reviewed      Assessment/Plan    IMPRESSION:   This is a pleasant 69-year-old male with diagnosis of hepatocellular carcinoma involving the right lobe, locally advanced disease, per the chart not amenable to local therapy, started sorafenib December 2018 however stopped in February 2019 due to intolerance without any dose reductions.  Has been off of treatment since then, restaging CAT scan and MRI  7/2019 show progressive disease in the liver.  Started  sorafenib 8/2019 at a lower dose 200 mg twice daily, s/p RFA 8/2019, was referred to [facility], s/p Y90 treatment followed by TACE, MR showed response to treatment without progression on 1/16/20 and 4/21/20  currently treatment on hold until evidence of disease progression      It seems like vascular surgery at  were concerned about enhancement of the gallbladder and they wanted to discuss at tumor board and inform me of their decision. There is no rule for surgery, on MR 12/2020 there was clear progression of HCC and discussed plan of either starting bevacizumab and atezolizomab vs lenvatinib   After discussion likely to start bevacizumab and atezolizomab.       Plan:   - given progression discussed sorafenib vs. lenvatinib vs. atezolizomab and bevacizumab   - likely to start atzolizumab and beva,   - port insertion given hard stick  - follow up to start new treatment plan   - will restage after 3 cycles around 3/2/21   -  Follow up with [facility] for GI follow up and repeat ERCP   - continue follow up with palliative medicine for pain control.             Physician: Milka Reynoso MD    I spent 40 minutes with the patient today with greater than 50% in counseling and/or coordination of care

## 2020-12-21 NOTE — H&P (VIEW-ONLY)
Medical Oncology Follow-Up    Subjective   HISTORY OF PRESENT ILLNESS:  Ben Lai Sr is a 70 y.o. male who  was diagnosed with hepatocellular carcinoma in November 2018.  He has an underlying hepatitis C which was supposedly cured.    Due to the extent of his HCC per GI at [facility] it was not amenable to local therapy, resection or radiation, referred to medical oncology   He was started on sorafenib early December 2018 but this appears to have been stopped in February due to significant side effects.  Patient states unequivocally that he started to feel better after a 3-week break but the sorafenib was recently restarted and he is having significant quality of life compromising side effects again.  This includes severe joint and muscle pain and fatigue.     7/29/19 restaging scans showed progressive disease in the liver, however no sings of metastatic disease     8/14/19 s/p   CT-guided  right lobe of the liver radiofrequency ablation.     9/2019 seen hepatobiliary team at [facility], and was discussed for tumor board, planned to have Y90  Treatment     10/8/19 MRI abdomen showed Nodular appearing liver with 2 areas of hyper/early arterial enhancement suggestive of hepatocellular carcinoma are both adjacent to the gallbladder which contains filling defects which are enhancing suggestive of neoplasm. This could be primary cholangiocarcinoma or hepatocellular carcinoma which is invading the gallbladder. The liver and gallbladder masses are very similar to the study from 7/29/2019.  Wedge-shaped signal abnormality in the anterior right lobe of the liver similar to the prior study could represent hepatic infarct.    11/7/19 s/p Y90 treatment at [facility]     12/9/19 MRI abdomen showed Cirrhosis and evidence of portal hypertension. Posttreatment changes of radioembolization throughout the right hepatic lobe, with increased diffuse heterogeneous arterial enhancement. This is the  patient's first posttreatment MRI. LR-TR equivocal. Continued attention on follow-up.  Grossly stable size of the lobulated enhancing endoluminal gallbladder mass with decreased but persistent areas of enhancing viable tumor.  Stable segment 4 exophytic 2.6 cm OPTN-5b lesion.  Unchanged right hepatic vein and right portal vein branch thrombi, without convincing thrombus enhancement.  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 Attention on follow-up.    12/11/19 s/p chemo embolization for possible residual viable tumor in the liver.    1/16/20  restaging MR showed  The circumscribed mass is seen in the anterior aspect of the right lobe of the liver demonstrates positive response to treatment with significantly reduced enhancement, no change in overall size.2.  Post radiation ablation defect in the anterior inferior right lobe with surrounding heterogeneous nonmass-like enhancement. It would be difficult to exclude active tumor within this surrounding area of enhancement.  Significant change in appearance of the posterior segment of the right lobe of the liver. Previous infiltrative nodular enhancement, now geographic nonmass-like enhancement with parenchymal volume loss. This could represent posttreatment related change. Difficult to exclude residual/progressive tumor    4/21/20 MR abdomen showed Continued evolution of post embolic/radiotherapy changes to the right hepatic lobe with large region of volume loss of enhancement likely representing fibrosis.  Previous HCC anteriorly along this region continues to regress with no evidence of enhancement to suggest recurrent or residual tumor. Previous nodular lesion inferiorly along the treatment bed which also likely represented a site of HCC also continues to regress with no evidence of recurrence or residual disease. No new suspicious liver lesions are demonstrated. Cirrhosis. Splenomegaly. Portosystemic collaterals.    6/26/20 MR No significant  MRI change in the treatment-related appearance of the right hepatic lobe since 4/21/2020. No MR evidence of recurrent hepatoma. Treated anterior right hepatic lobe lesion is redemonstrated. Treated lesion at right more posterior hepatic lobe is also seen.   Hepatic fibrosis.Cirrhosis, splenomegaly and portosystemic collaterals.    10/5/20 MR abdomen showed 2 treated right hepatic lobe lesions and associated scarring and capsular retraction. Since the previous examination, there is a new 4 mm enhancing nodule at the posterior aspect of the right anterior hepatic lobe treated lesion.  The enhancement characteristics are progressive and indeterminate and there is the hint of small restricted diffusion. Overall, a change in the appearance of this treated lesion which has otherwise been stable since 1/16/2020 is suspicious for recurrent tumor. However, the imaging characteristics themselves are nonspecific of the 4 mm nodule (with differential diagnosis including rounded scar), and attention on short-term follow-up MRI of the abdomen with IV contrast in 3 months is recommended, along with correlation with AFP levels.     12/21/20 MR showed Amorphous enhancement has increased within the lower right hepatic region area of previous treated tumor. This enhancement pattern is worrisome for tumor progression the area. Correlation with recent AFP would be helpful. Considerable scar tissue and areas well.. In addition the lesion overlying the gallbladder fossa may involve the gallbladder which now appears to be markedly thickened and demonstrates considerable wall enhancement which appears to have changed since the previous study. Differential would include direct tumor invasion gallbladder and acute cholecystitis.       Today:   Pain  Is worsening in the right upper quadrent   Has had symptoms of grade 1 Hepatic encephalopathy     Energy is  Stable ECOG 2   appetite ist stable, weight is stable   Takes care of his ADLS    Stable,back pain , chest pain and neuropathy         Encounter Diagnosis   Name Primary?   • Hepatocellular carcinoma (CMS/HCC) (HCC)    .     Treatment History:  Oncology History   Hepatocellular carcinoma (CMS/HCC) (HCC)   1/23/2019 Initial Diagnosis    Hepatocellular carcinoma (CMS/HCC) (HCC)     7/30/2019 - 12/9/2019 Adjuvant Therapy    Hepatocellular - Sorafenib (Nexavar)  Plan Provider: ANDERSON Bryan  Treatment goal: Palliative  Line of treatment: [No plan line of treatment]     6/12/2020 Cancer Staged    Staging form: Liver, AJCC 8th Edition  - Clinical: Stage IIIB (cT4, cN0, cM0) - Signed by Milka Reynoso MD on 6/12/2020               RECENT EVENTS:    PAST MEDICAL HISTORY:   Past Medical History:   Diagnosis Date   • A-fib (CMS/HCC) (HCC)    • Allergy    • Anxiety    • Arthritis    • Asthma    • Blindness of right eye    • BPH (benign prostatic hyperplasia)    • Cardiovascular disease    • Cirrhosis (CMS/HCC) (HCC)     with possible liver mass by MRI 5/18, rec repeat in 8/2018   • Complication of anesthesia    • COPD (chronic obstructive pulmonary disease) (CMS/HCC) (HCC)    • Depression    • Endocrine disorder    • Gallstones    • Gastritis    • GERD (gastroesophageal reflux disease)    • Glaucoma    • Hearing loss     bilateral hearing aids   • Hemorrhoids    • Hepatitis C     Hep C, 2016 remission, complicated by cirrhosis.  MRI abd 5/30/18, rec 3 month follow up for focus of enhancement right and left hepatic lobe junction   • Hernia, abdominal    • High blood pressure    • IBS (irritable bowel syndrome)    • Infectious viral hepatitis    • Jaundice    • Kidney stones    • Obstructive sleep apnea syndrome    • Periodic limb movement disorder    • Persistent hypersomnia    • Persistent insomnia    • Pneumonia    • PONV (postoperative nausea and vomiting)    • Type 2 diabetes mellitus (CMS/HCC) (HCC)    • Urinary incontinence    • Wears partial dentures         GYNECOLOGICAL HISTORY  (if applicable): NA     FAMILY HISTORY:   Family History   Problem Relation Age of Onset   • Arthritis Mother    • Rheum arthritis Mother    • Diabetes Mother    • Rectal cancer Mother    • Other Paternal Grandmother    • Other Paternal Grandfather    • Diabetes Other    • Rheum arthritis Other    • Osteoporosis Other         SOCIAL HISTORY:   Social History     Socioeconomic History   • Marital status:      Spouse name: Not on file   • Number of children: Not on file   • Years of education: Not on file   • Highest education level: Not on file   Occupational History   • Not on file   Social Needs   • Financial resource strain: Not on file   • Food insecurity     Worry: Not on file     Inability: Not on file   • Transportation needs     Medical: Not on file     Non-medical: Not on file   Tobacco Use   • Smoking status: Never Smoker   • Smokeless tobacco: Never Used   Substance and Sexual Activity   • Alcohol use: No   • Drug use: No   • Sexual activity: Defer   Lifestyle   • Physical activity     Days per week: Not on file     Minutes per session: Not on file   • Stress: Not on file   Relationships   • Social connections     Talks on phone: Not on file     Gets together: Not on file     Attends Congregational service: Not on file     Active member of club or organization: Not on file     Attends meetings of clubs or organizations: Not on file     Relationship status: Not on file   • Intimate partner violence     Fear of current or ex partner: Not on file     Emotionally abused: Not on file     Physically abused: Not on file     Forced sexual activity: Not on file   Other Topics Concern   • Not on file   Social History Narrative   • Not on file        ALLERGIES:   Allergies as of 12/21/2020 - Reviewed 10/07/2020   Allergen Reaction Noted   • Penicillins Anaphylaxis 10/19/2017   • Metformin Hives    • Adhesive tape-silicones  04/06/2018   • Interferon violetta-2a  02/05/2019   • Latex     • Milk  02/05/2019   •  Mometasone     • Mometasone furoate     • Omeprazole     • Ondansetron hcl Nausea And Vomiting 05/01/2019   • Pepper (genus capsicum)  02/05/2019   • Primidone  05/01/2019   • Ribavirin Itching 10/19/2017   • Rosuvastatin  02/05/2019   • Sorafenib  04/04/2019   • Spironolactone     • Statins-hmg-coa reductase inhibitors Itching and GI intolerance 04/06/2018   • Interferon violetta (human leuk. derived) Palpitations and Rash 09/20/2019   • Pantoprazole Itching and Rash 02/28/2018   • Peginterferon violetta-2b Palpitations 10/19/2017        MEDICATIONS:   Current Outpatient Medications   Medication Sig Dispense Refill   • HYDROmorphone (DILAUDID) 2 mg tablet Take 1 tablet (2 mg total) by mouth every 4 (four) hours as needed (pain) Max Daily Amount: 12 mg 60 tablet 0   • artificial saliva (YERBA GLADYS AND LYTES) aerosol,spray spray See administration instructions Take 3 to 5 sprays up to 3 to 5 times daily     • lidocaine (Lidoderm) 5 % patch Apply 1 patch topically daily Remove & discard patch within 12 hours or as directed by MD.     • psyllium husk (METAMUCIL ORAL) Take 1 Scoop by mouth daily       • hypromellose (SYSTANE GEL OPHT) Administer into affected eye(s) as needed     • fluticasone propionate (FLONASE) 50 mcg/actuation nasal spray Administer 2 sprays into each nostril daily       • CLOTRIMAZOLE, BULK, MISC as needed To feet     • naloxone HCl (NARCAN NASL) Administer into affected nostril(s) as needed     • sildenafiL (VIAGRA) 100 mg tablet Take 100 mg by mouth daily as needed for erectile dysfunction     • traZODone (DESYREL) 100 mg tablet Take 100 mg by mouth nightly       • ondansetron (ZOFRAN) 4 mg tablet Take 4 mg by mouth every 8 (eight) hours as needed for nausea or vomiting     • SUMAtriptan (IMITREX) 100 mg tablet Take 100 mg by mouth once as needed for migraine     • insulin glargine (Lantus Solostar U-100 Insulin) 100 unit/mL (3 mL) insulin pen injection pen Inject 10 Units under the skin every morning      • miconazole nitrate (SECURA ANTIFUNGAL T) Apply topically as needed       • carvediloL (COREG) 3.125 mg tablet Take 1 tablet (3.125 mg total) by mouth 2 (two) times a day with meals 180 tablet 0   • diclofenac sodium (VOLTAREN) 1 % gel Apply 4 g topically 4 (four) times a day       • cetirizine (ZyrTEC) 10 mg tablet Take 10 mg by mouth daily     • loperamide (Imodium A-D) 2 mg tablet Take 2 tablets with first loose stool of the day, then up to 8 tablets daily 80 tablet 2   • hydrOXYzine (ATARAX) 25 mg tablet Take 1 tablet (25 mg total) by mouth nightly 90 tablet 2   • buPROPion SR (WELLBUTRIN SR) 100 mg 12 hr tablet Take 1 tablet (100 mg total) by mouth daily 90 tablet 1   • lactulose (GENERLAC) 10 gram/15 mL solution Take 15 mL (10 g total) by mouth daily 473 mL 2   • terazosin (HYTRIN) 5 mg capsule Take 1 capsule (5 mg total) by mouth 2 (two) times a day 180 capsule 2   • blood-glucose meter (ACCU-CHEK ADELE PLUS METER) [facility] Use to test blood sugars 3 times daily (E11.65, non insulin depedent) AccuChek Adele plus, meter is 8 years old (Patient taking differently: Use to test blood sugars 3 times daily (E11.65, non insulin dependent) ) 1 each 0   • lisinopril (PRINIVIL,ZESTRIL) 20 mg tablet Take 1 tablet (20 mg total) by mouth daily 90 tablet 2   • furosemide (LASIX) 20 mg tablet Take 1 tablet (20 mg total) by mouth 2 (two) times a day. 180 tablet 2   • lansoprazole (PREVACID) 30 mg capsule Take 30 mg by mouth daily      • liraglutide (VICTOZA 2-JASE) 0.6 mg/0.1 mL (18 mg/3 mL) injection Inject 1.8 mg under the skin daily       • capsaicin (ZOSTRIX) 0.025 % cream Apply 1 application topically as needed for pain scale 1-3/10, 1-3/8.     • sodium chloride (SALINE NASAL) 0.65 % nasal spray Administer 1 spray into each nostril as needed for congestion or rhinitis      • white petrolatum (AQUAPHOR HEALING) 41 % ointment ointment Apply 1 application topically as needed.     • pramoxine-menthol (GOLD BOND MEDICATED  ANTI-ITCH) 1-1 % cream Apply topically as needed       • GLYCERIN/PROPYLENE GLYCOL (ARTIFICIAL TEARS,GLYCERIN-PEG, OPHT) Administer 1 drop into affected eye(s) as needed (Dry eyes)      • latanoprost (XALATAN) 0.005 % ophthalmic solution Administer 1 drop into both eyes nightly   10 0     No current facility-administered medications for this visit.        REVIEW OF SYSTEMS:   Review of Systems   Constitutional: Positive for fatigue.   HENT:   Positive for hearing loss.    Eyes: Positive for eye problems.   Gastrointestinal: Positive for abdominal distention and abdominal pain.   Endocrine: Negative.    Genitourinary: Positive for bladder incontinence.    Musculoskeletal: Positive for arthralgias, gait problem and myalgias.   Skin: Negative.    Neurological: Positive for gait problem.   Hematological: Negative.    Psychiatric/Behavioral: The patient is nervous/anxious.    All other systems reviewed and are negative.      The ECOG performance status today is 2 - Ambulatory care of self; up and about >50% of waking hours.          Objective    PHYSICAL EXAM:   /58   Pulse 75   Temp 36.8 °C (98.2 °F) (Temporal)   Resp 18   Wt 95.8 kg (211 lb 3.2 oz)   SpO2 94%   BMI 36.25 kg/m²    Body mass index is 36.25 kg/m².       Physical Exam  HENT:      Head: Normocephalic.      Nose: Nose normal.      Mouth/Throat:      Mouth: Mucous membranes are moist.   Eyes:      Pupils: Pupils are equal, round, and reactive to light.   Neck:      Musculoskeletal: Normal range of motion.   Cardiovascular:      Rate and Rhythm: Normal rate.   Pulmonary:      Effort: Pulmonary effort is normal.   Abdominal:      General: Abdomen is flat. There is no distension.   Musculoskeletal: Normal range of motion.   Skin:     General: Skin is dry.   Neurological:      General: No focal deficit present.      Mental Status: He is alert.   Psychiatric:         Mood and Affect: Mood normal.         LABS:   Appointment on 12/21/2020   Component  Date Value Ref Range Status   • WBC 12/21/2020 5.2  3.7 - 9.6 10*3/uL Final   • RBC 12/21/2020 3.93* 4.10 - 5.80 10*6/µL Final   • Hemoglobin 12/21/2020 11.3* 13.2 - 17.2 g/dL Final   • Hematocrit 12/21/2020 34.2* 38.0 - 50.0 % Final   • MCV 12/21/2020 87.1  82.0 - 97.0 fL Final   • MCH 12/21/2020 28.7* 29.0 - 34.0 pg Final   • MCHC 12/21/2020 33.0  32.0 - 36.0 g/dL Final   • RDW 12/21/2020 14.1  11.5 - 15.0 % Final   • Platelets 12/21/2020 182  130 - 350 10*3/uL Final   • MPV 12/21/2020 7.9  6.9 - 10.8 fL Final   • Neutrophils% 12/21/2020 79* 46 - 70 % Final   • Lymphocytes% 12/21/2020 9* 15 - 47 % Final   • Monocytes% 12/21/2020 10  5 - 13 % Final   • Eosinophils% 12/21/2020 1  0 - 3 % Final   • Basophils% 12/21/2020 0  0 - 2 % Final   • Neutrophils Absolute 12/21/2020 4.10  10*3/uL Final   • Lymphocytes Absolute 12/21/2020 0.50  10*3/uL Final   • Monocytes Absolute 12/21/2020 0.50  10*3/uL Final   • Eosinophils Absolute 12/21/2020 0.10  10*3/uL Final   • Basophils Absolute 12/21/2020 0.00  10*3/uL Final   • Sodium 12/21/2020 133* 135 - 145 mmol/L Final   • Potassium 12/21/2020 3.6  3.5 - 5.1 mmol/L Final   • Chloride 12/21/2020 100  98 - 107 mmol/L Final   • CO2 12/21/2020 26  21 - 32 mmol/L Final   • Anion Gap 12/21/2020 7  3 - 11 mmol/L Final   • BUN 12/21/2020 14  7 - 25 mg/dL Final   • Creatinine 12/21/2020 1.08  0.70 - 1.30 mg/dL Final   • Glucose 12/21/2020 191* 70 - 105 mg/dL Final   • Calcium 12/21/2020 9.1  8.6 - 10.3 mg/dL Final   • AST 12/21/2020 29  <40 U/L Final   • ALT (SGPT) 12/21/2020 25  7 - 52 U/L Final   • Alkaline Phosphatase 12/21/2020 151* 45 - 115 U/L Final   • Total Protein 12/21/2020 7.0  6.0 - 8.3 g/dL Final   • Albumin 12/21/2020 3.6  3.5 - 5.3 g/dL Final   • Total Bilirubin 12/21/2020 0.99  0.20 - 1.40 mg/dL Final   • eGFR 12/21/2020 70  >60 mL/min/1.73m*2 Final   • Corrected Calcium 12/21/2020 9.4  8.6 - 10.3 mg/dL Final       DIAGNOSTIC REPORTS REVIEWED:   Imaging:  Reviewed      Assessment/Plan    IMPRESSION:   This is a pleasant 69-year-old male with diagnosis of hepatocellular carcinoma involving the right lobe, locally advanced disease, per the chart not amenable to local therapy, started sorafenib December 2018 however stopped in February 2019 due to intolerance without any dose reductions.  Has been off of treatment since then, restaging CAT scan and MRI  7/2019 show progressive disease in the liver.  Started  sorafenib 8/2019 at a lower dose 200 mg twice daily, s/p RFA 8/2019, was referred to [facility], s/p Y90 treatment followed by [facility], MR showed response to treatment without progression on 1/16/20 and 4/21/20  currently treatment on hold until evidence of disease progression      It seems like vascular surgery at  were concerned about enhancement of the gallbladder and they wanted to discuss at tumor board and inform me of their decision. There is no rule for surgery, on MR 12/2020 there was clear progression of HCC and discussed plan of either starting bevacizumab and atezolizomab vs lenvatinib   After discussion likely to start bevacizumab and atezolizomab.       Plan:   - given progression discussed sorafenib vs. lenvatinib vs. atezolizomab and bevacizumab   - likely to start atzolizumab and beva,   - port insertion given hard stick  - follow up to start new treatment plan   - will restage after 3 cycles around 3/2/21   -  Follow up with [facility] for GI follow up and repeat ERCP   - continue follow up with palliative medicine for pain control.             Physician: Milka Reynoso MD    I spent 40 minutes with the patient today with greater than 50% in counseling and/or coordination of care

## 2020-12-24 NOTE — PROGRESS NOTES
Patient called said that he is supposed to be getting a port placed on the 29th and see the doctor but nothing was scheduled and he would like to speak to someone. Please call

## 2020-12-30 NOTE — PATIENT INSTRUCTIONS
Patient Education   Bevacizumab injection  What is this medicine?  BEVACIZUMAB (be va SIZ maame mab) is a monoclonal antibody. It is used to treat many types of cancer.  This medicine may be used for other purposes; ask your health care provider or pharmacist if you have questions.  COMMON BRAND NAME(S): Avastin, MVASI, Zirabev  What should I tell my health care provider before I take this medicine?  They need to know if you have any of these conditions:  · diabetes  · heart disease  · high blood pressure  · history of coughing up blood  · prior anthracycline chemotherapy (e.g., doxorubicin, daunorubicin, epirubicin)  · recent or ongoing radiation therapy  · recent or planning to have surgery  · stroke  · an unusual or allergic reaction to bevacizumab, hamster proteins, mouse proteins, other medicines, foods, dyes, or preservatives  · pregnant or trying to get pregnant  · breast-feeding  How should I use this medicine?  This medicine is for infusion into a vein. It is given by a health care professional in a hospital or clinic setting.  Talk to your pediatrician regarding the use of this medicine in children. Special care may be needed.  Overdosage: If you think you have taken too much of this medicine contact a poison control center or emergency room at once.  NOTE: This medicine is only for you. Do not share this medicine with others.  What if I miss a dose?  It is important not to miss your dose. Call your doctor or health care professional if you are unable to keep an appointment.  What may interact with this medicine?  Interactions are not expected.  This list may not describe all possible interactions. Give your health care provider a list of all the medicines, herbs, non-prescription drugs, or dietary supplements you use. Also tell them if you smoke, drink alcohol, or use illegal drugs. Some items may interact with your medicine.  What should I watch for while using this medicine?  Your condition will be  monitored carefully while you are receiving this medicine. You will need important blood work and urine testing done while you are taking this medicine.  This medicine may increase your risk to bruise or bleed. Call your doctor or health care professional if you notice any unusual bleeding.  Before having surgery, talk to your health care provider to make sure it is ok. This drug can increase the risk of poor healing of your surgical site or wound. You will need to stop this drug for 28 days before surgery. After surgery, wait at least 28 days before restarting this drug. Make sure the surgical site or wound is healed enough before restarting this drug. Talk to your health care provider if questions.  Do not become pregnant while taking this medicine or for 6 months after stopping it. Women should inform their doctor if they wish to become pregnant or think they might be pregnant. There is a potential for serious side effects to an unborn child. Talk to your health care professional or pharmacist for more information. Do not breast-feed an infant while taking this medicine and for 6 months after the last dose.  This medicine has caused ovarian failure in some women. This medicine may interfere with the ability to have a child. You should talk to your doctor or health care professional if you are concerned about your fertility.  What side effects may I notice from receiving this medicine?  Side effects that you should report to your doctor or health care professional as soon as possible:  · allergic reactions like skin rash, itching or hives, swelling of the face, lips, or tongue  · chest pain or chest tightness  · chills  · coughing up blood  · high fever  · seizures  · severe constipation  · signs and symptoms of bleeding such as bloody or black, tarry stools; red or dark-brown urine; spitting up blood or brown material that looks like coffee grounds; red spots on the skin; unusual bruising or bleeding from the  eye, gums, or nose  · signs and symptoms of a blood clot such as breathing problems; chest pain; severe, sudden headache; pain, swelling, warmth in the leg  · signs and symptoms of a stroke like changes in vision; confusion; trouble speaking or understanding; severe headaches; sudden numbness or weakness of the face, arm or leg; trouble walking; dizziness; loss of balance or coordination  · stomach pain  · sweating  · swelling of legs or ankles  · vomiting  · weight gain  Side effects that usually do not require medical attention (report to your doctor or health care professional if they continue or are bothersome):  · back pain  · changes in taste  · decreased appetite  · dry skin  · nausea  · tiredness  This list may not describe all possible side effects. Call your doctor for medical advice about side effects. You may report side effects to FDA at 7-153-FDA-0061.  Where should I keep my medicine?  This drug is given in a hospital or clinic and will not be stored at home.  NOTE: This sheet is a summary. It may not cover all possible information. If you have questions about this medicine, talk to your doctor, pharmacist, or health care provider.  © 2020 [employer]vier/Gold Standard (2020-10-14 10:50:46)       Patient Education   Atezolizumab injection  What is this medicine?  ATEZOLIZUMAB (a te carmen DULCE ue mab) is a monoclonal antibody. It is used to treat bladder cancer (urothelial cancer), liver cancer, lung cancer, breast cancer, and melanoma.  This medicine may be used for other purposes; ask your health care provider or pharmacist if you have questions.  COMMON BRAND NAME(S): Tecentriq  What should I tell my health care provider before I take this medicine?  They need to know if you have any of these conditions:  · diabetes  · immune system problems  · infection  · inflammatory bowel disease  · liver disease  · lung or breathing disease  · lupus  · nervous system problems like myasthenia gravis or Guillain-[place]  syndrome  · organ transplant  · an unusual or allergic reaction to atezolizumab, other medicines, foods, dyes, or preservatives  · pregnant or trying to get pregnant  · breast-feeding  How should I use this medicine?  This medicine is for infusion into a vein. It is given by a health care professional in a hospital or clinic setting.  A special MedGuide will be given to you before each treatment. Be sure to read this information carefully each time.  Talk to your pediatrician regarding the use of this medicine in children. Special care may be needed.  Overdosage: If you think you have taken too much of this medicine contact a poison control center or emergency room at once.  NOTE: This medicine is only for you. Do not share this medicine with others.  What if I miss a dose?  It is important not to miss your dose. Call your doctor or health care professional if you are unable to keep an appointment.  What may interact with this medicine?  Interactions have not been studied.  This list may not describe all possible interactions. Give your health care provider a list of all the medicines, herbs, non-prescription drugs, or dietary supplements you use. Also tell them if you smoke, drink alcohol, or use illegal drugs. Some items may interact with your medicine.  What should I watch for while using this medicine?  Your condition will be monitored carefully while you are receiving this medicine.  You may need blood work done while you are taking this medicine.  Do not become pregnant while taking this medicine or for at least 5 months after stopping it. Women should inform their doctor if they wish to become pregnant or think they might be pregnant. There is a potential for serious side effects to an unborn child. Talk to your health care professional or pharmacist for more information. Do not breast-feed an infant while taking this medicine or for at least 5 months after the last dose.  What side effects may I notice from  receiving this medicine?  Side effects that you should report to your doctor or health care professional as soon as possible:  · allergic reactions like skin rash, itching or hives, swelling of the face, lips, or tongue  · black, tarry stools  · bloody or watery diarrhea  · breathing problems  · changes in vision  · chest pain or chest tightness  · chills  · facial flushing  · fever  · headache  · signs and symptoms of high blood sugar such as dizziness; dry mouth; dry skin; fruity breath; nausea; stomach pain; increased hunger or thirst; increased urination  · signs and symptoms of liver injury like dark yellow or brown urine; general ill feeling or flu-like symptoms; light-colored stools; loss of appetite; nausea; right upper belly pain; unusually weak or tired; yellowing of the eyes or skin  · stomach pain  · trouble passing urine or change in the amount of urine  Side effects that usually do not require medical attention (report to your doctor or health care professional if they continue or are bothersome):  · bone pain  · cough  · diarrhea  · joint pain  · muscle pain  · muscle weakness  · swelling of arms or legs  · tiredness  · weight loss  This list may not describe all possible side effects. Call your doctor for medical advice about side effects. You may report side effects to FDA at 7-854-FDA-8314.  Where should I keep my medicine?  This drug is given in a hospital or clinic and will not be stored at home.  NOTE: This sheet is a summary. It may not cover all possible information. If you have questions about this medicine, talk to your doctor, pharmacist, or health care provider.  © 2020 Elsevier/Gold Standard (2020-08-07 13:11:14)

## 2020-12-31 NOTE — PROGRESS NOTES
Oncology Pharmacist Consult  Antiemetics for Chemotherapy Induced Nausea/Vomiting (CINV)    12/31/20  Oncology Provider: Dr. Reynoso    Subjective/Objective:    Pertinent History:  The patient is a 70 y.o. male with hepatocellular carcinoma starting therapy today with bevacizumab and atezolizumab.  He previously received sorafenib.  He also had Y90 treatment at the [facility]  Treatment Plans     Name Type Plan Dates Plan Provider         Active    Hepatocellular - Bevacizumab / Atezolizumab ONCOLOGY TREATMENT A  12/21/2020 - Present Milka Reynoso MD                . Pharmacist consulted to manage antiemetics for prevention and treatment of CINV, to review patient’s medication list for potential drug/drug interactions, and to provide patient education regarding antiemetic(s) to be taken at home.    Past Medical History:   Diagnosis Date   • A-fib (CMS/HCC) (HCC)    • Allergy    • Anxiety    • Arthritis    • Asthma    • Blindness of right eye    • BPH (benign prostatic hyperplasia)    • Cardiovascular disease    • Cirrhosis (CMS/HCC) (HCC)     with possible liver mass by MRI 5/18, rec repeat in 8/2018   • Complication of anesthesia    • COPD (chronic obstructive pulmonary disease) (CMS/HCC) (HCC)    • Depression    • Endocrine disorder    • Gallstones    • Gastritis    • GERD (gastroesophageal reflux disease)    • Glaucoma    • Hearing loss     bilateral hearing aids   • Hemorrhoids    • Hepatitis C     Hep C, 2016 remission, complicated by cirrhosis.  MRI abd 5/30/18, rec 3 month follow up for focus of enhancement right and left hepatic lobe junction   • Hernia, abdominal    • High blood pressure    • IBS (irritable bowel syndrome)    • Infectious viral hepatitis    • Jaundice    • Kidney stones    • Obstructive sleep apnea syndrome    • Periodic limb movement disorder    • Persistent hypersomnia    • Persistent insomnia    • Pneumonia    • PONV (postoperative nausea and vomiting)    • Type 2  diabetes mellitus (CMS/HCC) (HCC)    • Urinary incontinence    • Wears partial dentures        Allergies   Allergen Reactions   • Penicillins Anaphylaxis   • Metformin Hives   • Adhesive Tape-Silicones    • Interferon Jose Maria-2a    • Latex    • Milk      Cow milk/goat milk   • Mometasone    • Mometasone Furoate    • Omeprazole    • Ondansetron Hcl Nausea And Vomiting   • Pepper (Genus Capsicum)      Any Hot Peppers - can eat bell peppers   • Primidone      Other reaction(s): HEADACHE   • Ribavirin Itching   • Rosuvastatin    • Sorafenib      Other reaction(s): BURNING SENSATION, HEADACHE, JOINT PAIN   • Spironolactone    • Statins-Hmg-Coa Reductase Inhibitors Itching and GI intolerance   • Interferon Jose Maria (Human Leuk. Derived) Palpitations and Rash   • Pantoprazole Itching and Rash   • Peginterferon Jose Maria-2b Palpitations         Current Outpatient Medications:   •  prochlorperazine (COMPAZINE) 10 mg tablet, Take 1 tablet (10 mg total) by mouth every 6 (six) hours as needed for nausea or vomiting, Disp: 30 tablet, Rfl: 5  •  HYDROmorphone (DILAUDID) 2 mg tablet, Take 1 tablet (2 mg total) by mouth every 4 (four) hours as needed (pain) Max Daily Amount: 12 mg, Disp: 60 tablet, Rfl: 0  •  artificial saliva (YERBA GLADYS AND LYTES) aerosol,spray spray, See administration instructions Take 3 to 5 sprays up to 3 to 5 times daily, Disp: , Rfl:   •  lidocaine (Lidoderm) 5 % patch, Apply 1 patch topically daily Remove & discard patch within 12 hours or as directed by MD., Disp: , Rfl:   •  psyllium husk (METAMUCIL ORAL), Take 1 Scoop by mouth daily  , Disp: , Rfl:   •  hypromellose (SYSTANE GEL OPHT), Administer into affected eye(s) as needed, Disp: , Rfl:   •  fluticasone propionate (FLONASE) 50 mcg/actuation nasal spray, Administer 2 sprays into each nostril daily  , Disp: , Rfl:   •  CLOTRIMAZOLE, BULK, MISC, as needed To feet, Disp: , Rfl:   •  naloxone HCl (NARCAN NASL), Administer into affected nostril(s) as needed, Disp: ,  Rfl:   •  sildenafiL (VIAGRA) 100 mg tablet, Take 100 mg by mouth daily as needed for erectile dysfunction, Disp: , Rfl:   •  traZODone (DESYREL) 100 mg tablet, Take 100 mg by mouth nightly  , Disp: , Rfl:   •  ondansetron (ZOFRAN) 4 mg tablet, Take 4 mg by mouth every 8 (eight) hours as needed for nausea or vomiting, Disp: , Rfl:   •  SUMAtriptan (IMITREX) 100 mg tablet, Take 100 mg by mouth once as needed for migraine, Disp: , Rfl:   •  insulin glargine (Lantus Solostar U-100 Insulin) 100 unit/mL (3 mL) insulin pen injection pen, Inject 10 Units under the skin every morning, Disp: , Rfl:   •  miconazole nitrate (SECURA ANTIFUNGAL T), Apply topically as needed  , Disp: , Rfl:   •  carvediloL (COREG) 3.125 mg tablet, Take 1 tablet (3.125 mg total) by mouth 2 (two) times a day with meals, Disp: 180 tablet, Rfl: 0  •  diclofenac sodium (VOLTAREN) 1 % gel, Apply 4 g topically 4 (four) times a day  , Disp: , Rfl:   •  cetirizine (ZyrTEC) 10 mg tablet, Take 10 mg by mouth daily, Disp: , Rfl:   •  loperamide (Imodium A-D) 2 mg tablet, Take 2 tablets with first loose stool of the day, then up to 8 tablets daily, Disp: 80 tablet, Rfl: 2  •  hydrOXYzine (ATARAX) 25 mg tablet, Take 1 tablet (25 mg total) by mouth nightly, Disp: 90 tablet, Rfl: 2  •  buPROPion SR (WELLBUTRIN SR) 100 mg 12 hr tablet, Take 1 tablet (100 mg total) by mouth daily, Disp: 90 tablet, Rfl: 1  •  lactulose (GENERLAC) 10 gram/15 mL solution, Take 15 mL (10 g total) by mouth daily, Disp: 473 mL, Rfl: 2  •  terazosin (HYTRIN) 5 mg capsule, Take 1 capsule (5 mg total) by mouth 2 (two) times a day, Disp: 180 capsule, Rfl: 2  •  blood-glucose meter (ACCU-CHEK ADELE PLUS METER) [facility], Use to test blood sugars 3 times daily (E11.65, non insulin depedent) AccuChek Adele plus, meter is 8 years old (Patient taking differently: Use to test blood sugars 3 times daily (E11.65, non insulin dependent) ), Disp: 1 each, Rfl: 0  •  lisinopril (PRINIVIL,ZESTRIL) 20 mg  tablet, Take 1 tablet (20 mg total) by mouth daily, Disp: 90 tablet, Rfl: 2  •  furosemide (LASIX) 20 mg tablet, Take 1 tablet (20 mg total) by mouth 2 (two) times a day., Disp: 180 tablet, Rfl: 2  •  lansoprazole (PREVACID) 30 mg capsule, Take 30 mg by mouth daily , Disp: , Rfl:   •  liraglutide (VICTOZA 2-JASE) 0.6 mg/0.1 mL (18 mg/3 mL) injection, Inject 1.8 mg under the skin daily  , Disp: , Rfl:   •  capsaicin (ZOSTRIX) 0.025 % cream, Apply 1 application topically as needed for pain scale 1-3/10, 1-3/8., Disp: , Rfl:   •  sodium chloride (SALINE NASAL) 0.65 % nasal spray, Administer 1 spray into each nostril as needed for congestion or rhinitis , Disp: , Rfl:   •  white petrolatum (AQUAPHOR HEALING) 41 % ointment ointment, Apply 1 application topically as needed., Disp: , Rfl:   •  pramoxine-menthol (GOLD BOND MEDICATED ANTI-ITCH) 1-1 % cream, Apply topically as needed  , Disp: , Rfl:   •  GLYCERIN/PROPYLENE GLYCOL (ARTIFICIAL TEARS,GLYCERIN-PEG, OPHT), Administer 1 drop into affected eye(s) as needed (Dry eyes) , Disp: , Rfl:   •  latanoprost (XALATAN) 0.005 % ophthalmic solution, Administer 1 drop into both eyes nightly  , Disp: 10, Rfl: 0    Current Facility-Administered Medications:   •  sodium chloride 0.9 % infusion, 50 mL/hr, intravenous, PRN, Milka Reynoso MD  •  atezolizumab (TECENTRIQ) 1,200 mg in sodium chloride 0.9 % 250 mL chemo IVPB, 1,200 mg, intravenous, Once, Milka Reynoso MD, Last Rate: 300 mL/hr at 12/31/20 1221, 1,200 mg at 12/31/20 1221  •  heparin, porcine (PF) 100 unit/mL flush 5 mL, 500 Units, intravenous, PRN, Milka Reynoso MD  •  sodium chloride flush 10 mL, 10 mL, intravenous, PRN, Milka Reynoso MD    Facility-Administered Medications Ordered in Other Visits:   •  heparin, porcine (PF) 100 unit/mL flush 5 mL, 5 mL, intra-catheter, PRN, Milka eRynoso MD, 5 mL at 12/31/20 0925  •  heparin, porcine (PF) 100 unit/mL flush 5 mL, 500 Units, intravenous, 2x  daily, Sonny Danielle MD  •  heparin, porcine (PF) 100 unit/mL flush 5 mL, 500 Units, intravenous, PRN, Sonny Danielle MD  •  sodium chloride flush 10 mL, 10 mL, intravenous, 2x daily, Sonny Danielle MD  •  sodium chloride flush 10 mL, 10 mL, intravenous, PRN, Sonny Danielle MD    Antiemetic Plan of Care:  Prochlorperazine 10 mg PO q6h prn breakthrough n/v.    He also has some ondansetron 4 mg tablets which [facility].  We discussed that he may use either or both of these drugs for nausea if he needs them.    Potential drug/drug or other interactions:   Patient's medication list was reviewed with patient for any changes, and checked for any drug-drug interactions with bevacizumab and atezolizumab.    Assessment:  This is a minimally emetogenic regimen which does not generally require any scheduled antinausea medication.    Plan:  Patient Education:  Discussed plan for antiemetics with patient and provided education regarding antiemetics.  The patient was given verbal instructions on how to take antiemetics.  Patient was advised that Prochlorperazine may cause drowsiness, and to avoid driving and other activities requiring mental alertness.  Patient advised when to seek medical attention for uncontrolled nausea/vomiting, and was given clinic/pharmacist contact information.  Patient/caregiver questions were answered.    Drug/Drug Interactions: Discuss with provider to n/a    The interactions mentioned in [employer] were increased sedation if prochlorperazine is added to his pain, antidepression, or hypnotic meds.  I discussed this with the patient and his wife.    Patient was instructed to contact nurse/doctor/pharmacist with any issues regarding uncontrolled nausea and/or vomiting.    Total time spent on this consult was 25 minutes, of which 10 minutes were spent face-to-face with patient.    Thank you for the consult,    Pedro Mcdaniel [facility]

## 2020-12-31 NOTE — PROGRESS NOTES
Medical Oncology Follow-Up    Subjective   HISTORY OF PRESENT ILLNESS:  Ben Lai Sr is a 70 y.o. male who  was diagnosed with hepatocellular carcinoma in November 2018.  He has an underlying hepatitis C which was supposedly cured.    Due to the extent of his HCC per GI at [facility] it was not amenable to local therapy, resection or radiation, referred to medical oncology   He was started on sorafenib early December 2018 but this appears to have been stopped in February due to significant side effects.  Patient states unequivocally that he started to feel better after a 3-week break but the sorafenib was recently restarted and he is having significant quality of life compromising side effects again.  This includes severe joint and muscle pain and fatigue.     7/29/19 restaging scans showed progressive disease in the liver, however no sings of metastatic disease     8/14/19 s/p   CT-guided  right lobe of the liver radiofrequency ablation.     9/2019 seen hepatobiliary team at [facility], and was discussed for tumor board, planned to have Y90  Treatment     10/8/19 MRI abdomen showed Nodular appearing liver with 2 areas of hyper/early arterial enhancement suggestive of hepatocellular carcinoma are both adjacent to the gallbladder which contains filling defects which are enhancing suggestive of neoplasm. This could be primary cholangiocarcinoma or hepatocellular carcinoma which is invading the gallbladder. The liver and gallbladder masses are very similar to the study from 7/29/2019.  Wedge-shaped signal abnormality in the anterior right lobe of the liver similar to the prior study could represent hepatic infarct.    11/7/19 s/p Y90 treatment at [facility]     12/9/19 MRI abdomen showed Cirrhosis and evidence of portal hypertension. Posttreatment changes of radioembolization throughout the right hepatic lobe, with increased diffuse heterogeneous arterial enhancement. This is the  patient's first posttreatment MRI. LR-TR equivocal. Continued attention on follow-up.  Grossly stable size of the lobulated enhancing endoluminal gallbladder mass with decreased but persistent areas of enhancing viable tumor.  Stable segment 4 exophytic 2.6 cm OPTN-5b lesion.  Unchanged right hepatic vein and right portal vein branch thrombi, without convincing thrombus enhancement.  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 Attention on follow-up.    12/11/19 s/p chemo embolization for possible residual viable tumor in the liver.    1/16/20  restaging MR showed  The circumscribed mass is seen in the anterior aspect of the right lobe of the liver demonstrates positive response to treatment with significantly reduced enhancement, no change in overall size.2.  Post radiation ablation defect in the anterior inferior right lobe with surrounding heterogeneous nonmass-like enhancement. It would be difficult to exclude active tumor within this surrounding area of enhancement.  Significant change in appearance of the posterior segment of the right lobe of the liver. Previous infiltrative nodular enhancement, now geographic nonmass-like enhancement with parenchymal volume loss. This could represent posttreatment related change. Difficult to exclude residual/progressive tumor    4/21/20 MR abdomen showed Continued evolution of post embolic/radiotherapy changes to the right hepatic lobe with large region of volume loss of enhancement likely representing fibrosis.  Previous HCC anteriorly along this region continues to regress with no evidence of enhancement to suggest recurrent or residual tumor. Previous nodular lesion inferiorly along the treatment bed which also likely represented a site of HCC also continues to regress with no evidence of recurrence or residual disease. No new suspicious liver lesions are demonstrated. Cirrhosis. Splenomegaly. Portosystemic collaterals.    6/26/20 MR No significant  MRI change in the treatment-related appearance of the right hepatic lobe since 4/21/2020. No MR evidence of recurrent hepatoma. Treated anterior right hepatic lobe lesion is redemonstrated. Treated lesion at right more posterior hepatic lobe is also seen.   Hepatic fibrosis.Cirrhosis, splenomegaly and portosystemic collaterals.    10/5/20 MR abdomen showed 2 treated right hepatic lobe lesions and associated scarring and capsular retraction. Since the previous examination, there is a new 4 mm enhancing nodule at the posterior aspect of the right anterior hepatic lobe treated lesion.  The enhancement characteristics are progressive and indeterminate and there is the hint of small restricted diffusion. Overall, a change in the appearance of this treated lesion which has otherwise been stable since 1/16/2020 is suspicious for recurrent tumor. However, the imaging characteristics themselves are nonspecific of the 4 mm nodule (with differential diagnosis including rounded scar), and attention on short-term follow-up MRI of the abdomen with IV contrast in 3 months is recommended, along with correlation with AFP levels.     12/21/20 MR showed Amorphous enhancement has increased within the lower right hepatic region area of previous treated tumor. This enhancement pattern is worrisome for tumor progression the area. Correlation with recent AFP would be helpful. Considerable scar tissue and areas well.. In addition the lesion overlying the gallbladder fossa may involve the gallbladder which now appears to be markedly thickened and demonstrates considerable wall enhancement which appears to have changed since the previous study. Differential would include direct tumor invasion gallbladder and acute cholecystitis.       Today:   Pain  Is worsening in the right upper quadrent   Has had symptoms of grade 1 Hepatic encephalopathy     Energy is  Stable ECOG 2   appetite ist stable, weight is stable   Takes care of his ADLS    Stable,back pain , chest pain and neuropathy         Encounter Diagnosis   Name Primary?   • Hepatocellular carcinoma (CMS/HCC) (HCC)    .     Treatment History:  Oncology History   Hepatocellular carcinoma (CMS/HCC) (HCC)   1/23/2019 Initial Diagnosis    Hepatocellular carcinoma (CMS/HCC) (HCC)     7/30/2019 - 12/9/2019 Adjuvant Therapy    Hepatocellular - Sorafenib (Nexavar)  Plan Provider: ANDERSON Bryan  Treatment goal: Palliative  Line of treatment: [No plan line of treatment]     6/12/2020 Cancer Staged    Staging form: Liver, AJCC 8th Edition  - Clinical: Stage IIIB (cT4, cN0, cM0) - Signed by Milka Reynoso MD on 6/12/2020 12/31/2020 -  Adjuvant Therapy    Hepatocellular - Bevacizumab / Atezolizumab  Plan Provider: Milka Reynoso MD  Treatment goal: Palliative  Line of treatment: [No plan line of treatment]           RECENT EVENTS:    PAST MEDICAL HISTORY:   Past Medical History:   Diagnosis Date   • A-fib (CMS/HCC) (HCC)    • Allergy    • Anxiety    • Arthritis    • Asthma    • Blindness of right eye    • BPH (benign prostatic hyperplasia)    • Cardiovascular disease    • Cirrhosis (CMS/HCC) (HCC)     with possible liver mass by MRI 5/18, rec repeat in 8/2018   • Complication of anesthesia    • COPD (chronic obstructive pulmonary disease) (CMS/HCC) (HCC)    • Depression    • Endocrine disorder    • Gallstones    • Gastritis    • GERD (gastroesophageal reflux disease)    • Glaucoma    • Hearing loss     bilateral hearing aids   • Hemorrhoids    • Hepatitis C     Hep C, 2016 remission, complicated by cirrhosis.  MRI abd 5/30/18, rec 3 month follow up for focus of enhancement right and left hepatic lobe junction   • Hernia, abdominal    • High blood pressure    • IBS (irritable bowel syndrome)    • Infectious viral hepatitis    • Jaundice    • Kidney stones    • Obstructive sleep apnea syndrome    • Periodic limb movement disorder    • Persistent hypersomnia    • Persistent  insomnia    • Pneumonia    • PONV (postoperative nausea and vomiting)    • Type 2 diabetes mellitus (CMS/HCC) ([facility])    • Urinary incontinence    • Wears partial dentures         GYNECOLOGICAL HISTORY (if applicable): NA     FAMILY HISTORY:   Family History   Problem Relation Age of Onset   • Arthritis Mother    • Rheum arthritis Mother    • Diabetes Mother    • Rectal cancer Mother    • Other Paternal Grandmother    • Other Paternal Grandfather    • Diabetes Other    • Rheum arthritis Other    • Osteoporosis Other         SOCIAL HISTORY:   Social History     Socioeconomic History   • Marital status:      Spouse name: Not on file   • Number of children: Not on file   • Years of education: Not on file   • Highest education level: Not on file   Occupational History   • Not on file   Social Needs   • Financial resource strain: Not on file   • Food insecurity     Worry: Not on file     Inability: Not on file   • Transportation needs     Medical: Not on file     Non-medical: Not on file   Tobacco Use   • Smoking status: Never Smoker   • Smokeless tobacco: Never Used   Substance and Sexual Activity   • Alcohol use: No   • Drug use: No   • Sexual activity: Defer   Lifestyle   • Physical activity     Days per week: Not on file     Minutes per session: Not on file   • Stress: Not on file   Relationships   • Social connections     Talks on phone: Not on file     Gets together: Not on file     Attends Alevism service: Not on file     Active member of club or organization: Not on file     Attends meetings of clubs or organizations: Not on file     Relationship status: Not on file   • Intimate partner violence     Fear of current or ex partner: Not on file     Emotionally abused: Not on file     Physically abused: Not on file     Forced sexual activity: Not on file   Other Topics Concern   • Not on file   Social History Narrative   • Not on file        ALLERGIES:   Allergies as of 12/31/2020 - Reviewed 12/31/2020    Allergen Reaction Noted   • Penicillins Anaphylaxis 10/19/2017   • Metformin Hives    • Adhesive tape-silicones  04/06/2018   • Interferon violetta-2a  02/05/2019   • Latex     • Milk  02/05/2019   • Mometasone     • Mometasone furoate     • Omeprazole     • Ondansetron hcl Nausea And Vomiting 05/01/2019   • Pepper (genus capsicum)  02/05/2019   • Primidone  05/01/2019   • Ribavirin Itching 10/19/2017   • Rosuvastatin  02/05/2019   • Sorafenib  04/04/2019   • Spironolactone     • Statins-hmg-coa reductase inhibitors Itching and GI intolerance 04/06/2018   • Interferon violetta (human leuk. derived) Palpitations and Rash 09/20/2019   • Pantoprazole Itching and Rash 02/28/2018   • Peginterferon violetta-2b Palpitations 10/19/2017        MEDICATIONS:   Current Outpatient Medications   Medication Sig Dispense Refill   • prochlorperazine (COMPAZINE) 10 mg tablet Take 1 tablet (10 mg total) by mouth every 6 (six) hours as needed for nausea or vomiting 30 tablet 5   • HYDROmorphone (DILAUDID) 2 mg tablet Take 1 tablet (2 mg total) by mouth every 4 (four) hours as needed (pain) Max Daily Amount: 12 mg 60 tablet 0   • artificial saliva (YERBA GLADYS AND LYTES) aerosol,spray spray See administration instructions Take 3 to 5 sprays up to 3 to 5 times daily     • lidocaine (Lidoderm) 5 % patch Apply 1 patch topically daily Remove & discard patch within 12 hours or as directed by MD.     • psyllium husk (METAMUCIL ORAL) Take 1 Scoop by mouth daily       • hypromellose (SYSTANE GEL OPHT) Administer into affected eye(s) as needed     • fluticasone propionate (FLONASE) 50 mcg/actuation nasal spray Administer 2 sprays into each nostril daily       • CLOTRIMAZOLE, BULK, MISC as needed To feet     • naloxone HCl (NARCAN NASL) Administer into affected nostril(s) as needed     • sildenafiL (VIAGRA) 100 mg tablet Take 100 mg by mouth daily as needed for erectile dysfunction     • traZODone (DESYREL) 100 mg tablet Take 100 mg by mouth nightly        • ondansetron (ZOFRAN) 4 mg tablet Take 4 mg by mouth every 8 (eight) hours as needed for nausea or vomiting     • SUMAtriptan (IMITREX) 100 mg tablet Take 100 mg by mouth once as needed for migraine     • insulin glargine (Lantus Solostar U-100 Insulin) 100 unit/mL (3 mL) insulin pen injection pen Inject 10 Units under the skin every morning     • miconazole nitrate (SECURA ANTIFUNGAL T) Apply topically as needed       • carvediloL (COREG) 3.125 mg tablet Take 1 tablet (3.125 mg total) by mouth 2 (two) times a day with meals 180 tablet 0   • diclofenac sodium (VOLTAREN) 1 % gel Apply 4 g topically 4 (four) times a day       • cetirizine (ZyrTEC) 10 mg tablet Take 10 mg by mouth daily     • loperamide (Imodium A-D) 2 mg tablet Take 2 tablets with first loose stool of the day, then up to 8 tablets daily 80 tablet 2   • hydrOXYzine (ATARAX) 25 mg tablet Take 1 tablet (25 mg total) by mouth nightly 90 tablet 2   • buPROPion SR (WELLBUTRIN SR) 100 mg 12 hr tablet Take 1 tablet (100 mg total) by mouth daily 90 tablet 1   • lactulose (GENERLAC) 10 gram/15 mL solution Take 15 mL (10 g total) by mouth daily 473 mL 2   • terazosin (HYTRIN) 5 mg capsule Take 1 capsule (5 mg total) by mouth 2 (two) times a day 180 capsule 2   • blood-glucose meter (ACCU-CHEK ADELE PLUS METER) [facility] Use to test blood sugars 3 times daily (E11.65, non insulin depedent) AccuChek Adele plus, meter is 8 years old (Patient taking differently: Use to test blood sugars 3 times daily (E11.65, non insulin dependent) ) 1 each 0   • lisinopril (PRINIVIL,ZESTRIL) 20 mg tablet Take 1 tablet (20 mg total) by mouth daily 90 tablet 2   • furosemide (LASIX) 20 mg tablet Take 1 tablet (20 mg total) by mouth 2 (two) times a day. 180 tablet 2   • lansoprazole (PREVACID) 30 mg capsule Take 30 mg by mouth daily      • liraglutide (VICTOZA 2-JASE) 0.6 mg/0.1 mL (18 mg/3 mL) injection Inject 1.8 mg under the skin daily       • capsaicin (ZOSTRIX) 0.025 % cream Apply 1  application topically as needed for pain scale 1-3/10, 1-3/8.     • sodium chloride (SALINE NASAL) 0.65 % nasal spray Administer 1 spray into each nostril as needed for congestion or rhinitis      • white petrolatum (AQUAPHOR HEALING) 41 % ointment ointment Apply 1 application topically as needed.     • pramoxine-menthol (GOLD BOND MEDICATED ANTI-ITCH) 1-1 % cream Apply topically as needed       • GLYCERIN/PROPYLENE GLYCOL (ARTIFICIAL TEARS,GLYCERIN-PEG, OPHT) Administer 1 drop into affected eye(s) as needed (Dry eyes)      • latanoprost (XALATAN) 0.005 % ophthalmic solution Administer 1 drop into both eyes nightly   10 0     No current facility-administered medications for this visit.      Facility-Administered Medications Ordered in Other Visits   Medication Dose Route Frequency Provider Last Rate Last Admin   • heparin, porcine (PF) 100 unit/mL flush 5 mL  5 mL intra-catheter PRN Milka Reynoso MD   5 mL at 12/31/20 0925   • heparin, porcine (PF) 100 unit/mL flush 5 mL  500 Units intravenous 2x daily Sonny Danielle MD       • heparin, porcine (PF) 100 unit/mL flush 5 mL  500 Units intravenous PRN Sonny Danielle MD       • sodium chloride flush 10 mL  10 mL intravenous 2x daily Sonny Danielle MD       • sodium chloride flush 10 mL  10 mL intravenous PRN Sonny Danielle MD           REVIEW OF SYSTEMS:   Review of Systems   Constitutional: Positive for fatigue.   HENT:   Positive for hearing loss.    Eyes: Positive for eye problems.   Gastrointestinal: Positive for abdominal distention and abdominal pain.   Endocrine: Negative.    Genitourinary: Positive for bladder incontinence.    Musculoskeletal: Positive for arthralgias, gait problem and myalgias.   Skin: Negative.    Neurological: Positive for gait problem.   Hematological: Negative.    Psychiatric/Behavioral: The patient is nervous/anxious.    All other systems reviewed and are negative.      The ECOG performance status today is 2 - Ambulatory care of self;  up and about >50% of waking hours.          Objective    PHYSICAL EXAM:   /49   Pulse 77   Temp 36.3 °C (97.4 °F) (Temporal)   Resp 18   Wt 93.6 kg (206 lb 6.4 oz)   SpO2 95%   BMI 34.35 kg/m²    Body mass index is 34.35 kg/m².       Physical Exam  HENT:      Head: Normocephalic.      Nose: Nose normal.      Mouth/Throat:      Mouth: Mucous membranes are moist.   Eyes:      Pupils: Pupils are equal, round, and reactive to light.   Neck:      Musculoskeletal: Normal range of motion.   Cardiovascular:      Rate and Rhythm: Normal rate.   Pulmonary:      Effort: Pulmonary effort is normal.   Abdominal:      General: Abdomen is flat. There is no distension.   Musculoskeletal: Normal range of motion.   Skin:     General: Skin is dry.   Neurological:      General: No focal deficit present.      Mental Status: He is alert.   Psychiatric:         Mood and Affect: Mood normal.         LABS:   Appointment on 12/31/2020   Component Date Value Ref Range Status   • WBC 12/31/2020 5.8  3.7 - 9.6 10*3/uL Final   • RBC 12/31/2020 4.39  4.10 - 5.80 10*6/µL Final   • Hemoglobin 12/31/2020 12.6* 13.2 - 17.2 g/dL Final   • Hematocrit 12/31/2020 38.1  38.0 - 50.0 % Final   • MCV 12/31/2020 86.8  82.0 - 97.0 fL Final   • MCH 12/31/2020 28.6* 29.0 - 34.0 pg Final   • MCHC 12/31/2020 33.0  32.0 - 36.0 g/dL Final   • RDW 12/31/2020 14.2  11.5 - 15.0 % Final   • Platelets 12/31/2020 214  130 - 350 10*3/uL Final   • MPV 12/31/2020 7.2  6.9 - 10.8 fL Final   • Neutrophils% 12/31/2020 83* 46 - 70 % Final   • Lymphocytes% 12/31/2020 8* 15 - 47 % Final   • Monocytes% 12/31/2020 7  5 - 13 % Final   • Eosinophils% 12/31/2020 1  0 - 3 % Final   • Basophils% 12/31/2020 1  0 - 2 % Final   • Neutrophils Absolute 12/31/2020 4.80  10*3/uL Final   • Lymphocytes Absolute 12/31/2020 0.40  10*3/uL Final   • Monocytes Absolute 12/31/2020 0.40  10*3/uL Final   • Eosinophils Absolute 12/31/2020 0.10  10*3/uL Final   • Basophils Absolute 12/31/2020  0.10  10*3/uL Final   • Sodium 12/31/2020 137  135 - 145 mmol/L Final   • Potassium 12/31/2020 4.3  3.5 - 5.1 mmol/L Final   • Chloride 12/31/2020 101  98 - 107 mmol/L Final   • CO2 12/31/2020 29  21 - 32 mmol/L Final   • Anion Gap 12/31/2020 7  3 - 11 mmol/L Final   • BUN 12/31/2020 12  7 - 25 mg/dL Final   • Creatinine 12/31/2020 0.90  0.70 - 1.30 mg/dL Final   • Glucose 12/31/2020 216* 70 - 105 mg/dL Final   • Calcium 12/31/2020 9.0  8.6 - 10.3 mg/dL Final   • AST 12/31/2020 27  <40 U/L Final   • ALT (SGPT) 12/31/2020 21  7 - 52 U/L Final   • Alkaline Phosphatase 12/31/2020 173* 45 - 115 U/L Final   • Total Protein 12/31/2020 7.2  6.0 - 8.3 g/dL Final   • Albumin 12/31/2020 3.8  3.5 - 5.3 g/dL Final   • Total Bilirubin 12/31/2020 0.51  0.20 - 1.40 mg/dL Final   • eGFR 12/31/2020 87  >60 mL/min/1.73m*2 Final   • Corrected Calcium 12/31/2020 9.2  8.6 - 10.3 mg/dL Final   • TSH 12/31/2020 2.161  0.340 - 4.820 uIU/ml Final       DIAGNOSTIC REPORTS REVIEWED:   Imaging:  Reviewed     Assessment/Plan    IMPRESSION:   This is a pleasant 69-year-old male with diagnosis of hepatocellular carcinoma involving the right lobe, locally advanced disease, per the chart not amenable to local therapy, started sorafenib December 2018 however stopped in February 2019 due to intolerance without any dose reductions.  Has been off of treatment since then, restaging CAT scan and MRI  7/2019 show progressive disease in the liver.  Started  sorafenib 8/2019 at a lower dose 200 mg twice daily, s/p RFA 8/2019, was referred to [facility], s/p Y90 treatment followed by [facility], MR showed response to treatment without progression on 1/16/20 and 4/21/20  currently treatment on hold until evidence of disease progression      It seems like vascular surgery at  were concerned about enhancement of the gallbladder and they wanted to discuss at tumor board and inform me of their decision. There is no rule for surgery, on MR 12/2020 there  was clear progression of HCC and discussed plan of either starting bevacizumab and atezolizomab vs lenvatinib   After discussion likely to start bevacizumab and atezolizomab.       Plan:    -  start atzolizumab 1200 mg and bevacizumab 15mg/kg every 3 weeks  ,    - will restage after 3 cycles around 3/25/21   -  Follow up with [facility] for GI follow up and repeat ERCP   - continue follow up with palliative medicine for pain control.   - referral to hepatology for liver cirrhosis             Physician: Milka Reynoso MD

## 2020-12-31 NOTE — DISCHARGE INSTRUCTIONS
INVASIVE PROCEDURES INSTRUCTIONS FOR HOME CARE:      Activity when going home:  · No driving or operating heavy machinery for 24 hours.   · Day of discharge: relax at home, limit activity to minimal stair climbing and walking.   · Limit lifting to no more than 20 for 1 week   · Have someone stay with you for the first 12 hours.     Self Care:  · You may shower the next day.  Pat site dry.    · No tub bath for one week.   · Do not pic or peel Dermabond for one week.        Medications:  · Resume other medications unless directed otherwise by your doctor.     Diet:   · Resume previous diet unless your doctor has changed it.    Site Care:  · The site may be tender or slightly bruised.  This is normal and will heal.   · Over-the-counter pain relievers may be helpful.      Call your doctor if you have:   · Increased bruising or swelling at the site of your procedure.   · Bleeding from the site that is new or more in amount.    · Increasing shortness of breath.  · Increasing pain at the site.     Call your doctor if you see signs of infection at the site:   · Redness or swelling.   · Warm to touch.   · Thick cloudy drainage.   · Fever about 100.5 degrees F.   · Increased pain at site.

## 2020-12-31 NOTE — PROGRESS NOTES
Jakub and wife Laura state that Jakub is scheduled [facility] the end of January.  There is a risk of osteonecrosis of the jaw with MVASI.  Reviewed with Dr. Reynoso and he OKs proceeding with implants.

## 2020-12-31 NOTE — POST-PROCEDURE NOTE
Post Procedure Note    Patient Name: Ben Lai      : 1950    Radiologist: MATHIEU VALLE MD    Pre-operative Diagnosis: Hepatoma, Needs IV access for chemotherapy    Operation : Power Port  placement    Anesthesia Type: Lidocaine, conscious sedation    Estimated Blood Loss: <10ml    Findings: Ultrasound and fluoroscopically guided Power Port placement via right IJ vein with cath tip at RA/SVC junction. OK to use.    Complications:  None; patient tolerated the procedure well.          Prosthetic devices, implanted devices: Power Port

## 2021-01-01 ENCOUNTER — OFFICE VISIT (OUTPATIENT)
Dept: ONCOLOGY | Facility: CLINIC | Age: 71
End: 2021-01-01
Payer: OTHER GOVERNMENT

## 2021-01-01 ENCOUNTER — APPOINTMENT (OUTPATIENT)
Dept: FLUOROSCOPY | Facility: HOSPITAL | Age: 71
DRG: 919 | End: 2021-01-01
Payer: OTHER GOVERNMENT

## 2021-01-01 ENCOUNTER — HOSPITAL ENCOUNTER (OUTPATIENT)
Dept: PHYSICAL THERAPY | Facility: HOSPITAL | Age: 71
Setting detail: THERAPIES SERIES
Discharge: STILL ADMITTED OR ADMINISTRATIVE | End: 2021-08-04
Payer: OTHER GOVERNMENT

## 2021-01-01 ENCOUNTER — TELEPHONE (OUTPATIENT)
Dept: ADMINISTRATIVE | Facility: HOSPITAL | Age: 71
End: 2021-01-01

## 2021-01-01 ENCOUNTER — APPOINTMENT (OUTPATIENT)
Dept: CT IMAGING | Facility: HOSPITAL | Age: 71
DRG: 436 | End: 2021-01-01
Payer: OTHER GOVERNMENT

## 2021-01-01 ENCOUNTER — HOSPITAL ENCOUNTER (OUTPATIENT)
Dept: MRI IMAGING | Facility: HOSPITAL | Age: 71
Discharge: HOME | End: 2021-07-29
Payer: OTHER GOVERNMENT

## 2021-01-01 ENCOUNTER — APPOINTMENT (OUTPATIENT)
Dept: ONCOLOGY | Facility: CLINIC | Age: 71
End: 2021-01-01
Payer: OTHER GOVERNMENT

## 2021-01-01 ENCOUNTER — HOSPITAL ENCOUNTER (OUTPATIENT)
Dept: INTERVENTIONAL RADIOLOGY/VASCULAR | Facility: HOSPITAL | Age: 71
Discharge: HOME | End: 2021-04-02
Payer: OTHER GOVERNMENT

## 2021-01-01 ENCOUNTER — HOSPITAL ENCOUNTER (OUTPATIENT)
Dept: INFUSION THERAPY | Facility: HOSPITAL | Age: 71
Setting detail: INFUSION SERIES
Discharge: STILL ADMITTED OR ADMINISTRATIVE | End: 2021-09-18
Payer: MEDICARE

## 2021-01-01 ENCOUNTER — APPOINTMENT (OUTPATIENT)
Dept: CT IMAGING | Facility: HOSPITAL | Age: 71
DRG: 919 | End: 2021-01-01
Payer: OTHER GOVERNMENT

## 2021-01-01 ENCOUNTER — INFUSION (OUTPATIENT)
Dept: ONCOLOGY | Facility: CLINIC | Age: 71
End: 2021-01-01
Payer: OTHER GOVERNMENT

## 2021-01-01 ENCOUNTER — TELEPHONE (OUTPATIENT)
Dept: ONCOLOGY | Facility: CLINIC | Age: 71
End: 2021-01-01

## 2021-01-01 ENCOUNTER — ANESTHESIA (OUTPATIENT)
Dept: GASTROENTEROLOGY | Facility: HOSPITAL | Age: 71
DRG: 919 | End: 2021-01-01
Payer: OTHER GOVERNMENT

## 2021-01-01 ENCOUNTER — APPOINTMENT (OUTPATIENT)
Dept: NUCLEAR MEDICINE | Facility: HOSPITAL | Age: 71
DRG: 436 | End: 2021-01-01
Payer: OTHER GOVERNMENT

## 2021-01-01 ENCOUNTER — HOSPITAL ENCOUNTER (OUTPATIENT)
Dept: INFUSION THERAPY | Facility: HOSPITAL | Age: 71
Setting detail: INFUSION SERIES
Discharge: STILL ADMITTED OR ADMINISTRATIVE | End: 2021-08-17
Payer: MEDICARE

## 2021-01-01 ENCOUNTER — HOSPITAL ENCOUNTER (OUTPATIENT)
Dept: INFUSION THERAPY | Facility: HOSPITAL | Age: 71
Setting detail: INFUSION SERIES
Discharge: STILL ADMITTED OR ADMINISTRATIVE | End: 2021-08-18
Payer: MEDICARE

## 2021-01-01 ENCOUNTER — HOSPITAL ENCOUNTER (OUTPATIENT)
Dept: INFUSION THERAPY | Facility: HOSPITAL | Age: 71
Setting detail: INFUSION SERIES
Discharge: STILL ADMITTED OR ADMINISTRATIVE | End: 2021-09-08
Payer: MEDICARE

## 2021-01-01 ENCOUNTER — HOSPITAL ENCOUNTER (OUTPATIENT)
Dept: INTERVENTIONAL RADIOLOGY/VASCULAR | Facility: HOSPITAL | Age: 71
Discharge: HOME | End: 2021-07-19
Payer: OTHER GOVERNMENT

## 2021-01-01 ENCOUNTER — OFFICE VISIT (OUTPATIENT)
Dept: INFECTIOUS DISEASES | Facility: CLINIC | Age: 71
End: 2021-01-01
Payer: OTHER GOVERNMENT

## 2021-01-01 ENCOUNTER — HOSPITAL ENCOUNTER (OUTPATIENT)
Dept: INFUSION THERAPY | Facility: HOSPITAL | Age: 71
Setting detail: INFUSION SERIES
Discharge: STILL ADMITTED OR ADMINISTRATIVE | End: 2021-08-23
Payer: MEDICARE

## 2021-01-01 ENCOUNTER — CLINICAL SUPPORT (OUTPATIENT)
Dept: ONCOLOGY | Facility: CLINIC | Age: 71
End: 2021-01-01
Payer: COMMERCIAL

## 2021-01-01 ENCOUNTER — HOSPITAL ENCOUNTER (OUTPATIENT)
Dept: CT IMAGING | Facility: HOSPITAL | Age: 71
Discharge: HOME | End: 2021-04-29
Payer: OTHER GOVERNMENT

## 2021-01-01 ENCOUNTER — HOSPITAL ENCOUNTER (OUTPATIENT)
Dept: PHYSICAL THERAPY | Facility: HOSPITAL | Age: 71
Setting detail: THERAPIES SERIES
Discharge: STILL ADMITTED OR ADMINISTRATIVE | End: 2021-09-16
Payer: OTHER GOVERNMENT

## 2021-01-01 ENCOUNTER — OFFICE VISIT (OUTPATIENT)
Dept: ONCOLOGY | Facility: CLINIC | Age: 71
End: 2021-01-01
Payer: COMMERCIAL

## 2021-01-01 ENCOUNTER — HOSPITAL ENCOUNTER (OUTPATIENT)
Dept: CT IMAGING | Facility: HOSPITAL | Age: 71
Discharge: HOME | End: 2021-09-21
Payer: OTHER GOVERNMENT

## 2021-01-01 ENCOUNTER — INFUSION (OUTPATIENT)
Dept: ONCOLOGY | Facility: CLINIC | Age: 71
End: 2021-01-01
Payer: COMMERCIAL

## 2021-01-01 ENCOUNTER — HOSPITAL ENCOUNTER (OUTPATIENT)
Dept: INFUSION THERAPY | Facility: HOSPITAL | Age: 71
Setting detail: INFUSION SERIES
Discharge: STILL ADMITTED OR ADMINISTRATIVE | End: 2021-09-16
Payer: MEDICARE

## 2021-01-01 ENCOUNTER — HOSPITAL ENCOUNTER (OUTPATIENT)
Dept: INFUSION THERAPY | Facility: HOSPITAL | Age: 71
Setting detail: INFUSION SERIES
Discharge: STILL ADMITTED OR ADMINISTRATIVE | End: 2021-09-07
Payer: MEDICARE

## 2021-01-01 ENCOUNTER — HOSPITAL ENCOUNTER (OUTPATIENT)
Dept: INFUSION THERAPY | Facility: HOSPITAL | Age: 71
Setting detail: INFUSION SERIES
Discharge: STILL ADMITTED OR ADMINISTRATIVE | End: 2021-09-01
Payer: MEDICARE

## 2021-01-01 ENCOUNTER — HOSPITAL ENCOUNTER (OUTPATIENT)
Dept: INFUSION THERAPY | Facility: HOSPITAL | Age: 71
Setting detail: INFUSION SERIES
Discharge: STILL ADMITTED OR ADMINISTRATIVE | End: 2021-08-20
Payer: MEDICARE

## 2021-01-01 ENCOUNTER — ANESTHESIA (OUTPATIENT)
Dept: GASTROENTEROLOGY | Facility: HOSPITAL | Age: 71
End: 2021-01-01
Payer: OTHER GOVERNMENT

## 2021-01-01 ENCOUNTER — APPOINTMENT (OUTPATIENT)
Dept: RADIOLOGY | Facility: HOSPITAL | Age: 71
DRG: 436 | End: 2021-01-01
Payer: OTHER GOVERNMENT

## 2021-01-01 ENCOUNTER — APPOINTMENT (OUTPATIENT)
Dept: INTERVENTIONAL RADIOLOGY/VASCULAR | Facility: HOSPITAL | Age: 71
DRG: 919 | End: 2021-01-01
Payer: OTHER GOVERNMENT

## 2021-01-01 ENCOUNTER — HOSPITAL ENCOUNTER (OUTPATIENT)
Dept: PHYSICAL THERAPY | Facility: HOSPITAL | Age: 71
Setting detail: THERAPIES SERIES
Discharge: STILL ADMITTED OR ADMINISTRATIVE | End: 2021-09-20
Payer: OTHER GOVERNMENT

## 2021-01-01 ENCOUNTER — APPOINTMENT (OUTPATIENT)
Dept: ULTRASOUND IMAGING | Facility: HOSPITAL | Age: 71
DRG: 436 | End: 2021-01-01
Payer: OTHER GOVERNMENT

## 2021-01-01 ENCOUNTER — HOSPITAL ENCOUNTER (OUTPATIENT)
Dept: INFUSION THERAPY | Facility: HOSPITAL | Age: 71
Setting detail: INFUSION SERIES
Discharge: STILL ADMITTED OR ADMINISTRATIVE | End: 2021-09-11
Payer: MEDICARE

## 2021-01-01 ENCOUNTER — HOSPITAL ENCOUNTER (OUTPATIENT)
Dept: INFUSION THERAPY | Facility: HOSPITAL | Age: 71
Setting detail: INFUSION SERIES
Discharge: STILL ADMITTED OR ADMINISTRATIVE | End: 2021-09-13
Payer: MEDICARE

## 2021-01-01 ENCOUNTER — HOSPITAL ENCOUNTER (OUTPATIENT)
Dept: INFUSION THERAPY | Facility: HOSPITAL | Age: 71
Setting detail: INFUSION SERIES
Discharge: STILL ADMITTED OR ADMINISTRATIVE | End: 2021-08-31
Payer: MEDICARE

## 2021-01-01 ENCOUNTER — TELEPHONE (OUTPATIENT)
Dept: FAMILY MEDICINE | Facility: CLINIC | Age: 71
End: 2021-01-01

## 2021-01-01 ENCOUNTER — ANESTHESIA EVENT (OUTPATIENT)
Dept: GASTROENTEROLOGY | Facility: HOSPITAL | Age: 71
End: 2021-01-01
Payer: OTHER GOVERNMENT

## 2021-01-01 ENCOUNTER — HOSPITAL ENCOUNTER (INPATIENT)
Facility: HOSPITAL | Age: 71
LOS: 6 days | Discharge: HOME | DRG: 919 | End: 2021-08-17
Attending: EMERGENCY MEDICINE | Admitting: FAMILY MEDICINE
Payer: OTHER GOVERNMENT

## 2021-01-01 ENCOUNTER — HOSPITAL ENCOUNTER (OUTPATIENT)
Dept: INTERVENTIONAL RADIOLOGY/VASCULAR | Facility: HOSPITAL | Age: 71
Discharge: HOME | End: 2021-05-18
Payer: OTHER GOVERNMENT

## 2021-01-01 ENCOUNTER — HOSPITAL ENCOUNTER (OUTPATIENT)
Dept: INFUSION THERAPY | Facility: HOSPITAL | Age: 71
Setting detail: INFUSION SERIES
Discharge: STILL ADMITTED OR ADMINISTRATIVE | End: 2021-09-10
Payer: MEDICARE

## 2021-01-01 ENCOUNTER — HOSPITAL ENCOUNTER (OUTPATIENT)
Dept: PHYSICAL THERAPY | Facility: HOSPITAL | Age: 71
Setting detail: THERAPIES SERIES
Discharge: STILL ADMITTED OR ADMINISTRATIVE | End: 2021-08-02
Payer: OTHER GOVERNMENT

## 2021-01-01 ENCOUNTER — HOSPITAL ENCOUNTER (OUTPATIENT)
Dept: INFUSION THERAPY | Facility: HOSPITAL | Age: 71
Setting detail: INFUSION SERIES
Discharge: STILL ADMITTED OR ADMINISTRATIVE | End: 2021-08-22
Payer: MEDICARE

## 2021-01-01 ENCOUNTER — HOSPITAL ENCOUNTER (OUTPATIENT)
Dept: PHYSICAL THERAPY | Facility: HOSPITAL | Age: 71
Setting detail: THERAPIES SERIES
Discharge: STILL ADMITTED OR ADMINISTRATIVE | End: 2021-07-19
Payer: OTHER GOVERNMENT

## 2021-01-01 ENCOUNTER — HOSPITAL ENCOUNTER (OUTPATIENT)
Dept: INFUSION THERAPY | Facility: HOSPITAL | Age: 71
Setting detail: INFUSION SERIES
Discharge: STILL ADMITTED OR ADMINISTRATIVE | End: 2021-08-27
Payer: MEDICARE

## 2021-01-01 ENCOUNTER — HOSPITAL ENCOUNTER (OUTPATIENT)
Dept: INFUSION THERAPY | Facility: HOSPITAL | Age: 71
Setting detail: INFUSION SERIES
Discharge: STILL ADMITTED OR ADMINISTRATIVE | End: 2021-09-19
Payer: MEDICARE

## 2021-01-01 ENCOUNTER — HOSPITAL ENCOUNTER (OUTPATIENT)
Dept: INTERVENTIONAL RADIOLOGY/VASCULAR | Facility: HOSPITAL | Age: 71
Discharge: HOME | End: 2021-06-01
Payer: OTHER GOVERNMENT

## 2021-01-01 ENCOUNTER — HOSPITAL ENCOUNTER (OUTPATIENT)
Dept: INFUSION THERAPY | Facility: HOSPITAL | Age: 71
Setting detail: INFUSION SERIES
Discharge: STILL ADMITTED OR ADMINISTRATIVE | End: 2021-08-21
Payer: MEDICARE

## 2021-01-01 ENCOUNTER — TELEPHONE - BILLABLE (OUTPATIENT)
Dept: PALLIATIVE CARE | Facility: CLINIC | Age: 71
End: 2021-01-01
Payer: MEDICARE

## 2021-01-01 ENCOUNTER — HOSPITAL ENCOUNTER (OUTPATIENT)
Dept: INTERVENTIONAL RADIOLOGY/VASCULAR | Facility: HOSPITAL | Age: 71
Discharge: HOME | End: 2021-09-21
Payer: OTHER GOVERNMENT

## 2021-01-01 ENCOUNTER — DOCUMENTATION (OUTPATIENT)
Dept: ONCOLOGY | Facility: CLINIC | Age: 71
End: 2021-01-01

## 2021-01-01 ENCOUNTER — HOSPITAL ENCOUNTER (OUTPATIENT)
Dept: INFUSION THERAPY | Facility: HOSPITAL | Age: 71
Setting detail: INFUSION SERIES
Discharge: STILL ADMITTED OR ADMINISTRATIVE | End: 2021-08-26
Payer: MEDICARE

## 2021-01-01 ENCOUNTER — HOSPITAL ENCOUNTER (OUTPATIENT)
Dept: INFUSION THERAPY | Facility: HOSPITAL | Age: 71
Setting detail: INFUSION SERIES
Discharge: STILL ADMITTED OR ADMINISTRATIVE | End: 2021-09-22
Payer: MEDICARE

## 2021-01-01 ENCOUNTER — HOSPITAL ENCOUNTER (OUTPATIENT)
Dept: MRI IMAGING | Facility: HOSPITAL | Age: 71
Discharge: HOME | DRG: 436 | End: 2021-03-03
Payer: OTHER GOVERNMENT

## 2021-01-01 ENCOUNTER — HOSPITAL ENCOUNTER (OUTPATIENT)
Dept: INFUSION THERAPY | Facility: HOSPITAL | Age: 71
Setting detail: INFUSION SERIES
Discharge: STILL ADMITTED OR ADMINISTRATIVE | End: 2021-09-05
Payer: MEDICARE

## 2021-01-01 ENCOUNTER — HOSPITAL ENCOUNTER (OUTPATIENT)
Dept: INFUSION THERAPY | Facility: HOSPITAL | Age: 71
Setting detail: INFUSION SERIES
Discharge: STILL ADMITTED OR ADMINISTRATIVE | End: 2021-09-03
Payer: MEDICARE

## 2021-01-01 ENCOUNTER — HOSPITAL ENCOUNTER (OUTPATIENT)
Dept: INFUSION THERAPY | Facility: HOSPITAL | Age: 71
Setting detail: INFUSION SERIES
Discharge: STILL ADMITTED OR ADMINISTRATIVE | End: 2021-09-15
Payer: MEDICARE

## 2021-01-01 ENCOUNTER — APPOINTMENT (OUTPATIENT)
Dept: ONCOLOGY | Facility: CLINIC | Age: 71
End: 2021-01-01
Payer: COMMERCIAL

## 2021-01-01 ENCOUNTER — HOSPITAL ENCOUNTER (OUTPATIENT)
Dept: PHYSICAL THERAPY | Facility: HOSPITAL | Age: 71
Setting detail: THERAPIES SERIES
Discharge: STILL ADMITTED OR ADMINISTRATIVE | End: 2021-09-13
Payer: OTHER GOVERNMENT

## 2021-01-01 ENCOUNTER — HOSPITAL ENCOUNTER (OUTPATIENT)
Dept: INFUSION THERAPY | Facility: HOSPITAL | Age: 71
Setting detail: INFUSION SERIES
Discharge: STILL ADMITTED OR ADMINISTRATIVE | End: 2021-09-06
Payer: MEDICARE

## 2021-01-01 ENCOUNTER — HOSPITAL ENCOUNTER (OUTPATIENT)
Dept: INFUSION THERAPY | Facility: HOSPITAL | Age: 71
Setting detail: INFUSION SERIES
Discharge: STILL ADMITTED OR ADMINISTRATIVE | End: 2021-09-14
Payer: MEDICARE

## 2021-01-01 ENCOUNTER — HOSPITAL ENCOUNTER (INPATIENT)
Facility: HOSPITAL | Age: 71
LOS: 6 days | Discharge: HOME | DRG: 436 | End: 2021-03-10
Attending: EMERGENCY MEDICINE | Admitting: INTERNAL MEDICINE
Payer: OTHER GOVERNMENT

## 2021-01-01 ENCOUNTER — TELEPHONE (OUTPATIENT)
Dept: HOME HEALTH SERVICES | Facility: HOSPICE | Age: 71
End: 2021-01-01

## 2021-01-01 ENCOUNTER — TELEPHONE (OUTPATIENT)
Dept: INTERVENTIONAL RADIOLOGY/VASCULAR | Facility: HOSPITAL | Age: 71
End: 2021-01-01

## 2021-01-01 ENCOUNTER — HOSPITAL ENCOUNTER (OUTPATIENT)
Dept: INFUSION THERAPY | Facility: HOSPITAL | Age: 71
Setting detail: INFUSION SERIES
Discharge: STILL ADMITTED OR ADMINISTRATIVE | End: 2021-08-30
Payer: MEDICARE

## 2021-01-01 ENCOUNTER — HOSPITAL ENCOUNTER (OUTPATIENT)
Dept: INTERVENTIONAL RADIOLOGY/VASCULAR | Facility: HOSPITAL | Age: 71
Discharge: HOME | End: 2021-05-05
Payer: OTHER GOVERNMENT

## 2021-01-01 ENCOUNTER — HOSPITAL ENCOUNTER (OUTPATIENT)
Dept: MRI IMAGING | Facility: HOSPITAL | Age: 71
Discharge: HOME | End: 2021-05-05
Payer: OTHER GOVERNMENT

## 2021-01-01 ENCOUNTER — TELEPHONE - BILLABLE (OUTPATIENT)
Dept: PALLIATIVE CARE | Facility: CLINIC | Age: 71
End: 2021-01-01
Payer: COMMERCIAL

## 2021-01-01 ENCOUNTER — HOSPITAL ENCOUNTER (OUTPATIENT)
Dept: INFUSION THERAPY | Facility: HOSPITAL | Age: 71
Setting detail: INFUSION SERIES
Discharge: STILL ADMITTED OR ADMINISTRATIVE | End: 2021-09-09
Payer: MEDICARE

## 2021-01-01 ENCOUNTER — HOSPITAL ENCOUNTER (OUTPATIENT)
Dept: INFUSION THERAPY | Facility: HOSPITAL | Age: 71
Setting detail: INFUSION SERIES
Discharge: STILL ADMITTED OR ADMINISTRATIVE | End: 2021-09-02
Payer: MEDICARE

## 2021-01-01 ENCOUNTER — HOSPITAL ENCOUNTER (OUTPATIENT)
Dept: INFUSION THERAPY | Facility: HOSPITAL | Age: 71
Setting detail: INFUSION SERIES
Discharge: STILL ADMITTED OR ADMINISTRATIVE | End: 2021-09-17
Payer: MEDICARE

## 2021-01-01 ENCOUNTER — HOSPITAL ENCOUNTER (OUTPATIENT)
Dept: INFUSION THERAPY | Facility: HOSPITAL | Age: 71
Setting detail: INFUSION SERIES
Discharge: STILL ADMITTED OR ADMINISTRATIVE | End: 2021-08-19
Payer: MEDICARE

## 2021-01-01 ENCOUNTER — HOSPITAL ENCOUNTER (OUTPATIENT)
Dept: INTERVENTIONAL RADIOLOGY/VASCULAR | Facility: HOSPITAL | Age: 71
Discharge: HOME | End: 2021-08-23
Payer: OTHER GOVERNMENT

## 2021-01-01 ENCOUNTER — HOSPITAL ENCOUNTER (OUTPATIENT)
Facility: HOSPITAL | Age: 71
Setting detail: OUTPATIENT SURGERY
Discharge: HOME | End: 2021-05-11
Attending: INTERNAL MEDICINE | Admitting: INTERNAL MEDICINE
Payer: OTHER GOVERNMENT

## 2021-01-01 ENCOUNTER — HOSPITAL ENCOUNTER (OUTPATIENT)
Dept: INFUSION THERAPY | Facility: HOSPITAL | Age: 71
Setting detail: INFUSION SERIES
Discharge: STILL ADMITTED OR ADMINISTRATIVE | End: 2021-09-20
Payer: MEDICARE

## 2021-01-01 ENCOUNTER — HOSPITAL ENCOUNTER (OUTPATIENT)
Dept: CT IMAGING | Facility: HOSPITAL | Age: 71
Discharge: HOME | End: 2021-03-31
Payer: OTHER GOVERNMENT

## 2021-01-01 ENCOUNTER — HOSPITAL ENCOUNTER (OUTPATIENT)
Dept: INFUSION THERAPY | Facility: HOSPITAL | Age: 71
Setting detail: INFUSION SERIES
Discharge: STILL ADMITTED OR ADMINISTRATIVE | End: 2021-09-04
Payer: MEDICARE

## 2021-01-01 ENCOUNTER — HOSPITAL ENCOUNTER (OUTPATIENT)
Dept: INTERVENTIONAL RADIOLOGY/VASCULAR | Facility: HOSPITAL | Age: 71
Discharge: HOME | End: 2021-08-11
Payer: OTHER GOVERNMENT

## 2021-01-01 ENCOUNTER — HOSPITAL ENCOUNTER (OUTPATIENT)
Dept: INTERVENTIONAL RADIOLOGY/VASCULAR | Facility: HOSPITAL | Age: 71
Discharge: HOME | End: 2021-06-07
Payer: OTHER GOVERNMENT

## 2021-01-01 ENCOUNTER — HOSPITAL ENCOUNTER (OUTPATIENT)
Dept: INFUSION THERAPY | Facility: HOSPITAL | Age: 71
Setting detail: INFUSION SERIES
Discharge: STILL ADMITTED OR ADMINISTRATIVE | End: 2021-08-25
Payer: MEDICARE

## 2021-01-01 ENCOUNTER — HOSPITAL ENCOUNTER (OUTPATIENT)
Dept: INTERVENTIONAL RADIOLOGY/VASCULAR | Facility: HOSPITAL | Age: 71
Discharge: HOME | End: 2021-07-05
Payer: OTHER GOVERNMENT

## 2021-01-01 ENCOUNTER — HOSPITAL ENCOUNTER (OUTPATIENT)
Dept: INFUSION THERAPY | Facility: HOSPITAL | Age: 71
Setting detail: INFUSION SERIES
Discharge: STILL ADMITTED OR ADMINISTRATIVE | End: 2021-09-12
Payer: MEDICARE

## 2021-01-01 ENCOUNTER — HOSPITAL ENCOUNTER (OUTPATIENT)
Dept: INFUSION THERAPY | Facility: HOSPITAL | Age: 71
Setting detail: INFUSION SERIES
Discharge: STILL ADMITTED OR ADMINISTRATIVE | End: 2021-08-28
Payer: MEDICARE

## 2021-01-01 ENCOUNTER — HOSPITAL ENCOUNTER (OUTPATIENT)
Dept: PHYSICAL THERAPY | Facility: HOSPITAL | Age: 71
Setting detail: THERAPIES SERIES
Discharge: STILL ADMITTED OR ADMINISTRATIVE | End: 2021-07-28
Payer: OTHER GOVERNMENT

## 2021-01-01 ENCOUNTER — HOSPITAL ENCOUNTER (OUTPATIENT)
Dept: INFUSION THERAPY | Facility: HOSPITAL | Age: 71
Setting detail: INFUSION SERIES
Discharge: STILL ADMITTED OR ADMINISTRATIVE | End: 2021-09-21
Payer: MEDICARE

## 2021-01-01 ENCOUNTER — HOSPITAL ENCOUNTER (OUTPATIENT)
Dept: INTERVENTIONAL RADIOLOGY/VASCULAR | Facility: HOSPITAL | Age: 71
Discharge: HOME | End: 2021-09-13
Payer: OTHER GOVERNMENT

## 2021-01-01 ENCOUNTER — HOSPITAL ENCOUNTER (OUTPATIENT)
Dept: INTERVENTIONAL RADIOLOGY/VASCULAR | Facility: HOSPITAL | Age: 71
Discharge: HOME | End: 2021-06-21
Payer: OTHER GOVERNMENT

## 2021-01-01 ENCOUNTER — HOSPITAL ENCOUNTER (OUTPATIENT)
Dept: INFUSION THERAPY | Facility: HOSPITAL | Age: 71
Setting detail: INFUSION SERIES
Discharge: STILL ADMITTED OR ADMINISTRATIVE | End: 2021-08-29
Payer: MEDICARE

## 2021-01-01 ENCOUNTER — HOSPITAL ENCOUNTER (OUTPATIENT)
Dept: INFUSION THERAPY | Facility: HOSPITAL | Age: 71
Setting detail: INFUSION SERIES
Discharge: STILL ADMITTED OR ADMINISTRATIVE | End: 2021-08-24
Payer: MEDICARE

## 2021-01-01 VITALS
DIASTOLIC BLOOD PRESSURE: 78 MMHG | RESPIRATION RATE: 16 BRPM | OXYGEN SATURATION: 95 % | TEMPERATURE: 96.8 F | HEART RATE: 68 BPM | HEART RATE: 75 BPM | SYSTOLIC BLOOD PRESSURE: 128 MMHG | RESPIRATION RATE: 18 BRPM | RESPIRATION RATE: 16 BRPM | TEMPERATURE: 97.2 F | DIASTOLIC BLOOD PRESSURE: 59 MMHG | HEART RATE: 74 BPM | HEART RATE: 77 BPM | OXYGEN SATURATION: 94 % | TEMPERATURE: 97.7 F | SYSTOLIC BLOOD PRESSURE: 150 MMHG | RESPIRATION RATE: 16 BRPM | OXYGEN SATURATION: 92 % | OXYGEN SATURATION: 93 % | DIASTOLIC BLOOD PRESSURE: 57 MMHG | TEMPERATURE: 97.7 F | DIASTOLIC BLOOD PRESSURE: 83 MMHG | SYSTOLIC BLOOD PRESSURE: 122 MMHG | SYSTOLIC BLOOD PRESSURE: 170 MMHG

## 2021-01-01 VITALS
WEIGHT: 180 LBS | HEART RATE: 74 BPM | OXYGEN SATURATION: 91 % | RESPIRATION RATE: 20 BRPM | BODY MASS INDEX: 29.95 KG/M2

## 2021-01-01 VITALS
TEMPERATURE: 97.7 F | OXYGEN SATURATION: 95 % | TEMPERATURE: 98.1 F | HEART RATE: 77 BPM | SYSTOLIC BLOOD PRESSURE: 138 MMHG | DIASTOLIC BLOOD PRESSURE: 78 MMHG | SYSTOLIC BLOOD PRESSURE: 137 MMHG | DIASTOLIC BLOOD PRESSURE: 71 MMHG | RESPIRATION RATE: 16 BRPM | OXYGEN SATURATION: 94 % | HEART RATE: 69 BPM | RESPIRATION RATE: 18 BRPM

## 2021-01-01 VITALS
DIASTOLIC BLOOD PRESSURE: 72 MMHG | OXYGEN SATURATION: 94 % | SYSTOLIC BLOOD PRESSURE: 131 MMHG | TEMPERATURE: 97.5 F | RESPIRATION RATE: 18 BRPM | HEART RATE: 76 BPM

## 2021-01-01 VITALS
DIASTOLIC BLOOD PRESSURE: 78 MMHG | RESPIRATION RATE: 16 BRPM | TEMPERATURE: 97.9 F | SYSTOLIC BLOOD PRESSURE: 156 MMHG | OXYGEN SATURATION: 93 % | HEART RATE: 64 BPM

## 2021-01-01 VITALS
HEART RATE: 77 BPM | SYSTOLIC BLOOD PRESSURE: 141 MMHG | TEMPERATURE: 97.7 F | RESPIRATION RATE: 18 BRPM | OXYGEN SATURATION: 92 % | DIASTOLIC BLOOD PRESSURE: 62 MMHG

## 2021-01-01 VITALS — OXYGEN SATURATION: 94 % | HEART RATE: 71 BPM

## 2021-01-01 VITALS
WEIGHT: 186.6 LBS | OXYGEN SATURATION: 93 % | TEMPERATURE: 98.6 F | BODY MASS INDEX: 31.05 KG/M2 | SYSTOLIC BLOOD PRESSURE: 149 MMHG | HEART RATE: 79 BPM | DIASTOLIC BLOOD PRESSURE: 70 MMHG

## 2021-01-01 VITALS
DIASTOLIC BLOOD PRESSURE: 89 MMHG | TEMPERATURE: 97.7 F | RESPIRATION RATE: 16 BRPM | RESPIRATION RATE: 16 BRPM | TEMPERATURE: 96.8 F | HEART RATE: 82 BPM | SYSTOLIC BLOOD PRESSURE: 138 MMHG | DIASTOLIC BLOOD PRESSURE: 69 MMHG | HEART RATE: 61 BPM | HEART RATE: 75 BPM | SYSTOLIC BLOOD PRESSURE: 150 MMHG | DIASTOLIC BLOOD PRESSURE: 74 MMHG | TEMPERATURE: 97.5 F | TEMPERATURE: 98.1 F | RESPIRATION RATE: 16 BRPM | OXYGEN SATURATION: 92 % | OXYGEN SATURATION: 91 % | DIASTOLIC BLOOD PRESSURE: 65 MMHG | RESPIRATION RATE: 16 BRPM | OXYGEN SATURATION: 94 % | HEART RATE: 73 BPM | OXYGEN SATURATION: 93 % | SYSTOLIC BLOOD PRESSURE: 147 MMHG | SYSTOLIC BLOOD PRESSURE: 149 MMHG

## 2021-01-01 VITALS
SYSTOLIC BLOOD PRESSURE: 179 MMHG | WEIGHT: 195.33 LBS | TEMPERATURE: 98.4 F | OXYGEN SATURATION: 92 % | HEART RATE: 80 BPM | HEIGHT: 66 IN | RESPIRATION RATE: 17 BRPM | DIASTOLIC BLOOD PRESSURE: 72 MMHG | BODY MASS INDEX: 31.39 KG/M2

## 2021-01-01 VITALS
DIASTOLIC BLOOD PRESSURE: 70 MMHG | HEART RATE: 83 BPM | OXYGEN SATURATION: 93 % | RESPIRATION RATE: 16 BRPM | SYSTOLIC BLOOD PRESSURE: 137 MMHG | TEMPERATURE: 97.9 F

## 2021-01-01 VITALS
TEMPERATURE: 97.9 F | RESPIRATION RATE: 16 BRPM | RESPIRATION RATE: 20 BRPM | SYSTOLIC BLOOD PRESSURE: 130 MMHG | TEMPERATURE: 98.4 F | DIASTOLIC BLOOD PRESSURE: 82 MMHG | OXYGEN SATURATION: 93 % | HEART RATE: 68 BPM | OXYGEN SATURATION: 95 % | SYSTOLIC BLOOD PRESSURE: 151 MMHG | HEART RATE: 84 BPM | DIASTOLIC BLOOD PRESSURE: 59 MMHG

## 2021-01-01 VITALS
TEMPERATURE: 97.5 F | RESPIRATION RATE: 18 BRPM | SYSTOLIC BLOOD PRESSURE: 145 MMHG | DIASTOLIC BLOOD PRESSURE: 82 MMHG | HEART RATE: 80 BPM | OXYGEN SATURATION: 96 %

## 2021-01-01 VITALS
HEART RATE: 65 BPM | DIASTOLIC BLOOD PRESSURE: 70 MMHG | HEART RATE: 70 BPM | OXYGEN SATURATION: 94 % | RESPIRATION RATE: 16 BRPM | SYSTOLIC BLOOD PRESSURE: 117 MMHG | SYSTOLIC BLOOD PRESSURE: 145 MMHG | RESPIRATION RATE: 16 BRPM | SYSTOLIC BLOOD PRESSURE: 137 MMHG | RESPIRATION RATE: 20 BRPM | OXYGEN SATURATION: 94 % | DIASTOLIC BLOOD PRESSURE: 66 MMHG | TEMPERATURE: 97 F | TEMPERATURE: 97.9 F | HEART RATE: 72 BPM | TEMPERATURE: 97.9 F | OXYGEN SATURATION: 92 % | DIASTOLIC BLOOD PRESSURE: 71 MMHG

## 2021-01-01 VITALS
OXYGEN SATURATION: 94 % | DIASTOLIC BLOOD PRESSURE: 53 MMHG | RESPIRATION RATE: 18 BRPM | HEIGHT: 65 IN | HEART RATE: 75 BPM | SYSTOLIC BLOOD PRESSURE: 130 MMHG | BODY MASS INDEX: 29.61 KG/M2 | TEMPERATURE: 98.4 F | WEIGHT: 177.69 LBS

## 2021-01-01 VITALS
BODY MASS INDEX: 30.12 KG/M2 | DIASTOLIC BLOOD PRESSURE: 80 MMHG | HEART RATE: 77 BPM | SYSTOLIC BLOOD PRESSURE: 148 MMHG | TEMPERATURE: 98.2 F | WEIGHT: 181 LBS | OXYGEN SATURATION: 92 %

## 2021-01-01 VITALS
HEART RATE: 69 BPM | OXYGEN SATURATION: 96 % | SYSTOLIC BLOOD PRESSURE: 137 MMHG | RESPIRATION RATE: 18 BRPM | TEMPERATURE: 96.8 F | DIASTOLIC BLOOD PRESSURE: 69 MMHG

## 2021-01-01 VITALS
TEMPERATURE: 97.5 F | OXYGEN SATURATION: 94 % | TEMPERATURE: 97.3 F | RESPIRATION RATE: 16 BRPM | SYSTOLIC BLOOD PRESSURE: 140 MMHG | HEART RATE: 72 BPM | SYSTOLIC BLOOD PRESSURE: 158 MMHG | HEART RATE: 77 BPM | DIASTOLIC BLOOD PRESSURE: 78 MMHG | OXYGEN SATURATION: 90 % | DIASTOLIC BLOOD PRESSURE: 67 MMHG | RESPIRATION RATE: 16 BRPM

## 2021-01-01 VITALS
SYSTOLIC BLOOD PRESSURE: 152 MMHG | HEART RATE: 74 BPM | TEMPERATURE: 98.1 F | BODY MASS INDEX: 30.12 KG/M2 | HEIGHT: 65 IN | DIASTOLIC BLOOD PRESSURE: 78 MMHG | OXYGEN SATURATION: 96 % | RESPIRATION RATE: 16 BRPM

## 2021-01-01 VITALS
RESPIRATION RATE: 16 BRPM | TEMPERATURE: 97.2 F | SYSTOLIC BLOOD PRESSURE: 150 MMHG | OXYGEN SATURATION: 96 % | HEART RATE: 74 BPM | DIASTOLIC BLOOD PRESSURE: 68 MMHG

## 2021-01-01 VITALS — HEART RATE: 63 BPM | RESPIRATION RATE: 20 BRPM | OXYGEN SATURATION: 92 %

## 2021-01-01 VITALS
TEMPERATURE: 97.3 F | OXYGEN SATURATION: 93 % | TEMPERATURE: 97.3 F | HEART RATE: 82 BPM | HEART RATE: 80 BPM | DIASTOLIC BLOOD PRESSURE: 96 MMHG | DIASTOLIC BLOOD PRESSURE: 69 MMHG | SYSTOLIC BLOOD PRESSURE: 143 MMHG | RESPIRATION RATE: 16 BRPM | SYSTOLIC BLOOD PRESSURE: 155 MMHG | RESPIRATION RATE: 16 BRPM | OXYGEN SATURATION: 93 %

## 2021-01-01 VITALS
DIASTOLIC BLOOD PRESSURE: 58 MMHG | WEIGHT: 178.2 LBS | HEART RATE: 76 BPM | TEMPERATURE: 97.5 F | OXYGEN SATURATION: 95 % | BODY MASS INDEX: 29.65 KG/M2 | SYSTOLIC BLOOD PRESSURE: 90 MMHG | RESPIRATION RATE: 18 BRPM

## 2021-01-01 VITALS
DIASTOLIC BLOOD PRESSURE: 68 MMHG | OXYGEN SATURATION: 93 % | HEART RATE: 72 BPM | HEIGHT: 65 IN | SYSTOLIC BLOOD PRESSURE: 134 MMHG | BODY MASS INDEX: 32.18 KG/M2 | TEMPERATURE: 97.1 F | RESPIRATION RATE: 16 BRPM

## 2021-01-01 VITALS
HEART RATE: 60 BPM | TEMPERATURE: 97.3 F | RESPIRATION RATE: 16 BRPM | DIASTOLIC BLOOD PRESSURE: 91 MMHG | OXYGEN SATURATION: 95 % | SYSTOLIC BLOOD PRESSURE: 148 MMHG

## 2021-01-01 VITALS
RESPIRATION RATE: 16 BRPM | HEART RATE: 66 BPM | RESPIRATION RATE: 16 BRPM | OXYGEN SATURATION: 93 % | HEART RATE: 69 BPM | OXYGEN SATURATION: 94 %

## 2021-01-01 VITALS
HEART RATE: 78 BPM | TEMPERATURE: 98.2 F | BODY MASS INDEX: 31.12 KG/M2 | DIASTOLIC BLOOD PRESSURE: 68 MMHG | OXYGEN SATURATION: 93 % | SYSTOLIC BLOOD PRESSURE: 122 MMHG | WEIGHT: 187 LBS

## 2021-01-01 VITALS
BODY MASS INDEX: 34.25 KG/M2 | OXYGEN SATURATION: 97 % | DIASTOLIC BLOOD PRESSURE: 70 MMHG | WEIGHT: 205.8 LBS | RESPIRATION RATE: 18 BRPM | HEART RATE: 82 BPM | SYSTOLIC BLOOD PRESSURE: 142 MMHG | TEMPERATURE: 97.6 F

## 2021-01-01 VITALS
DIASTOLIC BLOOD PRESSURE: 61 MMHG | SYSTOLIC BLOOD PRESSURE: 143 MMHG | BODY MASS INDEX: 32.95 KG/M2 | TEMPERATURE: 97.9 F | WEIGHT: 198 LBS | HEART RATE: 67 BPM | OXYGEN SATURATION: 93 %

## 2021-01-01 VITALS
RESPIRATION RATE: 16 BRPM | SYSTOLIC BLOOD PRESSURE: 152 MMHG | OXYGEN SATURATION: 97 % | DIASTOLIC BLOOD PRESSURE: 86 MMHG | DIASTOLIC BLOOD PRESSURE: 80 MMHG | HEART RATE: 76 BPM | TEMPERATURE: 96.8 F | HEART RATE: 61 BPM | SYSTOLIC BLOOD PRESSURE: 160 MMHG | TEMPERATURE: 95.9 F | OXYGEN SATURATION: 94 % | RESPIRATION RATE: 16 BRPM

## 2021-01-01 VITALS
HEART RATE: 78 BPM | TEMPERATURE: 97.9 F | RESPIRATION RATE: 18 BRPM | BODY MASS INDEX: 31.45 KG/M2 | OXYGEN SATURATION: 93 % | DIASTOLIC BLOOD PRESSURE: 62 MMHG | WEIGHT: 189 LBS | SYSTOLIC BLOOD PRESSURE: 132 MMHG

## 2021-01-01 VITALS
DIASTOLIC BLOOD PRESSURE: 69 MMHG | HEART RATE: 78 BPM | RESPIRATION RATE: 16 BRPM | SYSTOLIC BLOOD PRESSURE: 159 MMHG | TEMPERATURE: 99.5 F | OXYGEN SATURATION: 93 %

## 2021-01-01 VITALS
HEART RATE: 63 BPM | WEIGHT: 186.9 LBS | BODY MASS INDEX: 31.1 KG/M2 | TEMPERATURE: 97.2 F | DIASTOLIC BLOOD PRESSURE: 63 MMHG | OXYGEN SATURATION: 93 % | RESPIRATION RATE: 18 BRPM | SYSTOLIC BLOOD PRESSURE: 128 MMHG

## 2021-01-01 VITALS
BODY MASS INDEX: 33.95 KG/M2 | DIASTOLIC BLOOD PRESSURE: 59 MMHG | HEART RATE: 70 BPM | OXYGEN SATURATION: 95 % | SYSTOLIC BLOOD PRESSURE: 130 MMHG | WEIGHT: 204 LBS | TEMPERATURE: 97.8 F

## 2021-01-01 VITALS — RESPIRATION RATE: 16 BRPM | OXYGEN SATURATION: 93 % | HEART RATE: 69 BPM

## 2021-01-01 VITALS
OXYGEN SATURATION: 92 % | WEIGHT: 193.4 LBS | DIASTOLIC BLOOD PRESSURE: 68 MMHG | RESPIRATION RATE: 18 BRPM | TEMPERATURE: 98.4 F | HEART RATE: 79 BPM | BODY MASS INDEX: 31.23 KG/M2 | SYSTOLIC BLOOD PRESSURE: 130 MMHG

## 2021-01-01 VITALS — RESPIRATION RATE: 16 BRPM | OXYGEN SATURATION: 92 % | HEART RATE: 72 BPM

## 2021-01-01 VITALS
OXYGEN SATURATION: 95 % | DIASTOLIC BLOOD PRESSURE: 83 MMHG | TEMPERATURE: 98.1 F | HEART RATE: 67 BPM | OXYGEN SATURATION: 95 % | DIASTOLIC BLOOD PRESSURE: 62 MMHG | SYSTOLIC BLOOD PRESSURE: 169 MMHG | SYSTOLIC BLOOD PRESSURE: 159 MMHG | RESPIRATION RATE: 16 BRPM | TEMPERATURE: 97.3 F | RESPIRATION RATE: 16 BRPM | HEART RATE: 76 BPM

## 2021-01-01 VITALS — HEART RATE: 79 BPM | OXYGEN SATURATION: 91 % | RESPIRATION RATE: 16 BRPM

## 2021-01-01 VITALS
HEART RATE: 72 BPM | DIASTOLIC BLOOD PRESSURE: 78 MMHG | SYSTOLIC BLOOD PRESSURE: 146 MMHG | HEIGHT: 65 IN | OXYGEN SATURATION: 95 % | BODY MASS INDEX: 30.12 KG/M2 | RESPIRATION RATE: 18 BRPM

## 2021-01-01 VITALS
WEIGHT: 195.2 LBS | OXYGEN SATURATION: 92 % | TEMPERATURE: 98.4 F | RESPIRATION RATE: 18 BRPM | HEART RATE: 85 BPM | BODY MASS INDEX: 32.48 KG/M2

## 2021-01-01 VITALS
RESPIRATION RATE: 16 BRPM | OXYGEN SATURATION: 93 % | HEART RATE: 72 BPM | SYSTOLIC BLOOD PRESSURE: 130 MMHG | TEMPERATURE: 97.7 F | DIASTOLIC BLOOD PRESSURE: 69 MMHG

## 2021-01-01 VITALS
OXYGEN SATURATION: 95 % | RESPIRATION RATE: 16 BRPM | SYSTOLIC BLOOD PRESSURE: 147 MMHG | TEMPERATURE: 97.7 F | DIASTOLIC BLOOD PRESSURE: 75 MMHG | HEART RATE: 68 BPM

## 2021-01-01 VITALS
DIASTOLIC BLOOD PRESSURE: 73 MMHG | HEART RATE: 62 BPM | SYSTOLIC BLOOD PRESSURE: 161 MMHG | OXYGEN SATURATION: 92 % | TEMPERATURE: 96.8 F | RESPIRATION RATE: 16 BRPM

## 2021-01-01 VITALS — OXYGEN SATURATION: 92 % | HEART RATE: 69 BPM

## 2021-01-01 VITALS — OXYGEN SATURATION: 94 % | HEART RATE: 68 BPM

## 2021-01-01 VITALS
SYSTOLIC BLOOD PRESSURE: 147 MMHG | DIASTOLIC BLOOD PRESSURE: 74 MMHG | RESPIRATION RATE: 16 BRPM | OXYGEN SATURATION: 95 % | TEMPERATURE: 98.2 F | HEART RATE: 73 BPM

## 2021-01-01 VITALS — HEART RATE: 92 BPM | RESPIRATION RATE: 20 BRPM | OXYGEN SATURATION: 93 %

## 2021-01-01 VITALS
DIASTOLIC BLOOD PRESSURE: 60 MMHG | HEART RATE: 72 BPM | SYSTOLIC BLOOD PRESSURE: 139 MMHG | WEIGHT: 181 LBS | TEMPERATURE: 98 F | OXYGEN SATURATION: 95 % | BODY MASS INDEX: 30.12 KG/M2

## 2021-01-01 DIAGNOSIS — A49.9 POLYMICROBIAL BACTERIAL INFECTION: Primary | ICD-10-CM

## 2021-01-01 DIAGNOSIS — K65.1 RIGHT UPPER QUADRANT ABDOMINAL ABSCESS (CMS/HCC): Primary | ICD-10-CM

## 2021-01-01 DIAGNOSIS — L02.211 ABDOMINAL WALL ABSCESS: Primary | ICD-10-CM

## 2021-01-01 DIAGNOSIS — C22.0 HEPATOCELLULAR CARCINOMA (CMS/HCC): ICD-10-CM

## 2021-01-01 DIAGNOSIS — D64.9 ANEMIA: ICD-10-CM

## 2021-01-01 DIAGNOSIS — C22.0 HEPATOCELLULAR CARCINOMA (CMS/HCC): Primary | ICD-10-CM

## 2021-01-01 DIAGNOSIS — K82.2 PERFORATED GALLBLADDER: ICD-10-CM

## 2021-01-01 DIAGNOSIS — K65.1 INTRA-ABDOMINAL ABSCESS (CMS/HCC): Primary | ICD-10-CM

## 2021-01-01 DIAGNOSIS — R10.9 UNSPECIFIED ABDOMINAL PAIN: ICD-10-CM

## 2021-01-01 DIAGNOSIS — G89.3 PAIN OF METASTATIC MALIGNANCY: Primary | ICD-10-CM

## 2021-01-01 DIAGNOSIS — K65.1 INTRA-ABDOMINAL ABSCESS (CMS/HCC): ICD-10-CM

## 2021-01-01 DIAGNOSIS — Z23 HIGH PRIORITY FOR 2019-NCOV VACCINE: ICD-10-CM

## 2021-01-01 DIAGNOSIS — Z00.00 ROUTINE CHECK-UP: ICD-10-CM

## 2021-01-01 DIAGNOSIS — K21.9 GASTROESOPHAGEAL REFLUX DISEASE WITHOUT ESOPHAGITIS: ICD-10-CM

## 2021-01-01 DIAGNOSIS — C22.0 CANCER, HEPATOCELLULAR (CMS/HCC): ICD-10-CM

## 2021-01-01 DIAGNOSIS — L02.91 ABSCESS: ICD-10-CM

## 2021-01-01 DIAGNOSIS — K65.1 RIGHT UPPER QUADRANT ABDOMINAL ABSCESS (CMS/HCC): ICD-10-CM

## 2021-01-01 DIAGNOSIS — A49.9 POLYMICROBIAL BACTERIAL INFECTION: ICD-10-CM

## 2021-01-01 DIAGNOSIS — K65.9 ABDOMINAL INFECTION (CMS/HCC): Primary | ICD-10-CM

## 2021-01-01 DIAGNOSIS — E87.6 HYPOKALEMIA: Primary | ICD-10-CM

## 2021-01-01 DIAGNOSIS — R10.11 RIGHT UPPER QUADRANT ABDOMINAL PAIN: Primary | ICD-10-CM

## 2021-01-01 DIAGNOSIS — B99.9 INTRA-ABDOMINAL INFECTION: Primary | ICD-10-CM

## 2021-01-01 DIAGNOSIS — G47.01 INSOMNIA DUE TO MEDICAL CONDITION: ICD-10-CM

## 2021-01-01 DIAGNOSIS — R10.11 RIGHT UPPER QUADRANT PAIN: ICD-10-CM

## 2021-01-01 DIAGNOSIS — R10.11 RIGHT UPPER QUADRANT ABDOMINAL PAIN: ICD-10-CM

## 2021-01-01 DIAGNOSIS — R11.0 NAUSEA: ICD-10-CM

## 2021-01-01 DIAGNOSIS — K59.00 CONSTIPATION, UNSPECIFIED CONSTIPATION TYPE: ICD-10-CM

## 2021-01-01 DIAGNOSIS — C22.0 HEPATOCARCINOMA (CMS/HCC): ICD-10-CM

## 2021-01-01 DIAGNOSIS — G89.3 CANCER RELATED PAIN: ICD-10-CM

## 2021-01-01 DIAGNOSIS — R11.10 VOMITING: ICD-10-CM

## 2021-01-01 DIAGNOSIS — R10.9 ABDOMINAL PAIN: ICD-10-CM

## 2021-01-01 LAB
ABO GROUP (TYPE) IN BLOOD: NORMAL
ALBUMIN SERPL-MCNC: 2.5 G/DL (ref 3.5–5.3)
ALBUMIN SERPL-MCNC: 2.7 G/DL (ref 3.5–5.3)
ALBUMIN SERPL-MCNC: 2.8 G/DL (ref 3.5–5.3)
ALBUMIN SERPL-MCNC: 2.9 G/DL (ref 3.5–5.3)
ALBUMIN SERPL-MCNC: 2.9 G/DL (ref 3.5–5.3)
ALBUMIN SERPL-MCNC: 3 G/DL (ref 3.5–5.3)
ALBUMIN SERPL-MCNC: 3.1 G/DL (ref 3.5–5.3)
ALBUMIN SERPL-MCNC: 3.2 G/DL (ref 3.5–5.3)
ALBUMIN SERPL-MCNC: 3.3 G/DL (ref 3.5–5.3)
ALBUMIN SERPL-MCNC: 3.4 G/DL (ref 3.5–5.3)
ALBUMIN SERPL-MCNC: 3.4 G/DL (ref 3.5–5.3)
ALBUMIN SERPL-MCNC: 3.5 G/DL (ref 3.5–5.3)
ALBUMIN SERPL-MCNC: 3.6 G/DL (ref 3.5–5.3)
ALBUMIN SERPL-MCNC: 3.6 G/DL (ref 3.5–5.3)
ALBUMIN SERPL-MCNC: 3.7 G/DL (ref 3.5–5.3)
ALP SERPL-CCNC: 103 U/L (ref 45–115)
ALP SERPL-CCNC: 117 U/L (ref 45–115)
ALP SERPL-CCNC: 118 U/L (ref 45–115)
ALP SERPL-CCNC: 120 U/L (ref 45–115)
ALP SERPL-CCNC: 126 U/L (ref 45–115)
ALP SERPL-CCNC: 138 U/L (ref 45–115)
ALP SERPL-CCNC: 148 U/L (ref 45–115)
ALP SERPL-CCNC: 154 U/L (ref 45–115)
ALP SERPL-CCNC: 165 U/L (ref 45–115)
ALP SERPL-CCNC: 167 U/L (ref 45–115)
ALP SERPL-CCNC: 180 U/L (ref 45–115)
ALP SERPL-CCNC: 184 U/L (ref 45–115)
ALP SERPL-CCNC: 194 U/L (ref 45–115)
ALP SERPL-CCNC: 216 U/L (ref 45–115)
ALP SERPL-CCNC: 218 U/L (ref 45–115)
ALP SERPL-CCNC: 229 U/L (ref 45–115)
ALP SERPL-CCNC: 234 U/L (ref 45–115)
ALP SERPL-CCNC: 329 U/L (ref 45–115)
ALP SERPL-CCNC: 394 U/L (ref 45–115)
ALP SERPL-CCNC: 395 U/L (ref 45–115)
ALP SERPL-CCNC: 436 U/L (ref 45–115)
ALP SERPL-CCNC: 455 U/L (ref 45–115)
ALP SERPL-CCNC: 87 U/L (ref 45–115)
ALP SERPL-CCNC: 91 U/L (ref 45–115)
ALP SERPL-CCNC: 92 U/L (ref 45–115)
ALP SERPL-CCNC: 97 U/L (ref 45–115)
ALT SERPL-CCNC: 11 U/L (ref 7–52)
ALT SERPL-CCNC: 12 U/L (ref 7–52)
ALT SERPL-CCNC: 13 U/L (ref 7–52)
ALT SERPL-CCNC: 14 U/L (ref 7–52)
ALT SERPL-CCNC: 15 U/L (ref 7–52)
ALT SERPL-CCNC: 15 U/L (ref 7–52)
ALT SERPL-CCNC: 16 U/L (ref 7–52)
ALT SERPL-CCNC: 17 U/L (ref 7–52)
ALT SERPL-CCNC: 17 U/L (ref 7–52)
ALT SERPL-CCNC: 18 U/L (ref 7–52)
ALT SERPL-CCNC: 22 U/L (ref 7–52)
ALT SERPL-CCNC: 23 U/L (ref 7–52)
ALT SERPL-CCNC: 33 U/L (ref 7–52)
ALT SERPL-CCNC: 36 U/L (ref 7–52)
ALT SERPL-CCNC: 47 U/L (ref 7–52)
ALT SERPL-CCNC: 49 U/L (ref 7–52)
ALT SERPL-CCNC: 50 U/L (ref 7–52)
ALT SERPL-CCNC: 57 U/L (ref 7–52)
ALT SERPL-CCNC: 7 U/L (ref 7–52)
ALT SERPL-CCNC: 7 U/L (ref 7–52)
ALT SERPL-CCNC: 9 U/L (ref 7–52)
AMMONIA PLAS-SCNC: 38 UMOL/L
ANION GAP SERPL CALC-SCNC: 4 MMOL/L (ref 3–11)
ANION GAP SERPL CALC-SCNC: 4 MMOL/L (ref 3–11)
ANION GAP SERPL CALC-SCNC: 5 MMOL/L (ref 3–11)
ANION GAP SERPL CALC-SCNC: 6 MMOL/L (ref 3–11)
ANION GAP SERPL CALC-SCNC: 7 MMOL/L (ref 3–11)
ANION GAP SERPL CALC-SCNC: 8 MMOL/L (ref 3–11)
ANION GAP SERPL CALC-SCNC: 9 MMOL/L (ref 3–11)
ANISOCYTOSIS BLD QL SMEAR: ABNORMAL
ANISOCYTOSIS PRESENCE IN BLOOD, ANALYZER: ABNORMAL
ANTIBODY SCREEN: NORMAL
AST SERPL-CCNC: 101 U/L
AST SERPL-CCNC: 116 U/L
AST SERPL-CCNC: 17 U/L
AST SERPL-CCNC: 17 U/L
AST SERPL-CCNC: 19 U/L
AST SERPL-CCNC: 20 U/L
AST SERPL-CCNC: 21 U/L
AST SERPL-CCNC: 21 U/L
AST SERPL-CCNC: 22 U/L
AST SERPL-CCNC: 23 U/L
AST SERPL-CCNC: 24 U/L
AST SERPL-CCNC: 24 U/L
AST SERPL-CCNC: 26 U/L
AST SERPL-CCNC: 29 U/L
AST SERPL-CCNC: 31 U/L
AST SERPL-CCNC: 32 U/L
AST SERPL-CCNC: 32 U/L
AST SERPL-CCNC: 34 U/L
AST SERPL-CCNC: 34 U/L
AST SERPL-CCNC: 35 U/L
AST SERPL-CCNC: 37 U/L
AST SERPL-CCNC: 42 U/L
AST SERPL-CCNC: 53 U/L
AST SERPL-CCNC: 59 U/L
AST SERPL-CCNC: 70 U/L
AST SERPL-CCNC: 83 U/L
BACTERIA BLD CULT: NORMAL
BACTERIA ISLT CULT: ABNORMAL
BACTERIA ISLT CULT: NORMAL
BACTERIA WND CULT: NORMAL
BASOPHILS # BLD AUTO: 0 10*3/UL
BASOPHILS NFR BLD AUTO: 0 % (ref 0–2)
BASOPHILS NFR BLD AUTO: 1 % (ref 0–2)
BILIRUB SERPL-MCNC: 0.52 MG/DL (ref 0.2–1.4)
BILIRUB SERPL-MCNC: 0.54 MG/DL (ref 0.2–1.4)
BILIRUB SERPL-MCNC: 0.55 MG/DL (ref 0.2–1.4)
BILIRUB SERPL-MCNC: 0.57 MG/DL (ref 0.2–1.4)
BILIRUB SERPL-MCNC: 0.58 MG/DL (ref 0.2–1.4)
BILIRUB SERPL-MCNC: 0.59 MG/DL (ref 0.2–1.4)
BILIRUB SERPL-MCNC: 0.63 MG/DL (ref 0.2–1.4)
BILIRUB SERPL-MCNC: 0.67 MG/DL (ref 0.2–1.4)
BILIRUB SERPL-MCNC: 0.67 MG/DL (ref 0.2–1.4)
BILIRUB SERPL-MCNC: 0.7 MG/DL (ref 0.2–1.4)
BILIRUB SERPL-MCNC: 0.73 MG/DL (ref 0.2–1.4)
BILIRUB SERPL-MCNC: 0.74 MG/DL (ref 0.2–1.4)
BILIRUB SERPL-MCNC: 0.77 MG/DL (ref 0.2–1.4)
BILIRUB SERPL-MCNC: 0.8 MG/DL (ref 0.2–1.4)
BILIRUB SERPL-MCNC: 0.82 MG/DL (ref 0.2–1.4)
BILIRUB SERPL-MCNC: 0.85 MG/DL (ref 0.2–1.4)
BILIRUB SERPL-MCNC: 0.92 MG/DL (ref 0.2–1.4)
BILIRUB SERPL-MCNC: 0.94 MG/DL (ref 0.2–1.4)
BILIRUB SERPL-MCNC: 0.95 MG/DL (ref 0.2–1.4)
BILIRUB SERPL-MCNC: 1.02 MG/DL (ref 0.2–1.4)
BILIRUB SERPL-MCNC: 1.05 MG/DL (ref 0.2–1.4)
BILIRUB SERPL-MCNC: 1.09 MG/DL (ref 0.2–1.4)
BILIRUB SERPL-MCNC: 1.12 MG/DL (ref 0.2–1.4)
BILIRUB SERPL-MCNC: 13.87 MG/DL (ref 0.2–1.4)
BILIRUB SERPL-MCNC: 2.1 MG/DL (ref 0.2–1.4)
BILIRUB SERPL-MCNC: 7.8 MG/DL (ref 0.2–1.4)
BUN SERPL-MCNC: 10 MG/DL (ref 7–25)
BUN SERPL-MCNC: 11 MG/DL (ref 7–25)
BUN SERPL-MCNC: 12 MG/DL (ref 7–25)
BUN SERPL-MCNC: 13 MG/DL (ref 7–25)
BUN SERPL-MCNC: 15 MG/DL (ref 7–25)
BUN SERPL-MCNC: 16 MG/DL (ref 7–25)
BUN SERPL-MCNC: 17 MG/DL (ref 7–25)
BUN SERPL-MCNC: 5 MG/DL (ref 7–25)
BUN SERPL-MCNC: 6 MG/DL (ref 7–25)
BUN SERPL-MCNC: 6 MG/DL (ref 7–25)
BUN SERPL-MCNC: 7 MG/DL (ref 7–25)
BUN SERPL-MCNC: 8 MG/DL (ref 7–25)
BUN SERPL-MCNC: 9 MG/DL (ref 7–25)
CALCIUM ALBUM COR SERPL-MCNC: 8.3 MG/DL (ref 8.6–10.3)
CALCIUM ALBUM COR SERPL-MCNC: 8.6 MG/DL (ref 8.6–10.3)
CALCIUM ALBUM COR SERPL-MCNC: 8.8 MG/DL (ref 8.6–10.3)
CALCIUM ALBUM COR SERPL-MCNC: 8.8 MG/DL (ref 8.6–10.3)
CALCIUM ALBUM COR SERPL-MCNC: 8.9 MG/DL (ref 8.6–10.3)
CALCIUM ALBUM COR SERPL-MCNC: 9 MG/DL (ref 8.6–10.3)
CALCIUM ALBUM COR SERPL-MCNC: 9.1 MG/DL (ref 8.6–10.3)
CALCIUM ALBUM COR SERPL-MCNC: 9.1 MG/DL (ref 8.6–10.3)
CALCIUM ALBUM COR SERPL-MCNC: 9.2 MG/DL (ref 8.6–10.3)
CALCIUM ALBUM COR SERPL-MCNC: 9.3 MG/DL (ref 8.6–10.3)
CALCIUM ALBUM COR SERPL-MCNC: 9.4 MG/DL (ref 8.6–10.3)
CALCIUM ALBUM COR SERPL-MCNC: 9.4 MG/DL (ref 8.6–10.3)
CALCIUM ALBUM COR SERPL-MCNC: 9.5 MG/DL (ref 8.6–10.3)
CALCIUM ALBUM COR SERPL-MCNC: 9.6 MG/DL (ref 8.6–10.3)
CALCIUM SERPL-MCNC: 7.5 MG/DL (ref 8.6–10.3)
CALCIUM SERPL-MCNC: 7.8 MG/DL (ref 8.6–10.3)
CALCIUM SERPL-MCNC: 7.8 MG/DL (ref 8.6–10.3)
CALCIUM SERPL-MCNC: 7.9 MG/DL (ref 8.6–10.3)
CALCIUM SERPL-MCNC: 8.1 MG/DL (ref 8.6–10.3)
CALCIUM SERPL-MCNC: 8.2 MG/DL (ref 8.6–10.3)
CALCIUM SERPL-MCNC: 8.3 MG/DL (ref 8.6–10.3)
CALCIUM SERPL-MCNC: 8.3 MG/DL (ref 8.6–10.3)
CALCIUM SERPL-MCNC: 8.4 MG/DL (ref 8.6–10.3)
CALCIUM SERPL-MCNC: 8.4 MG/DL (ref 8.6–10.3)
CALCIUM SERPL-MCNC: 8.5 MG/DL (ref 8.6–10.3)
CALCIUM SERPL-MCNC: 8.7 MG/DL (ref 8.6–10.3)
CALCIUM SERPL-MCNC: 8.7 MG/DL (ref 8.6–10.3)
CALCIUM SERPL-MCNC: 8.8 MG/DL (ref 8.6–10.3)
CALCIUM SERPL-MCNC: 8.8 MG/DL (ref 8.6–10.3)
CALCIUM SERPL-MCNC: 8.9 MG/DL (ref 8.6–10.3)
CALCIUM SERPL-MCNC: 9.1 MG/DL (ref 8.6–10.3)
CALCIUM SERPL-MCNC: 9.2 MG/DL (ref 8.6–10.3)
CHLORIDE SERPL-SCNC: 100 MMOL/L (ref 98–107)
CHLORIDE SERPL-SCNC: 101 MMOL/L (ref 98–107)
CHLORIDE SERPL-SCNC: 102 MMOL/L (ref 98–107)
CHLORIDE SERPL-SCNC: 103 MMOL/L (ref 98–107)
CHLORIDE SERPL-SCNC: 104 MMOL/L (ref 98–107)
CHLORIDE SERPL-SCNC: 105 MMOL/L (ref 98–107)
CHLORIDE SERPL-SCNC: 105 MMOL/L (ref 98–107)
CHLORIDE SERPL-SCNC: 106 MMOL/L (ref 98–107)
CHLORIDE SERPL-SCNC: 97 MMOL/L (ref 98–107)
CHLORIDE SERPL-SCNC: 98 MMOL/L (ref 98–107)
CHLORIDE SERPL-SCNC: 99 MMOL/L (ref 98–107)
CK SERPL-CCNC: 29 U/L (ref 39–308)
CO2 SERPL-SCNC: 23 MMOL/L (ref 21–32)
CO2 SERPL-SCNC: 24 MMOL/L (ref 21–32)
CO2 SERPL-SCNC: 24 MMOL/L (ref 21–32)
CO2 SERPL-SCNC: 26 MMOL/L (ref 21–32)
CO2 SERPL-SCNC: 27 MMOL/L (ref 21–32)
CO2 SERPL-SCNC: 28 MMOL/L (ref 21–32)
CO2 SERPL-SCNC: 29 MMOL/L (ref 21–32)
CO2 SERPL-SCNC: 30 MMOL/L (ref 21–32)
CO2 SERPL-SCNC: 30 MMOL/L (ref 21–32)
CO2 SERPL-SCNC: 31 MMOL/L (ref 21–32)
CREAT SERPL-MCNC: 0.47 MG/DL (ref 0.7–1.3)
CREAT SERPL-MCNC: 0.47 MG/DL (ref 0.7–1.3)
CREAT SERPL-MCNC: 0.5 MG/DL (ref 0.7–1.3)
CREAT SERPL-MCNC: 0.5 MG/DL (ref 0.7–1.3)
CREAT SERPL-MCNC: 0.54 MG/DL (ref 0.7–1.3)
CREAT SERPL-MCNC: 0.55 MG/DL (ref 0.7–1.3)
CREAT SERPL-MCNC: 0.56 MG/DL (ref 0.7–1.3)
CREAT SERPL-MCNC: 0.57 MG/DL (ref 0.7–1.3)
CREAT SERPL-MCNC: 0.58 MG/DL (ref 0.7–1.3)
CREAT SERPL-MCNC: 0.58 MG/DL (ref 0.7–1.3)
CREAT SERPL-MCNC: 0.6 MG/DL (ref 0.7–1.3)
CREAT SERPL-MCNC: 0.62 MG/DL (ref 0.7–1.3)
CREAT SERPL-MCNC: 0.66 MG/DL (ref 0.7–1.3)
CREAT SERPL-MCNC: 0.67 MG/DL (ref 0.7–1.3)
CREAT SERPL-MCNC: 0.68 MG/DL (ref 0.7–1.3)
CREAT SERPL-MCNC: 0.68 MG/DL (ref 0.7–1.3)
CREAT SERPL-MCNC: 0.69 MG/DL (ref 0.7–1.3)
CREAT SERPL-MCNC: 0.69 MG/DL (ref 0.7–1.3)
CREAT SERPL-MCNC: 0.72 MG/DL (ref 0.7–1.3)
CREAT SERPL-MCNC: 0.72 MG/DL (ref 0.7–1.3)
CREAT SERPL-MCNC: 0.73 MG/DL (ref 0.7–1.3)
CREAT SERPL-MCNC: 0.76 MG/DL (ref 0.7–1.3)
CREAT SERPL-MCNC: 0.77 MG/DL (ref 0.7–1.3)
CREAT SERPL-MCNC: 0.9 MG/DL (ref 0.7–1.3)
CREAT SERPL-MCNC: 0.91 MG/DL (ref 0.7–1.3)
CRP SERPL-MCNC: 90.8 MG/L
D AG BLD QL: NORMAL
EOSINOPHIL # BLD AUTO: 0 10*3/UL
EOSINOPHIL # BLD AUTO: 0.1 10*3/UL
EOSINOPHIL # BLD MANUAL: 0 10*3/UL
EOSINOPHIL NFR BLD AUTO: 1 % (ref 0–3)
EOSINOPHIL NFR BLD AUTO: 2 % (ref 0–3)
EOSINOPHIL NFR BLD AUTO: 3 % (ref 0–3)
EOSINOPHIL NFR BLD MANUAL: 1 % (ref 0–3)
ERYTHROCYTE [DISTWIDTH] IN BLOOD BY AUTOMATED COUNT: 14.1 % (ref 11.5–15)
ERYTHROCYTE [DISTWIDTH] IN BLOOD BY AUTOMATED COUNT: 15.1 % (ref 11.5–15)
ERYTHROCYTE [DISTWIDTH] IN BLOOD BY AUTOMATED COUNT: 15.3 % (ref 11.5–15)
ERYTHROCYTE [DISTWIDTH] IN BLOOD BY AUTOMATED COUNT: 15.5 % (ref 11.5–15)
ERYTHROCYTE [DISTWIDTH] IN BLOOD BY AUTOMATED COUNT: 15.5 % (ref 11.5–15)
ERYTHROCYTE [DISTWIDTH] IN BLOOD BY AUTOMATED COUNT: 15.6 % (ref 11.5–15)
ERYTHROCYTE [DISTWIDTH] IN BLOOD BY AUTOMATED COUNT: 15.6 % (ref 11.5–15)
ERYTHROCYTE [DISTWIDTH] IN BLOOD BY AUTOMATED COUNT: 15.8 % (ref 11.5–15)
ERYTHROCYTE [DISTWIDTH] IN BLOOD BY AUTOMATED COUNT: 15.9 % (ref 11.5–15)
ERYTHROCYTE [DISTWIDTH] IN BLOOD BY AUTOMATED COUNT: 15.9 % (ref 11.5–15)
ERYTHROCYTE [DISTWIDTH] IN BLOOD BY AUTOMATED COUNT: 16 % (ref 11.5–15)
ERYTHROCYTE [DISTWIDTH] IN BLOOD BY AUTOMATED COUNT: 16.1 % (ref 11.5–15)
ERYTHROCYTE [DISTWIDTH] IN BLOOD BY AUTOMATED COUNT: 16.1 % (ref 11.5–15)
ERYTHROCYTE [DISTWIDTH] IN BLOOD BY AUTOMATED COUNT: 16.8 % (ref 11.5–15)
ERYTHROCYTE [DISTWIDTH] IN BLOOD BY AUTOMATED COUNT: 17 % (ref 11.5–15)
ERYTHROCYTE [DISTWIDTH] IN BLOOD BY AUTOMATED COUNT: 17.4 % (ref 11.5–15)
ERYTHROCYTE [DISTWIDTH] IN BLOOD BY AUTOMATED COUNT: 17.5 % (ref 11.5–15)
ERYTHROCYTE [DISTWIDTH] IN BLOOD BY AUTOMATED COUNT: 17.7 % (ref 11.5–15)
ERYTHROCYTE [DISTWIDTH] IN BLOOD BY AUTOMATED COUNT: 17.8 % (ref 11.5–15)
ERYTHROCYTE [DISTWIDTH] IN BLOOD BY AUTOMATED COUNT: 17.9 % (ref 11.5–15)
ERYTHROCYTE [DISTWIDTH] IN BLOOD BY AUTOMATED COUNT: 18 % (ref 11.5–15)
ERYTHROCYTE [DISTWIDTH] IN BLOOD BY AUTOMATED COUNT: 18.1 % (ref 11.5–15)
ERYTHROCYTE [DISTWIDTH] IN BLOOD BY AUTOMATED COUNT: 18.2 % (ref 11.5–15)
ERYTHROCYTE [DISTWIDTH] IN BLOOD BY AUTOMATED COUNT: 18.3 % (ref 11.5–15)
ERYTHROCYTE [DISTWIDTH] IN BLOOD BY AUTOMATED COUNT: 18.3 % (ref 11.5–15)
ERYTHROCYTE [DISTWIDTH] IN BLOOD BY AUTOMATED COUNT: 18.4 % (ref 11.5–15)
ERYTHROCYTE [DISTWIDTH] IN BLOOD BY AUTOMATED COUNT: 19.8 % (ref 11.5–15)
EST. AVERAGE GLUCOSE BLD GHB EST-MCNC: 159.9 MG/DL
GFR SERPL CREATININE-BSD FRML MDRD: 101 ML/MIN/1.73M*2
GFR SERPL CREATININE-BSD FRML MDRD: 102 ML/MIN/1.73M*2
GFR SERPL CREATININE-BSD FRML MDRD: 103 ML/MIN/1.73M*2
GFR SERPL CREATININE-BSD FRML MDRD: 103 ML/MIN/1.73M*2
GFR SERPL CREATININE-BSD FRML MDRD: 104 ML/MIN/1.73M*2
GFR SERPL CREATININE-BSD FRML MDRD: 105 ML/MIN/1.73M*2
GFR SERPL CREATININE-BSD FRML MDRD: 106 ML/MIN/1.73M*2
GFR SERPL CREATININE-BSD FRML MDRD: 107 ML/MIN/1.73M*2
GFR SERPL CREATININE-BSD FRML MDRD: 110 ML/MIN/1.73M*2
GFR SERPL CREATININE-BSD FRML MDRD: 110 ML/MIN/1.73M*2
GFR SERPL CREATININE-BSD FRML MDRD: 113 ML/MIN/1.73M*2
GFR SERPL CREATININE-BSD FRML MDRD: 113 ML/MIN/1.73M*2
GFR SERPL CREATININE-BSD FRML MDRD: 85 ML/MIN/1.73M*2
GFR SERPL CREATININE-BSD FRML MDRD: 87 ML/MIN/1.73M*2
GFR SERPL CREATININE-BSD FRML MDRD: 92 ML/MIN/1.73M*2
GFR SERPL CREATININE-BSD FRML MDRD: 93 ML/MIN/1.73M*2
GFR SERPL CREATININE-BSD FRML MDRD: 94 ML/MIN/1.73M*2
GFR SERPL CREATININE-BSD FRML MDRD: 94 ML/MIN/1.73M*2
GFR SERPL CREATININE-BSD FRML MDRD: 95 ML/MIN/1.73M*2
GFR SERPL CREATININE-BSD FRML MDRD: 96 ML/MIN/1.73M*2
GFR SERPL CREATININE-BSD FRML MDRD: 97 ML/MIN/1.73M*2
GFR SERPL CREATININE-BSD FRML MDRD: 98 ML/MIN/1.73M*2
GLUCOSE BLDC GLUCOMTR-MCNC: 101 MG/DL (ref 70–105)
GLUCOSE BLDC GLUCOMTR-MCNC: 104 MG/DL (ref 70–105)
GLUCOSE BLDC GLUCOMTR-MCNC: 108 MG/DL (ref 70–105)
GLUCOSE BLDC GLUCOMTR-MCNC: 111 MG/DL (ref 70–105)
GLUCOSE BLDC GLUCOMTR-MCNC: 111 MG/DL (ref 70–105)
GLUCOSE BLDC GLUCOMTR-MCNC: 115 MG/DL (ref 70–105)
GLUCOSE BLDC GLUCOMTR-MCNC: 123 MG/DL (ref 70–105)
GLUCOSE BLDC GLUCOMTR-MCNC: 124 MG/DL (ref 70–105)
GLUCOSE BLDC GLUCOMTR-MCNC: 134 MG/DL (ref 70–105)
GLUCOSE BLDC GLUCOMTR-MCNC: 135 MG/DL (ref 70–105)
GLUCOSE BLDC GLUCOMTR-MCNC: 135 MG/DL (ref 70–105)
GLUCOSE BLDC GLUCOMTR-MCNC: 137 MG/DL (ref 70–105)
GLUCOSE BLDC GLUCOMTR-MCNC: 139 MG/DL (ref 70–105)
GLUCOSE BLDC GLUCOMTR-MCNC: 139 MG/DL (ref 70–105)
GLUCOSE BLDC GLUCOMTR-MCNC: 140 MG/DL (ref 70–105)
GLUCOSE BLDC GLUCOMTR-MCNC: 140 MG/DL (ref 70–105)
GLUCOSE BLDC GLUCOMTR-MCNC: 141 MG/DL (ref 70–105)
GLUCOSE BLDC GLUCOMTR-MCNC: 142 MG/DL (ref 70–105)
GLUCOSE BLDC GLUCOMTR-MCNC: 164 MG/DL (ref 70–105)
GLUCOSE BLDC GLUCOMTR-MCNC: 192 MG/DL (ref 70–105)
GLUCOSE BLDC GLUCOMTR-MCNC: 204 MG/DL (ref 70–105)
GLUCOSE BLDC GLUCOMTR-MCNC: 207 MG/DL (ref 70–105)
GLUCOSE BLDC GLUCOMTR-MCNC: 228 MG/DL (ref 70–105)
GLUCOSE BLDC GLUCOMTR-MCNC: 79 MG/DL (ref 70–105)
GLUCOSE BLDC GLUCOMTR-MCNC: 82 MG/DL (ref 70–105)
GLUCOSE BLDC GLUCOMTR-MCNC: 83 MG/DL (ref 70–105)
GLUCOSE BLDC GLUCOMTR-SCNC: 102 MG/DL (ref 70–105)
GLUCOSE BLDC GLUCOMTR-SCNC: 106 MG/DL (ref 70–105)
GLUCOSE BLDC GLUCOMTR-SCNC: 110 MG/DL (ref 70–105)
GLUCOSE BLDC GLUCOMTR-SCNC: 111 MG/DL (ref 70–105)
GLUCOSE BLDC GLUCOMTR-SCNC: 114 MG/DL (ref 70–105)
GLUCOSE BLDC GLUCOMTR-SCNC: 116 MG/DL (ref 70–105)
GLUCOSE BLDC GLUCOMTR-SCNC: 121 MG/DL (ref 70–105)
GLUCOSE BLDC GLUCOMTR-SCNC: 124 MG/DL (ref 70–105)
GLUCOSE BLDC GLUCOMTR-SCNC: 127 MG/DL (ref 70–105)
GLUCOSE BLDC GLUCOMTR-SCNC: 129 MG/DL (ref 70–105)
GLUCOSE BLDC GLUCOMTR-SCNC: 133 MG/DL (ref 70–105)
GLUCOSE BLDC GLUCOMTR-SCNC: 134 MG/DL (ref 70–105)
GLUCOSE BLDC GLUCOMTR-SCNC: 135 MG/DL (ref 70–105)
GLUCOSE BLDC GLUCOMTR-SCNC: 136 MG/DL (ref 70–105)
GLUCOSE BLDC GLUCOMTR-SCNC: 137 MG/DL (ref 70–105)
GLUCOSE BLDC GLUCOMTR-SCNC: 140 MG/DL (ref 70–105)
GLUCOSE BLDC GLUCOMTR-SCNC: 141 MG/DL (ref 70–105)
GLUCOSE BLDC GLUCOMTR-SCNC: 143 MG/DL (ref 70–105)
GLUCOSE BLDC GLUCOMTR-SCNC: 148 MG/DL (ref 70–105)
GLUCOSE BLDC GLUCOMTR-SCNC: 152 MG/DL (ref 70–105)
GLUCOSE BLDC GLUCOMTR-SCNC: 154 MG/DL (ref 70–105)
GLUCOSE BLDC GLUCOMTR-SCNC: 157 MG/DL (ref 70–105)
GLUCOSE BLDC GLUCOMTR-SCNC: 176 MG/DL (ref 70–105)
GLUCOSE BLDC GLUCOMTR-SCNC: 236 MG/DL (ref 70–105)
GLUCOSE BLDC GLUCOMTR-SCNC: 91 MG/DL (ref 70–105)
GLUCOSE BLDC GLUCOMTR-SCNC: 92 MG/DL (ref 70–105)
GLUCOSE SERPL-MCNC: 108 MG/DL (ref 70–105)
GLUCOSE SERPL-MCNC: 119 MG/DL (ref 70–105)
GLUCOSE SERPL-MCNC: 120 MG/DL (ref 70–105)
GLUCOSE SERPL-MCNC: 124 MG/DL (ref 70–105)
GLUCOSE SERPL-MCNC: 125 MG/DL (ref 70–105)
GLUCOSE SERPL-MCNC: 125 MG/DL (ref 70–105)
GLUCOSE SERPL-MCNC: 127 MG/DL (ref 70–105)
GLUCOSE SERPL-MCNC: 128 MG/DL (ref 70–105)
GLUCOSE SERPL-MCNC: 132 MG/DL (ref 70–105)
GLUCOSE SERPL-MCNC: 136 MG/DL (ref 70–105)
GLUCOSE SERPL-MCNC: 146 MG/DL (ref 70–105)
GLUCOSE SERPL-MCNC: 152 MG/DL (ref 70–105)
GLUCOSE SERPL-MCNC: 166 MG/DL (ref 70–105)
GLUCOSE SERPL-MCNC: 180 MG/DL (ref 70–105)
GLUCOSE SERPL-MCNC: 183 MG/DL (ref 70–105)
GLUCOSE SERPL-MCNC: 188 MG/DL (ref 70–105)
GLUCOSE SERPL-MCNC: 189 MG/DL (ref 70–105)
GLUCOSE SERPL-MCNC: 200 MG/DL (ref 70–105)
GLUCOSE SERPL-MCNC: 212 MG/DL (ref 70–105)
GLUCOSE SERPL-MCNC: 225 MG/DL (ref 70–105)
GLUCOSE SERPL-MCNC: 232 MG/DL (ref 70–105)
GLUCOSE SERPL-MCNC: 235 MG/DL (ref 70–105)
GLUCOSE SERPL-MCNC: 240 MG/DL (ref 70–105)
GLUCOSE SERPL-MCNC: 273 MG/DL (ref 70–105)
GLUCOSE SERPL-MCNC: 306 MG/DL (ref 70–105)
GLUCOSE SERPL-MCNC: 76 MG/DL (ref 70–105)
GLUCOSE SERPL-MCNC: 96 MG/DL (ref 70–105)
GRAM STN SPEC: ABNORMAL
GRAM STN SPEC: NORMAL
GRAM STN SPEC: NORMAL
HBA1C MFR BLD: 7.2 % (ref 4–6)
HCT VFR BLD AUTO: 31.3 % (ref 38–50)
HCT VFR BLD AUTO: 32.2 % (ref 38–50)
HCT VFR BLD AUTO: 32.5 % (ref 38–50)
HCT VFR BLD AUTO: 32.7 % (ref 38–50)
HCT VFR BLD AUTO: 32.8 % (ref 38–50)
HCT VFR BLD AUTO: 32.8 % (ref 38–50)
HCT VFR BLD AUTO: 33.1 % (ref 38–50)
HCT VFR BLD AUTO: 33.4 % (ref 38–50)
HCT VFR BLD AUTO: 33.7 % (ref 38–50)
HCT VFR BLD AUTO: 33.8 % (ref 38–50)
HCT VFR BLD AUTO: 34 % (ref 38–50)
HCT VFR BLD AUTO: 34.2 % (ref 38–50)
HCT VFR BLD AUTO: 34.4 % (ref 38–50)
HCT VFR BLD AUTO: 34.7 % (ref 38–50)
HCT VFR BLD AUTO: 34.9 % (ref 38–50)
HCT VFR BLD AUTO: 35 % (ref 38–50)
HCT VFR BLD AUTO: 35 % (ref 38–50)
HCT VFR BLD AUTO: 35.1 % (ref 38–50)
HCT VFR BLD AUTO: 35.5 % (ref 38–50)
HCT VFR BLD AUTO: 35.5 % (ref 38–50)
HCT VFR BLD AUTO: 35.9 % (ref 38–50)
HCT VFR BLD AUTO: 36.1 % (ref 38–50)
HCT VFR BLD AUTO: 36.6 % (ref 38–50)
HCT VFR BLD AUTO: 36.8 % (ref 38–50)
HCT VFR BLD AUTO: 37.3 % (ref 38–50)
HCT VFR BLD AUTO: 37.8 % (ref 38–50)
HCT VFR BLD AUTO: 38.2 % (ref 38–50)
HGB BLD-MCNC: 10.5 G/DL (ref 13.2–17.2)
HGB BLD-MCNC: 10.7 G/DL (ref 13.2–17.2)
HGB BLD-MCNC: 10.8 G/DL (ref 13.2–17.2)
HGB BLD-MCNC: 10.9 G/DL (ref 13.2–17.2)
HGB BLD-MCNC: 11 G/DL (ref 13.2–17.2)
HGB BLD-MCNC: 11.1 G/DL (ref 13.2–17.2)
HGB BLD-MCNC: 11.1 G/DL (ref 13.2–17.2)
HGB BLD-MCNC: 11.2 G/DL (ref 13.2–17.2)
HGB BLD-MCNC: 11.4 G/DL (ref 13.2–17.2)
HGB BLD-MCNC: 11.5 G/DL (ref 13.2–17.2)
HGB BLD-MCNC: 11.6 G/DL (ref 13.2–17.2)
HGB BLD-MCNC: 11.7 G/DL (ref 13.2–17.2)
HGB BLD-MCNC: 11.7 G/DL (ref 13.2–17.2)
HGB BLD-MCNC: 11.8 G/DL (ref 13.2–17.2)
HGB BLD-MCNC: 11.9 G/DL (ref 13.2–17.2)
HGB BLD-MCNC: 12 G/DL (ref 13.2–17.2)
HGB BLD-MCNC: 12.1 G/DL (ref 13.2–17.2)
HGB BLD-MCNC: 12.2 G/DL (ref 13.2–17.2)
HGB BLD-MCNC: 12.5 G/DL (ref 13.2–17.2)
HYPOCHROMIA BLD QL SMEAR: ABNORMAL
HYPOCHROMIA BLD QL SMEAR: ABNORMAL
HYPOCHROMIA PRESENCE IN BLOOD, ANALYZER: ABNORMAL
INR BLD: 1.2
INR BLD: 1.3
INR BLD: 1.5
LACTATE SERPL-SCNC: 1.1 MMOL/L (ref 0.5–2.2)
LIPASE SERPL-CCNC: 12 U/L (ref 11–82)
LIPASE SERPL-CCNC: 12 U/L (ref 11–82)
LYMPHOCYTES # BLD AUTO: 0.2 10*3/UL
LYMPHOCYTES # BLD AUTO: 0.3 10*3/UL
LYMPHOCYTES # BLD AUTO: 0.4 10*3/UL
LYMPHOCYTES # BLD AUTO: 0.5 10*3/UL
LYMPHOCYTES # BLD AUTO: 0.5 10*3/UL
LYMPHOCYTES # BLD MANUAL: 0.1 10*3/UL
LYMPHOCYTES NFR BLD AUTO: 10 % (ref 15–47)
LYMPHOCYTES NFR BLD AUTO: 11 % (ref 15–47)
LYMPHOCYTES NFR BLD AUTO: 14 % (ref 15–47)
LYMPHOCYTES NFR BLD AUTO: 14 % (ref 15–47)
LYMPHOCYTES NFR BLD AUTO: 15 % (ref 15–47)
LYMPHOCYTES NFR BLD AUTO: 18 % (ref 15–47)
LYMPHOCYTES NFR BLD AUTO: 6 % (ref 15–47)
LYMPHOCYTES NFR BLD AUTO: 7 % (ref 15–47)
LYMPHOCYTES NFR BLD AUTO: 8 % (ref 15–47)
LYMPHOCYTES NFR BLD AUTO: 9 % (ref 15–47)
LYMPHOCYTES NFR BLD MANUAL: 3 % (ref 15–47)
MAGNESIUM SERPL-MCNC: 1.5 MG/DL (ref 1.8–2.4)
MAGNESIUM SERPL-MCNC: 1.6 MG/DL (ref 1.8–2.4)
MAGNESIUM SERPL-MCNC: 2.1 MG/DL (ref 1.8–2.4)
MCH RBC QN AUTO: 26.5 PG (ref 29–34)
MCH RBC QN AUTO: 26.6 PG (ref 29–34)
MCH RBC QN AUTO: 26.6 PG (ref 29–34)
MCH RBC QN AUTO: 26.7 PG (ref 29–34)
MCH RBC QN AUTO: 26.8 PG (ref 29–34)
MCH RBC QN AUTO: 27.1 PG (ref 29–34)
MCH RBC QN AUTO: 27.1 PG (ref 29–34)
MCH RBC QN AUTO: 27.3 PG (ref 29–34)
MCH RBC QN AUTO: 27.4 PG (ref 29–34)
MCH RBC QN AUTO: 27.4 PG (ref 29–34)
MCH RBC QN AUTO: 27.5 PG (ref 29–34)
MCH RBC QN AUTO: 27.6 PG (ref 29–34)
MCH RBC QN AUTO: 27.6 PG (ref 29–34)
MCH RBC QN AUTO: 27.7 PG (ref 29–34)
MCH RBC QN AUTO: 27.9 PG (ref 29–34)
MCH RBC QN AUTO: 27.9 PG (ref 29–34)
MCH RBC QN AUTO: 28.2 PG (ref 29–34)
MCH RBC QN AUTO: 28.4 PG (ref 29–34)
MCHC RBC AUTO-ENTMCNC: 32.1 G/DL (ref 32–36)
MCHC RBC AUTO-ENTMCNC: 32.2 G/DL (ref 32–36)
MCHC RBC AUTO-ENTMCNC: 32.4 G/DL (ref 32–36)
MCHC RBC AUTO-ENTMCNC: 32.5 G/DL (ref 32–36)
MCHC RBC AUTO-ENTMCNC: 32.5 G/DL (ref 32–36)
MCHC RBC AUTO-ENTMCNC: 32.6 G/DL (ref 32–36)
MCHC RBC AUTO-ENTMCNC: 32.7 G/DL (ref 32–36)
MCHC RBC AUTO-ENTMCNC: 32.8 G/DL (ref 32–36)
MCHC RBC AUTO-ENTMCNC: 32.9 G/DL (ref 32–36)
MCHC RBC AUTO-ENTMCNC: 33 G/DL (ref 32–36)
MCHC RBC AUTO-ENTMCNC: 33 G/DL (ref 32–36)
MCHC RBC AUTO-ENTMCNC: 33.1 G/DL (ref 32–36)
MCHC RBC AUTO-ENTMCNC: 33.1 G/DL (ref 32–36)
MCHC RBC AUTO-ENTMCNC: 33.3 G/DL (ref 32–36)
MCHC RBC AUTO-ENTMCNC: 33.4 G/DL (ref 32–36)
MCHC RBC AUTO-ENTMCNC: 33.5 G/DL (ref 32–36)
MCHC RBC AUTO-ENTMCNC: 34.1 G/DL (ref 32–36)
MCV RBC AUTO: 81.2 FL (ref 82–97)
MCV RBC AUTO: 81.4 FL (ref 82–97)
MCV RBC AUTO: 82 FL (ref 82–97)
MCV RBC AUTO: 82 FL (ref 82–97)
MCV RBC AUTO: 82.2 FL (ref 82–97)
MCV RBC AUTO: 82.6 FL (ref 82–97)
MCV RBC AUTO: 83 FL (ref 82–97)
MCV RBC AUTO: 83.1 FL (ref 82–97)
MCV RBC AUTO: 83.1 FL (ref 82–97)
MCV RBC AUTO: 83.2 FL (ref 82–97)
MCV RBC AUTO: 83.3 FL (ref 82–97)
MCV RBC AUTO: 83.5 FL (ref 82–97)
MCV RBC AUTO: 83.5 FL (ref 82–97)
MCV RBC AUTO: 83.7 FL (ref 82–97)
MCV RBC AUTO: 83.8 FL (ref 82–97)
MCV RBC AUTO: 83.9 FL (ref 82–97)
MCV RBC AUTO: 83.9 FL (ref 82–97)
MCV RBC AUTO: 84.5 FL (ref 82–97)
MCV RBC AUTO: 84.6 FL (ref 82–97)
MCV RBC AUTO: 84.6 FL (ref 82–97)
MCV RBC AUTO: 86 FL (ref 82–97)
MCV RBC AUTO: 86.6 FL (ref 82–97)
METAMYELOCYTES # BLD MANUAL: 0.1 10*3/UL
METAMYELOCYTES NFR BLD MANUAL: 2 % (ref 0–0)
MONOCYTES # BLD AUTO: 0.2 10*3/UL
MONOCYTES # BLD AUTO: 0.2 10*3/UL
MONOCYTES # BLD AUTO: 0.3 10*3/UL
MONOCYTES # BLD AUTO: 0.4 10*3/UL
MONOCYTES # BLD AUTO: 0.5 10*3/UL
MONOCYTES # BLD AUTO: 0.6 10*3/UL
MONOCYTES # BLD MANUAL: 0.1 10*3/UL
MONOCYTES NFR BLD AUTO: 10 % (ref 5–13)
MONOCYTES NFR BLD AUTO: 11 % (ref 5–13)
MONOCYTES NFR BLD AUTO: 12 % (ref 5–13)
MONOCYTES NFR BLD AUTO: 13 % (ref 5–13)
MONOCYTES NFR BLD AUTO: 14 % (ref 5–13)
MONOCYTES NFR BLD AUTO: 15 % (ref 5–13)
MONOCYTES NFR BLD AUTO: 7 % (ref 5–13)
MONOCYTES NFR BLD AUTO: 9 % (ref 5–13)
MONOCYTES NFR BLD AUTO: 9 % (ref 5–13)
MONOCYTES NFR BLD MANUAL: 2 % (ref 5–13)
NEUTROPHILS # BLD AUTO: 1.4 10*3/UL
NEUTROPHILS # BLD AUTO: 1.4 10*3/UL
NEUTROPHILS # BLD AUTO: 1.7 10*3/UL
NEUTROPHILS # BLD AUTO: 1.8 10*3/UL
NEUTROPHILS # BLD AUTO: 1.9 10*3/UL
NEUTROPHILS # BLD AUTO: 1.9 10*3/UL
NEUTROPHILS # BLD AUTO: 2.2 10*3/UL
NEUTROPHILS # BLD AUTO: 2.3 10*3/UL
NEUTROPHILS # BLD AUTO: 2.3 10*3/UL
NEUTROPHILS # BLD AUTO: 2.4 10*3/UL
NEUTROPHILS # BLD AUTO: 2.4 10*3/UL
NEUTROPHILS # BLD AUTO: 2.5 10*3/UL
NEUTROPHILS # BLD AUTO: 2.5 10*3/UL
NEUTROPHILS # BLD AUTO: 2.8 10*3/UL
NEUTROPHILS # BLD AUTO: 3.1 10*3/UL
NEUTROPHILS # BLD AUTO: 3.2 10*3/UL
NEUTROPHILS # BLD AUTO: 3.5 10*3/UL
NEUTROPHILS # BLD AUTO: 3.7 10*3/UL
NEUTROPHILS # BLD AUTO: 3.9 10*3/UL
NEUTROPHILS # BLD AUTO: 4 10*3/UL
NEUTROPHILS # BLD MANUAL: 4.51 10*3/UL
NEUTROPHILS NFR BLD AUTO: 66 % (ref 46–70)
NEUTROPHILS NFR BLD AUTO: 69 % (ref 46–70)
NEUTROPHILS NFR BLD AUTO: 71 % (ref 46–70)
NEUTROPHILS NFR BLD AUTO: 72 % (ref 46–70)
NEUTROPHILS NFR BLD AUTO: 72 % (ref 46–70)
NEUTROPHILS NFR BLD AUTO: 75 % (ref 46–70)
NEUTROPHILS NFR BLD AUTO: 76 % (ref 46–70)
NEUTROPHILS NFR BLD AUTO: 77 % (ref 46–70)
NEUTROPHILS NFR BLD AUTO: 77 % (ref 46–70)
NEUTROPHILS NFR BLD AUTO: 78 % (ref 46–70)
NEUTROPHILS NFR BLD AUTO: 79 % (ref 46–70)
NEUTROPHILS NFR BLD AUTO: 80 % (ref 46–70)
NEUTROPHILS NFR BLD AUTO: 83 % (ref 46–70)
NEUTROPHILS NFR BLD AUTO: 84 % (ref 46–70)
NEUTROPHILS NFR BLD AUTO: 84 % (ref 46–70)
NEUTS SEG # BLD MANUAL: 4.5 10*3/UL
NEUTS SEG NFR BLD MANUAL: 92 % (ref 46–70)
PHOSPHATE SERPL-MCNC: 1.8 MG/DL (ref 2.5–4.9)
PHOSPHATE SERPL-MCNC: 2.8 MG/DL (ref 2.5–4.9)
PLATELET # BLD AUTO: 101 10*3/UL (ref 130–350)
PLATELET # BLD AUTO: 102 10*3/UL (ref 130–350)
PLATELET # BLD AUTO: 103 10*3/UL (ref 130–350)
PLATELET # BLD AUTO: 106 10*3/UL (ref 130–350)
PLATELET # BLD AUTO: 107 10*3/UL (ref 130–350)
PLATELET # BLD AUTO: 108 10*3/UL (ref 130–350)
PLATELET # BLD AUTO: 109 10*3/UL (ref 130–350)
PLATELET # BLD AUTO: 110 10*3/UL (ref 130–350)
PLATELET # BLD AUTO: 110 10*3/UL (ref 130–350)
PLATELET # BLD AUTO: 111 10*3/UL (ref 130–350)
PLATELET # BLD AUTO: 113 10*3/UL (ref 130–350)
PLATELET # BLD AUTO: 115 10*3/UL (ref 130–350)
PLATELET # BLD AUTO: 116 10*3/UL (ref 130–350)
PLATELET # BLD AUTO: 120 10*3/UL (ref 130–350)
PLATELET # BLD AUTO: 122 10*3/UL (ref 130–350)
PLATELET # BLD AUTO: 122 10*3/UL (ref 130–350)
PLATELET # BLD AUTO: 125 10*3/UL (ref 130–350)
PLATELET # BLD AUTO: 126 10*3/UL (ref 130–350)
PLATELET # BLD AUTO: 129 10*3/UL (ref 130–350)
PLATELET # BLD AUTO: 140 10*3/UL (ref 130–350)
PLATELET # BLD AUTO: 158 10*3/UL (ref 130–350)
PLATELET # BLD AUTO: 218 10*3/UL (ref 130–350)
PLATELET # BLD AUTO: 81 10*3/UL (ref 130–350)
PLATELET # BLD AUTO: 82 10*3/UL (ref 130–350)
PLATELET # BLD AUTO: 87 10*3/UL (ref 130–350)
PLATELET # BLD AUTO: 90 10*3/UL (ref 130–350)
PLATELET # BLD AUTO: 93 10*3/UL (ref 130–350)
PLATELET CLUMP BLD QL SMEAR: ABNORMAL
PLATELET MORPHOLOGY IN BLOOD: NORMAL
PMV BLD AUTO: 7 FL (ref 6.9–10.8)
PMV BLD AUTO: 7.4 FL (ref 6.9–10.8)
PMV BLD AUTO: 7.5 FL (ref 6.9–10.8)
PMV BLD AUTO: 7.6 FL (ref 6.9–10.8)
PMV BLD AUTO: 7.7 FL (ref 6.9–10.8)
PMV BLD AUTO: 7.7 FL (ref 6.9–10.8)
PMV BLD AUTO: 7.8 FL (ref 6.9–10.8)
PMV BLD AUTO: 7.9 FL (ref 6.9–10.8)
PMV BLD AUTO: 8 FL (ref 6.9–10.8)
PMV BLD AUTO: 8.1 FL (ref 6.9–10.8)
PMV BLD AUTO: 8.3 FL (ref 6.9–10.8)
PMV BLD AUTO: 8.3 FL (ref 6.9–10.8)
PMV BLD AUTO: 8.6 FL (ref 6.9–10.8)
PMV BLD AUTO: 8.7 FL (ref 6.9–10.8)
POTASSIUM SERPL-SCNC: 2.6 MMOL/L (ref 3.5–5.1)
POTASSIUM SERPL-SCNC: 3.3 MMOL/L (ref 3.5–5.1)
POTASSIUM SERPL-SCNC: 3.3 MMOL/L (ref 3.5–5.1)
POTASSIUM SERPL-SCNC: 3.4 MMOL/L (ref 3.5–5.1)
POTASSIUM SERPL-SCNC: 3.5 MMOL/L (ref 3.5–5.1)
POTASSIUM SERPL-SCNC: 3.6 MMOL/L (ref 3.5–5.1)
POTASSIUM SERPL-SCNC: 3.7 MMOL/L (ref 3.5–5.1)
POTASSIUM SERPL-SCNC: 3.8 MMOL/L (ref 3.5–5.1)
POTASSIUM SERPL-SCNC: 3.9 MMOL/L (ref 3.5–5.1)
POTASSIUM SERPL-SCNC: 4 MMOL/L (ref 3.5–5.1)
POTASSIUM SERPL-SCNC: 4.1 MMOL/L (ref 3.5–5.1)
POTASSIUM SERPL-SCNC: 4.2 MMOL/L (ref 3.5–5.1)
PROCALCITONIN SERPL-MCNC: 0.07 NG/ML
PROT SERPL-MCNC: 5.2 G/DL (ref 6–8.3)
PROT SERPL-MCNC: 5.4 G/DL (ref 6–8.3)
PROT SERPL-MCNC: 5.4 G/DL (ref 6–8.3)
PROT SERPL-MCNC: 5.5 G/DL (ref 6–8.3)
PROT SERPL-MCNC: 5.6 G/DL (ref 6–8.3)
PROT SERPL-MCNC: 5.8 G/DL (ref 6–8.3)
PROT SERPL-MCNC: 5.8 G/DL (ref 6–8.3)
PROT SERPL-MCNC: 5.9 G/DL (ref 6–8.3)
PROT SERPL-MCNC: 5.9 G/DL (ref 6–8.3)
PROT SERPL-MCNC: 6.2 G/DL (ref 6–8.3)
PROT SERPL-MCNC: 6.3 G/DL (ref 6–8.3)
PROT SERPL-MCNC: 6.4 G/DL (ref 6–8.3)
PROT SERPL-MCNC: 6.5 G/DL (ref 6–8.3)
PROT SERPL-MCNC: 6.6 G/DL (ref 6–8.3)
PROT SERPL-MCNC: 6.8 G/DL (ref 6–8.3)
PROT SERPL-MCNC: 6.8 G/DL (ref 6–8.3)
PROT SERPL-MCNC: 7.1 G/DL (ref 6–8.3)
PROT UR STRIP.AUTO-MCNC: NEGATIVE MG/DL
PROTHROMBIN TIME: 14.1 SECONDS (ref 9.4–12.5)
PROTHROMBIN TIME: 15 SECONDS (ref 9.4–12.5)
PROTHROMBIN TIME: 17.6 SECONDS (ref 9.4–12.5)
RBC # BLD AUTO: 3.77 10*6/ΜL (ref 4.1–5.8)
RBC # BLD AUTO: 3.84 10*6/ΜL (ref 4.1–5.8)
RBC # BLD AUTO: 3.91 10*6/ΜL (ref 4.1–5.8)
RBC # BLD AUTO: 3.93 10*6/ΜL (ref 4.1–5.8)
RBC # BLD AUTO: 3.93 10*6/ΜL (ref 4.1–5.8)
RBC # BLD AUTO: 3.95 10*6/ΜL (ref 4.1–5.8)
RBC # BLD AUTO: 3.95 10*6/ΜL (ref 4.1–5.8)
RBC # BLD AUTO: 3.99 10*6/ΜL (ref 4.1–5.8)
RBC # BLD AUTO: 4.04 10*6/ΜL (ref 4.1–5.8)
RBC # BLD AUTO: 4.04 10*6/ΜL (ref 4.1–5.8)
RBC # BLD AUTO: 4.05 10*6/ΜL (ref 4.1–5.8)
RBC # BLD AUTO: 4.05 10*6/ΜL (ref 4.1–5.8)
RBC # BLD AUTO: 4.08 10*6/ΜL (ref 4.1–5.8)
RBC # BLD AUTO: 4.08 10*6/ΜL (ref 4.1–5.8)
RBC # BLD AUTO: 4.13 10*6/ΜL (ref 4.1–5.8)
RBC # BLD AUTO: 4.16 10*6/ΜL (ref 4.1–5.8)
RBC # BLD AUTO: 4.18 10*6/ΜL (ref 4.1–5.8)
RBC # BLD AUTO: 4.2 10*6/ΜL (ref 4.1–5.8)
RBC # BLD AUTO: 4.24 10*6/ΜL (ref 4.1–5.8)
RBC # BLD AUTO: 4.25 10*6/ΜL (ref 4.1–5.8)
RBC # BLD AUTO: 4.27 10*6/ΜL (ref 4.1–5.8)
RBC # BLD AUTO: 4.4 10*6/ΜL (ref 4.1–5.8)
RBC # BLD AUTO: 4.4 10*6/ΜL (ref 4.1–5.8)
RBC # BLD AUTO: 4.41 10*6/ΜL (ref 4.1–5.8)
RBC # BLD AUTO: 4.53 10*6/ΜL (ref 4.1–5.8)
RBC # BLD AUTO: 4.58 10*6/ΜL (ref 4.1–5.8)
RBC # BLD AUTO: 4.69 10*6/ΜL (ref 4.1–5.8)
RBC MORPH BLD: ABNORMAL
SECOND/CONFIRMATORY ABORH PERFORMED: NORMAL
SODIUM SERPL-SCNC: 133 MMOL/L (ref 135–145)
SODIUM SERPL-SCNC: 134 MMOL/L (ref 135–145)
SODIUM SERPL-SCNC: 135 MMOL/L (ref 135–145)
SODIUM SERPL-SCNC: 136 MMOL/L (ref 135–145)
SODIUM SERPL-SCNC: 137 MMOL/L (ref 135–145)
SODIUM SERPL-SCNC: 138 MMOL/L (ref 135–145)
SODIUM SERPL-SCNC: 139 MMOL/L (ref 135–145)
SODIUM SERPL-SCNC: 140 MMOL/L (ref 135–145)
SODIUM SERPL-SCNC: 141 MMOL/L (ref 135–145)
TARGETS BLD QL SMEAR: ABNORMAL
TOTAL CELLS COUNTED BLD: 100 CELLS
TSH SERPL DL<=0.05 MIU/L-ACNC: 2.1 UIU/ML (ref 0.34–4.82)
TSH SERPL DL<=0.05 MIU/L-ACNC: 2.41 UIU/ML (ref 0.34–4.82)
TSH SERPL DL<=0.05 MIU/L-ACNC: 2.93 UIU/ML (ref 0.34–4.82)
WBC # BLD AUTO: 2 10*3/UL (ref 3.7–9.6)
WBC # BLD AUTO: 2.1 10*3/UL (ref 3.7–9.6)
WBC # BLD AUTO: 2.2 10*3/UL (ref 3.7–9.6)
WBC # BLD AUTO: 2.3 10*3/UL (ref 3.7–9.6)
WBC # BLD AUTO: 2.3 10*3/UL (ref 3.7–9.6)
WBC # BLD AUTO: 2.7 10*3/UL (ref 3.7–9.6)
WBC # BLD AUTO: 2.8 10*3/UL (ref 3.7–9.6)
WBC # BLD AUTO: 2.8 10*3/UL (ref 3.7–9.6)
WBC # BLD AUTO: 2.9 10*3/UL (ref 3.7–9.6)
WBC # BLD AUTO: 2.9 10*3/UL (ref 3.7–9.6)
WBC # BLD AUTO: 3 10*3/UL (ref 3.7–9.6)
WBC # BLD AUTO: 3.1 10*3/UL (ref 3.7–9.6)
WBC # BLD AUTO: 3.1 10*3/UL (ref 3.7–9.6)
WBC # BLD AUTO: 3.2 10*3/UL (ref 3.7–9.6)
WBC # BLD AUTO: 3.2 10*3/UL (ref 3.7–9.6)
WBC # BLD AUTO: 3.3 10*3/UL (ref 3.7–9.6)
WBC # BLD AUTO: 3.9 10*3/UL (ref 3.7–9.6)
WBC # BLD AUTO: 4 10*3/UL (ref 3.7–9.6)
WBC # BLD AUTO: 4 10*3/UL (ref 3.7–9.6)
WBC # BLD AUTO: 4.1 10*3/UL (ref 3.7–9.6)
WBC # BLD AUTO: 4.5 10*3/UL (ref 3.7–9.6)
WBC # BLD AUTO: 4.5 10*3/UL (ref 3.7–9.6)
WBC # BLD AUTO: 4.9 10*3/UL (ref 3.7–9.6)
WBC # BLD AUTO: 4.9 10*3/UL (ref 3.7–9.6)
WBC # BLD AUTO: 5.1 10*3/UL (ref 3.7–9.6)
WBC NRBC COR # BLD: 4.9 10*3/UL

## 2021-01-01 PROCEDURE — 2550000100 HC RX 255: Performed by: FAMILY MEDICINE

## 2021-01-01 PROCEDURE — 99214 OFFICE O/P EST MOD 30 MIN: CPT | Performed by: INTERNAL MEDICINE

## 2021-01-01 PROCEDURE — 83690 ASSAY OF LIPASE: CPT | Performed by: EMERGENCY MEDICINE

## 2021-01-01 PROCEDURE — 6370000100 HC RX 637 (ALT 250 FOR IP): Performed by: INTERNAL MEDICINE

## 2021-01-01 PROCEDURE — 36415 COLL VENOUS BLD VENIPUNCTURE: CPT | Performed by: INTERNAL MEDICINE

## 2021-01-01 PROCEDURE — 00813 ANES UPR LWR GI NDSC PX: CPT | Performed by: NURSE ANESTHETIST, CERTIFIED REGISTERED

## 2021-01-01 PROCEDURE — G0463 HOSPITAL OUTPT CLINIC VISIT: HCPCS

## 2021-01-01 PROCEDURE — G1004 CDSM NDSC: HCPCS

## 2021-01-01 PROCEDURE — 2580000300 HC RX 258: Performed by: INTERNAL MEDICINE

## 2021-01-01 PROCEDURE — 2590000100 HC RX 259: Performed by: NURSE PRACTITIONER

## 2021-01-01 PROCEDURE — 96521 REFILL/MAINT PORTABLE PUMP: CPT

## 2021-01-01 PROCEDURE — 85025 COMPLETE CBC W/AUTO DIFF WBC: CPT

## 2021-01-01 PROCEDURE — 6360000200 HC RX 636 W HCPCS (ALT 250 FOR IP): Performed by: INTERNAL MEDICINE

## 2021-01-01 PROCEDURE — 2580000300 HC RX 258: Performed by: FAMILY MEDICINE

## 2021-01-01 PROCEDURE — 82947 ASSAY GLUCOSE BLOOD QUANT: CPT | Mod: QW

## 2021-01-01 PROCEDURE — C1769 GUIDE WIRE: HCPCS

## 2021-01-01 PROCEDURE — 80053 COMPREHEN METABOLIC PANEL: CPT

## 2021-01-01 PROCEDURE — 99232 SBSQ HOSP IP/OBS MODERATE 35: CPT | Performed by: FAMILY MEDICINE

## 2021-01-01 PROCEDURE — (BLANK): Performed by: INTERNAL MEDICINE

## 2021-01-01 PROCEDURE — 6370000100 HC RX 637 (ALT 250 FOR IP): Performed by: FAMILY MEDICINE

## 2021-01-01 PROCEDURE — 97110 THERAPEUTIC EXERCISES: CPT | Mod: GP | Performed by: PHYSICAL THERAPIST

## 2021-01-01 PROCEDURE — 74183 MRI ABD W/O CNTR FLWD CNTR: CPT | Mod: MG

## 2021-01-01 PROCEDURE — 80053 COMPREHEN METABOLIC PANEL: CPT | Performed by: INTERNAL MEDICINE

## 2021-01-01 PROCEDURE — 2550000100 HC RX 255: Performed by: RADIOLOGY

## 2021-01-01 PROCEDURE — 36415 COLL VENOUS BLD VENIPUNCTURE: CPT

## 2021-01-01 PROCEDURE — 99232 SBSQ HOSP IP/OBS MODERATE 35: CPT | Performed by: INTERNAL MEDICINE

## 2021-01-01 PROCEDURE — 85025 COMPLETE CBC W/AUTO DIFF WBC: CPT | Performed by: FAMILY MEDICINE

## 2021-01-01 PROCEDURE — C1887 CATHETER, GUIDING: HCPCS

## 2021-01-01 PROCEDURE — (BLANK) HC MAC ANESTHESIA FACILITY CHARGE EACH ADDITIONAL MIN: Performed by: INTERNAL MEDICINE

## 2021-01-01 PROCEDURE — 2550000100 HC RX 255: Performed by: INTERNAL MEDICINE

## 2021-01-01 PROCEDURE — (BLANK) HC GENERAL ANESTHESIA FACILITY CHARGE EACH ADDITIONAL MIN: Performed by: INTERNAL MEDICINE

## 2021-01-01 PROCEDURE — 6360000200 HC RX 636 W HCPCS (ALT 250 FOR IP): Performed by: FAMILY MEDICINE

## 2021-01-01 PROCEDURE — 83735 ASSAY OF MAGNESIUM: CPT | Performed by: INTERNAL MEDICINE

## 2021-01-01 PROCEDURE — 99215 OFFICE O/P EST HI 40 MIN: CPT | Performed by: NURSE PRACTITIONER

## 2021-01-01 PROCEDURE — 85610 PROTHROMBIN TIME: CPT

## 2021-01-01 PROCEDURE — 96413 CHEMO IV INFUSION 1 HR: CPT

## 2021-01-01 PROCEDURE — 80053 COMPREHEN METABOLIC PANEL: CPT | Performed by: FAMILY MEDICINE

## 2021-01-01 PROCEDURE — 74328 X-RAY BILE DUCT ENDOSCOPY: CPT

## 2021-01-01 PROCEDURE — 96523 IRRIG DRUG DELIVERY DEVICE: CPT

## 2021-01-01 PROCEDURE — 6360000200 HC RX 636 W HCPCS (ALT 250 FOR IP): Performed by: NURSE PRACTITIONER

## 2021-01-01 PROCEDURE — 85027 COMPLETE CBC AUTOMATED: CPT | Performed by: INTERNAL MEDICINE

## 2021-01-01 PROCEDURE — 87040 BLOOD CULTURE FOR BACTERIA: CPT | Performed by: EMERGENCY MEDICINE

## 2021-01-01 PROCEDURE — 96417 CHEMO IV INFUS EACH ADDL SEQ: CPT

## 2021-01-01 PROCEDURE — 82140 ASSAY OF AMMONIA: CPT

## 2021-01-01 PROCEDURE — 6360000200 HC RX 636 W HCPCS (ALT 250 FOR IP): Performed by: EMERGENCY MEDICINE

## 2021-01-01 PROCEDURE — 36592 COLLECT BLOOD FROM PICC: CPT

## 2021-01-01 PROCEDURE — 94660 CPAP INITIATION&MGMT: CPT

## 2021-01-01 PROCEDURE — 2580000300 HC RX 258: Performed by: EMERGENCY MEDICINE

## 2021-01-01 PROCEDURE — 2580000300 HC RX 258: Performed by: NURSE PRACTITIONER

## 2021-01-01 PROCEDURE — 99233 SBSQ HOSP IP/OBS HIGH 50: CPT | Performed by: INTERNAL MEDICINE

## 2021-01-01 PROCEDURE — 2580000300 HC RX 258: Performed by: NURSE ANESTHETIST, CERTIFIED REGISTERED

## 2021-01-01 PROCEDURE — 97165 OT EVAL LOW COMPLEX 30 MIN: CPT | Mod: GO

## 2021-01-01 PROCEDURE — 99215 OFFICE O/P EST HI 40 MIN: CPT | Performed by: INTERNAL MEDICINE

## 2021-01-01 PROCEDURE — 74177 CT ABD & PELVIS W/CONTRAST: CPT | Mod: MG

## 2021-01-01 PROCEDURE — 99239 HOSP IP/OBS DSCHRG MGMT >30: CPT | Performed by: INTERNAL MEDICINE

## 2021-01-01 PROCEDURE — 94761 N-INVAS EAR/PLS OXIMETRY MLT: CPT

## 2021-01-01 PROCEDURE — 87070 CULTURE OTHR SPECIMN AEROBIC: CPT | Performed by: RADIOLOGY

## 2021-01-01 PROCEDURE — A9579 GAD-BASE MR CONTRAST NOS,1ML: HCPCS | Performed by: INTERNAL MEDICINE

## 2021-01-01 PROCEDURE — 83605 ASSAY OF LACTIC ACID: CPT | Performed by: INTERNAL MEDICINE

## 2021-01-01 PROCEDURE — (BLANK) HC ROOM ICU INTERMEDIATE

## 2021-01-01 PROCEDURE — 84443 ASSAY THYROID STIM HORMONE: CPT

## 2021-01-01 PROCEDURE — 85025 COMPLETE CBC W/AUTO DIFF WBC: CPT | Performed by: EMERGENCY MEDICINE

## 2021-01-01 PROCEDURE — 80053 COMPREHEN METABOLIC PANEL: CPT | Performed by: EMERGENCY MEDICINE

## 2021-01-01 PROCEDURE — 71046 X-RAY EXAM CHEST 2 VIEWS: CPT

## 2021-01-01 PROCEDURE — 00732 ANES UPR GI NDSC PX ERCP: CPT | Performed by: NURSE ANESTHETIST, CERTIFIED REGISTERED

## 2021-01-01 PROCEDURE — 99232 SBSQ HOSP IP/OBS MODERATE 35: CPT | Performed by: NURSE PRACTITIONER

## 2021-01-01 PROCEDURE — (BLANK) HC GENERAL ANESTHESIA FACILITY CHARGE 1ST 15 MIN: Performed by: INTERNAL MEDICINE

## 2021-01-01 PROCEDURE — 84100 ASSAY OF PHOSPHORUS: CPT | Performed by: INTERNAL MEDICINE

## 2021-01-01 PROCEDURE — 99152 MOD SED SAME PHYS/QHP 5/>YRS: CPT

## 2021-01-01 PROCEDURE — C1729 CATH, DRAINAGE: HCPCS

## 2021-01-01 PROCEDURE — 36415 COLL VENOUS BLD VENIPUNCTURE: CPT | Performed by: NURSE PRACTITIONER

## 2021-01-01 PROCEDURE — 6360000200 HC RX 636 W HCPCS (ALT 250 FOR IP): Performed by: NURSE ANESTHETIST, CERTIFIED REGISTERED

## 2021-01-01 PROCEDURE — 97530 THERAPEUTIC ACTIVITIES: CPT | Mod: GO

## 2021-01-01 PROCEDURE — 2500000200 HC RX 250 WO HCPCS: Performed by: NURSE ANESTHETIST, CERTIFIED REGISTERED

## 2021-01-01 PROCEDURE — 86901 BLOOD TYPING SEROLOGIC RH(D): CPT | Performed by: NURSE PRACTITIONER

## 2021-01-01 PROCEDURE — 2590000100 HC RX 259: Performed by: INTERNAL MEDICINE

## 2021-01-01 PROCEDURE — C2625 STENT, NON-COR, TEM W/DEL SY: HCPCS | Performed by: INTERNAL MEDICINE

## 2021-01-01 PROCEDURE — 97161 PT EVAL LOW COMPLEX 20 MIN: CPT | Mod: GP | Performed by: PHYSICAL THERAPIST

## 2021-01-01 PROCEDURE — A9537 TC99M MEBROFENIN: HCPCS | Performed by: RADIOLOGY

## 2021-01-01 PROCEDURE — (BLANK) HC ROOM PRIVATE

## 2021-01-01 PROCEDURE — 85027 COMPLETE CBC AUTOMATED: CPT | Performed by: FAMILY MEDICINE

## 2021-01-01 PROCEDURE — 85610 PROTHROMBIN TIME: CPT | Performed by: FAMILY MEDICINE

## 2021-01-01 PROCEDURE — 36415 COLL VENOUS BLD VENIPUNCTURE: CPT | Performed by: FAMILY MEDICINE

## 2021-01-01 PROCEDURE — 99285 EMERGENCY DEPT VISIT HI MDM: CPT | Performed by: EMERGENCY MEDICINE

## 2021-01-01 PROCEDURE — 99443 *INACTIVE DO NOT USE* PR PHYS/QHP TELEPHONE EVALUATION 21-30 MIN: CPT | Mod: 95 | Performed by: INTERNAL MEDICINE

## 2021-01-01 PROCEDURE — 85025 COMPLETE CBC W/AUTO DIFF WBC: CPT | Performed by: NURSE PRACTITIONER

## 2021-01-01 PROCEDURE — 76080 X-RAY EXAM OF FISTULA: CPT

## 2021-01-01 PROCEDURE — 86140 C-REACTIVE PROTEIN: CPT | Performed by: INTERNAL MEDICINE

## 2021-01-01 PROCEDURE — 80053 COMPREHEN METABOLIC PANEL: CPT | Performed by: NURSE PRACTITIONER

## 2021-01-01 PROCEDURE — 75984 XRAY CONTROL CATHETER CHANGE: CPT

## 2021-01-01 PROCEDURE — 87075 CULTR BACTERIA EXCEPT BLOOD: CPT | Performed by: RADIOLOGY

## 2021-01-01 PROCEDURE — 99214 OFFICE O/P EST MOD 30 MIN: CPT | Performed by: NURSE PRACTITIONER

## 2021-01-01 PROCEDURE — 2590000100 HC RX 259: Performed by: PHYSICIAN ASSISTANT

## 2021-01-01 PROCEDURE — 96365 THER/PROPH/DIAG IV INF INIT: CPT

## 2021-01-01 PROCEDURE — 87077 CULTURE AEROBIC IDENTIFY: CPT | Performed by: NURSE PRACTITIONER

## 2021-01-01 PROCEDURE — (BLANK) HC RECOVERY PHASE-1 1ST  HOUR ACUITY LEVEL 2: Performed by: INTERNAL MEDICINE

## 2021-01-01 PROCEDURE — 85025 COMPLETE CBC W/AUTO DIFF WBC: CPT | Performed by: INTERNAL MEDICINE

## 2021-01-01 PROCEDURE — 4A0D7LZ MEASUREMENT OF URINARY VOLUME, VIA NATURAL OR ARTIFICIAL OPENING: ICD-10-PCS | Performed by: FAMILY MEDICINE

## 2021-01-01 PROCEDURE — 84145 PROCALCITONIN (PCT): CPT | Performed by: INTERNAL MEDICINE

## 2021-01-01 PROCEDURE — 49424 ASSESS CYST CONTRAST INJECT: CPT

## 2021-01-01 PROCEDURE — 82044 UR ALBUMIN SEMIQUANTITATIVE: CPT

## 2021-01-01 PROCEDURE — 83036 HEMOGLOBIN GLYCOSYLATED A1C: CPT | Performed by: INTERNAL MEDICINE

## 2021-01-01 PROCEDURE — (BLANK) HC RECOVERY PHASE-2 EACH ADDITIONAL 1/2 HOUR ACUITY LEVEL 4: Performed by: INTERNAL MEDICINE

## 2021-01-01 PROCEDURE — 82550 ASSAY OF CK (CPK): CPT | Performed by: INTERNAL MEDICINE

## 2021-01-01 PROCEDURE — 2550000100 HC RX 255: Performed by: EMERGENCY MEDICINE

## 2021-01-01 PROCEDURE — 36573 INSJ PICC RS&I 5 YR+: CPT

## 2021-01-01 PROCEDURE — 6370000100 HC RX 637 (ALT 250 FOR IP): Performed by: NURSE PRACTITIONER

## 2021-01-01 PROCEDURE — 97116 GAIT TRAINING THERAPY: CPT | Mod: GP | Performed by: PHYSICAL THERAPIST

## 2021-01-01 PROCEDURE — 02HV33Z INSERTION OF INFUSION DEVICE INTO SUPERIOR VENA CAVA, PERCUTANEOUS APPROACH: ICD-10-PCS | Performed by: RADIOLOGY

## 2021-01-01 PROCEDURE — 2590000100 HC RX 259: Performed by: FAMILY MEDICINE

## 2021-01-01 PROCEDURE — 51798 US URINE CAPACITY MEASURE: CPT

## 2021-01-01 PROCEDURE — 99213 OFFICE O/P EST LOW 20 MIN: CPT | Performed by: INTERNAL MEDICINE

## 2021-01-01 PROCEDURE — 97162 PT EVAL MOD COMPLEX 30 MIN: CPT | Mod: GP | Performed by: PHYSICAL THERAPIST

## 2021-01-01 PROCEDURE — 96367 TX/PROPH/DG ADDL SEQ IV INF: CPT

## 2021-01-01 PROCEDURE — 6360000200 HC RX 636 W HCPCS (ALT 250 FOR IP): Performed by: RADIOLOGY

## 2021-01-01 PROCEDURE — 80048 BASIC METABOLIC PNL TOTAL CA: CPT | Performed by: INTERNAL MEDICINE

## 2021-01-01 PROCEDURE — (BLANK) HC RECOVERY PHASE-2 1ST 1/2 HOUR ACUITY LEVEL 4: Performed by: INTERNAL MEDICINE

## 2021-01-01 PROCEDURE — 96376 TX/PRO/DX INJ SAME DRUG ADON: CPT

## 2021-01-01 PROCEDURE — 0F9130Z DRAINAGE OF RIGHT LOBE LIVER WITH DRAINAGE DEVICE, PERCUTANEOUS APPROACH: ICD-10-PCS | Performed by: RADIOLOGY

## 2021-01-01 PROCEDURE — C1769 GUIDE WIRE: HCPCS | Performed by: INTERNAL MEDICINE

## 2021-01-01 PROCEDURE — 99233 SBSQ HOSP IP/OBS HIGH 50: CPT | Performed by: NURSE PRACTITIONER

## 2021-01-01 PROCEDURE — 36415 COLL VENOUS BLD VENIPUNCTURE: CPT | Performed by: EMERGENCY MEDICINE

## 2021-01-01 PROCEDURE — 84100 ASSAY OF PHOSPHORUS: CPT | Performed by: NURSE PRACTITIONER

## 2021-01-01 PROCEDURE — 99223 1ST HOSP IP/OBS HIGH 75: CPT | Mod: AI | Performed by: INTERNAL MEDICINE

## 2021-01-01 PROCEDURE — G0463 HOSPITAL OUTPT CLINIC VISIT: HCPCS | Mod: 25

## 2021-01-01 PROCEDURE — 99153 MOD SED SAME PHYS/QHP EA: CPT

## 2021-01-01 PROCEDURE — 2720000000 HC SUPP 272 WO HCPCS: Performed by: INTERNAL MEDICINE

## 2021-01-01 PROCEDURE — 76705 ECHO EXAM OF ABDOMEN: CPT

## 2021-01-01 PROCEDURE — 49424 ASSESS CYST CONTRAST INJECT: CPT | Mod: 59

## 2021-01-01 PROCEDURE — M1145 MFN DRUG ADD-ON, PER DOSE: HCPCS | Performed by: NURSE PRACTITIONER

## 2021-01-01 PROCEDURE — 75989 ABSCESS DRAINAGE UNDER X-RAY: CPT

## 2021-01-01 PROCEDURE — 87186 SC STD MICRODIL/AGAR DIL: CPT | Performed by: RADIOLOGY

## 2021-01-01 PROCEDURE — (BLANK) HC MAC ANESTHESIA FACILITY CHARGE 1ST 15 MIN: Performed by: INTERNAL MEDICINE

## 2021-01-01 PROCEDURE — 75989 ABSCESS DRAINAGE UNDER X-RAY: CPT | Mod: NCP

## 2021-01-01 PROCEDURE — 99222 1ST HOSP IP/OBS MODERATE 55: CPT | Mod: AI | Performed by: NURSE PRACTITIONER

## 2021-01-01 PROCEDURE — 83735 ASSAY OF MAGNESIUM: CPT | Performed by: NURSE PRACTITIONER

## 2021-01-01 PROCEDURE — 99251 *NO LONGER ACTIVE 2023 DO NOT USE* PR INITL INPATIENT CONSULT NEW/ESTAB PT 20 MIN: CPT | Performed by: NURSE PRACTITIONER

## 2021-01-01 PROCEDURE — 99223 1ST HOSP IP/OBS HIGH 75: CPT | Performed by: INTERNAL MEDICINE

## 2021-01-01 PROCEDURE — 96375 TX/PRO/DX INJ NEW DRUG ADDON: CPT

## 2021-01-01 PROCEDURE — 99239 HOSP IP/OBS DSCHRG MGMT >30: CPT | Performed by: FAMILY MEDICINE

## 2021-01-01 PROCEDURE — 0F798DZ DILATION OF COMMON BILE DUCT WITH INTRALUMINAL DEVICE, VIA NATURAL OR ARTIFICIAL OPENING ENDOSCOPIC: ICD-10-PCS | Performed by: INTERNAL MEDICINE

## 2021-01-01 PROCEDURE — 2500000200 HC RX 250 WO HCPCS: Performed by: INTERNAL MEDICINE

## 2021-01-01 PROCEDURE — 85610 PROTHROMBIN TIME: CPT | Performed by: NURSE PRACTITIONER

## 2021-01-01 PROCEDURE — 87070 CULTURE OTHR SPECIMN AEROBIC: CPT | Performed by: NURSE PRACTITIONER

## 2021-01-01 DEVICE — BILIARY STENT WITH NAVIFLEXTM RX DELIVERY SYSTEM
Type: IMPLANTABLE DEVICE | Site: DUODENUM | Status: FUNCTIONAL
Brand: ADVANIX™ BILIARY

## 2021-01-01 DEVICE — IMPLANTABLE DEVICE: Type: IMPLANTABLE DEVICE | Site: DUODENUM | Status: FUNCTIONAL

## 2021-01-01 RX ORDER — SODIUM CHLORIDE 0.9 % (FLUSH) 0.9 %
10 SYRINGE (ML) INJECTION AS NEEDED
Status: DISCONTINUED | OUTPATIENT
Start: 2021-01-01 | End: 2021-01-01 | Stop reason: HOSPADM

## 2021-01-01 RX ORDER — DIPHENHYDRAMINE HYDROCHLORIDE 50 MG/ML
50 INJECTION INTRAMUSCULAR; INTRAVENOUS ONCE AS NEEDED
Status: CANCELLED | OUTPATIENT
Start: 2021-01-01

## 2021-01-01 RX ORDER — IOPAMIDOL 612 MG/ML
100 INJECTION, SOLUTION INTRAVASCULAR ONCE
Status: COMPLETED | OUTPATIENT
Start: 2021-01-01 | End: 2021-01-01

## 2021-01-01 RX ORDER — DIPHENHYDRAMINE HYDROCHLORIDE 50 MG/ML
25-50 INJECTION INTRAMUSCULAR; INTRAVENOUS AS NEEDED
Status: CANCELLED | OUTPATIENT
Start: 2021-01-01

## 2021-01-01 RX ORDER — FAMOTIDINE 10 MG/ML
20 INJECTION INTRAVENOUS AS NEEDED
Status: CANCELLED | OUTPATIENT
Start: 2021-01-01

## 2021-01-01 RX ORDER — ALBUTEROL SULFATE 0.83 MG/ML
2.5 SOLUTION RESPIRATORY (INHALATION) AS NEEDED
Status: CANCELLED | OUTPATIENT
Start: 2021-01-01

## 2021-01-01 RX ORDER — EPINEPHRINE 1 MG/ML
0.3 INJECTION INTRAMUSCULAR; INTRAVENOUS; SUBCUTANEOUS AS NEEDED
Status: CANCELLED | OUTPATIENT
Start: 2021-01-01

## 2021-01-01 RX ORDER — CIPROFLOXACIN 2 MG/ML
400 INJECTION, SOLUTION INTRAVENOUS EVERY 12 HOURS
Status: DISCONTINUED | OUTPATIENT
Start: 2021-01-01 | End: 2021-01-01

## 2021-01-01 RX ORDER — DESVENLAFAXINE SUCCINATE 25 MG/1
25 TABLET, EXTENDED RELEASE ORAL DAILY
COMMUNITY
Start: 2021-01-01 | End: 2021-01-01 | Stop reason: ALTCHOICE

## 2021-01-01 RX ORDER — SODIUM CHLORIDE 0.9 % (FLUSH) 0.9 %
10 SYRINGE (ML) INJECTION AS NEEDED
Status: CANCELLED | OUTPATIENT
Start: 2021-01-01

## 2021-01-01 RX ORDER — SODIUM CHLORIDE 9 MG/ML
0-999 INJECTION, SOLUTION INTRAVENOUS CONTINUOUS PRN
Status: CANCELLED | OUTPATIENT
Start: 2021-01-01 | End: 2021-01-01

## 2021-01-01 RX ORDER — SODIUM CHLORIDE 0.9 % (FLUSH) 0.9 %
3 SYRINGE (ML) INJECTION 2 TIMES DAILY
Status: DISCONTINUED | OUTPATIENT
Start: 2021-01-01 | End: 2021-01-01 | Stop reason: HOSPADM

## 2021-01-01 RX ORDER — FENTANYL CITRATE/PF 50 MCG/ML
PLASTIC BAG, INJECTION (ML) INTRAVENOUS CODE/TRAUMA/SEDATION MEDICATION
Status: COMPLETED | OUTPATIENT
Start: 2021-01-01 | End: 2021-01-01

## 2021-01-01 RX ORDER — HEPARIN 100 UNIT/ML
500 SYRINGE INTRAVENOUS AS NEEDED
Status: DISCONTINUED | OUTPATIENT
Start: 2021-01-01 | End: 2021-01-01 | Stop reason: HOSPADM

## 2021-01-01 RX ORDER — ACETAMINOPHEN 500 MG
500 TABLET ORAL EVERY 4 HOURS PRN
Status: CANCELLED | OUTPATIENT
Start: 2021-01-01

## 2021-01-01 RX ORDER — LANSOPRAZOLE 30 MG/1
30 CAPSULE, DELAYED RELEASE ORAL DAILY
Status: DISCONTINUED | OUTPATIENT
Start: 2021-01-01 | End: 2021-01-01 | Stop reason: HOSPADM

## 2021-01-01 RX ORDER — POLYETHYLENE GLYCOL 3350 17 G/17G
17 POWDER, FOR SOLUTION ORAL DAILY
COMMUNITY

## 2021-01-01 RX ORDER — HYDROMORPHONE HYDROCHLORIDE 1 MG/ML
.4-.6 INJECTION, SOLUTION INTRAMUSCULAR; INTRAVENOUS; SUBCUTANEOUS
Status: DISCONTINUED | OUTPATIENT
Start: 2021-01-01 | End: 2021-01-01

## 2021-01-01 RX ORDER — LISINOPRIL 20 MG/1
20 TABLET ORAL DAILY
Status: DISCONTINUED | OUTPATIENT
Start: 2021-01-01 | End: 2021-01-01 | Stop reason: HOSPADM

## 2021-01-01 RX ORDER — HEPARIN 100 UNIT/ML
500 SYRINGE INTRAVENOUS AS NEEDED
Status: CANCELLED | OUTPATIENT
Start: 2021-01-01

## 2021-01-01 RX ORDER — INSULIN ASPART 100 [IU]/ML
0-15 INJECTION, SOLUTION INTRAVENOUS; SUBCUTANEOUS
Status: DISCONTINUED | OUTPATIENT
Start: 2021-01-01 | End: 2021-01-01 | Stop reason: HOSPADM

## 2021-01-01 RX ORDER — BRIMONIDINE TARTRATE 2 MG/ML
1 SOLUTION/ DROPS OPHTHALMIC 2 TIMES DAILY
COMMUNITY
Start: 2021-01-01

## 2021-01-01 RX ORDER — AMOXICILLIN AND CLAVULANATE POTASSIUM 875; 125 MG/1; MG/1
1 TABLET, FILM COATED ORAL 2 TIMES DAILY
Qty: 42 TABLET | Refills: 0 | Status: SHIPPED | OUTPATIENT
Start: 2021-01-01 | End: 2021-01-01 | Stop reason: SDUPTHER

## 2021-01-01 RX ORDER — SUCCINYLCHOLINE CHLORIDE 20 MG/ML INJECTION SOLUTION
SOLUTION AS NEEDED
Status: DISCONTINUED | OUTPATIENT
Start: 2021-01-01 | End: 2021-01-01 | Stop reason: SURG

## 2021-01-01 RX ORDER — METOCLOPRAMIDE HYDROCHLORIDE 5 MG/ML
10 INJECTION INTRAMUSCULAR; INTRAVENOUS ONCE
Status: COMPLETED | OUTPATIENT
Start: 2021-01-01 | End: 2021-01-01

## 2021-01-01 RX ORDER — CARBOXYMETHYLCELLULOSE SODIUM 5 MG/ML
2 SOLUTION/ DROPS OPHTHALMIC 3 TIMES DAILY PRN
Status: DISCONTINUED | OUTPATIENT
Start: 2021-01-01 | End: 2021-01-01 | Stop reason: HOSPADM

## 2021-01-01 RX ORDER — SODIUM CHLORIDE 9 MG/ML
50 INJECTION, SOLUTION INTRAVENOUS AS NEEDED
Status: DISCONTINUED | OUTPATIENT
Start: 2021-01-01 | End: 2021-01-01 | Stop reason: HOSPADM

## 2021-01-01 RX ORDER — LIDOCAINE HYDROCHLORIDE 20 MG/ML
INJECTION, SOLUTION EPIDURAL; INFILTRATION; INTRACAUDAL; PERINEURAL AS NEEDED
Status: DISCONTINUED | OUTPATIENT
Start: 2021-01-01 | End: 2021-01-01 | Stop reason: SURG

## 2021-01-01 RX ORDER — INSULIN ASPART 100 [IU]/ML
0-15 INJECTION, SOLUTION INTRAVENOUS; SUBCUTANEOUS
Status: DISCONTINUED | OUTPATIENT
Start: 2021-01-01 | End: 2021-01-01

## 2021-01-01 RX ORDER — ONDANSETRON 4 MG/1
4 TABLET, ORALLY DISINTEGRATING ORAL EVERY 8 HOURS PRN
Status: DISCONTINUED | OUTPATIENT
Start: 2021-01-01 | End: 2021-01-01 | Stop reason: HOSPADM

## 2021-01-01 RX ORDER — SODIUM CHLORIDE 9 MG/ML
25-50 INJECTION, SOLUTION INTRAVENOUS AS NEEDED
Status: DISCONTINUED | OUTPATIENT
Start: 2021-01-01 | End: 2021-01-01 | Stop reason: HOSPADM

## 2021-01-01 RX ORDER — ENOXAPARIN SODIUM 100 MG/ML
40 INJECTION SUBCUTANEOUS
Status: DISCONTINUED | OUTPATIENT
Start: 2021-01-01 | End: 2021-01-01

## 2021-01-01 RX ORDER — NALOXONE HYDROCHLORIDE 4 MG/.1ML
1 SPRAY NASAL AS NEEDED
COMMUNITY
Start: 2021-01-01

## 2021-01-01 RX ORDER — CARBOXYMETHYLCELLULOSE SODIUM 5 MG/ML
1 SOLUTION/ DROPS OPHTHALMIC
COMMUNITY
Start: 2020-01-30

## 2021-01-01 RX ORDER — DEXTROSE 40 %
15 GEL (GRAM) ORAL
Status: DISCONTINUED | OUTPATIENT
Start: 2021-01-01 | End: 2021-01-01 | Stop reason: HOSPADM

## 2021-01-01 RX ORDER — METRONIDAZOLE 500 MG/1
500 TABLET ORAL 4 TIMES DAILY
Status: ON HOLD | COMMUNITY
Start: 2021-01-01 | End: 2021-01-01

## 2021-01-01 RX ORDER — IOPAMIDOL 612 MG/ML
50 INJECTION, SOLUTION INTRAVASCULAR ONCE
Status: COMPLETED | OUTPATIENT
Start: 2021-01-01 | End: 2021-01-01

## 2021-01-01 RX ORDER — KETAMINE HCL IN 0.9 % NACL 50 MG/5 ML
SYRINGE (ML) INTRAVENOUS AS NEEDED
Status: DISCONTINUED | OUTPATIENT
Start: 2021-01-01 | End: 2021-01-01 | Stop reason: SURG

## 2021-01-01 RX ORDER — DEXTROSE 50 % IN WATER (D50W) INTRAVENOUS SYRINGE
15-30
Status: DISCONTINUED | OUTPATIENT
Start: 2021-01-01 | End: 2021-01-01 | Stop reason: HOSPADM

## 2021-01-01 RX ORDER — HEPARIN 100 UNIT/ML
5 SYRINGE INTRAVENOUS ONCE
Status: COMPLETED | OUTPATIENT
Start: 2021-01-01 | End: 2021-01-01

## 2021-01-01 RX ORDER — CEFUROXIME AXETIL 500 MG/1
500 TABLET ORAL 2 TIMES DAILY
COMMUNITY
Start: 2021-01-01 | End: 2021-01-01 | Stop reason: HOSPADM

## 2021-01-01 RX ORDER — INSULIN GLARGINE 100 [IU]/ML
24 INJECTION, SOLUTION SUBCUTANEOUS EVERY MORNING
Status: DISCONTINUED | OUTPATIENT
Start: 2021-01-01 | End: 2021-01-01

## 2021-01-01 RX ORDER — METRONIDAZOLE 500 MG/1
500 TABLET ORAL 4 TIMES DAILY
COMMUNITY
Start: 2021-01-01 | End: 2021-01-01 | Stop reason: HOSPADM

## 2021-01-01 RX ORDER — IOPAMIDOL 755 MG/ML
105 INJECTION, SOLUTION INTRAVASCULAR ONCE
Status: COMPLETED | OUTPATIENT
Start: 2021-01-01 | End: 2021-01-01

## 2021-01-01 RX ORDER — SODIUM CHLORIDE 9 MG/ML
50 INJECTION, SOLUTION INTRAVENOUS CONTINUOUS
Status: DISCONTINUED | OUTPATIENT
Start: 2021-01-01 | End: 2021-01-01

## 2021-01-01 RX ORDER — BISACODYL 10 MG/1
10 SUPPOSITORY RECTAL DAILY PRN
Status: DISCONTINUED | OUTPATIENT
Start: 2021-01-01 | End: 2021-01-01 | Stop reason: HOSPADM

## 2021-01-01 RX ORDER — LACTULOSE 10 G/15ML
10 SOLUTION ORAL 2 TIMES DAILY PRN
Status: DISCONTINUED | OUTPATIENT
Start: 2021-01-01 | End: 2021-01-01 | Stop reason: HOSPADM

## 2021-01-01 RX ORDER — PROPOFOL 10 MG/ML
INJECTION, EMULSION INTRAVENOUS AS NEEDED
Status: DISCONTINUED | OUTPATIENT
Start: 2021-01-01 | End: 2021-01-01 | Stop reason: SURG

## 2021-01-01 RX ORDER — ROCURONIUM BROMIDE 50 MG/5 ML
SYRINGE (ML) INTRAVENOUS AS NEEDED
Status: DISCONTINUED | OUTPATIENT
Start: 2021-01-01 | End: 2021-01-01 | Stop reason: SURG

## 2021-01-01 RX ORDER — ENOXAPARIN SODIUM 100 MG/ML
40 INJECTION SUBCUTANEOUS
Status: DISCONTINUED | OUTPATIENT
Start: 2021-01-01 | End: 2021-01-01 | Stop reason: HOSPADM

## 2021-01-01 RX ORDER — MAGNESIUM SULFATE HEPTAHYDRATE 40 MG/ML
4 INJECTION, SOLUTION INTRAVENOUS ONCE
Status: COMPLETED | OUTPATIENT
Start: 2021-01-01 | End: 2021-01-01

## 2021-01-01 RX ORDER — METRONIDAZOLE 500 MG/1
500 TABLET ORAL 3 TIMES DAILY
Qty: 63 TABLET | Refills: 0 | Status: SHIPPED | OUTPATIENT
Start: 2021-01-01 | End: 2021-01-01

## 2021-01-01 RX ORDER — ADHESIVE BANDAGE
30 BANDAGE TOPICAL DAILY PRN
Status: DISCONTINUED | OUTPATIENT
Start: 2021-01-01 | End: 2021-01-01 | Stop reason: HOSPADM

## 2021-01-01 RX ORDER — METOCLOPRAMIDE HYDROCHLORIDE 5 MG/ML
10 INJECTION INTRAMUSCULAR; INTRAVENOUS EVERY 6 HOURS PRN
Status: DISCONTINUED | OUTPATIENT
Start: 2021-01-01 | End: 2021-01-01 | Stop reason: HOSPADM

## 2021-01-01 RX ORDER — DEXAMETHASONE SODIUM PHOSPHATE 4 MG/ML
INJECTION, SOLUTION INTRA-ARTICULAR; INTRALESIONAL; INTRAMUSCULAR; INTRAVENOUS; SOFT TISSUE AS NEEDED
Status: DISCONTINUED | OUTPATIENT
Start: 2021-01-01 | End: 2021-01-01 | Stop reason: SURG

## 2021-01-01 RX ORDER — MORPHINE SULFATE 15 MG/1
15 TABLET, FILM COATED, EXTENDED RELEASE ORAL 2 TIMES DAILY
Qty: 60 TABLET | Refills: 0
Start: 2021-01-01 | End: 2021-01-01

## 2021-01-01 RX ORDER — INDOMETHACIN 50 MG/1
100 SUPPOSITORY RECTAL ONCE
Status: DISCONTINUED | OUTPATIENT
Start: 2021-01-01 | End: 2021-01-01 | Stop reason: HOSPADM

## 2021-01-01 RX ORDER — IOPAMIDOL 612 MG/ML
50 INJECTION, SOLUTION INTRAVASCULAR ONCE
Status: DISCONTINUED | OUTPATIENT
Start: 2021-01-01 | End: 2021-01-01

## 2021-01-01 RX ORDER — MORPHINE SULFATE 15 MG/1
15 TABLET, FILM COATED, EXTENDED RELEASE ORAL EVERY 12 HOURS SCHEDULED
Status: DISCONTINUED | OUTPATIENT
Start: 2021-01-01 | End: 2021-01-01 | Stop reason: HOSPADM

## 2021-01-01 RX ORDER — HYDROMORPHONE HYDROCHLORIDE 2 MG/1
2-4 TABLET ORAL EVERY 6 HOURS PRN
Status: DISCONTINUED | OUTPATIENT
Start: 2021-01-01 | End: 2021-01-01 | Stop reason: HOSPADM

## 2021-01-01 RX ORDER — MORPHINE SULFATE 15 MG/1
15 TABLET, FILM COATED, EXTENDED RELEASE ORAL 2 TIMES DAILY
COMMUNITY
Start: 2021-01-01

## 2021-01-01 RX ORDER — ESCITALOPRAM OXALATE 10 MG/1
5 TABLET ORAL DAILY
COMMUNITY
Start: 2020-01-01 | End: 2021-01-01 | Stop reason: ALTCHOICE

## 2021-01-01 RX ORDER — TERAZOSIN 5 MG/1
5 CAPSULE ORAL 2 TIMES DAILY
Status: DISCONTINUED | OUTPATIENT
Start: 2021-01-01 | End: 2021-01-01 | Stop reason: HOSPADM

## 2021-01-01 RX ORDER — HYDROMORPHONE HYDROCHLORIDE 1 MG/ML
0.8 INJECTION, SOLUTION INTRAMUSCULAR; INTRAVENOUS; SUBCUTANEOUS
Status: DISCONTINUED | OUTPATIENT
Start: 2021-01-01 | End: 2021-01-01 | Stop reason: HOSPADM

## 2021-01-01 RX ORDER — SODIUM CHLORIDE, SODIUM LACTATE, POTASSIUM CHLORIDE, CALCIUM CHLORIDE 600; 310; 30; 20 MG/100ML; MG/100ML; MG/100ML; MG/100ML
INJECTION, SOLUTION INTRAVENOUS CONTINUOUS PRN
Status: DISCONTINUED | OUTPATIENT
Start: 2021-01-01 | End: 2021-01-01 | Stop reason: SURG

## 2021-01-01 RX ORDER — AMOXICILLIN 250 MG
1 CAPSULE ORAL 2 TIMES DAILY
Status: DISCONTINUED | OUTPATIENT
Start: 2021-01-01 | End: 2021-01-01

## 2021-01-01 RX ORDER — SODIUM CHLORIDE 9 MG/ML
50 INJECTION, SOLUTION INTRAVENOUS AS NEEDED
Status: CANCELLED | OUTPATIENT
Start: 2021-01-01

## 2021-01-01 RX ORDER — BUPROPION HYDROCHLORIDE 100 MG/1
100 TABLET, EXTENDED RELEASE ORAL DAILY
Status: DISCONTINUED | OUTPATIENT
Start: 2021-01-01 | End: 2021-01-01 | Stop reason: HOSPADM

## 2021-01-01 RX ORDER — TRAZODONE HYDROCHLORIDE 50 MG/1
100 TABLET ORAL NIGHTLY
Status: DISCONTINUED | OUTPATIENT
Start: 2021-01-01 | End: 2021-01-01 | Stop reason: HOSPADM

## 2021-01-01 RX ORDER — METRONIDAZOLE 500 MG/100ML
500 INJECTION, SOLUTION INTRAVENOUS EVERY 6 HOURS
Status: DISCONTINUED | OUTPATIENT
Start: 2021-01-01 | End: 2021-01-01

## 2021-01-01 RX ORDER — METRONIDAZOLE 500 MG/100ML
500 INJECTION, SOLUTION INTRAVENOUS ONCE
Status: COMPLETED | OUTPATIENT
Start: 2021-01-01 | End: 2021-01-01

## 2021-01-01 RX ORDER — IOPAMIDOL 755 MG/ML
115 INJECTION, SOLUTION INTRAVASCULAR ONCE
Status: COMPLETED | OUTPATIENT
Start: 2021-01-01 | End: 2021-01-01

## 2021-01-01 RX ORDER — ONDANSETRON HYDROCHLORIDE 2 MG/ML
INJECTION, SOLUTION INTRAVENOUS AS NEEDED
Status: DISCONTINUED | OUTPATIENT
Start: 2021-01-01 | End: 2021-01-01 | Stop reason: SURG

## 2021-01-01 RX ORDER — ACETAMINOPHEN 325 MG/1
325-650 TABLET ORAL EVERY 4 HOURS PRN
Status: DISCONTINUED | OUTPATIENT
Start: 2021-01-01 | End: 2021-01-01 | Stop reason: HOSPADM

## 2021-01-01 RX ORDER — HEPARIN 100 UNIT/ML
5 SYRINGE INTRAVENOUS ONCE AS NEEDED
Status: DISCONTINUED | OUTPATIENT
Start: 2021-01-01 | End: 2021-01-01 | Stop reason: HOSPADM

## 2021-01-01 RX ORDER — INSULIN GLARGINE 100 [IU]/ML
12 INJECTION, SOLUTION SUBCUTANEOUS EVERY MORNING
Status: DISCONTINUED | OUTPATIENT
Start: 2021-01-01 | End: 2021-01-01 | Stop reason: HOSPADM

## 2021-01-01 RX ORDER — LIDOCAINE AND PRILOCAINE 25; 25 MG/G; MG/G
CREAM TOPICAL
Qty: 30 G | Refills: 2 | Status: SHIPPED | OUTPATIENT
Start: 2021-01-01

## 2021-01-01 RX ORDER — SILDENAFIL 100 MG/1
100 TABLET, FILM COATED ORAL AS NEEDED
COMMUNITY

## 2021-01-01 RX ORDER — IOPAMIDOL 755 MG/ML
140 INJECTION, SOLUTION INTRAVASCULAR ONCE
Status: COMPLETED | OUTPATIENT
Start: 2021-01-01 | End: 2021-01-01

## 2021-01-01 RX ORDER — HYDROMORPHONE HYDROCHLORIDE 2 MG/1
2-4 TABLET ORAL EVERY 6 HOURS PRN
COMMUNITY
Start: 2021-01-01

## 2021-01-01 RX ORDER — HYDROMORPHONE HYDROCHLORIDE 2 MG/1
2-4 TABLET ORAL EVERY 4 HOURS PRN
Qty: 180 TABLET | Refills: 0
Start: 2021-01-01 | End: 2021-01-01

## 2021-01-01 RX ORDER — HEPARIN 100 UNIT/ML
5 SYRINGE INTRAVENOUS
Status: COMPLETED | OUTPATIENT
Start: 2021-01-01 | End: 2021-01-01

## 2021-01-01 RX ORDER — AMOXICILLIN 250 MG
2 CAPSULE ORAL 2 TIMES DAILY
Status: DISCONTINUED | OUTPATIENT
Start: 2021-01-01 | End: 2021-01-01 | Stop reason: HOSPADM

## 2021-01-01 RX ORDER — ONDANSETRON HYDROCHLORIDE 2 MG/ML
4 INJECTION, SOLUTION INTRAVENOUS EVERY 8 HOURS PRN
Status: DISCONTINUED | OUTPATIENT
Start: 2021-01-01 | End: 2021-01-01 | Stop reason: HOSPADM

## 2021-01-01 RX ORDER — POLYETHYLENE GLYCOL (3350) 17 G/17G
17 POWDER, FOR SOLUTION ORAL DAILY
Status: DISCONTINUED | OUTPATIENT
Start: 2021-01-01 | End: 2021-01-01 | Stop reason: HOSPADM

## 2021-01-01 RX ORDER — TRAZODONE HYDROCHLORIDE 100 MG/1
100 TABLET ORAL NIGHTLY
Status: DISCONTINUED | OUTPATIENT
Start: 2021-01-01 | End: 2021-01-01 | Stop reason: HOSPADM

## 2021-01-01 RX ORDER — ALUMINUM HYDROXIDE, MAGNESIUM HYDROXIDE, AND SIMETHICONE 1200; 120; 1200 MG/30ML; MG/30ML; MG/30ML
30 SUSPENSION ORAL ONCE
Status: COMPLETED | OUTPATIENT
Start: 2021-01-01 | End: 2021-01-01

## 2021-01-01 RX ORDER — MIDAZOLAM HYDROCHLORIDE 1 MG/ML
INJECTION INTRAMUSCULAR; INTRAVENOUS CODE/TRAUMA/SEDATION MEDICATION
Status: COMPLETED | OUTPATIENT
Start: 2021-01-01 | End: 2021-01-01

## 2021-01-01 RX ORDER — METOCLOPRAMIDE 10 MG/1
10 TABLET ORAL EVERY 6 HOURS PRN
Status: DISCONTINUED | OUTPATIENT
Start: 2021-01-01 | End: 2021-01-01 | Stop reason: HOSPADM

## 2021-01-01 RX ORDER — FENTANYL CITRATE/PF 50 MCG/ML
25 PLASTIC BAG, INJECTION (ML) INTRAVENOUS
Status: DISCONTINUED | OUTPATIENT
Start: 2021-01-01 | End: 2021-01-01 | Stop reason: HOSPADM

## 2021-01-01 RX ORDER — CARVEDILOL 3.12 MG/1
3.12 TABLET ORAL 2 TIMES DAILY WITH MEALS
Status: DISCONTINUED | OUTPATIENT
Start: 2021-01-01 | End: 2021-01-01 | Stop reason: HOSPADM

## 2021-01-01 RX ORDER — AMOXICILLIN AND CLAVULANATE POTASSIUM 875; 125 MG/1; MG/1
1 TABLET, FILM COATED ORAL 2 TIMES DAILY
Qty: 28 TABLET | Refills: 0 | Status: SHIPPED | OUTPATIENT
Start: 2021-01-01 | End: 2021-01-01 | Stop reason: ALTCHOICE

## 2021-01-01 RX ORDER — SODIUM FLUORIDE 5 MG/G
GEL, DENTIFRICE DENTAL SEE ADMIN INSTRUCTIONS
COMMUNITY
Start: 2021-01-01

## 2021-01-01 RX ORDER — FLUTICASONE PROPIONATE 50 MCG
2 SPRAY, SUSPENSION (ML) NASAL DAILY
Status: DISCONTINUED | OUTPATIENT
Start: 2021-01-01 | End: 2021-01-01 | Stop reason: HOSPADM

## 2021-01-01 RX ORDER — CIPROFLOXACIN 2 MG/ML
400 INJECTION, SOLUTION INTRAVENOUS ONCE
Status: COMPLETED | OUTPATIENT
Start: 2021-01-01 | End: 2021-01-01

## 2021-01-01 RX ORDER — FUROSEMIDE 20 MG/1
20 TABLET ORAL
Status: DISCONTINUED | OUTPATIENT
Start: 2021-01-01 | End: 2021-01-01 | Stop reason: HOSPADM

## 2021-01-01 RX ORDER — BRIMONIDINE TARTRATE 2 MG/ML
1 SOLUTION/ DROPS OPHTHALMIC 2 TIMES DAILY
COMMUNITY
End: 2021-01-01 | Stop reason: DRUGHIGH

## 2021-01-01 RX ORDER — HYDROMORPHONE HYDROCHLORIDE 1 MG/ML
0.4 INJECTION, SOLUTION INTRAMUSCULAR; INTRAVENOUS; SUBCUTANEOUS
Status: DISCONTINUED | OUTPATIENT
Start: 2021-01-01 | End: 2021-01-01 | Stop reason: HOSPADM

## 2021-01-01 RX ORDER — HEPARIN 100 UNIT/ML
5 SYRINGE INTRAVENOUS AS NEEDED
Status: COMPLETED | OUTPATIENT
Start: 2021-01-01 | End: 2021-01-01

## 2021-01-01 RX ORDER — IOPAMIDOL 755 MG/ML
130 INJECTION, SOLUTION INTRAVASCULAR ONCE
Status: COMPLETED | OUTPATIENT
Start: 2021-01-01 | End: 2021-01-01

## 2021-01-01 RX ORDER — SODIUM CHLORIDE 0.9 % (FLUSH) 0.9 %
2 SYRINGE (ML) INJECTION DAILY
Status: DISCONTINUED | OUTPATIENT
Start: 2021-01-01 | End: 2021-01-01 | Stop reason: HOSPADM

## 2021-01-01 RX ORDER — SODIUM CHLORIDE 0.9 % (FLUSH) 0.9 %
SYRINGE (ML) INJECTION
COMMUNITY
Start: 2021-01-01

## 2021-01-01 RX ORDER — SODIUM CHLORIDE 9 MG/ML
10 INJECTION, SOLUTION INTRAVENOUS CONTINUOUS
Status: DISCONTINUED | OUTPATIENT
Start: 2021-01-01 | End: 2021-01-01 | Stop reason: HOSPADM

## 2021-01-01 RX ORDER — SODIUM CHLORIDE 9 MG/ML
100 INJECTION, SOLUTION INTRAVENOUS CONTINUOUS
Status: DISCONTINUED | OUTPATIENT
Start: 2021-01-01 | End: 2021-01-01

## 2021-01-01 RX ORDER — PROPOFOL 10 MG/ML
INJECTION, EMULSION INTRAVENOUS CONTINUOUS PRN
Status: DISCONTINUED | OUTPATIENT
Start: 2021-01-01 | End: 2021-01-01 | Stop reason: SURG

## 2021-01-01 RX ORDER — HYDROMORPHONE HYDROCHLORIDE 1 MG/ML
1 INJECTION, SOLUTION INTRAMUSCULAR; INTRAVENOUS; SUBCUTANEOUS ONCE
Status: COMPLETED | OUTPATIENT
Start: 2021-01-01 | End: 2021-01-01

## 2021-01-01 RX ORDER — SODIUM CHLORIDE 0.9 % (FLUSH) 0.9 %
3 SYRINGE (ML) INJECTION AS NEEDED
Status: DISCONTINUED | OUTPATIENT
Start: 2021-01-01 | End: 2021-01-01 | Stop reason: HOSPADM

## 2021-01-01 RX ORDER — POTASSIUM CHLORIDE 20 MEQ/1
20 TABLET, EXTENDED RELEASE ORAL 2 TIMES DAILY
Qty: 10 TABLET | Refills: 1 | Status: SHIPPED | OUTPATIENT
Start: 2021-01-01 | End: 2021-01-01

## 2021-01-01 RX ORDER — LORATADINE 10 MG/1
10 TABLET ORAL DAILY
Status: DISCONTINUED | OUTPATIENT
Start: 2021-01-01 | End: 2021-01-01 | Stop reason: HOSPADM

## 2021-01-01 RX ORDER — SODIUM CHLORIDE 9 MG/ML
3 INJECTION, SOLUTION INTRAMUSCULAR; INTRAVENOUS; SUBCUTANEOUS
Status: ON HOLD | COMMUNITY
Start: 2021-01-01 | End: 2021-01-01 | Stop reason: ALTCHOICE

## 2021-01-01 RX ORDER — SODIUM CHLORIDE 0.9 % (FLUSH) 0.9 %
10 SYRINGE (ML) INJECTION 2 TIMES DAILY
Status: DISCONTINUED | OUTPATIENT
Start: 2021-01-01 | End: 2021-01-01 | Stop reason: HOSPADM

## 2021-01-01 RX ORDER — TALC
10.5 POWDER (GRAM) TOPICAL NIGHTLY
Status: DISCONTINUED | OUTPATIENT
Start: 2021-01-01 | End: 2021-01-01 | Stop reason: HOSPADM

## 2021-01-01 RX ORDER — FENTANYL CITRATE/PF 50 MCG/ML
50 PLASTIC BAG, INJECTION (ML) INTRAVENOUS
Status: DISCONTINUED | OUTPATIENT
Start: 2021-01-01 | End: 2021-01-01

## 2021-01-01 RX ORDER — HYDROMORPHONE HYDROCHLORIDE 1 MG/ML
.2-.3 INJECTION, SOLUTION INTRAMUSCULAR; INTRAVENOUS; SUBCUTANEOUS
Status: DISCONTINUED | OUTPATIENT
Start: 2021-01-01 | End: 2021-01-01

## 2021-01-01 RX ORDER — MORPHINE SULFATE 15 MG/1
15 TABLET, FILM COATED, EXTENDED RELEASE ORAL 2 TIMES DAILY
Qty: 14 TABLET | Refills: 0 | Status: SHIPPED | OUTPATIENT
Start: 2021-01-01 | End: 2021-01-01

## 2021-01-01 RX ORDER — FENTANYL CITRATE/PF 50 MCG/ML
PLASTIC BAG, INJECTION (ML) INTRAVENOUS AS NEEDED
Status: DISCONTINUED | OUTPATIENT
Start: 2021-01-01 | End: 2021-01-01 | Stop reason: SURG

## 2021-01-01 RX ORDER — IOPAMIDOL 408 MG/ML
INJECTION, SOLUTION INTRAVASCULAR AS NEEDED
Status: DISCONTINUED | OUTPATIENT
Start: 2021-01-01 | End: 2021-01-01 | Stop reason: HOSPADM

## 2021-01-01 RX ADMIN — CARVEDILOL 3.12 MG: 3.12 TABLET, FILM COATED ORAL at 18:19

## 2021-01-01 RX ADMIN — BUPROPION HYDROCHLORIDE 100 MG: 100 TABLET, FILM COATED, EXTENDED RELEASE ORAL at 09:37

## 2021-01-01 RX ADMIN — ATEZOLIZUMAB 1200 MG: 1200 INJECTION, SOLUTION INTRAVENOUS at 16:19

## 2021-01-01 RX ADMIN — FUROSEMIDE 20 MG: 20 TABLET ORAL at 09:37

## 2021-01-01 RX ADMIN — SODIUM CHLORIDE 25 ML: 9 INJECTION, SOLUTION INTRAVENOUS at 06:21

## 2021-01-01 RX ADMIN — TERAZOSIN HYDROCHLORIDE 5 MG: 5 CAPSULE ORAL at 08:37

## 2021-01-01 RX ADMIN — LANSOPRAZOLE 30 MG: 30 CAPSULE, DELAYED RELEASE ORAL at 09:50

## 2021-01-01 RX ADMIN — CIPROFLOXACIN 400 MG: 2 INJECTION, SOLUTION INTRAVENOUS at 21:52

## 2021-01-01 RX ADMIN — SODIUM CHLORIDE 100 ML/HR: 9 INJECTION, SOLUTION INTRAVENOUS at 22:04

## 2021-01-01 RX ADMIN — METRONIDAZOLE 500 MG: 500 INJECTION, SOLUTION INTRAVENOUS at 08:23

## 2021-01-01 RX ADMIN — ATEZOLIZUMAB 1200 MG: 1200 INJECTION, SOLUTION INTRAVENOUS at 15:12

## 2021-01-01 RX ADMIN — LISINOPRIL 20 MG: 20 TABLET ORAL at 08:21

## 2021-01-01 RX ADMIN — TERAZOSIN HYDROCHLORIDE 5 MG: 5 CAPSULE ORAL at 20:26

## 2021-01-01 RX ADMIN — TERAZOSIN HYDROCHLORIDE 5 MG: 5 CAPSULE ORAL at 08:58

## 2021-01-01 RX ADMIN — Medication 10 ML: at 21:43

## 2021-01-01 RX ADMIN — INSULIN ASPART 1 UNITS: 100 INJECTION, SOLUTION INTRAVENOUS; SUBCUTANEOUS at 18:29

## 2021-01-01 RX ADMIN — HEPARIN 5 ML: 100 SYRINGE at 17:13

## 2021-01-01 RX ADMIN — LISINOPRIL 20 MG: 20 TABLET ORAL at 08:24

## 2021-01-01 RX ADMIN — Medication 3 ML: at 20:11

## 2021-01-01 RX ADMIN — CARVEDILOL 3.12 MG: 3.12 TABLET, FILM COATED ORAL at 09:00

## 2021-01-01 RX ADMIN — AMPICILLIN SODIUM AND SULBACTAM SODIUM 3000 MG: 2; 1 INJECTION, POWDER, FOR SOLUTION INTRAMUSCULAR; INTRAVENOUS at 18:04

## 2021-01-01 RX ADMIN — SODIUM CHLORIDE 12000 MG: 9 INJECTION, SOLUTION INTRAVENOUS at 08:38

## 2021-01-01 RX ADMIN — Medication 10 ML: at 11:31

## 2021-01-01 RX ADMIN — SODIUM CHLORIDE 12000 MG: 9 INJECTION, SOLUTION INTRAVENOUS at 08:48

## 2021-01-01 RX ADMIN — INSULIN ASPART 2 UNITS: 100 INJECTION, SOLUTION INTRAVENOUS; SUBCUTANEOUS at 14:30

## 2021-01-01 RX ADMIN — METRONIDAZOLE 500 MG: 500 INJECTION, SOLUTION INTRAVENOUS at 08:04

## 2021-01-01 RX ADMIN — LACTULOSE 10 G: 10 SOLUTION ORAL at 09:04

## 2021-01-01 RX ADMIN — Medication 2 ML: at 09:30

## 2021-01-01 RX ADMIN — Medication 3 ML: at 20:30

## 2021-01-01 RX ADMIN — Medication 10 ML: at 15:46

## 2021-01-01 RX ADMIN — MORPHINE SULFATE 15 MG: 15 TABLET, EXTENDED RELEASE ORAL at 08:58

## 2021-01-01 RX ADMIN — SODIUM CHLORIDE, PRESERVATIVE FREE 5 ML: 5 INJECTION INTRAVENOUS at 09:59

## 2021-01-01 RX ADMIN — AMPICILLIN SODIUM AND SULBACTAM SODIUM 3000 MG: 2; 1 INJECTION, POWDER, FOR SOLUTION INTRAMUSCULAR; INTRAVENOUS at 14:02

## 2021-01-01 RX ADMIN — LISINOPRIL 20 MG: 20 TABLET ORAL at 08:57

## 2021-01-01 RX ADMIN — LISINOPRIL 20 MG: 20 TABLET ORAL at 08:19

## 2021-01-01 RX ADMIN — INSULIN GLARGINE 12 UNITS: 100 INJECTION, SOLUTION SUBCUTANEOUS at 09:15

## 2021-01-01 RX ADMIN — TERAZOSIN HYDROCHLORIDE 5 MG: 5 CAPSULE ORAL at 21:43

## 2021-01-01 RX ADMIN — BUPROPION HYDROCHLORIDE 100 MG: 100 TABLET, FILM COATED, EXTENDED RELEASE ORAL at 08:30

## 2021-01-01 RX ADMIN — METOCLOPRAMIDE HYDROCHLORIDE 10 MG: 5 INJECTION INTRAMUSCULAR; INTRAVENOUS at 06:33

## 2021-01-01 RX ADMIN — LISINOPRIL 20 MG: 20 TABLET ORAL at 18:16

## 2021-01-01 RX ADMIN — LANSOPRAZOLE 30 MG: 30 CAPSULE, DELAYED RELEASE PELLETS ORAL at 08:22

## 2021-01-01 RX ADMIN — HEPARIN 5 ML: 100 SYRINGE at 15:45

## 2021-01-01 RX ADMIN — DOCUSATE SODIUM 50 MG AND SENNOSIDES 8.6 MG 1 TABLET: 8.6; 5 TABLET, FILM COATED ORAL at 21:45

## 2021-01-01 RX ADMIN — TERAZOSIN HYDROCHLORIDE 5 MG: 5 CAPSULE ORAL at 08:21

## 2021-01-01 RX ADMIN — TERAZOSIN HYDROCHLORIDE 5 MG: 5 CAPSULE ORAL at 20:38

## 2021-01-01 RX ADMIN — HEPARIN 5 ML: 100 SYRINGE at 11:31

## 2021-01-01 RX ADMIN — CARVEDILOL 3.12 MG: 3.12 TABLET, FILM COATED ORAL at 17:39

## 2021-01-01 RX ADMIN — ENOXAPARIN SODIUM 40 MG: 100 INJECTION SUBCUTANEOUS at 13:47

## 2021-01-01 RX ADMIN — Medication 10 ML: at 16:49

## 2021-01-01 RX ADMIN — CARVEDILOL 3.12 MG: 3.12 TABLET, FILM COATED ORAL at 08:21

## 2021-01-01 RX ADMIN — SODIUM CHLORIDE 3000 MG: 900 INJECTION INTRAVENOUS at 13:01

## 2021-01-01 RX ADMIN — INSULIN GLARGINE 12 UNITS: 100 INJECTION, SOLUTION SUBCUTANEOUS at 12:10

## 2021-01-01 RX ADMIN — IOPAMIDOL 100 ML: 612 INJECTION, SOLUTION INTRAVENOUS at 14:15

## 2021-01-01 RX ADMIN — HYDROMORPHONE HYDROCHLORIDE 0.4 MG: 1 INJECTION, SOLUTION INTRAMUSCULAR; INTRAVENOUS; SUBCUTANEOUS at 06:07

## 2021-01-01 RX ADMIN — AMPICILLIN SODIUM AND SULBACTAM SODIUM 12000 MG: 2; 1 INJECTION, POWDER, FOR SOLUTION INTRAMUSCULAR; INTRAVENOUS at 08:29

## 2021-01-01 RX ADMIN — SODIUM CHLORIDE 12000 MG: 9 INJECTION, SOLUTION INTRAVENOUS at 08:45

## 2021-01-01 RX ADMIN — LANSOPRAZOLE 30 MG: 30 CAPSULE, DELAYED RELEASE PELLETS ORAL at 08:37

## 2021-01-01 RX ADMIN — AMPICILLIN SODIUM AND SULBACTAM SODIUM 3000 MG: 2; 1 INJECTION, POWDER, FOR SOLUTION INTRAMUSCULAR; INTRAVENOUS at 01:09

## 2021-01-01 RX ADMIN — AMPICILLIN SODIUM AND SULBACTAM SODIUM 12000 MG: 2; 1 INJECTION, POWDER, FOR SOLUTION INTRAMUSCULAR; INTRAVENOUS at 08:37

## 2021-01-01 RX ADMIN — PROPOFOL 200 MCG/KG/MIN: 10 INJECTION, EMULSION INTRAVENOUS at 08:08

## 2021-01-01 RX ADMIN — SODIUM CHLORIDE 50 ML/HR: 9 INJECTION, SOLUTION INTRAVENOUS at 15:11

## 2021-01-01 RX ADMIN — CARBOXYMETHYLCELLULOSE SODIUM 2 DROP: 0.5 SOLUTION/ DROPS OPHTHALMIC at 08:58

## 2021-01-01 RX ADMIN — SODIUM CHLORIDE, PRESERVATIVE FREE 5 ML: 5 INJECTION INTRAVENOUS at 09:45

## 2021-01-01 RX ADMIN — LORATADINE 10 MG: 10 TABLET ORAL at 08:30

## 2021-01-01 RX ADMIN — CARVEDILOL 3.12 MG: 3.12 TABLET, FILM COATED ORAL at 08:58

## 2021-01-01 RX ADMIN — ATEZOLIZUMAB 1200 MG: 1200 INJECTION, SOLUTION INTRAVENOUS at 15:05

## 2021-01-01 RX ADMIN — TERAZOSIN HYDROCHLORIDE 5 MG: 5 CAPSULE ORAL at 20:13

## 2021-01-01 RX ADMIN — FLUTICASONE PROPIONATE 2 SPRAY: 50 SPRAY, METERED NASAL at 08:21

## 2021-01-01 RX ADMIN — INSULIN ASPART 2 UNITS: 100 INJECTION, SOLUTION INTRAVENOUS; SUBCUTANEOUS at 13:17

## 2021-01-01 RX ADMIN — INSULIN GLARGINE 12 UNITS: 100 INJECTION, SOLUTION SUBCUTANEOUS at 08:25

## 2021-01-01 RX ADMIN — MORPHINE SULFATE 15 MG: 15 TABLET, EXTENDED RELEASE ORAL at 08:22

## 2021-01-01 RX ADMIN — CARVEDILOL 3.12 MG: 3.12 TABLET, FILM COATED ORAL at 09:51

## 2021-01-01 RX ADMIN — PROPOFOL 50 MG: 10 INJECTION, EMULSION INTRAVENOUS at 08:08

## 2021-01-01 RX ADMIN — AMPICILLIN SODIUM AND SULBACTAM SODIUM 3000 MG: 2; 1 INJECTION, POWDER, FOR SOLUTION INTRAMUSCULAR; INTRAVENOUS at 02:17

## 2021-01-01 RX ADMIN — TERAZOSIN HYDROCHLORIDE 5 MG: 5 CAPSULE ORAL at 20:10

## 2021-01-01 RX ADMIN — GADOTERIDOL 19 ML: 279.3 INJECTION, SOLUTION INTRAVENOUS at 09:14

## 2021-01-01 RX ADMIN — CIPROFLOXACIN 400 MG: 2 INJECTION, SOLUTION INTRAVENOUS at 22:13

## 2021-01-01 RX ADMIN — Medication 10 ML: at 15:45

## 2021-01-01 RX ADMIN — BUPROPION HYDROCHLORIDE 100 MG: 100 TABLET, FILM COATED, EXTENDED RELEASE ORAL at 09:04

## 2021-01-01 RX ADMIN — FENTANYL CITRATE 25 MCG: 0.05 INJECTION, SOLUTION INTRAMUSCULAR; INTRAVENOUS at 21:38

## 2021-01-01 RX ADMIN — TRAZODONE HYDROCHLORIDE 100 MG: 100 TABLET ORAL at 20:26

## 2021-01-01 RX ADMIN — Medication 3 ML: at 08:53

## 2021-01-01 RX ADMIN — AMPICILLIN SODIUM AND SULBACTAM SODIUM 3000 MG: 2; 1 INJECTION, POWDER, FOR SOLUTION INTRAMUSCULAR; INTRAVENOUS at 06:21

## 2021-01-01 RX ADMIN — TERAZOSIN HYDROCHLORIDE 5 MG: 5 CAPSULE ORAL at 09:04

## 2021-01-01 RX ADMIN — FUROSEMIDE 20 MG: 20 TABLET ORAL at 14:05

## 2021-01-01 RX ADMIN — BEVACIZUMAB-AWWB 1400 MG: 400 INJECTION, SOLUTION INTRAVENOUS at 16:08

## 2021-01-01 RX ADMIN — HYDROMORPHONE HYDROCHLORIDE 0.4 MG: 1 INJECTION, SOLUTION INTRAMUSCULAR; INTRAVENOUS; SUBCUTANEOUS at 00:39

## 2021-01-01 RX ADMIN — MORPHINE SULFATE 15 MG: 15 TABLET, EXTENDED RELEASE ORAL at 08:21

## 2021-01-01 RX ADMIN — SODIUM CHLORIDE 12000 MG: 9 INJECTION, SOLUTION INTRAVENOUS at 08:39

## 2021-01-01 RX ADMIN — Medication 10 ML: at 15:49

## 2021-01-01 RX ADMIN — BUPROPION HYDROCHLORIDE 100 MG: 100 TABLET, FILM COATED, EXTENDED RELEASE ORAL at 09:29

## 2021-01-01 RX ADMIN — MIDAZOLAM HYDROCHLORIDE 1 MG: 1 INJECTION, SOLUTION INTRAMUSCULAR; INTRAVENOUS at 13:54

## 2021-01-01 RX ADMIN — SODIUM PHOSPHATE, MONOBASIC, MONOHYDRATE AND SODIUM PHOSPHATE, DIBASIC, ANHYDROUS 30 MMOL: 276; 142 INJECTION, SOLUTION INTRAVENOUS at 05:27

## 2021-01-01 RX ADMIN — APIXABAN 5 MG: 5 TABLET, FILM COATED ORAL at 09:29

## 2021-01-01 RX ADMIN — SODIUM CHLORIDE 50 ML: 9 INJECTION, SOLUTION INTRAVENOUS at 13:46

## 2021-01-01 RX ADMIN — BISACODYL 10 MG: 10 SUPPOSITORY RECTAL at 19:17

## 2021-01-01 RX ADMIN — INSULIN ASPART 3 UNITS: 100 INJECTION, SOLUTION INTRAVENOUS; SUBCUTANEOUS at 09:11

## 2021-01-01 RX ADMIN — CARVEDILOL 3.12 MG: 3.12 TABLET, FILM COATED ORAL at 08:31

## 2021-01-01 RX ADMIN — FENTANYL CITRATE 25 MCG: 0.05 INJECTION, SOLUTION INTRAMUSCULAR; INTRAVENOUS at 18:32

## 2021-01-01 RX ADMIN — HEPARIN 5 ML: 100 SYRINGE at 14:05

## 2021-01-01 RX ADMIN — FENTANYL CITRATE 50 MCG: 50 INJECTION, SOLUTION INTRAMUSCULAR; INTRAVENOUS at 13:49

## 2021-01-01 RX ADMIN — METRONIDAZOLE 500 MG: 500 INJECTION, SOLUTION INTRAVENOUS at 02:25

## 2021-01-01 RX ADMIN — AMPICILLIN SODIUM AND SULBACTAM SODIUM 3000 MG: 2; 1 INJECTION, POWDER, FOR SOLUTION INTRAMUSCULAR; INTRAVENOUS at 13:19

## 2021-01-01 RX ADMIN — FLUTICASONE PROPIONATE 2 SPRAY: 50 SPRAY, METERED NASAL at 08:58

## 2021-01-01 RX ADMIN — AMPICILLIN SODIUM AND SULBACTAM SODIUM 3000 MG: 2; 1 INJECTION, POWDER, FOR SOLUTION INTRAMUSCULAR; INTRAVENOUS at 09:13

## 2021-01-01 RX ADMIN — IOPAMIDOL 130 ML: 755 INJECTION, SOLUTION INTRAVENOUS at 07:30

## 2021-01-01 RX ADMIN — SODIUM CHLORIDE 25 ML: 9 INJECTION, SOLUTION INTRAVENOUS at 01:10

## 2021-01-01 RX ADMIN — CARVEDILOL 3.12 MG: 3.12 TABLET, FILM COATED ORAL at 18:08

## 2021-01-01 RX ADMIN — HEPARIN 5 ML: 100 SYRINGE at 16:49

## 2021-01-01 RX ADMIN — AMPICILLIN SODIUM AND SULBACTAM SODIUM 3000 MG: 2; 1 INJECTION, POWDER, FOR SOLUTION INTRAMUSCULAR; INTRAVENOUS at 13:38

## 2021-01-01 RX ADMIN — AMPICILLIN SODIUM AND SULBACTAM SODIUM 3000 MG: 2; 1 INJECTION, POWDER, FOR SOLUTION INTRAMUSCULAR; INTRAVENOUS at 19:08

## 2021-01-01 RX ADMIN — BEVACIZUMAB-AWWB 1300 MG: 400 INJECTION, SOLUTION INTRAVENOUS at 15:22

## 2021-01-01 RX ADMIN — METRONIDAZOLE 500 MG: 500 INJECTION, SOLUTION INTRAVENOUS at 16:11

## 2021-01-01 RX ADMIN — FUROSEMIDE 20 MG: 20 TABLET ORAL at 14:44

## 2021-01-01 RX ADMIN — AMPICILLIN SODIUM AND SULBACTAM SODIUM 3000 MG: 2; 1 INJECTION, POWDER, FOR SOLUTION INTRAMUSCULAR; INTRAVENOUS at 01:01

## 2021-01-01 RX ADMIN — Medication 3 ML: at 20:14

## 2021-01-01 RX ADMIN — TERAZOSIN HYDROCHLORIDE 5 MG: 5 CAPSULE ORAL at 21:25

## 2021-01-01 RX ADMIN — Medication 10 ML: at 09:30

## 2021-01-01 RX ADMIN — HYDROMORPHONE HYDROCHLORIDE 0.4 MG: 1 INJECTION, SOLUTION INTRAMUSCULAR; INTRAVENOUS; SUBCUTANEOUS at 00:59

## 2021-01-01 RX ADMIN — ATEZOLIZUMAB 1200 MG: 1200 INJECTION, SOLUTION INTRAVENOUS at 16:42

## 2021-01-01 RX ADMIN — IOPAMIDOL 50 ML: 612 INJECTION, SOLUTION INTRAVENOUS at 13:30

## 2021-01-01 RX ADMIN — MORPHINE SULFATE 15 MG: 15 TABLET, EXTENDED RELEASE ORAL at 09:42

## 2021-01-01 RX ADMIN — FENTANYL CITRATE 50 MCG: 0.05 INJECTION, SOLUTION INTRAMUSCULAR; INTRAVENOUS at 16:31

## 2021-01-01 RX ADMIN — AMPICILLIN SODIUM AND SULBACTAM SODIUM 3000 MG: 2; 1 INJECTION, POWDER, FOR SOLUTION INTRAMUSCULAR; INTRAVENOUS at 01:04

## 2021-01-01 RX ADMIN — TERAZOSIN HYDROCHLORIDE 5 MG: 5 CAPSULE ORAL at 09:37

## 2021-01-01 RX ADMIN — Medication 2 ML: at 09:44

## 2021-01-01 RX ADMIN — INSULIN ASPART 1 UNITS: 100 INJECTION, SOLUTION INTRAVENOUS; SUBCUTANEOUS at 22:01

## 2021-01-01 RX ADMIN — HYDROMORPHONE HYDROCHLORIDE 2 MG: 2 TABLET ORAL at 12:20

## 2021-01-01 RX ADMIN — ENOXAPARIN SODIUM 40 MG: 40 INJECTION SUBCUTANEOUS at 16:12

## 2021-01-01 RX ADMIN — Medication 2 ML: at 08:22

## 2021-01-01 RX ADMIN — CARVEDILOL 3.12 MG: 3.12 TABLET, FILM COATED ORAL at 08:24

## 2021-01-01 RX ADMIN — LANSOPRAZOLE 30 MG: 30 CAPSULE, DELAYED RELEASE ORAL at 08:24

## 2021-01-01 RX ADMIN — MAGNESIUM SULFATE HEPTAHYDRATE 4 G: 40 INJECTION, SOLUTION INTRAVENOUS at 05:24

## 2021-01-01 RX ADMIN — HEPARIN 5 ML: 100 SYRINGE at 15:47

## 2021-01-01 RX ADMIN — INSULIN ASPART 1 UNITS: 100 INJECTION, SOLUTION INTRAVENOUS; SUBCUTANEOUS at 22:04

## 2021-01-01 RX ADMIN — AMPICILLIN SODIUM AND SULBACTAM SODIUM 3000 MG: 2; 1 INJECTION, POWDER, FOR SOLUTION INTRAMUSCULAR; INTRAVENOUS at 06:53

## 2021-01-01 RX ADMIN — APIXABAN 5 MG: 5 TABLET, FILM COATED ORAL at 08:58

## 2021-01-01 RX ADMIN — ENOXAPARIN SODIUM 40 MG: 100 INJECTION SUBCUTANEOUS at 13:20

## 2021-01-01 RX ADMIN — INSULIN ASPART 2 UNITS: 100 INJECTION, SOLUTION INTRAVENOUS; SUBCUTANEOUS at 12:46

## 2021-01-01 RX ADMIN — INSULIN ASPART 1 UNITS: 100 INJECTION, SOLUTION INTRAVENOUS; SUBCUTANEOUS at 13:17

## 2021-01-01 RX ADMIN — SODIUM CHLORIDE 50 ML/HR: 9 INJECTION, SOLUTION INTRAVENOUS at 16:06

## 2021-01-01 RX ADMIN — SODIUM CHLORIDE 25 ML: 9 INJECTION, SOLUTION INTRAVENOUS at 19:07

## 2021-01-01 RX ADMIN — AMPICILLIN SODIUM AND SULBACTAM SODIUM 3000 MG: 2; 1 INJECTION, POWDER, FOR SOLUTION INTRAMUSCULAR; INTRAVENOUS at 16:04

## 2021-01-01 RX ADMIN — POLYETHYLENE GLYCOL 3350 17 G: 17 POWDER, FOR SOLUTION ORAL at 09:04

## 2021-01-01 RX ADMIN — SODIUM CHLORIDE 50 ML/HR: 9 INJECTION, SOLUTION INTRAVENOUS at 10:55

## 2021-01-01 RX ADMIN — BISACODYL 10 MG: 10 SUPPOSITORY RECTAL at 08:21

## 2021-01-01 RX ADMIN — SODIUM CHLORIDE 50 ML/HR: 9 INJECTION, SOLUTION INTRAVENOUS at 20:08

## 2021-01-01 RX ADMIN — MORPHINE SULFATE 15 MG: 15 TABLET, EXTENDED RELEASE ORAL at 20:26

## 2021-01-01 RX ADMIN — AMPICILLIN SODIUM AND SULBACTAM SODIUM 3000 MG: 2; 1 INJECTION, POWDER, FOR SOLUTION INTRAMUSCULAR; INTRAVENOUS at 19:20

## 2021-01-01 RX ADMIN — AMPICILLIN SODIUM AND SULBACTAM SODIUM 3000 MG: 2; 1 INJECTION, POWDER, FOR SOLUTION INTRAMUSCULAR; INTRAVENOUS at 08:29

## 2021-01-01 RX ADMIN — CIPROFLOXACIN 400 MG: 2 INJECTION, SOLUTION INTRAVENOUS at 10:40

## 2021-01-01 RX ADMIN — LANSOPRAZOLE 30 MG: 30 CAPSULE, DELAYED RELEASE PELLETS ORAL at 09:37

## 2021-01-01 RX ADMIN — INSULIN ASPART 2 UNITS: 100 INJECTION, SOLUTION INTRAVENOUS; SUBCUTANEOUS at 09:46

## 2021-01-01 RX ADMIN — INSULIN ASPART 4 UNITS: 100 INJECTION, SOLUTION INTRAVENOUS; SUBCUTANEOUS at 19:15

## 2021-01-01 RX ADMIN — SODIUM CHLORIDE 12000 MG: 9 INJECTION, SOLUTION INTRAVENOUS at 08:46

## 2021-01-01 RX ADMIN — HYDROMORPHONE HYDROCHLORIDE 1 MG: 1 INJECTION, SOLUTION INTRAMUSCULAR; INTRAVENOUS; SUBCUTANEOUS at 18:03

## 2021-01-01 RX ADMIN — POTASSIUM BICARBONATE 40 MEQ: 782 TABLET, EFFERVESCENT ORAL at 08:30

## 2021-01-01 RX ADMIN — SODIUM CHLORIDE 12000 MG: 9 INJECTION, SOLUTION INTRAVENOUS at 08:32

## 2021-01-01 RX ADMIN — BISACODYL 10 MG: 10 SUPPOSITORY RECTAL at 14:26

## 2021-01-01 RX ADMIN — Medication 1 SPRAY: at 13:29

## 2021-01-01 RX ADMIN — HYDROMORPHONE HYDROCHLORIDE 0.4 MG: 1 INJECTION, SOLUTION INTRAMUSCULAR; INTRAVENOUS; SUBCUTANEOUS at 19:16

## 2021-01-01 RX ADMIN — ALUMINUM HYDROXIDE, MAGNESIUM HYDROXIDE, AND SIMETHICONE 30 ML: 200; 200; 20 SUSPENSION ORAL at 21:10

## 2021-01-01 RX ADMIN — FLUTICASONE PROPIONATE 2 SPRAY: 50 SPRAY, METERED NASAL at 09:32

## 2021-01-01 RX ADMIN — TERAZOSIN HYDROCHLORIDE 5 MG: 5 CAPSULE ORAL at 08:22

## 2021-01-01 RX ADMIN — Medication 3 ML: at 08:20

## 2021-01-01 RX ADMIN — AMPICILLIN SODIUM AND SULBACTAM SODIUM 12000 MG: 2; 1 INJECTION, POWDER, FOR SOLUTION INTRAMUSCULAR; INTRAVENOUS at 08:38

## 2021-01-01 RX ADMIN — BEVACIZUMAB-AWWB 1300 MG: 400 INJECTION, SOLUTION INTRAVENOUS at 14:36

## 2021-01-01 RX ADMIN — SODIUM CHLORIDE 25 ML: 9 INJECTION, SOLUTION INTRAVENOUS at 01:00

## 2021-01-01 RX ADMIN — LORATADINE 10 MG: 10 TABLET ORAL at 18:14

## 2021-01-01 RX ADMIN — MELATONIN TAB 3 MG 10.5 MG: 3 TAB at 21:42

## 2021-01-01 RX ADMIN — TERAZOSIN HYDROCHLORIDE 5 MG: 5 CAPSULE ORAL at 20:40

## 2021-01-01 RX ADMIN — ATEZOLIZUMAB 1200 MG: 1200 INJECTION, SOLUTION INTRAVENOUS at 14:35

## 2021-01-01 RX ADMIN — TRAZODONE HYDROCHLORIDE 100 MG: 100 TABLET ORAL at 20:09

## 2021-01-01 RX ADMIN — SODIUM CHLORIDE 10 ML/HR: 9 INJECTION, SOLUTION INTRAVENOUS at 10:59

## 2021-01-01 RX ADMIN — LORATADINE 10 MG: 10 TABLET ORAL at 09:29

## 2021-01-01 RX ADMIN — METRONIDAZOLE 500 MG: 500 INJECTION, SOLUTION INTRAVENOUS at 10:01

## 2021-01-01 RX ADMIN — MELATONIN TAB 3 MG 10.5 MG: 3 TAB at 21:08

## 2021-01-01 RX ADMIN — DEXMEDETOMIDINE HYDROCHLORIDE 8 MCG: 4 INJECTION, SOLUTION INTRAVENOUS at 08:11

## 2021-01-01 RX ADMIN — HEPARIN 5 ML: 100 SYRINGE at 15:09

## 2021-01-01 RX ADMIN — HYDROMORPHONE HYDROCHLORIDE 0.4 MG: 1 INJECTION, SOLUTION INTRAMUSCULAR; INTRAVENOUS; SUBCUTANEOUS at 12:44

## 2021-01-01 RX ADMIN — LORATADINE 10 MG: 10 TABLET ORAL at 08:22

## 2021-01-01 RX ADMIN — Medication 2 ML: at 08:39

## 2021-01-01 RX ADMIN — SODIUM CHLORIDE 12000 MG: 9 INJECTION, SOLUTION INTRAVENOUS at 08:34

## 2021-01-01 RX ADMIN — DOCUSATE SODIUM 50 MG AND SENNOSIDES 8.6 MG 1 TABLET: 8.6; 5 TABLET, FILM COATED ORAL at 09:04

## 2021-01-01 RX ADMIN — SODIUM CHLORIDE 50 ML: 9 INJECTION, SOLUTION INTRAVENOUS at 10:01

## 2021-01-01 RX ADMIN — FUROSEMIDE 20 MG: 20 TABLET ORAL at 08:57

## 2021-01-01 RX ADMIN — IOPAMIDOL 50 ML: 612 INJECTION, SOLUTION INTRAVENOUS at 09:45

## 2021-01-01 RX ADMIN — LORATADINE 10 MG: 10 TABLET ORAL at 08:58

## 2021-01-01 RX ADMIN — INSULIN ASPART 3 UNITS: 100 INJECTION, SOLUTION INTRAVENOUS; SUBCUTANEOUS at 16:27

## 2021-01-01 RX ADMIN — PROPOFOL 30 MG: 10 INJECTION, EMULSION INTRAVENOUS at 10:02

## 2021-01-01 RX ADMIN — HYDROMORPHONE HYDROCHLORIDE 0.8 MG: 1 INJECTION, SOLUTION INTRAMUSCULAR; INTRAVENOUS; SUBCUTANEOUS at 23:52

## 2021-01-01 RX ADMIN — TRAZODONE HYDROCHLORIDE 100 MG: 100 TABLET ORAL at 20:18

## 2021-01-01 RX ADMIN — IOPAMIDOL 50 ML: 612 INJECTION, SOLUTION INTRAVENOUS at 15:15

## 2021-01-01 RX ADMIN — INSULIN ASPART 1 UNITS: 100 INJECTION, SOLUTION INTRAVENOUS; SUBCUTANEOUS at 08:25

## 2021-01-01 RX ADMIN — SODIUM CHLORIDE 25 ML: 9 INJECTION, SOLUTION INTRAVENOUS at 01:24

## 2021-01-01 RX ADMIN — SODIUM CHLORIDE 100 ML/HR: 9 INJECTION, SOLUTION INTRAVENOUS at 10:46

## 2021-01-01 RX ADMIN — HYDROMORPHONE HYDROCHLORIDE 0.4 MG: 1 INJECTION, SOLUTION INTRAMUSCULAR; INTRAVENOUS; SUBCUTANEOUS at 10:07

## 2021-01-01 RX ADMIN — HYDROMORPHONE HYDROCHLORIDE 4 MG: 2 TABLET ORAL at 21:27

## 2021-01-01 RX ADMIN — CARVEDILOL 3.12 MG: 3.12 TABLET, FILM COATED ORAL at 18:59

## 2021-01-01 RX ADMIN — TRAZODONE HYDROCHLORIDE 100 MG: 100 TABLET ORAL at 20:38

## 2021-01-01 RX ADMIN — HYDROMORPHONE HYDROCHLORIDE 2 MG: 2 TABLET ORAL at 08:38

## 2021-01-01 RX ADMIN — SODIUM CHLORIDE 12000 MG: 9 INJECTION, SOLUTION INTRAVENOUS at 08:35

## 2021-01-01 RX ADMIN — ATEZOLIZUMAB 1200 MG: 1200 INJECTION, SOLUTION INTRAVENOUS at 11:06

## 2021-01-01 RX ADMIN — LISINOPRIL 20 MG: 20 TABLET ORAL at 09:04

## 2021-01-01 RX ADMIN — METRONIDAZOLE 500 MG: 500 INJECTION, SOLUTION INTRAVENOUS at 02:24

## 2021-01-01 RX ADMIN — CARBOXYMETHYLCELLULOSE SODIUM 2 DROP: 0.5 SOLUTION/ DROPS OPHTHALMIC at 15:08

## 2021-01-01 RX ADMIN — SODIUM CHLORIDE 50 ML/HR: 9 INJECTION, SOLUTION INTRAVENOUS at 15:33

## 2021-01-01 RX ADMIN — Medication 3 ML: at 08:59

## 2021-01-01 RX ADMIN — SODIUM CHLORIDE 100 ML/HR: 9 INJECTION, SOLUTION INTRAVENOUS at 02:25

## 2021-01-01 RX ADMIN — IOPAMIDOL 115 ML: 755 INJECTION, SOLUTION INTRAVENOUS at 19:35

## 2021-01-01 RX ADMIN — ENOXAPARIN SODIUM 40 MG: 40 INJECTION SUBCUTANEOUS at 14:01

## 2021-01-01 RX ADMIN — LORATADINE 10 MG: 10 TABLET ORAL at 09:04

## 2021-01-01 RX ADMIN — DOCUSATE SODIUM 50 MG AND SENNOSIDES 8.6 MG 1 TABLET: 8.6; 5 TABLET, FILM COATED ORAL at 20:40

## 2021-01-01 RX ADMIN — ENOXAPARIN SODIUM 40 MG: 40 INJECTION SUBCUTANEOUS at 13:41

## 2021-01-01 RX ADMIN — HYDROMORPHONE HYDROCHLORIDE 0.8 MG: 1 INJECTION, SOLUTION INTRAMUSCULAR; INTRAVENOUS; SUBCUTANEOUS at 18:48

## 2021-01-01 RX ADMIN — FENTANYL CITRATE 50 MCG: 0.05 INJECTION, SOLUTION INTRAMUSCULAR; INTRAVENOUS at 16:17

## 2021-01-01 RX ADMIN — IOPAMIDOL 115 ML: 755 INJECTION, SOLUTION INTRAVENOUS at 15:45

## 2021-01-01 RX ADMIN — CARBOXYMETHYLCELLULOSE SODIUM 2 DROP: 0.5 SOLUTION/ DROPS OPHTHALMIC at 09:32

## 2021-01-01 RX ADMIN — LISINOPRIL 20 MG: 20 TABLET ORAL at 08:37

## 2021-01-01 RX ADMIN — HYDROMORPHONE HYDROCHLORIDE 2 MG: 2 TABLET ORAL at 18:08

## 2021-01-01 RX ADMIN — INSULIN ASPART 2 UNITS: 100 INJECTION, SOLUTION INTRAVENOUS; SUBCUTANEOUS at 10:22

## 2021-01-01 RX ADMIN — MORPHINE SULFATE 15 MG: 15 TABLET, EXTENDED RELEASE ORAL at 02:29

## 2021-01-01 RX ADMIN — INSULIN ASPART 3 UNITS: 100 INJECTION, SOLUTION INTRAVENOUS; SUBCUTANEOUS at 18:57

## 2021-01-01 RX ADMIN — HYDROMORPHONE HYDROCHLORIDE 2 MG: 2 TABLET ORAL at 09:36

## 2021-01-01 RX ADMIN — DAPTOMYCIN 700 MG: 500 INJECTION, POWDER, LYOPHILIZED, FOR SOLUTION INTRAVENOUS at 18:09

## 2021-01-01 RX ADMIN — Medication 100 MCG: at 10:46

## 2021-01-01 RX ADMIN — INSULIN ASPART 1 UNITS: 100 INJECTION, SOLUTION INTRAVENOUS; SUBCUTANEOUS at 10:23

## 2021-01-01 RX ADMIN — DEXMEDETOMIDINE HYDROCHLORIDE 12 MCG: 4 INJECTION, SOLUTION INTRAVENOUS at 08:08

## 2021-01-01 RX ADMIN — CARVEDILOL 3.12 MG: 3.12 TABLET, FILM COATED ORAL at 09:29

## 2021-01-01 RX ADMIN — TRAZODONE HYDROCHLORIDE 100 MG: 100 TABLET ORAL at 21:25

## 2021-01-01 RX ADMIN — LANSOPRAZOLE 30 MG: 30 CAPSULE, DELAYED RELEASE ORAL at 08:21

## 2021-01-01 RX ADMIN — CIPROFLOXACIN 400 MG: 2 INJECTION, SOLUTION INTRAVENOUS at 10:38

## 2021-01-01 RX ADMIN — MORPHINE SULFATE 15 MG: 15 TABLET, EXTENDED RELEASE ORAL at 22:42

## 2021-01-01 RX ADMIN — HYDROMORPHONE HYDROCHLORIDE 2 MG: 2 TABLET ORAL at 14:43

## 2021-01-01 RX ADMIN — FENTANYL CITRATE 50 MCG: 50 INJECTION, SOLUTION INTRAMUSCULAR; INTRAVENOUS at 10:32

## 2021-01-01 RX ADMIN — AMPICILLIN SODIUM AND SULBACTAM SODIUM 3000 MG: 2; 1 INJECTION, POWDER, FOR SOLUTION INTRAMUSCULAR; INTRAVENOUS at 01:19

## 2021-01-01 RX ADMIN — ATEZOLIZUMAB 1200 MG: 1200 INJECTION, SOLUTION INTRAVENOUS at 15:09

## 2021-01-01 RX ADMIN — MORPHINE SULFATE 15 MG: 15 TABLET, EXTENDED RELEASE ORAL at 09:04

## 2021-01-01 RX ADMIN — LISINOPRIL 20 MG: 20 TABLET ORAL at 09:50

## 2021-01-01 RX ADMIN — INSULIN GLARGINE 12 UNITS: 100 INJECTION, SOLUTION SUBCUTANEOUS at 09:14

## 2021-01-01 RX ADMIN — FENTANYL CITRATE 50 MCG: 50 INJECTION INTRAMUSCULAR; INTRAVENOUS at 12:48

## 2021-01-01 RX ADMIN — METRONIDAZOLE 500 MG: 500 INJECTION, SOLUTION INTRAVENOUS at 14:34

## 2021-01-01 RX ADMIN — IOPAMIDOL 50 ML: 612 INJECTION, SOLUTION INTRAVENOUS at 09:15

## 2021-01-01 RX ADMIN — IOPAMIDOL 105 ML: 755 INJECTION, SOLUTION INTRAVENOUS at 08:10

## 2021-01-01 RX ADMIN — SODIUM CHLORIDE 25 ML: 9 INJECTION, SOLUTION INTRAVENOUS at 01:19

## 2021-01-01 RX ADMIN — CARVEDILOL 3.12 MG: 3.12 TABLET, FILM COATED ORAL at 09:04

## 2021-01-01 RX ADMIN — LANSOPRAZOLE 30 MG: 30 CAPSULE, DELAYED RELEASE ORAL at 08:31

## 2021-01-01 RX ADMIN — LISINOPRIL 20 MG: 20 TABLET ORAL at 09:29

## 2021-01-01 RX ADMIN — CIPROFLOXACIN 400 MG: 2 INJECTION, SOLUTION INTRAVENOUS at 11:00

## 2021-01-01 RX ADMIN — ATEZOLIZUMAB 1200 MG: 1200 INJECTION, SOLUTION INTRAVENOUS at 16:07

## 2021-01-01 RX ADMIN — AMPICILLIN SODIUM AND SULBACTAM SODIUM 3000 MG: 2; 1 INJECTION, POWDER, FOR SOLUTION INTRAMUSCULAR; INTRAVENOUS at 18:06

## 2021-01-01 RX ADMIN — SODIUM CHLORIDE 50 ML: 9 INJECTION, SOLUTION INTRAVENOUS at 16:09

## 2021-01-01 RX ADMIN — AMPICILLIN SODIUM AND SULBACTAM SODIUM 3000 MG: 2; 1 INJECTION, POWDER, FOR SOLUTION INTRAMUSCULAR; INTRAVENOUS at 20:09

## 2021-01-01 RX ADMIN — AMPICILLIN SODIUM AND SULBACTAM SODIUM 3000 MG: 2; 1 INJECTION, POWDER, FOR SOLUTION INTRAMUSCULAR; INTRAVENOUS at 02:09

## 2021-01-01 RX ADMIN — TERAZOSIN HYDROCHLORIDE 5 MG: 5 CAPSULE ORAL at 09:29

## 2021-01-01 RX ADMIN — HYDROMORPHONE HYDROCHLORIDE 0.4 MG: 1 INJECTION, SOLUTION INTRAMUSCULAR; INTRAVENOUS; SUBCUTANEOUS at 13:44

## 2021-01-01 RX ADMIN — SODIUM CHLORIDE 12000 MG: 9 INJECTION, SOLUTION INTRAVENOUS at 08:57

## 2021-01-01 RX ADMIN — ENOXAPARIN SODIUM 40 MG: 40 INJECTION SUBCUTANEOUS at 16:04

## 2021-01-01 RX ADMIN — POLYETHYLENE GLYCOL 3350 17 G: 17 POWDER, FOR SOLUTION ORAL at 18:14

## 2021-01-01 RX ADMIN — METOCLOPRAMIDE HYDROCHLORIDE 10 MG: 5 INJECTION INTRAMUSCULAR; INTRAVENOUS at 16:02

## 2021-01-01 RX ADMIN — IOPAMIDOL 50 ML: 612 INJECTION, SOLUTION INTRAVENOUS at 09:35

## 2021-01-01 RX ADMIN — AMPICILLIN SODIUM AND SULBACTAM SODIUM 3000 MG: 2; 1 INJECTION, POWDER, FOR SOLUTION INTRAMUSCULAR; INTRAVENOUS at 06:19

## 2021-01-01 RX ADMIN — FENTANYL CITRATE 50 MCG: 50 INJECTION, SOLUTION INTRAMUSCULAR; INTRAVENOUS at 13:54

## 2021-01-01 RX ADMIN — SODIUM CHLORIDE 25 ML: 9 INJECTION, SOLUTION INTRAVENOUS at 06:24

## 2021-01-01 RX ADMIN — Medication 10 ML: at 11:37

## 2021-01-01 RX ADMIN — AMPICILLIN SODIUM AND SULBACTAM SODIUM 3000 MG: 2; 1 INJECTION, POWDER, FOR SOLUTION INTRAMUSCULAR; INTRAVENOUS at 02:24

## 2021-01-01 RX ADMIN — IOPAMIDOL 50 ML: 612 INJECTION, SOLUTION INTRAVENOUS at 15:30

## 2021-01-01 RX ADMIN — SODIUM CHLORIDE, PRESERVATIVE FREE 5 ML: 5 INJECTION INTRAVENOUS at 10:02

## 2021-01-01 RX ADMIN — AMPICILLIN SODIUM AND SULBACTAM SODIUM 3000 MG: 2; 1 INJECTION, POWDER, FOR SOLUTION INTRAMUSCULAR; INTRAVENOUS at 01:26

## 2021-01-01 RX ADMIN — HYDROMORPHONE HYDROCHLORIDE 0.4 MG: 1 INJECTION, SOLUTION INTRAMUSCULAR; INTRAVENOUS; SUBCUTANEOUS at 04:10

## 2021-01-01 RX ADMIN — ENOXAPARIN SODIUM 40 MG: 40 INJECTION SUBCUTANEOUS at 14:17

## 2021-01-01 RX ADMIN — FUROSEMIDE 20 MG: 20 TABLET ORAL at 09:29

## 2021-01-01 RX ADMIN — SODIUM CHLORIDE 50 ML/HR: 9 INJECTION, SOLUTION INTRAVENOUS at 20:12

## 2021-01-01 RX ADMIN — MORPHINE SULFATE 15 MG: 15 TABLET, EXTENDED RELEASE ORAL at 09:28

## 2021-01-01 RX ADMIN — AMPICILLIN SODIUM AND SULBACTAM SODIUM 3000 MG: 2; 1 INJECTION, POWDER, FOR SOLUTION INTRAMUSCULAR; INTRAVENOUS at 07:13

## 2021-01-01 RX ADMIN — AMPICILLIN SODIUM AND SULBACTAM SODIUM 3000 MG: 2; 1 INJECTION, POWDER, FOR SOLUTION INTRAMUSCULAR; INTRAVENOUS at 14:04

## 2021-01-01 RX ADMIN — HEPARIN 5 ML: 100 SYRINGE at 16:40

## 2021-01-01 RX ADMIN — MORPHINE SULFATE 15 MG: 15 TABLET, EXTENDED RELEASE ORAL at 08:31

## 2021-01-01 RX ADMIN — IOPAMIDOL 50 ML: 612 INJECTION, SOLUTION INTRAVENOUS at 11:15

## 2021-01-01 RX ADMIN — Medication 10 ML: at 15:09

## 2021-01-01 RX ADMIN — FUROSEMIDE 20 MG: 20 TABLET ORAL at 14:23

## 2021-01-01 RX ADMIN — Medication 25 MG: at 10:01

## 2021-01-01 RX ADMIN — METRONIDAZOLE 500 MG: 500 INJECTION, SOLUTION INTRAVENOUS at 20:30

## 2021-01-01 RX ADMIN — SODIUM CHLORIDE 12000 MG: 9 INJECTION, SOLUTION INTRAVENOUS at 08:33

## 2021-01-01 RX ADMIN — POLYETHYLENE GLYCOL 3350 17 G: 17 POWDER, FOR SOLUTION ORAL at 09:28

## 2021-01-01 RX ADMIN — MORPHINE SULFATE 15 MG: 15 TABLET, EXTENDED RELEASE ORAL at 20:13

## 2021-01-01 RX ADMIN — Medication 10 ML: at 08:59

## 2021-01-01 RX ADMIN — Medication 5 ML: at 15:25

## 2021-01-01 RX ADMIN — FENTANYL CITRATE 50 MCG: 50 INJECTION, SOLUTION INTRAMUSCULAR; INTRAVENOUS at 10:29

## 2021-01-01 RX ADMIN — ENOXAPARIN SODIUM 40 MG: 40 INJECTION SUBCUTANEOUS at 14:23

## 2021-01-01 RX ADMIN — MORPHINE SULFATE 15 MG: 15 TABLET, EXTENDED RELEASE ORAL at 20:10

## 2021-01-01 RX ADMIN — HYDROMORPHONE HYDROCHLORIDE 0.4 MG: 1 INJECTION, SOLUTION INTRAMUSCULAR; INTRAVENOUS; SUBCUTANEOUS at 11:07

## 2021-01-01 RX ADMIN — SODIUM CHLORIDE 12000 MG: 9 INJECTION, SOLUTION INTRAVENOUS at 08:44

## 2021-01-01 RX ADMIN — FLUTICASONE PROPIONATE 2 SPRAY: 50 SPRAY, METERED NASAL at 09:05

## 2021-01-01 RX ADMIN — GADOTERIDOL 18 ML: 279.3 INJECTION, SOLUTION INTRAVENOUS at 09:07

## 2021-01-01 RX ADMIN — SODIUM CHLORIDE 25 ML: 9 INJECTION, SOLUTION INTRAVENOUS at 07:03

## 2021-01-01 RX ADMIN — MORPHINE SULFATE 15 MG: 15 TABLET, EXTENDED RELEASE ORAL at 20:09

## 2021-01-01 RX ADMIN — MORPHINE SULFATE 15 MG: 15 TABLET, EXTENDED RELEASE ORAL at 09:00

## 2021-01-01 RX ADMIN — TRAZODONE HYDROCHLORIDE 100 MG: 100 TABLET ORAL at 20:13

## 2021-01-01 RX ADMIN — TRAZODONE HYDROCHLORIDE 100 MG: 100 TABLET ORAL at 20:40

## 2021-01-01 RX ADMIN — AMPICILLIN SODIUM AND SULBACTAM SODIUM 3000 MG: 2; 1 INJECTION, POWDER, FOR SOLUTION INTRAMUSCULAR; INTRAVENOUS at 07:03

## 2021-01-01 RX ADMIN — METRONIDAZOLE 500 MG: 500 INJECTION, SOLUTION INTRAVENOUS at 20:20

## 2021-01-01 RX ADMIN — AMPICILLIN SODIUM AND SULBACTAM SODIUM 3000 MG: 2; 1 INJECTION, POWDER, FOR SOLUTION INTRAMUSCULAR; INTRAVENOUS at 18:19

## 2021-01-01 RX ADMIN — FUROSEMIDE 20 MG: 20 TABLET ORAL at 09:04

## 2021-01-01 RX ADMIN — ATEZOLIZUMAB 1200 MG: 1200 INJECTION, SOLUTION INTRAVENOUS at 15:17

## 2021-01-01 RX ADMIN — LISINOPRIL 20 MG: 20 TABLET ORAL at 09:41

## 2021-01-01 RX ADMIN — MORPHINE SULFATE 15 MG: 15 TABLET, EXTENDED RELEASE ORAL at 08:37

## 2021-01-01 RX ADMIN — AMPICILLIN SODIUM AND SULBACTAM SODIUM 3000 MG: 2; 1 INJECTION, POWDER, FOR SOLUTION INTRAMUSCULAR; INTRAVENOUS at 19:51

## 2021-01-01 RX ADMIN — PROPOFOL 30 MG: 10 INJECTION, EMULSION INTRAVENOUS at 10:51

## 2021-01-01 RX ADMIN — CARBOXYMETHYLCELLULOSE SODIUM 2 DROP: 0.5 SOLUTION/ DROPS OPHTHALMIC at 08:23

## 2021-01-01 RX ADMIN — SODIUM CHLORIDE 50 ML/HR: 9 INJECTION, SOLUTION INTRAVENOUS at 10:54

## 2021-01-01 RX ADMIN — CARVEDILOL 3.12 MG: 3.12 TABLET, FILM COATED ORAL at 18:16

## 2021-01-01 RX ADMIN — FLUTICASONE PROPIONATE 2 SPRAY: 50 SPRAY, METERED NASAL at 08:38

## 2021-01-01 RX ADMIN — TERAZOSIN HYDROCHLORIDE 5 MG: 5 CAPSULE ORAL at 18:13

## 2021-01-01 RX ADMIN — HEPARIN 5 ML: 100 SYRINGE at 15:49

## 2021-01-01 RX ADMIN — SODIUM CHLORIDE 12000 MG: 9 INJECTION, SOLUTION INTRAVENOUS at 08:36

## 2021-01-01 RX ADMIN — ACETAMINOPHEN 650 MG: 325 TABLET ORAL at 08:58

## 2021-01-01 RX ADMIN — DOCUSATE SODIUM 50 MG AND SENNOSIDES 8.6 MG 1 TABLET: 8.6; 5 TABLET, FILM COATED ORAL at 08:21

## 2021-01-01 RX ADMIN — CARVEDILOL 3.12 MG: 3.12 TABLET, FILM COATED ORAL at 17:33

## 2021-01-01 RX ADMIN — Medication 10 ML: at 16:40

## 2021-01-01 RX ADMIN — TERAZOSIN HYDROCHLORIDE 5 MG: 5 CAPSULE ORAL at 09:50

## 2021-01-01 RX ADMIN — SUCCINYLCHOLINE CHLORIDE SOL PREF SYR 200 MG/10ML (20 MG/ML) 120 MG: 200/10 SOLUTION PREFILLED SYRINGE at 09:36

## 2021-01-01 RX ADMIN — HYDROMORPHONE HYDROCHLORIDE 0.8 MG: 1 INJECTION, SOLUTION INTRAMUSCULAR; INTRAVENOUS; SUBCUTANEOUS at 04:48

## 2021-01-01 RX ADMIN — LISINOPRIL 20 MG: 20 TABLET ORAL at 09:00

## 2021-01-01 RX ADMIN — Medication 2 ML: at 09:00

## 2021-01-01 RX ADMIN — DOCUSATE SODIUM 50 MG AND SENNOSIDES 8.6 MG 1 TABLET: 8.6; 5 TABLET, FILM COATED ORAL at 18:13

## 2021-01-01 RX ADMIN — LANSOPRAZOLE 30 MG: 30 CAPSULE, DELAYED RELEASE ORAL at 09:00

## 2021-01-01 RX ADMIN — Medication 2 ML: at 08:59

## 2021-01-01 RX ADMIN — FENTANYL CITRATE 50 MCG: 50 INJECTION, SOLUTION INTRAMUSCULAR; INTRAVENOUS at 09:36

## 2021-01-01 RX ADMIN — SODIUM CHLORIDE 50 ML/HR: 9 INJECTION, SOLUTION INTRAVENOUS at 13:50

## 2021-01-01 RX ADMIN — GADOTERIDOL 17 ML: 279.3 INJECTION, SOLUTION INTRAVENOUS at 09:48

## 2021-01-01 RX ADMIN — MAGNESIUM SULFATE HEPTAHYDRATE 4 G: 40 INJECTION, SOLUTION INTRAVENOUS at 14:46

## 2021-01-01 RX ADMIN — BEVACIZUMAB-AWWB 1400 MG: 400 INJECTION, SOLUTION INTRAVENOUS at 13:51

## 2021-01-01 RX ADMIN — Medication 2 ML: at 17:54

## 2021-01-01 RX ADMIN — LISINOPRIL 20 MG: 20 TABLET ORAL at 08:31

## 2021-01-01 RX ADMIN — MORPHINE SULFATE 15 MG: 15 TABLET, EXTENDED RELEASE ORAL at 08:24

## 2021-01-01 RX ADMIN — Medication 5 MG: at 09:36

## 2021-01-01 RX ADMIN — Medication 3 ML: at 20:10

## 2021-01-01 RX ADMIN — HEPARIN 5 ML: 100 SYRINGE at 15:46

## 2021-01-01 RX ADMIN — LANSOPRAZOLE 30 MG: 30 CAPSULE, DELAYED RELEASE ORAL at 08:20

## 2021-01-01 RX ADMIN — MORPHINE SULFATE 15 MG: 15 TABLET, EXTENDED RELEASE ORAL at 20:38

## 2021-01-01 RX ADMIN — FENTANYL CITRATE 50 MCG: 50 INJECTION, SOLUTION INTRAMUSCULAR; INTRAVENOUS at 09:51

## 2021-01-01 RX ADMIN — INSULIN ASPART 3 UNITS: 100 INJECTION, SOLUTION INTRAVENOUS; SUBCUTANEOUS at 08:24

## 2021-01-01 RX ADMIN — POLYETHYLENE GLYCOL 3350 17 G: 17 POWDER, FOR SOLUTION ORAL at 08:57

## 2021-01-01 RX ADMIN — BEVACIZUMAB-AWWB 1200 MG: 400 INJECTION, SOLUTION INTRAVENOUS at 15:40

## 2021-01-01 RX ADMIN — Medication 3 ML: at 11:09

## 2021-01-01 RX ADMIN — HYDROMORPHONE HYDROCHLORIDE 0.8 MG: 1 INJECTION, SOLUTION INTRAMUSCULAR; INTRAVENOUS; SUBCUTANEOUS at 12:28

## 2021-01-01 RX ADMIN — SODIUM CHLORIDE 12000 MG: 9 INJECTION, SOLUTION INTRAVENOUS at 07:46

## 2021-01-01 RX ADMIN — DOCUSATE SODIUM 50 MG AND SENNOSIDES 8.6 MG 1 TABLET: 8.6; 5 TABLET, FILM COATED ORAL at 20:09

## 2021-01-01 RX ADMIN — DOCUSATE SODIUM 50 MG AND SENNOSIDES 8.6 MG 1 TABLET: 8.6; 5 TABLET, FILM COATED ORAL at 08:37

## 2021-01-01 RX ADMIN — MIDAZOLAM HYDROCHLORIDE 1 MG: 1 INJECTION, SOLUTION INTRAMUSCULAR; INTRAVENOUS at 16:31

## 2021-01-01 RX ADMIN — ENOXAPARIN SODIUM 40 MG: 100 INJECTION SUBCUTANEOUS at 13:03

## 2021-01-01 RX ADMIN — METRONIDAZOLE 500 MG: 500 INJECTION, SOLUTION INTRAVENOUS at 19:55

## 2021-01-01 RX ADMIN — HYDROMORPHONE HYDROCHLORIDE 0.4 MG: 1 INJECTION, SOLUTION INTRAMUSCULAR; INTRAVENOUS; SUBCUTANEOUS at 06:32

## 2021-01-01 RX ADMIN — MORPHINE SULFATE 15 MG: 15 TABLET, EXTENDED RELEASE ORAL at 09:58

## 2021-01-01 RX ADMIN — HYDROMORPHONE HYDROCHLORIDE 4 MG: 2 TABLET ORAL at 10:53

## 2021-01-01 RX ADMIN — INSULIN ASPART 5 UNITS: 100 INJECTION, SOLUTION INTRAVENOUS; SUBCUTANEOUS at 18:06

## 2021-01-01 RX ADMIN — LIDOCAINE HYDROCHLORIDE 100 MG: 20 INJECTION, SOLUTION EPIDURAL; INFILTRATION; INTRACAUDAL; PERINEURAL at 08:08

## 2021-01-01 RX ADMIN — TRAZODONE HYDROCHLORIDE 100 MG: 100 TABLET ORAL at 21:08

## 2021-01-01 RX ADMIN — ATEZOLIZUMAB 1200 MG: 1200 INJECTION, SOLUTION INTRAVENOUS at 10:58

## 2021-01-01 RX ADMIN — AMPICILLIN SODIUM AND SULBACTAM SODIUM 3000 MG: 2; 1 INJECTION, POWDER, FOR SOLUTION INTRAMUSCULAR; INTRAVENOUS at 20:13

## 2021-01-01 RX ADMIN — Medication 10 ML: at 17:13

## 2021-01-01 RX ADMIN — FUROSEMIDE 20 MG: 20 TABLET ORAL at 08:22

## 2021-01-01 RX ADMIN — FUROSEMIDE 20 MG: 20 TABLET ORAL at 14:34

## 2021-01-01 RX ADMIN — LIDOCAINE HYDROCHLORIDE 60 MG: 20 INJECTION, SOLUTION EPIDURAL; INFILTRATION; INTRACAUDAL; PERINEURAL at 09:36

## 2021-01-01 RX ADMIN — INSULIN ASPART 2 UNITS: 100 INJECTION, SOLUTION INTRAVENOUS; SUBCUTANEOUS at 13:02

## 2021-01-01 RX ADMIN — CARVEDILOL 3.12 MG: 3.12 TABLET, FILM COATED ORAL at 18:13

## 2021-01-01 RX ADMIN — FUROSEMIDE 20 MG: 20 TABLET ORAL at 15:33

## 2021-01-01 RX ADMIN — CARVEDILOL 3.12 MG: 3.12 TABLET, FILM COATED ORAL at 18:02

## 2021-01-01 RX ADMIN — TRAZODONE HYDROCHLORIDE 100 MG: 100 TABLET ORAL at 22:42

## 2021-01-01 RX ADMIN — INSULIN GLARGINE 12 UNITS: 100 INJECTION, SOLUTION SUBCUTANEOUS at 08:30

## 2021-01-01 RX ADMIN — AMPICILLIN SODIUM AND SULBACTAM SODIUM 3000 MG: 2; 1 INJECTION, POWDER, FOR SOLUTION INTRAMUSCULAR; INTRAVENOUS at 01:11

## 2021-01-01 RX ADMIN — AMPICILLIN SODIUM AND SULBACTAM SODIUM 3000 MG: 2; 1 INJECTION, POWDER, FOR SOLUTION INTRAMUSCULAR; INTRAVENOUS at 13:47

## 2021-01-01 RX ADMIN — ONDANSETRON 4 MG: 2 INJECTION INTRAMUSCULAR; INTRAVENOUS at 10:09

## 2021-01-01 RX ADMIN — Medication 5 ML: at 07:15

## 2021-01-01 RX ADMIN — HYDROMORPHONE HYDROCHLORIDE 2 MG: 2 TABLET ORAL at 09:37

## 2021-01-01 RX ADMIN — FENTANYL CITRATE 25 MCG: 0.05 INJECTION, SOLUTION INTRAMUSCULAR; INTRAVENOUS at 18:31

## 2021-01-01 RX ADMIN — SODIUM CHLORIDE 25 ML: 9 INJECTION, SOLUTION INTRAVENOUS at 14:45

## 2021-01-01 RX ADMIN — METOCLOPRAMIDE HYDROCHLORIDE 10 MG: 5 INJECTION INTRAMUSCULAR; INTRAVENOUS at 10:47

## 2021-01-01 RX ADMIN — TERAZOSIN HYDROCHLORIDE 5 MG: 5 CAPSULE ORAL at 08:24

## 2021-01-01 RX ADMIN — DEXAMETHASONE SODIUM PHOSPHATE 4 MG: 4 INJECTION, SOLUTION INTRAMUSCULAR; INTRAVENOUS at 10:09

## 2021-01-01 RX ADMIN — AMPICILLIN SODIUM AND SULBACTAM SODIUM 3000 MG: 2; 1 INJECTION, POWDER, FOR SOLUTION INTRAMUSCULAR; INTRAVENOUS at 13:17

## 2021-01-01 RX ADMIN — MORPHINE SULFATE 15 MG: 15 TABLET, EXTENDED RELEASE ORAL at 08:19

## 2021-01-01 RX ADMIN — SODIUM CHLORIDE 12000 MG: 9 INJECTION, SOLUTION INTRAVENOUS at 08:30

## 2021-01-01 RX ADMIN — BUPROPION HYDROCHLORIDE 100 MG: 100 TABLET, FILM COATED, EXTENDED RELEASE ORAL at 08:57

## 2021-01-01 RX ADMIN — HYDROMORPHONE HYDROCHLORIDE 0.5 MG: 1 INJECTION, SOLUTION INTRAMUSCULAR; INTRAVENOUS; SUBCUTANEOUS at 15:58

## 2021-01-01 RX ADMIN — HEPARIN 5 ML: 100 SYRINGE at 11:37

## 2021-01-01 RX ADMIN — HYDROMORPHONE HYDROCHLORIDE 0.4 MG: 1 INJECTION, SOLUTION INTRAMUSCULAR; INTRAVENOUS; SUBCUTANEOUS at 08:22

## 2021-01-01 RX ADMIN — TRAZODONE HYDROCHLORIDE 100 MG: 100 TABLET ORAL at 02:25

## 2021-01-01 RX ADMIN — SODIUM CHLORIDE 25 ML: 9 INJECTION, SOLUTION INTRAVENOUS at 05:26

## 2021-01-01 RX ADMIN — AMPICILLIN SODIUM AND SULBACTAM SODIUM 3000 MG: 2; 1 INJECTION, POWDER, FOR SOLUTION INTRAMUSCULAR; INTRAVENOUS at 15:05

## 2021-01-01 RX ADMIN — TRAZODONE HYDROCHLORIDE 100 MG: 100 TABLET ORAL at 20:10

## 2021-01-01 RX ADMIN — FENTANYL CITRATE 50 MCG: 50 INJECTION INTRAMUSCULAR; INTRAVENOUS at 10:47

## 2021-01-01 RX ADMIN — FLUTICASONE PROPIONATE 2 SPRAY: 50 SPRAY, METERED NASAL at 09:44

## 2021-01-01 RX ADMIN — SODIUM CHLORIDE, POTASSIUM CHLORIDE, SODIUM LACTATE AND CALCIUM CHLORIDE: 600; 310; 30; 20 INJECTION, SOLUTION INTRAVENOUS at 09:32

## 2021-01-01 RX ADMIN — SODIUM CHLORIDE 50 ML/HR: 9 INJECTION, SOLUTION INTRAVENOUS at 20:04

## 2021-01-01 RX ADMIN — SODIUM CHLORIDE 50 ML/HR: 9 INJECTION, SOLUTION INTRAVENOUS at 14:33

## 2021-01-01 RX ADMIN — SODIUM CHLORIDE 25 ML: 9 INJECTION, SOLUTION INTRAVENOUS at 06:02

## 2021-01-01 RX ADMIN — CARVEDILOL 3.12 MG: 3.12 TABLET, FILM COATED ORAL at 09:42

## 2021-01-01 RX ADMIN — AMPICILLIN SODIUM AND SULBACTAM SODIUM 3000 MG: 2; 1 INJECTION, POWDER, FOR SOLUTION INTRAMUSCULAR; INTRAVENOUS at 06:02

## 2021-01-01 RX ADMIN — SODIUM CHLORIDE, POTASSIUM CHLORIDE, SODIUM LACTATE AND CALCIUM CHLORIDE: 600; 310; 30; 20 INJECTION, SOLUTION INTRAVENOUS at 08:04

## 2021-01-01 RX ADMIN — BUPROPION HYDROCHLORIDE 100 MG: 100 TABLET, FILM COATED, EXTENDED RELEASE ORAL at 08:21

## 2021-01-01 RX ADMIN — SODIUM CHLORIDE 12000 MG: 9 INJECTION, SOLUTION INTRAVENOUS at 16:17

## 2021-01-01 RX ADMIN — MORPHINE SULFATE 15 MG: 15 TABLET, EXTENDED RELEASE ORAL at 21:25

## 2021-01-01 RX ADMIN — POLYETHYLENE GLYCOL 3350 17 G: 17 POWDER, FOR SOLUTION ORAL at 08:21

## 2021-01-01 RX ADMIN — SODIUM CHLORIDE 25 ML: 9 INJECTION, SOLUTION INTRAVENOUS at 06:52

## 2021-01-01 RX ADMIN — DEXMEDETOMIDINE HYDROCHLORIDE 8 MCG: 4 INJECTION, SOLUTION INTRAVENOUS at 10:10

## 2021-01-01 RX ADMIN — SODIUM CHLORIDE 50 ML/HR: 9 INJECTION, SOLUTION INTRAVENOUS at 14:53

## 2021-01-01 RX ADMIN — HYDROMORPHONE HYDROCHLORIDE 0.4 MG: 1 INJECTION, SOLUTION INTRAMUSCULAR; INTRAVENOUS; SUBCUTANEOUS at 20:20

## 2021-01-01 RX ADMIN — MORPHINE SULFATE 15 MG: 15 TABLET, EXTENDED RELEASE ORAL at 20:18

## 2021-01-01 RX ADMIN — TERAZOSIN HYDROCHLORIDE 5 MG: 5 CAPSULE ORAL at 20:18

## 2021-01-01 RX ADMIN — TERAZOSIN HYDROCHLORIDE 5 MG: 5 CAPSULE ORAL at 21:08

## 2021-01-01 RX ADMIN — LORATADINE 10 MG: 10 TABLET ORAL at 09:41

## 2021-01-01 RX ADMIN — IOPAMIDOL 140 ML: 755 INJECTION, SOLUTION INTRAVENOUS at 14:45

## 2021-01-01 RX ADMIN — AMPICILLIN SODIUM AND SULBACTAM SODIUM 3000 MG: 2; 1 INJECTION, POWDER, FOR SOLUTION INTRAMUSCULAR; INTRAVENOUS at 12:20

## 2021-01-01 RX ADMIN — SODIUM CHLORIDE 25 ML: 9 INJECTION, SOLUTION INTRAVENOUS at 19:19

## 2021-01-01 RX ADMIN — APIXABAN 5 MG: 5 TABLET, FILM COATED ORAL at 20:10

## 2021-01-01 RX ADMIN — TERAZOSIN HYDROCHLORIDE 5 MG: 5 CAPSULE ORAL at 20:09

## 2021-01-01 RX ADMIN — LANSOPRAZOLE 30 MG: 30 CAPSULE, DELAYED RELEASE PELLETS ORAL at 09:29

## 2021-01-01 RX ADMIN — PROPOFOL 100 MG: 10 INJECTION, EMULSION INTRAVENOUS at 09:36

## 2021-01-01 RX ADMIN — MORPHINE SULFATE 15 MG: 15 TABLET, EXTENDED RELEASE ORAL at 20:40

## 2021-01-01 RX ADMIN — HYDROMORPHONE HYDROCHLORIDE 0.4 MG: 1 INJECTION, SOLUTION INTRAMUSCULAR; INTRAVENOUS; SUBCUTANEOUS at 17:37

## 2021-01-01 RX ADMIN — INSULIN ASPART 2 UNITS: 100 INJECTION, SOLUTION INTRAVENOUS; SUBCUTANEOUS at 18:00

## 2021-01-01 RX ADMIN — APIXABAN 5 MG: 5 TABLET, FILM COATED ORAL at 08:21

## 2021-01-01 RX ADMIN — DOCUSATE SODIUM 50 MG AND SENNOSIDES 8.6 MG 1 TABLET: 8.6; 5 TABLET, FILM COATED ORAL at 08:57

## 2021-01-01 RX ADMIN — TERAZOSIN HYDROCHLORIDE 5 MG: 5 CAPSULE ORAL at 08:20

## 2021-01-01 RX ADMIN — SODIUM CHLORIDE 100 ML/HR: 9 INJECTION, SOLUTION INTRAVENOUS at 12:15

## 2021-01-01 RX ADMIN — INSULIN ASPART 2 UNITS: 100 INJECTION, SOLUTION INTRAVENOUS; SUBCUTANEOUS at 10:31

## 2021-01-01 RX ADMIN — METRONIDAZOLE 500 MG: 500 INJECTION, SOLUTION INTRAVENOUS at 02:26

## 2021-01-01 RX ADMIN — FUROSEMIDE 20 MG: 20 TABLET ORAL at 08:30

## 2021-01-01 RX ADMIN — INSULIN GLARGINE 12 UNITS: 100 INJECTION, SOLUTION SUBCUTANEOUS at 08:55

## 2021-01-01 RX ADMIN — Medication 3 ML: at 20:28

## 2021-01-01 RX ADMIN — TRAZODONE HYDROCHLORIDE 100 MG: 100 TABLET ORAL at 21:42

## 2021-01-01 RX ADMIN — MIDAZOLAM HYDROCHLORIDE 1 MG: 1 INJECTION, SOLUTION INTRAMUSCULAR; INTRAVENOUS at 13:49

## 2021-01-01 RX ADMIN — DOCUSATE SODIUM 50 MG AND SENNOSIDES 8.6 MG 1 TABLET: 8.6; 5 TABLET, FILM COATED ORAL at 09:29

## 2021-01-01 RX ADMIN — SODIUM CHLORIDE 25 ML: 9 INJECTION, SOLUTION INTRAVENOUS at 05:53

## 2021-01-01 RX ADMIN — MIDAZOLAM HYDROCHLORIDE 1 MG: 1 INJECTION, SOLUTION INTRAMUSCULAR; INTRAVENOUS at 16:17

## 2021-01-01 RX ADMIN — Medication 10 MG: at 10:22

## 2021-01-01 RX ADMIN — SODIUM CHLORIDE 12000 MG: 9 INJECTION, SOLUTION INTRAVENOUS at 08:43

## 2021-01-01 RX ADMIN — CIPROFLOXACIN 400 MG: 2 INJECTION, SOLUTION INTRAVENOUS at 22:07

## 2021-01-01 RX ADMIN — SODIUM CHLORIDE 12000 MG: 9 INJECTION, SOLUTION INTRAVENOUS at 08:40

## 2021-01-01 RX ADMIN — SODIUM CHLORIDE 25 ML: 9 INJECTION, SOLUTION INTRAVENOUS at 01:08

## 2021-01-01 RX ADMIN — MORPHINE SULFATE 15 MG: 15 TABLET, EXTENDED RELEASE ORAL at 21:43

## 2021-01-01 RX ADMIN — AMPICILLIN SODIUM AND SULBACTAM SODIUM 12000 MG: 2; 1 INJECTION, POWDER, FOR SOLUTION INTRAMUSCULAR; INTRAVENOUS at 08:34

## 2021-01-01 RX ADMIN — APIXABAN 5 MG: 5 TABLET, FILM COATED ORAL at 21:43

## 2021-01-01 RX ADMIN — AMPICILLIN SODIUM AND SULBACTAM SODIUM 3000 MG: 2; 1 INJECTION, POWDER, FOR SOLUTION INTRAMUSCULAR; INTRAVENOUS at 13:03

## 2021-01-01 RX ADMIN — Medication 3 ML: at 16:02

## 2021-01-01 RX ADMIN — LANSOPRAZOLE 30 MG: 30 CAPSULE, DELAYED RELEASE PELLETS ORAL at 09:03

## 2021-01-01 RX ADMIN — AMPICILLIN SODIUM AND SULBACTAM SODIUM 3000 MG: 2; 1 INJECTION, POWDER, FOR SOLUTION INTRAMUSCULAR; INTRAVENOUS at 09:05

## 2021-01-01 RX ADMIN — LANSOPRAZOLE 30 MG: 30 CAPSULE, DELAYED RELEASE PELLETS ORAL at 08:58

## 2021-01-01 RX ADMIN — CARVEDILOL 3.12 MG: 3.12 TABLET, FILM COATED ORAL at 08:19

## 2021-01-01 RX ADMIN — TERAZOSIN HYDROCHLORIDE 5 MG: 5 CAPSULE ORAL at 09:00

## 2021-01-01 RX ADMIN — TERAZOSIN HYDROCHLORIDE 5 MG: 5 CAPSULE ORAL at 08:31

## 2021-01-01 ASSESSMENT — ENCOUNTER SYMPTOMS
MYALGIAS: 0
LEG SWELLING: 0
FATIGUE: 1
FREQUENCY: 0
ADENOPATHY: 0
POLYDIPSIA: 0
SCLERAL ICTERUS: 0
AGITATION: 0
SCLERAL ICTERUS: 0
ABDOMINAL DISTENTION: 1
CONFUSION: 0
VOICE CHANGE: 0
HALLUCINATIONS: 0
APPETITE CHANGE: 1
TROUBLE SWALLOWING: 0
VOMITING: 0
APPETITE CHANGE: 0
SHORTNESS OF BREATH: 0
CONSTIPATION: 1
HEADACHES: 0
VOMITING: 0
HEMATURIA: 0
VOMITING: 0
VOICE CHANGE: 0
SEIZURES: 0
HEMATOLOGIC/LYMPHATIC NEGATIVE: 1
UNEXPECTED WEIGHT CHANGE: 0
DIZZINESS: 0
HEMATOLOGIC/LYMPHATIC NEGATIVE: 1
SLEEP DISTURBANCE: 1
MUSCULOSKELETAL NEGATIVE: 1
NAUSEA: 0
NAUSEA: 0
HEMATURIA: 0
HEADACHES: 0
VOMITING: 0
ARTHRALGIAS: 0
VOMITING: 1
SORE THROAT: 0
NECK PAIN: 0
DIZZINESS: 0
ENDOCRINE NEGATIVE: 1
DIAPHORESIS: 0
ARTHRALGIAS: 0
NECK PAIN: 0
ACTIVITY CHANGE: 1
CONSTIPATION: 0
HEMATURIA: 0
DIFFICULTY URINATING: 0
NUMBNESS: 0
DIAPHORESIS: 0
FATIGUE: 1
CHILLS: 0
FEVER: 0
DIFFICULTY URINATING: 0
ABDOMINAL DISTENTION: 1
ARTHRALGIAS: 1
ARTHRALGIAS: 0
ADENOPATHY: 0
BLOOD IN STOOL: 0
COUGH: 1
WOUND: 0
CONSTIPATION: 1
ACTIVITY CHANGE: 0
ACTIVITY CHANGE: 1
NERVOUS/ANXIOUS: 0
POLYDIPSIA: 0
CHEST TIGHTNESS: 0
FATIGUE: 1
HEMOPTYSIS: 0
FREQUENCY: 0
LIGHT-HEADEDNESS: 1
ARTHRALGIAS: 0
EXTREMITY WEAKNESS: 0
PALPITATIONS: 0
NUMBNESS: 0
EXTREMITY WEAKNESS: 0
ABDOMINAL PAIN: 1
DIAPHORESIS: 0
FATIGUE: 1
BRUISES/BLEEDS EASILY: 0
HEMOPTYSIS: 0
NUMBNESS: 0
ABDOMINAL PAIN: 1
PHOTOPHOBIA: 0
SPEECH DIFFICULTY: 0
WEAKNESS: 1
DIZZINESS: 0
FEVER: 0
MYALGIAS: 0
NEUROLOGICAL NEGATIVE: 1
CONFUSION: 1
WOUND: 1
APPETITE CHANGE: 1
WHEEZING: 0
ABDOMINAL PAIN: 1
DIZZINESS: 0
DYSURIA: 0
LIGHT-HEADEDNESS: 0
ACTIVITY CHANGE: 0
EYE PROBLEMS: 0
ABDOMINAL PAIN: 1
CONFUSION: 0
NAUSEA: 1
EXTREMITY WEAKNESS: 0
EYE PROBLEMS: 1
WEAKNESS: 0
FATIGUE: 1
NECK PAIN: 0
SORE THROAT: 0
COUGH: 1
BLOOD IN STOOL: 0
VOMITING: 0
ABDOMINAL PAIN: 1
DIARRHEA: 0
SHORTNESS OF BREATH: 0
EYE PROBLEMS: 1
FATIGUE: 0
CONSTIPATION: 1
ACTIVITY CHANGE: 0
BACK PAIN: 0
FEVER: 0
NERVOUS/ANXIOUS: 0
JOINT SWELLING: 0
DIARRHEA: 0
ABDOMINAL PAIN: 1
ABDOMINAL DISTENTION: 0
UNEXPECTED WEIGHT CHANGE: 0
CHILLS: 0
CONSTIPATION: 0
LIGHT-HEADEDNESS: 0
CHEST TIGHTNESS: 0
DECREASED CONCENTRATION: 0
NERVOUS/ANXIOUS: 0
WEAKNESS: 0
FREQUENCY: 0
FEVER: 0
DIFFICULTY URINATING: 0
MYALGIAS: 1
ABDOMINAL DISTENTION: 1
PALPITATIONS: 0
NERVOUS/ANXIOUS: 0
SHORTNESS OF BREATH: 0
TROUBLE SWALLOWING: 0
EXTREMITY WEAKNESS: 0
SORE THROAT: 0
ABDOMINAL DISTENTION: 0
CHEST TIGHTNESS: 0
SCLERAL ICTERUS: 0
VOMITING: 0
TROUBLE SWALLOWING: 0
ARTHRALGIAS: 0
HALLUCINATIONS: 0
BACK PAIN: 1
DIZZINESS: 0
COUGH: 0
ARTHRALGIAS: 1
NAUSEA: 0
SHORTNESS OF BREATH: 0
MYALGIAS: 0
CHILLS: 0
SHORTNESS OF BREATH: 0
HEMATOLOGIC/LYMPHATIC NEGATIVE: 1
HEMATURIA: 0
DIARRHEA: 0
SEIZURES: 0
CARDIOVASCULAR NEGATIVE: 1
UNEXPECTED WEIGHT CHANGE: 0
APPETITE CHANGE: 1
ARTHRALGIAS: 0
NAUSEA: 0
FEVER: 0
ABDOMINAL DISTENTION: 0
NERVOUS/ANXIOUS: 0
VOMITING: 0
SORE THROAT: 0
TROUBLE SWALLOWING: 0
LIGHT-HEADEDNESS: 0
ROS GI COMMENTS: 2 DRAINS IN PLACE
HEMATURIA: 0
UNEXPECTED WEIGHT CHANGE: 1
PALPITATIONS: 0
FATIGUE: 1
FREQUENCY: 0
SLEEP DISTURBANCE: 1
SORE THROAT: 0
ARTHRALGIAS: 1
BLOOD IN STOOL: 0
CONFUSION: 0
ABDOMINAL DISTENTION: 1
FREQUENCY: 0
HEMOPTYSIS: 0
EYE PROBLEMS: 1
WOUND: 1
SEIZURES: 0
DYSURIA: 0
NAUSEA: 0
SHORTNESS OF BREATH: 0
COUGH: 0
CONFUSION: 0
ABDOMINAL PAIN: 0
PALPITATIONS: 0
NUMBNESS: 0
ARTHRALGIAS: 1
NERVOUS/ANXIOUS: 1
ABDOMINAL DISTENTION: 0
ABDOMINAL DISTENTION: 0
UNEXPECTED WEIGHT CHANGE: 0
CONSTIPATION: 1
PALPITATIONS: 0
SORE THROAT: 0
EYE PROBLEMS: 1
HEMATOLOGIC/LYMPHATIC NEGATIVE: 1
DIARRHEA: 0
ALLERGIC/IMMUNOLOGIC NEGATIVE: 1
VOMITING: 0
SLEEP DISTURBANCE: 0
SPEECH DIFFICULTY: 0
EYE DISCHARGE: 0
WHEEZING: 0
DIARRHEA: 0
FREQUENCY: 0
CHILLS: 0
EYE DISCHARGE: 0
HEADACHES: 0
CONSTIPATION: 1
LEG SWELLING: 1
WOUND: 0
SORE THROAT: 0
WOUND: 0
TROUBLE SWALLOWING: 0
SEIZURES: 0
TREMORS: 0
JOINT SWELLING: 0
ARTHRALGIAS: 1
VOICE CHANGE: 0
APPETITE CHANGE: 1
BLOOD IN STOOL: 0
PALPITATIONS: 0
COLOR CHANGE: 0
TROUBLE SWALLOWING: 0
DIFFICULTY URINATING: 0
ABDOMINAL PAIN: 1
MYALGIAS: 1
BLOOD IN STOOL: 0
LEG SWELLING: 1
BLOOD IN STOOL: 0
FEVER: 0
FATIGUE: 1
SHORTNESS OF BREATH: 0
SPEECH DIFFICULTY: 0
WOUND: 0
FATIGUE: 1
HEMATURIA: 0
FREQUENCY: 0
BLOOD IN STOOL: 0
ENDOCRINE NEGATIVE: 1
DIARRHEA: 0
LIGHT-HEADEDNESS: 0
DIARRHEA: 0
SHORTNESS OF BREATH: 0
PSYCHIATRIC NEGATIVE: 1
MYALGIAS: 0
BRUISES/BLEEDS EASILY: 0
CONSTIPATION: 1
BRUISES/BLEEDS EASILY: 0
NERVOUS/ANXIOUS: 0
NERVOUS/ANXIOUS: 1
TROUBLE SWALLOWING: 0
EYE PROBLEMS: 1
NERVOUS/ANXIOUS: 0
ABDOMINAL PAIN: 1
JOINT SWELLING: 0
CONSTIPATION: 1
TROUBLE SWALLOWING: 0
SHORTNESS OF BREATH: 0
FATIGUE: 0
NUMBNESS: 0
UNEXPECTED WEIGHT CHANGE: 0
NERVOUS/ANXIOUS: 1
CHEST TIGHTNESS: 0
CHILLS: 0
ENDOCRINE NEGATIVE: 1
ABDOMINAL PAIN: 1
COLOR CHANGE: 0
BLOOD IN STOOL: 0
APPETITE CHANGE: 1
VOMITING: 0
MYALGIAS: 0
VOMITING: 0
FEVER: 0
ABDOMINAL DISTENTION: 0
DIFFICULTY URINATING: 0
ABDOMINAL PAIN: 1
VOMITING: 0
EYE PROBLEMS: 0
WEAKNESS: 1
DYSURIA: 0
DIZZINESS: 0
SLEEP DISTURBANCE: 0
CHILLS: 0
ACTIVITY CHANGE: 0
ADENOPATHY: 0
FATIGUE: 1
DEPRESSION: 1
ABDOMINAL PAIN: 1
DIZZINESS: 0
COUGH: 0
ARTHRALGIAS: 1
NECK PAIN: 0
SCLERAL ICTERUS: 0
FLANK PAIN: 0
LEG SWELLING: 1
ENDOCRINE NEGATIVE: 1
WEAKNESS: 1
LIGHT-HEADEDNESS: 0
FEVER: 0
NECK PAIN: 0
DEPRESSION: 1
EYE DISCHARGE: 0
ABDOMINAL DISTENTION: 1
NAUSEA: 0
SEIZURES: 0
JAUNDICE: 1
PHOTOPHOBIA: 0
DYSURIA: 0
ADENOPATHY: 0
ACTIVITY CHANGE: 0
APPETITE CHANGE: 0
NERVOUS/ANXIOUS: 1
NUMBNESS: 0
RESPIRATORY NEGATIVE: 1
CHILLS: 0
DIFFICULTY URINATING: 0
EYE DISCHARGE: 0
RESPIRATORY NEGATIVE: 1
FATIGUE: 1
DIFFICULTY URINATING: 0
WOUND: 0
ABDOMINAL PAIN: 0
BLOOD IN STOOL: 0
SHORTNESS OF BREATH: 0
CHILLS: 0
ALLERGIC/IMMUNOLOGIC NEGATIVE: 1
BRUISES/BLEEDS EASILY: 0
APPETITE CHANGE: 0
SLEEP DISTURBANCE: 0
ABDOMINAL DISTENTION: 0
SPEECH DIFFICULTY: 0
FREQUENCY: 0
WOUND: 0
FATIGUE: 1
ABDOMINAL PAIN: 1
CONFUSION: 0
HEMATURIA: 0
WHEEZING: 0
FATIGUE: 0
NERVOUS/ANXIOUS: 1
WEAKNESS: 0
FREQUENCY: 0
JOINT SWELLING: 0
ABDOMINAL PAIN: 1
MYALGIAS: 0
ABDOMINAL PAIN: 1
DYSURIA: 0
ABDOMINAL DISTENTION: 0
ABDOMINAL DISTENTION: 1
COUGH: 0
FATIGUE: 1
PHOTOPHOBIA: 0
ABDOMINAL PAIN: 1
HEADACHES: 1
TROUBLE SWALLOWING: 0
WOUND: 0
SPEECH DIFFICULTY: 0
APPETITE CHANGE: 0
MYALGIAS: 1
DECREASED CONCENTRATION: 0
WOUND: 0
LIGHT-HEADEDNESS: 0
DIAPHORESIS: 0
CHILLS: 0
MYALGIAS: 0
EYE PROBLEMS: 0
NAUSEA: 0
VOMITING: 0
DECREASED CONCENTRATION: 0
DEPRESSION: 1
BACK PAIN: 1
SLEEP DISTURBANCE: 0
DIFFICULTY URINATING: 0
PHOTOPHOBIA: 0
WHEEZING: 0
MYALGIAS: 0
PALPITATIONS: 0
HEMOPTYSIS: 0
HEMATOLOGIC/LYMPHATIC NEGATIVE: 1
DIAPHORESIS: 0
APPETITE CHANGE: 1
MYALGIAS: 1
BACK PAIN: 1
CONFUSION: 0
VOICE CHANGE: 0
ROS GI COMMENTS: 2 DRAINS IN PLACE
TROUBLE SWALLOWING: 0
SHORTNESS OF BREATH: 0
ENDOCRINE NEGATIVE: 1
DIAPHORESIS: 0
CHEST TIGHTNESS: 0
NERVOUS/ANXIOUS: 0
HALLUCINATIONS: 0
SORE THROAT: 0
NAUSEA: 0
CONSTIPATION: 0
APPETITE CHANGE: 0
WHEEZING: 0
FEVER: 0
ACTIVITY CHANGE: 1
LIGHT-HEADEDNESS: 0
DYSURIA: 0
DYSURIA: 0
COLOR CHANGE: 0
HEADACHES: 0
ACTIVITY CHANGE: 1
ABDOMINAL DISTENTION: 0
AGITATION: 0
FEVER: 0
FEVER: 0
NAUSEA: 0
CONSTIPATION: 0
JAUNDICE: 1
VOICE CHANGE: 0
FATIGUE: 0
SEIZURES: 0
ACTIVITY CHANGE: 1
FATIGUE: 1
DYSURIA: 0
PSYCHIATRIC NEGATIVE: 1
ABDOMINAL PAIN: 1
ACTIVITY CHANGE: 0
SHORTNESS OF BREATH: 0
DIARRHEA: 0
APPETITE CHANGE: 0
VOMITING: 0
DEPRESSION: 1
SPEECH DIFFICULTY: 0
SPEECH DIFFICULTY: 0
FATIGUE: 1
WOUND: 0
SORE THROAT: 0
EXTREMITY WEAKNESS: 0
HEMATURIA: 0
ACTIVITY CHANGE: 0
NAUSEA: 0
ENDOCRINE NEGATIVE: 1
SLEEP DISTURBANCE: 1
HEMATOLOGIC/LYMPHATIC NEGATIVE: 1
POLYDIPSIA: 0
HEADACHES: 0
CONFUSION: 0
DIFFICULTY URINATING: 0
FEVER: 0
SHORTNESS OF BREATH: 0
NAUSEA: 0
UNEXPECTED WEIGHT CHANGE: 0
LIGHT-HEADEDNESS: 0
FLANK PAIN: 0
NERVOUS/ANXIOUS: 1
POLYDIPSIA: 0
SEIZURES: 0
TREMORS: 0
FATIGUE: 1
PALPITATIONS: 0
COUGH: 1
NAUSEA: 1
HEMATOLOGIC/LYMPHATIC NEGATIVE: 1
CONFUSION: 0
DIARRHEA: 0
FEVER: 0
UNEXPECTED WEIGHT CHANGE: 0
HEADACHES: 0
CONSTIPATION: 1
CHILLS: 0
HALLUCINATIONS: 0
MYALGIAS: 0
FLANK PAIN: 0
DECREASED CONCENTRATION: 0
FATIGUE: 1
DIZZINESS: 0
HEADACHES: 0
LIGHT-HEADEDNESS: 0
MYALGIAS: 1
VOMITING: 0
DYSURIA: 0
FEVER: 0
ARTHRALGIAS: 0
NERVOUS/ANXIOUS: 0
ARTHRALGIAS: 0
MUSCULOSKELETAL NEGATIVE: 1
HEMATURIA: 0
APPETITE CHANGE: 1
DIARRHEA: 0
FATIGUE: 0
UNEXPECTED WEIGHT CHANGE: 0
AGITATION: 0
TREMORS: 0
MYALGIAS: 1
BRUISES/BLEEDS EASILY: 0
HEADACHES: 0
FLANK PAIN: 0
MYALGIAS: 0
ENDOCRINE NEGATIVE: 1
NAUSEA: 1
APPETITE CHANGE: 1
MUSCULOSKELETAL NEGATIVE: 1
NERVOUS/ANXIOUS: 1
SEIZURES: 0
ABDOMINAL DISTENTION: 0
CHEST TIGHTNESS: 0
SPEECH DIFFICULTY: 0
NAUSEA: 0
NAUSEA: 0
HEMOPTYSIS: 0
CHILLS: 0
DIARRHEA: 0
FATIGUE: 1
DYSURIA: 0
DECREASED CONCENTRATION: 0
NUMBNESS: 0
EYE PROBLEMS: 1
SCLERAL ICTERUS: 0
VOMITING: 0
EYE PROBLEMS: 0
HEMATOLOGIC/LYMPHATIC NEGATIVE: 1
ENDOCRINE NEGATIVE: 1
CHILLS: 0
ABDOMINAL DISTENTION: 0
UNEXPECTED WEIGHT CHANGE: 0
ABDOMINAL PAIN: 1
CONSTIPATION: 1
NUMBNESS: 0
DIARRHEA: 0
FATIGUE: 0
DEPRESSION: 1
FATIGUE: 1
APPETITE CHANGE: 0
SEIZURES: 0
WEAKNESS: 0
VOMITING: 0
COLOR CHANGE: 0
ABDOMINAL PAIN: 1
LEG SWELLING: 0
ABDOMINAL PAIN: 1
EYE PROBLEMS: 0
SHORTNESS OF BREATH: 0
DYSPHORIC MOOD: 1
COUGH: 0
PALPITATIONS: 0
AGITATION: 0
PALPITATIONS: 0
CHILLS: 0
DIARRHEA: 1
TREMORS: 0
ADENOPATHY: 0
FLANK PAIN: 0
COUGH: 0
APPETITE CHANGE: 0
ABDOMINAL PAIN: 1
SPEECH DIFFICULTY: 0
BLOOD IN STOOL: 0
WHEEZING: 0
CONSTIPATION: 0
CONSTIPATION: 1
DIFFICULTY URINATING: 0
NUMBNESS: 0
ARTHRALGIAS: 0
BACK PAIN: 1
SLEEP DISTURBANCE: 0
COUGH: 1
UNEXPECTED WEIGHT CHANGE: 0
DIZZINESS: 0
APPETITE CHANGE: 1
CONFUSION: 0
ABDOMINAL PAIN: 1
DIZZINESS: 0
ARTHRALGIAS: 0
NAUSEA: 0

## 2021-01-01 ASSESSMENT — PAIN SCALES - GENERAL
PAINLEVEL: 8
PAINLEVEL: 6
PAINLEVEL: 0-NO PAIN
PAINLEVEL: 5
PAINLEVEL: 4
PAINLEVEL: 2

## 2021-01-01 ASSESSMENT — COPD QUESTIONNAIRES
COPD: 1
COPD: 1
CAT_SEVERITY: MODERATE
CAT_SEVERITY: MODERATE

## 2021-01-01 ASSESSMENT — PAIN DESCRIPTION - DESCRIPTORS
DESCRIPTORS: SHOOTING;SHARP
DESCRIPTORS: ACHING;BURNING

## 2021-01-01 ASSESSMENT — ACTIVITIES OF DAILY LIVING (ADL)
PATIENT'S MEMORY ADEQUATE TO SAFELY COMPLETE DAILY ACTIVITIES?: YES
PATIENT'S MEMORY ADEQUATE TO SAFELY COMPLETE DAILY ACTIVITIES?: YES
ASSISTIVE_DEVICE: WHEELCHAIR
ADEQUATE_TO_COMPLETE_ADL: YES
ASSISTIVE_DEVICE: WALKER;EYEGLASSES
ADEQUATE_TO_COMPLETE_ADL: YES

## 2021-01-04 NOTE — PROGRESS NOTES
----- Message from Lizet Bishop RN sent at 12/31/2020  2:24 PM [facility] -----  Regarding: C1D1 follow up call  Diagnosis:  Hepatocellular carcinoma  Chemo Regimen/Name of Medications Administered:  bevacizumab/atezulizumab  Oncologist:  Dr. Milka Reynoso  Anticipated date of call back:    Best time of day to contact:  If the patient is not available are we able to talk with anyone else about how they are feeling?                                           If yes please list who?

## 2021-01-04 NOTE — TELEPHONE ENCOUNTER
"Contacted patient to follow up after initial chemotherapy treatment.  Discussed with the patient any symptoms they have been experiencing related to recent treatment:    Pain: 7/10  Location: To right rib cage \"liver area\" to area of stent in gallbladder  Pain scale:7  Does anything help relieve the pain?  Sitting  Did this condition exist prior to starting chemotherapy?  No  Does the patient have a pain contract? No    Fever:  Denies  Highest recorded fever:    Chills:  Denies    Nausea/Vomiting:  Nausea this weekend  Taking anti-emetics as prescribed?  Yes  Able to eat/drink? Eating less since starting chemo.  Feels full quickly.    Diarrhea: Denies  How many loose stools per day?    Constipation:  States taking stool softener; lactulose, and miralax.      States that he is coughing which causes the soreness, has dry throat.  States cough is nonproductive.  States that he will get his humidifier running and increase the humidity on his CPAP.  Discussed trying lozenges to help with the cough.  Reviewed with Chrystal Goodman CNP.  Recommendations to try lozenges and advice above.  COVID testing is recommended.  Follow bowel protocol as needed.  Seek immediate medical attention if symptoms do not resolve or worsen.  Reviewed with Jakub and he voices understanding with acceptance.    Patient identifies further concerns (financial, home health, support, transportation, etc.)? Denies      Reviewed pertinent information from [facility] Chemotherapy Patient Education Manual with patient.        "

## 2021-01-08 NOTE — TELEPHONE ENCOUNTER
Notified by palliative care office that patient at [facility] (formerly [facility]3], requesting refill on hydromorphone. He does have an appointment on Monday with Dr. Gómez for outpatient palliative follow-up.  Per palliative nursing patient has been taking 4 mg of hydromorphone approximately 3 times a day.  Have left a hardcopy prescription at [facility] (formerly [facility]3]  for hydromorphone 2 to 4 mg every 4 hours as needed for pain (dispense #40 tablets) to carry him through to his upcoming visit.

## 2021-01-11 NOTE — PROGRESS NOTES
"Subjective   Per discussion with Jose Gómez MD, Ben, has verbally consented to be treated via a telephone based visit: Yes. A total of 25 minutes were required for this telephone based visit.   Patient Location: Home  Provider Location: Clinic  Technology used by Provider: Phone    HPI  Ben Lai  is a 70 y.o. male who presents for follow up of .    The pt is being seen today via telehealth due to covid 19 pandemic. He states that he has had a recent increase in his pain, particularly in his right lower quadrant. He is taking dilaudid prn but it's \"not cutting it\". He has not taken any today but yesterday he took three doses of 2mg and the day before, he took two doses of 2mg. He did not take 4mg tabs as they make him sleepy. He is sleeping more hours of the day. He recently started immune therapy and had his first treatment one week ago. His wife has tried to start dispensing his meds as the pt seems to forget what and when he has taken medication.         The following have been reviewed and updated as appropriate in this visit:    Allergies   Allergen Reactions   • Penicillins Anaphylaxis   • Metformin Hives   • Adhesive Tape-Silicones    • Interferon Jose Maria-2a    • Latex    • Milk      Cow milk/goat milk   • Mometasone    • Mometasone Furoate    • Omeprazole    • Ondansetron Hcl Nausea And Vomiting   • Pepper (Genus Capsicum)      Any Hot Peppers - can eat bell peppers   • Primidone      Other reaction(s): HEADACHE   • Ribavirin Itching   • Rosuvastatin    • Sorafenib      Other reaction(s): BURNING SENSATION, HEADACHE, JOINT PAIN   • Spironolactone    • Statins-Hmg-Coa Reductase Inhibitors Itching and GI intolerance   • Interferon Jose Maria (Human Leuk. Derived) Palpitations and Rash   • Pantoprazole Itching and Rash   • Peginterferon Jose Maria-2b Palpitations     Current Outpatient Medications   Medication Sig Dispense Refill   • prochlorperazine (COMPAZINE) 10 mg tablet Take 1 tablet (10 mg total) by mouth " every 6 (six) hours as needed for nausea or vomiting 30 tablet 5   • HYDROmorphone (DILAUDID) 2 mg tablet Take 1 tablet (2 mg total) by mouth every 4 (four) hours as needed (pain) Max Daily Amount: 12 mg 60 tablet 0   • artificial saliva (YERBA GLADYS AND LYTES) aerosol,spray spray See administration instructions Take 3 to 5 sprays up to 3 to 5 times daily     • lidocaine (Lidoderm) 5 % patch Apply 1 patch topically daily Remove & discard patch within 12 hours or as directed by MD.     • psyllium husk (METAMUCIL ORAL) Take 1 Scoop by mouth daily       • hypromellose (SYSTANE GEL OPHT) Administer into affected eye(s) as needed     • fluticasone propionate (FLONASE) 50 mcg/actuation nasal spray Administer 2 sprays into each nostril daily       • CLOTRIMAZOLE, BULK, MISC as needed To feet     • naloxone HCl (NARCAN NASL) Administer into affected nostril(s) as needed     • sildenafiL (VIAGRA) 100 mg tablet Take 100 mg by mouth daily as needed for erectile dysfunction     • traZODone (DESYREL) 100 mg tablet Take 100 mg by mouth nightly       • ondansetron (ZOFRAN) 4 mg tablet Take 4 mg by mouth every 8 (eight) hours as needed for nausea or vomiting     • SUMAtriptan (IMITREX) 100 mg tablet Take 100 mg by mouth once as needed for migraine     • insulin glargine (Lantus Solostar U-100 Insulin) 100 unit/mL (3 mL) insulin pen injection pen Inject 10 Units under the skin every morning     • miconazole nitrate (SECURA ANTIFUNGAL T) Apply topically as needed       • carvediloL (COREG) 3.125 mg tablet Take 1 tablet (3.125 mg total) by mouth 2 (two) times a day with meals 180 tablet 0   • diclofenac sodium (VOLTAREN) 1 % gel Apply 4 g topically 4 (four) times a day       • cetirizine (ZyrTEC) 10 mg tablet Take 10 mg by mouth daily     • loperamide (Imodium A-D) 2 mg tablet Take 2 tablets with first loose stool of the day, then up to 8 tablets daily 80 tablet 2   • hydrOXYzine (ATARAX) 25 mg tablet Take 1 tablet (25 mg total) by  mouth nightly 90 tablet 2   • buPROPion SR (WELLBUTRIN SR) 100 mg 12 hr tablet Take 1 tablet (100 mg total) by mouth daily 90 tablet 1   • lactulose (GENERLAC) 10 gram/15 mL solution Take 15 mL (10 g total) by mouth daily 473 mL 2   • terazosin (HYTRIN) 5 mg capsule Take 1 capsule (5 mg total) by mouth 2 (two) times a day 180 capsule 2   • blood-glucose meter (ACCU-CHEK ADELE PLUS METER) [facility] Use to test blood sugars 3 times daily (E11.65, non insulin depedent) AccuChek Adele plus, meter is 8 years old (Patient taking differently: Use to test blood sugars 3 times daily (E11.65, non insulin dependent) ) 1 each 0   • lisinopril (PRINIVIL,ZESTRIL) 20 mg tablet Take 1 tablet (20 mg total) by mouth daily 90 tablet 2   • furosemide (LASIX) 20 mg tablet Take 1 tablet (20 mg total) by mouth 2 (two) times a day. 180 tablet 2   • lansoprazole (PREVACID) 30 mg capsule Take 30 mg by mouth daily      • liraglutide (VICTOZA 2-JASE) 0.6 mg/0.1 mL (18 mg/3 mL) injection Inject 1.8 mg under the skin daily       • capsaicin (ZOSTRIX) 0.025 % cream Apply 1 application topically as needed for pain scale 1-3/10, 1-3/8.     • sodium chloride (SALINE NASAL) 0.65 % nasal spray Administer 1 spray into each nostril as needed for congestion or rhinitis      • white petrolatum (AQUAPHOR HEALING) 41 % ointment ointment Apply 1 application topically as needed.     • pramoxine-menthol (GOLD BOND MEDICATED ANTI-ITCH) 1-1 % cream Apply topically as needed       • GLYCERIN/PROPYLENE GLYCOL (ARTIFICIAL TEARS,GLYCERIN-PEG, OPHT) Administer 1 drop into affected eye(s) as needed (Dry eyes)      • latanoprost (XALATAN) 0.005 % ophthalmic solution Administer 1 drop into both eyes nightly   10 0     No current facility-administered medications for this visit.      Past Medical History:   Diagnosis Date   • A-fib (CMS/HCC) (Formerly [facility])    • Allergy    • Anxiety    • Arthritis    • Asthma    • Blindness of right eye    • BPH (benign prostatic hyperplasia)    •  Cardiovascular disease    • Cirrhosis (CMS/HCC) (HCC)     with possible liver mass by MRI 5/18, rec repeat in 8/2018   • Complication of anesthesia    • COPD (chronic obstructive pulmonary disease) (CMS/HCC) (HCC)    • Depression    • Endocrine disorder    • Gallstones    • Gastritis    • GERD (gastroesophageal reflux disease)    • Glaucoma    • Hearing loss     bilateral hearing aids   • Hemorrhoids    • Hepatitis C     Hep C, 2016 remission, complicated by cirrhosis.  MRI abd 5/30/18, rec 3 month follow up for focus of enhancement right and left hepatic lobe junction   • Hernia, abdominal    • High blood pressure    • IBS (irritable bowel syndrome)    • Infectious viral hepatitis    • Jaundice    • Kidney stones    • Obstructive sleep apnea syndrome    • Periodic limb movement disorder    • Persistent hypersomnia    • Persistent insomnia    • Pneumonia    • PONV (postoperative nausea and vomiting)    • Type 2 diabetes mellitus (CMS/HCC) (HCC)    • Urinary incontinence    • Wears partial dentures      Past Surgical History:   Procedure Laterality Date   • COLONOSCOPY  10/27/2016    Pili, normal, repeat 10 years   • CT ABLATION LIVER RADIOFREQUENCY  8/14/2019    CT ABLATION LIVER RADIOFREQUENCY 8/14/2019 [facility] MIS CT SCAN   • ESOPHAGOGASTRODUODENOSCOPY N/A 2/22/2018    Procedure: EGD - ESOPHAGOGASTRODUODENOSCOPY with banding  x 2 with crna;  Surgeon: William Rivas MD;  Location: [facility] Endoscopy;  Service: Endoscopy;  Laterality: N/A;   • ESOPHAGOGASTRODUODENOSCOPY N/A 4/6/2018    Procedure: EGD - ESOPHAGOGASTRODUODENOSCOPY with crna;  Surgeon: William Rivas MD;  Location: [facility] Endoscopy;  Service: Endoscopy;  Laterality: N/A;   • ESOPHAGOGASTRODUODENOSCOPY N/A 5/7/2019    Procedure: EGD - ESOPHAGOGASTRODUODENOSCOPY;  Surgeon: William Rivas MD;  Location: [facility] Endoscopy;  Service: Endoscopy;  Laterality: N/A;   • HAND SURGERY Left     thumb   • HERNIA REPAIR  01/01/1965   • IR TUNNELED CENTRAL LINE WITH PORT  12/31/2020     IR TUNNELED CENTRAL LINE WITH PORT 12/31/2020 Sonny Danielle MD [facility] MIS INTERVEN RAD   • KNEE ARTHROSCOPY  09/19/2017    left knee, with partial medial meniscectomy; limited chondroplasty, patellofemoral joint   • LIVER BIOPSY      by Dr. Alejandre   • OTHER SURGICAL HISTORY      esophageal varices, banded   • VASECTOMY      at approx age of 25     Family History   Problem Relation Age of Onset   • Arthritis Mother    • Rheum arthritis Mother    • Diabetes Mother    • Rectal cancer Mother    • Other Paternal Grandmother    • Other Paternal Grandfather    • Diabetes Other    • Rheum arthritis Other    • Osteoporosis Other      Social History     Occupational History   • Not on file   Tobacco Use   • Smoking status: Never Smoker   • Smokeless tobacco: Never Used   Substance and Sexual Activity   • Alcohol use: No   • Drug use: No   • Sexual activity: Defer   Social History Narrative   • Not on file       Review of Systems   Constitutional: Positive for activity change, appetite change and fatigue. Negative for unexpected weight change.   HENT: Negative.    Respiratory: Negative.  Negative for shortness of breath.    Cardiovascular: Negative.    Gastrointestinal: Positive for abdominal pain.   Endocrine: Negative.    Genitourinary: Negative.    Musculoskeletal: Negative.    Skin: Negative.    Allergic/Immunologic: Negative.    Neurological: Negative.    Hematological: Negative.    Psychiatric/Behavioral: Negative.          Assessment/Plan   Diagnoses and all orders for this visit:    Pain of metastatic malignancy    Hepatocellular carcinoma (CMS/HCC) (HCC)    Insomnia due to medical condition    We will have the pt keep track of how much and how often he is taking narcotic pain medication, and what his pain number is before taking it and one hour after taking it. We have an appointment in a few weeks, and we will go over this log at that appointment, and then make decisions regarding addition of a long acting pain  med.

## 2021-01-21 NOTE — PROGRESS NOTES
Hematology/Medical Oncology Follow-Up    Patient Name: Ben Lai Sr  YOB: 1950  Medical Record Number: [id number]    DATE OF SERVICE:   1/21/2021    CHIEF COMPLAINT:  Ben Lai Sr is a 70 y.o. male with locally advanced hepatocellular carcinoma who presents today for follow-up and treatment.    ONCOLOGY HISTORY/ HISTORY OF PRESENT ILLNESS:  Oncology History   Hepatocellular carcinoma (CMS/HCC) (HCC)   1/23/2019 Initial Diagnosis    Hepatocellular carcinoma (CMS/HCC) (HCC)     7/30/2019 - 12/9/2019 Adjuvant Therapy    Hepatocellular - Sorafenib (Nexavar)  Plan Provider: ANDERSON Bryan  Treatment goal: Palliative  Line of treatment: [No plan line of treatment]     6/12/2020 Cancer Staged    Staging form: Liver, AJCC 8th Edition  - Clinical: Stage IIIB (cT4, cN0, cM0) - Signed by Milka Reynoso MD on 6/12/2020 12/31/2020 -  Adjuvant Therapy    Hepatocellular - Bevacizumab / Atezolizumab  Plan Provider: Milka Reynoso MD  Treatment goal: Palliative  Line of treatment: [No plan line of treatment]     diagnosed with hepatocellular carcinoma in November 2018.  He has an underlying hepatitis C which was supposedly cured.    Due to the extent of his HCC per GI at [facility] it was not amenable to local therapy, resection or radiation, referred to medical oncology   He was started on sorafenib early December 2018 but this appears to have been stopped in February due to significant side effects.  Patient states unequivocally that he started to feel better after a 3-week break but the sorafenib was recently restarted and he is having significant quality of life compromising side effects again.  This includes severe joint and muscle pain and fatigue.      7/29/19 restaging scans showed progressive disease in the liver, however no sings of metastatic disease      8/14/19 s/p   CT-guided  right lobe of the liver radiofrequency ablation.      9/2019 seen hepatobiliary team at  [facility], and was discussed for tumor board, planned to have Y90  Treatment      10/8/19 MRI abdomen showed Nodular appearing liver with 2 areas of hyper/early arterial enhancement suggestive of hepatocellular carcinoma are both adjacent to the gallbladder which contains filling defects which are enhancing suggestive of neoplasm. This could be primary cholangiocarcinoma or hepatocellular carcinoma which is invading the gallbladder. The liver and gallbladder masses are very similar to the study from 7/29/2019.  Wedge-shaped signal abnormality in the anterior right lobe of the liver similar to the prior study could represent hepatic infarct.     11/7/19 s/p Y90 treatment at [facility]      12/9/19 MRI abdomen showed Cirrhosis and evidence of portal hypertension. Posttreatment changes of radioembolization throughout the right hepatic lobe, with increased diffuse heterogeneous arterial enhancement. This is the patient's first posttreatment MRI. LR-TR equivocal. Continued attention on follow-up.  Grossly stable size of the lobulated enhancing endoluminal gallbladder mass with decreased but persistent areas of enhancing viable tumor.  Stable segment 4 exophytic 2.6 cm OPTN-5b lesion.  Unchanged right hepatic vein and right portal vein branch thrombi, without convincing thrombus enhancement.  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 Attention on follow-up.     12/11/19 s/p chemo embolization for possible residual viable tumor in the liver.     1/16/20  restaging MR showed  The circumscribed mass is seen in the anterior aspect of the right lobe of the liver demonstrates positive response to treatment with significantly reduced enhancement, no change in overall size.2.  Post radiation ablation defect in the anterior inferior right lobe with surrounding heterogeneous nonmass-like enhancement. It would be difficult to exclude active tumor within this surrounding area of  enhancement.  Significant change in appearance of the posterior segment of the right lobe of the liver. Previous infiltrative nodular enhancement, now geographic nonmass-like enhancement with parenchymal volume loss. This could represent posttreatment related change. Difficult to exclude residual/progressive tumor     4/21/20 MR abdomen showed Continued evolution of post embolic/radiotherapy changes to the right hepatic lobe with large region of volume loss of enhancement likely representing fibrosis.  Previous HCC anteriorly along this region continues to regress with no evidence of enhancement to suggest recurrent or residual tumor. Previous nodular lesion inferiorly along the treatment bed which also likely represented a site of HCC also continues to regress with no evidence of recurrence or residual disease. No new suspicious liver lesions are demonstrated. Cirrhosis. Splenomegaly. Portosystemic collaterals.     6/26/20 MR No significant MRI change in the treatment-related appearance of the right hepatic lobe since 4/21/2020. No MR evidence of recurrent hepatoma. Treated anterior right hepatic lobe lesion is redemonstrated. Treated lesion at right more posterior hepatic lobe is also seen.   Hepatic fibrosis.Cirrhosis, splenomegaly and portosystemic collaterals.     10/5/20 MR abdomen showed 2 treated right hepatic lobe lesions and associated scarring and capsular retraction. Since the previous examination, there is a new 4 mm enhancing nodule at the posterior aspect of the right anterior hepatic lobe treated lesion.  The enhancement characteristics are progressive and indeterminate and there is the hint of small restricted diffusion. Overall, a change in the appearance of this treated lesion which has otherwise been stable since 1/16/2020 is suspicious for recurrent tumor. However, the imaging characteristics themselves are nonspecific of the 4 mm nodule (with differential diagnosis including rounded scar), and  attention on short-term follow-up MRI of the abdomen with IV contrast in 3 months is recommended, along with correlation with AFP levels.     12/21/20 MR showed Amorphous enhancement has increased within the lower right hepatic region area of previous treated tumor. This enhancement pattern is worrisome for tumor progression the area. Correlation with recent AFP would be helpful. Considerable scar tissue and areas well.. In addition the lesion overlying the gallbladder fossa may involve the gallbladder which now appears to be markedly thickened and demonstrates considerable wall enhancement which appears to have changed since the previous study. Differential would include direct tumor invasion gallbladder and acute cholecystitis.        INTERVAL HISTORY:  Here to consider cycle 2 of atezolizumab and bevacizumab.  Patient has been doing well overall the last 3 weeks.  He was anticipating a couple of labs from his GI provider.  I did review her note and added PT/INR and ammonia level.  He has upcoming appointment with dermatology for a lesion on his nose as well as respiratory therapist to get adjustment for his CPAP.  He has been more fatigued and believes some of this is due to his CPAP not fitting correctly.  His appetite has been fair.  Pain has been up and down.  3 days ago he is having pretty significant abdominal pain.  As of the last couple of days this has been better.  He is following closely with palliative care and considering starting a long-acting pain medication.    Past Medical History:   Diagnosis Date   • A-fib (CMS/HCC) (HCC)    • Allergy    • Anxiety    • Arthritis    • Asthma    • Blindness of right eye    • BPH (benign prostatic hyperplasia)    • Cardiovascular disease    • Cirrhosis (CMS/HCC) (HCC)     with possible liver mass by MRI 5/18, rec repeat in 8/2018   • Complication of anesthesia    • COPD (chronic obstructive pulmonary disease) (CMS/HCC) (HCC)    • Depression    • Endocrine disorder     • Gallstones    • Gastritis    • GERD (gastroesophageal reflux disease)    • Glaucoma    • Hearing loss     bilateral hearing aids   • Hemorrhoids    • Hepatitis C     Hep C, 2016 remission, complicated by cirrhosis.  MRI abd 5/30/18, rec 3 month follow up for focus of enhancement right and left hepatic lobe junction   • Hernia, abdominal    • High blood pressure    • IBS (irritable bowel syndrome)    • Infectious viral hepatitis    • Jaundice    • Kidney stones    • Obstructive sleep apnea syndrome    • Periodic limb movement disorder    • Persistent hypersomnia    • Persistent insomnia    • Pneumonia    • PONV (postoperative nausea and vomiting)    • Type 2 diabetes mellitus (CMS/HCC) (HCC)    • Urinary incontinence    • Wears partial dentures      Past Surgical History:   Procedure Laterality Date   • COLONOSCOPY  10/27/2016    Pili, normal, repeat 10 years   • CT ABLATION LIVER RADIOFREQUENCY  8/14/2019    CT ABLATION LIVER RADIOFREQUENCY 8/14/2019 [facility] MIS CT SCAN   • ESOPHAGOGASTRODUODENOSCOPY N/A 2/22/2018    Procedure: EGD - ESOPHAGOGASTRODUODENOSCOPY with banding  x 2 with crna;  Surgeon: William Rivas MD;  Location: [facility] Endoscopy;  Service: Endoscopy;  Laterality: N/A;   • ESOPHAGOGASTRODUODENOSCOPY N/A 4/6/2018    Procedure: EGD - ESOPHAGOGASTRODUODENOSCOPY with crna;  Surgeon: William Rivas MD;  Location: [facility];  Service: Endoscopy;  Laterality: N/A;   • ESOPHAGOGASTRODUODENOSCOPY N/A 5/7/2019    Procedure: EGD - ESOPHAGOGASTRODUODENOSCOPY;  Surgeon: William Rivas MD;  Location: [facility];  Service: Endoscopy;  Laterality: N/A;   • HAND SURGERY Left     thumb   • HERNIA REPAIR  01/01/1965   • IR TUNNELED CENTRAL LINE WITH PORT  12/31/2020    IR TUNNELED CENTRAL LINE WITH PORT 12/31/2020 Sonny Danielle MD [facility] MIS INTERVEN RAD   • KNEE ARTHROSCOPY  09/19/2017    left knee, with partial medial meniscectomy; limited chondroplasty, patellofemoral joint   • LIVER BIOPSY      by Dr. Alejandre   •  OTHER SURGICAL HISTORY      esophageal varices, banded   • VASECTOMY      at approx age of 25     Social History     Socioeconomic History   • Marital status:      Spouse name: Not on file   • Number of children: Not on file   • Years of education: Not on file   • Highest education level: Not on file   Occupational History   • Not on file   Social Needs   • Financial resource strain: Not on file   • Food insecurity     Worry: Not on file     Inability: Not on file   • Transportation needs     Medical: Not on file     Non-medical: Not on file   Tobacco Use   • Smoking status: Never Smoker   • Smokeless tobacco: Never Used   Substance and Sexual Activity   • Alcohol use: No   • Drug use: No   • Sexual activity: Defer   Lifestyle   • Physical activity     Days per week: Not on file     Minutes per session: Not on file   • Stress: Not on file   Relationships   • Social connections     Talks on phone: Not on file     Gets together: Not on file     Attends [employer] service: Not on file     Active member of club or organization: Not on file     Attends meetings of clubs or organizations: Not on file     Relationship status: Not on file   • Intimate partner violence     Fear of current or ex partner: Not on file     Emotionally abused: Not on file     Physically abused: Not on file     Forced sexual activity: Not on file   Other Topics Concern   • Not on file   Social History Narrative   • Not on file      ALLERGIES:   Allergies as of 01/21/2021 - Reviewed 12/31/2020   Allergen Reaction Noted   • Penicillins Anaphylaxis 10/19/2017   • Metformin Hives    • Adhesive tape-silicones  04/06/2018   • Interferon violetta-2a  02/05/2019   • Latex     • Milk  02/05/2019   • Mometasone     • Mometasone furoate     • Omeprazole     • Ondansetron hcl Nausea And Vomiting 05/01/2019   • Pepper (genus capsicum)  02/05/2019   • Primidone  05/01/2019   • Ribavirin Itching 10/19/2017   • Rosuvastatin  02/05/2019   • Sorafenib  04/04/2019    • Spironolactone     • Statins-hmg-coa reductase inhibitors Itching and GI intolerance 04/06/2018   • Interferon violetta (human leuk. derived) Palpitations and Rash 09/20/2019   • Pantoprazole Itching and Rash 02/28/2018   • Peginterferon violetta-2b Palpitations 10/19/2017      MEDICATIONS:   Current Outpatient Medications   Medication Sig Dispense Refill   • HYDROmorphone (DILAUDID) 2 mg tablet Take 1-2 tablets (2-4 mg total) by mouth every 4 (four) hours as needed (pain) Max Daily Amount: 24 mg 180 tablet 0   • prochlorperazine (COMPAZINE) 10 mg tablet Take 1 tablet (10 mg total) by mouth every 6 (six) hours as needed for nausea or vomiting 30 tablet 5   • artificial saliva (YERBA GLADYS AND LYTES) aerosol,spray spray See administration instructions Take 3 to 5 sprays up to 3 to 5 times daily     • lidocaine (Lidoderm) 5 % patch Apply 1 patch topically daily Remove & discard patch within 12 hours or as directed by MD.     • psyllium husk (METAMUCIL ORAL) Take 1 Scoop by mouth daily       • hypromellose (SYSTANE GEL OPHT) Administer into affected eye(s) as needed     • fluticasone propionate (FLONASE) 50 mcg/actuation nasal spray Administer 2 sprays into each nostril daily       • CLOTRIMAZOLE, BULK, MISC as needed To feet     • naloxone HCl (NARCAN NASL) Administer into affected nostril(s) as needed     • sildenafiL (VIAGRA) 100 mg tablet Take 100 mg by mouth daily as needed for erectile dysfunction     • traZODone (DESYREL) 100 mg tablet Take 100 mg by mouth nightly       • ondansetron (ZOFRAN) 4 mg tablet Take 4 mg by mouth every 8 (eight) hours as needed for nausea or vomiting     • SUMAtriptan (IMITREX) 100 mg tablet Take 100 mg by mouth once as needed for migraine     • insulin glargine (Lantus Solostar U-100 Insulin) 100 unit/mL (3 mL) insulin pen injection pen Inject 10 Units under the skin every morning     • miconazole nitrate (SECURA ANTIFUNGAL T) Apply topically as needed       • carvediloL (COREG) 3.125 mg  tablet Take 1 tablet (3.125 mg total) by mouth 2 (two) times a day with meals 180 tablet 0   • diclofenac sodium (VOLTAREN) 1 % gel Apply 4 g topically 4 (four) times a day       • cetirizine (ZyrTEC) 10 mg tablet Take 10 mg by mouth daily     • loperamide (Imodium A-D) 2 mg tablet Take 2 tablets with first loose stool of the day, then up to 8 tablets daily 80 tablet 2   • hydrOXYzine (ATARAX) 25 mg tablet Take 1 tablet (25 mg total) by mouth nightly 90 tablet 2   • buPROPion SR (WELLBUTRIN SR) 100 mg 12 hr tablet Take 1 tablet (100 mg total) by mouth daily 90 tablet 1   • lactulose (GENERLAC) 10 gram/15 mL solution Take 15 mL (10 g total) by mouth daily 473 mL 2   • terazosin (HYTRIN) 5 mg capsule Take 1 capsule (5 mg total) by mouth 2 (two) times a day 180 capsule 2   • blood-glucose meter (ACCU-CHEK ADELE PLUS METER) [facility] Use to test blood sugars 3 times daily (E11.65, non insulin depedent) AccuChek Adele plus, meter is 8 years old (Patient taking differently: Use to test blood sugars 3 times daily (E11.65, non insulin dependent) ) 1 each 0   • lisinopril (PRINIVIL,ZESTRIL) 20 mg tablet Take 1 tablet (20 mg total) by mouth daily 90 tablet 2   • furosemide (LASIX) 20 mg tablet Take 1 tablet (20 mg total) by mouth 2 (two) times a day. 180 tablet 2   • lansoprazole (PREVACID) 30 mg capsule Take 30 mg by mouth daily      • liraglutide (VICTOZA 2-JASE) 0.6 mg/0.1 mL (18 mg/3 mL) injection Inject 1.8 mg under the skin daily       • capsaicin (ZOSTRIX) 0.025 % cream Apply 1 application topically as needed for pain scale 1-3/10, 1-3/8.     • sodium chloride (SALINE NASAL) 0.65 % nasal spray Administer 1 spray into each nostril as needed for congestion or rhinitis      • white petrolatum (AQUAPHOR HEALING) 41 % ointment ointment Apply 1 application topically as needed.     • pramoxine-menthol (GOLD BOND MEDICATED ANTI-ITCH) 1-1 % cream Apply topically as needed       • GLYCERIN/PROPYLENE GLYCOL (ARTIFICIAL  TEARS,GLYCERIN-PEG, OPHT) Administer 1 drop into affected eye(s) as needed (Dry eyes)      • latanoprost (XALATAN) 0.005 % ophthalmic solution Administer 1 drop into both eyes nightly   10 0     No current facility-administered medications for this visit.      25 minutes spent reconciling patient's medication list    REVIEW OF SYSTEMS:   The ECOG performance status today is .1 - Symptomatic but completely ambulatory  Review of Systems   Constitutional: Positive for appetite change and fatigue. Negative for chills, diaphoresis, fever and unexpected weight change.   HENT:   Negative for hearing loss, lump/mass, mouth sores, nosebleeds, sore throat, tinnitus, trouble swallowing and voice change.    Eyes: Negative for eye problems and icterus.   Respiratory: Negative for chest tightness, cough, hemoptysis, shortness of breath and wheezing.         Not wearing Cpap, has appointment 1/25 with respiratory therapist for evaluation   Cardiovascular: Positive for leg swelling (improving). Negative for chest pain and palpitations.   Gastrointestinal: Positive for abdominal pain (RUQ, epigastric) and constipation. Negative for abdominal distention, blood in stool, diarrhea, nausea and vomiting.   Genitourinary: Negative for bladder incontinence, difficulty urinating, dysuria, frequency and hematuria.    Musculoskeletal: Positive for gait problem (ambulates with a walker). Negative for arthralgias, back pain, flank pain, myalgias and neck pain.   Skin: Negative for itching, rash and wound.        Has appointment with PCP (Dr. Ram[facility]rez at [facility]) to evaluate skin lesion on bridge of nose   Neurological: Positive for gait problem (ambulates with a walker). Negative for dizziness, extremity weakness, headaches, light-headedness, numbness, seizures and speech difficulty.        Tingling in feet with swelling   Hematological: Negative for adenopathy. Does not bruise/bleed easily.        Night sweats-wife believes is from overheating at  night with blankets   Psychiatric/Behavioral: Positive for depression (Follows with counselor at [facility]; recently restarted lexapro). Negative for confusion, decreased concentration, sleep disturbance and suicidal ideas. The patient is not nervous/anxious.    All other systems reviewed and are negative.      PHYSICAL EXAM:   /59 (BP Location: Left arm)   Pulse 70   Temp 36.6 °C (97.8 °F) (Temporal)   Wt 92.5 kg (204 lb)   SpO2 95%   BMI 33.95 kg/m²      Physical Exam  Constitutional:       General: He is not in acute distress.     Appearance: Normal appearance. He is well-developed.   HENT:      Head: Normocephalic.      Nose: Nose normal.   Eyes:      General: No scleral icterus.        Right eye: No discharge.         Left eye: No discharge.      Extraocular Movements: Extraocular movements intact.      Conjunctiva/sclera: Conjunctivae normal.   Neck:      Musculoskeletal: Full passive range of motion without pain, normal range of motion and neck supple.      Thyroid: No thyromegaly.   Cardiovascular:      Rate and Rhythm: Normal rate and regular rhythm.      Heart sounds: Normal heart sounds, S1 normal and S2 normal. No murmur. No gallop.    Pulmonary:      Effort: Pulmonary effort is normal. No respiratory distress.      Breath sounds: Normal breath sounds. No decreased breath sounds, wheezing, rhonchi or rales.   Chest:      Chest wall: No tenderness.   Abdominal:      General: Bowel sounds are normal. There is no distension.      Palpations: Abdomen is soft. There is no mass.      Tenderness: There is abdominal tenderness in the right upper quadrant, right lower quadrant and epigastric area. There is no guarding or rebound.   Musculoskeletal: Normal range of motion.         General: No swelling, tenderness or deformity.      Right lower leg: No edema.      Left lower leg: No edema.   Lymphadenopathy:      Head:      Right side of head: No submental, submandibular, tonsillar, preauricular, posterior  auricular or occipital adenopathy.      Left side of head: No submental, submandibular, tonsillar, preauricular, posterior auricular or occipital adenopathy.      Cervical: No cervical adenopathy.      Upper Body:      Right upper body: No supraclavicular adenopathy.      Left upper body: No supraclavicular adenopathy.   Skin:     General: Skin is warm and dry.      Capillary Refill: Capillary refill takes less than 2 seconds.      Coloration: Skin is not jaundiced or pale.      Findings: No bruising, erythema, lesion, petechiae or rash.   Neurological:      General: No focal deficit present.      Mental Status: He is alert and oriented to person, place, and time.      Cranial Nerves: Cranial nerves are intact. No cranial nerve deficit.      Sensory: Sensation is intact.      Motor: Motor function is intact.      Coordination: Coordination is intact.      Gait: Gait is intact.   Psychiatric:         Attention and Perception: Attention and perception normal.         Mood and Affect: Mood and affect normal.         Speech: Speech normal.         Behavior: Behavior normal. Behavior is cooperative.         Thought Content: Thought content normal.         Cognition and Memory: Cognition and memory normal.         Judgment: Judgment normal.         LABORATORY DATA:     Lab Results   Component Value Date    WBC 5.8 12/31/2020    HGB 12.6 (L) 12/31/2020    HCT 38.1 12/31/2020     12/31/2020    RBC 4.39 12/31/2020    MCV 86.8 12/31/2020    MCH 28.6 (L) 12/31/2020    MCHC 33.0 12/31/2020    RDW 14.2 12/31/2020    MPV 7.2 12/31/2020    ANC 2.640 04/23/2020    NEUTROABS 4.80 12/31/2020     Lab Results   Component Value Date     12/31/2020    K 4.3 12/31/2020     12/31/2020    CO2 29 12/31/2020    BUN 12 12/31/2020    CREATININE 0.90 12/31/2020    GLUCOSE 216 (H) 12/31/2020    CALCIUM 9.0 12/31/2020    PROT 7.2 12/31/2020    ALBUMIN 3.8 12/31/2020    AST 27 12/31/2020    ALT 21 12/31/2020    ALKPHOS 173 (H)  12/31/2020    BILITOT 0.51 12/31/2020     Lab Results   Component Value Date    PT 12.4 07/29/2019    INR 1.1 07/29/2019     Lab Results   Component Value Date    TSH 2.161 12/31/2020     Reviewed    DIAGNOSTIC IMAGING:   Ir Tunneled Central Line With Port    Result Date: 12/31/2020  Narrative: Procedure: Right  Ultrasound and Fluoroscopically guided tunneled internal jugular chest port placement.12/31/2020 Clinical History: Hepatocellular carcinoma (CMS/HCC) (HCC); prior to chemo The procedure was performed utilizing intravenous conscious sedation. An ACLS trained nurse was present during the procedure to monitor the patient, including pulse oximetry and continuous cardiac monitoring.. Medications used for sedation include 2  mg Versed and 100  mcg Fentanyl. 4 mg of Zofran was also administered intravenously. Physician face-to-face time (intraservice time) for the procedure was 23 minutes. Fluoroscopy time: 0.2 minutes. One image is obtained. Complications: none Procedure/Views: The patient was informed of the risks and benefits of this procedure and provided informed consent.  The Patient was placed in the supine position. The right   neck and chest is prepped and draped following the maximum sterile barrier technique; cap, mask, sterile gown, sterile gloves, large sterile sheet, hand hygiene, and antisepsis with 2% chlorhexidine (or acceptable alternative antiseptics per current guidelines) for cutaneous antisepsis were performed.  The internal jugular vein was documented patent with ultrasound.  Sterile ultrasound technique was utilized including sterile probe cover and sterile ultrasound gel.  Hardcopy ultrasound imaging of this vessel was obtained.  Lidocaine is used for local anesthesia.   A 21-gauge needle was placed in the Internal jugular vein under ultrasound guidance. Access was secured with an 018 wire under fluoroscopic guidance. A micropuncture sheath was placed. A chest pocket and tunnel was  anesthetized with lidocaine from the chest to the neck and then using sharp and blunt dissection a 8 Fr single lumen power injectable chest port was placed in the pocket and the tubing was pulled through the skin tunnel to the access site. The neck access site was dilated and a peel-away sheath was placed. The catheter was cut to length and inserted through the peel-away sheath under positive thoracic pressure. With fluoroscopic guidance the catheter tip is positioned in the atrial caval junction. Hard copy fluoroscopic imaging of the final catheter position is obtained. The catheter aspirates easily and was flushed with saline and then locked with 2.5 mL of 100 units per milliliter heparin. The neck puncture site was closed with Dermabond and the chest port pocket is closed with interrupted 2-0 Vicryl sutures and a 4-0 Monocryl running stitch. Dermabond is applied. The catheter is functional and ready for use. The patient tolerated the procedure well and there was no immediate complication.     Impression: IMPRESSION: Successful Right internal jugular US and Fluoroscopically guided 8-Wolof single-lumen power chest port placement.       Reviewed    IMPRESSION & PLAN:   1.  Hepatocellular carcinoma: Clinically, he appears to be stable with out signs of disease progression.  He has tolerated treatment with atezolizumab and bevacizumab well so far without any significant side effects.  His labs are within acceptable limits.  -Proceed with cycle 2 atezolizumab 1200 mg IV and bevacizumab 15 mg/kg  -Restage after 3 cycles in March 2021  -Plan for follow-up at the [facility] with GI  -PT/INR and ammonia level CC to local GI provider.  2.  Pain: Cancer-related, managed by palliative care.  No new pains to report today.  3.  Constipation: Secondary to narcotic pain medication.  Reviewed bowel protocol.  4.  Depression: On Wellbutrin and has restarted Escitalopram.  [facility]  Patient made aware of our patient support team and the role.  5.  Follow-up in 3 weeks with repeat labs prior to the start of the next cycle.  He can be seen sooner if needed.  Instructed to call for questions or concerns.    One this date of service 60 minutes of total time was spent on this encounter. Greater than 50% of which was spent in counseling and coordination of care.    Electronically signed by: Chrystal Goodman CNP

## 2021-02-11 NOTE — PROGRESS NOTES
Medical Oncology Follow-Up    Subjective   HISTORY OF PRESENT ILLNESS:  Ben Lai Sr is a 70 y.o. male who  was diagnosed with hepatocellular carcinoma in November 2018.  He has an underlying hepatitis C which was supposedly cured.    Due to the extent of his HCC per GI at [facility] it was not amenable to local therapy, resection or radiation, referred to medical oncology   He was started on sorafenib early December 2018 but this appears to have been stopped in February due to significant side effects.  Patient states unequivocally that he started to feel better after a 3-week break but the sorafenib was recently restarted and he is having significant quality of life compromising side effects again.  This includes severe joint and muscle pain and fatigue.     7/29/19 restaging scans showed progressive disease in the liver, however no sings of metastatic disease     8/14/19 s/p   CT-guided  right lobe of the liver radiofrequency ablation.     9/2019 seen hepatobiliary team at [facility], and was discussed for tumor board, planned to have Y90  Treatment     10/8/19 MRI abdomen showed Nodular appearing liver with 2 areas of hyper/early arterial enhancement suggestive of hepatocellular carcinoma are both adjacent to the gallbladder which contains filling defects which are enhancing suggestive of neoplasm. This could be primary cholangiocarcinoma or hepatocellular carcinoma which is invading the gallbladder. The liver and gallbladder masses are very similar to the study from 7/29/2019.  Wedge-shaped signal abnormality in the anterior right lobe of the liver similar to the prior study could represent hepatic infarct.    11/7/19 s/p Y90 treatment at [facility]     12/9/19 MRI abdomen showed Cirrhosis and evidence of portal hypertension. Posttreatment changes of radioembolization throughout the right hepatic lobe, with increased diffuse heterogeneous arterial enhancement. This is the  patient's first posttreatment MRI. LR-TR equivocal. Continued attention on follow-up.  Grossly stable size of the lobulated enhancing endoluminal gallbladder mass with decreased but persistent areas of enhancing viable tumor.  Stable segment 4 exophytic 2.6 cm OPTN-5b lesion.  Unchanged right hepatic vein and right portal vein branch thrombi, without convincing thrombus enhancement.  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 Attention on follow-up.    12/11/19 s/p chemo embolization for possible residual viable tumor in the liver.    1/16/20  restaging MR showed  The circumscribed mass is seen in the anterior aspect of the right lobe of the liver demonstrates positive response to treatment with significantly reduced enhancement, no change in overall size.2.  Post radiation ablation defect in the anterior inferior right lobe with surrounding heterogeneous nonmass-like enhancement. It would be difficult to exclude active tumor within this surrounding area of enhancement.  Significant change in appearance of the posterior segment of the right lobe of the liver. Previous infiltrative nodular enhancement, now geographic nonmass-like enhancement with parenchymal volume loss. This could represent posttreatment related change. Difficult to exclude residual/progressive tumor    4/21/20 MR abdomen showed Continued evolution of post embolic/radiotherapy changes to the right hepatic lobe with large region of volume loss of enhancement likely representing fibrosis.  Previous HCC anteriorly along this region continues to regress with no evidence of enhancement to suggest recurrent or residual tumor. Previous nodular lesion inferiorly along the treatment bed which also likely represented a site of HCC also continues to regress with no evidence of recurrence or residual disease. No new suspicious liver lesions are demonstrated. Cirrhosis. Splenomegaly. Portosystemic collaterals.    6/26/20 MR No significant  MRI change in the treatment-related appearance of the right hepatic lobe since 4/21/2020. No MR evidence of recurrent hepatoma. Treated anterior right hepatic lobe lesion is redemonstrated. Treated lesion at right more posterior hepatic lobe is also seen.   Hepatic fibrosis.Cirrhosis, splenomegaly and portosystemic collaterals.    10/5/20 MR abdomen showed 2 treated right hepatic lobe lesions and associated scarring and capsular retraction. Since the previous examination, there is a new 4 mm enhancing nodule at the posterior aspect of the right anterior hepatic lobe treated lesion.  The enhancement characteristics are progressive and indeterminate and there is the hint of small restricted diffusion. Overall, a change in the appearance of this treated lesion which has otherwise been stable since 1/16/2020 is suspicious for recurrent tumor. However, the imaging characteristics themselves are nonspecific of the 4 mm nodule (with differential diagnosis including rounded scar), and attention on short-term follow-up MRI of the abdomen with IV contrast in 3 months is recommended, along with correlation with AFP levels.     12/21/20 MR showed Amorphous enhancement has increased within the lower right hepatic region area of previous treated tumor. This enhancement pattern is worrisome for tumor progression the area. Correlation with recent AFP would be helpful. Considerable scar tissue and areas well.. In addition the lesion overlying the gallbladder fossa may involve the gallbladder which now appears to be markedly thickened and demonstrates considerable wall enhancement which appears to have changed since the previous study. Differential would include direct tumor invasion gallbladder and acute cholecystitis.       Today:   Pain  Is worsening in the right upper quadrent   Has had symptoms of grade 1 Hepatic encephalopathy     Energy is  Stable ECOG 2   appetite ist stable, weight is stable   Takes care of his ADLS    Stable,back pain , chest pain and neuropathy         Encounter Diagnosis   Name Primary?   • Hepatocellular carcinoma (CMS/HCC) (HCC)    .     Treatment History:  Oncology History   Hepatocellular carcinoma (CMS/HCC) (HCC)   1/23/2019 Initial Diagnosis    Hepatocellular carcinoma (CMS/HCC) (HCC)     7/30/2019 - 12/9/2019 Adjuvant Therapy    Hepatocellular - Sorafenib (Nexavar)  Plan Provider: ANDERSON Bryan  Treatment goal: Palliative  Line of treatment: [No plan line of treatment]     6/12/2020 Cancer Staged    Staging form: Liver, AJCC 8th Edition  - Clinical: Stage IIIB (cT4, cN0, cM0) - Signed by Milka Reynoso MD on 6/12/2020 12/31/2020 -  Adjuvant Therapy    Hepatocellular - Bevacizumab / Atezolizumab  Plan Provider: Milka Reynoso MD  Treatment goal: Palliative  Line of treatment: [No plan line of treatment]           RECENT EVENTS:    PAST MEDICAL HISTORY:   Past Medical History:   Diagnosis Date   • A-fib (CMS/HCC) (HCC)    • Allergy    • Anxiety    • Arthritis    • Asthma    • Blindness of right eye    • BPH (benign prostatic hyperplasia)    • Cardiovascular disease    • Cirrhosis (CMS/HCC) (HCC)     with possible liver mass by MRI 5/18, rec repeat in 8/2018   • Complication of anesthesia    • COPD (chronic obstructive pulmonary disease) (CMS/HCC) (HCC)    • Depression    • Endocrine disorder    • Gallstones    • Gastritis    • GERD (gastroesophageal reflux disease)    • Glaucoma    • Hearing loss     bilateral hearing aids   • Hemorrhoids    • Hepatitis C     Hep C, 2016 remission, complicated by cirrhosis.  MRI abd 5/30/18, rec 3 month follow up for focus of enhancement right and left hepatic lobe junction   • Hernia, abdominal    • High blood pressure    • IBS (irritable bowel syndrome)    • Infectious viral hepatitis    • Jaundice    • Kidney stones    • Obstructive sleep apnea syndrome    • Periodic limb movement disorder    • Persistent hypersomnia    • Persistent  insomnia    • Pneumonia    • PONV (postoperative nausea and vomiting)    • Type 2 diabetes mellitus (CMS/HCC) (HCC)    • Urinary incontinence    • Wears partial dentures         GYNECOLOGICAL HISTORY (if applicable): NA     FAMILY HISTORY:   Family History   Problem Relation Age of Onset   • Arthritis Mother    • Rheum arthritis Mother    • Diabetes Mother    • Rectal cancer Mother    • Other Paternal Grandmother    • Other Paternal Grandfather    • Diabetes Other    • Rheum arthritis Other    • Osteoporosis Other         SOCIAL HISTORY:   Social History     Socioeconomic History   • Marital status:      Spouse name: Not on file   • Number of children: Not on file   • Years of education: Not on file   • Highest education level: Not on file   Occupational History   • Not on file   Tobacco Use   • Smoking status: Never Smoker   • Smokeless tobacco: Never Used   Substance and Sexual Activity   • Alcohol use: No   • Drug use: No   • Sexual activity: Defer   Other Topics Concern   • Not on file   Social History Narrative   • Not on file     Social Determinants of Health     Financial Resource Strain:    • Difficulty of Paying Living Expenses:    Food Insecurity:    • Worried About Running Out of Food in the Last Year:    • Ran Out of Food in the Last Year:    Transportation Needs:    • Lack of Transportation (Medical):    • Lack of Transportation (Non-Medical):    Physical Activity:    • Days of Exercise per Week:    • Minutes of Exercise per Session:    Stress:    • Feeling of Stress :    Social Connections:    • Frequency of Communication with Friends and Family:    • Frequency of Social Gatherings with Friends and Family:    • Attends [employer]ah[employer] Services:    • Active Member of Clubs or Organizations:    • Attends Club or Organization Meetings:    • Marital Status:    Intimate Partner Violence:    • Fear of Current or Ex-Partner:    • Emotionally Abused:    • Physically Abused:    • Sexually Abused:          ALLERGIES:   Allergies as of 02/11/2021 - Reviewed 02/11/2021   Allergen Reaction Noted   • Penicillins Anaphylaxis 10/19/2017   • Metformin Hives    • Adhesive tape-silicones  04/06/2018   • Interferon violetta-2a  02/05/2019   • Latex     • Milk  02/05/2019   • Mometasone     • Mometasone furoate     • Omeprazole     • Ondansetron hcl Nausea And Vomiting 05/01/2019   • Pepper (genus capsicum)  02/05/2019   • Primidone  05/01/2019   • Ribavirin Itching 10/19/2017   • Rosuvastatin  02/05/2019   • Sorafenib  04/04/2019   • Spironolactone     • Statins-hmg-coa reductase inhibitors Itching and GI intolerance 04/06/2018   • Interferon violetta (human leuk. derived) Palpitations and Rash 09/20/2019   • Pantoprazole Itching and Rash 02/28/2018   • Peginterferon violetta-2b Palpitations 10/19/2017        MEDICATIONS:   Current Outpatient Medications   Medication Sig Dispense Refill   • polyethylene glycol (Miralax) 17 gram/dose powder Take 17 g by mouth daily     • brimonidine (ALPHAGAN) 0.2 % ophthalmic solution 1 drop 2 (two) times a day     • escitalopram (LEXAPRO) 10 mg tablet Take 5 mg by mouth daily     • carboxymethylcellulose (REFRESH PLUS) 0.5 % dropperette INSTILL ONE DROP IN BOTH EYES EVERY 3 HOURS FOR DRY EYES     • prochlorperazine (COMPAZINE) 10 mg tablet Take 1 tablet (10 mg total) by mouth every 6 (six) hours as needed for nausea or vomiting 30 tablet 5   • artificial saliva (YERBA GLADYS AND LYTES) aerosol,spray spray See administration instructions Take 3 to 5 sprays up to 3 to 5 times daily     • lidocaine (Lidoderm) 5 % patch Apply 1 patch topically daily Remove & discard patch within 12 hours or as directed by MD.     • psyllium husk (METAMUCIL ORAL) Take 1 Scoop by mouth daily       • fluticasone propionate (FLONASE) 50 mcg/actuation nasal spray Administer 2 sprays into each nostril daily       • CLOTRIMAZOLE, BULK, MISC as needed To feet     • naloxone HCl (NARCAN NASL) Administer into affected nostril(s) as  needed     • traZODone (DESYREL) 100 mg tablet Take 100 mg by mouth nightly       • SUMAtriptan (IMITREX) 100 mg tablet Take 100 mg by mouth once as needed for migraine     • insulin glargine (Lantus Solostar U-100 Insulin) 100 unit/mL (3 mL) insulin pen injection pen Inject 10 Units under the skin every morning     • carvediloL (COREG) 3.125 mg tablet Take 1 tablet (3.125 mg total) by mouth 2 (two) times a day with meals 180 tablet 0   • diclofenac sodium (VOLTAREN) 1 % gel Apply 4 g topically 4 (four) times a day       • cetirizine (ZyrTEC) 10 mg tablet Take 10 mg by mouth daily     • buPROPion SR (WELLBUTRIN SR) 100 mg 12 hr tablet Take 1 tablet (100 mg total) by mouth daily 90 tablet 1   • lactulose (GENERLAC) 10 gram/15 mL solution Take 15 mL (10 g total) by mouth daily 473 mL 2   • terazosin (HYTRIN) 5 mg capsule Take 1 capsule (5 mg total) by mouth 2 (two) times a day 180 capsule 2   • blood-glucose meter (ACCU-CHEK ADELE PLUS METER) [facility] Use to test blood sugars 3 times daily (E11.65, non insulin depedent) AccuChek Adele plus, meter is 8 years old (Patient taking differently: Use to test blood sugars 3 times daily (E11.65, non insulin dependent) ) 1 each 0   • lisinopril (PRINIVIL,ZESTRIL) 20 mg tablet Take 1 tablet (20 mg total) by mouth daily 90 tablet 2   • furosemide (LASIX) 20 mg tablet Take 1 tablet (20 mg total) by mouth 2 (two) times a day. (Patient taking differently: Take 20 mg by mouth 2 (two) times a day Taking 40 mg in am ) 180 tablet 2   • lansoprazole (PREVACID) 30 mg capsule Take 30 mg by mouth daily      • liraglutide (VICTOZA 2-JASE) 0.6 mg/0.1 mL (18 mg/3 mL) injection Inject 1.8 mg under the skin daily       • sodium chloride (SALINE NASAL) 0.65 % nasal spray Administer 1 spray into each nostril as needed for congestion or rhinitis      • pramoxine-menthol (GOLD BOND MEDICATED ANTI-ITCH) 1-1 % cream Apply topically as needed       • GLYCERIN/PROPYLENE GLYCOL (ARTIFICIAL TEARS,GLYCERIN-PEG,  OPHT) Administer 1 drop into affected eye(s) as needed (Dry eyes)      • latanoprost (XALATAN) 0.005 % ophthalmic solution Administer 1 drop into both eyes nightly   10 0   • loperamide (Imodium A-D) 2 mg tablet Take 2 tablets with first loose stool of the day, then up to 8 tablets daily (Patient not taking: Reported on 1/21/2021 ) 80 tablet 2     No current facility-administered medications for this visit.       REVIEW OF SYSTEMS:   Review of Systems   Constitutional: Positive for fatigue.   HENT:   Positive for hearing loss.    Eyes: Positive for eye problems.   Gastrointestinal: Positive for abdominal distention and abdominal pain.   Endocrine: Negative.    Genitourinary: Positive for bladder incontinence.    Musculoskeletal: Positive for arthralgias, gait problem and myalgias.   Skin: Negative.    Neurological: Positive for gait problem.   Hematological: Negative.    Psychiatric/Behavioral: The patient is nervous/anxious.    All other systems reviewed and are negative.      The ECOG performance status today is 2 - Ambulatory care of self; up and about >50% of waking hours.          Objective    PHYSICAL EXAM:   /70   Pulse 82   Temp 36.4 °C (97.6 °F) (Temporal)   Resp 18   Wt 93.4 kg (205 lb 12.8 oz)   SpO2 97%   BMI 34.25 kg/m²    Body mass index is 34.25 kg/m².       Physical Exam  HENT:      Head: Normocephalic.      Nose: Nose normal.      Mouth/Throat:      Mouth: Mucous membranes are moist.   Eyes:      Pupils: Pupils are equal, round, and reactive to light.   Cardiovascular:      Rate and Rhythm: Normal rate.   Pulmonary:      Effort: Pulmonary effort is normal.   Abdominal:      General: Abdomen is flat. There is no distension.   Musculoskeletal:         General: Normal range of motion.      Cervical back: Normal range of motion.   Skin:     General: Skin is dry.   Neurological:      General: No focal deficit present.      Mental Status: He is alert.   Psychiatric:         Mood and Affect:  Mood normal.         LABS:   Appointment on 02/11/2021   Component Date Value Ref Range Status   • WBC 02/11/2021 3.9  3.7 - 9.6 10*3/uL Final   • RBC 02/11/2021 4.08* 4.10 - 5.80 10*6/µL Final   • Hemoglobin 02/11/2021 11.4* 13.2 - 17.2 g/dL Final   • Hematocrit 02/11/2021 35.1* 38.0 - 50.0 % Final   • MCV 02/11/2021 86.0  82.0 - 97.0 fL Final   • MCH 02/11/2021 27.9* 29.0 - 34.0 pg Final   • MCHC 02/11/2021 32.4  32.0 - 36.0 g/dL Final   • RDW 02/11/2021 15.1* 11.5 - 15.0 % Final   • Platelets 02/11/2021 122* 130 - 350 10*3/uL Final   • MPV 02/11/2021 7.8  6.9 - 10.8 fL Final   • Neutrophils% 02/11/2021 72* 46 - 70 % Final   • Lymphocytes% 02/11/2021 14* 15 - 47 % Final   • Monocytes% 02/11/2021 13  5 - 13 % Final   • Eosinophils% 02/11/2021 2  0 - 3 % Final   • Basophils% 02/11/2021 0  0 - 2 % Final   • Neutrophils Absolute 02/11/2021 2.80  10*3/uL Final   • Lymphocytes Absolute 02/11/2021 0.50  10*3/uL Final   • Monocytes Absolute 02/11/2021 0.50  10*3/uL Final   • Eosinophils Absolute 02/11/2021 0.10  10*3/uL Final   • Basophils Absolute 02/11/2021 0.00  10*3/uL Final   • Sodium 02/11/2021 137  135 - 145 mmol/L Final   • Potassium 02/11/2021 4.2  3.5 - 5.1 mmol/L Final   • Chloride 02/11/2021 103  98 - 107 mmol/L Final   • CO2 02/11/2021 28  21 - 32 mmol/L Final   • Anion Gap 02/11/2021 6  3 - 11 mmol/L Final   • BUN 02/11/2021 11  7 - 25 mg/dL Final   • Creatinine 02/11/2021 0.73  0.70 - 1.30 mg/dL Final   • Glucose 02/11/2021 76  70 - 105 mg/dL Final   • Calcium 02/11/2021 8.9  8.6 - 10.3 mg/dL Final   • AST 02/11/2021 22  <40 U/L Final   • ALT (SGPT) 02/11/2021 12  7 - 52 U/L Final   • Alkaline Phosphatase 02/11/2021 103  45 - 115 U/L Final   • Total Protein 02/11/2021 6.4  6.0 - 8.3 g/dL Final   • Albumin 02/11/2021 3.6  3.5 - 5.3 g/dL Final   • Total Bilirubin 02/11/2021 0.82  0.20 - 1.40 mg/dL Final   • eGFR 02/11/2021 95  >60 mL/min/1.73m*2 Final   • Corrected Calcium 02/11/2021 9.2  8.6 - 10.3 mg/dL  Final       DIAGNOSTIC REPORTS REVIEWED:   Imaging:  Reviewed     Assessment/Plan    IMPRESSION:   This is a pleasant 69-year-old male with diagnosis of hepatocellular carcinoma involving the right lobe, locally advanced disease, per the chart not amenable to local therapy, started sorafenib December 2018 however stopped in February 2019 due to intolerance without any dose reductions.  Has been off of treatment since then, restaging CAT scan and MRI  7/2019 show progressive disease in the liver.  Started  sorafenib 8/2019 at a lower dose 200 mg twice daily, s/p RFA 8/2019, was referred to [facility], s/p Y90 treatment followed by [facility], MR showed response to treatment without progression on 1/16/20 and 4/21/20  currently treatment on hold until evidence of disease progression      It seems like vascular surgery at  were concerned about enhancement of the gallbladder and they wanted to discuss at tumor board and inform me of their decision. There is no rule for surgery, on MR 12/2020 there was clear progression of HCC and discussed plan of either starting bevacizumab and atezolizomab vs lenvatinib   After discussion likely to start bevacizumab and atezolizomab.       Plan:    -  Continue atzolizumab 1200 mg and bevacizumab 15mg/kg every 3 weeks.    - will restage after 3 cycles around 3/25/21   -  Follow up with [facility] for GI follow up and repeat ERCP   - continue follow up with palliative medicine for pain control.   - referral to hepatology for liver cirrhosis             Physician: Milka Reynoso MD    On this date of service  I spent a total time of 50 minutes

## 2021-02-15 NOTE — PROGRESS NOTES
"Subjective   Per discussion with Jose Gómez MD, Ben, has verbally consented to be treated via a telephone based visit: Yes. A total of 27 minutes were required for this telephone based visit.   Patient Location: Home  Provider Location: Clinic  Technology used by Provider: Phone    HPI  Ben Lai Sr is a 70 y.o. male who presents for follow up of HCC and related symptoms.    The pt is being seen today via telehealth due to covid 19 pandemic. The pt states that he is \"doing ok.\" Since our last appointment, he is doing pretty good. He is fatigued. His appetite is fair and his weight is down a little, between 195 and 200. He is occasionally nauseated and zofran has been effective for thisl, although he hasn't taken it since our last appointment. Some ABDUL but not at rest. His pain waxes and wanes. He c/o RUQ pain just underneath the ribs. He takes the dilaudid 2 to 4mg about 2 to 3 times per day. He feels that his pain control is \"fair.\" When he takes dilaudid and it is effective when he does take it, taking the number down but he is having difficulty putting a number on it.       The following have been reviewed and updated as appropriate in this visit:    Allergies   Allergen Reactions   • Penicillins Anaphylaxis   • Metformin Hives   • Adhesive Tape-Silicones    • Interferon Jose Maria-2a    • Latex    • Milk      Cow milk/goat milk   • Mometasone    • Mometasone Furoate    • Omeprazole    • Ondansetron Hcl Nausea And Vomiting   • Pepper (Genus Capsicum)      Any Hot Peppers - can eat bell peppers   • Primidone      Other reaction(s): HEADACHE   • Ribavirin Itching   • Rosuvastatin    • Sorafenib      Other reaction(s): BURNING SENSATION, HEADACHE, JOINT PAIN   • Spironolactone    • Statins-Hmg-Coa Reductase Inhibitors Itching and GI intolerance   • Interferon Jose Maria (Human Leuk. Derived) Palpitations and Rash   • Pantoprazole Itching and Rash   • Peginterferon Jose Maria-2b Palpitations     Current Outpatient " Medications   Medication Sig Dispense Refill   • polyethylene glycol (Miralax) 17 gram/dose powder Take 17 g by mouth daily     • brimonidine (ALPHAGAN) 0.2 % ophthalmic solution 1 drop 2 (two) times a day     • escitalopram (LEXAPRO) 10 mg tablet Take 5 mg by mouth daily     • carboxymethylcellulose (REFRESH PLUS) 0.5 % dropperette INSTILL ONE DROP IN BOTH EYES EVERY 3 HOURS FOR DRY EYES     • prochlorperazine (COMPAZINE) 10 mg tablet Take 1 tablet (10 mg total) by mouth every 6 (six) hours as needed for nausea or vomiting 30 tablet 5   • artificial saliva (YERBA GLADYS AND LYTES) aerosol,spray spray See administration instructions Take 3 to 5 sprays up to 3 to 5 times daily     • lidocaine (Lidoderm) 5 % patch Apply 1 patch topically daily Remove & discard patch within 12 hours or as directed by MD.     • psyllium husk (METAMUCIL ORAL) Take 1 Scoop by mouth daily       • fluticasone propionate (FLONASE) 50 mcg/actuation nasal spray Administer 2 sprays into each nostril daily       • CLOTRIMAZOLE, BULK, MISC as needed To feet     • naloxone HCl (NARCAN NASL) Administer into affected nostril(s) as needed     • traZODone (DESYREL) 100 mg tablet Take 100 mg by mouth nightly       • SUMAtriptan (IMITREX) 100 mg tablet Take 100 mg by mouth once as needed for migraine     • insulin glargine (Lantus Solostar U-100 Insulin) 100 unit/mL (3 mL) insulin pen injection pen Inject 10 Units under the skin every morning     • carvediloL (COREG) 3.125 mg tablet Take 1 tablet (3.125 mg total) by mouth 2 (two) times a day with meals 180 tablet 0   • diclofenac sodium (VOLTAREN) 1 % gel Apply 4 g topically 4 (four) times a day       • cetirizine (ZyrTEC) 10 mg tablet Take 10 mg by mouth daily     • loperamide (Imodium A-D) 2 mg tablet Take 2 tablets with first loose stool of the day, then up to 8 tablets daily (Patient not taking: Reported on 1/21/2021 ) 80 tablet 2   • buPROPion SR (WELLBUTRIN SR) 100 mg 12 hr tablet Take 1 tablet  (100 mg total) by mouth daily 90 tablet 1   • lactulose (GENERLAC) 10 gram/15 mL solution Take 15 mL (10 g total) by mouth daily 473 mL 2   • terazosin (HYTRIN) 5 mg capsule Take 1 capsule (5 mg total) by mouth 2 (two) times a day 180 capsule 2   • blood-glucose meter (ACCU-CHEK ADELE PLUS METER) [facility] Use to test blood sugars 3 times daily (E11.65, non insulin depedent) AccuChek Adele plus, meter is 8 years old (Patient taking differently: Use to test blood sugars 3 times daily (E11.65, non insulin dependent) ) 1 each 0   • lisinopril (PRINIVIL,ZESTRIL) 20 mg tablet Take 1 tablet (20 mg total) by mouth daily 90 tablet 2   • furosemide (LASIX) 20 mg tablet Take 1 tablet (20 mg total) by mouth 2 (two) times a day. (Patient taking differently: Take 20 mg by mouth 2 (two) times a day Taking 40 mg in am ) 180 tablet 2   • lansoprazole (PREVACID) 30 mg capsule Take 30 mg by mouth daily      • liraglutide (VICTOZA 2-JASE) 0.6 mg/0.1 mL (18 mg/3 mL) injection Inject 1.8 mg under the skin daily       • sodium chloride (SALINE NASAL) 0.65 % nasal spray Administer 1 spray into each nostril as needed for congestion or rhinitis      • pramoxine-menthol (GOLD BOND MEDICATED ANTI-ITCH) 1-1 % cream Apply topically as needed       • GLYCERIN/PROPYLENE GLYCOL (ARTIFICIAL TEARS,GLYCERIN-PEG, OPHT) Administer 1 drop into affected eye(s) as needed (Dry eyes)      • latanoprost (XALATAN) 0.005 % ophthalmic solution Administer 1 drop into both eyes nightly   10 0     No current facility-administered medications for this visit.     Past Medical History:   Diagnosis Date   • A-fib (CMS/HCC) (HCC)    • Allergy    • Anxiety    • Arthritis    • Asthma    • Blindness of right eye    • BPH (benign prostatic hyperplasia)    • Cardiovascular disease    • Cirrhosis (CMS/HCC) (HCC)     with possible liver mass by MRI 5/18, rec repeat in 8/2018   • Complication of anesthesia    • COPD (chronic obstructive pulmonary disease) (CMS/HCC) (HCC)    •  Depression    • Endocrine disorder    • Gallstones    • Gastritis    • GERD (gastroesophageal reflux disease)    • Glaucoma    • Hearing loss     bilateral hearing aids   • Hemorrhoids    • Hepatitis C     Hep C, 2016 remission, complicated by cirrhosis.  MRI abd 5/30/18, rec 3 month follow up for focus of enhancement right and left hepatic lobe junction   • Hernia, abdominal    • High blood pressure    • IBS (irritable bowel syndrome)    • Infectious viral hepatitis    • Jaundice    • Kidney stones    • Obstructive sleep apnea syndrome    • Periodic limb movement disorder    • Persistent hypersomnia    • Persistent insomnia    • Pneumonia    • PONV (postoperative nausea and vomiting)    • Type 2 diabetes mellitus (CMS/HCC) (HCC)    • Urinary incontinence    • Wears partial dentures      Past Surgical History:   Procedure Laterality Date   • COLONOSCOPY  10/27/2016    Pili, normal, repeat 10 years   • CT ABLATION LIVER RADIOFREQUENCY  8/14/2019    CT ABLATION LIVER RADIOFREQUENCY 8/14/2019 [facility] MIS CT SCAN   • ESOPHAGOGASTRODUODENOSCOPY N/A 2/22/2018    Procedure: EGD - ESOPHAGOGASTRODUODENOSCOPY with banding  x 2 with crna;  Surgeon: William Rivas MD;  Location: [facility];  Service: Endoscopy;  Laterality: N/A;   • ESOPHAGOGASTRODUODENOSCOPY N/A 4/6/2018    Procedure: EGD - ESOPHAGOGASTRODUODENOSCOPY with crna;  Surgeon: William Rivas MD;  Location: [facility];  Service: Endoscopy;  Laterality: N/A;   • ESOPHAGOGASTRODUODENOSCOPY N/A 5/7/2019    Procedure: EGD - ESOPHAGOGASTRODUODENOSCOPY;  Surgeon: William Rivas MD;  Location: [facility];  Service: Endoscopy;  Laterality: N/A;   • HAND SURGERY Left     thumb   • HERNIA REPAIR  01/01/1965   • IR TUNNELED CENTRAL LINE WITH PORT  12/31/2020    IR TUNNELED CENTRAL LINE WITH PORT 12/31/2020 Sonny Danielle MD [facility] MIS INTERVEN RAD   • KNEE ARTHROSCOPY  09/19/2017    left knee, with partial medial meniscectomy; limited chondroplasty, patellofemoral joint   •  LIVER BIOPSY      by Dr. Alejandre   • OTHER SURGICAL HISTORY      esophageal varices, banded   • VASECTOMY      at approx age of 25     Family History   Problem Relation Age of Onset   • Arthritis Mother    • Rheum arthritis Mother    • Diabetes Mother    • Rectal cancer Mother    • Other Paternal Grandmother    • Other Paternal Grandfather    • Diabetes Other    • Rheum arthritis Other    • Osteoporosis Other      Social History     Occupational History   • Not on file   Tobacco Use   • Smoking status: Never Smoker   • Smokeless tobacco: Never Used   Substance and Sexual Activity   • Alcohol use: No   • Drug use: No   • Sexual activity: Defer   Social History Narrative   • Not on file       Review of Systems   Constitutional: Positive for activity change, appetite change, fatigue and unexpected weight change.   HENT: Negative.    Respiratory:        ABDUL; non SOB at rest   Cardiovascular: Positive for leg swelling. Negative for chest pain.   Gastrointestinal: Positive for abdominal pain, constipation and nausea. Negative for diarrhea and vomiting.   Endocrine: Negative.    Genitourinary: Negative.    Musculoskeletal: Negative.    Skin: Negative.    Allergic/Immunologic: Negative.    Neurological: Positive for weakness.   Hematological: Negative.    Psychiatric/Behavioral: Positive for confusion and sleep disturbance.         Assessment/Plan   Diagnoses and all orders for this visit:    Pain of metastatic malignancy    Hepatocellular carcinoma (CMS/HCC) (HCC)    Gastroesophageal reflux disease without esophagitis    Nausea    I would advocate adding a long acting pain medication, but pt is not sure how/where I should send it. Would start ms contin 15mg Q 12 hrs. He does not currently need any refills on the dilaudid. We will follow up in 4 weeks or sooner, if needed. [facility] is going to have [facility] call our office to coordinate how to get the pt his narcotic rxs.

## 2021-03-02 NOTE — PROGRESS NOTES
Patient calling in about a referral to [place] that [facility] was to put in for him / pt & wife confused on what referral is for & who it's with / Pt's wife states they have a GI appt on 4/12 at [facility] / please call back

## 2021-03-04 ENCOUNTER — TRANSFERRED RECORDS (OUTPATIENT)
Dept: HEALTH INFORMATION MANAGEMENT | Facility: CLINIC | Age: 71
End: 2021-03-04

## 2021-03-04 PROBLEM — R10.11 INTRACTABLE RIGHT UPPER QUADRANT ABDOMINAL PAIN: Status: ACTIVE | Noted: 2021-01-01

## 2021-03-04 PROBLEM — R10.9 ABDOMINAL PAIN: Status: ACTIVE | Noted: 2021-01-01

## 2021-03-04 NOTE — LETTER
03/10/21      Good afternoon Stacy    This patient need follow-up with me on 3/26/2021  Right upper quadrant abscess, status post drainage, polymicrobial growth, he will be discharged on Augmentin 875 mg twice daily for 3 weeks    Thank you    Dr. Lucia [facility]URGICAL  [address] [id number]  [phone]  Dept: [phone]

## 2021-03-04 NOTE — ED PROVIDER NOTES
HPI:Abdominal pain    Patient is a 70 y.o. male presenting to the emergency department with constant increasing 9/10 abdominal pain with associated diarrhea and vomiting that began today.    He has no alleviating factors. He was sent from cancer center where he is being treated for his liver cancer by Dr. Reynoso.    HISTORY:  Past Medical History:   Diagnosis Date   • A-fib (CMS/HCC) (HCC)    • Allergy    • Anxiety    • Arthritis    • Asthma    • Blindness of right eye    • BPH (benign prostatic hyperplasia)    • Cardiovascular disease    • Cirrhosis (CMS/HCC) (HCC)     with possible liver mass by MRI 5/18, rec repeat in 8/2018   • Complication of anesthesia    • COPD (chronic obstructive pulmonary disease) (CMS/HCC) (HCC)    • Depression    • Endocrine disorder    • Gallstones    • Gastritis    • GERD (gastroesophageal reflux disease)    • Glaucoma    • Hearing loss     bilateral hearing aids   • Hemorrhoids    • Hepatitis C     Hep C, 2016 remission, complicated by cirrhosis.  MRI abd 5/30/18, rec 3 month follow up for focus of enhancement right and left hepatic lobe junction   • Hernia, abdominal    • High blood pressure    • IBS (irritable bowel syndrome)    • Infectious viral hepatitis    • Jaundice    • Kidney stones    • Obstructive sleep apnea syndrome    • Periodic limb movement disorder    • Persistent hypersomnia    • Persistent insomnia    • Pneumonia    • PONV (postoperative nausea and vomiting)    • Type 2 diabetes mellitus (CMS/HCC) (HCC)    • Urinary incontinence    • Wears partial dentures        Past Surgical History:   Procedure Laterality Date   • COLONOSCOPY  10/27/2016    Pili, normal, repeat 10 years   • CT ABLATION LIVER RADIOFREQUENCY  8/14/2019    CT ABLATION LIVER RADIOFREQUENCY 8/14/2019 [facility] MIS CT SCAN   • ESOPHAGOGASTRODUODENOSCOPY N/A 2/22/2018    Procedure: EGD - ESOPHAGOGASTRODUODENOSCOPY with banding  x 2 with crna;  Surgeon: William Rivas MD;  Location: [facility];   Service: Endoscopy;  Laterality: N/A;   • ESOPHAGOGASTRODUODENOSCOPY N/A 4/6/2018    Procedure: EGD - ESOPHAGOGASTRODUODENOSCOPY with crna;  Surgeon: William Rivas MD;  Location: [facility] End[facility];  Service: Endoscopy;  Laterality: N/A;   • ESOPHAGOGASTRODUODENOSCOPY N/A 5/7/2019    Procedure: EGD - ESOPHAGOGASTRODUODENOSCOPY;  Surgeon: William Rivas MD;  Location: [facility] End[facility];  Service: Endoscopy;  Laterality: N/A;   • HAND SURGERY Left     thumb   • HERNIA REPAIR  01/01/1965   • IR TUNNELED CENTRAL LINE WITH PORT  12/31/2020    IR TUNNELED CENTRAL LINE WITH PORT 12/31/2020 Sonny Danielle MD [facility] MIS INTERVEN RAD   • KNEE ARTHROSCOPY  09/19/2017    left knee, with partial medial meniscectomy; limited chondroplasty, patellofemoral joint   • LIVER BIOPSY      by Dr. Alejandre   • OTHER SURGICAL HISTORY      esophageal varices, banded   • VASECTOMY      at approx age of 25       Family History   Problem Relation Age of Onset   • Arthritis Mother    • Rheum arthritis Mother    • Diabetes Mother    • Rectal cancer Mother    • Other Paternal Grandmother    • Other Paternal Grandfather    • Diabetes Other    • Rheum arthritis Other    • Osteoporosis Other        Social History     Tobacco Use   • Smoking status: Never Smoker   • Smokeless tobacco: Never Used   Substance Use Topics   • Alcohol use: No   • Drug use: No       ROS:  Constitutional: Negative for fever.   HENT: Negative for sore throat.    Eyes: Negative for pain.   Respiratory: Negative for shortness of breath.  Cardiovascular: Negative for chest pain.   Gastrointestinal: Positive for abdominal pain, positive for vomiting, positive for diarrhea.   Endocrine: Negative for polyuria.   Genitourinary: Negative for flank pain.   Musculoskeletal: Negative for back pain.   Neurological: Negative for headaches.   Hematological: Negative for bleeding.    PHYSICAL EXAM:  Nursing note and vitals reviewed.  Constitutional: appears well-developed.   HENT: Moist mucous  membranes.  Head: Normocephalic and atraumatic.   Eyes: Pupils are equal, round, and reactive to light.   Neck: Supple, no lymphadenopathy  Cardiovascular: Regular rate and rhythm with no murmur, rub, or gallop.  Normal pulses.  Pulmonary/Chest: No respiratory distress.  Clear to auscultation bilaterally.  Abdominal: Right upper quadrant tenderness. No rebounding or guarding.  Back: No CVA tenderness.  Musculoskeletal: No edema  Neurological: Alert.   Skin: Skin is warm and dry. No rash noted.   Psychiatric: Normal mood and affect.    MDM:     70 year old male with a known history of metastatic liver cancer presented from the cancer center with abdominal pain. Imaging done prior to arrival were reviewed and I discussed the findings with IR who agreed to review the results tomorrow to decide on treatment options. Labs were unremarkable.  Ultrasound of gallbladder revealed sludge and stones, however wall was not very well delineated, no pericholecystic fluid.     CT abd with contrast 1. There are 2 small fluid and gas locules in the right upper quadrant subhepatic space extrinsic to the gallbladder. Concern would be for gallbladder perforation or fistulization to these areas.  2. Very abnormal appearing gallbladder which contains indwelling stents. It is possible these could have eroded through the gallbladder.  3. Generalized inflammatory changes right upper quadrant subhepatic space extending to involve the right colon. Differential would include inflammatory changes related to bile leakage, infection and/or infiltrative malignancy.  4. Cirrhosis with changes of known hepatoma and treatment related scarring/fibrosis. Uncertain if there is residual viable tumor or if this is all related to treated disease      Dilaudid was given. Case was discussed with the Hospitalist Service who agreed to hospitalize the patient for further evaluation and management. Patient remained stable.      Labs Reviewed   CBC WITH AUTO  DIFFERENTIAL - Abnormal       Result Value    WBC 5.1      RBC 4.18      Hemoglobin 11.5 (*)     Hematocrit 35.0 (*)     MCV 83.7      MCH 27.4 (*)     MCHC 32.8      RDW 15.5 (*)     Platelets 111 (*)     MPV 7.8      Neutrophils% 78 (*)     Lymphocytes% 9 (*)     Monocytes% 12      Eosinophils% 1      Basophils% 0      Neutrophils Absolute 4.00      Lymphocytes Absolute 0.50      Monocytes Absolute 0.60      Eosinophils Absolute 0.00      Basophils Absolute 0.00     COMPREHENSIVE METABOLIC PANEL - Abnormal    Sodium 137      Potassium 3.7      Chloride 102      CO2 29      Anion Gap 6      BUN 12      Creatinine 0.69 (*)     Glucose 125 (*)     Calcium 8.4 (*)     AST 21      ALT (SGPT) 13      Alkaline Phosphatase 118 (*)     Total Protein 6.3      Albumin 3.2 (*)     Total Bilirubin 0.92      eGFR 97      Corrected Calcium 9.0      Narrative:     Estimated GFR calculated using the 2009 CKD-EPI creatinine equation.   LIPASE - Normal    Lipase 12     BLOOD CULTURES, 2 SETS    Narrative:     The following orders were created for panel order Blood Cultures, 2 Sets.  Procedure                               Abnormality         Status                     ---------                               -----------         ------                     Blood culture, 1 set[[id number]]                                                         Blood culture, 1 set[[id number]]                                                           Please view results for these tests on the individual orders.   BLOOD CULTURE   BLOOD CULTURE   URINE EXTRA CONTAINER       US abdomen limited   Final Result   IMPRESSION:       1. Very poorly defined gallbladder which appears completely filled with stones and sludge. Soft tissue infiltration not excluded. Gallbladder wall is not well delineated.   2. Small amount of perihepatic ascites.   3. Cirrhotic liver with areas of fibrosis.      CT ABDOMEN PELVIS W IV CONTRAST    (Results Pending)     MR abdomen with  and without contrast    Result Date: 3/3/2021  Narrative: Exam: MRI of the abdomen with and without gbrfhazp38/03/2021 Clinical History:  Testicular carcinoma Comparison(s):  12/21/2020 Technique: Axial and coronal T2 haste with and without fat saturation, axial in and out of phase gradient echo, axial T1-weighted 3-D vibe multiple phases utilizing 19 cc of ProHance. IV (contrast waste equal 1 cc single use vial) Findings: Extremely complicated appearing region adjacent to the gallbladder fundus the inferior surface of the right hepatic lobe extending to the hepatic flexure the colon. The degree of enhancement involving the extrahepatic subhepatic region has increased. There is a small fluid collection which is contiguous with the gallbladder fundus measuring approximately 2.8 cm. This appears increased since previous. There is thickening of the hepatic flexure. There is still some abnormal enhancement involving the area of capsular retraction scarring inferior right hepatic lobe which otherwise does not appear dramatically changed. There is some enhancement along right hepatic lobe biliary tree which does not appear significantly changed. Cirrhotic changes seen elsewhere. Splenomegaly. No acute renal pathology.     Impression: IMPRESSION: 1. Very complex appearing situation in the region of the gallbladder fundus, subhepatic space extending to the hepatic flexure of the colon. There is concern for the possibility of extrahepatic malignancy and/or an inflammatory process in this area which could be affecting the gallbladder. There is a 2.8 cm fluid collection adjacent to the gallbladder. Cholecystitis would be another consideration. Additional correlation with contrast-enhanced CT may be helpful to better define some of the anatomy in this area. 2. Capsular retraction, scarring and mild enhancement redemonstrated in right hepatic lobe. Most of the intrahepatic signal abnormalities do not appear dramatically changed.  3. There is still some biliary dilatation the right hepatic lobe with adjacent enhancement. Question cholangitis. 4. In addition there are small filling defects within the common bile duct. Possible choledocholithiasis. Currently there is not seen to be high-grade obstruction however. Common bile duct diameter approximately millimeters.     ED Medication Administration from 03/04/2021 1408 to 03/04/2021 1931       Date/Time Order Dose Route Action Action by     03/04/2021 1803 HYDROmorphone (DILAUDID) injection 1 mg 1 mg intravenous Given LENA Alvarado     03/04/2021 1935 iopamidoL (ISOVUE-370) 76 % injection 115 mL 115 mL intravenous Given EBONIE Cruz      PROCEDURES:  Procedures    ED COURSE:       CLINICAL IMPRESSION:  Final diagnoses:   [R10.9] Abdominal pain   [R11.10] Vomiting   [D64.9] Anemia   [C22.0] Hepatocarcinoma (CMS/HCC) (HCC)   [R10.11] Right upper quadrant pain   By signing my name, I, Stefania Delgado, attest that this documentation has been prepared under the direction and in the presence of Dr. Dyson; 3/4/2021, 5:01 PM.    I, Dr. Dyson, personally performed the services described in this documentation as described by laurel in my presence and it is both accurate and complete.     A voice recognition program was used to aid in documentation of this record.  Sometimes words are not printed exactly as they were spoken.  While efforts were made to carefully edit and correct any inaccuracies, some areas may be present; please take these into context.  Please contact the provider if areas are identified.       Benedict Dyson DO  03/05/21 0021

## 2021-03-05 PROBLEM — E83.42 HYPOMAGNESEMIA: Status: ACTIVE | Noted: 2021-01-01

## 2021-03-05 PROBLEM — E83.39 HYPOPHOSPHATEMIA: Status: ACTIVE | Noted: 2021-01-01

## 2021-03-05 NOTE — NURSING NOTE
Pt tolerated abscess drain placement x2 well. Pt returned to room 1008 via cart in stable condition. Report called to Alicia SIGALA. Specimen taken to lab by Aby SIGAAL.

## 2021-03-05 NOTE — INTERDISCIPLINARY/THERAPY
Case Management Admission Note  [id number]    Living Situation: Home with wife  ADLs: Ind and active when feeling well.  Stairs: Split 6/6, can stay on one level at discharge   DME: [facility]  Oxygen: None  [facility]  Home Health: None  Dialysis: None  Diabetes/supplies: Has testing and insulin supplies  PCP: Dr. Collin Ramírez  Funding: [facility] [employer] [facility] connected.   Pharmacy: [facility]  Support Person: Wife  Transportation at DC: Wife to transport  Discharge Needs/Barriers: None    Narrative: Patient admitted for Intractable upper quadrant pain. Plan is for surgery later today.  Patient has significant health history including hepatocellular cancer .      CM completed face to face visit with patient and wife, patient deferring to wife for answers to questions. Patient well supported by wife, patient anticipates he will be able to discharge home in 2-3 days. Has ride. No anticipated discharge needs.      Dispo: Home with wife once medically stable.

## 2021-03-05 NOTE — MEDICATION HISTORY SPECIALIST NOTES
[facility] ED-212    CSN: [id number]  : [id number]    Information sources:  EPIC  Complete Dispense Report  Care Everywhere    Allergies verified.    Patient and his spouse interviewed in the ED who provided all history. Patient verified medications and provided last doses. Verified with Complete Dispense Report and Care Everywhere.    New medications added:  Desvenlafaxine  Dentagel    Discontinued medications:  Metamucil  Escitalopram - alt therapy  Lidocaine patch - pharmacy policy  Sumatriptan - pharmacy policy  Artificial tears - alt therapy    Profile was checked for  medications.    Discrepancies:  Patient taking Lantus 16 units daily.  Patient taking lactulose 20 ml as needed.

## 2021-03-05 NOTE — CONSULTATION
"  Consult to Palliative Care  Consult performed by: Whitney Gallego CNP  Consult ordered by: Tim Lutz MD  Reason for consult: symptom management         Subjective     Code status: Full Code    Chief Complaint  Encounter for palliative care.     History of Present Illness  Mr. Lai is a 69 yo male who was admitted 3/4/2021 with increased RUQ abdominal pain beginning one week ago, associated with nausea and diarrhea. Pt has hepatocellular carcinoma, diagnosed in November 2018 and receiving treatment with Dr. Reynoso. He was seen by Dr. Reynoso yesterday and sent to ED for evaluation and treatment of abdominal pain. History is also significant for hepatitis C, liver cirrhosis, paroxysmal atrial fibrillation, COPD, JENIFFER, hypertension, depression and anxiety. Pt has been seen by Dr. Gómez in outpatient palliative care for symptom management. Pain management was recently assumed by [facility]. Palliative care was asked to see pt to assist with symptom management. He was seen in his room, wife present. Pt has recently returned from interventional radiology. Symptoms were reviewed. Pt and wife report that \"usual\" RUQ pain was well-controlled with long-acting morphine 15 mg every 12 hours and hydromorphone 2-4 mg 2-4 times per day. Pain increased significantly in the past 1 week.     Past Medical History  Past Medical History:   Diagnosis Date   • A-fib (CMS/HCC) (HCC)    • Allergy    • Anxiety    • Arthritis    • Asthma    • Blindness of right eye    • BPH (benign prostatic hyperplasia)    • Cardiovascular disease    • Cirrhosis (CMS/HCC) (HCC)     with possible liver mass by MRI 5/18, rec repeat in 8/2018   • Complication of anesthesia    • COPD (chronic obstructive pulmonary disease) (CMS/HCC) (HCC)    • Depression    • Endocrine disorder    • Gallstones    • Gastritis    • GERD (gastroesophageal reflux disease)    • Glaucoma    • Hearing loss     bilateral hearing aids   • Hemorrhoids    • Hepatitis C     Hep " C, 2016 remission, complicated by cirrhosis.  MRI abd 5/30/18, rec 3 month follow up for focus of enhancement right and left hepatic lobe junction   • Hernia, abdominal    • High blood pressure    • IBS (irritable bowel syndrome)    • Infectious viral hepatitis    • Jaundice    • Kidney stones    • Obstructive sleep apnea syndrome    • Periodic limb movement disorder    • Persistent hypersomnia    • Persistent insomnia    • Pneumonia    • PONV (postoperative nausea and vomiting)    • Type 2 diabetes mellitus (CMS/HCC) (HCC)    • Urinary incontinence    • Wears partial dentures        Past Surgical History  Past Surgical History:   Procedure Laterality Date   • COLONOSCOPY  10/27/2016    Pili, normal, repeat 10 years   • CT ABLATION LIVER RADIOFREQUENCY  8/14/2019    CT ABLATION LIVER RADIOFREQUENCY 8/14/2019 [facility] MIS CT SCAN   • CT ABSCESS CYST PERITONEAL  3/5/2021    CT ABSCESS CYST PERITONEAL 3/5/2021 [facility] MIS CT SCAN   • ESOPHAGOGASTRODUODENOSCOPY N/A 2/22/2018    Procedure: EGD - ESOPHAGOGASTRODUODENOSCOPY with banding  x 2 with crna;  Surgeon: William Rivas MD;  Location: [facility] Endoscopy;  Service: Endoscopy;  Laterality: N/A;   • ESOPHAGOGASTRODUODENOSCOPY N/A 4/6/2018    Procedure: EGD - ESOPHAGOGASTRODUODENOSCOPY with crna;  Surgeon: William Rivas MD;  Location: [facility] Endoscopy;  Service: Endoscopy;  Laterality: N/A;   • ESOPHAGOGASTRODUODENOSCOPY N/A 5/7/2019    Procedure: EGD - ESOPHAGOGASTRODUODENOSCOPY;  Surgeon: William Rivas MD;  Location: [facility] Endoscopy;  Service: Endoscopy;  Laterality: N/A;   • HAND SURGERY Left     thumb   • HERNIA REPAIR  01/01/1965   • IR TUNNELED CENTRAL LINE WITH PORT  12/31/2020    IR TUNNELED CENTRAL LINE WITH PORT 12/31/2020 Sonny Danielle MD [facility] MIS INTERVEN RAD   • KNEE ARTHROSCOPY  09/19/2017    left knee, with partial medial meniscectomy; limited chondroplasty, patellofemoral joint   • LIVER BIOPSY      by Dr. Alejandre   • OTHER SURGICAL HISTORY      esophageal varices,  banded   • VASECTOMY      at approx age of 25       Social History  . Lives in [place] with his wife. [place].     Review of Systems  Review of Systems   Constitutional: Positive for fatigue. Negative for activity change and appetite change.   HENT: Negative for trouble swallowing.    Respiratory: Negative for shortness of breath (with increased pain).    Cardiovascular: Positive for leg swelling.   Gastrointestinal: Positive for abdominal pain (RUQ/ribs), constipation, diarrhea, nausea and vomiting.       Objective     Vital signs for last 24 hours  Temp:  [36.7 °C (98 °F)-37.3 °C (99.1 °F)] 37 °C (98.6 °F)  Heart Rate:  [68-90] 78  Resp:  [16-25] 18  BP: (132-175)/() 175/86    Intake/Output this shift  I/O this shift:  In: -   Out: 20 [Drains:20]    Physicial Exam  Physical Exam  Constitutional:       General: He is not in acute distress.     Appearance: He is ill-appearing.      Interventions: Nasal cannula in place.   Cardiovascular:      Rate and Rhythm: Normal rate and regular rhythm.   Pulmonary:      Effort: No respiratory distress.      Breath sounds: Examination of the right-lower field reveals decreased breath sounds. Examination of the left-lower field reveals decreased breath sounds. Decreased breath sounds (on anterior exam) present.   Chest:       Abdominal:      General: Bowel sounds are decreased. There is no distension.      Palpations: Abdomen is soft.      Tenderness: There is abdominal tenderness in the right upper quadrant.       Musculoskeletal:      Right lower leg: 3+ Edema present.      Left lower leg: 3+ Edema present.   Skin:     General: Skin is warm and dry.   Neurological:      Mental Status: He is alert.      Comments: Calm and appropriate in discussion. Wife assisted with history.    Psychiatric:         Behavior: Behavior is cooperative.         Labs  Blood (Aerobic and Anaerobic):    Lab Results   Component Value Date    BLOODCX No Growth 12 hours 03/04/2021      Results from last 4 days   Lab Units 03/04/21  1733   WBC AUTO 10*3/uL 5.1   HEMOGLOBIN g/dL 11.5*   HEMATOCRIT % 35.0*   PLATELETS AUTO 10*3/uL 111*   NEUTROS PCT AUTO % 78*   LYMPHS PCT AUTO % 9*   MONOS PCT AUTO % 12   EOS PCT AUTO % 1     Results from last 4 days   Lab Units 03/04/21  1733   SODIUM mmol/L 137   POTASSIUM mmol/L 3.7   CHLORIDE mmol/L 102   CO2 mmol/L 29   BUN mg/dL 12   CREATININE mg/dL 0.69*   CALCIUM mg/dL 8.4*   TOTAL PROTEIN g/dL 6.3   BILIRUBIN TOTAL mg/dL 0.92   ALK PHOS U/L 118*   ALT U/L 13   AST U/L 21   GLUCOSE mg/dL 125*       Radiology  3/3 MRI abdomen: 1. Very complex appearing situation in the region of the gallbladder fundus, subhepatic space extending to the hepatic flexure of the colon. There is concern for the possibility of extrahepatic malignancy and/or an inflammatory process in this area which could be affecting the gallbladder. There is a 2.8 cm fluid collection adjacent to the gallbladder. Cholecystitis would be another consideration. Additional correlation with contrast-enhanced CT may be helpful to better define some of the anatomy in this area.  2. Capsular retraction, scarring and mild enhancement redemonstrated in right hepatic lobe. Most of the intrahepatic signal abnormalities do not appear dramatically changed.  3. There is still some biliary dilatation the right hepatic lobe with adjacent enhancement. Question cholangitis.  4. In addition there are small filling defects within the common bile duct. Possible choledocholithiasis. Currently there is not seen to be high-grade obstruction however. Common bile duct diameter approximately millimeters.     3/4 abdominal ultrasound: 1. Very poorly defined gallbladder which appears completely filled with stones and sludge. Soft tissue infiltration not excluded. Gallbladder wall is not well delineated.  2. Small amount of perihepatic ascites.  3. Cirrhotic liver with areas of fibrosis.    3/4 CT abdomen/pelvis: 1. There  are 2 small fluid and gas locules in the right upper quadrant subhepatic space extrinsic to the gallbladder. Concern would be for gallbladder perforation or fistulization to these areas.  2. Very abnormal appearing gallbladder which contains indwelling stents. It is possible these could have eroded through the gallbladder.  3. Generalized inflammatory changes right upper quadrant subhepatic space extending to involve the right colon. Differential would include inflammatory changes related to bile leakage, infection and/or infiltrative malignancy.  4. Cirrhosis with changes of known hepatoma and treatment related scarring/fibrosis. Uncertain if there is residual viable tumor or if this is all related to treated disease.    3/5 CT abscess: Completed drainage of 2 small fluid collections adjacent to the gallbladder in the right upper lateral abdomen.    _________________________________________________________________________________  Assessment and Plan    1. Palliative care by specialist    Symptoms: pain, fatigue.     Code Status: FULL CODE.     Goals of Care: to have better pain control at this time. Discussed with Dr. Lutz. Our service is not staffed on the weekend. We will see pt next on 3/8 if he is still hospitalized. Thank you for asking us to assist in the care of your pt.     2. Symptom management   Continue long-acting morphine 15 mg every 12 hours. Will increase IV hydromorphone dose to 0.4 mg every 3 hours prn pain 4-7 and 0.8 mg every 3 hours prn for pain 8-10. Pt and wife advised that he may have improvement in pain with placement of drain tubes. Wife states pt has both long-acting morphine and po hydromorphone at home if he is discharged.     3. Hepatocellular carcinoma   Diagnosed in 2018. On treatment with Dr. Reynoso. Current treatment is bevacizumab and atezolizumab, held 3/4.     4. Comorbid conditions   Hepatitis C, liver cirrhosis, paroxysmal atrial fibrillation, COPD, JENIFFER, hypertension,  depression and anxiety.    Whitney Gallego, CNP  3/5/2021  4:35 PM

## 2021-03-05 NOTE — PROGRESS NOTES
Daily Progress Note    Assessment/Plan     Principal Problem:    Intractable right upper quadrant abdominal pain  Active Problems:    Chronic hepatitis C with cirrhosis (CMS/HCC) (HCC)    Type 2 diabetes mellitus with neurologic complication (CMS/HCC) (HCC)    Essential hypertension    Hepatocellular carcinoma (CMS/HCC) (HCC)    Pain of metastatic malignancy    Migraine without aura and without status migrainosus, not intractable    Primary osteoarthritis of both knees    Gastroesophageal reflux disease without esophagitis    Abdominal pain    Hypomagnesemia    Hypophosphatemia       LOS: 1 day     Subjective     Interval History: Patient indicates that his pain can still peak to 10/10 when he takes a deeper breath, with radiation to the back.  He has been followed as an outpatient by Dr. Jose Gómez from the Palliative Medicine service by way of management of his pain due to metastatic malignancy.  His wife Laura is in attendance, has questions/concerns regarding what will be done by interventional radiology today.    Objective     Vital signs in last 24 hours:  Temp:  [36.7 °C (98 °F)-37.3 °C (99.1 °F)] 37.2 °C (98.9 °F)  Heart Rate:  [68-90] 80  Resp:  [16-25] 18  BP: (132-175)/() 144/76    Intake/Output last 3 shifts:  I/O last 3 completed shifts:  In: 626.4 [I.V.:199.8; IV Piggyback:426.6]  Out: 20 [Drains:20]  Intake/Output this shift:  No intake/output data recorded.    Physical Exam:  Physical Exam  Vitals reviewed.   Constitutional:       Appearance: He is ill-appearing. He is not diaphoretic.   HENT:      Head: Normocephalic and atraumatic.   Eyes:      General: No scleral icterus.        Right eye: No discharge.         Left eye: No discharge.   Cardiovascular:      Rate and Rhythm: Normal rate and regular rhythm.      Heart sounds: No murmur. No friction rub. No gallop.    Pulmonary:      Breath sounds: No wheezing, rhonchi or rales.   Abdominal:      General: There is distension.       Palpations: Abdomen is soft.      Tenderness: There is abdominal tenderness. There is no guarding or rebound.   Musculoskeletal:      Right lower leg: No edema.      Left lower leg: No edema.   Skin:     General: Skin is warm and dry.      Findings: No erythema or rash.   Neurological:      Mental Status: He is oriented to person, place, and time.      Cranial Nerves: No cranial nerve deficit.   Psychiatric:         Mood and Affect: Mood normal.         Behavior: Behavior normal.         Thought Content: Thought content normal.         Judgment: Judgment normal.         Labs  Lactate:   Lab Results   Component Value Date    LACTATE 1.1 03/05/2021     Assessment:  Acute on chronic right upper quadrant abdominal pain  Abnormal gallbladder by CT imaging, questionable cholecystitis  Hepatocellular carcinoma, metastatic -- followed by local oncologist Dr. Reynoso  Severe penicillin allergy (anaphylaxis)    Plan:  1. Consulted the Palliative Medicine service for assistance in pain management.  He has been followed as an outpatient by Dr. Jose Gómez.  Appreciate their input.  2. Await the findings and intervention of the IR service with interest. Conveyed to his spouse that he would have a percutaneous drain placed if found to have a fluid collection.  3. Continue empiric IV ciprofloxacin and Flagyl.    MORELIA BLOUNT MD     Total time - 25 minutes. More than 50% of time spent in direct patient care, care coordination, patient/caregiver counseling, and formalizing plan of care.  Case discussed with the admitting physician Dr. Marah Sales, and Whitney Gallego CNP with the Palliative Medicine service. Discussed with the lead case manager.  Will sign out with the physician assuming his care for the hospitalist service tomorrow.      A voice recognition program was used to aid in documentation of this record. Sometimes words are not printed exactly as they were spoken.  While efforts were made to carefully edit and correct any  inaccuracies, some areas may be present; please take these into context.  Please contact the provider if areas are identified.

## 2021-03-05 NOTE — POST-PROCEDURE NOTE
Post Procedure Note      Patient Name: Ben Lai      : 1950    Radiologist: MATHIEU VALLE MD    Pre-operative Diagnosis: right perihepatic Fluid collection x 2    Operation :Two drain placements into separate collections adjacent to gall bladde Drain Placement    Anesthesia Type: Lidocaine, conscious sedation    Estimated Blood Loss:< 10 cc    Specimens: Gram stain, Culture    Findings: 10 [place] drain placed into both small fluid collections adjacent to gall bladder.    Complications:  None; patient tolerated the procedure well.          Prosthetic devices, implanted devices: Two 10 fr drains Drain.

## 2021-03-05 NOTE — PLAN OF CARE
Problem: Malnutrition   Parameters for Malnutrition: Severe Malnutrition-Acute disease or injury-related (NC-4.1.3.2)  Etiology: Liver CA, Nausea, Vomiting, Diarrhea, abdominal pain   Signs/Symptoms:  Weight Loss: Weight loss of 4.8 kg (5.1%) over past month  Energy Intake: Intakes <50% estimated needs >1 month per report     Goal: Food and/or Nutrient Delivery (ND)  Description: Intakes 75% at meals  Maintain wt above 88 kg    Outcome: Not Progressing-- NPO most of day for procedure   Interventions:   Meals and Snacks (ND-1): General/healthful diet  Nutrition Supplement Therapy (ND-3): Medical Food Supplement Therapy

## 2021-03-05 NOTE — INTERDISCIPLINARY/THERAPY
NUTRITION ASSESSMENT:    MALNUTRITION:  Parameters for Malnutrition: Severe Malnutrition-Acute disease or injury-related (NC-4.1.3.2)  Etiology: Liver CA, Nausea, Vomiting, Diarrhea, abdominal pain   Signs/Symptoms:  Weight Loss: Weight loss of 4.8 kg (5.1%) over past month  Energy Intake: Intakes <50% estimated needs >1 month per report        OTHER NUTRITION PROBLEMS:   DM-- POC Glucs:  x3 checks   Lab Results   Component Value Date    HGBA1C 7.5 (H) 08/04/2020     FOOD ALLERGIES: Milk, Hot peppers (bell peppers okay)    NUTRITION INTERVENTIONS:     Continue Regular diet as tolerated   Add Ensure HiPRO to trays (provides 160 kcal and 16 g PRO)     Discharge nutrition recommendations Regular, Nutrition supplements 1-2 daily to maintain wt  _______________________________________________________________________________  NUTRITION ASSESSMENT/REASSESSMENT    PERTINENT MEDICAL DIAGNOSIS/PROBLEMS:      Abdominal Pain  PMH: A-Fib, Anxiety, Arthritis, Asthma, BPH, CVD, Cirrhosis, COPD, Depression, Gallstones, Gastritis, GERD, Glaucoma, Hepatitis C, Hernia, IBS, JENIFFER, Insomnia, Pneumonia, PONV, DM, Wears dentures         NUTRITION PRESCRIPTION:  Total Energy Estimated Needs: 25-30 kcal/kg IBW= 4670-6093 kcal  Total Protein Estimated Needs: 1.2-1.5 g/kg IBW= 77-96 g PRO  Total Fluid Estimated Needs: 30 ml/kg Adj IBW= 2100 ml or per provider         Dietary Orders   (From admission, onward)             Start     Ordered    03/05/21 0215  Diet Regular  Diet effective now     Question Answer Comment   Nutrition Therapy Protocol (Dietitian May Adjust Diet and Nourishments) Yes    Diet type Regular        03/05/21 0214              MONITORING/EVALUATION:  Pertinent Info: 3/5: pt triggered for weight loss and poor intakes PTA-- 2 drains placed adjacent ot gall bladder by IR today-- spoke with pt's wife, pt was resting, wife reports pt has had poor intakes of 2-3 small meals/day for about the past month due to pt's pain--  "reports  lbs with weight loss over this past month-- reports pt sometimes drinks a protein supplement (has about 32 g PRO per wife recall); agreeable to trial Ensure HiPRO when diet advanced     Neuro:  --  Intake:   Average Percent Meals Eaten (%): 0 Avg %     GI:   BM TPA  Wounds:  None noted  Pertinent Meds:  Coreg, Cipro, Aspart (Correction), Glargine, Prevacid, Lisinopril, Flagyl   Labs:    Results from last 4 days   Lab Units 03/05/21  0225 03/04/21  1733   POTASSIUM mmol/L  --  3.7   CHLORIDE mmol/L  --  102   SODIUM mmol/L  --  137   BUN mg/dL  --  12   CREATININE mg/dL  --  0.69*   CO2 mmol/L  --  29   ANION GAP mmol/L  --  6   GLUCOSE mg/dL  --  125*   CALCIUM mg/dL  --  8.4*   AST U/L  --  21   ALT U/L  --  13   ALK PHOS U/L  --  118*   TOTAL PROTEIN g/dL  --  6.3   ALBUMIN g/dL  --  3.2*   BILIRUBIN TOTAL mg/dL  --  0.92   EGFR mL/min/1.73m*2  --  97   PHOSPHORUS mg/dL 1.8*  --    MAGNESIUM mg/dL 1.5*  --      Fluid Status:   ml/hr; Non-pitting BLE edema per nursing documentation   Wt:     Weights (last 14 days)     Date/Time   Weight    03/04/21 1419   88.6 kg (195 lb 5.2 oz)            ANTHROPOMETRICS:  Ht Readings from Last 3 Encounters:   03/05/21 1.676 m (5' 5.98\")   12/31/20 1.651 m (5' 5\")   08/04/20 1.626 m (5' 4\")     Wt Readings from Last 10 Encounters:   03/04/21 88.6 kg (195 lb 5.2 oz)   03/04/21 88.5 kg (195 lb 3.2 oz)   02/11/21 93.4 kg (205 lb 12.8 oz)   01/21/21 92.5 kg (204 lb)   12/31/20 92.5 kg (204 lb)   12/31/20 93.6 kg (206 lb 6.4 oz)   12/21/20 95.8 kg (211 lb 3.2 oz)   10/07/20 95.6 kg (210 lb 12.8 oz)   08/04/20 97.8 kg (215 lb 9.8 oz)   04/23/20 99 kg (218 lb 3.2 oz)     Admit Weight: 88.6 kg (195 lb 5.2 oz) 3/4/2021  Admit BMI (kg/m2): 31.53  IBW/kg (Calculated) Male: 64.5 kg  Weight Category: Obesity Grade I  Weight loss of 4.8 kg (5.1%) over past month    ORAL/DENTAL STATUS:  Teeth: Intact  Difficulty Chewing or Swallowing: No    FOOD ALLERGIES: Milk, Hot peppers " (bell peppers okay)  Allergies   Allergen Reactions   • Penicillins Anaphylaxis   • Metformin Hives   • Adhesive Tape-Silicones    • Interferon Jose Maria-2a    • Latex    • Milk      Cow milk/goat milk   • Mometasone    • Mometasone Furoate    • Omeprazole    • Ondansetron Hcl Nausea And Vomiting   • Pepper (Genus Capsicum)      Any Hot Peppers - can eat bell peppers   • Primidone      Other reaction(s): HEADACHE   • Ribavirin Itching   • Rosuvastatin    • Sorafenib      Other reaction(s): BURNING SENSATION, HEADACHE, JOINT PAIN   • Spironolactone    • Statins-Hmg-Coa Reductase Inhibitors Itching and GI intolerance   • Interferon Jose Maria (Human Leuk. Derived) Palpitations and Rash   • Pantoprazole Itching and Rash   • Peginterferon Jose Maria-2b Palpitations       Lutheran/CULTURAL REQUESTS:  None  Cultural Requests During Hospitalization: none  Spiritual Requests During Hospitalization: Faith    ____________________________________________________________________  DIETITIAN DATA for assessing patient:  Patient Active Problem List   Diagnosis   • Chronic hepatitis C with cirrhosis (CMS/HCC) (HCC)   • Type 2 diabetes mellitus with neurologic complication (CMS/HCC) (HCC)   • Multiple food allergies   • Gastric erosion   • Noncompliance with therapeutic plan   • Essential hypertension   • History of esophageal varices   • No-show for appointment   • Hepatocellular carcinoma (CMS/HCC) (HCC)   • Pain of metastatic malignancy   • Nausea   • Insomnia due to medical condition   • Other headache syndrome   • Migraine without aura and without status migrainosus, not intractable   • Primary osteoarthritis of both knees   • Gastroesophageal reflux disease without esophagitis   • Intractable right upper quadrant abdominal pain   • Abdominal pain   • Hypomagnesemia   • Hypophosphatemia     Past Medical History:   Diagnosis Date   • A-fib (CMS/HCC) (HCC)    • Allergy    • Anxiety    • Arthritis    • Asthma    • Blindness of right eye    •  BPH (benign prostatic hyperplasia)    • Cardiovascular disease    • Cirrhosis (CMS/HCC) (HCC)     with possible liver mass by MRI 5/18, rec repeat in 8/2018   • Complication of anesthesia    • COPD (chronic obstructive pulmonary disease) (CMS/HCC) (HCC)    • Depression    • Endocrine disorder    • Gallstones    • Gastritis    • GERD (gastroesophageal reflux disease)    • Glaucoma    • Hearing loss     bilateral hearing aids   • Hemorrhoids    • Hepatitis C     Hep C, 2016 remission, complicated by cirrhosis.  MRI abd 5/30/18, rec 3 month follow up for focus of enhancement right and left hepatic lobe junction   • Hernia, abdominal    • High blood pressure    • IBS (irritable bowel syndrome)    • Infectious viral hepatitis    • Jaundice    • Kidney stones    • Obstructive sleep apnea syndrome    • Periodic limb movement disorder    • Persistent hypersomnia    • Persistent insomnia    • Pneumonia    • PONV (postoperative nausea and vomiting)    • Type 2 diabetes mellitus (CMS/HCC) (HCC)    • Urinary incontinence    • Wears partial dentures        NUTRITION FOCUSED PHYSICAL EXAM (NFPE): N/A  Physical Exam       Subcutaneous Fat Loss       Muscle Wasting       Physical Findings

## 2021-03-05 NOTE — PLAN OF CARE
Problem: Safety Adult - Fall  Goal: Free from fall injury  Description: INTERVENTIONS:    Inpatient - Please reference Cares/Safety Flowsheet under [facility] Fall Risk for interventions.  Pediatrics - Please reference Peds Daily Cares/Safety Flowsheet under [facility] Pediatric Fall Assessment Fall Bundle for interventions  LD/OB - Please reference OB Shift Screening Flowsheet under OB Fall Risk for interventions.  Outcome: Not Progressing     Problem: Pain - Adult  Goal: Verbalizes/displays adequate comfort level or baseline comfort level  Description: INTERVENTIONS:  1. Encourage patient to monitor pain and request interventions  2. Assess pain using the appropriate pain scale  3. Administer analgesics based on type and severity of pain and evaluate response  4. Educate/Implement non-pharmacological measures as appropriate and evaluate response  5. Consider cultural, developmental and social influences on pain and pain management  6. Notify Provider if interventions unsuccessful or patient reports new pain  Outcome: Not Progressing     Problem: Infection - Adult  Goal: Absence of infection during hospitalization  Description: INTERVENTIONS:  1. Assess and monitor for signs and symptoms of infection  2. Monitor lab/diagnostic results  3. Monitor all insertion sites/wounds/incisions  4. Monitor secretions for changes in amount and color  5. Administer medications as ordered  6. Educate and encourage patient and family to use good hand hygiene technique  7. Identify and educate in appropriate isolation precautions for identified infection/condition  Outcome: Not Progressing     Problem: Safety Adult  Goal: Patient will remain safe during hospitalization  Description: INTERVENTIONS    1. Assess patient for fall risk and implement interventions if needed  2. Use safe transport techniques  3. Assess patient using the appropriate Javon skin assessment scale  4. Assess patient for risk of aspiration  5. Assess patient for  risk of elopement  6. Assess patient for risk of suicide  Outcome: Not Progressing     Problem: Daily Care  Goal: Daily care needs are met  Description: INTERVENTIONS:   1. Assess and monitor skin integrity  2. Identify patients at risk for skin breakdown on admission and per policy  3. Assess and monitor ability to perform self care and identify potential discharge needs  4. Assess skin integrity/risk for skin breakdown  5. Assist patient with activities of daily living as needed  6. Encourage independent activity per ability   7. Provide mouth care   8. Include patient/family/caregiver in decisions related to daily care   Outcome: Not Progressing     Problem: Knowledge Deficit  Goal: Patient/family/caregiver demonstrates understanding of disease process, treatment plan, medications, and discharge instructions  Description: INTERVENTIONS:   1. Complete learning assessment and assess knowledge base  2. Provide teaching at level of understanding   3. Provide teaching via preferred learning methods  Outcome: Not Progressing     Problem: Discharge Barriers  Goal: Patient's discharge needs are met  Description: INTERVENTIONS:  1. Assess patient for self-management skills  2. Encourage participation in management  3. Identify potential discharge barriers on admission and throughout hospital stay  4. Involve patient/family/caregiver in discharge planning process  5. Collaborate with case management/ for discharge needs  Outcome: Not Progressing

## 2021-03-05 NOTE — H&P
Chief Complaint   Patient presents with   • Abdominal Pain     patient arrives for abdominal pain, was down at cancer center for liver cancer, has diarrhea and vomiting, not vomiting in triage. was told the gallbladder was leaking.        HPI:  Ben Lai Sr is an 70 y.o. male with medical history significant for diabetes mellitus type 2, last hemoglobin A1c of 7.5 on 8/4/2020, on insulin therapy, essential hypertension, paroxysmal atrial fibrillation on carvedilol but now blood thinners, COPD, depression/anxiety, BPH, obstructive sleep apnea, osteoarthritis, urinary incontinence,  GERD, cataracts in the right eye, glaucoma, bilateral hearing loss, uses hearing aids, IBS and hepatitis C, complicated by liver cirrhosis and hepatocellular cancer diagnosed in November 2018, considered nonoperable or amendable to radiation as per GI at [facility], treated with sorafenib in December 2018 status post RFA in August 2019, status post Y 90 treatment followed by TACE at [facility], abdominal MRI has been followed over time for staging and restaging with the most recent on 3/3/2021 showing very complex.  Situation in the region of the gallbladder fundus, subhepatic space extending to the hepatic flexure of the colon with concern for probable cholecystitis.  There is capsular retraction, scarring and mild enhancement redemonstrated in the right hepatic lobe with unchanged intrahepatic signal abnormalities.  Biliary dilatation still present in the right hepatic lobe with adjacent enhancement with questionable cholangitis, and small filling defect within the common bile duct concerning for possible choledocholithiasis.  No evidence of high-grade obstruction noted.  Currently on treatment with atezolizumab 1200 mg and bevacizumab 15 mg/kg that were held by Dr. Reynoso after patient had presented today for follow-up and had complained of having worsening right upper quadrant pain.  He referred to the ER for  pain management and further treatment.    According to patient's wife, Laura patient has been having intermittent right upper quadrant pain that seems to have progressively gotten worse over the last few days.  Pain is described as sharp of more than 10 out of 10 intensity, exacerbated by deep breathing or movement.  Pain radiates to the right back.  Usually pain is controlled with both the patient's hydromorphone and morphine.  However patient ran out of his morphine.  He had nonbloody vomiting as well as diarrhea for 2 days on the 26th and the 27th.  These have currently resolved and patient did not have any today.  He denied having any fever or bloating but feels cold all the time.  He has had lightheadedness but no dizziness.  He had an episode while he was squatting down to play with his grandson about 4 weeks ago and lost his balance.  He just sat on the floor and did not have any injury to his head.  He has noticed that his urine is dark but denied having any jaundice.  Patient does not have any cough, recent URI symptoms, chest pain or difficulty breathing, blood in his urine or yellowing of his eyes or skin.  Patient has never smoked reported extensive secondhand smoking.  He does not drink alcohol or use any illicit drugs.    In the ER, patient's vital signs were temperature of 37.5 °C, pulse rate of 86 bpm, respiratory rate of 20, blood pressure of 155/83 mmHg and saturating at 91 to 93% on room air.   Labs had shown a sodium of 137, potassium of 3.7, chloride of 102, bicarb of 29, anion gap of 6, BUN of 12, creatinine of 0.69, GFR of 97, glucose of 125, corrected calcium of 9, alkaline phosphatase of 118, albumin of 3.2 with normal transaminases, WBC of 5.1 with a neutrophil count of 78%, H&H of 11.5/35, and platelets of 111.  CT scan of the abdomen and pelvis with IV contrast showed 2 small fluid and gas locules in the right upper quadrant subhepatic space extrinsic to the gallbladder concerning for  gallbladder perforation or fistulization to these areas with a very abnormal appearing gallbladder which contains indwelling stents with possible erosion through the gallbladder. Generalized inflammatory changes in the right upper quadrant subhepatic space extending to involve the right colon with differential including inflammatory changes related to bile leakage, infection and/or infiltrative malignancy as well as cirrhosis with changes of known hepatoma and treatment related scarring/fibrosis. Uncertain if there is residual viable tumor or if this is all related to treated disease.  Dr. Reynoso had discussed the patient's case with IR who had initially recommended CT scan of the abdomen and pelvis.  Dr. Dyson also discussed the patient's case after CT scan of the abdomen was done with IR who suggested that keep patient comfortable tonight with pain management and they will see in the morning for possible intervention.  Patient may also need an ERCP.    I discussed the patient's case with Dr. Dyson and patient will be admitted for further evaluation and management.      Past Medical History:   Diagnosis Date   • A-fib (CMS/HCC) (HCC)    • Allergy    • Anxiety    • Arthritis    • Asthma    • Blindness of right eye    • BPH (benign prostatic hyperplasia)    • Cardiovascular disease    • Cirrhosis (CMS/HCC) (HCC)     with possible liver mass by MRI 5/18, rec repeat in 8/2018   • Complication of anesthesia    • COPD (chronic obstructive pulmonary disease) (CMS/HCC) (HCC)    • Depression    • Endocrine disorder    • Gallstones    • Gastritis    • GERD (gastroesophageal reflux disease)    • Glaucoma    • Hearing loss     bilateral hearing aids   • Hemorrhoids    • Hepatitis C     Hep C, 2016 remission, complicated by cirrhosis.  MRI abd 5/30/18, rec 3 month follow up for focus of enhancement right and left hepatic lobe junction   • Hernia, abdominal    • High blood pressure    • IBS (irritable bowel syndrome)    •  Infectious viral hepatitis    • Jaundice    • Kidney stones    • Obstructive sleep apnea syndrome    • Periodic limb movement disorder    • Persistent hypersomnia    • Persistent insomnia    • Pneumonia    • PONV (postoperative nausea and vomiting)    • Type 2 diabetes mellitus (CMS/HCC) (HCC)    • Urinary incontinence    • Wears partial dentures         Past Surgical History:   Procedure Laterality Date   • COLONOSCOPY  10/27/2016    Pili, normal, repeat 10 years   • CT ABLATION LIVER RADIOFREQUENCY  8/14/2019    CT ABLATION LIVER RADIOFREQUENCY 8/14/2019 [facility] MIS CT SCAN   • ESOPHAGOGASTRODUODENOSCOPY N/A 2/22/2018    Procedure: EGD - ESOPHAGOGASTRODUODENOSCOPY with banding  x 2 with crna;  Surgeon: William Rivas MD;  Location: [facility];  Service: Endoscopy;  Laterality: N/A;   • ESOPHAGOGASTRODUODENOSCOPY N/A 4/6/2018    Procedure: EGD - ESOPHAGOGASTRODUODENOSCOPY with crna;  Surgeon: William Rivas MD;  Location: [facility];  Service: Endoscopy;  Laterality: N/A;   • ESOPHAGOGASTRODUODENOSCOPY N/A 5/7/2019    Procedure: EGD - ESOPHAGOGASTRODUODENOSCOPY;  Surgeon: William Rivas MD;  Location: [facility];  Service: Endoscopy;  Laterality: N/A;   • HAND SURGERY Left     thumb   • HERNIA REPAIR  01/01/1965   • IR TUNNELED CENTRAL LINE WITH PORT  12/31/2020    IR TUNNELED CENTRAL LINE WITH PORT 12/31/2020 Sonny Danielle MD [facility] MIS INTERVEN RAD   • KNEE ARTHROSCOPY  09/19/2017    left knee, with partial medial meniscectomy; limited chondroplasty, patellofemoral joint   • LIVER BIOPSY      by Dr. Alejandre   • OTHER SURGICAL HISTORY      esophageal varices, banded   • VASECTOMY      at approx age of 25       No current outpatient medications on file.    Allergies   Allergen Reactions   • Penicillins Anaphylaxis   • Metformin Hives   • Adhesive Tape-Silicones    • Interferon Jose Maria-2a    • Latex    • Milk      Cow milk/goat milk   • Mometasone    • Mometasone Furoate    • Omeprazole    • Ondansetron Hcl Nausea  And Vomiting   • Pepper (Genus Capsicum)      Any Hot Peppers - can eat bell peppers   • Primidone      Other reaction(s): HEADACHE   • Ribavirin Itching   • Rosuvastatin    • Sorafenib      Other reaction(s): BURNING SENSATION, HEADACHE, JOINT PAIN   • Spironolactone    • Statins-Hmg-Coa Reductase Inhibitors Itching and GI intolerance   • Interferon Jose Maria (Human Leuk. Derived) Palpitations and Rash   • Pantoprazole Itching and Rash   • Peginterferon Jose Maria-2b Palpitations       Family History   Problem Relation Age of Onset   • Arthritis Mother    • Rheum arthritis Mother    • Diabetes Mother    • Rectal cancer Mother    • Other Paternal Grandmother    • Other Paternal Grandfather    • Diabetes Other    • Rheum arthritis Other    • Osteoporosis Other        Social History     Socioeconomic History   • Marital status:      Spouse name: Not on file   • Number of children: Not on file   • Years of education: Not on file   • Highest education level: Not on file   Occupational History   • Not on file   Tobacco Use   • Smoking status: Never Smoker   • Smokeless tobacco: Never Used   Substance and Sexual Activity   • Alcohol use: No   • Drug use: No   • Sexual activity: Defer   Other Topics Concern   • Not on file   Social History Narrative   • Not on file     Social Determinants of Health     Financial Resource Strain:    • Difficulty of Paying Living Expenses:    Food Insecurity:    • Worried About Running Out of Food in the Last Year:    • Ran Out of Food in the Last Year:    Transportation Needs:    • Lack of Transportation (Medical):    • Lack of Transportation (Non-Medical):    Physical Activity:    • Days of Exercise per Week:    • Minutes of Exercise per Session:    Stress:    • Feeling of Stress :    Social Connections:    • Frequency of Communication with Friends and Family:    • Frequency of Social Gatherings with Friends and Family:    • Attends Congregational Services:    • Active Member of Clubs or  "Organizations:    • Attends Club or Organization Meetings:    • Marital Status:    Intimate Partner Violence:    • Fear of Current or Ex-Partner:    • Emotionally Abused:    • Physically Abused:    • Sexually Abused:        Full Code    Review of Systems   14 point review of systems were negative except for above in HPI    /76 (BP Location: Left arm, Patient Position: Head of bed 30 degrees or higher, Cuff Size: Regular Adult)   Pulse 80   Temp 37 °C (98.6 °F) (Oral)   Resp 20   Ht 1.676 m (5' 6\")   Wt 88.6 kg (195 lb 5.2 oz)   SpO2 95%   BMI 31.53 kg/m²     Physical Exam   General: Pleasant, obese, elderly,  man sitting up in bed and does not seem to be in significant distress.  Pain is now decreased after receiving IV pain medications.  He is afebrile  HEENT: Atraumatic, Normocephalic, anicteric sclerae, normal conjunctivae, no lid lag, no facial droop, dry mucous membranes,   Neck: Supple, trachea midline, full range of motion, no thyromegaly, no cervical lymphadenopathy  Lungs: No reproducible chest wall tenderness. Shallow breathing due to pain.  Normal breath sounds with no intercostal retractions, rales, or wheezes.  Bibasilar crepitation, left greater than right.  Cardiovascular: Heart sounds 1 and 2 present, regular.  No murmur present. No rubs or gallops. No JVD or carotid bruit  Abdomen: Soft, non distended.  Tenderness present in the upper abdomen especially on the right with mild guarding. hepatosplenomegaly. Bowel sounds present in all 4 quadrants,  Extremities: No swelling bilaterally. Good palpable pedal pulses bilaterally.  Back : Right CVA tenderness, thoracic and lumbar paraspinal tenderness, no areas of erythema or induration  Skin: Warm, normal turgor.  No rash or ulcers.    Psych: Pleasant, cooperative with normal affect.   Neuro: Alert and Oriented to person and place.  EOMI, JEVON.  No focal neurological deficits. Speech is clear. Gait not assessed. Moves all " extremities.      Assessment/Plan   Principal Problem:    Intractable right upper quadrant abdominal pain  Active Problems:    Chronic hepatitis C with cirrhosis (CMS/HCC) (HCC)    Type 2 diabetes mellitus with neurologic complication (CMS/HCC) (HCC)    Essential hypertension    Hepatocellular carcinoma (CMS/HCC) (HCC)    Pain of metastatic malignancy    Migraine without aura and without status migrainosus, not intractable    Primary osteoarthritis of both knees    Gastroesophageal reflux disease without esophagitis    Abdominal pain    Hypomagnesemia    Hypophosphatemia     Intractable right upper quadrant abdominal pain  Chronic hepatitis C with cirrhosis/hepatocellular carcinoma  CT scan of the abdomen and pelvis showing possible gallbladder leakage versus inflammation/infection  Will send blood cultures  Will start patient on metronidazole and Cipro pending cultures  Will continue patient on IV fluids  Pain management as needed  Antiemetics as needed  Will keep patient n.p.o. for IR intervention planned for tomorrow    Hypophosphatemia  Will replete level    Hypomagnesemia  Will replete level    Type 2 diabetes mellitus  Will start patient on basal and correctional scale insulin  Blood glucose monitoring    Essential hypertension  Will reconcile patient's home medications  Blood pressure monitoring    COPD  Not currently in any exacerbation  Bronchodilators as needed  Oxygen supplementation as needed    Obstructive sleep apnea  Will continue patient on CPAP at night    GERD  PPI as needed    BPH  Will reconcile home medications once medication list updated    Additional Cares:  DVT prophylaxis: Lovenox 40 mg subcutaneous daily  CODE STATUS: FULL  Anticipate 2 days hospital stay or more depending on hospital course.     I spent more than 70 minutes on patient's admission which included face to face time (history taking, physical examination, education/counseling, explaining plan of action, planned labs and  investigations and answering questions and concerns.  More than 50% of my time was spent in care and coordination), admission documentation.  Medication reconciliation pending pharmacy verification of patient's home medications. I discussed patient's case with the emergency room physician, Dr. Dyson in detail and reviewed patient's medical records in detail which included emergency room documentation as well as other prior medical records. I will discuss history, assessment and plan during my face to face verbal handoff with my colleague who will assume patient's care in the morning.

## 2021-03-06 NOTE — PROGRESS NOTES
Internal/Family Medicine Daily Progress Note   LOS: 2 days     Subjective      Patient without new issue overnight, unfortunately has continued to have right upper quadrant pain even after percutaneous drain placement yesterday.  Did state, however, that is discomfort was improved but not yet back to his baseline.  Discussed with him and his wife multiple complexities of current situation and though percutaneous drain and suppressive antimicrobials might very well be a good short-term answer, significant questions about his prognosis from here given his presentation and underlying pathology.  Discussed that the plan for now would be to continue percutaneous drainage and IV antimicrobials in the direction of infectious disease, but will need further formulation of plan going forward.  Agreeable no further questions or concerns.     Objective       Vital signs:  Temp:  [36.7 °C (98.1 °F)-37.4 °C (99.3 °F)] 36.9 °C (98.4 °F)  Heart Rate:  [71-86] 73  Resp:  [16-18] 16  BP: (126-175)/(56-86) 143/60    ROS: Review of Systems   Constitutional: Negative for activity change, appetite change, chills, fatigue, fever and unexpected weight change.   HENT: Negative for congestion, hearing loss, nosebleeds, sore throat and trouble swallowing.    Eyes: Negative for photophobia, discharge and visual disturbance.   Respiratory: Negative for cough and shortness of breath.    Cardiovascular: Negative for chest pain, palpitations and leg swelling.   Gastrointestinal: Positive for abdominal pain. Negative for abdominal distention, blood in stool, constipation, diarrhea, nausea and vomiting.   Endocrine: Negative for cold intolerance and polydipsia.   Genitourinary: Negative for difficulty urinating, dysuria, frequency and hematuria.   Musculoskeletal: Negative for arthralgias, joint swelling and myalgias.   Skin: Negative for color change, rash and wound.   Neurological: Negative for dizziness, tremors, seizures, syncope, speech  difficulty, weakness, light-headedness, numbness and headaches.   Psychiatric/Behavioral: Negative for agitation, behavioral problems, confusion and hallucinations. The patient is not nervous/anxious.        Physical Exam:    Physical Exam  Vitals reviewed.   Constitutional:       Appearance: He is well-developed.      Comments: Patient mildly sedate, but pleasant and interactive.  No acute distress and appears to be only mildly uncomfortable with movement.   HENT:      Head: Normocephalic and atraumatic.      Nose: Nose normal.   Eyes:      General: No scleral icterus.        Right eye: No discharge.         Left eye: No discharge.      Conjunctiva/sclera: Conjunctivae normal.      Pupils: Pupils are equal, round, and reactive to light.   Cardiovascular:      Rate and Rhythm: Normal rate and regular rhythm.      Heart sounds: No murmur. No friction rub. No gallop.    Pulmonary:      Effort: Pulmonary effort is normal. No respiratory distress.      Breath sounds: Normal breath sounds. No wheezing or rales.   Chest:      Chest wall: No tenderness.   Abdominal:      General: Bowel sounds are normal. There is no distension.      Palpations: Abdomen is soft. There is no mass.      Tenderness: There is no abdominal tenderness. There is no guarding or rebound.      Hernia: No hernia is present.   Musculoskeletal:         General: No tenderness or deformity. Normal range of motion.      Cervical back: Normal range of motion and neck supple.   Skin:     General: Skin is warm and dry.      Capillary Refill: Capillary refill takes less than 2 seconds.      Findings: No rash.   Neurological:      Mental Status: He is alert and oriented to person, place, and time.      Cranial Nerves: No cranial nerve deficit.      Coordination: Coordination normal.   Psychiatric:         Mood and Affect: Mood normal.         Behavior: Behavior normal.         Thought Content: Thought content normal.         Judgment: Judgment normal.          Assessment/Plan     Right upper quadrant abscess, suspected gallbladder perforation  Abnormal gallbladder, cholecystitis versus mechanical perforation  Hepatocellular carcinoma, metastatic  History of hepatitis C  Chronic pain with opiate dependency, secondary to malignancy  BPH  COPD without acute exacerbation  GERD  Essential hypertension  DM2, non-insulin-dependent    Brief summary:  Patient with very complicated past medical history, predominantly revolving around hepatocellular carcinoma and cirrhosis secondary to chronic hepatitis C.  Underwent placement of a transpapillary gallbladder drain at the [facility] in late October 2020.  Which was reportedly uneventful.  Patient was having right upper quadrant pain at the time presumed to be due to biliary obstruction, had minimal pain for about a month, but unfortunately had increasing pain after that.  Had noticed it increasing significantly over the week prior to presentation and presented to his primary oncologist, Dr. Reynoso on 3/4/2021 for evaluation.  Noted to have restaging MRI on same day showing complex appearing situation in the region of the gallbladder fundus suggestive of extrahepatic malignancy or possibly abscess.  Was subsequently admitted to the hospital under the hospitalist service for further treatment.  Was felt that area likely represented abscess and underwent percutaneous drain placement x2 with interventional radiology on 2/5/2020 which did result in removal of about 5 cc of purulent appearing fluid.  He was placed on Cipro and Flagyl due to penicillin allergy and infectious disease consulted for plan going forward.    Daily update:  Very complex situation.  Dr. Myers's note reviewed.  Unfortunately, this does not solve his long-term problem if his gallbladder has in fact perforated.  Spoke with Dr. Stanton from general surgery, feels that he does need to be addressed by hepatobiliary team at [facility], but  "surgery here could be quite problematic and would best be avoided unless he becomes frankly septic.  Did discuss possibility of intervention at some point with him and his wife, and his wife freely admitted that the hepatobiliary team at the [facility] said that they \"would not touch his gallbladder\" at his last appointment.  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  I am somewhat concerned that the patient may be nearing limited options as far as definitive treatment for this issue goes and suppressive antibiotics and percutaneous drainage may be his best option.  We will continue plan for now and adjust antimicrobials as direction of infectious disease.  Plan on contacting hepatobiliary physician who placed his stent, Dr Story, on Monday to further clarify ongoing treatment plan.  Should he deteriorate in the meantime, we will reconsider more proximal intervention here versus acute transfer.      Principal Problem:    Intractable right upper quadrant abdominal pain  Active Problems:    Chronic hepatitis C with cirrhosis (CMS/HCC) (HCC)    Type 2 diabetes mellitus with neurologic complication (CMS/HCC) (HCC)    Essential hypertension    Hepatocellular carcinoma (CMS/HCC) (HCC)    Pain of metastatic malignancy    Migraine without aura and without status migrainosus, not intractable    Primary osteoarthritis of both knees    Gastroesophageal reflux disease without esophagitis    Abdominal pain    Hypomagnesemia    Hypophosphatemia    DVT Prophylaxis: Lovenox    Urinary Catheter Date: None      Justification:     Central Lines: None    Other lines/tubes: Percutaneous drains right upper quadrant x2, placed 3/5/2021    Wounds: None    Code Status: Full Code    Anticipated date of discharge: 3/9/2021      "

## 2021-03-06 NOTE — PLAN OF CARE
Problem: Respiratory - Adult  Goal: Achieves optimal ventilation and oxygenation with noninvasive CPAP/BiLEVEL support  Description: INTERVENTIONS:  1. Provide education to patient/family about rationale and expected outcomes associated with therapy  2. Position patient to facilitate optimal ventilation/oxygenation status and minimize respiratory effort  3. Position patient to reduce aspiration risk, elevate head of bed at least 35 degrees or higher, as applicable  4. Assess effectiveness of therapy on ventilation/oxygenation status based on oxygen saturation and/or arterial blood gases, as indicated  5. Assess patient for changes in respiratory and physiological status  6. Auscultate breath sounds and assess chest excursion, as indicated  7. Assess patient for changes in mentation and behavior  8. Routinely monitor equipment for proper performance and settings  9. Assess and monitor skin condition, in relationship to the respiratory interface  10. Assure equipment alarm volume is adequate for the patient's environment  11. Immediately respond to equipment alarm, to assess patient and/or cause for alarm  12. Follow universal infection control/hospital policy(ies)/standards  Outcome: Progressing

## 2021-03-06 NOTE — PLAN OF CARE
Problem: Safety Adult - Fall  Goal: Free from fall injury  Description: INTERVENTIONS:    Inpatient - Please reference Cares/Safety Flowsheet under [facility] Fall Risk for interventions.  Pediatrics - Please reference Peds Daily Cares/Safety Flowsheet under [facility] Pediatric Fall Assessment Fall Bundle for interventions  LD/OB - Please reference OB Shift Screening Flowsheet under OB Fall Risk for interventions.  Outcome: Progressing     Problem: Pain - Adult  Goal: Verbalizes/displays adequate comfort level or baseline comfort level  Description: INTERVENTIONS:  1. Encourage patient to monitor pain and request interventions  2. Assess pain using the appropriate pain scale  3. Administer analgesics based on type and severity of pain and evaluate response  4. Educate/Implement non-pharmacological measures as appropriate and evaluate response  5. Consider cultural, developmental and social influences on pain and pain management  6. Notify Provider if interventions unsuccessful or patient reports new pain  Outcome: Progressing     Problem: Infection - Adult  Goal: Absence of infection during hospitalization  Description: INTERVENTIONS:  1. Assess and monitor for signs and symptoms of infection  2. Monitor lab/diagnostic results  3. Monitor all insertion sites/wounds/incisions  4. Monitor secretions for changes in amount and color  5. Administer medications as ordered  6. Educate and encourage patient and family to use good hand hygiene technique  7. Identify and educate in appropriate isolation precautions for identified infection/condition  Outcome: Progressing     Problem: Safety Adult  Goal: Patient will remain safe during hospitalization  Description: INTERVENTIONS    1. Assess patient for fall risk and implement interventions if needed  2. Use safe transport techniques  3. Assess patient using the appropriate Javon skin assessment scale  4. Assess patient for risk of aspiration  5. Assess patient for risk of  elopement  6. Assess patient for risk of suicide  Outcome: Progressing     Problem: Daily Care  Goal: Daily care needs are met  Description: INTERVENTIONS:   1. Assess and monitor skin integrity  2. Identify patients at risk for skin breakdown on admission and per policy  3. Assess and monitor ability to perform self care and identify potential discharge needs  4. Assess skin integrity/risk for skin breakdown  5. Assist patient with activities of daily living as needed  6. Encourage independent activity per ability   7. Provide mouth care   8. Include patient/family/caregiver in decisions related to daily care   Outcome: Progressing     Problem: Knowledge Deficit  Goal: Patient/family/caregiver demonstrates understanding of disease process, treatment plan, medications, and discharge instructions  Description: INTERVENTIONS:   1. Complete learning assessment and assess knowledge base  2. Provide teaching at level of understanding   3. Provide teaching via preferred learning methods  Outcome: Progressing     Problem: Discharge Barriers  Goal: Patient's discharge needs are met  Description: INTERVENTIONS:  1. Assess patient for self-management skills  2. Encourage participation in management  3. Identify potential discharge barriers on admission and throughout hospital stay  4. Involve patient/family/caregiver in discharge planning process  5. Collaborate with case management/ for discharge needs  Outcome: Progressing

## 2021-03-06 NOTE — CONSULTATION
INFECTIOUS DISEASES CONSULTATION REPORT    Subjective     HPI:     Chart was reviewed,  For details of medical history, please refer to admission H&P on 3/4/2021   In summary patient is 70 years old male with multiple comorbidities, hepatitis C complicated with liver cirrhosis and hepatocellular cancer, admitted to the hospital because of progressive right upper quadrant pain and abnormal MRI, patient was directed to go to the emergency room. CT scan showed possible gallbladder perforation/small abscess he underwent percutaneous drain placement, purulent output, culture grew Enterococcus faecalis, currently on Ciprofloxacin and Flagyl    Patient has had chronic pain requiring narcotics for the last few months over the last 2 weeks he started having different type of pain in the right upper quadrant severe and not controlled with the pain medication.  Patient undergoing chemotherapy.  He continued to have the pain with no significant change after the percutaneous drains placement  No nausea, he has anorexia  Patient has some shortness of breath most likely due to shallow breathing due to right upper quadrant pain  No rash or itching, no swelling of the joints  Patient has not been on any antibiotic for the last several months last time he took antibiotic was in October when he had the special biliary drain placed in at the [facility]    Admission HPI:  Ben Lai Sr is an 70 y.o. male with medical history significant for diabetes mellitus type 2, last hemoglobin A1c of 7.5 on 8/4/2020, on insulin therapy, essential hypertension, paroxysmal atrial fibrillation on carvedilol but now blood thinners, COPD, depression/anxiety, BPH, obstructive sleep apnea, osteoarthritis, urinary incontinence,  GERD, cataracts in the right eye, glaucoma, bilateral hearing loss, uses hearing aids, IBS and hepatitis C, complicated by liver cirrhosis and hepatocellular cancer diagnosed in November  2018, considered nonoperable or amendable to radiation as per GI at [facility], treated with sorafenib in December 2018 status post RFA in August 2019, status post Y 90 treatment followed by TACE at [facility], abdominal MRI has been followed over time for staging and restaging with the most recent on 3/3/2021 showing very complex.  Situation in the region of the gallbladder fundus, subhepatic space extending to the hepatic flexure of the colon with concern for probable cholecystitis.  There is capsular retraction, scarring and mild enhancement redemonstrated in the right hepatic lobe with unchanged intrahepatic signal abnormalities.  Biliary dilatation still present in the right hepatic lobe with adjacent enhancement with questionable cholangitis, and small filling defect within the common bile duct concerning for possible choledocholithiasis.  No evidence of high-grade obstruction noted.  Currently on treatment with atezolizumab 1200 mg and bevacizumab 15 mg/kg that were held by Dr. Reynoso after patient had presented today for follow-up and had complained of having worsening right upper quadrant pain.  He referred to the ER for pain management and further treatment.     According to patient's wife, Laura patient has been having intermittent right upper quadrant pain that seems to have progressively gotten worse over the last few days.  Pain is described as sharp of more than 10 out of 10 intensity, exacerbated by deep breathing or movement.  Pain radiates to the right back.  Usually pain is controlled with both the patient's hydromorphone and morphine.  However patient ran out of his morphine.  He had nonbloody vomiting as well as diarrhea for 2 days on the 26th and the 27th.  These have currently resolved and patient did not have any today.  He denied having any fever or bloating but feels cold all the time.  He has had lightheadedness but no dizziness.  He had an episode while he was squatting  down to play with his grandson about 4 weeks ago and lost his balance.  He just sat on the floor and did not have any injury to his head.  He has noticed that his urine is dark but denied having any jaundice.  Patient does not have any cough, recent URI symptoms, chest pain or difficulty breathing, blood in his urine or yellowing of his eyes or skin.  Patient has never smoked reported extensive secondhand smoking.  He does not drink alcohol or use any illicit drugs.     In the ER, patient's vital signs were temperature of 37.5 °C, pulse rate of 86 bpm, respiratory rate of 20, blood pressure of 155/83 mmHg and saturating at 91 to 93% on room air.   Labs had shown a sodium of 137, potassium of 3.7, chloride of 102, bicarb of 29, anion gap of 6, BUN of 12, creatinine of 0.69, GFR of 97, glucose of 125, corrected calcium of 9, alkaline phosphatase of 118, albumin of 3.2 with normal transaminases, WBC of 5.1 with a neutrophil count of 78%, H&H of 11.5/35, and platelets of 111.  CT scan of the abdomen and pelvis with IV contrast showed 2 small fluid and gas locules in the right upper quadrant subhepatic space extrinsic to the gallbladder concerning for gallbladder perforation or fistulization to these areas with a very abnormal appearing gallbladder which contains indwelling stents with possible erosion through the gallbladder. Generalized inflammatory changes in the right upper quadrant subhepatic space extending to involve the right colon with differential including inflammatory changes related to bile leakage, infection and/or infiltrative malignancy as well as cirrhosis with changes of known hepatoma and treatment related scarring/fibrosis. Uncertain if there is residual viable tumor or if this is all related to treated disease.    REVIEW OF SYSTEMS:  Pertinent positives and negatives are noted in the HPI above,   all other systems reviewed and were negative    PAST HISTORY:     Past Medical History:   Diagnosis Date    • A-fib (CMS/HCC) (HCC)    • Allergy    • Anxiety    • Arthritis    • Asthma    • Blindness of right eye    • BPH (benign prostatic hyperplasia)    • Cardiovascular disease    • Cirrhosis (CMS/HCC) (HCC)     with possible liver mass by MRI 5/18, rec repeat in 8/2018   • Complication of anesthesia    • COPD (chronic obstructive pulmonary disease) (CMS/HCC) (HCC)    • Depression    • Endocrine disorder    • Gallstones    • Gastritis    • GERD (gastroesophageal reflux disease)    • Glaucoma    • Hearing loss     bilateral hearing aids   • Hemorrhoids    • Hepatitis C     Hep C, 2016 remission, complicated by cirrhosis.  MRI abd 5/30/18, rec 3 month follow up for focus of enhancement right and left hepatic lobe junction   • Hernia, abdominal    • High blood pressure    • IBS (irritable bowel syndrome)    • Infectious viral hepatitis    • Jaundice    • Kidney stones    • Obstructive sleep apnea syndrome    • Periodic limb movement disorder    • Persistent hypersomnia    • Persistent insomnia    • Pneumonia    • PONV (postoperative nausea and vomiting)    • Type 2 diabetes mellitus (CMS/HCC) (HCC)    • Urinary incontinence    • Wears partial dentures      Past Surgical History:   Procedure Laterality Date   • COLONOSCOPY  10/27/2016    Pili, normal, repeat 10 years   • CT ABLATION LIVER RADIOFREQUENCY  8/14/2019    CT ABLATION LIVER RADIOFREQUENCY 8/14/2019 [facility] MIS CT SCAN   • CT ABSCESS CYST PERITONEAL  3/5/2021    CT ABSCESS CYST PERITONEAL 3/5/2021 [facility] MIS CT SCAN   • ESOPHAGOGASTRODUODENOSCOPY N/A 2/22/2018    Procedure: EGD - ESOPHAGOGASTRODUODENOSCOPY with banding  x 2 with crna;  Surgeon: William Rivas MD;  Location: [facility] Endoscopy;  Service: Endoscopy;  Laterality: N/A;   • ESOPHAGOGASTRODUODENOSCOPY N/A 4/6/2018    Procedure: EGD - ESOPHAGOGASTRODUODENOSCOPY with crna;  Surgeon: William Rivas MD;  Location: [facility] Endoscopy;  Service: Endoscopy;  Laterality: N/A;   • ESOPHAGOGASTRODUODENOSCOPY N/A 5/7/2019     Procedure: EGD - ESOPHAGOGASTRODUODENOSCOPY;  Surgeon: William Rivas MD;  Location: [facility] Endoscopy;  Service: Endoscopy;  Laterality: N/A;   • HAND SURGERY Left     thumb   • HERNIA REPAIR  01/01/1965   • IR TUNNELED CENTRAL LINE WITH PORT  12/31/2020    IR TUNNELED CENTRAL LINE WITH PORT 12/31/2020 Sonny Danielle MD [facility] MIS INTERVEN RAD   • KNEE ARTHROSCOPY  09/19/2017    left knee, with partial medial meniscectomy; limited chondroplasty, patellofemoral joint   • LIVER BIOPSY      by Dr. Alejandre   • OTHER SURGICAL HISTORY      esophageal varices, banded   • VASECTOMY      at approx age of 25      Social History     Occupational History   • Not on file   Tobacco Use   • Smoking status: Never Smoker   • Smokeless tobacco: Never Used   Substance and Sexual Activity   • Alcohol use: No   • Drug use: No   • Sexual activity: Defer   Social History Narrative   • Not on file     Family History   Problem Relation Age of Onset   • Arthritis Mother    • Rheum arthritis Mother    • Diabetes Mother    • Rectal cancer Mother    • Other Paternal Grandmother    • Other Paternal Grandfather    • Diabetes Other    • Rheum arthritis Other    • Osteoporosis Other        ALLERGY:     Allergies   Allergen Reactions   • Penicillins Anaphylaxis   • Metformin Hives   • Adhesive Tape-Silicones    • Interferon Jose Marai-2a    • Latex    • Milk      Pt claimed that he is not allergic to milk at all    • Mometasone    • Mometasone Furoate    • Omeprazole    • Ondansetron Hcl Nausea And Vomiting   • Pepper (Genus Capsicum)      Any Hot Peppers - can eat bell peppers   • Primidone      Other reaction(s): HEADACHE   • Ribavirin Itching   • Rosuvastatin    • Sorafenib      Other reaction(s): BURNING SENSATION, HEADACHE, JOINT PAIN   • Spironolactone    • Statins-Hmg-Coa Reductase Inhibitors Itching and GI intolerance   • Interferon Jose Maria (Human Leuk. Derived) Palpitations and Rash   • Pantoprazole Itching and Rash   • Peginterferon Jose Maria-2b Palpitations         MEDICATIONS:     Inpatient medication list reviewed  enoxaparin, 40 mg, Daily at 1400  metronidazole, 500 mg, q6h  ciprofloxacin, 400 mg, q12h  sodium chloride, 3 mL, 2x daily  morphine, 15 mg, q12h RONAL  terazosin, 5 mg, 2x daily  lansoprazole, 30 mg, Daily  lisinopriL, 20 mg, Daily  traZODone, 100 mg, Nightly  carvediloL, 3.125 mg, 2x daily with meals  insulin aspart U-100, 0-15 Units, Insulin: 4x daily with meals  insulin glargine, 12 Units, q AM         Objective     PHYSICAL EXAMINATION:     Temp:  [36.7 °C (98.1 °F)-37.4 °C (99.3 °F)] 36.9 °C (98.4 °F)  Heart Rate:  [69-86] 73  Resp:  [16-20] 16  BP: (126-175)/(56-86) 143/60  ED Triage Vitals [03/04/21 1419]   Weight 88.6 kg (195 lb 5.2 oz)        EXAM:    GENERAL: No acute distress, well developed, chronically ill appearing  HEENT: normal external nose and ears,   RESPIRATORY: non-laboured breathing   CARDIOVASCULAR: Normal rate,   GASTROINTESTINAL: Soft, tenderness of the right upper quadrant,two percutaneous drains  MUSCULOSKELETAL: no acute inflammatory joint  SKIN: No rash,   NEUROLOGIC: Alert and oriented, grossly no focal deficit  PSYCHIATRIC: Cooperative    DIAGNOSTIC DATA:      Lab data/culture data reviewed by me    Microbiology data:    Fluid culture 3/5/21 few colonies Enterococcus Faecalis, sensitivity pending    Results from last 4 days   Lab Units 03/06/21  0706 03/04/21  1733 03/03/21  0802   WBC AUTO 10*3/uL 4.9 5.1 4.5   HEMOGLOBIN g/dL 11.2* 11.5* 12.1*   HEMATOCRIT % 33.7* 35.0* 37.3*   PLATELETS AUTO 10*3/uL 122* 111* 113*     Results from last 4 days   Lab Units 03/06/21  0706 03/04/21  1733 03/03/21  0802   SODIUM mmol/L 135 137 138   POTASSIUM mmol/L 3.4* 3.7 3.7   CO2 mmol/L 23 29 29   BUN mg/dL 7 12 11   CREATININE mg/dL 0.54* 0.69* 0.90   CALCIUM mg/dL 8.1* 8.4* 8.3*   TOTAL PROTEIN g/dL  --  6.3 6.4   BILIRUBIN TOTAL mg/dL  --  0.92 0.95   ALK PHOS U/L  --  118* 117*   ALT U/L  --  13 15   AST U/L  --  21 29     Results from  last 4 days   Lab Units 03/05/21  0225   CRP mg/L 90.8*       IMAGING:      I reviewed the images personally,    CT of abd and pelvis 3/4/21  1. There are 2 small fluid and gas locules in the right upper quadrant subhepatic space extrinsic to the gallbladder. Concern would be for gallbladder perforation or fistulization to these areas.  2. Very abnormal appearing gallbladder which contains indwelling stents. It is possible these could have eroded through the gallbladder.  3. Generalized inflammatory changes right upper quadrant subhepatic space extending to involve the right colon. Differential would include inflammatory changes related to bile leakage, infection and/or infiltrative malignancy.  4. Cirrhosis with changes of known hepatoma and treatment related scarring/fibrosis. Uncertain if there is residual viable tumor or if this is all related to treated disease.    MRI of abdomen 3/3/21  1. Very complex appearing situation in the region of the gallbladder fundus, subhepatic space extending to the hepatic flexure of the colon. There is concern for the possibility of extrahepatic malignancy and/or an inflammatory process in this area which could be affecting the gallbladder. There is a 2.8 cm fluid collection adjacent to the gallbladder. Cholecystitis would be another consideration. Additional correlation with contrast-enhanced CT may be helpful to better define some of the anatomy in this area.  2. Capsular retraction, scarring and mild enhancement redemonstrated in right hepatic lobe. Most of the intrahepatic signal abnormalities do not appear dramatically changed.  3. There is still some biliary dilatation the right hepatic lobe with adjacent enhancement. Question cholangitis.  4. In addition there are small filling defects within the common bile duct. Possible choledocholithiasis. Currently there is not seen to be high-grade obstruction however. Common bile duct diameter approximately  millimeters.    Assessment/Plan     ASSESSMENT/PLAN:     Cholecystitis/right upper quadrant abscess  Enterococcus faecalis infection  Hepatocellular carcinoma  Hepatitis C cirrhosis  Biliary obstruction status post biliary stent placement  Splenomegaly/thrombocytopenia, mild    Recommendation    Giving the isolation of the Enterococcus faecalis we will give one dose of daptomycin IV today, will follow on the susceptibility and adjust antibiotic accordingly  Meanwhile to continue with the ciprofloxacin and Flagyl empirically    Case was discussed with Dr. Shaye Myers MD  3/6/2021,    [facility] - Infectious Diseases

## 2021-03-06 NOTE — NURSING END OF SHIFT
Nursing End of Shift Summary:    Patient: Ben Lai Sr  MRN: [id number]  : 1950, Age: 70 y.o.    Location: [address] [place] [address]    Nursing Goals  Clinical Goals for the Shift: ensure safety and comfort, pain control, monitor drains and VS    Narrative Summary of Progress Toward Clinical Goals:  Pt had a good night. Ice pack applied to IR site with good effect. Drains were flushed and output as per chart. VS are stable, safety and comfort maintained    Barriers to Goals/Nursing Concerns:  Yes -     New Patient or Family Concerns/Issues:  No    Shift Summary:      Significant Events & Communications to Providers (last 12 hours)      Last 5 Values    No documentation.              Oxygen Usage (last 12 hours)      Last 5 Values    No documentation.              Mobility (last 12 hours)      Last 5 Values     Row Name 21                   Mobility    Activity  Bathroom privileges        Level of Assistance  Independent after set-up            Urethral Catheter    Active Urethral Catheter     None            Active Lines    Active Central venous catheter / Peripherally inserted central catheter / Implantable Port / Hemodialysis catheter / Midline Catheter     Name:   Placement date:   Placement time:   Site:   Days:    Single Lumen Implantable Port 20 Right Chest   20    0921    Chest   64              Infusing Medications   Medication Dose Last Rate   • sodium chloride 0.9 %  100 mL/hr 100 mL/hr (21 0558)   • sodium chloride 0.9% (NS)  10 mL/hr Stopped (21 1212)     PRN Medications   Medication Dose Last Admin   • acetaminophen  325-650 mg     • bisacodyL  10 mg     • magnesium hydroxide  30 mL     • sodium chloride 0.9% (NS)  25-50 mL Rate Verify at 21 1711   • HYDROmorphone  0.4 mg     • HYDROmorphone  0.8 mg     • metoclopramide  10 mg      Or   • metoclopramide  10 mg 10 mg at 21 1602     _________________________  Hosea Mei RN  21 6:01 AM

## 2021-03-07 ENCOUNTER — HEALTH MAINTENANCE LETTER (OUTPATIENT)
Age: 71
End: 2021-03-07

## 2021-03-07 NOTE — PROGRESS NOTES
Internal/Family Medicine Daily Progress Note   LOS: 3 days     Subjective      Patient without new issue overnight, overall doing well.  Has some pain issues as right upper quadrant last night, but better today.  Wife present at bedside again, discussed continued antimicrobials with possible antimicrobial change due to cultures at the discretion of infectious disease.  Also revisited plan for contacting hepatobiliary team at [facility] tomorrow for further clarification of ongoing plan regarding management of infection and abnormal gallbladder.  No further questions and agreeable to plan.     Objective       Vital signs:  Temp:  [36.6 °C (97.9 °F)-36.8 °C (98.3 °F)] 36.6 °C (97.9 °F)  Heart Rate:  [73-75] 73  Resp:  [16-20] 20  BP: (146-163)/(63-84) 146/63    ROS: Review of Systems   Constitutional: Positive for fatigue. Negative for activity change, appetite change, chills, fever and unexpected weight change.   HENT: Negative for congestion, hearing loss, nosebleeds, sore throat and trouble swallowing.    Eyes: Negative for photophobia, discharge and visual disturbance.   Respiratory: Negative for cough and shortness of breath.    Cardiovascular: Negative for chest pain, palpitations and leg swelling.   Gastrointestinal: Positive for abdominal pain. Negative for abdominal distention, blood in stool, constipation, diarrhea, nausea and vomiting.   Endocrine: Negative for cold intolerance and polydipsia.   Genitourinary: Negative for difficulty urinating, dysuria, frequency and hematuria.   Musculoskeletal: Negative for arthralgias, joint swelling and myalgias.   Skin: Negative for color change, rash and wound.   Neurological: Negative for dizziness, tremors, seizures, syncope, speech difficulty, weakness, light-headedness, numbness and headaches.   Psychiatric/Behavioral: Negative for agitation, behavioral problems, confusion and hallucinations. The patient is not nervous/anxious.        Physical Exam:     Physical Exam  Vitals reviewed.   Constitutional:       Appearance: He is well-developed.      Comments: Patient mildly fatigued, but overall looks well and comfortable.   HENT:      Head: Normocephalic and atraumatic.      Nose: Nose normal.   Eyes:      General: No scleral icterus.        Right eye: No discharge.         Left eye: No discharge.      Conjunctiva/sclera: Conjunctivae normal.      Pupils: Pupils are equal, round, and reactive to light.   Cardiovascular:      Rate and Rhythm: Normal rate and regular rhythm.      Heart sounds: No murmur. No friction rub. No gallop.    Pulmonary:      Effort: Pulmonary effort is normal. No respiratory distress.      Breath sounds: Normal breath sounds. No wheezing or rales.   Chest:      Chest wall: No tenderness.   Abdominal:      General: Bowel sounds are normal. There is no distension.      Palpations: Abdomen is soft. There is no mass.      Tenderness: There is no abdominal tenderness. There is no guarding or rebound.      Hernia: No hernia is present.   Musculoskeletal:         General: No tenderness or deformity. Normal range of motion.      Cervical back: Normal range of motion and neck supple.   Skin:     General: Skin is warm and dry.      Capillary Refill: Capillary refill takes less than 2 seconds.      Findings: No rash.   Neurological:      Mental Status: He is alert and oriented to person, place, and time.      Cranial Nerves: No cranial nerve deficit.      Coordination: Coordination normal.   Psychiatric:         Mood and Affect: Mood normal.         Behavior: Behavior normal.         Thought Content: Thought content normal.         Judgment: Judgment normal.         Assessment/Plan     Right upper quadrant abscess, suspected gallbladder perforation  Abnormal gallbladder, cholecystitis versus mechanical perforation  Hepatocellular carcinoma, metastatic  History of hepatitis C  Chronic pain with opiate dependency, secondary to malignancy  BPH  COPD  without acute exacerbation  GERD  Essential hypertension  DM2, non-insulin-dependent     Brief summary:  Patient with very complicated past medical history, predominantly revolving around hepatocellular carcinoma and cirrhosis secondary to chronic hepatitis C.  Underwent placement of a transpapillary gallbladder drain at the [facility] in late October 2020.  Which was reportedly uneventful.  Patient was having right upper quadrant pain at the time presumed to be due to biliary obstruction, had minimal pain for about a month, but unfortunately had increasing pain after that.  Had noticed it increasing significantly over the week prior to presentation and presented to his primary oncologist, Dr. Reynoso on 3/4/2021 for evaluation.  Noted to have restaging MRI on same day showing complex appearing situation in the region of the gallbladder fundus suggestive of extrahepatic malignancy or possibly abscess.  Was subsequently admitted to the hospital under the hospitalist service for further treatment.  Was felt that area likely represented abscess and underwent percutaneous drain placement x2 with interventional radiology on 2/5/2020 which did result in removal of about 5 cc of purulent appearing fluid.  He was placed on Cipro and Flagyl due to penicillin allergy and infectious disease consulted for plan going forward.     Daily update:  Patient without new issue overnight, doing well.  Continue antimicrobial plan for now, but expect some change in antimicrobial plan based off of emerging organisms on culture.  Otherwise, will contact better biliary team at [facility] tomorrow for further direction on ongoing care and follow-up.     Principal Problem:    Intractable right upper quadrant abdominal pain  Active Problems:    Chronic hepatitis C with cirrhosis (CMS/HCC) (HCC)    Type 2 diabetes mellitus with neurologic complication (CMS/HCC) (HCC)    Essential hypertension    Hepatocellular carcinoma  (CMS/HCC) (HCC)    Pain of metastatic malignancy    Migraine without aura and without status migrainosus, not intractable    Primary osteoarthritis of both knees    Gastroesophageal reflux disease without esophagitis    Abdominal pain    Hypomagnesemia    Hypophosphatemia    DVT Prophylaxis: Lovenox     Urinary Catheter Date: None      Justification:      Central Lines: None     Other lines/tubes: Percutaneous drains right upper quadrant x2, placed 3/5/2021     Wounds: None    Code Status: Full Code    Anticipated date of discharge: 3/9/2021

## 2021-03-07 NOTE — NURSING END OF SHIFT
Nursing End of Shift Summary:    Patient: Ben Lai Sr  MRN: [id number]  : 1950, Age: 70 y.o.    Location: [address] [place] [address]    Nursing Goals  Clinical Goals for the Shift: pain control, safety and comfort, give Abx, monitor drains    Narrative Summary of Progress Toward Clinical Goals:  Drains had an output of 20mls, 10mls for each during the shift, Pain manage with medication with some effect. VS are stable, safety and comfort maintained.    Barriers to Goals/Nursing Concerns:  Yes -     New Patient or Family Concerns/Issues:  No    Shift Summary:      Significant Events & Communications to Providers (last 12 hours)      Last 5 Values    No documentation.              Oxygen Usage (last 12 hours)      Last 5 Values     Row Name 21                   Oxygen Weaning Trial by Nursing    Home Oxygen Use Frequency  At Night        Is Patient on Room Air OR on the Same Amount of O2 as at Home?  Yes                   Mobility (last 12 hours)      Last 5 Values     Row Name 21                   Mobility    Activity  Bedrest;Bathroom privileges        Level of Assistance  Independent after set-up            Urethral Catheter    Active Urethral Catheter     None            Active Lines    Active Central venous catheter / Peripherally inserted central catheter / Implantable Port / Hemodialysis catheter / Midline Catheter     Name:   Placement date:   Placement time:   Site:   Days:    Single Lumen Implantable Port 20 Right Chest   20    09    Chest   65              Infusing Medications   Medication Dose Last Rate   • sodium chloride 0.9 %  50 mL/hr 50 mL/hr (21 1806)   • sodium chloride 0.9% (NS)  10 mL/hr Stopped (21 1212)     PRN Medications   Medication Dose Last Admin   • acetaminophen  325-650 mg     • bisacodyL  10 mg 10 mg at 21 1426   • magnesium hydroxide  30 mL     • sodium chloride 0.9% (NS)  25-50 mL Rate Verify at 21 1711   • HYDROmorphone   0.4 mg 0.4 mg at 03/07/21 0410   • HYDROmorphone  0.8 mg     • metoclopramide  10 mg      Or   • metoclopramide  10 mg 10 mg at 03/05/21 1602     _________________________  Hosea Mei RN  03/07/21 6:19 AM

## 2021-03-07 NOTE — PROGRESS NOTES
INFECTIOUS DISEASES DAILY PROGRESS NOTE    Subjective                                                                                                                                                                                                                                                     SUBJECTIVE:     Chart was reviewed,  Overall he feels a little better, continue to have the abdominal pain mainly in the right upper quadrant  Aspirated fluid culture 3/5/2021 Enterococcus faecalis, actinomyces odontolyticus, Streptococcus intermedius, Gemella morbillorum    By questioning the patient about his penicillin allergy, he said when he was in the service he was standing he was given penicillin intramuscular injection and he passed out, there was no rash no respiratory distress no swelling of the face or the tongue.  I believe patient had vasovagal incident and not a true allergy to penicillin    All other systems reviewed and were negative    Objective   OBJECTIVE:   VITALS:  Temp:  [36.8 °C (98.2 °F)-36.9 °C (98.4 °F)] 36.8 °C (98.3 °F)  Heart Rate:  [73-75] 73  Resp:  [16] 16  BP: (143-163)/(60-84) 160/80  ED Triage Vitals [03/04/21 1419]   Weight 88.6 kg (195 lb 5.2 oz)       PHYSICAL EXAMINATION    GENERAL: No acute distress, well developed, chronically ill-appearing  HEENT: Normocephalic,, normal nose and external ear  PULMONARY: Nonlabored breathing,  CARDIOVASCULAR: Normal rate  GASTROINTESTINAL: Soft, tenderness mainly in the right upper quadrant  To percutaneous biliary drain with a clear bile output  MUSCULOSKELETAL: No acute inflammatory joint  SKIN: No rash   NEUROLOGIC: Grossly no focal motor deficit, alert and oriented  PSYCHIATRIC: Cooperative      DIAGNOSTIC DATA:     Laboratory data reviewed     Microbiology data:      Results from last 4 days   Lab Units 03/07/21  0640 03/06/21  0706 03/04/21  1733   WBC AUTO 10*3/uL 4.0 4.9 5.1   HEMOGLOBIN g/dL 11.1* 11.2* 11.5*    HEMATOCRIT % 34.2* 33.7* 35.0*   PLATELETS AUTO 10*3/uL 126* 122* 111*     Results from last 4 days   Lab Units 03/07/21  0640 03/06/21  0706 03/04/21  1733 03/03/21  0802   SODIUM mmol/L 135 135 137 138   POTASSIUM mmol/L 3.5 3.4* 3.7 3.7   CO2 mmol/L 24 23 29 29   BUN mg/dL 7 7 12 11   CREATININE mg/dL 0.47* 0.54* 0.69* 0.90   CALCIUM mg/dL 7.9* 8.1* 8.4* 8.3*   TOTAL PROTEIN g/dL 5.9*  --  6.3 6.4   BILIRUBIN TOTAL mg/dL 0.77  --  0.92 0.95   ALK PHOS U/L 92  --  118* 117*   ALT U/L 7  --  13 15   AST U/L 17  --  21 29     Results from last 4 days   Lab Units 03/05/21  0225   CRP mg/L 90.8*         MEDICATIONS:      List of the inpatient medication reviewed  Scheduled   enoxaparin, 40 mg, Daily at 1400  metronidazole, 500 mg, q6h  ciprofloxacin, 400 mg, q12h  sodium chloride, 3 mL, 2x daily  morphine, 15 mg, q12h RONAL  terazosin, 5 mg, 2x daily  lansoprazole, 30 mg, Daily  lisinopriL, 20 mg, Daily  traZODone, 100 mg, Nightly  carvediloL, 3.125 mg, 2x daily with meals  insulin aspart U-100, 0-15 Units, Insulin: 4x daily with meals  insulin glargine, 12 Units, q AM         Assessment/Plan   ASSESSMENT & PLAN:     Cholecystitis/right upper quadrant abscess  Polymicrobial infection  Hepatocellular carcinoma  Hepatitis C cirrhosis  Biliary obstruction, status post special stent placement at [facility]  Splenomegaly/thrombocytopenia     Recommendation    Discontinue ciprofloxacin and Flagyl  Unasyn 3 g every 6 hours IV, will monitor closely for any reaction          Jermaine Myers MD  3/7/2021,   [facility] - Infectious Diseases

## 2021-03-07 NOTE — PLAN OF CARE
Problem: Safety Adult - Fall  Goal: Free from fall injury  Description: INTERVENTIONS:    Inpatient - Please reference Cares/Safety Flowsheet under [facility] Fall Risk for interventions.  Pediatrics - Please reference Peds Daily Cares/Safety Flowsheet under [facility] Pediatric Fall Assessment Fall Bundle for interventions  LD/OB - Please reference OB Shift Screening Flowsheet under OB Fall Risk for interventions.  Outcome: Progressing     Problem: Pain - Adult  Goal: Verbalizes/displays adequate comfort level or baseline comfort level  Description: INTERVENTIONS:  1. Encourage patient to monitor pain and request interventions  2. Assess pain using the appropriate pain scale  3. Administer analgesics based on type and severity of pain and evaluate response  4. Educate/Implement non-pharmacological measures as appropriate and evaluate response  5. Consider cultural, developmental and social influences on pain and pain management  6. Notify Provider if interventions unsuccessful or patient reports new pain  Outcome: Progressing     Problem: Infection - Adult  Goal: Absence of infection during hospitalization  Description: INTERVENTIONS:  1. Assess and monitor for signs and symptoms of infection  2. Monitor lab/diagnostic results  3. Monitor all insertion sites/wounds/incisions  4. Monitor secretions for changes in amount and color  5. Administer medications as ordered  6. Educate and encourage patient and family to use good hand hygiene technique  7. Identify and educate in appropriate isolation precautions for identified infection/condition  Outcome: Progressing     Problem: Safety Adult  Goal: Patient will remain safe during hospitalization  Description: INTERVENTIONS    1. Assess patient for fall risk and implement interventions if needed  2. Use safe transport techniques  3. Assess patient using the appropriate Javon skin assessment scale  4. Assess patient for risk of aspiration  5. Assess patient for risk of  elopement  6. Assess patient for risk of suicide  Outcome: Progressing     Problem: Daily Care  Goal: Daily care needs are met  Description: INTERVENTIONS:   1. Assess and monitor skin integrity  2. Identify patients at risk for skin breakdown on admission and per policy  3. Assess and monitor ability to perform self care and identify potential discharge needs  4. Assess skin integrity/risk for skin breakdown  5. Assist patient with activities of daily living as needed  6. Encourage independent activity per ability   7. Provide mouth care   8. Include patient/family/caregiver in decisions related to daily care   Outcome: Progressing     Problem: Knowledge Deficit  Goal: Patient/family/caregiver demonstrates understanding of disease process, treatment plan, medications, and discharge instructions  Description: INTERVENTIONS:   1. Complete learning assessment and assess knowledge base  2. Provide teaching at level of understanding   3. Provide teaching via preferred learning methods  Outcome: Progressing     Problem: Discharge Barriers  Goal: Patient's discharge needs are met  Description: INTERVENTIONS:  1. Assess patient for self-management skills  2. Encourage participation in management  3. Identify potential discharge barriers on admission and throughout hospital stay  4. Involve patient/family/caregiver in discharge planning process  5. Collaborate with case management/ for discharge needs  Outcome: Progressing     Problem: Respiratory - Adult  Goal: Achieves optimal ventilation and oxygenation with noninvasive CPAP/BiLEVEL support  Description: INTERVENTIONS:  1. Provide education to patient/family about rationale and expected outcomes associated with therapy  2. Position patient to facilitate optimal ventilation/oxygenation status and minimize respiratory effort  3. Position patient to reduce aspiration risk, elevate head of bed at least 35 degrees or higher, as applicable  4. Assess  effectiveness of therapy on ventilation/oxygenation status based on oxygen saturation and/or arterial blood gases, as indicated  5. Assess patient for changes in respiratory and physiological status  6. Auscultate breath sounds and assess chest excursion, as indicated  7. Assess patient for changes in mentation and behavior  8. Routinely monitor equipment for proper performance and settings  9. Assess and monitor skin condition, in relationship to the respiratory interface  10. Assure equipment alarm volume is adequate for the patient's environment  11. Immediately respond to equipment alarm, to assess patient and/or cause for alarm  12. Follow universal infection control/hospital policy(ies)/standards  Outcome: Progressing

## 2021-03-08 NOTE — PROGRESS NOTES
Internal/Family Medicine Daily Progress Note   LOS: 4 days     Subjective      Patient without new issue overnight, doing well per nursing staff.  Patient states he continues to have some right upper quadrant pain, but otherwise doing well and tolerating diet.     Objective       Vital signs:  Temp:  [36.4 °C (97.6 °F)-36.7 °C (98.1 °F)] 36.4 °C (97.6 °F)  Heart Rate:  [62-70] 66  Resp:  [18-20] 18  BP: (137-171)/(64-85) 148/64    ROS: Review of Systems   Constitutional: Negative for activity change, appetite change, chills, fatigue, fever and unexpected weight change.   HENT: Negative for congestion, hearing loss, nosebleeds, sore throat and trouble swallowing.    Eyes: Negative for photophobia, discharge and visual disturbance.   Respiratory: Negative for cough and shortness of breath.    Cardiovascular: Negative for chest pain, palpitations and leg swelling.   Gastrointestinal: Positive for abdominal pain. Negative for abdominal distention, blood in stool, constipation, diarrhea, nausea and vomiting.   Endocrine: Negative for cold intolerance and polydipsia.   Genitourinary: Negative for difficulty urinating, dysuria, frequency and hematuria.   Musculoskeletal: Negative for arthralgias, joint swelling and myalgias.   Skin: Negative for color change, rash and wound.   Neurological: Negative for dizziness, tremors, seizures, syncope, speech difficulty, weakness, light-headedness, numbness and headaches.   Psychiatric/Behavioral: Negative for agitation, behavioral problems, confusion and hallucinations. The patient is not nervous/anxious.        Physical Exam:    Physical Exam  Vitals reviewed.   Constitutional:       Appearance: He is well-developed.   HENT:      Head: Normocephalic and atraumatic.      Nose: Nose normal.   Eyes:      General: No scleral icterus.        Right eye: No discharge.         Left eye: No discharge.      Conjunctiva/sclera: Conjunctivae normal.      Pupils: Pupils are equal, round, and  reactive to light.   Cardiovascular:      Rate and Rhythm: Normal rate and regular rhythm.      Heart sounds: No murmur. No friction rub. No gallop.    Pulmonary:      Effort: Pulmonary effort is normal. No respiratory distress.      Breath sounds: Normal breath sounds. No wheezing or rales.   Chest:      Chest wall: No tenderness.   Abdominal:      General: Bowel sounds are normal. There is no distension.      Palpations: Abdomen is soft. There is no mass.      Tenderness: There is no abdominal tenderness. There is no guarding or rebound.      Hernia: No hernia is present.   Musculoskeletal:         General: No tenderness or deformity. Normal range of motion.      Cervical back: Normal range of motion and neck supple.   Skin:     General: Skin is warm and dry.      Capillary Refill: Capillary refill takes less than 2 seconds.      Findings: No rash.   Neurological:      Mental Status: He is alert and oriented to person, place, and time.      Cranial Nerves: No cranial nerve deficit.      Coordination: Coordination normal.   Psychiatric:         Mood and Affect: Mood normal.         Behavior: Behavior normal.         Thought Content: Thought content normal.         Judgment: Judgment normal.         Assessment/Plan     Right upper quadrant abscess, suspected gallbladder perforation  Abnormal gallbladder, cholecystitis versus mechanical perforation  Hepatocellular carcinoma, metastatic  History of hepatitis C  Chronic pain with opiate dependency, secondary to malignancy  BPH  COPD without acute exacerbation  GERD  Essential hypertension  DM2, non-insulin-dependent     Brief summary:  Patient with very complicated past medical history, predominantly revolving around hepatocellular carcinoma and cirrhosis secondary to chronic hepatitis C.  Underwent placement of a transpapillary gallbladder drain at the [facility] in late October 2020.  Which was reportedly uneventful.  Patient was having right upper  quadrant pain at the time presumed to be due to biliary obstruction, had minimal pain for about a month, but unfortunately had increasing pain after that.  Had noticed it increasing significantly over the week prior to presentation and presented to his primary oncologist, Dr. Reynoso on 3/4/2021 for evaluation.  Noted to have restaging MRI on same day showing complex appearing situation in the region of the gallbladder fundus suggestive of extrahepatic malignancy or possibly abscess.  Was subsequently admitted to the hospital under the hospitalist service for further treatment.  Was felt that area likely represented abscess and underwent percutaneous drain placement x2 with interventional radiology on 2/5/2020 which did result in removal of about 5 cc of purulent appearing fluid.  He was placed on Cipro and Flagyl due to penicillin allergy and infectious disease consulted for plan going forward.  Unfortunately, cultures became significantly polymicrobial, including multiple anaerobes.  Did trial patient on Unasyn which was well-tolerated.  Spoke with pancreatico biliary interventional list at [facility],  3/8/2021, recommended continued percutaneous drainage and antimicrobials with HIDA scan and chemical testing for bilirubin of drain fluid to help differentiate between perforated gallbladder versus other pathology.     Daily update:  Patient with quite concerning broadly polymicrobial culture from percutaneous drainage tube.  Could be gallbladder, but also could be fecal.  Dr. Myers felt that today's drainage looked more biliary in nature and would likely confirm gallbladder leak on chemical testing of fluid and HIDA scan planned for tomorrow morning.  Spoke with Dr. Farah today by phone, felt that would likely be a permanent drain as he was not a surgical candidate if this was in fact a gallbladder leak, but if was not may have some hope of removing tubes sometime.  In either case,  requesting short-term follow-up with him in the clinic at the [facility] and he was currently working on an appointment for that.  Infectious disease recommending 5 total days of IV antimicrobials then if no further issues and no signs of sepsis, can transition to oral Augmentin to bridge to follow-up appointment [facility].     Principal Problem:    Intractable right upper quadrant abdominal pain  Active Problems:    Chronic hepatitis C with cirrhosis (CMS/HCC) (HCC)    Type 2 diabetes mellitus with neurologic complication (CMS/HCC) (HCC)    Essential hypertension    Hepatocellular carcinoma (CMS/HCC) (HCC)    Pain of metastatic malignancy    Migraine without aura and without status migrainosus, not intractable    Primary osteoarthritis of both knees    Gastroesophageal reflux disease without esophagitis    Abdominal pain    Hypomagnesemia    Hypophosphatemia    DVT Prophylaxis: Lovenox     Urinary Catheter Date: None      Justification:      Central Lines: None     Other lines/tubes: Percutaneous drains right upper quadrant x2, placed 3/5/2021     Wounds: None    Code Status: Full Code    Anticipated date of discharge: 3/10/2021

## 2021-03-08 NOTE — INTERDISCIPLINARY/THERAPY
Case Management Progress Note  [id number]    Diagnosis: Concerns for Extrahepatic malignancy- placement of percutaneous drain placement x2 with interventional radiology on 2/5/2020     Narrative/Plan of Care:  Continued IV antibiotic treatment with need for continued IP management, attending to consult [facility] for review.     Disposition: Home with wife once medically stable/symptoms managed.

## 2021-03-08 NOTE — PROGRESS NOTES
INFECTIOUS DISEASES DAILY PROGRESS NOTE    Subjective                                                                                                                                                                                                                                                     SUBJECTIVE:     Chart was reviewed,  Patient tolerating Unasyn with no adverse effect/allergic reaction  No significant nausea or vomiting, abdominal pain about the same  Generalized weakness malaise  No acute cough or dyspnea  No fever or chills    All other systems reviewed and were negative    Objective   OBJECTIVE:   VITALS:  Temp:  [36.4 °C (97.6 °F)-36.7 °C (98.1 °F)] 36.4 °C (97.6 °F)  Heart Rate:  [62-70] 66  Resp:  [18-20] 18  BP: (137-171)/(64-85) 148/64  ED Triage Vitals [03/04/21 1419]   Weight 88.6 kg (195 lb 5.2 oz)       PHYSICAL EXAMINATION    GENERAL: No acute distress, well developed, chronically ill-appearing  HEENT: Normocephalic,, normal nose and external ear  PULMONARY: Nonlabored breathing,  CARDIOVASCULAR: Normal rate  GASTROINTESTINAL: Soft, tenderness mainly in the right upper quadrant  To percutaneous biliary drain with a clear bile output  MUSCULOSKELETAL: No acute inflammatory joint  SKIN: No rash   NEUROLOGIC: Grossly no focal motor deficit, alert and oriented  PSYCHIATRIC: Cooperative      DIAGNOSTIC DATA:     Laboratory data reviewed     Microbiology data:   Right upper quadrant fluid collection aspirate culture 3/5/2021 Enterococcus faecalis, actinomyces odonolyticus, Streptococcus intermedius, Gemella, Clostridium perfringens, Prevotella, Clostridium ramosum    Results from last 4 days   Lab Units 03/08/21  0834 03/07/21  0640 03/06/21  0706   WBC AUTO 10*3/uL 3.0* 4.0 4.9   HEMOGLOBIN g/dL 11.0* 11.1* 11.2*   HEMATOCRIT % 32.8* 34.2* 33.7*   PLATELETS AUTO 10*3/uL 129* 126* 122*     Results from last 4 days   Lab Units 03/08/21  0834 03/07/21  0640 03/06/21  0706  03/04/21  1733   SODIUM mmol/L 137 135 135 137   POTASSIUM mmol/L 3.6 3.5 3.4* 3.7   CO2 mmol/L 27 24 23 29   BUN mg/dL 6* 7 7 12   CREATININE mg/dL 0.57* 0.47* 0.54* 0.69*   CALCIUM mg/dL 7.9* 7.9* 8.1* 8.4*   TOTAL PROTEIN g/dL 5.2* 5.9*  --  6.3   BILIRUBIN TOTAL mg/dL 0.52 0.77  --  0.92   ALK PHOS U/L 87 92  --  118*   ALT U/L 9 7  --  13   AST U/L 17 17  --  21     Results from last 4 days   Lab Units 03/05/21  0225   CRP mg/L 90.8*         MEDICATIONS:      List of the inpatient medication reviewed  Scheduled   ampicillin-sulbactam, 3,000 mg, q6h  enoxaparin, 40 mg, Daily at 1400  sodium chloride, 3 mL, 2x daily  morphine, 15 mg, q12h RONAL  terazosin, 5 mg, 2x daily  lansoprazole, 30 mg, Daily  lisinopriL, 20 mg, Daily  traZODone, 100 mg, Nightly  carvediloL, 3.125 mg, 2x daily with meals  insulin aspart U-100, 0-15 Units, Insulin: 4x daily with meals  insulin glargine, 12 Units, q AM         Assessment/Plan   ASSESSMENT & PLAN:     Cholecystitis/right upper quadrant abscess  Polymicrobial infection  Hepatocellular carcinoma  Hepatitis C cirrhosis  Biliary obstruction, status post special stent placement at [facility]  Splenomegaly/thrombocytopenia     Recommendation    Continue Unasyn IV  In the next 48 hours if he continues to be stable, antibiotic can be changed to Augmentin 875 mg twice daily    Case discussed with Dr. Shaye Myers MD  3/8/2021,   [facility] - Infectious Diseases

## 2021-03-08 NOTE — NURSING END OF SHIFT
Nursing End of Shift Summary:    Patient: Ben Lai Sr  MRN: [id number]  : 1950, Age: 71 y.o.    Location: [address] [place] [address]    Nursing Goals  Clinical Goals for the Shift: pain control, safety and comfort, monitor drain    Narrative Summary of Progress Toward Clinical Goals:  Pt pain was manage with medication. Had a bed bath this morning with wipes. Safety and comfort maintained. About 15mls were emptied from both drains    Barriers to Goals/Nursing Concerns:  Yes -     New Patient or Family Concerns/Issues:  No    Shift Summary:      Significant Events & Communications to Providers (last 12 hours)      Last 5 Values    No documentation.              Oxygen Usage (last 12 hours)      Last 5 Values    No documentation.              Mobility (last 12 hours)      Last 5 Values     Row Name 21                   Mobility    Activity  Ambulate in room;Bathroom privileges        Level of Assistance  Standby assist, set-up cues, supervision of patient - no hands on            Urethral Catheter    Active Urethral Catheter     None            Active Lines    Active Central venous catheter / Peripherally inserted central catheter / Implantable Port / Hemodialysis catheter / Midline Catheter     Name:   Placement date:   Placement time:   Site:   Days:    Single Lumen Implantable Port 20 Right Chest   20    0921    Chest   66              Infusing Medications   Medication Dose Last Rate   • sodium chloride 0.9 %  50 mL/hr 50 mL/hr (21 0500)   • sodium chloride 0.9% (NS)  10 mL/hr Stopped (21 1212)     PRN Medications   Medication Dose Last Admin   • sodium chloride  1 spray     • acetaminophen  325-650 mg     • bisacodyL  10 mg 10 mg at 21 1426   • magnesium hydroxide  30 mL     • sodium chloride 0.9% (NS)  25-50 mL Rate Verify at 21 1711   • HYDROmorphone  0.4 mg 0.4 mg at 21 0410   • HYDROmorphone  0.8 mg 0.8 mg at 21 0448   • metoclopramide  10 mg      Or    • metoclopramide  10 mg 10 mg at 03/05/21 1602     _________________________  Hosea Mei RN  03/08/21 6:31 AM

## 2021-03-09 NOTE — NURSING END OF SHIFT
Nursing End of Shift Summary:    Patient: Ben Lai Sr  MRN: [id number]  : 1950, Age: 71 y.o.    Location: [address] [place] [address]    Nursing Goals  Clinical Goals for the Shift: monitor safety, pain, enedina     Narrative Summary of Progress Toward Clinical Goals:  Pt free fm fall during shift, ENEDINA was flushed and not draining much, pain is managed through med    Barriers to Goals/Nursing Concerns:  Yes pt hv NM biliary scan tomorrow    New Patient or Family Concerns/Issues:  No    Shift Summary:      Significant Events & Communications to Providers (last 12 hours)      Last 5 Values    No documentation.              Oxygen Usage (last 12 hours)      Last 5 Values    No documentation.              Mobility (last 12 hours)      Last 5 Values     Row Name 21 0730 21 0800                Mobility    Activity  Ambulate in room  Ambulate in perdue;Ambulate in room;Bedrest;Chair;Turn       Level of Assistance  Minimal assist, patient does 75% or more  Minimal assist, patient does 75% or more           Urethral Catheter    Active Urethral Catheter     None            Active Lines    Active Central venous catheter / Peripherally inserted central catheter / Implantable Port / Hemodialysis catheter / Midline Catheter     Name:   Placement date:   Placement time:   Site:   Days:    Single Lumen Implantable Port 20 09 Right Chest   20    0921    Chest   67              Infusing Medications   Medication Dose Last Rate   • sodium chloride 0.9 %  50 mL/hr 50 mL/hr (21 0500)   • sodium chloride 0.9% (NS)  10 mL/hr Stopped (21 1212)     PRN Medications   Medication Dose Last Admin   • sodium chloride  1 spray 1 spray at 21 1329   • acetaminophen  325-650 mg     • bisacodyL  10 mg 10 mg at 21 1426   • magnesium hydroxide  30 mL     • sodium chloride 0.9% (NS)  25-50 mL Rate Verify at 21 1711   • HYDROmorphone  0.4 mg 0.4 mg at 21 1244   • HYDROmorphone  0.8 mg 0.8 mg at 21  0448   • metoclopramide  10 mg      Or   • metoclopramide  10 mg 10 mg at 03/05/21 1602     _________________________  Julie Peyeow, RN  03/08/21 5:30 PM

## 2021-03-09 NOTE — PLAN OF CARE
Problem: Safety Adult - Fall  Goal: Free from fall injury  Description: INTERVENTIONS:    Inpatient - Please reference Cares/Safety Flowsheet under [facility] Fall Risk for interventions.  Pediatrics - Please reference Peds Daily Cares/Safety Flowsheet under [facility] Pediatric Fall Assessment Fall Bundle for interventions  LD/OB - Please reference OB Shift Screening Flowsheet under OB Fall Risk for interventions.  Outcome: Progressing     Problem: Pain - Adult  Goal: Verbalizes/displays adequate comfort level or baseline comfort level  Description: INTERVENTIONS:  1. Encourage patient to monitor pain and request interventions  2. Assess pain using the appropriate pain scale  3. Administer analgesics based on type and severity of pain and evaluate response  4. Educate/Implement non-pharmacological measures as appropriate and evaluate response  5. Consider cultural, developmental and social influences on pain and pain management  6. Notify Provider if interventions unsuccessful or patient reports new pain  Outcome: Progressing     Problem: Infection - Adult  Goal: Absence of infection during hospitalization  Description: INTERVENTIONS:  1. Assess and monitor for signs and symptoms of infection  2. Monitor lab/diagnostic results  3. Monitor all insertion sites/wounds/incisions  4. Monitor secretions for changes in amount and color  5. Administer medications as ordered  6. Educate and encourage patient and family to use good hand hygiene technique  7. Identify and educate in appropriate isolation precautions for identified infection/condition  Outcome: Progressing     Problem: Safety Adult  Goal: Patient will remain safe during hospitalization  Description: INTERVENTIONS    1. Assess patient for fall risk and implement interventions if needed  2. Use safe transport techniques  3. Assess patient using the appropriate Javon skin assessment scale  4. Assess patient for risk of aspiration  5. Assess patient for risk of  elopement  6. Assess patient for risk of suicide  Outcome: Progressing     Problem: Daily Care  Goal: Daily care needs are met  Description: INTERVENTIONS:   1. Assess and monitor skin integrity  2. Identify patients at risk for skin breakdown on admission and per policy  3. Assess and monitor ability to perform self care and identify potential discharge needs  4. Assess skin integrity/risk for skin breakdown  5. Assist patient with activities of daily living as needed  6. Encourage independent activity per ability   7. Provide mouth care   8. Include patient/family/caregiver in decisions related to daily care   Outcome: Progressing     Problem: Knowledge Deficit  Goal: Patient/family/caregiver demonstrates understanding of disease process, treatment plan, medications, and discharge instructions  Description: INTERVENTIONS:   1. Complete learning assessment and assess knowledge base  2. Provide teaching at level of understanding   3. Provide teaching via preferred learning methods  Outcome: Progressing     Problem: Discharge Barriers  Goal: Patient's discharge needs are met  Description: INTERVENTIONS:  1. Assess patient for self-management skills  2. Encourage participation in management  3. Identify potential discharge barriers on admission and throughout hospital stay  4. Involve patient/family/caregiver in discharge planning process  5. Collaborate with case management/ for discharge needs  Outcome: Progressing     Problem: Respiratory - Adult  Goal: Achieves optimal ventilation and oxygenation with noninvasive CPAP/BiLEVEL support  Description: INTERVENTIONS:  1. Provide education to patient/family about rationale and expected outcomes associated with therapy  2. Position patient to facilitate optimal ventilation/oxygenation status and minimize respiratory effort  3. Position patient to reduce aspiration risk, elevate head of bed at least 35 degrees or higher, as applicable  4. Assess  effectiveness of therapy on ventilation/oxygenation status based on oxygen saturation and/or arterial blood gases, as indicated  5. Assess patient for changes in respiratory and physiological status  6. Auscultate breath sounds and assess chest excursion, as indicated  7. Assess patient for changes in mentation and behavior  8. Routinely monitor equipment for proper performance and settings  9. Assess and monitor skin condition, in relationship to the respiratory interface  10. Assure equipment alarm volume is adequate for the patient's environment  11. Immediately respond to equipment alarm, to assess patient and/or cause for alarm  12. Follow universal infection control/hospital policy(ies)/standards  Outcome: Progressing     Problem: Potential for Compromised Skin Integrity  Goal: Skin Integrity is Maintained or Improved  Description: INTERVENTIONS:  1. Assess and monitor skin integrity  2. Collaborate with interdisciplinary team and initiate plans and interventions as needed  3. Alternate a full bath with partial baths for elderly   4. Monitor patient's hygiene practices   5. Collaborate with wound, ostomy, and continence nurse  Outcome: Progressing  Goal: Nutritional status is improving  Description: INTERVENTIONS:  1. Monitor and assess patient for malnutrition (ex- brittle hair, bruises, dry skin, pale skin and conjunctiva, muscle wasting, smooth red tongue, and disorientation)  2. Monitor patient's weight and dietary intake as ordered or per policy  3. Determine patient's food preferences and provide high-protein, high-caloric foods as appropriate  4. Assist patient with eating   5. Allow adequate time for meals   6. Encourage patient to take dietary supplement as ordered   7. Collaborate with dietitian  8. Include patient/family/caregiver in decisions related to nutrition  Outcome: Progressing  Goal: MOBILITY IS MAINTAINED OR IMPROVED  Description: INTERVENTIONS  1. Collaborate with interdisciplinary team  and initiate plan and interventions as ordered (PT/OT)  2. Encourage ambulation  3. Up to chair for meals  4. Monitor for signs of deconditioning  Outcome: Progressing     Problem: Urinary Incontinence  Goal: Perineal skin integrity is maintained or improved  Description: INTERVENTIONS:  1. Assess genitourinary system, perineal skin, labs (urinalysis), and history of incontinence to include past management, aggravating, and alleviating factors  2. Collaborate with interdisciplinary team including wound, ostomy, and continence nurse and initiate plans and interventions as needed  4. Consider urine containment device  5. Apply skin protectant   6. Develop skin care regimen  7. Provide privacy when changing patient's incontinence device to maintain their dignity  Outcome: Progressing

## 2021-03-09 NOTE — PROGRESS NOTES
Internal/Family Medicine Daily Progress Note   LOS: 5 days     Subjective      Patient without new issue overnight, continues to do well.  Lengthy conversation with patient and his wife, who was again present at bedside, regarding continuation of IV antimicrobials and possible discharge to home tomorrow on oral Augmentin and further decision making about prognosis for removal of percutaneous drains based off HIDA scan findings.  Agreeable no further questions or concerns.     Objective       Vital signs:  Temp:  [36.3 °C (97.3 °F)-36.8 °C (98.2 °F)] 36.6 °C (97.9 °F)  Heart Rate:  [61-97] 97  Resp:  [16-18] 16  BP: (154-175)/(67-82) 173/82    ROS: Review of Systems   Constitutional: Positive for fatigue. Negative for activity change, appetite change, chills, fever and unexpected weight change.   HENT: Negative for congestion, hearing loss, nosebleeds, sore throat and trouble swallowing.    Eyes: Negative for photophobia, discharge and visual disturbance.   Respiratory: Negative for cough and shortness of breath.    Cardiovascular: Negative for chest pain, palpitations and leg swelling.   Gastrointestinal: Positive for abdominal pain. Negative for abdominal distention, blood in stool, constipation, diarrhea, nausea and vomiting.   Endocrine: Negative for cold intolerance and polydipsia.   Genitourinary: Negative for difficulty urinating, dysuria, frequency and hematuria.   Musculoskeletal: Negative for arthralgias, joint swelling and myalgias.   Skin: Negative for color change, rash and wound.   Neurological: Negative for dizziness, tremors, seizures, syncope, speech difficulty, weakness, light-headedness, numbness and headaches.   Psychiatric/Behavioral: Negative for agitation, behavioral problems, confusion and hallucinations. The patient is not nervous/anxious.        Physical Exam:    Physical Exam  Vitals reviewed.   Constitutional:       Appearance: He is well-developed.      Comments: Patient appears to be  more comfortable today, but overall unchanged.   HENT:      Head: Normocephalic and atraumatic.      Nose: Nose normal.   Eyes:      General: No scleral icterus.        Right eye: No discharge.         Left eye: No discharge.      Conjunctiva/sclera: Conjunctivae normal.      Pupils: Pupils are equal, round, and reactive to light.   Cardiovascular:      Rate and Rhythm: Normal rate and regular rhythm.      Heart sounds: No murmur. No friction rub. No gallop.    Pulmonary:      Effort: Pulmonary effort is normal. No respiratory distress.      Breath sounds: Normal breath sounds. No wheezing or rales.   Chest:      Chest wall: No tenderness.   Abdominal:      General: Bowel sounds are normal. There is no distension.      Palpations: Abdomen is soft. There is no mass.      Tenderness: There is no abdominal tenderness. There is no guarding or rebound.      Hernia: No hernia is present.      Comments: Decreased, but persistent abdominal pain today.   Musculoskeletal:         General: No tenderness or deformity. Normal range of motion.      Cervical back: Normal range of motion and neck supple.   Skin:     General: Skin is warm and dry.      Capillary Refill: Capillary refill takes less than 2 seconds.      Findings: No rash.   Neurological:      Mental Status: He is alert and oriented to person, place, and time.      Cranial Nerves: No cranial nerve deficit.      Coordination: Coordination normal.   Psychiatric:         Mood and Affect: Mood normal.         Behavior: Behavior normal.         Thought Content: Thought content normal.         Judgment: Judgment normal.         Assessment/Plan     Right upper quadrant abscess, suspected gallbladder perforation  Abnormal gallbladder, cholecystitis versus mechanical perforation  Hepatocellular carcinoma, metastatic  History of hepatitis C  Chronic pain with opiate dependency, secondary to malignancy  BPH  COPD without acute exacerbation  GERD  Essential hypertension  DM2,  non-insulin-dependent     Brief summary:  Patient with very complicated past medical history, predominantly revolving around hepatocellular carcinoma and cirrhosis secondary to chronic hepatitis C.  Underwent placement of a transpapillary gallbladder drain at the [facility] in late October 2020.  Which was reportedly uneventful.  Patient was having right upper quadrant pain at the time presumed to be due to biliary obstruction, had minimal pain for about a month, but unfortunately had increasing pain after that.  Had noticed it increasing significantly over the week prior to presentation and presented to his primary oncologist, Dr. Reynoso on 3/4/2021 for evaluation.  Noted to have restaging MRI on same day showing complex appearing situation in the region of the gallbladder fundus suggestive of extrahepatic malignancy or possibly abscess.  Was subsequently admitted to the hospital under the hospitalist service for further treatment.  Was felt that area likely represented abscess and underwent percutaneous drain placement x2 with interventional radiology on 2/5/2020 which did result in removal of about 5 cc of purulent appearing fluid.  He was placed on Cipro and Flagyl due to penicillin allergy and infectious disease consulted for plan going forward.  Unfortunately, cultures became significantly polymicrobial, including multiple anaerobes.  Did trial patient on Unasyn which was well-tolerated.  Spoke with pancreatico biliary interventional list at [facility],  3/8/2021, recommended continued percutaneous drainage and antimicrobials with HIDA scan and chemical testing for bilirubin of drain fluid to help differentiate between perforated gallbladder versus other pathology.  HIDA scan performed 3/9/2021 which showed no extravasation of radiotracer consistent with biliary leakage, presumed other source of infection for abscess.     Daily update:  HIDA scan negative for obvious  biliary leak.  This seems empirically good as does leave some hope for percutaneous tube removal at some point, but still no clear source of infection.  For now, I think plan remains the same and could go home tomorrow on oral antimicrobial therapies if no new issues arise.  We will follow up tomorrow.    Principal Problem:    Intractable right upper quadrant abdominal pain  Active Problems:    Chronic hepatitis C with cirrhosis (CMS/HCC) (HCC)    Type 2 diabetes mellitus with neurologic complication (CMS/HCC) (HCC)    Essential hypertension    Hepatocellular carcinoma (CMS/HCC) (HCC)    Pain of metastatic malignancy    Migraine without aura and without status migrainosus, not intractable    Primary osteoarthritis of both knees    Gastroesophageal reflux disease without esophagitis    Abdominal pain    Hypomagnesemia    Hypophosphatemia    DVT Prophylaxis: Lovenox     Urinary Catheter Date: None      Justification:      Central Lines: None     Other lines/tubes: Percutaneous drains right upper quadrant x2, placed 3/5/2021     Wounds: None    Code Status: Full Code    Anticipated date of discharge: 3/10/2021

## 2021-03-09 NOTE — INTERDISCIPLINARY/THERAPY
NUTRITION ASSESSMENT:        ESTIMATED NEEDS:  Total Energy Estimated Needs: 25-30 kcal/kg IBW= 4128-5588 kcal  Total Protein Estimated Needs: 1.2-1.5 g/kg IBW= 77-96 g PRO  Total Fluid Estimated Needs: 30 ml/kg Adj IBW= 2100 ml or per provider     MALNUTRITION:  Parameters for Malnutrition: Severe Malnutrition-Acute disease or injury-related (NC-4.1.3.2)  Etiology: Liver CA, Nausea, Vomiting, Diarrhea, abdominal pain  Signs/Symptoms:  Weight Loss: Weight loss of 4.8 kg (5.1%) over past month  Energy Intake: Intakes <50% estimated needs >1 month per report         OTHER NUTRITION PROBLEMS:   DM-- POC Glucs:   X 3 checks         Lab Results   Component Value Date     HGBA1C 7.5 (H) 08/04/2020      FOOD ALLERGIES: Milk, Hot peppers (bell peppers okay)       MONITORING:   Intake:   Eating % of meals, supplements included in meal total  Average Percent Meals Eaten (%): 86.25 Avg %  Average Percent Supplement Eaten (%): 50 Avg %    Weight:  No new wt.   Wt Readings from Last 10 Encounters:   03/04/21 88.6 kg (195 lb 5.2 oz)   03/04/21 88.5 kg (195 lb 3.2 oz)   02/11/21 93.4 kg (205 lb 12.8 oz)   01/21/21 92.5 kg (204 lb)   12/31/20 92.5 kg (204 lb)   12/31/20 93.6 kg (206 lb 6.4 oz)   12/21/20 95.8 kg (211 lb 3.2 oz)   10/07/20 95.6 kg (210 lb 12.8 oz)   08/04/20 97.8 kg (215 lb 9.8 oz)   04/23/20 99 kg (218 lb 3.2 oz)     Admit Weight: 88.6 kg (195 lb 5.2 oz) 3/4/2021    SUMMARY:  pt triggered for weight loss and poor intake per  wife 2-3 small meals/day for about the past month due to pt's pain.    lbs with weight loss over this past month-- reports pt sometimes drinks a protein supplement (has about 32 g PRO per wife recall); agreeable to trial Ensure HiPRO when diet advanced   3/9:  No new issues with nutrition, tolerating diet, drain removed 35 ml yesterday.  Has non-pitting edema in RUE and BLE per nursing assessment.  3/6 BM x 2 none recorded since    NUTRITION INTERVENTIONS:     Regular diet as  tolerated    Ensure HiPRO to trays (provides 160 kcal and 16 g PRO)      Discharge nutrition recommendations Regular, Nutrition supplements 1-2 daily to maintain wt

## 2021-03-09 NOTE — NURSING END OF SHIFT
Nursing End of Shift Summary:    Patient: Ben Lai Sr  MRN: [id number]  : 1950, Age: 71 y.o.    Location: [address] [place] [address]    Nursing Goals  Clinical Goals for the Shift: monitor safety and comfort, monitor output and labs, pain control    Narrative Summary of Progress Toward Clinical Goals:  Pt had a safe shift. Kept NPO at midnight for biliary scan this morning. VS are stable. Pain manage with medication. GC fair.    Barriers to Goals/Nursing Concerns:  Yes - HIDA scan this morning    New Patient or Family Concerns/Issues:  No    Shift Summary:      Significant Events & Communications to Providers (last 12 hours)      Last 5 Values    No documentation.              Oxygen Usage (last 12 hours)      Last 5 Values     Row Name 21                   Oxygen Weaning Trial by Nursing    Is Patient on Room Air OR on the Same Amount of O2 as at Home?  Yes                   Mobility (last 12 hours)      Last 5 Values     Row Name 21                   Mobility    Activity  Ambulate in room            Urethral Catheter    Active Urethral Catheter     None            Active Lines    Active Central venous catheter / Peripherally inserted central catheter / Implantable Port / Hemodialysis catheter / Midline Catheter     Name:   Placement date:   Placement time:   Site:   Days:    Single Lumen Implantable Port 20 Right Chest   20    09    Chest   67              Infusing Medications   Medication Dose Last Rate   • sodium chloride 0.9 %  50 mL/hr 50 mL/hr (21)   • sodium chloride 0.9% (NS)  10 mL/hr Stopped (21 1212)     PRN Medications   Medication Dose Last Admin   • sodium chloride  1 spray 1 spray at 21 1329   • acetaminophen  325-650 mg     • bisacodyL  10 mg 10 mg at 21 1426   • magnesium hydroxide  30 mL     • sodium chloride 0.9% (NS)  25-50 mL Rate Verify at 21 1711   • HYDROmorphone  0.4 mg 0.4 mg at 21 1916   • HYDROmorphone  0.8 mg  0.8 mg at 03/08/21 0448   • metoclopramide  10 mg      Or   • metoclopramide  10 mg 10 mg at 03/05/21 1602     _________________________  Hosea Mei RN  03/09/21 6:01 AM

## 2021-03-09 NOTE — INTERDISCIPLINARY/THERAPY
Case Management Progress Note  [id number]    Diagnosis: Right upper quadrant abscess, suspected gallbladder perforation  Abnormal gallbladder, cholecystitis versus mechanical perforation  Hepatocellular carcinoma, metastatic.    Narrative/Plan of Care:  Pending HIDA scan results and continued treatment plan.  Patient reports gradual improvements.  Minimal activity.  Patient discharge pending recommendations and progress with mobility, pain control, and ongoing treatment plans. Patient wife reports that the is willing to assist as needed.     Disposition: Home pending progress and final recommendations.

## 2021-03-09 NOTE — PLAN OF CARE
Problem: Malnutrition   Parameters for Malnutrition: Severe Malnutrition-Acute disease or injury-related (NC-4.1.3.2)  Etiology: Liver CA, Nausea, Vomiting, Diarrhea, abdominal pain   Signs/Symptoms:  Weight Loss: Weight loss of 4.8 kg (5.1%) over past month  Energy Intake: Intakes <50% estimated needs >1 month per report     Goal: Food and/or Nutrient Delivery (ND)  Description: Intakes 75% at meals  Maintain wt above 88 kg    Outcome:  Progressing-eating % of meals and supplements, no new wt,.  Interventions:   Meals and Snacks (ND-1): General/healthful diet  Nutrition Supplement Therapy (ND-3): Medical Food Supplement Therapy

## 2021-03-10 PROBLEM — K65.1: Status: ACTIVE | Noted: 2021-01-01

## 2021-03-10 NOTE — INTERDISCIPLINARY/THERAPY
03/10/21 1507   O2 Discharge Determination   Activity Resting;Exercise   $ Multiple SpO2 Levels Obtained? - RT Charge Only Yes   DC Oxygen Determination Comment Pt denies SOB/Dyspnea pre/post test. Pt denies home O2 pre H admit. Pt reports cane/walker ambulation assistance at home. Pt able to perform sit/stand w/o difficulty or event. Pt explained results and has not questions for RT.   O2 Discharge Determination Activity: Resting   Resting Lowest SPO2 (%) on Room Air 94 %   Resting Comment SpO2 >88 RA. Pt does not qualify for home O2 on rest.   O2 Discharge Determination Activity: Exercise   Exercise Lowest SPO2 (%) on Room Air 89 %   Exercise Comment SpO2 >88 RA. Pt does not qualify for home O2 on activity.

## 2021-03-10 NOTE — PROGRESS NOTES
INFECTIOUS DISEASES DAILY PROGRESS NOTE    Subjective                                                                                                                                                                                                                                                     SUBJECTIVE:     Chart was reviewed,  Feels okay  The abdominal pain is improving  No nausea vomiting,  No acute cough dyspnea or chest pain  No side effect with the Unasyn IV    HIDA scan 3/9/2021  Normal radiotracer concentration throughout the liver with excretion into the biliary tree and bowel. No radiotracer accumulation seen within the gallbladder. On delayed phase imaging no evidence of bile leak, with no radiotracer activity seen in the drainage bulbs.    All other systems reviewed and were negative    Objective   OBJECTIVE:   VITALS:  Temp:  [36.6 °C (97.9 °F)-36.8 °C (98.2 °F)] 36.8 °C (98.2 °F)  Heart Rate:  [71-97] 82  Resp:  [16-20] 18  BP: (131-173)/(62-82) 163/74  ED Triage Vitals [03/04/21 1419]   Weight 88.6 kg (195 lb 5.2 oz)       PHYSICAL EXAMINATION    GENERAL: No acute distress, well developed, chronically ill-appearing  HEENT: Normocephalic,, normal nose and external ear  PULMONARY: Nonlabored breathing,  CARDIOVASCULAR: Normal rate  GASTROINTESTINAL: Soft, tenderness mainly in the right upper quadrant  To percutaneous biliary drain with yellowish-orange color mostly clear  MUSCULOSKELETAL: No acute inflammatory joint  SKIN: No rash   NEUROLOGIC: Grossly no focal motor deficit, alert and oriented  PSYCHIATRIC: Cooperative      DIAGNOSTIC DATA:     Laboratory data reviewed     Microbiology data:   Right upper quadrant fluid collection aspirate culture 3/5/2021 Enterococcus faecalis, actinomyces odonolyticus, Streptococcus intermedius, Gemella, Clostridium perfringens, Prevotella, Clostridium ramosum    Results from last 4 days   Lab Units 03/10/21  0831 03/09/21  0568  03/08/21  0834   WBC AUTO 10*3/uL 3.0* 3.2* 3.0*   HEMOGLOBIN g/dL 11.4* 11.8* 11.0*   HEMATOCRIT % 34.9* 35.9* 32.8*   PLATELETS AUTO 10*3/uL 140 158 129*     Results from last 4 days   Lab Units 03/10/21  0744 03/09/21  0549 03/08/21  0834   SODIUM mmol/L 141 138 137   POTASSIUM mmol/L 3.4* 3.3* 3.6   CO2 mmol/L 29 27 27   BUN mg/dL 6* 5* 6*   CREATININE mg/dL 0.58* 0.60* 0.57*   CALCIUM mg/dL 7.8* 8.1* 7.9*   TOTAL PROTEIN g/dL 5.5* 5.6* 5.2*   BILIRUBIN TOTAL mg/dL 0.58 0.57 0.52   ALK PHOS U/L 97 91 87   ALT U/L 7 9 9   AST U/L 20 19 17             MEDICATIONS:      List of the inpatient medication reviewed  Scheduled   ampicillin-sulbactam, 3,000 mg, q6h  enoxaparin, 40 mg, Daily at 1400  sodium chloride, 3 mL, 2x daily  morphine, 15 mg, q12h RONAL  terazosin, 5 mg, 2x daily  lansoprazole, 30 mg, Daily  lisinopriL, 20 mg, Daily  traZODone, 100 mg, Nightly  carvediloL, 3.125 mg, 2x daily with meals  insulin aspart U-100, 0-15 Units, Insulin: 4x daily with meals  insulin glargine, 12 Units, q AM         Assessment/Plan   ASSESSMENT & PLAN:     Cholecystitis/right upper quadrant abscess  Polymicrobial infection  Hepatocellular carcinoma  Hepatitis C cirrhosis  Biliary obstruction, status post special stent placement at [facility]  Splenomegaly/thrombocytopenia     Recommendation    From infectious disease standpoint he can be discharged on Augmentin 875 mg twice daily for 3 weeks  See me in the clinic on 3/26/2021     Case discussed with Dr. Shaye Myers MD  3/10/2021,   [facility] - Infectious Diseases

## 2021-03-10 NOTE — INTERDISCIPLINARY/THERAPY
03/10/21 0932   Time Calculation   Start Time 0932   Stop Time 0953   Time Calculation (min) 21 min   PT Last Visit   PT Received On 03/10/21   Visit Number 1   Pain Assessment Scale   Pain Assessment 0-10   Pain Score 8   Pain Type Acute pain   Pain Location Abdomen   Pain Orientation Right;Upper   Pain Interventions Rest   General   Chart Reviewed Yes   Therapy Treatment Diagnosis intractable adominal pain/abscess, malignancy   PT Treatment Duration (Min) 21 Minutes   Is this a Co-Treatment? Yes  (OT)   Additional Pertinent History PMH: chronic pain, COPD, HTN, DM2   Family/Caregiver Present Yes  (wife)   Precautions   Reinforced Precautions Yes   Other Precautions gait belt low - R upper abdominal drains, R chest port   Home Living   Type of Home House   Home Layout Multi-level;Stairs to alternate level with rails   Alternate Level Stairs-Number of Steps 5   Home Access Stairs to enter with rails   Entrance Stairs-Number of Steps 3   Home Adaptive Equipment 4 Wheel walker   Home Living Comments Split level home - 5+5 with HR. Can remain on one level once up stairs.   Prior Function   Level of [place] Independent with ADLs and functional transfers   Lived With Spouse   Receives Help From Family   ADL Assistance Independent   Prior Function Comments Pt reports (I) at PLOF. Recently using 4WW in community.   Subjective Comments   RN Stated patient is medically cleared for therapy Yes   Subjective Comments RN okayed therapy. Pt in recliner, agreeable to PT/OT. Pt in recliner end of tx, call light in reach, all needs met.   Bed Mobility   Bed Mobility Not assessed   Transfers   Transfer Assessed   Transfer 1   Transfer From 1 Chair with arms;Sit   Transfer Type 1 To and from   Transfer to 1 Stand   Technique 1 Sit to stand;Stand to sit   Transfer Device 1 Transfer belt;4 Wheel walker   Level of Assistance 1 Standby assistance   Ambulation   Ambulation Assessed   Ambulation 1   Surface 1 Level surface;Smooth    Device 1 Gait belt;4 Wheel walker   Assistance 1 Contact guard assist x 1   Quality of Gait 1 Functional gait speed. No LOB.   Comments/Distance 1 x45m - Pt's 4WW is set very high but he prefers it this way.   Stairs   Stairs Yes   Stairs 1   Rails 1 Bilateral;Left   Device 1 Gait belt   Assistance 1 Contact guard assist x 1   Comment/# Steps 1 x11 steps total - B HR to ascend and L HR to descend - Steady, step over step   Balance   Balance Intact  (with 4WW)   RLE Assessment   RLE Assessment WFL   LLE Assessment   LLE Assessment WFL   Other Activities   Other Activities Comments Encouraged frequent mobilization with nursing staff.   Assessment   Rehab Potential Good   Progress   (Eval 3/10)   Problem List Decreased mobility;Pain   Assessment Comment Jakub tolerates mobility well. Able to walk household distances and navigate a flight of stairs with SBA/CGA. Supportive spouse. Pt will benefit from continued skilled PT to progress activity tolerance and functional balance.   Recommendation   Recommendations for Therapy Continue skilled therapy;Home Health Therapy   Equipment Recommended   (Pt has 4WW)   Therapeutic Interventions (Time Spent in Minutes)   Gait/Mobility 8   Plan   Treatment Interventions Bed mobility;Functional transfer training;Gait training;Stair training;Balance training;Endurance training;Therapeutic activities;Therapeutic exercises   PT Frequency 2-3x/wk   Plan Comment 1-assist; 45m 4WW, endurance/strengthening/balance   Date POC Due 03/17/21

## 2021-03-10 NOTE — INTERDISCIPLINARY/THERAPY
Case Management Discharge Note  [id number]  Discharge Disposition: Home with wife    Transportation: Wife to transport home.     Specialty Referrals: Drain teaching and follow up with general surgery on Monday.     Support System Notified: Patient and patient wife updated.

## 2021-03-10 NOTE — NURSING END OF SHIFT
Nursing End of Shift Summary:    Patient: Ben Lai Sr  MRN: [id number]  : 1950, Age: 71 y.o.    Location: [address] [place] [address]    Nursing Goals  Clinical Goals for the Shift: ensure safety and comfort, pain control, monitor drains    Narrative Summary of Progress Toward Clinical Goals:  Pt safety and comfort maintained. Spent some time in the chair at night, complained of pain and was medicated as per MAR. VS stable, KYUNG drains has an output of 5cc each. GC stable     Barriers to Goals/Nursing Concerns:  Yes - Dr. Review this morning and if cleared, will possibly go home. IV Abx.    New Patient or Family Concerns/Issues:  No    Shift Summary:      Significant Events & Communications to Providers (last 12 hours)      Last 5 Values    No documentation.              Oxygen Usage (last 12 hours)      Last 5 Values    No documentation.              Mobility (last 12 hours)      Last 5 Values     Row Name 21                   Mobility    Activity  Ambulate in room;Bathroom privileges        Level of Assistance  Standby assist, set-up cues, supervision of patient - no hands on            Urethral Catheter    Active Urethral Catheter     None            Active Lines    Active Central venous catheter / Peripherally inserted central catheter / Implantable Port / Hemodialysis catheter / Midline Catheter     Name:   Placement date:   Placement time:   Site:   Days:    Single Lumen Implantable Port 20 Right Chest   20    09    Chest   68              Infusing Medications   Medication Dose Last Rate   • sodium chloride 0.9 %  50 mL/hr 50 mL/hr (03/10/21 0533)   • sodium chloride 0.9% (NS)  10 mL/hr Stopped (21 1212)     PRN Medications   Medication Dose Last Admin   • sodium chloride  1 spray 1 spray at 21 1329   • acetaminophen  325-650 mg     • bisacodyL  10 mg 10 mg at 21 1426   • magnesium hydroxide  30 mL     • sodium chloride 0.9% (NS)  25-50 mL Rate Verify at 21 1711    • HYDROmorphone  0.4 mg 0.4 mg at 03/10/21 0039   • HYDROmorphone  0.8 mg 0.8 mg at 03/08/21 0448   • metoclopramide  10 mg      Or   • metoclopramide  10 mg 10 mg at 03/09/21 0633     _________________________  Hosea Mei RN  03/10/21 5:45 AM

## 2021-03-10 NOTE — NURSING END OF SHIFT
Nursing End of Shift Summary:    Patient: Ben Lai Sr  MRN: [id number]  : 1950, Age: 71 y.o.    Location: [address] [place] [address]    Nursing Goals  Clinical Goals for the Shift: monitor safety and comfort, monitor output and labs, pain control    Narrative Summary of Progress Toward Clinical Goals:  Patient has been safe throughout shift. Wife at bedside. Patient has complained of pain 8/10 or higher, PRN meds given and effective. Continues on IV antibiotics. Patient continues to be SOB with activity. KYUNG drains x 2 remain in place.     Barriers to Goals/Nursing Concerns:  Pain control    New Patient or Family Concerns/Issues:      Shift Summary:      Significant Events & Communications to Providers (last 12 hours)      Last 5 Values    No documentation.              Oxygen Usage (last 12 hours)      Last 5 Values     Row Name 21 0752                   Oxygen Weaning Trial by Nursing    Home Oxygen Use Frequency  At Night        Is Patient on Room Air OR on the Same Amount of O2 as at Home?  Yes                   Mobility (last 12 hours)      Last 5 Values    No documentation.       Urethral Catheter    Active Urethral Catheter     None            Active Lines    Active Central venous catheter / Peripherally inserted central catheter / Implantable Port / Hemodialysis catheter / Midline Catheter     Name:   Placement date:   Placement time:   Site:   Days:    Single Lumen Implantable Port 20 Right Chest   20    09    Chest   68              Infusing Medications   Medication Dose Last Rate   • sodium chloride 0.9 %  50 mL/hr 50 mL/hr (21 1728)   • sodium chloride 0.9% (NS)  10 mL/hr Stopped (21 1212)     PRN Medications   Medication Dose Last Admin   • sodium chloride  1 spray 1 spray at 21 1329   • acetaminophen  325-650 mg     • bisacodyL  10 mg 10 mg at 21 1426   • magnesium hydroxide  30 mL     • sodium chloride 0.9% (NS)  25-50 mL Rate Verify at 21 1711   •  HYDROmorphone  0.4 mg 0.4 mg at 03/09/21 1737   • HYDROmorphone  0.8 mg 0.8 mg at 03/08/21 0448   • metoclopramide  10 mg      Or   • metoclopramide  10 mg 10 mg at 03/09/21 0633     _________________________  Sophie Simpson  03/09/21 5:56 PM

## 2021-03-10 NOTE — INTERDISCIPLINARY/THERAPY
03/10/21 0931   Time Calculation   Start Time 0931   Stop Time 0952   Time Calculation (min) 21 min   OT Last Visit   OT Received On 03/10/21   General   Chart Reviewed Yes   Therapy Treatment Diagnosis intractable right upper quadrant abdominal pain, hepatocellular carcinoma   OT Treatment Duration (Min) 21 Minutes   Is this a Co-Treatment? Yes   Additional Pertinent History chronic hep C, metastatic malignancy    Family/Caregiver Present Yes   Precautions   Reinforced Precautions Yes   Other Precautions drains, gait belt low   Home Living   Type of Home House   Home Layout Multi-level;Stairs to alternate level with rails   Alternate Level Stairs-Number of Steps 5   Home Access Stairs to enter with rails   Entrance Stairs-Number of Steps 3   Bathroom Shower/Tub Walk-in shower   Bathroom Toilet Standard   Bathroom Equipment Built-in shower seat   Home Adaptive Equipment 4 Wheel walker   Home Living Comments Pt. resides in a [facility], he reports he was intermittently using his 4WW prior, mainly when outdoors.    Prior Function   Level of [place] Independent with ADLs and functional transfers;Independent with homemaking with ambulation   Lived With Spouse   Receives Help From Family   ADL Assistance Independent   IADL Assistance Independent   Prior Function Comments (I) at baseline.   Subjective Comments   RN Stated patient is medically cleared for therapy Yes   Subjective Comments Pt. agreeable to OT/PT services, spouse present. Pt. primarily had questions/concerns about stairs.   Pain Assessment Scale   Pain Assessment 0-10   Pain Score 8   Pain Type Acute pain   Pain Location Abdomen   Pain Orientation Right;Upper   Cognition   Arousal/Alertness WFL   Cognition Comment appropriate to task, cooperative   Bed Mobility   Bed Mobility Not assessed   Transfers   Transfer Assessed   Transfer 1   Transfer From 1 Sit;Chair with arms   Transfer Type 1 To   Transfer to 1 Stand   Technique 1 Sit to stand;Stand to  sit   Transfer Device 1 Transfer belt;4 Wheel walker   Level of Assistance 1 Standby assistance   Trials/Comments 1 steady with fxnal transfers   Ambulation   Ambulation Assessed   Ambulation 1   Surface 1 Level surface;Smooth   Device 1 Gait belt;4 Wheel walker   Assistance 1 Contact guard assist x 1   Comments/Distance 1 45m, steady throughout, CGA for safety.   Balance   Balance Intact  (using 4WW)   LE Dressing   LE Dressing Yes   LE Dressing Comments Reports his spouse has been assissting him, may benefit from AE training.   RUE Assessment   RUE Assessment   (fxnal for ADLs)   LUE Assessment   LUE Assessment   (fxnal for ADLs)   Hand Function   Gross Grasp R Functional;L Functional   Vision-Basic Assessment   Current Vision   (no visual changes or concerns reported)   Additional Activities   Additional Activities Comments Completed 1 flight of stairs with CGA just for safety. See PT note for further details. Left seated in recliner upon departure, all needs in reach.   Activity Tolerance   Activity Tolerance Comments tolerated tx session without adverse response. on RA.   Assessment   Rehab Potential Good   Progress   (eval this date)   Problem List Decreased ADL status;Decreased IADLs   Recommendations for Therapy Continue skilled therapy   Assessment Comment Pt. is safe to discharge home when medically stable, will follow up for specific ADL training as needed to support pt. in achieving optimal (I)'nce level.   Recommendation   Recommendation Home when medically stable, may benefit from HH services.   Equipment Recommended None   Therapeutic Interventions (Time Spent in Minutes)   Therapeutic Activity Dynamic 10   Plan   Plan Comment AE training for LB dress, D/C if no further concerns   Treatment Interventions ADL retraining;Therapeutic activity;Therapeutic exercise;Endurance training;Patient/family training;Equipment evaluation/education;Compensatory technique education   OT Frequency 1-2x/wk   Date POC  Due 03/17/21

## 2021-03-11 ENCOUNTER — MYC MEDICAL ADVICE (OUTPATIENT)
Dept: GASTROENTEROLOGY | Facility: CLINIC | Age: 71
End: 2021-03-11

## 2021-03-11 ENCOUNTER — TELEPHONE (OUTPATIENT)
Dept: GASTROENTEROLOGY | Facility: CLINIC | Age: 71
End: 2021-03-11

## 2021-03-11 NOTE — TELEPHONE ENCOUNTER
Called to talk to Tarsha regarding MyChart questions.    Reports patient weak, in pain and on antibiotic. Many questions about tube placed in IR at [facility], referred them back to IR for question. Disucssed video vs inperson visit for clinic with Dr. Denise on 4/29, is scheduled video for now. Will check on timing and potential for inperson with Dr. Denise      ML

## 2021-03-11 NOTE — TELEPHONE ENCOUNTER
Called Pt to get him scheduled for a follow up post hospital admission. Pt scheduled for 4/29/21. Pt/wife knows that they have to be in [place] for virtual visit.    Will call [facility] to obtain recent discharge notes and images.    Jacob Hall LPN

## 2021-03-12 ENCOUNTER — DOCUMENTATION ONLY (OUTPATIENT)
Dept: GASTROENTEROLOGY | Facility: CLINIC | Age: 71
End: 2021-03-12

## 2021-03-12 ENCOUNTER — TELEPHONE (OUTPATIENT)
Dept: GASTROENTEROLOGY | Facility: CLINIC | Age: 71
End: 2021-03-12

## 2021-03-12 NOTE — TELEPHONE ENCOUNTER
Antonietta from [facility] called back and left a VM requesting for information to start the authorization for Pt's upcoming appt on 4/29/21: Facility Name, address, and NPI; Dr's name and NPI; andTax ID.     Attempted to call Antonietta back. No answer. Left detailed msg for Antonietta with details that she requested. Asked that she calls back if she needs anything else.    Jacob Hall LPN

## 2021-03-12 NOTE — PROGRESS NOTES
Antonietta from the [facility] hospital/insurance called and left a msg re Pt's upcoming appt on 4/29/21. CB#: [phone], ext: [phone]. Antonietta called to verify appt and question about Pt traveling to [place] for the virtual visit.    Attempted to call Antonietta back. Per Lorie, Antonietta will be in at 11am. Left msg with Lorie to have Antonietta call me back.    Jacob Hall LPN

## 2021-03-12 NOTE — PROGRESS NOTES
Called to obtain recent discharge summary and images for recent scans done in March, 2021.     [facility]  Address: [address] [place] [address], [place] [address]  Phone: [phone]    Per Medical Records, recent discharge summary will be faxed and Radiology will push images done March, 2021 to  PACS.    Jacob Hall LPN

## 2021-03-16 NOTE — DISCHARGE SUMMARY
Inpatient Discharge Summary    BRIEF OVERVIEW  Admitting Provider: Marah Sales MD  Discharge Provider: ELIAS Cr DO  Consultations: Dr. Myers, infectious disease   Primary Care Physician at Discharge: Collin Ramírez [phone]    Admission Date: 3/4/2021     Discharge Date: 3/10/2021    Primary Discharge Diagnosis  Right upper quadrant abscess, no definitive source  Hepatocellular carcinoma, metastatic    Secondary Discharge Diagnosis  History of hepatitis C  Chronic pain with opiate dependency, secondary to malignancy  BPH  COPD without acute exacerbation  GERD  Essential hypertension  DM2, non-insulin-dependent    Discharge Disposition  01 - Home or Self-Care  Code Status at Discharge: Prior     Discharge medication list      START taking these medications      Instructions   amoxicillin-pot clavulanate 875-125 mg per tablet  Commonly known as: AUGMENTIN   Take 1 tablet by mouth 2 (two) times a day for 21 days        CHANGE how you take these medications      Instructions   blood-glucose meter misc  Commonly known as: Accu-Chek Annabella Plus Meter  What changed: additional instructions   Use to test blood sugars 3 times daily (E11.65, non insulin depedent) AccuChek Annabella plus, meter is 8 years old        CONTINUE taking these medications      Instructions   artificial saliva aerosol,spray spray  Commonly known as: YENATHALIA BLANCO      brimonidine 0.2 % ophthalmic solution  Commonly known as: ALPHAGAN      buPROPion  mg 12 hr tablet  Commonly known as: WELLBUTRIN SR   Take 1 tablet (100 mg total) by mouth daily     carboxymethylcellulose 0.5 % dropperette  Commonly known as: REFRESH PLUS      carvediloL 3.125 mg tablet  Commonly known as: COREG   Take 1 tablet (3.125 mg total) by mouth 2 (two) times a day with meals     cetirizine 10 mg tablet  Commonly known as: ZyrTEC      desvenlafaxine succinate 25 mg tablet extended release 24 hr      diclofenac sodium 1 % gel  Commonly known as:  VOLTAREN      fluoride (sodium) 1.1 % gel dental gel  Commonly known as: DENTAGEL      fluticasone propionate 50 mcg/actuation nasal spray  Commonly known as: FLONASE      furosemide 20 mg tablet  Commonly known as: LASIX   Take 1 tablet (20 mg total) by mouth 2 (two) times a day.     Gold Pete Medicated Anti-Itch 1-1 % cream  Generic drug: pramoxine-menthol      HYDROmorphone 2 mg tablet  Commonly known as: DILAUDID      lactulose 10 gram/15 mL solution  Commonly known as: Generlac   Take 15 mL (10 g total) by mouth daily     lansoprazole 30 mg capsule  Commonly known as: PREVACID      Lantus Solostar U-100 Insulin 100 unit/mL (3 mL) insulin pen injection pen  Generic drug: insulin glargine      latanoprost 0.005 % ophthalmic solution  Commonly known as: XALATAN      lisinopriL 20 mg tablet  Commonly known as: PRINIVIL,ZESTRIL   Take 1 tablet (20 mg total) by mouth daily     loperamide 2 mg tablet  Commonly known as: Imodium A-D   Take 2 tablets with first loose stool of the day, then up to 8 tablets daily     Miralax 17 gram/dose powder  Generic drug: polyethylene glycol      morphine 15 mg 12 hr tablet  Commonly known as: MS CONTIN   Take 1 tablet (15 mg total) by mouth 2 (two) times a day Max Daily Amount: 30 mg     NARCAN NASL      prochlorperazine 10 mg tablet  Commonly known as: COMPAZINE   Take 1 tablet (10 mg total) by mouth every 6 (six) hours as needed for nausea or vomiting     Saline NasaL 0.65 % nasal spray  Generic drug: sodium chloride      sildenafiL 100 mg tablet  Commonly known as: VIAGRA      terazosin 5 mg capsule  Commonly known as: HYTRIN   Take 1 capsule (5 mg total) by mouth 2 (two) times a day     traZODone 100 mg tablet  Commonly known as: DESYREL      Victoza 2-Florentin 0.6 mg/0.1 mL (18 mg/3 mL) injection  Generic drug: liraglutide            Where to Get Your Medications      [facility]) - [place], [place] - [address] [address] [facility].  [address] [place] [address]  [facility] [id number]    Phone: [phone]   · amoxicillin-pot clavulanate 875-125 mg per tablet         Outpatient Follow-Up  Future Appointments   Date Time Provider Department Center   3/25/2021  8:20 AM ONCOLOGY [facility], LAB [facility]    3/25/2021  9:30 AM Milka Reynoso MD [facility]   3/25/2021 10:15 [facility]   3/26/2021  8:00 AM Jermaine Myers MD [facility]    4/15/2021  8:30 AM ONCOLOGY [facility], LAB [facility]   4/15/2021  9:30 AM Chrystal Goodman CNP [facility]   4/15/2021 10:00 AM [facility]       Referrals and Follow-ups to Schedule     Ambulatory referral to Gastroenterology      Dr. Farah per appointment.    Ambulatory referral to Infectious Disease          Test Results Pending at Discharge  None    DETAILS OF HOSPITAL STAY    Presenting Problem/History of Present Illness  Vomiting [R11.10]  Hepatocarcinoma (CMS/HCC) (HCC) [C22.0]  Abdominal pain [R10.9]  Anemia [D64.9]  Right upper quadrant pain [R10.11]      Hospital Course  Patient with very complicated past medical history, predominantly revolving around hepatocellular carcinoma and cirrhosis secondary to chronic hepatitis C.  Underwent placement of a transpapillary gallbladder drain at the [facility] in late October 2020.  Which was reportedly uneventful.  Patient was having right upper quadrant pain at the time presumed to be due to biliary obstruction, had minimal pain for about a month, but unfortunately had increasing pain after that.  Had noticed it increasing significantly over the week prior to presentation and presented to his primary oncologist, Dr. Reynoso on 3/4/2021 for evaluation.  Noted to have restaging MRI on same day showing complex appearing situation in the region of the gallbladder fundus suggestive of extrahepatic malignancy or possibly abscess.  Was subsequently admitted to the hospital under the hospitalist service for further  treatment.  Was felt that area likely represented abscess and underwent percutaneous drain placement x2 with interventional radiology on 2/5/2020 which did result in removal of about 5 cc of purulent appearing fluid.  He was placed on Cipro and Flagyl due to penicillin allergy and infectious disease consulted for plan going forward.  Unfortunately, cultures became significantly polymicrobial, including multiple anaerobes.  Did trial patient on Unasyn which was well-tolerated.  Spoke with pancreatico biliary interventional list at [facility],  3/8/2021, recommended continued percutaneous drainage and antimicrobials with HIDA scan and chemical testing for bilirubin of drain fluid to help differentiate between perforated gallbladder versus other pathology.  HIDA scan performed 3/9/2021 which showed no extravasation of radiotracer consistent with biliary leakage, presumed other source of infection for abscess.  At that juncture, patient was felt stable for discharge to home on oral antimicrobials and was recommended at least 21 days of Augmentin per infectious disease with plan on following up with Dr. Myers in the infectious disease clinic in 2 weeks for further evaluation and decision about leaving drains in place is versus removal.  Additionally, is to follow-up with  the biliary clinic at the [facility], his office will contact patient and family for timing.    Overall, patient tolerated hospitalization quite well and pain has significantly improved at time of discharge.  He did receive education on drain care prior to discharge.         Operative Procedures Performed  None    Hospital Problems  Principal Problem:    Intractable right upper quadrant abdominal pain  Active Problems:    Chronic hepatitis C with cirrhosis (CMS/HCC) (HCC)    Type 2 diabetes mellitus with neurologic complication (CMS/HCC) (HCC)    Essential hypertension    Hepatocellular carcinoma (CMS/HCC)  (HCC)    Pain of metastatic malignancy    Migraine without aura and without status migrainosus, not intractable    Primary osteoarthritis of both knees    Gastroesophageal reflux disease without esophagitis    Abdominal pain    Hypomagnesemia    Hypophosphatemia    Right upper quadrant abdominal abscess (CMS/HCC) (HCC)        Physical Exam at Discharge  Discharge Condition: good  Heart Rate: 80  Resp: 17  BP: 179/72  Temp: 36.9 °C (98.4 °F)  Weight: 88.6 kg (195 lb 5.2 oz)  General: Awake alert afebrile, no acute distress  HEENT: Head normocephalic atraumatic  Lungs: Clear to station bilaterally  Heart: Regular rate and rhythm S1-S2  Abdomen: Soft, normal bowel sounds auscultated all 4 quadrants.  Improved, but continued tenderness right upper quadrant.  Extremities: Grossly intact without clubbing cyanosis or edema  Skin: Warm and dry    Time spent coordinating discharge: 45 minutes    Pedro Cr, DO

## 2021-03-18 NOTE — TELEPHONE ENCOUNTER
Laura, wife, called IR concerned about drain appears to be pulled 1/4 to 1/2 inch from entry site, questions if drain needs to be evaluated by Dr. Danielle.  Dr. Danielle states if fluid drainage is same amount as previous, then no need to be seen.  Wife states same about 10 cc from drain, suture appears in place.  Laura states other drain is draining 3cc instead of 5 after flushing.  Reassured Laura that is is ok.   Laura then asked whois monitoring or taking charge for follow up of drains.  [facility], [facility] told pt to call IR for follow up. I explained IR does not follow after placement.  Laura, understandably, upset due to no provider assisting after discharge.  I called [facility], Dr. Ramírez's office, spoke with Whitney SIGALA.   I explained that Dr. Danielle does not follow up with pts drains, requested the [facility] or Dr. Ramírez to monitor the pt.  I asked Whitney, when to tell the pt to except a follow up phone call from [facility]. Whitney stated she would speak with Dr. Ramírez then contact pt and wife today or tomorrow.  Next, I called Laura and pt, explained that I called [facility] and  spoke with Whitney and she would be calling them for follow-up

## 2021-03-22 PROBLEM — I27.89 OTHER CHRONIC PULMONARY HEART DISEASES: Status: ACTIVE | Noted: 2019-09-20

## 2021-03-22 PROBLEM — Z71.89 COUNSELING ON SUBSTANCE USE AND ABUSE: Status: ACTIVE | Noted: 2021-01-01

## 2021-03-22 PROBLEM — Z87.19 HISTORY OF PORTAL HYPERTENSION: Status: ACTIVE | Noted: 2020-07-31

## 2021-03-22 PROBLEM — D12.6 BENIGN NEOPLASM OF COLON: Status: ACTIVE | Noted: 2019-09-20

## 2021-03-22 PROBLEM — N40.0 BENIGN PROSTATIC HYPERTROPHY WITHOUT URINARY OBSTRUCTION: Status: ACTIVE | Noted: 2021-01-01

## 2021-03-22 PROBLEM — K80.20 GALLSTONE: Status: ACTIVE | Noted: 2019-09-20

## 2021-03-22 PROBLEM — K74.60 ESOPHAGEAL VARICES IN CIRRHOSIS (CMS/HCC): Status: ACTIVE | Noted: 2019-09-20

## 2021-03-22 PROBLEM — E78.5 HYPERLIPIDEMIA: Status: ACTIVE | Noted: 2019-09-20

## 2021-03-22 PROBLEM — G47.33 OBSTRUCTIVE SLEEP APNEA: Status: ACTIVE | Noted: 2021-01-01

## 2021-03-22 PROBLEM — I50.9 CONGESTIVE HEART FAILURE (CMS/HCC): Status: ACTIVE | Noted: 2019-09-20

## 2021-03-22 PROBLEM — F32.A DEPRESSIVE DISORDER: Status: ACTIVE | Noted: 2021-01-01

## 2021-03-22 PROBLEM — S81.832D: Status: ACTIVE | Noted: 2018-08-28

## 2021-03-22 PROBLEM — R94.6 THYROID FUNCTION TEST ABNORMAL: Status: ACTIVE | Noted: 2019-09-20

## 2021-03-22 PROBLEM — N40.0 HYPERTROPHY OF PROSTATE WITHOUT URINARY OBSTRUCTION AND OTHER LOWER URINARY TRACT SYMPTOMS (LUTS): Status: ACTIVE | Noted: 2020-07-31

## 2021-03-22 PROBLEM — R29.818: Status: ACTIVE | Noted: 2019-09-20

## 2021-03-22 PROBLEM — I85.10 ESOPHAGEAL VARICES IN CIRRHOSIS (CMS/HCC): Status: ACTIVE | Noted: 2019-09-20

## 2021-03-22 PROBLEM — H26.9 CATARACT: Status: ACTIVE | Noted: 2019-09-20

## 2021-03-22 PROBLEM — R69 OTHER ILL-DEFINED AND UNKNOWN CAUSES OF MORBIDITY AND MORTALITY: Status: ACTIVE | Noted: 2019-09-20

## 2021-03-22 PROBLEM — Z60.0 PHASE OF LIFE PROBLEM: Status: ACTIVE | Noted: 2021-01-01

## 2021-03-22 PROBLEM — K80.50 BILIARY COLIC: Status: ACTIVE | Noted: 2020-08-12

## 2021-03-22 PROBLEM — F33.1 MODERATE RECURRENT MAJOR DEPRESSION (CMS/HCC): Status: ACTIVE | Noted: 2021-01-01

## 2021-03-22 PROBLEM — Z96.1 ARTIFICIAL LENS PRESENT: Status: ACTIVE | Noted: 2021-01-01

## 2021-03-22 PROBLEM — Z96.1 HISTORY OF POSTERIOR CHAMBER INTRAOCULAR LENS IMPLANTATION: Status: ACTIVE | Noted: 2020-07-29

## 2021-03-22 PROBLEM — C22.9 MALIGNANT NEOPLASM OF LIVER (CMS/HCC): Status: ACTIVE | Noted: 2019-09-20

## 2021-03-22 PROBLEM — K74.60: Status: ACTIVE | Noted: 2019-09-20

## 2021-03-24 NOTE — TELEPHONE ENCOUNTER
His PCP called wondering if you would be removing his drain tubes or not? He said he would touch base with you on Friday

## 2021-03-25 NOTE — PATIENT INSTRUCTIONS
Call Office if you have:  · Temperature greater than 100.0  · Any nausea or vomiting that is not controlled by medication  · Pain not managed by medication or new.  · Redness, tenderness, or signs of infection (pain, swelling, redness, odor or green/yellow discharge)  · Difficulty breathing, headache or visual disturbances  · Hives  · Persistent dizziness or light-headedness  · Extreme fatigue  · Any other questions or concerns you may have after discharge     Your nurse today was Manisha Rivas RN

## 2021-03-25 NOTE — PROGRESS NOTES
Medical Oncology Follow-Up    Subjective   HISTORY OF PRESENT ILLNESS:  Ben Lai Sr is a 71 y.o. male who  was diagnosed with hepatocellular carcinoma in November 2018.  He has an underlying hepatitis C which was supposedly cured.    Due to the extent of his HCC per GI at [facility] it was not amenable to local therapy, resection or radiation, referred to medical oncology   He was started on sorafenib early December 2018 but this appears to have been stopped in February due to significant side effects.  Patient states unequivocally that he started to feel better after a 3-week break but the sorafenib was recently restarted and he is having significant quality of life compromising side effects again.  This includes severe joint and muscle pain and fatigue.     7/29/19 restaging scans showed progressive disease in the liver, however no sings of metastatic disease     8/14/19 s/p   CT-guided  right lobe of the liver radiofrequency ablation.     9/2019 seen hepatobiliary team at [facility], and was discussed for tumor board, planned to have Y90  Treatment     10/8/19 MRI abdomen showed Nodular appearing liver with 2 areas of hyper/early arterial enhancement suggestive of hepatocellular carcinoma are both adjacent to the gallbladder which contains filling defects which are enhancing suggestive of neoplasm. This could be primary cholangiocarcinoma or hepatocellular carcinoma which is invading the gallbladder. The liver and gallbladder masses are very similar to the study from 7/29/2019.  Wedge-shaped signal abnormality in the anterior right lobe of the liver similar to the prior study could represent hepatic infarct.    11/7/19 s/p Y90 treatment at [facility]     12/9/19 MRI abdomen showed Cirrhosis and evidence of portal hypertension. Posttreatment changes of radioembolization throughout the right hepatic lobe, with increased diffuse heterogeneous arterial enhancement. This is the  patient's first posttreatment MRI. LR-TR equivocal. Continued attention on follow-up.  Grossly stable size of the lobulated enhancing endoluminal gallbladder mass with decreased but persistent areas of enhancing viable tumor.  Stable segment 4 exophytic 2.6 cm OPTN-5b lesion.  Unchanged right hepatic vein and right portal vein branch thrombi, without convincing thrombus enhancement.  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 Attention on follow-up.    12/11/19 s/p chemo embolization for possible residual viable tumor in the liver.    1/16/20  restaging MR showed  The circumscribed mass is seen in the anterior aspect of the right lobe of the liver demonstrates positive response to treatment with significantly reduced enhancement, no change in overall size.2.  Post radiation ablation defect in the anterior inferior right lobe with surrounding heterogeneous nonmass-like enhancement. It would be difficult to exclude active tumor within this surrounding area of enhancement.  Significant change in appearance of the posterior segment of the right lobe of the liver. Previous infiltrative nodular enhancement, now geographic nonmass-like enhancement with parenchymal volume loss. This could represent posttreatment related change. Difficult to exclude residual/progressive tumor    4/21/20 MR abdomen showed Continued evolution of post embolic/radiotherapy changes to the right hepatic lobe with large region of volume loss of enhancement likely representing fibrosis.  Previous HCC anteriorly along this region continues to regress with no evidence of enhancement to suggest recurrent or residual tumor. Previous nodular lesion inferiorly along the treatment bed which also likely represented a site of HCC also continues to regress with no evidence of recurrence or residual disease. No new suspicious liver lesions are demonstrated. Cirrhosis. Splenomegaly. Portosystemic collaterals.    6/26/20 MR No significant  MRI change in the treatment-related appearance of the right hepatic lobe since 4/21/2020. No MR evidence of recurrent hepatoma. Treated anterior right hepatic lobe lesion is redemonstrated. Treated lesion at right more posterior hepatic lobe is also seen.   Hepatic fibrosis.Cirrhosis, splenomegaly and portosystemic collaterals.    10/5/20 MR abdomen showed 2 treated right hepatic lobe lesions and associated scarring and capsular retraction. Since the previous examination, there is a new 4 mm enhancing nodule at the posterior aspect of the right anterior hepatic lobe treated lesion.  The enhancement characteristics are progressive and indeterminate and there is the hint of small restricted diffusion. Overall, a change in the appearance of this treated lesion which has otherwise been stable since 1/16/2020 is suspicious for recurrent tumor. However, the imaging characteristics themselves are nonspecific of the 4 mm nodule (with differential diagnosis including rounded scar), and attention on short-term follow-up MRI of the abdomen with IV contrast in 3 months is recommended, along with correlation with AFP levels.     12/21/20 MR showed Amorphous enhancement has increased within the lower right hepatic region area of previous treated tumor. This enhancement pattern is worrisome for tumor progression the area. Correlation with recent AFP would be helpful. Considerable scar tissue and areas well.. In addition the lesion overlying the gallbladder fossa may involve the gallbladder which now appears to be markedly thickened and demonstrates considerable wall enhancement which appears to have changed since the previous study. Differential would include direct tumor invasion gallbladder and acute cholecystitis.     3/3/21 MR showed Very complex appearing situation in the region of the gallbladder fundus, subhepatic space extending to the hepatic flexure of the colon. There is concern for the possibility of extrahepatic  malignancy and/or an inflammatory process in this area which could be affecting the gallbladder. There is a 2.8 cm fluid collection adjacent to the gallbladder. Cholecystitis would be another consideration. Additional correlation with contrast-enhanced CT may be helpful to better define some of the anatomy in this areaCapsular retraction, scarring and mild enhancement redemonstrated in right hepatic lobe. Most of the intrahepatic signal abnormalities do not appear dramatically changed.  3. There is still some biliary dilatation the right hepatic lobe with adjacent enhancement. Question cholangitis.  4. In addition there are small filling defects within the common bile duct. Possible choledocholithiasis. Currently there is not seen to be high-grade obstruction however. Common bile duct diameter approximately millimeters    Today:   Pain  In RUQ improved with drainage, still on oral antibiotics today    No fevers or chills   Has had symptoms of grade 1 Hepatic encephalopathy     Energy is  Stable ECOG 2   appetite ist stable, weight is stable   Takes care of his ADLS   Stable,back pain , chest pain and neuropathy         Encounter Diagnosis   Name Primary?   • Hepatocellular carcinoma (CMS/HCC) (HCC)    .     Treatment History:  Oncology History   Hepatocellular carcinoma (CMS/HCC) (HCC)   1/23/2019 Initial Diagnosis    Hepatocellular carcinoma (CMS/HCC) (HCC)     7/30/2019 - 12/9/2019 Adjuvant Therapy    Hepatocellular - Sorafenib (Nexavar)  Plan Provider: ANDERSON Bryan  Treatment goal: Palliative  Line of treatment: [No plan line of treatment]     6/12/2020 Cancer Staged    Staging form: Liver, AJCC 8th Edition  - Clinical: Stage IIIB (cT4, cN0, cM0) - Signed by Milka Reynoso MD on 6/12/2020 12/31/2020 -  Adjuvant Therapy    Hepatocellular - Bevacizumab / Atezolizumab  Plan Provider: Milka Reynoso MD  Treatment goal: Palliative  Line of treatment: [No plan line of treatment]            RECENT EVENTS:    PAST MEDICAL HISTORY:   Past Medical History:   Diagnosis Date   • A-fib (CMS/HCC) (HCC)    • Allergy    • Anxiety    • Arthritis    • Asthma    • Blindness of right eye    • BPH (benign prostatic hyperplasia)    • Cardiovascular disease    • Cirrhosis (CMS/HCC) (HCC)     with possible liver mass by MRI 5/18, rec repeat in 8/2018   • Complication of anesthesia    • Congestive heart failure (CMS/HCC) (HCC) 9/20/2019   • COPD (chronic obstructive pulmonary disease) (CMS/HCC) (HCC)    • Depression    • Endocrine disorder    • Gallstones    • Gastritis    • GERD (gastroesophageal reflux disease)    • Glaucoma    • Hearing loss     bilateral hearing aids   • Hemorrhoids    • Hepatitis C     Hep C, 2016 remission, complicated by cirrhosis.  MRI abd 5/30/18, rec 3 month follow up for focus of enhancement right and left hepatic lobe junction   • Hernia, abdominal    • High blood pressure    • IBS (irritable bowel syndrome)    • Infectious viral hepatitis    • Jaundice    • Kidney stones    • Obstructive sleep apnea syndrome    • Periodic limb movement disorder    • Persistent hypersomnia    • Persistent insomnia    • Pneumonia    • PONV (postoperative nausea and vomiting)    • Type 2 diabetes mellitus (CMS/HCC) (HCC)    • Urinary incontinence    • Wears partial dentures         GYNECOLOGICAL HISTORY (if applicable): NA     FAMILY HISTORY:   Family History   Problem Relation Age of Onset   • Arthritis Mother    • Rheum arthritis Mother    • Diabetes Mother    • Rectal cancer Mother    • Other Paternal Grandmother    • Other Paternal Grandfather    • Diabetes Other    • Rheum arthritis Other    • Osteoporosis Other         SOCIAL HISTORY:   Social History     Socioeconomic History   • Marital status:      Spouse name: Not on file   • Number of children: Not on file   • Years of education: Not on file   • Highest education level: Not on file   Occupational History   • Not on file   Tobacco Use    • Smoking status: Never Smoker   • Smokeless tobacco: Never Used   Substance and Sexual Activity   • Alcohol use: No   • Drug use: No   • Sexual activity: Defer   Other Topics Concern   • Not on file   Social History Narrative   • Not on file     Social Determinants of Health     Financial Resource Strain:    • Difficulty of Paying Living Expenses:    Food Insecurity:    • Worried About Running Out of Food in the Last Year:    • Ran Out of Food in the Last Year:    Transportation Needs:    • Lack of Transportation (Medical):    • Lack of Transportation (Non-Medical):    Physical Activity:    • Days of Exercise per Week:    • Minutes of Exercise per Session:    Stress:    • Feeling of Stress :    Social Connections:    • Frequency of Communication with Friends and Family:    • Frequency of Social Gatherings with Friends and Family:    • Attends Yaz[employer] Services:    • Active Member of Clubs or Organizations:    • Attends Club or Organization Meetings:    • Marital Status:    Intimate Partner Violence:    • Fear of Current or Ex-Partner:    • Emotionally Abused:    • Physically Abused:    • Sexually Abused:         ALLERGIES:   Allergies as of 03/25/2021 - Reviewed 03/25/2021   Allergen Reaction Noted   • Metformin Hives    • Adhesive tape-silicones  04/06/2018   • Interferon violetta-2a  02/05/2019   • Latex     • Milk  02/05/2019   • Mometasone     • Mometasone furoate     • Omeprazole     • Ondansetron hcl Nausea And Vomiting 05/01/2019   • Pepper (genus capsicum)  02/05/2019   • Primidone  05/01/2019   • Ribavirin Itching 10/19/2017   • Rosuvastatin  02/05/2019   • Sorafenib  04/04/2019   • Spironolactone     • Statins-hmg-coa reductase inhibitors Itching and GI intolerance 04/06/2018   • Interferon violetta (human leuk. derived) Palpitations and Rash 09/20/2019   • Pantoprazole Itching and Rash 02/28/2018   • Peginterferon violetta-2b Palpitations 10/19/2017   • Penicillins Other (see comments) 10/19/2017         MEDICATIONS:   Current Outpatient Medications   Medication Sig Dispense Refill   • amoxicillin-pot clavulanate (AUGMENTIN) 875-125 mg per tablet Take 1 tablet by mouth 2 (two) times a day for 21 days 42 tablet 0   • HYDROmorphone (DILAUDID) 2 mg tablet Take 2-4 mg by mouth every 6 (six) hours as needed       • desvenlafaxine succinate 25 mg tablet extended release 24 hr Take 25 mg by mouth daily     • sildenafiL (VIAGRA) 100 mg tablet Take 100 mg by mouth as needed for erectile dysfunction Up to 4 times monthly     • fluoride, sodium, (DENTAGEL) 1.1 % gel dental gel See administration instructions Apply small amount to toothbrush in place of regular toothpaste. Brush for 2 minutes in the morning and evening.     • polyethylene glycol (Miralax) 17 gram/dose powder Take 17 g by mouth daily     • brimonidine (ALPHAGAN) 0.2 % ophthalmic solution 1 drop 2 (two) times a day     • carboxymethylcellulose (REFRESH PLUS) 0.5 % dropperette Administer 1 drop into both eyes every 3 (three) hours Indications: dry eye       • prochlorperazine (COMPAZINE) 10 mg tablet Take 1 tablet (10 mg total) by mouth every 6 (six) hours as needed for nausea or vomiting 30 tablet 5   • artificial saliva (YERBA GLADYS AND LYTES) aerosol,spray spray Apply 2 sprays to the mouth or throat as needed       • fluticasone propionate (FLONASE) 50 mcg/actuation nasal spray Administer 2 sprays into each nostril daily       • naloxone HCl (NARCAN NASL) Administer into affected nostril(s) as needed     • traZODone (DESYREL) 100 mg tablet Take 100 mg by mouth nightly       • insulin glargine (Lantus Solostar U-100 Insulin) 100 unit/mL (3 mL) insulin pen injection pen Inject 24 Units under the skin every morning       • carvediloL (COREG) 3.125 mg tablet Take 1 tablet (3.125 mg total) by mouth 2 (two) times a day with meals 180 tablet 0   • diclofenac sodium (VOLTAREN) 1 % gel Apply 4 g topically 4 (four) times a day       • cetirizine (ZyrTEC) 10 mg tablet  Take 10 mg by mouth daily     • buPROPion SR (WELLBUTRIN SR) 100 mg 12 hr tablet Take 1 tablet (100 mg total) by mouth daily 90 tablet 1   • lactulose (GENERLAC) 10 gram/15 mL solution Take 15 mL (10 g total) by mouth daily 473 mL 2   • terazosin (HYTRIN) 5 mg capsule Take 1 capsule (5 mg total) by mouth 2 (two) times a day 180 capsule 2   • blood-glucose meter (ACCU-CHEK ADELE PLUS METER) [facility] Use to test blood sugars 3 times daily (E11.65, non insulin depedent) AccuChek Adele plus, meter is 8 years old 1 each 0   • lisinopril (PRINIVIL,ZESTRIL) 20 mg tablet Take 1 tablet (20 mg total) by mouth daily 90 tablet 2   • furosemide (LASIX) 20 mg tablet Take 1 tablet (20 mg total) by mouth 2 (two) times a day. 180 tablet 2   • lansoprazole (PREVACID) 30 mg capsule Take 30 mg by mouth daily      • liraglutide (VICTOZA 2-JASE) 0.6 mg/0.1 mL (18 mg/3 mL) injection Inject 1.8 mg under the skin daily       • sodium chloride (SALINE NASAL) 0.65 % nasal spray Administer 1 spray into each nostril as needed for congestion or rhinitis      • pramoxine-menthol (GOLD BOND MEDICATED ANTI-ITCH) 1-1 % cream Apply topically as needed       • latanoprost (XALATAN) 0.005 % ophthalmic solution Administer 1 drop into both eyes nightly   10 0   • loperamide (Imodium A-D) 2 mg tablet Take 2 tablets with first loose stool of the day, then up to 8 tablets daily (Patient not taking: Reported on 1/21/2021 ) 80 tablet 2     No current facility-administered medications for this visit.       REVIEW OF SYSTEMS:   Review of Systems   Constitutional: Positive for fatigue.   HENT:   Positive for hearing loss.    Eyes: Positive for eye problems.   Gastrointestinal: Positive for abdominal distention and abdominal pain.   Endocrine: Negative.    Genitourinary: Positive for bladder incontinence.    Musculoskeletal: Positive for arthralgias, gait problem and myalgias.   Skin: Negative.    Neurological: Positive for gait problem.   Hematological: Negative.     Psychiatric/Behavioral: The patient is nervous/anxious.    All other systems reviewed and are negative.      The ECOG performance status today is 2 - Ambulatory care of self; up and about >50% of waking hours.          Objective    PHYSICAL EXAM:   /68   Pulse 79   Temp 36.9 °C (98.4 °F) (Temporal)   Resp 18   Wt 87.7 kg (193 lb 6.4 oz)   SpO2 92%   BMI 31.23 kg/m²    Body mass index is 31.23 kg/m².       Physical Exam  HENT:      Head: Normocephalic.      Nose: Nose normal.      Mouth/Throat:      Mouth: Mucous membranes are moist.   Eyes:      Pupils: Pupils are equal, round, and reactive to light.   Cardiovascular:      Rate and Rhythm: Normal rate.   Pulmonary:      Effort: Pulmonary effort is normal.   Abdominal:      General: Abdomen is flat. There is no distension.   Musculoskeletal:         General: Normal range of motion.      Cervical back: Normal range of motion.   Skin:     General: Skin is dry.   Neurological:      General: No focal deficit present.      Mental Status: He is alert.   Psychiatric:         Mood and Affect: Mood normal.         LABS:   Appointment on 03/25/2021   Component Date Value Ref Range Status   • WBC 03/25/2021 2.8* 3.7 - 9.6 10*3/uL Final   • RBC 03/25/2021 4.27  4.10 - 5.80 10*6/µL Final   • Hemoglobin 03/25/2021 11.6* 13.2 - 17.2 g/dL Final   • Hematocrit 03/25/2021 35.5* 38.0 - 50.0 % Final   • MCV 03/25/2021 83.2  82.0 - 97.0 fL Final   • MCH 03/25/2021 27.1* 29.0 - 34.0 pg Final   • MCHC 03/25/2021 32.6  32.0 - 36.0 g/dL Final   • RDW 03/25/2021 15.8* 11.5 - 15.0 % Final   • Platelets 03/25/2021 110* 130 - 350 10*3/uL Final   • MPV 03/25/2021 7.7  6.9 - 10.8 fL Final   • Neutrophils% 03/25/2021 69  46 - 70 % Final   • Lymphocytes% 03/25/2021 15  15 - 47 % Final   • Monocytes% 03/25/2021 12  5 - 13 % Final   • Eosinophils% 03/25/2021 3  0 - 3 % Final   • Basophils% 03/25/2021 1  0 - 2 % Final   • Neutrophils Absolute 03/25/2021 1.90  10*3/uL Final   • Lymphocytes  Absolute 03/25/2021 0.40  10*3/uL Final   • Monocytes Absolute 03/25/2021 0.30  10*3/uL Final   • Eosinophils Absolute 03/25/2021 0.10  10*3/uL Final   • Basophils Absolute 03/25/2021 0.00  10*3/uL Final   • Sodium 03/25/2021 136  135 - 145 mmol/L Final   • Potassium 03/25/2021 4.2  3.5 - 5.1 mmol/L Final   • Chloride 03/25/2021 100  98 - 107 mmol/L Final   • CO2 03/25/2021 30  21 - 32 mmol/L Final   • Anion Gap 03/25/2021 6  3 - 11 mmol/L Final   • BUN 03/25/2021 10  7 - 25 mg/dL Final   • Creatinine 03/25/2021 0.68* 0.70 - 1.30 mg/dL Final   • Glucose 03/25/2021 152* 70 - 105 mg/dL Final   • Calcium 03/25/2021 9.2  8.6 - 10.3 mg/dL Final   • AST 03/25/2021 34  <40 U/L Final   • ALT (SGPT) 03/25/2021 18  7 - 52 U/L Final   • Alkaline Phosphatase 03/25/2021 138* 45 - 115 U/L Final   • Total Protein 03/25/2021 7.1  6.0 - 8.3 g/dL Final   • Albumin 03/25/2021 3.7  3.5 - 5.3 g/dL Final   • Total Bilirubin 03/25/2021 0.67  0.20 - 1.40 mg/dL Final   • eGFR 03/25/2021 97  >60 mL/min/1.73m*2 Final   • Corrected Calcium 03/25/2021 9.4  8.6 - 10.3 mg/dL Final       DIAGNOSTIC REPORTS REVIEWED:   Imaging:  Reviewed     Assessment/Plan    IMPRESSION:   This is a pleasant 71 y.o. male with diagnosis of hepatocellular carcinoma involving the right lobe, locally advanced disease, per the chart not amenable to local therapy, started sorafenib December 2018 however stopped in February 2019 due to intolerance without any dose reductions.  Has been off of treatment since then, restaging CAT scan and MRI  7/2019 show progressive disease in the liver.  Started  sorafenib 8/2019 at a lower dose 200 mg twice daily, s/p RFA 8/2019, was referred to [facility], s/p Y90 treatment followed by [facility], MR showed response to treatment without progression on 1/16/20 and 4/21/20  currently treatment on hold until evidence of disease progression       concerned about enhancement of the gallbladder discussed at tumor board at  and   There is no rule for surgery, on MR 12/2020 there was clear progression of HCC and discussed plan of either starting bevacizumab and atezolizomab vs lenvatinib   After discussion likely to start bevacizumab and atezolizomab.     Restaging MR 3/2021 showed Very complex appearing situation in the region of the gallbladder fundus,  possibility of extrahepatic malignancy and/or an inflammatory process  . There is a 2.8 cm fluid collection adjacent to the gallbladder. Cholecystitis would be another consideration.  S/p drain tubes and antibiotics      Plan:   -   continue atzolizumab 1200 mg and hold bevacizumab 15mg/kg  Until drain tubes are removed    - will restage 5/7/21 with MR abdomen  -  Follow up with [facility] for GI follow up and repeat ERCP   - continue follow up with palliative medicine for pain control.   - follow up on referral to hepatology for liver cirrhosis   - follow up ID for antibiotics             Physician: Milka Reynoso MD

## 2021-03-26 PROBLEM — T85.698A: Status: ACTIVE | Noted: 2021-01-01

## 2021-03-26 PROBLEM — R60.1 GENERALIZED EDEMA: Status: ACTIVE | Noted: 2021-01-01

## 2021-03-26 NOTE — PROGRESS NOTES
INFECTIOUS DISEASES OUTPATIENT NOTE    Subjective     HPI:     Patient is 71 years old  male with hepatocellular carcinoma undergoing immunotherapy, recently he was hospitalized with right upper quadrant fluid collection with?  Perforation of the gallbladder, he had percutaneous drainage (2 tubes), the culture was polymicrobial (Enterococcus faecalis, Clostridium perfringens, Prevotella Buccae, Clostridium ramosum, parvimonas micra, actinomyces odontolyticus, Streptococcus intermedius  During his hospitalization he was treated with Unasyn IV and then switched to Augmentin p.o.  He was supposed to follow-up with [facility] interventional GI clinic, unfortunately it is a scheduled to the end of April with a video visit  Currently he has minimal output from both drains (approximately 15 to 20 mL/day) mostly serous with chunks of clots/mucus  Overall he feels okay, significant improvement of the right upper quadrant abdominal pain  No systemic fever chills  He had mild nausea, no vomiting  No diarrhea  No acute dyspnea or chest pain  No rash or itching    REVIEW OF SYSTEMS:  Pertinent positives and negatives are noted in the HPI above,   all other systems reviewed and were negative    PAST HISTORY:     Past Medical History:   Diagnosis Date   • A-fib (CMS/HCC) (HCC)    • Allergy    • Anxiety    • Arthritis    • Asthma    • Blindness of right eye    • BPH (benign prostatic hyperplasia)    • Cardiovascular disease    • Cirrhosis (CMS/HCC) (HCC)     with possible liver mass by MRI 5/18, rec repeat in 8/2018   • Complication of anesthesia    • Congestive heart failure (CMS/HCC) (HCC) 9/20/2019   • COPD (chronic obstructive pulmonary disease) (CMS/HCC) (HCC)    • Depression    • Endocrine disorder    • Gallstones    • Gastritis    • GERD (gastroesophageal reflux disease)    • Glaucoma    • Hearing loss     bilateral hearing aids   • Hemorrhoids    • Hepatitis C     Hep C, 2016  remission, complicated by cirrhosis.  MRI abd 5/30/18, rec 3 month follow up for focus of enhancement right and left hepatic lobe junction   • Hernia, abdominal    • High blood pressure    • IBS (irritable bowel syndrome)    • Infectious viral hepatitis    • Jaundice    • Kidney stones    • Obstructive sleep apnea syndrome    • Periodic limb movement disorder    • Persistent hypersomnia    • Persistent insomnia    • Pneumonia    • PONV (postoperative nausea and vomiting)    • Type 2 diabetes mellitus (CMS/HCC) (HCC)    • Urinary incontinence    • Wears partial dentures      Past Surgical History:   Procedure Laterality Date   • COLONOSCOPY  10/27/2016    Pili, normal, repeat 10 years   • CT ABLATION LIVER RADIOFREQUENCY  8/14/2019    CT ABLATION LIVER RADIOFREQUENCY 8/14/2019 [facility] MIS CT SCAN   • CT ABSCESS CYST PERITONEAL  3/5/2021    CT ABSCESS CYST PERITONEAL 3/5/2021 [facility] MIS CT SCAN   • ESOPHAGOGASTRODUODENOSCOPY N/A 2/22/2018    Procedure: EGD - ESOPHAGOGASTRODUODENOSCOPY with banding  x 2 with crna;  Surgeon: William Rivas MD;  Location: [facility] Endoscopy;  Service: Endoscopy;  Laterality: N/A;   • ESOPHAGOGASTRODUODENOSCOPY N/A 4/6/2018    Procedure: EGD - ESOPHAGOGASTRODUODENOSCOPY with crna;  Surgeon: William Rivas MD;  Location: [facility] Endoscopy;  Service: Endoscopy;  Laterality: N/A;   • ESOPHAGOGASTRODUODENOSCOPY N/A 5/7/2019    Procedure: EGD - ESOPHAGOGASTRODUODENOSCOPY;  Surgeon: William Rivas MD;  Location: [facility] Endoscopy;  Service: Endoscopy;  Laterality: N/A;   • HAND SURGERY Left     thumb   • HERNIA REPAIR  01/01/1965   • IR TUNNELED CENTRAL LINE WITH PORT  12/31/2020    IR TUNNELED CENTRAL LINE WITH PORT 12/31/2020 Sonny Danielle MD [facility] MIS INTERVEN RAD   • KNEE ARTHROSCOPY  09/19/2017    left knee, with partial medial meniscectomy; limited chondroplasty, patellofemoral joint   • LIVER BIOPSY      by Dr. Alejandre   • OTHER SURGICAL HISTORY      esophageal varices, banded   • VASECTOMY      at approx  age of 25      Social History     Occupational History   • Not on file   Tobacco Use   • Smoking status: Never Smoker   • Smokeless tobacco: Never Used   Substance and Sexual Activity   • Alcohol use: No   • Drug use: No   • Sexual activity: Defer   Social History Narrative   • Not on file     Family History   Problem Relation Age of Onset   • Arthritis Mother    • Rheum arthritis Mother    • Diabetes Mother    • Rectal cancer Mother    • Other Paternal Grandmother    • Other Paternal Grandfather    • Diabetes Other    • Rheum arthritis Other    • Osteoporosis Other        ALLERGY:     Allergies   Allergen Reactions   • Metformin Hives   • Adhesive Tape-Silicones    • Interferon Jose Maria-2a    • Latex    • Milk      Pt claimed that he is not allergic to milk at all    • Mometasone    • Mometasone Furoate    • Omeprazole    • Ondansetron Hcl Nausea And Vomiting   • Pepper (Genus Capsicum)      Any Hot Peppers - can eat bell peppers   • Primidone      Other reaction(s): HEADACHE   • Ribavirin Itching   • Rosuvastatin    • Sorafenib      Other reaction(s): BURNING SENSATION, HEADACHE, JOINT PAIN   • Spironolactone    • Statins-Hmg-Coa Reductase Inhibitors Itching and GI intolerance   • Interferon Jose Maria (Human Leuk. Derived) Palpitations and Rash   • Pantoprazole Itching and Rash   • Peginterferon Jose Maria-2b Palpitations   • Penicillins Other (see comments)     Passed out after IM injection in service ?vasovagal reaction  Pt tolerates unasyn         MEDICATIONS:     Current Outpatient Medications   Medication Sig Dispense Refill   • morphine (MS CONTIN) 15 mg 12 hr tablet TAKE ONE TABLET BY MOUTH TWICE A DAY FOR PAIN     • amoxicillin-pot clavulanate (AUGMENTIN) 875-125 mg per tablet Take 1 tablet by mouth 2 (two) times a day for 14 days 28 tablet 0   • lidocaine-prilocaine (EMLA) cream Apply thick layer to intact skin over port 1 hour prior to procedure. Cover with occlusive dressing. 30 g 2   • HYDROmorphone (DILAUDID) 2 mg  tablet Take 2-4 mg by mouth every 6 (six) hours as needed       • desvenlafaxine succinate 25 mg tablet extended release 24 hr Take 25 mg by mouth daily     • sildenafiL (VIAGRA) 100 mg tablet Take 100 mg by mouth as needed for erectile dysfunction Up to 4 times monthly     • fluoride, sodium, (DENTAGEL) 1.1 % gel dental gel See administration instructions Apply small amount to toothbrush in place of regular toothpaste. Brush for 2 minutes in the morning and evening.     • polyethylene glycol (Miralax) 17 gram/dose powder Take 17 g by mouth daily     • brimonidine (ALPHAGAN) 0.2 % ophthalmic solution 1 drop 2 (two) times a day     • carboxymethylcellulose (REFRESH PLUS) 0.5 % dropperette Administer 1 drop into both eyes every 3 (three) hours Indications: dry eye       • prochlorperazine (COMPAZINE) 10 mg tablet Take 1 tablet (10 mg total) by mouth every 6 (six) hours as needed for nausea or vomiting 30 tablet 5   • artificial saliva (YERBA GLADYS AND LYTES) aerosol,spray spray Apply 2 sprays to the mouth or throat as needed       • fluticasone propionate (FLONASE) 50 mcg/actuation nasal spray Administer 2 sprays into each nostril daily       • naloxone HCl (NARCAN NASL) Administer into affected nostril(s) as needed     • traZODone (DESYREL) 100 mg tablet Take 100 mg by mouth nightly       • insulin glargine (Lantus Solostar U-100 Insulin) 100 unit/mL (3 mL) insulin pen injection pen Inject 24 Units under the skin every morning       • carvediloL (COREG) 3.125 mg tablet Take 1 tablet (3.125 mg total) by mouth 2 (two) times a day with meals 180 tablet 0   • diclofenac sodium (VOLTAREN) 1 % gel Apply 4 g topically 4 (four) times a day       • cetirizine (ZyrTEC) 10 mg tablet Take 10 mg by mouth daily     • loperamide (Imodium A-D) 2 mg tablet Take 2 tablets with first loose stool of the day, then up to 8 tablets daily 80 tablet 2   • buPROPion SR (WELLBUTRIN SR) 100 mg 12 hr tablet Take 1 tablet (100 mg total) by mouth  "daily 90 tablet 1   • lactulose (GENERLAC) 10 gram/15 mL solution Take 15 mL (10 g total) by mouth daily 473 mL 2   • terazosin (HYTRIN) 5 mg capsule Take 1 capsule (5 mg total) by mouth 2 (two) times a day 180 capsule 2   • blood-glucose meter (ACCU-CHEK ADELE PLUS METER) [facility] Use to test blood sugars 3 times daily (E11.65, non insulin depedent) AccuChek Adele plus, meter is 8 years old 1 each 0   • lisinopril (PRINIVIL,ZESTRIL) 20 mg tablet Take 1 tablet (20 mg total) by mouth daily 90 tablet 2   • furosemide (LASIX) 20 mg tablet Take 1 tablet (20 mg total) by mouth 2 (two) times a day. 180 tablet 2   • lansoprazole (PREVACID) 30 mg capsule Take 30 mg by mouth daily      • liraglutide (VICTOZA 2-JASE) 0.6 mg/0.1 mL (18 mg/3 mL) injection Inject 1.8 mg under the skin daily       • sodium chloride (SALINE NASAL) 0.65 % nasal spray Administer 1 spray into each nostril as needed for congestion or rhinitis      • pramoxine-menthol (GOLD BOND MEDICATED ANTI-ITCH) 1-1 % cream Apply topically as needed       • latanoprost (XALATAN) 0.005 % ophthalmic solution Administer 1 drop into both eyes nightly   10 0     No current facility-administered medications for this visit.       Objective     PHYSICAL EXAMINATION:     VITALS: /68   Pulse 72   Temp 36.2 °C (97.1 °F) (Temporal)   Resp 16   Ht 1.651 m (5' 5\")   SpO2 93%   BMI 32.18 kg/m²      EXAM:    GENERAL: No acute distress, well developed, chronically ill-appearing  HEENT: normal external nose and ears,   RESPIRATORY: non-laboured, breathing   CARDIOVASCULAR: Normal rate,   GASTROINTESTINAL: Soft, right upper quadrant 2 drains with no cellulitic changes around the tube, serous output with chunks of clots  MUSCULOSKELETAL: no acute inflammatory joint  SKIN: No rash,   NEUROLOGIC: Alert and oriented, grossly no focal deficit  PSYCHIATRIC: Normal affect and cognition    DIAGNOSTIC DATA:      Lab data/culture data reviewed by me:    Microbiology data:  As " above    Results from last 4 days   Lab Units 03/25/21  0811   WBC AUTO 10*3/uL 2.8*   HEMOGLOBIN g/dL 11.6*   HEMATOCRIT % 35.5*   PLATELETS AUTO 10*3/uL 110*     Results from last 4 days   Lab Units 03/25/21  0811   SODIUM mmol/L 136   POTASSIUM mmol/L 4.2   CO2 mmol/L 30   BUN mg/dL 10   CREATININE mg/dL 0.68*   CALCIUM mg/dL 9.2   TOTAL PROTEIN g/dL 7.1   BILIRUBIN TOTAL mg/dL 0.67   ALK PHOS U/L 138*   ALT U/L 18   AST U/L 34             Assessment/Plan     ASSESSMENT/PLAN:      Diagnosis Plan   1. Polymicrobial bacterial infection     2. Right upper quadrant abdominal abscess (CMS/HCC) (HCC)  amoxicillin-pot clavulanate (AUGMENTIN) 875-125 mg per tablet    CT abdomen pelvis with IV contrast   3. Hepatocellular carcinoma (CMS/HCC) (HCC)       At this point we will continue with the oral antibiotic including Augmentin 875 mg twice daily for another 2 weeks (he had been on Augmentin for approximately 3 weeks)  We will do CT scan of abdomen pelvis with IV contrast to reassess the right upper quadrant fluid collection to determine if the drains can be removed        Total time - 33 minutes. More than 50% of time spent in direct patient care, care coordination, patient counseling, and formalizing plan of care (as above).        Jermaine Myers MD  3/26/2021

## 2021-03-26 NOTE — LETTER
Critical access hospital INFECTIOUS DISEASES  [address] [facility] [address]  [phone]  Dept: [phone]  03/26/21    Collin Ramírez DO  [address]  [facility] [place] [address]      Dear Dr. Ramírez,    I am writing to confirm that your patient, Ben Lai Sr, received care in my office on 03/26/21. I have enclosed a summary of the care provided to Ben for your reference.    Please contact me with any questions you may have regarding the visit.    Sincerely,         Jermaine Myers MD  [address] [facility] [address]  [phone]    CC: No Recipients

## 2021-03-26 NOTE — LETTER
[facility] INFECTIOUS DISEASES  [address] [facility] [id number] [phone]  Dept: [phone]  03/26/21    Pedro Cr DO  [address] [facility] [address]      Dear Dr. Cr,    Thank you for referring your patient, Ben Lai , to receive care in my office. I have enclosed a summary of the care provided to Ben on 03/26/21.    Please contact me with any questions you may have regarding the visit.    Sincerely,         Jermaine Myers MD  [address] [facility] [id number] [phone]    CC: No Recipients

## 2021-03-29 NOTE — TELEPHONE ENCOUNTER
I spoke with CT , they have an opening this Wednesday at 6:45 to check in for the scan, I left a message on Laura's number to call me back if they want the slot.

## 2021-03-29 NOTE — TELEPHONE ENCOUNTER
Laura calls requesting we look into the scheduled time for his CT, he has been scheduled on 4/12 and wonders if he can have this done earlier or at [facility]. I will call CT and see if there is any wiggle room on their schedule. She said last night the drain from his back drained 36 cc of very bloody fluid and the front drain only had 5 cc of pink tinged fluid. I will keep her posted when I hear from CT.

## 2021-03-30 NOTE — PSYCHOTHERAPY
SW provided f/u call in regards to best practice for depression. Patient reported depression due to cancer limiting what he used to be able to do. Patient denied any additional emotional needs at this time and expressed he is coping at this time. SW encouraged patient to reach out should he or his spouse need additional support.

## 2021-03-31 NOTE — TELEPHONE ENCOUNTER
Dr Myers spoke with Jakub, he would like an order placed for IR to remove both drains, I called IR and they will contact the patient.

## 2021-04-02 NOTE — POST-PROCEDURE NOTE
Operation : Fluoroscopic right abdominal drain / Tube  Check    Patient Name: Ben Lai      : 1950    Radiologist: Mireya Jules MD    Pre-operative Diagnosis: right sided abdominal drains    Findings: collapsed cavities around both drains, with evidence of filling of the gallbladder and biliary tree with injection of both drains.     Complications:  None; patient tolerated the procedure well.

## 2021-04-04 ENCOUNTER — MYC MEDICAL ADVICE (OUTPATIENT)
Dept: GASTROENTEROLOGY | Facility: CLINIC | Age: 71
End: 2021-04-04

## 2021-04-05 ENCOUNTER — DOCUMENTATION ONLY (OUTPATIENT)
Dept: GASTROENTEROLOGY | Facility: CLINIC | Age: 71
End: 2021-04-05

## 2021-04-05 NOTE — PROGRESS NOTES
Called to obtain CT images, DOS: 3/31/21    [facility]    [address] [place] [address] [phone]      Images will be pushed to  PACS.    Jacob Hall LPN

## 2021-04-15 NOTE — PROGRESS NOTES
Hematology/Medical Oncology Follow-Up    Patient Name: Ben Lai Sr  YOB: 1950  Medical Record Number: [id number]    DATE OF SERVICE:   4/15/2021    CHIEF COMPLAINT:  Ben Lai Sr is a 71 y.o. male with locally advanced hepatocellular carcinoma who presents today for follow-up and treatment.    ONCOLOGY HISTORY/ HISTORY OF PRESENT ILLNESS:  Oncology History   Hepatocellular carcinoma (CMS/HCC) (HCC)   1/23/2019 Initial Diagnosis    Hepatocellular carcinoma (CMS/HCC) (HCC)     7/30/2019 - 12/9/2019 Adjuvant Therapy    Hepatocellular - Sorafenib (Nexavar)  Plan Provider: ANDERSON Bryan  Treatment goal: Palliative  Line of treatment: [No plan line of treatment]     6/12/2020 Cancer Staged    Staging form: Liver, AJCC 8th Edition  - Clinical: Stage IIIB (cT4, cN0, cM0) - Signed by Milka Reynoso MD on 6/12/2020 12/31/2020 -  Adjuvant Therapy    Hepatocellular - Bevacizumab / Atezolizumab  Plan Provider: Milka Reynoso MD  Treatment goal: Palliative  Line of treatment: [No plan line of treatment]     diagnosed with hepatocellular carcinoma in November 2018.  He has an underlying hepatitis C which was supposedly cured.    Due to the extent of his HCC per GI at [facility] it was not amenable to local therapy, resection or radiation, referred to medical oncology   He was started on sorafenib early December 2018 but this appears to have been stopped in February due to significant side effects.  Patient states unequivocally that he started to feel better after a 3-week break but the sorafenib was recently restarted and he is having significant quality of life compromising side effects again.  This includes severe joint and muscle pain and fatigue.      7/29/19 restaging scans showed progressive disease in the liver, however no sings of metastatic disease      8/14/19 s/p   CT-guided  right lobe of the liver radiofrequency ablation.      9/2019 seen hepatobiliary team at  [facility], and was discussed for tumor board, planned to have Y90  Treatment      10/8/19 MRI abdomen showed Nodular appearing liver with 2 areas of hyper/early arterial enhancement suggestive of hepatocellular carcinoma are both adjacent to the gallbladder which contains filling defects which are enhancing suggestive of neoplasm. This could be primary cholangiocarcinoma or hepatocellular carcinoma which is invading the gallbladder. The liver and gallbladder masses are very similar to the study from 7/29/2019.  Wedge-shaped signal abnormality in the anterior right lobe of the liver similar to the prior study could represent hepatic infarct.     11/7/19 s/p Y90 treatment at [facility]      12/9/19 MRI abdomen showed Cirrhosis and evidence of portal hypertension. Posttreatment changes of radioembolization throughout the right hepatic lobe, with increased diffuse heterogeneous arterial enhancement. This is the patient's first posttreatment MRI. LR-TR equivocal. Continued attention on follow-up.  Grossly stable size of the lobulated enhancing endoluminal gallbladder mass with decreased but persistent areas of enhancing viable tumor.  Stable segment 4 exophytic 2.6 cm OPTN-5b lesion.  Unchanged right hepatic vein and right portal vein branch thrombi, without convincing thrombus enhancement.  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 Attention on follow-up.     12/11/19 s/p chemo embolization for possible residual viable tumor in the liver.     1/16/20  restaging MR showed  The circumscribed mass is seen in the anterior aspect of the right lobe of the liver demonstrates positive response to treatment with significantly reduced enhancement, no change in overall size.2.  Post radiation ablation defect in the anterior inferior right lobe with surrounding heterogeneous nonmass-like enhancement. It would be difficult to exclude active tumor within this surrounding area of  enhancement.  Significant change in appearance of the posterior segment of the right lobe of the liver. Previous infiltrative nodular enhancement, now geographic nonmass-like enhancement with parenchymal volume loss. This could represent posttreatment related change. Difficult to exclude residual/progressive tumor     4/21/20 MR abdomen showed Continued evolution of post embolic/radiotherapy changes to the right hepatic lobe with large region of volume loss of enhancement likely representing fibrosis.  Previous HCC anteriorly along this region continues to regress with no evidence of enhancement to suggest recurrent or residual tumor. Previous nodular lesion inferiorly along the treatment bed which also likely represented a site of HCC also continues to regress with no evidence of recurrence or residual disease. No new suspicious liver lesions are demonstrated. Cirrhosis. Splenomegaly. Portosystemic collaterals.     6/26/20 MR No significant MRI change in the treatment-related appearance of the right hepatic lobe since 4/21/2020. No MR evidence of recurrent hepatoma. Treated anterior right hepatic lobe lesion is redemonstrated. Treated lesion at right more posterior hepatic lobe is also seen.   Hepatic fibrosis.Cirrhosis, splenomegaly and portosystemic collaterals.     10/5/20 MR abdomen showed 2 treated right hepatic lobe lesions and associated scarring and capsular retraction. Since the previous examination, there is a new 4 mm enhancing nodule at the posterior aspect of the right anterior hepatic lobe treated lesion.  The enhancement characteristics are progressive and indeterminate and there is the hint of small restricted diffusion. Overall, a change in the appearance of this treated lesion which has otherwise been stable since 1/16/2020 is suspicious for recurrent tumor. However, the imaging characteristics themselves are nonspecific of the 4 mm nodule (with differential diagnosis including rounded scar), and  attention on short-term follow-up MRI of the abdomen with IV contrast in 3 months is recommended, along with correlation with AFP levels.     12/21/20 MR showed Amorphous enhancement has increased within the lower right hepatic region area of previous treated tumor. This enhancement pattern is worrisome for tumor progression the area. Correlation with recent AFP would be helpful. Considerable scar tissue and areas well.. In addition the lesion overlying the gallbladder fossa may involve the gallbladder which now appears to be markedly thickened and demonstrates considerable wall enhancement which appears to have changed since the previous study. Differential would include direct tumor invasion gallbladder and acute cholecystitis.      3/3/21 MR showed Very complex appearing situation in the region of the gallbladder fundus, subhepatic space extending to the hepatic flexure of the colon. There is concern for the possibility of extrahepatic malignancy and/or an inflammatory process in this area which could be affecting the gallbladder. There is a 2.8 cm fluid collection adjacent to the gallbladder. Cholecystitis would be another consideration. Additional correlation with contrast-enhanced CT may be helpful to better define some of the anatomy in this areaCapsular retraction, scarring and mild enhancement redemonstrated in right hepatic lobe. Most of the intrahepatic signal abnormalities do not appear dramatically changed.  3. There is still some biliary dilatation the right hepatic lobe with adjacent enhancement. Question cholangitis.  4. In addition there are small filling defects within the common bile duct. Possible choledocholithiasis. Currently there is not seen to be high-grade obstruction however. Common bile duct diameter approximately millimeters  -He is status post drain placement and antibiotic treatment after being hospitalized 3/4/21-3/10/21    INTERVAL HISTORY:  Here to consider cycle 6 of atezolizumab  and bevacizumab. Drains still in place, flushing with 3 cc saline solution to clean out. Output log scannend into chart. The more anterior drain is not draining much (just the flush 3 cc amount) the posterior drain is getting between 20-50 cc serosanguinous output. Lately, it has not been as thick. Will have virtual visit 4/29/21 with Mayi (Dr. Ramos). Saw Virginia Lai CNP with GI earlier this week.  He continues to have the right upper quadrant abdominal pain,is worse with deep breathing or coughing, likely irritation from the drain.  He is getting relief with MS Contin twice daily and on occasion requiring Dilaudid.      Past Medical History:   Diagnosis Date   • A-fib (CMS/HCC) (HCC)    • Allergy    • Anxiety    • Arthritis    • Asthma    • Blindness of right eye    • BPH (benign prostatic hyperplasia)    • Cardiovascular disease    • Cirrhosis (CMS/HCC) (HCC)     with possible liver mass by MRI 5/18, rec repeat in 8/2018   • Complication of anesthesia    • Congestive heart failure (CMS/HCC) (HCC) 9/20/2019   • COPD (chronic obstructive pulmonary disease) (CMS/HCC) (HCC)    • Depression    • Endocrine disorder    • Gallstones    • Gastritis    • GERD (gastroesophageal reflux disease)    • Glaucoma    • Hearing loss     bilateral hearing aids   • Hemorrhoids    • Hepatitis C     Hep C, 2016 remission, complicated by cirrhosis.  MRI abd 5/30/18, rec 3 month follow up for focus of enhancement right and left hepatic lobe junction   • Hernia, abdominal    • High blood pressure    • IBS (irritable bowel syndrome)    • Infectious viral hepatitis    • Jaundice    • Kidney stones    • Obstructive sleep apnea syndrome    • Periodic limb movement disorder    • Persistent hypersomnia    • Persistent insomnia    • Pneumonia    • PONV (postoperative nausea and vomiting)    • Type 2 diabetes mellitus (CMS/HCC) (HCC)    • Urinary incontinence    • Wears partial dentures      Past Surgical History:   Procedure Laterality  Date   • COLONOSCOPY  10/27/2016    Pili, normal, repeat 10 years   • CT ABLATION LIVER RADIOFREQUENCY  8/14/2019    CT ABLATION LIVER RADIOFREQUENCY 8/14/2019 [facility] CT SCAN   • CT ABSCESS CYST PERITONEAL  3/5/2021    CT ABSCESS CYST PERITONEAL 3/5/2021 [facility] CT SCAN   • ESOPHAGOGASTRODUODENOSCOPY N/A 2/22/2018    Procedure: EGD - ESOPHAGOGASTRODUODENOSCOPY with banding  x 2 with crna;  Surgeon: William Rivas MD;  Location: [facility];  Service: Endoscopy;  Laterality: N/A;   • ESOPHAGOGASTRODUODENOSCOPY N/A 4/6/2018    Procedure: EGD - ESOPHAGOGASTRODUODENOSCOPY with crna;  Surgeon: William Rivas MD;  Location: [facility];  Service: Endoscopy;  Laterality: N/A;   • ESOPHAGOGASTRODUODENOSCOPY N/A 5/7/2019    Procedure: EGD - ESOPHAGOGASTRODUODENOSCOPY;  Surgeon: William Rivas MD;  Location: [facility];  Service: Endoscopy;  Laterality: N/A;   • HAND SURGERY Left     thumb   • HERNIA REPAIR  01/01/1965   • IR INJECTION ABSCESS CATHETER EVALUATION  4/2/2021    IR INJECTION ABSCESS CATHETER EVALUATION 4/2/2021 Mireya Jules MD [facility] MIS INTERVEN RAD   • IR INJECTION ABSCESS CATHETER EVALUATION  4/2/2021    IR INJECTION ABSCESS CATHETER EVALUATION 4/2/2021 Mireya Jules MD [facility] MIS INTERVEN RAD   • IR TUNNELED CENTRAL LINE WITH PORT  12/31/2020    IR TUNNELED CENTRAL LINE WITH PORT 12/31/2020 Sonny Danielle MD [facility] MIS INTERVEN RAD   • KNEE ARTHROSCOPY  09/19/2017    left knee, with partial medial meniscectomy; limited chondroplasty, patellofemoral joint   • LIVER BIOPSY      by Dr. Alejandre   • OTHER SURGICAL HISTORY      esophageal varices, banded   • VASECTOMY      at approx age of 25     Social History     Socioeconomic History   • Marital status:      Spouse name: Not on file   • Number of children: Not on file   • Years of education: Not on file   • Highest education level: Not on file   Occupational History   • Not on file   Tobacco Use   • Smoking status: Never Smoker   • Smokeless  tobacco: Never Used   Substance and Sexual Activity   • Alcohol use: No   • Drug use: No   • Sexual activity: Defer   Other Topics Concern   • Not on file   Social History Narrative   • Not on file     Social Determinants of Health     Financial Resource Strain:    • Difficulty of Paying Living Expenses:    Food Insecurity:    • Worried About Running Out of Food in the Last Year:    • Ran Out of Food in the Last Year:    Transportation Needs:    • Lack of Transportation (Medical):    • Lack of Transportation (Non-Medical):    Physical Activity:    • Days of Exercise per Week:    • Minutes of Exercise per Session:    Stress:    • Feeling of Stress :    Social Connections:    • Frequency of Communication with Friends and Family:    • Frequency of Social Gatherings with Friends and Family:    • Attends [employer] Services:    • Active Member of Clubs or Organizations:    • Attends Club or Organization Meetings:    • Marital Status:    Intimate Partner Violence:    • Fear of Current or Ex-Partner:    • Emotionally Abused:    • Physically Abused:    • Sexually Abused:       ALLERGIES:     Allergies as of 04/15/2021 - Reviewed 03/31/2021   Allergen Reaction Noted   • Metformin Hives    • Adhesive tape-silicones  04/06/2018   • Interferon violetta-2a  02/05/2019   • Latex     • Milk  02/05/2019   • Mometasone     • Mometasone furoate     • Omeprazole     • Ondansetron hcl Nausea And Vomiting 05/01/2019   • Pepper (genus capsicum)  02/05/2019   • Primidone  05/01/2019   • Ribavirin Itching 10/19/2017   • Rosuvastatin  02/05/2019   • Sorafenib  04/04/2019   • Spironolactone     • Statins-hmg-coa reductase inhibitors Itching and GI intolerance 04/06/2018   • Interferon violetta (human leuk. derived) Palpitations and Rash 09/20/2019   • Pantoprazole Itching and Rash 02/28/2018   • Peginterferon violetta-2b Palpitations 10/19/2017   • Penicillins Other (see comments) 10/19/2017      MEDICATIONS:     Current Outpatient Medications    Medication Sig Dispense Refill   • morphine (MS CONTIN) 15 mg 12 hr tablet TAKE ONE TABLET BY MOUTH TWICE A DAY FOR PAIN     • amoxicillin-pot clavulanate (AUGMENTIN) 875-125 mg per tablet Take 1 tablet by mouth 2 (two) times a day for 14 days 28 tablet 0   • lidocaine-prilocaine (EMLA) cream Apply thick layer to intact skin over port 1 hour prior to procedure. Cover with occlusive dressing. 30 g 2   • HYDROmorphone (DILAUDID) 2 mg tablet Take 2-4 mg by mouth every 6 (six) hours as needed       • desvenlafaxine succinate 25 mg tablet extended release 24 hr Take 25 mg by mouth daily     • sildenafiL (VIAGRA) 100 mg tablet Take 100 mg by mouth as needed for erectile dysfunction Up to 4 times monthly     • fluoride, sodium, (DENTAGEL) 1.1 % gel dental gel See administration instructions Apply small amount to toothbrush in place of regular toothpaste. Brush for 2 minutes in the morning and evening.     • polyethylene glycol (Miralax) 17 gram/dose powder Take 17 g by mouth daily     • brimonidine (ALPHAGAN) 0.2 % ophthalmic solution 1 drop 2 (two) times a day     • carboxymethylcellulose (REFRESH PLUS) 0.5 % dropperette Administer 1 drop into both eyes every 3 (three) hours Indications: dry eye       • prochlorperazine (COMPAZINE) 10 mg tablet Take 1 tablet (10 mg total) by mouth every 6 (six) hours as needed for nausea or vomiting 30 tablet 5   • artificial saliva (YERBA GLADYS AND LYTES) aerosol,spray spray Apply 2 sprays to the mouth or throat as needed       • fluticasone propionate (FLONASE) 50 mcg/actuation nasal spray Administer 2 sprays into each nostril daily       • naloxone HCl (NARCAN NASL) Administer into affected nostril(s) as needed     • traZODone (DESYREL) 100 mg tablet Take 100 mg by mouth nightly       • insulin glargine (Lantus Solostar U-100 Insulin) 100 unit/mL (3 mL) insulin pen injection pen Inject 24 Units under the skin every morning       • carvediloL (COREG) 3.125 mg tablet Take 1 tablet  (3.125 mg total) by mouth 2 (two) times a day with meals 180 tablet 0   • diclofenac sodium (VOLTAREN) 1 % gel Apply 4 g topically 4 (four) times a day       • cetirizine (ZyrTEC) 10 mg tablet Take 10 mg by mouth daily     • loperamide (Imodium A-D) 2 mg tablet Take 2 tablets with first loose stool of the day, then up to 8 tablets daily 80 tablet 2   • buPROPion SR (WELLBUTRIN SR) 100 mg 12 hr tablet Take 1 tablet (100 mg total) by mouth daily 90 tablet 1   • lactulose (GENERLAC) 10 gram/15 mL solution Take 15 mL (10 g total) by mouth daily 473 mL 2   • terazosin (HYTRIN) 5 mg capsule Take 1 capsule (5 mg total) by mouth 2 (two) times a day 180 capsule 2   • blood-glucose meter (ACCU-CHEK ADELE PLUS METER) [facility] Use to test blood sugars 3 times daily (E11.65, non insulin depedent) AccuChek Adele plus, meter is 8 years old 1 each 0   • lisinopril (PRINIVIL,ZESTRIL) 20 mg tablet Take 1 tablet (20 mg total) by mouth daily 90 tablet 2   • furosemide (LASIX) 20 mg tablet Take 1 tablet (20 mg total) by mouth 2 (two) times a day. 180 tablet 2   • lansoprazole (PREVACID) 30 mg capsule Take 30 mg by mouth daily      • liraglutide (VICTOZA 2-JASE) 0.6 mg/0.1 mL (18 mg/3 mL) injection Inject 1.8 mg under the skin daily       • sodium chloride (SALINE NASAL) 0.65 % nasal spray Administer 1 spray into each nostril as needed for congestion or rhinitis      • pramoxine-menthol (GOLD BOND MEDICATED ANTI-ITCH) 1-1 % cream Apply topically as needed       • latanoprost (XALATAN) 0.005 % ophthalmic solution Administer 1 drop into both eyes nightly   10 0     No current facility-administered medications for this visit.     25 minutes spent reconciling patient's medication list    REVIEW OF SYSTEMS:   The ECOG performance status today is .1 - Symptomatic but completely ambulatory  Review of Systems   Constitutional: Positive for appetite change and fatigue. Negative for chills, diaphoresis, fever and unexpected weight change.   HENT:    "Negative for hearing loss, lump/mass, mouth sores, nosebleeds, sore throat, tinnitus, trouble swallowing and voice change.    Eyes: Negative for eye problems and icterus.   Respiratory: Positive for cough (dry towards the evening). Negative for chest tightness, hemoptysis, shortness of breath and wheezing.         Not wearing Cpap, has appointment 1/25 with respiratory therapist for evaluation   Cardiovascular: Positive for leg swelling (improving since hospital discharge). Negative for chest pain and palpitations.   Gastrointestinal: Positive for abdominal pain (RUQ, epigastric) and constipation. Negative for abdominal distention, blood in stool, diarrhea, nausea and vomiting.        2 drains in place   Endocrine:        No chills, cold intolerance    Genitourinary: Negative for bladder incontinence, difficulty urinating, dysuria, frequency and hematuria.    Musculoskeletal: Positive for back pain (with cough-right posterior (using voltaren gel)) and gait problem (ambulates with a walker). Negative for arthralgias, flank pain, myalgias and neck pain.   Skin: Negative for itching, rash and wound.   Neurological: Positive for gait problem (ambulates with a walker) and headaches. Negative for dizziness, extremity weakness, light-headedness, numbness, seizures and speech difficulty.   Hematological: Negative for adenopathy. Does not bruise/bleed easily.   Psychiatric/Behavioral: Positive for depression ([facility] Negative for confusion, decreased concentration, sleep disturbance and suicidal ideas. The patient is not nervous/anxious.         \"brain fog\" stable overall, improves with rest   All other systems reviewed and are negative.      PHYSICAL EXAM:   /61 (BP Location: Left arm)   Pulse 67   Temp 36.6 °C (97.9 °F) (Tympanic)   Wt 89.8 kg (198 lb)   SpO2 93%   BMI 32.95 kg/m²    Pain average 3-5/10, up to a 7/10     Physical Exam  Constitutional:       General: He is not in acute " distress.     Appearance: Normal appearance. He is well-developed.   HENT:      Head: Normocephalic.      Nose: Nose normal.   Eyes:      General: No scleral icterus.        Right eye: No discharge.         Left eye: No discharge.      Extraocular Movements: Extraocular movements intact.      Conjunctiva/sclera: Conjunctivae normal.   Neck:      Thyroid: No thyromegaly.   Cardiovascular:      Rate and Rhythm: Normal rate and regular rhythm.      Heart sounds: Normal heart sounds, S1 normal and S2 normal. No murmur. No gallop.    Pulmonary:      Effort: Pulmonary effort is normal. No respiratory distress.      Breath sounds: Normal breath sounds. No decreased breath sounds, wheezing, rhonchi or rales.   Chest:      Chest wall: No tenderness.   Abdominal:      General: Bowel sounds are normal. There is no distension.      Palpations: Abdomen is soft. There is no mass.      Tenderness: There is abdominal tenderness in the right upper quadrant, right lower quadrant and epigastric area. There is no guarding or rebound.   Musculoskeletal:         General: No swelling, tenderness or deformity. Normal range of motion.      Cervical back: Full passive range of motion without pain, normal range of motion and neck supple.      Right lower leg: No edema.      Left lower leg: No edema.   Lymphadenopathy:      Head:      Right side of head: No submental, submandibular, tonsillar, preauricular, posterior auricular or occipital adenopathy.      Left side of head: No submental, submandibular, tonsillar, preauricular, posterior auricular or occipital adenopathy.      Cervical: No cervical adenopathy.      Upper Body:      Right upper body: No supraclavicular adenopathy.      Left upper body: No supraclavicular adenopathy.   Skin:     General: Skin is warm and dry.      Capillary Refill: Capillary refill takes less than 2 seconds.      Coloration: Skin is not jaundiced or pale.      Findings: No bruising, erythema, lesion, petechiae  or rash.      Comments: Drain sites are clean, dry and intact. Mild erythema surrounding insertion site without purulent drainage   Neurological:      General: No focal deficit present.      Mental Status: He is alert and oriented to person, place, and time.      Cranial Nerves: Cranial nerves are intact. No cranial nerve deficit.      Sensory: Sensation is intact.      Motor: Motor function is intact.      Coordination: Coordination is intact.      Gait: Gait is intact.   Psychiatric:         Attention and Perception: Attention and perception normal.         Mood and Affect: Mood and affect normal.         Speech: Speech normal.         Behavior: Behavior normal. Behavior is cooperative.         Thought Content: Thought content normal.         Cognition and Memory: Cognition and memory normal.         Judgment: Judgment normal.         LABORATORY DATA:     Lab Results   Component Value Date    WBC 3.3 (L) 04/15/2021    HGB 11.2 (L) 04/15/2021    HCT 34.4 (L) 04/15/2021     (L) 04/15/2021    RBC 4.20 04/15/2021    MCV 82.0 04/15/2021    MCH 26.8 (L) 04/15/2021    MCHC 32.6 04/15/2021    RDW 16.0 (H) 04/15/2021    MPV 7.9 04/15/2021    ANC 2.640 04/23/2020    NEUTROABS 2.50 04/15/2021     Lab Results   Component Value Date     04/15/2021    K 3.8 04/15/2021     04/15/2021    CO2 31 04/15/2021    BUN 12 04/15/2021    CREATININE 0.66 (L) 04/15/2021    GLUCOSE 180 (H) 04/15/2021    CALCIUM 8.9 04/15/2021    PROT 6.5 04/15/2021    ALBUMIN 3.5 04/15/2021    AST 53 (H) 04/15/2021    ALT 33 04/15/2021    ALKPHOS 234 (H) 04/15/2021    BILITOT 0.55 04/15/2021     Lab Results   Component Value Date    PT 14.1 (H) 01/21/2021    INR 1.2 (H) 01/21/2021     Lab Results   Component Value Date    TSH 2.929 03/03/2021     Reviewed    DIAGNOSTIC IMAGING:    CT abdomen/pelvis from 3/31/21 reviewed.    Reviewed    IMPRESSION & PLAN:   1.  Hepatocellular carcinoma: Clinically, he appears to be stable with out signs of  disease progression.  He   His labs are within acceptable limits. We reviewed his most recent hospitalization and plan of care going forward.  -Proceed with cycle 6 atezolizumab 1200 mg IV and continue to hold bevacizumab  -Restage with MR abdomen in 3 weeks as scheduled   -Plan for follow-up at the [facility] with GI 4/29/21  -I will forward note to Virginia Peña with GI. From our standpoint ERCP can be done at any time given we are holding the avastin.  -Plan to keep drains in place for now  2.  Pain: Cancer-related, managed by palliative care.  No new pains to report today.  3.  Constipation: Secondary to narcotic pain medication.  Reviewed bowel protocol.  4.  Depression: On Wellbutrin. [facility]  He can follow up with our patient support team as well if needed.  5.  Follow-up in 3 weeks with repeat labs prior to the start of the next cycle.  He can be seen sooner if needed.  Instructed to call for questions or concerns.    One this date of service 60 minutes of total time was spent on this encounter. Greater than 50% of which was spent in counseling and coordination of care.    Electronically signed by: Chrystal Goodman, CNP

## 2021-04-16 NOTE — PROGRESS NOTES
Called [facility], and spoke with Frederic. Advised that the pharmacy sent the wrong cream, pt was to get EMLA cream, but what was sent was a steroid/lidocaine cream. They will send the correct cream to the patient.

## 2021-04-25 ENCOUNTER — HEALTH MAINTENANCE LETTER (OUTPATIENT)
Age: 71
End: 2021-04-25

## 2021-04-29 ENCOUNTER — VIRTUAL VISIT (OUTPATIENT)
Dept: GASTROENTEROLOGY | Facility: CLINIC | Age: 71
End: 2021-04-29
Payer: MEDICARE

## 2021-04-29 VITALS — BODY MASS INDEX: 31.82 KG/M2 | HEIGHT: 65 IN | WEIGHT: 191 LBS

## 2021-04-29 DIAGNOSIS — K75.0 HEPATIC ABSCESS: Primary | ICD-10-CM

## 2021-04-29 PROCEDURE — 99215 OFFICE O/P EST HI 40 MIN: CPT | Mod: 95 | Performed by: INTERNAL MEDICINE

## 2021-04-29 RX ORDER — MORPHINE SULFATE 15 MG/1
TABLET, FILM COATED, EXTENDED RELEASE ORAL
COMMUNITY
Start: 2021-03-22

## 2021-04-29 ASSESSMENT — MIFFLIN-ST. JEOR: SCORE: 1553.25

## 2021-04-29 NOTE — TELEPHONE ENCOUNTER
I spoke with Dr Myers, he would like to proceed with ordering the CT of the abdomen /pelvis with IV contrast as directed by GI.

## 2021-04-29 NOTE — NURSING NOTE
"Chief Complaint   Patient presents with     Consult     Complex abscess.       Vitals:    04/29/21 0904   Weight: 191 lb   Height: 5' 5\"       Body mass index is 31.78 kg/m .      Briana Rojas, EMT                      "

## 2021-04-29 NOTE — TELEPHONE ENCOUNTER
His CT has been ordered and PAC has been asked for a first available to help out his patient,  wife notified and she will stay in contact.

## 2021-04-29 NOTE — LETTER
"    4/29/2021         RE: John Mancia Sr.  [address] [place] [address]        Dear Colleague,    Thank you for referring your patient, John Mancia Sr., to the [facility]. Please see a copy of my visit note below.    The patient has been notified of following:     \"This video visit will be conducted via a call between you and your physician/provider. We have found that certain health care needs can be provided without the need for an in-person physical exam.  This service lets us provide the care you need with a video conversation.  If a prescription is necessary we can send it directly to your pharmacy.  If lab work is needed we can place an order for that and you can then stop by our lab to have the test done at a later time.    Video visits are billed at different rates depending on your insurance coverage.  Please reach out to your insurance provider with any questions.    If during the course of the call the physician/provider feels a video visit is not appropriate, you will not be charged for this service.\"    Patient has given verbal consent for Video visit? Yes  How would you like to obtain your AVS? My Chart  If you are dropped from the video visit, the video invite should be resent to: Cell phone  Will anyone else be joining your video visit? No  {If patient encounters technical issues they should call [phone]     Video-Visit Details    Type of service:  Video Visit    Video Start Time: 0930  Video End Time: 1015    Originating Location (pt. Location): Home    Distant Location (provider location):  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 More recently in March of this year he developed recurrent RUQ abdominal pain and MRI demonstrated a 2.3cm complex abscess or malignant involvement of the gallbladder. Two percutaneous drains were placed and antibiotics were initiated. HIDA was performed and excluded perforation of the gallbladder. He was doing well enough for discharge and since then the drains have been interrogated and now demonstrated fistulous communication with the gallbladder and remain in place. This was in the beginning of April and they were to return within a week or two but this was never organized.    He has been off antibiotic for over a week and he has not had fevers. He notes abdominal pain, variable in nature, at the site of the drain insertion. He denies RUQ pain. He does describes constipation and narrowing. His last colonoscopy was 5 years ago and this was unremarkable. He does take long and short acting pain medicine. He does take stool softener, Miralax and lactulose and despite this constipation persists.    Outside imaging from late March demonstrated significant improvement, with decrease perihepatic fluid and gas as well as decrease in size of the abscess. The drains were in place and were adjacent to the liver.    Review of Systems   ROS COMP: Constitutional, HEENT, cardiovascular, pulmonary, GI, , musculoskeletal, neuro, skin, endocrine and psych systems are negative, except as otherwise noted.    Medications  Current Outpatient Medications   Medication     morphine (MS CONTIN) 15 MG CR tablet     Artificial Saliva (MOUTH KOTE) SOLN     artificial tears OINT ophthalmic ointment     brimonidine (ALPHAGAN) 0.2 % ophthalmic solution     buPROPion (WELLBUTRIN SR) 100 MG 12 hr tablet     carvedilol (COREG) 3.125 MG tablet     cetirizine (ZYRTEC) 10 MG tablet     fluticasone (FLONASE) 50 MCG/ACT nasal spray     furosemide (LASIX) 20 MG tablet     HYDROmorphone (DILAUDID) 2 MG tablet      insulin glargine (LANTUS PEN) 100 UNIT/ML pen     lactulose (CHRONULAC) 10 GM/15ML solution     Lancet Devices (LANCET DEVICE WITH EJECTOR) MISC     LANsoprazole (PREVACID) 30 MG DR capsule     latanoprost (XALATAN) 0.005 % ophthalmic solution     lisinopril (PRINIVIL/ZESTRIL) 20 MG tablet     loperamide (IMODIUM) 2 MG capsule     psyllium (METAMUCIL/KONSYL) Packet     SUMAtriptan (IMITREX) 100 MG tablet     terazosin (HYTRIN) 5 MG capsule     traZODone (DESYREL) 100 MG tablet     VICTOZA PEN 18 MG/3ML soln     No current facility-administered medications for this visit.      Past Medical  Past Medical History:   Diagnosis Date     Allergic rhinitis      Cancer (H) 2019    History of Colon CA, HCC with mets to gallbladder     Congestive heart failure (H)      Depressive disorder      Diabetes (H)     TYPE II     Hypertension      Multiple food allergies     see epic     Sleep apnea     uses CPAP     Past Surgical  Past Surgical History:   Procedure Laterality Date     ENDOSCOPIC RETROGRADE CHOLANGIOPANCREATOGRAM N/A 9/2/2020    Procedure: ENDOSCOPIC RETROGRADE CHOLANGIOPANCREATOGRAPHY with transpapillary gallbladder stenting **Latex Allergy** with biliary sphincterotomy, stent placement, pancreatic stent placement, gallbladder stent placement;  Surgeon: Gary Denise MD;  Location: [facility] OR     ENDOSCOPIC RETROGRADE CHOLANGIOPANCREATOGRAM N/A 10/27/2020    Procedure: ENDOSCOPIC RETROGRADE CHOLANGIOPANCREATOGRAPHY  **Latex Allergy** with Stent Exchange;  Surgeon: Gary Denise MD;  Location: [facility] OR     HERNIA REPAIR       IR CHEMO EMBOLIZATION  12/11/2019     IR SIRT (SELECTIVE INTERNAL RADIO THERAPY)  11/6/2019     IR VISCERAL ANGIOGRAM  11/6/2019     IR VISCERAL EMBOLIZATION  11/7/2019     VASCULAR SURGERY      liver ablation in [place]     VASECTOMY       Social History  Social History     Socioeconomic History     Marital status: Single     Spouse name: Not on file     Number of children: Not on  file     Years of education: Not on file     Highest education level: Not on file   Occupational History     Not on file   Social Needs     Financial resource strain: Not on file     Food insecurity     Worry: Not on file     Inability: Not on file     Transportation needs     Medical: Not on file     Non-medical: Not on file   Tobacco Use     Smoking status: Passive Smoke Exposure - Never Smoker     Smokeless tobacco: Never Used   Substance and Sexual Activity     Alcohol use: Not Currently     Drug use: Yes     Types: Hydromorphone     Comment: Dilaudid po as needed     Sexual activity: Not Currently   Lifestyle     Physical activity     Days per week: Not on file     Minutes per session: Not on file     Stress: Not on file   Relationships     Social connections     Talks on phone: Not on file     Gets together: Not on file     Attends Zoroastrianism service: Not on file     Active member of club or organization: Not on file     Attends meetings of clubs or organizations: Not on file     Relationship status: Not on file     Intimate partner violence     Fear of current or ex partner: Not on file     Emotionally abused: Not on file     Physically abused: Not on file     Forced sexual activity: Not on file   Other Topics Concern     Parent/sibling w/ CABG, MI or angioplasty before 65F 55M? Not Asked   Social History Narrative     Not on file     Family History  Family History   Problem Relation Age of Onset     Anesthesia Reaction No family hx of      Cardiovascular No family hx of      Deep Vein Thrombosis (DVT) No family hx of      Objective:  Reported vitals:  There were no vitals taken for this visit.   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 The etiology of the abscess is suspected to be a focus of malignancy. While HIDA excluded perforation at the time, two percutaneous drains were placed and there was subsequent evidence of fistulous connection with the gallbladder. These drains are under management of his local IR physicians who last recommended repeat check.    He needs to follow up with his local IR physicians. We would recommend a two week capping trial of both drains. Without evidence of increasing pain or fevers, his local IR physicians may then consider removal. However with symptoms at least one of these drains may have to remain indefinitely. There are no endoscopic interventions recommended at this time. The two transpapillary gallbladder stents may continue to remain in situ indefinitely, as I do not suspect these to be related to the formation of the abscess nor are they likely exacerbating the current situation.    While I think his change in bowel habits is secondary to his chronic narcotic use, however we can not exclude a colonic mass. We will recommend that he reach out to his local gastroenterologist for interval evaluation by colonoscopy. An upper endoscopy to evaluate for varices in tandem.    It was a pleasure to participate in the care of this patient; please contact us with any further questions.  A total of 45 minutes was spent in evaluation with this patient, >50% of which was counseling regarding the above delineated issues.    Gary Denise MD PhD FACG ANGELA TERAN  Director of Endoscopy  Associate Professor of Medicine, Surgery and  Pediatrics  Interventional and Therapeutic Endoscopy    [facility]  Division of Gastroenterology and Hepatology  [facility] [address], [place] [address]    New Consultations  [phone]  Procedure Scheduling [phone]  Clinical Nurse Coordinator [phone]  Clinical Fax   [phone]  Administrative   [phone]  Administrative Fax  [phone]

## 2021-04-29 NOTE — TELEPHONE ENCOUNTER
"Laura calls in today after a teleconference with Dr Kamara of the [facility].  Dr Kamara is requesting a CT of Jakub' abdomen as his drains are still in. Jakub has has some increase of drainage from the front tube over the last 3-4 days with an increasing bad smell which Laura describes as \"rank\". The tube is draining between 32 and 15 cc of fluid when she clears it and the back tube is draining 20-30 cc's . The front tube put out 15 cc yesterday and the back tube 30 cc last night which was a bit of an increase.  Jakub has had no fever or chills but increasing tiredness and pain.  I will speak with Dr Myers today or at tomorrow's clinic about ordering the CT as [facility]  I will get back with the Ming's after I visit with Dr Myers.  "

## 2021-04-29 NOTE — PROGRESS NOTES
"The patient has been notified of following:     \"This video visit will be conducted via a call between you and your physician/provider. We have found that certain health care needs can be provided without the need for an in-person physical exam.  This service lets us provide the care you need with a video conversation.  If a prescription is necessary we can send it directly to your pharmacy.  If lab work is needed we can place an order for that and you can then stop by our lab to have the test done at a later time.    Video visits are billed at different rates depending on your insurance coverage.  Please reach out to your insurance provider with any questions.    If during the course of the call the physician/provider feels a video visit is not appropriate, you will not be charged for this service.\"    Patient has given verbal consent for Video visit? Yes  How would you like to obtain your AVS? My Chart  If you are dropped from the video visit, the video invite should be resent to: Cell phone  Will anyone else be joining your video visit? No  {If patient encounters technical issues they should call [phone]     Video-Visit Details    Type of service:  Video Visit    Video Start Time: 0930  Video End Time: 1015    Originating Location (pt. 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 More recently in March of this year he developed recurrent RUQ abdominal pain and MRI demonstrated a 2.3cm complex abscess or malignant involvement of the gallbladder. Two percutaneous drains were placed and antibiotics were initiated. HIDA was performed and " excluded perforation of the gallbladder. He was doing well enough for discharge and since then the drains have been interrogated and now demonstrated fistulous communication with the gallbladder and remain in place. This was in the beginning of April and they were to return within a week or two but this was never organized.    He has been off antibiotic for over a week and he has not had fevers. He notes abdominal pain, variable in nature, at the site of the drain insertion. He denies RUQ pain. He does describes constipation and narrowing. His last colonoscopy was 5 years ago and this was unremarkable. He does take long and short acting pain medicine. He does take stool softener, Miralax and lactulose and despite this constipation persists.    Outside imaging from late March demonstrated significant improvement, with decrease perihepatic fluid and gas as well as decrease in size of the abscess. The drains were in place and were adjacent to the liver.    Review of Systems   ROS COMP: Constitutional, HEENT, cardiovascular, pulmonary, GI, , musculoskeletal, neuro, skin, endocrine and psych systems are negative, except as otherwise noted.    Medications  Current Outpatient Medications   Medication     morphine (MS CONTIN) 15 MG CR tablet     Artificial Saliva (MOUTH KOTE) SOLN     artificial tears OINT ophthalmic ointment     brimonidine (ALPHAGAN) 0.2 % ophthalmic solution     buPROPion (WELLBUTRIN SR) 100 MG 12 hr tablet     carvedilol (COREG) 3.125 MG tablet     cetirizine (ZYRTEC) 10 MG tablet     fluticasone (FLONASE) 50 MCG/ACT nasal spray     furosemide (LASIX) 20 MG tablet     HYDROmorphone (DILAUDID) 2 MG tablet     insulin glargine (LANTUS PEN) 100 UNIT/ML pen     lactulose (CHRONULAC) 10 GM/15ML solution     Lancet Devices (LANCET DEVICE WITH EJECTOR) MISC     LANsoprazole (PREVACID) 30 MG DR capsule     latanoprost (XALATAN) 0.005 % ophthalmic solution     lisinopril (PRINIVIL/ZESTRIL) 20 MG tablet      loperamide (IMODIUM) 2 MG capsule     psyllium (METAMUCIL/KONSYL) Packet     SUMAtriptan (IMITREX) 100 MG tablet     terazosin (HYTRIN) 5 MG capsule     traZODone (DESYREL) 100 MG tablet     VICTOZA PEN 18 MG/3ML soln     No current facility-administered medications for this visit.      Past Medical  Past Medical History:   Diagnosis Date     Allergic rhinitis      Cancer (H) 2019    History of Colon CA, HCC with mets to gallbladder     Congestive heart failure (H)      Depressive disorder      Diabetes (H)     TYPE II     Hypertension      Multiple food allergies     see epic     Sleep apnea     uses CPAP     Past Surgical  Past Surgical History:   Procedure Laterality Date     ENDOSCOPIC RETROGRADE CHOLANGIOPANCREATOGRAM N/A 9/2/2020    Procedure: ENDOSCOPIC RETROGRADE CHOLANGIOPANCREATOGRAPHY with transpapillary gallbladder stenting **Latex Allergy** with biliary sphincterotomy, stent placement, pancreatic stent placement, gallbladder stent placement;  Surgeon: Gary Denise MD;  Location: [facility] OR     ENDOSCOPIC RETROGRADE CHOLANGIOPANCREATOGRAM N/A 10/27/2020    Procedure: ENDOSCOPIC RETROGRADE CHOLANGIOPANCREATOGRAPHY  **Latex Allergy** with Stent Exchange;  Surgeon: Gary Denise MD;  Location: [facility] OR     HERNIA REPAIR       IR CHEMO EMBOLIZATION  12/11/2019     IR SIRT (SELECTIVE INTERNAL RADIO THERAPY)  11/6/2019     IR VISCERAL ANGIOGRAM  11/6/2019     IR VISCERAL EMBOLIZATION  11/7/2019     VASCULAR SURGERY      liver ablation in [place]     VASECTOMY       Social History  Social History     Socioeconomic History     Marital status: Single     Spouse name: Not on file     Number of children: Not on file     Years of education: Not on file     Highest education level: Not on file   Occupational History     Not on file   Social Needs     Financial resource strain: Not on file     Food insecurity     Worry: Not on file     Inability: Not on file     Transportation needs     Medical: Not on  file     Non-medical: Not on file   Tobacco Use     Smoking status: Passive Smoke Exposure - Never Smoker     Smokeless tobacco: Never Used   Substance and Sexual Activity     Alcohol use: Not Currently     Drug use: Yes     Types: Hydromorphone     Comment: Dilaudid po as needed     Sexual activity: Not Currently   Lifestyle     Physical activity     Days per week: Not on file     Minutes per session: Not on file     Stress: Not on file   Relationships     Social connections     Talks on phone: Not on file     Gets together: Not on file     Attends Roman [employer] service: Not on file     Active member of club or organization: Not on file     Attends meetings of clubs or organizations: Not on file     Relationship status: Not on file     Intimate partner violence     Fear of current or ex partner: Not on file     Emotionally abused: Not on file     Physically abused: Not on file     Forced sexual activity: Not on file   Other Topics Concern     Parent/sibling w/ CABG, MI or angioplasty before 65F 55M? Not Asked   Social History Narrative     Not on file     Family History  Family History   Problem Relation Age of Onset     Anesthesia Reaction No family hx of      Cardiovascular No family hx of      Deep Vein Thrombosis (DVT) No family hx of      Objective:  Reported vitals:  There were no vitals taken for this visit.   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 The etiology of the abscess is suspected to be a focus of malignancy. While HIDA excluded perforation at the time, two percutaneous drains were placed and there was subsequent evidence of fistulous connection with the gallbladder. These drains are under management of his local IR physicians who last recommended repeat check.    He needs to follow up with his local IR physicians. We would recommend a two week capping trial of both drains. Without evidence of increasing pain or fevers, his local IR physicians may then consider removal. However with symptoms at least one of these drains may have to remain indefinitely. There are no endoscopic interventions recommended at this time. The two transpapillary gallbladder stents may continue to remain in situ indefinitely, as I do not suspect these to be related to the formation of the abscess nor are they likely exacerbating the current situation.    While I think his change in bowel habits is secondary to his chronic narcotic use, however we can not exclude a colonic mass. We will recommend that he reach out to his local gastroenterologist for interval evaluation by colonoscopy. An upper endoscopy to evaluate for varices in tandem.    It was a pleasure to participate in the care of this patient; please contact us with any further questions.  A total of 45 minutes was spent in evaluation with this patient, >50% of which was counseling regarding the above delineated issues.    Gary Denise MD PhD EZ TERAN  Director of Endoscopy  Associate Professor of Medicine, Surgery and Pediatrics  Interventional and Therapeutic Endoscopy    [facility]  Division of Gastroenterology and Hepatology  [facility] [address], [place] [address]    New Consultations  [phone]  Procedure Scheduling [phone]  Clinical Nurse  Coordinator [phone]  Clinical Fax   [phone]  Administrative   [phone]  Administrative Fax  [phone]

## 2021-04-30 NOTE — PATIENT INSTRUCTIONS
Follow up:    Dr. Denise has outlined the following steps after your recent clinic visit:    -Please follow up with your local IR physicians in regards to your drain. Would recommend a two week capping trial of both drains. Without evidence of increasing pain or fevers, your local IR physicians may then consider removal. However with symptoms at least one of these drains may have to remain indefinitely.     -For the change in bowel habits, it was recommended that you reach out to your local gastroenterologist for interval evaluation by colonoscopy and upper endoscopy.       Please call with any questions or concerns regarding your clinic visit today.    It is a pleasure being involved in your health care.    Contacts post-consultation depending on your need:    Schedule Clinic Appointments            [phone] # 1   M-F 7:30 - 5 pm    Violetta Goldberg RN Care Coordinator  [phone]    Jacob Hall LPN    [phone]     OR Procedure Scheduling                             [phone]    My Chart is available 24 hours a day and is a secure way to access your records and communicate with your care team.  I strongly recommend signing up if you haven't already done so, if you are comfortable with computers.  If you would like to inquire about this or are having problems with My Chart access, you may call [phone] or go online at [email or web address].  Please allow at least 24 hours for a response and extra time on weekends and Holidays.

## 2021-05-04 ENCOUNTER — DOCUMENTATION ONLY (OUTPATIENT)
Dept: GASTROENTEROLOGY | Facility: CLINIC | Age: 71
End: 2021-05-04

## 2021-05-04 NOTE — PROGRESS NOTES
Debora, from [facility], called and left a VM wanting to confirm if Pt had OV done with Dr. Denise on 4/29/21. Call back number: [phone], ext [phone]    Called Debora back. No answer. Left detailed VM for Debora stating Pt did have virtual visit done with Dr. Denise on 4/29/21.     Jacob Hall LPN

## 2021-05-05 NOTE — POST-PROCEDURE NOTE
Post Procedure Note:     Patient Name: Ben Lai Sr     : 1950    Radiologist: SAMUEL YI MD    Pre-operative Diagnosis: abscess cath injection    Operation :injection x 2    Anesthesia Type: 0    Estimated Blood Loss: 0    Specimens: 0    Findings: no residual cavity    Complications:  None; patient tolerated the procedure well.          Prosthetic devices, implanted devices: drain capped x 2

## 2021-05-06 NOTE — PROGRESS NOTES
Medical Oncology Follow-Up    Subjective   HISTORY OF PRESENT ILLNESS:  Ben Lai Sr is a 71 y.o. male who  was diagnosed with hepatocellular carcinoma in November 2018.  He has an underlying hepatitis C which was supposedly cured.    Due to the extent of his HCC per GI at [facility] it was not amenable to local therapy, resection or radiation, referred to medical oncology   He was started on sorafenib early December 2018 but this appears to have been stopped in February due to significant side effects.  Patient states unequivocally that he started to feel better after a 3-week break but the sorafenib was recently restarted and he is having significant quality of life compromising side effects again.  This includes severe joint and muscle pain and fatigue.     7/29/19 restaging scans showed progressive disease in the liver, however no sings of metastatic disease     8/14/19 s/p   CT-guided  right lobe of the liver radiofrequency ablation.     9/2019 seen hepatobiliary team at [facility], and was discussed for tumor board, planned to have Y90  Treatment     10/8/19 MRI abdomen showed Nodular appearing liver with 2 areas of hyper/early arterial enhancement suggestive of hepatocellular carcinoma are both adjacent to the gallbladder which contains filling defects which are enhancing suggestive of neoplasm. This could be primary cholangiocarcinoma or hepatocellular carcinoma which is invading the gallbladder. The liver and gallbladder masses are very similar to the study from 7/29/2019.  Wedge-shaped signal abnormality in the anterior right lobe of the liver similar to the prior study could represent hepatic infarct.    11/7/19 s/p Y90 treatment at [facility]     12/9/19 MRI abdomen showed Cirrhosis and evidence of portal hypertension. Posttreatment changes of radioembolization throughout the right hepatic lobe, with increased diffuse heterogeneous arterial enhancement. This is the  patient's first posttreatment MRI. LR-TR equivocal. Continued attention on follow-up.  Grossly stable size of the lobulated enhancing endoluminal gallbladder mass with decreased but persistent areas of enhancing viable tumor.  Stable segment 4 exophytic 2.6 cm OPTN-5b lesion.  Unchanged right hepatic vein and right portal vein branch thrombi, without convincing thrombus enhancement.  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 Attention on follow-up.    12/11/19 s/p chemo embolization for possible residual viable tumor in the liver.    1/16/20  restaging MR showed  The circumscribed mass is seen in the anterior aspect of the right lobe of the liver demonstrates positive response to treatment with significantly reduced enhancement, no change in overall size.2.  Post radiation ablation defect in the anterior inferior right lobe with surrounding heterogeneous nonmass-like enhancement. It would be difficult to exclude active tumor within this surrounding area of enhancement.  Significant change in appearance of the posterior segment of the right lobe of the liver. Previous infiltrative nodular enhancement, now geographic nonmass-like enhancement with parenchymal volume loss. This could represent posttreatment related change. Difficult to exclude residual/progressive tumor    4/21/20 MR abdomen showed Continued evolution of post embolic/radiotherapy changes to the right hepatic lobe with large region of volume loss of enhancement likely representing fibrosis.  Previous HCC anteriorly along this region continues to regress with no evidence of enhancement to suggest recurrent or residual tumor. Previous nodular lesion inferiorly along the treatment bed which also likely represented a site of HCC also continues to regress with no evidence of recurrence or residual disease. No new suspicious liver lesions are demonstrated. Cirrhosis. Splenomegaly. Portosystemic collaterals.    6/26/20 MR No significant  MRI change in the treatment-related appearance of the right hepatic lobe since 4/21/2020. No MR evidence of recurrent hepatoma. Treated anterior right hepatic lobe lesion is redemonstrated. Treated lesion at right more posterior hepatic lobe is also seen.   Hepatic fibrosis.Cirrhosis, splenomegaly and portosystemic collaterals.    10/5/20 MR abdomen showed 2 treated right hepatic lobe lesions and associated scarring and capsular retraction. Since the previous examination, there is a new 4 mm enhancing nodule at the posterior aspect of the right anterior hepatic lobe treated lesion.  The enhancement characteristics are progressive and indeterminate and there is the hint of small restricted diffusion. Overall, a change in the appearance of this treated lesion which has otherwise been stable since 1/16/2020 is suspicious for recurrent tumor. However, the imaging characteristics themselves are nonspecific of the 4 mm nodule (with differential diagnosis including rounded scar), and attention on short-term follow-up MRI of the abdomen with IV contrast in 3 months is recommended, along with correlation with AFP levels.     12/21/20 MR showed Amorphous enhancement has increased within the lower right hepatic region area of previous treated tumor. This enhancement pattern is worrisome for tumor progression the area. Correlation with recent AFP would be helpful. Considerable scar tissue and areas well.. In addition the lesion overlying the gallbladder fossa may involve the gallbladder which now appears to be markedly thickened and demonstrates considerable wall enhancement which appears to have changed since the previous study. Differential would include direct tumor invasion gallbladder and acute cholecystitis.     3/3/21 MR showed Very complex appearing situation in the region of the gallbladder fundus, subhepatic space extending to the hepatic flexure of the colon. There is concern for the possibility of extrahepatic  malignancy and/or an inflammatory process in this area which could be affecting the gallbladder. There is a 2.8 cm fluid collection adjacent to the gallbladder. Cholecystitis would be another consideration. Additional correlation with contrast-enhanced CT may be helpful to better define some of the anatomy in this areaCapsular retraction, scarring and mild enhancement redemonstrated in right hepatic lobe. Most of the intrahepatic signal abnormalities do not appear dramatically changed.  3. There is still some biliary dilatation the right hepatic lobe with adjacent enhancement. Question cholangitis.  4. In addition there are small filling defects within the common bile duct. Possible choledocholithiasis. Currently there is not seen to be high-grade obstruction however. Common bile duct diameter approximately millimeters    Today:   Pain  In RUQ improved with drainage, still on oral antibiotics today    No fevers or chills   Has had symptoms of grade 1 Hepatic encephalopathy     Energy is  Stable ECOG 2   appetite ist stable, weight is stable   Takes care of his ADLS   Stable,back pain , chest pain and neuropathy         Encounter Diagnosis   Name Primary?   • Hepatocellular carcinoma (CMS/HCC) (HCC)    .     Treatment History:  Oncology History   Hepatocellular carcinoma (CMS/HCC) (HCC)   1/23/2019 Initial Diagnosis    Hepatocellular carcinoma (CMS/HCC) (HCC)     7/30/2019 - 12/9/2019 Adjuvant Therapy    Hepatocellular - Sorafenib (Nexavar)  Plan Provider: ANDERSON Bryan  Treatment goal: Palliative  Line of treatment: [No plan line of treatment]     6/12/2020 Cancer Staged    Staging form: Liver, AJCC 8th Edition  - Clinical: Stage IIIB (cT4, cN0, cM0) - Signed by Milka Reynoso MD on 6/12/2020 12/31/2020 -  Adjuvant Therapy    Hepatocellular - Bevacizumab / Atezolizumab  Plan Provider: Milka Reynoso MD  Treatment goal: Palliative  Line of treatment: [No plan line of treatment]            RECENT EVENTS:    PAST MEDICAL HISTORY:   Past Medical History:   Diagnosis Date   • A-fib (CMS/HCC) (HCC)    • Allergy    • Anxiety    • Arthritis    • Asthma    • Blindness of right eye    • BPH (benign prostatic hyperplasia)    • Cardiovascular disease    • Cirrhosis (CMS/HCC) (HCC)     with possible liver mass by MRI 5/18, rec repeat in 8/2018   • Complication of anesthesia    • Congestive heart failure (CMS/HCC) (HCC) 9/20/2019   • COPD (chronic obstructive pulmonary disease) (CMS/HCC) (HCC)    • Depression    • Endocrine disorder    • Gallstones    • Gastritis    • GERD (gastroesophageal reflux disease)    • Glaucoma    • Hearing loss     bilateral hearing aids   • Hemorrhoids    • Hepatitis C     Hep C, 2016 remission, complicated by cirrhosis.  MRI abd 5/30/18, rec 3 month follow up for focus of enhancement right and left hepatic lobe junction   • Hernia, abdominal    • High blood pressure    • IBS (irritable bowel syndrome)    • Infectious viral hepatitis    • Jaundice    • Kidney stones    • Obstructive sleep apnea syndrome    • Periodic limb movement disorder    • Persistent hypersomnia    • Persistent insomnia    • Pneumonia    • PONV (postoperative nausea and vomiting)    • Type 2 diabetes mellitus (CMS/HCC) (HCC)    • Urinary incontinence    • Wears partial dentures         GYNECOLOGICAL HISTORY (if applicable): NA     FAMILY HISTORY:   Family History   Problem Relation Age of Onset   • Arthritis Mother    • Rheum arthritis Mother    • Diabetes Mother    • Rectal cancer Mother    • Other Paternal Grandmother    • Other Paternal Grandfather    • Diabetes Other    • Rheum arthritis Other    • Osteoporosis Other         SOCIAL HISTORY:   Social History     Socioeconomic History   • Marital status:      Spouse name: Not on file   • Number of children: Not on file   • Years of education: Not on file   • Highest education level: Not on file   Occupational History   • Not on file   Tobacco Use    • Smoking status: Never Smoker   • Smokeless tobacco: Never Used   Substance and Sexual Activity   • Alcohol use: No   • Drug use: No   • Sexual activity: Defer   Other Topics Concern   • Not on file   Social History Narrative   • Not on file     Social Determinants of Health     Financial Resource Strain:    • Difficulty of Paying Living Expenses:    Food Insecurity:    • Worried About Running Out of Food in the Last Year:    • Ran Out of Food in the Last Year:    Transportation Needs:    • Lack of Transportation (Medical):    • Lack of Transportation (Non-Medical):    Physical Activity:    • Days of Exercise per Week:    • Minutes of Exercise per Session:    Stress:    • Feeling of Stress :    Social Connections:    • Frequency of Communication with Friends and Family:    • Frequency of Social Gatherings with Friends and Family:    • Attends Buddhism Services:    • Active Member of Clubs or Organizations:    • Attends Club or Organization Meetings:    • Marital Status:    Intimate Partner Violence:    • Fear of Current or Ex-Partner:    • Emotionally Abused:    • Physically Abused:    • Sexually Abused:         ALLERGIES:   Allergies as of 05/06/2021 - Reviewed 05/05/2021   Allergen Reaction Noted   • Metformin Hives    • Adhesive tape-silicones  04/06/2018   • Interferon violetta-2a  02/05/2019   • Latex     • Milk  02/05/2019   • Mometasone     • Mometasone furoate     • Omeprazole     • Ondansetron hcl Nausea And Vomiting 05/01/2019   • Pepper (genus capsicum)  02/05/2019   • Primidone  05/01/2019   • Ribavirin Itching 10/19/2017   • Rosuvastatin  02/05/2019   • Sorafenib  04/04/2019   • Spironolactone     • Statins-hmg-coa reductase inhibitors Itching and GI intolerance 04/06/2018   • Interferon violetta (human leuk. derived) Palpitations and Rash 09/20/2019   • Pantoprazole Itching and Rash 02/28/2018   • Peginterferon violetta-2b Palpitations 10/19/2017   • Penicillins Other (see comments) 10/19/2017         MEDICATIONS:   Current Outpatient Medications   Medication Sig Dispense Refill   • morphine (MS CONTIN) 15 mg 12 hr tablet TAKE ONE TABLET BY MOUTH TWICE A DAY FOR PAIN     • amoxicillin-pot clavulanate (AUGMENTIN) 875-125 mg per tablet Take 1 tablet by mouth 2 (two) times a day for 14 days 28 tablet 0   • lidocaine-prilocaine (EMLA) cream Apply thick layer to intact skin over port 1 hour prior to procedure. Cover with occlusive dressing. 30 g 2   • HYDROmorphone (DILAUDID) 2 mg tablet Take 2-4 mg by mouth every 6 (six) hours as needed       • sildenafiL (VIAGRA) 100 mg tablet Take 100 mg by mouth as needed for erectile dysfunction Up to 4 times monthly     • fluoride, sodium, (DENTAGEL) 1.1 % gel dental gel See administration instructions Apply small amount to toothbrush in place of regular toothpaste. Brush for 2 minutes in the morning and evening.     • polyethylene glycol (Miralax) 17 gram/dose powder Take 17 g by mouth daily     • brimonidine (ALPHAGAN) 0.2 % ophthalmic solution 1 drop 2 (two) times a day     • carboxymethylcellulose (REFRESH PLUS) 0.5 % dropperette Administer 1 drop into both eyes every 3 (three) hours Indications: dry eye       • prochlorperazine (COMPAZINE) 10 mg tablet Take 1 tablet (10 mg total) by mouth every 6 (six) hours as needed for nausea or vomiting (Patient not taking: Reported on 4/15/2021 ) 30 tablet 5   • artificial saliva (YERBA GLADYS AND LYTES) aerosol,spray spray Apply 2 sprays to the mouth or throat as needed       • fluticasone propionate (FLONASE) 50 mcg/actuation nasal spray Administer 2 sprays into each nostril daily       • naloxone HCl (NARCAN NASL) Administer into affected nostril(s) as needed     • traZODone (DESYREL) 100 mg tablet Take 100 mg by mouth nightly       • insulin glargine (Lantus Solostar U-100 Insulin) 100 unit/mL (3 mL) insulin pen injection pen Inject 24 Units under the skin every morning       • carvediloL (COREG) 3.125 mg tablet Take 1 tablet  (3.125 mg total) by mouth 2 (two) times a day with meals 180 tablet 0   • diclofenac sodium (VOLTAREN) 1 % gel Apply 4 g topically 4 (four) times a day       • cetirizine (ZyrTEC) 10 mg tablet Take 10 mg by mouth daily     • loperamide (Imodium A-D) 2 mg tablet Take 2 tablets with first loose stool of the day, then up to 8 tablets daily (Patient not taking: Reported on 4/15/2021 ) 80 tablet 2   • buPROPion SR (WELLBUTRIN SR) 100 mg 12 hr tablet Take 1 tablet (100 mg total) by mouth daily 90 tablet 1   • lactulose (GENERLAC) 10 gram/15 mL solution Take 15 mL (10 g total) by mouth daily 473 mL 2   • terazosin (HYTRIN) 5 mg capsule Take 1 capsule (5 mg total) by mouth 2 (two) times a day 180 capsule 2   • blood-glucose meter (ACCU-CHEK ADELE PLUS METER) [facility] Use to test blood sugars 3 times daily (E11.65, non insulin depedent) AccuChek Adele plus, meter is 8 years old 1 each 0   • lisinopril (PRINIVIL,ZESTRIL) 20 mg tablet Take 1 tablet (20 mg total) by mouth daily 90 tablet 2   • furosemide (LASIX) 20 mg tablet Take 1 tablet (20 mg total) by mouth 2 (two) times a day. 180 tablet 2   • lansoprazole (PREVACID) 30 mg capsule Take 30 mg by mouth daily      • liraglutide (VICTOZA 2-JASE) 0.6 mg/0.1 mL (18 mg/3 mL) injection Inject 1.8 mg under the skin daily       • sodium chloride (SALINE NASAL) 0.65 % nasal spray Administer 1 spray into each nostril as needed for congestion or rhinitis      • pramoxine-menthol (GOLD BOND MEDICATED ANTI-ITCH) 1-1 % cream Apply topically as needed       • latanoprost (XALATAN) 0.005 % ophthalmic solution Administer 1 drop into both eyes nightly   10 0     No current facility-administered medications for this visit.       REVIEW OF SYSTEMS:   Review of Systems   Constitutional: Positive for fatigue.   HENT:   Positive for hearing loss.    Eyes: Positive for eye problems.   Gastrointestinal: Positive for abdominal distention and abdominal pain.   Endocrine: Negative.    Genitourinary:  Positive for bladder incontinence.    Musculoskeletal: Positive for arthralgias, gait problem and myalgias.   Skin: Negative.    Neurological: Positive for gait problem.   Hematological: Negative.    Psychiatric/Behavioral: The patient is nervous/anxious.    All other systems reviewed and are negative.      The ECOG performance status today is 2 - Ambulatory care of self; up and about >50% of waking hours.          Objective    PHYSICAL EXAM:   /62   Pulse 78   Temp 36.6 °C (97.9 °F) (Temporal)   Resp 18   Wt 85.7 kg (189 lb)   SpO2 93%   BMI 31.45 kg/m²    Body mass index is 31.45 kg/m².       Physical Exam  HENT:      Head: Normocephalic.      Nose: Nose normal.      Mouth/Throat:      Mouth: Mucous membranes are moist.   Eyes:      Pupils: Pupils are equal, round, and reactive to light.   Cardiovascular:      Rate and Rhythm: Normal rate.   Pulmonary:      Effort: Pulmonary effort is normal.   Abdominal:      General: Abdomen is flat. There is no distension.   Musculoskeletal:         General: Normal range of motion.      Cervical back: Normal range of motion.   Skin:     General: Skin is dry.   Neurological:      General: No focal deficit present.      Mental Status: He is alert.   Psychiatric:         Mood and Affect: Mood normal.         LABS:   Appointment on 05/06/2021   Component Date Value Ref Range Status   • WBC 05/06/2021 4.1  3.7 - 9.6 10*3/uL Final   • RBC 05/06/2021 4.58  4.10 - 5.80 10*6/µL Final   • Hemoglobin 05/06/2021 12.2* 13.2 - 17.2 g/dL Final   • Hematocrit 05/06/2021 37.8* 38.0 - 50.0 % Final   • MCV 05/06/2021 82.6  82.0 - 97.0 fL Final   • MCH 05/06/2021 26.6* 29.0 - 34.0 pg Final   • MCHC 05/06/2021 32.2  32.0 - 36.0 g/dL Final   • RDW 05/06/2021 16.1* 11.5 - 15.0 % Final   • Platelets 05/06/2021 116* 130 - 350 10*3/uL Final   • MPV 05/06/2021 7.9  6.9 - 10.8 fL Final   • Neutrophils% 05/06/2021 78* 46 - 70 % Final   • Lymphocytes% 05/06/2021 9* 15 - 47 % Final   • Monocytes%  05/06/2021 11  5 - 13 % Final   • Eosinophils% 05/06/2021 1  0 - 3 % Final   • Basophils% 05/06/2021 1  0 - 2 % Final   • Neutrophils Absolute 05/06/2021 3.20  10*3/uL Final   • Lymphocytes Absolute 05/06/2021 0.40  10*3/uL Final   • Monocytes Absolute 05/06/2021 0.50  10*3/uL Final   • Eosinophils Absolute 05/06/2021 0.00  10*3/uL Final   • Basophils Absolute 05/06/2021 0.00  10*3/uL Final   • Hypochromia 05/06/2021 1+   Final   • Sodium 05/06/2021 134* 135 - 145 mmol/L Final   • Potassium 05/06/2021 4.2  3.5 - 5.1 mmol/L Final   • Chloride 05/06/2021 99  98 - 107 mmol/L Final   • CO2 05/06/2021 26  21 - 32 mmol/L Final   • Anion Gap 05/06/2021 9  3 - 11 mmol/L Final   • BUN 05/06/2021 13  7 - 25 mg/dL Final   • Creatinine 05/06/2021 0.77  0.70 - 1.30 mg/dL Final   • Glucose 05/06/2021 273* 70 - 105 mg/dL Final   • Calcium 05/06/2021 9.1  8.6 - 10.3 mg/dL Final   • AST 05/06/2021 59* <40 U/L Final   • ALT (SGPT) 05/06/2021 47  7 - 52 U/L Final   • Alkaline Phosphatase 05/06/2021 394* 45 - 115 U/L Final   • Total Protein 05/06/2021 6.4  6.0 - 8.3 g/dL Final   • Albumin 05/06/2021 3.4* 3.5 - 5.3 g/dL Final   • Total Bilirubin 05/06/2021 1.05  0.20 - 1.40 mg/dL Final   • eGFR 05/06/2021 92  >60 mL/min/1.73m*2 Final   • Corrected Calcium 05/06/2021 9.6  8.6 - 10.3 mg/dL Final   • TSH 05/06/2021 2.096  0.340 - 4.820 uIU/ml Final       DIAGNOSTIC REPORTS REVIEWED:   Imaging:  Reviewed     Assessment/Plan    IMPRESSION:   This is a pleasant 71 y.o. male with diagnosis of hepatocellular carcinoma involving the right lobe, locally advanced disease, per the chart not amenable to local therapy, started sorafenib December 2018 however stopped in February 2019 due to intolerance without any dose reductions.  Has been off of treatment since then, restaging CAT scan and MRI  7/2019 show progressive disease in the liver.  Started  sorafenib 8/2019 at a lower dose 200 mg twice daily, s/p RFA 8/2019, was referred to [facility], s/p Y90 treatment followed by [facility], MR showed response to treatment without progression on 1/16/20 and 4/21/20  currently treatment on hold until evidence of disease progression       concerned about enhancement of the gallbladder discussed at tumor board at  and  There is no rule for surgery, on MR 12/2020 there was clear progression of HCC and discussed plan of either starting bevacizumab and atezolizomab vs lenvatinib   After discussion likely to start bevacizumab and atezolizomab.     Restaging MR 3/2021 showed Very complex appearing situation in the region of the gallbladder fundus,  possibility of extrahepatic malignancy and/or an inflammatory process  . There is a 2.8 cm fluid collection adjacent to the gallbladder. Cholecystitis would be another consideration.  S/p drain tubes and antibiotics      Plan:   - continue atzolizumab 1200 mg  and bevacizumab 15mg/kg  Until drain tubes are removed.    - will restage 7/29/21 with MR abdomen.   -  Follow up with [facility] for GI follow up and repeat ERCP.   - continue follow up with palliative medicine for pain control.   - follow up on referral to hepatology for liver cirrhosis.   - follow up ID for antibiotics.        Physician: Milka Reynoso MD

## 2021-05-11 NOTE — ANESTHESIA PREPROCEDURE EVALUATION
"Pre-Procedure Assessment    Patient: Ben Lai , male, 71 y.o.    Ht Readings from Last 1 Encounters:   03/26/21 1.651 m (5' 5\")     Wt Readings from Last 1 Encounters:   05/06/21 85.7 kg (189 lb)       Last Vitals  BP      Temp      Pulse     Resp      SpO2      Pain Score         Problem list reviewed and Medical history reviewed    History of anesthetic complications: delayed emergence and PONV         Airway   Mallampati: II  TM distance: >3 FB  Neck ROM: full      Dental      Pulmonary     breath sounds clear to auscultation  (+) pneumonia, COPD moderate, asthma, sleep apnea,   Cardiovascular   (+) hypertension, CHF,     Rhythm: regular  Rate: normal    Mental Status/Neuro/Psych    Pt is alert.        GI/Hepatic/Renal    (+) GERD, hepatitis C, liver disease, jaundice,     Endo/Other    (+) diabetes mellitus,   Abdominal             Social History     Tobacco Use   • Smoking status: Never Smoker   • Smokeless tobacco: Never Used   Substance Use Topics   • Alcohol use: No      Hematology   WBC   Date Value Ref Range Status   05/06/2021 4.1 3.7 - 9.6 10*3/uL Final     RBC   Date Value Ref Range Status   05/06/2021 4.58 4.10 - 5.80 10*6/µL Final     MCV   Date Value Ref Range Status   05/06/2021 82.6 82.0 - 97.0 fL Final     Hemoglobin   Date Value Ref Range Status   05/06/2021 12.2 (L) 13.2 - 17.2 g/dL Final     Hematocrit   Date Value Ref Range Status   05/06/2021 37.8 (L) 38.0 - 50.0 % Final     Platelets   Date Value Ref Range Status   05/06/2021 116 (L) 130 - 350 10*3/uL Final      Coagulation   Protime   Date Value Ref Range Status   01/21/2021 14.1 (H) 9.4 - 12.5 seconds Final     INR   Date Value Ref Range Status   01/21/2021 1.2 (H) <=1.1 Final      General Chemistry   POC Glucose   Date Value Ref Range Status   03/10/2021 111 (H) 70 - 105 mg/dL Final     Calcium   Date Value Ref Range Status   05/06/2021 9.1 8.6 - 10.3 mg/dL Final     BUN   Date Value Ref Range Status   05/06/2021 13 7 - 25 mg/dL " Final     Creatinine   Date Value Ref Range Status   05/06/2021 0.77 0.70 - 1.30 mg/dL Final     Glucose   Date Value Ref Range Status   05/06/2021 273 (H) 70 - 105 mg/dL Final     Sodium   Date Value Ref Range Status   05/06/2021 134 (L) 135 - 145 mmol/L Final     Potassium   Date Value Ref Range Status   05/06/2021 4.2 3.5 - 5.1 mmol/L Final     Magnesium   Date Value Ref Range Status   03/05/2021 1.5 (L) 1.8 - 2.4 mg/dL Final     CO2   Date Value Ref Range Status   05/06/2021 26 21 - 32 mmol/L Final     Chloride   Date Value Ref Range Status   05/06/2021 99 98 - 107 mmol/L Final     Anesthesia Plan    ASA 3   NPO status reviewed: > 6 hours    MAC         Induction: intravenous                 Anesthetic plan and risks discussed with patient.  Use of blood products discussed with patient who consented to blood products.     Plan discussed with attending.

## 2021-05-11 NOTE — PERIOPERATIVE NURSING NOTE
Xray tech from intervention at bedside changing out pt drain dressing. IR dr came and assess drain site on pt R chest.
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
## 2021-05-11 NOTE — ANESTHESIA POSTPROCEDURE EVALUATION
Patient: Ben Lai Sr    Procedure Summary     Date: 05/11/21 Room / Location: [facility] ENDO 02 / [facility] Endoscopy    Anesthesia Start: 0804 Anesthesia Stop: 0854    Procedures:       ESOPHAGOGASTRODUODENOSCOPY (N/A Esophagus)      COLONOSCOPY with polypectomy (N/A Anus) Diagnosis:       Screen for colon cancer      Esophageal varices without bleeding, unspecified esophageal varices type (CMS/HCC) (HCC)      (portal hypertensive gastropathy, biliary stent in place, )      (hemorrhoids, polyp, poor colon prep)    Providers: William Rivas MD Responsible Provider: William Rivas MD    Anesthesia Type: MAC ASA Status: 3          Anesthesia Type: MAC    Last vitals  Vitals Value Taken Time   BP     Temp     Pulse     Resp     SpO2     Pain Score         Anesthesia Post Evaluation    Patient location during evaluation: bedside  Patient participation: waiting for patient participation  Level of consciousness: sleepy but conscious  Pain management: adequate  Airway patency: patent  Anesthetic complications: no  Cardiovascular status: acceptable  Respiratory status: acceptable  Hydration status: acceptable  May dismiss recovered patient based on consultation with the appropriate physicians and/or meeting appropriate discharge criteria      Cosmetic?  This procedure is not cosmetic.

## 2021-05-18 NOTE — POST-PROCEDURE NOTE
Post Operative Note    Operation : Fluoroscopic abscess Drain / Tube  Check    Patient Name: Ben Lai Sr     : 1950    Radiologist: JIM TINEO MD    Pre-operative Diagnosis: abdomen fluid collection    Post-operative Diagnosis: Same    Anesthesia Type: SQ Lidocaine     Estimated Blood Loss: 0 mL    Findings: fistula on lateral drain, small collection on medial. Medial drain removed.    Specimens Removed:  None    Complications:  None; patient tolerated the procedure well.    Prosthetic devices, implanted devices: None

## 2021-05-27 NOTE — PROGRESS NOTES
Rt Abdomen wound from previous drain, now draining tan purulent drainage. Order for culture collected from site and sent to lab.

## 2021-05-27 NOTE — PROGRESS NOTES
Hematology/Medical Oncology Follow-Up    Patient Name: Ben Lai Sr  YOB: 1950  Medical Record Number: [id number]    DATE OF SERVICE:   5/27/2021    CHIEF COMPLAINT:  Ben Lai Sr is a 71 y.o. male with locally advanced hepatocellular carcinoma who presents today for follow-up and treatment.    ONCOLOGY HISTORY/ HISTORY OF PRESENT ILLNESS:  Oncology History   Hepatocellular carcinoma (CMS/HCC) (HCC)   1/23/2019 Initial Diagnosis    Hepatocellular carcinoma (CMS/HCC) (HCC)     7/30/2019 - 12/9/2019 Adjuvant Therapy    Hepatocellular - Sorafenib (Nexavar)  Plan Provider: ANDERSON Bryan  Treatment goal: Palliative  Line of treatment: [No plan line of treatment]     6/12/2020 Cancer Staged    Staging form: Liver, AJCC 8th Edition  - Clinical: Stage IIIB (cT4, cN0, cM0) - Signed by Milka Reynoso MD on 6/12/2020 12/31/2020 -  Adjuvant Therapy    Hepatocellular - Bevacizumab / Atezolizumab  Plan Provider: Milka Reynoso MD  Treatment goal: Palliative  Line of treatment: [No plan line of treatment]     diagnosed with hepatocellular carcinoma in November 2018.  He has an underlying hepatitis C which was supposedly cured.    Due to the extent of his HCC per GI at [facility] it was not amenable to local therapy, resection or radiation, referred to medical oncology   He was started on sorafenib early December 2018 but this appears to have been stopped in February due to significant side effects.  Patient states unequivocally that he started to feel better after a 3-week break but the sorafenib was recently restarted and he is having significant quality of life compromising side effects again.  This includes severe joint and muscle pain and fatigue.      7/29/19 restaging scans showed progressive disease in the liver, however no sings of metastatic disease      8/14/19 s/p   CT-guided  right lobe of the liver radiofrequency ablation.      9/2019 seen hepatobiliary team at  [facility], and was discussed for tumor board, planned to have Y90  Treatment      10/8/19 MRI abdomen showed Nodular appearing liver with 2 areas of hyper/early arterial enhancement suggestive of hepatocellular carcinoma are both adjacent to the gallbladder which contains filling defects which are enhancing suggestive of neoplasm. This could be primary cholangiocarcinoma or hepatocellular carcinoma which is invading the gallbladder. The liver and gallbladder masses are very similar to the study from 7/29/2019.  Wedge-shaped signal abnormality in the anterior right lobe of the liver similar to the prior study could represent hepatic infarct.     11/7/19 s/p Y90 treatment at [facility]      12/9/19 MRI abdomen showed Cirrhosis and evidence of portal hypertension. Posttreatment changes of radioembolization throughout the right hepatic lobe, with increased diffuse heterogeneous arterial enhancement. This is the patient's first posttreatment MRI. LR-TR equivocal. Continued attention on follow-up.  Grossly stable size of the lobulated enhancing endoluminal gallbladder mass with decreased but persistent areas of enhancing viable tumor.  Stable segment 4 exophytic 2.6 cm OPTN-5b lesion.  Unchanged right hepatic vein and right portal vein branch thrombi, without convincing thrombus enhancement.  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 Attention on follow-up.     12/11/19 s/p chemo embolization for possible residual viable tumor in the liver.     1/16/20  restaging MR showed  The circumscribed mass is seen in the anterior aspect of the right lobe of the liver demonstrates positive response to treatment with significantly reduced enhancement, no change in overall size.2.  Post radiation ablation defect in the anterior inferior right lobe with surrounding heterogeneous nonmass-like enhancement. It would be difficult to exclude active tumor within this surrounding area of  enhancement.  Significant change in appearance of the posterior segment of the right lobe of the liver. Previous infiltrative nodular enhancement, now geographic nonmass-like enhancement with parenchymal volume loss. This could represent posttreatment related change. Difficult to exclude residual/progressive tumor     4/21/20 MR abdomen showed Continued evolution of post embolic/radiotherapy changes to the right hepatic lobe with large region of volume loss of enhancement likely representing fibrosis.  Previous HCC anteriorly along this region continues to regress with no evidence of enhancement to suggest recurrent or residual tumor. Previous nodular lesion inferiorly along the treatment bed which also likely represented a site of HCC also continues to regress with no evidence of recurrence or residual disease. No new suspicious liver lesions are demonstrated. Cirrhosis. Splenomegaly. Portosystemic collaterals.     6/26/20 MR No significant MRI change in the treatment-related appearance of the right hepatic lobe since 4/21/2020. No MR evidence of recurrent hepatoma. Treated anterior right hepatic lobe lesion is redemonstrated. Treated lesion at right more posterior hepatic lobe is also seen.   Hepatic fibrosis.Cirrhosis, splenomegaly and portosystemic collaterals.     10/5/20 MR abdomen showed 2 treated right hepatic lobe lesions and associated scarring and capsular retraction. Since the previous examination, there is a new 4 mm enhancing nodule at the posterior aspect of the right anterior hepatic lobe treated lesion.  The enhancement characteristics are progressive and indeterminate and there is the hint of small restricted diffusion. Overall, a change in the appearance of this treated lesion which has otherwise been stable since 1/16/2020 is suspicious for recurrent tumor. However, the imaging characteristics themselves are nonspecific of the 4 mm nodule (with differential diagnosis including rounded scar), and  attention on short-term follow-up MRI of the abdomen with IV contrast in 3 months is recommended, along with correlation with AFP levels.     12/21/20 MR showed Amorphous enhancement has increased within the lower right hepatic region area of previous treated tumor. This enhancement pattern is worrisome for tumor progression the area. Correlation with recent AFP would be helpful. Considerable scar tissue and areas well.. In addition the lesion overlying the gallbladder fossa may involve the gallbladder which now appears to be markedly thickened and demonstrates considerable wall enhancement which appears to have changed since the previous study. Differential would include direct tumor invasion gallbladder and acute cholecystitis.      3/3/21 MR showed Very complex appearing situation in the region of the gallbladder fundus, subhepatic space extending to the hepatic flexure of the colon. There is concern for the possibility of extrahepatic malignancy and/or an inflammatory process in this area which could be affecting the gallbladder. There is a 2.8 cm fluid collection adjacent to the gallbladder. Cholecystitis would be another consideration. Additional correlation with contrast-enhanced CT may be helpful to better define some of the anatomy in this areaCapsular retraction, scarring and mild enhancement redemonstrated in right hepatic lobe. Most of the intrahepatic signal abnormalities do not appear dramatically changed.  3. There is still some biliary dilatation the right hepatic lobe with adjacent enhancement. Question cholangitis.  4. In addition there are small filling defects within the common bile duct. Possible choledocholithiasis. Currently there is not seen to be high-grade obstruction however. Common bile duct diameter approximately millimeters    -He is status post drain placement and antibiotic treatment after being hospitalized 3/4/21-3/10/21    5/5/21: MR abdomen  Showed  1.  The subhepatic inflammatory  process has improved. There are at least two subhepatic drains in place. No measurable collections are seen.  2.  Stable appearance of the liver. No new or progressive disease.    INTERVAL HISTORY:  Here to consider cycle 8 of atezolizumab and bevacizumab.  Lateral drain output has been stable. Between 16-35 cc the last week. He will have evaluation in IR again on 6/1/21. Previous drain site is oozing in the last 24 hours. Tan, creamy, foul smelling. He has not had fevers. He still has abdominal pain but it is in general not worse. He did run out of his MS contin. [facility] and he got the hydromorphone in the mail but not the MS contin. He has been getting by with the hydromorphone the last couple of days but it makes him more drowsy which he does not like. His wife notes he is going to start going to a therapist for his mood.        Past Medical History:   Diagnosis Date   • A-fib (CMS/HCC) (HCC)    • Allergy    • Anxiety    • Arthritis    • Asthma    • Blindness of right eye    • BPH (benign prostatic hyperplasia)    • Cardiovascular disease    • Cirrhosis (CMS/HCC) (HCC)     with possible liver mass by MRI 5/18, rec repeat in 8/2018   • Complication of anesthesia    • Congestive heart failure (CMS/HCC) (HCC) 9/20/2019   • COPD (chronic obstructive pulmonary disease) (CMS/HCC) (HCC)    • Depression    • Endocrine disorder    • Gallstones    • Gastritis    • GERD (gastroesophageal reflux disease)    • Glaucoma    • Hearing loss     bilateral hearing aids   • Hemorrhoids    • Hepatitis C     Hep C, 2016 remission, complicated by cirrhosis.  MRI abd 5/30/18, rec 3 month follow up for focus of enhancement right and left hepatic lobe junction   • Hernia, abdominal    • High blood pressure    • IBS (irritable bowel syndrome)    • Infectious viral hepatitis    • Jaundice    • Kidney stones    • Obstructive sleep apnea syndrome    • Periodic limb movement disorder    • Persistent hypersomnia    •  Persistent insomnia    • Pneumonia    • PONV (postoperative nausea and vomiting)    • Type 2 diabetes mellitus (CMS/HCC) (HCC)    • Urinary incontinence    • Wears partial dentures      Past Surgical History:   Procedure Laterality Date   • COLONOSCOPY  10/27/2016    Pili, normal, repeat 10 years   • COLONOSCOPY N/A 5/11/2021    Procedure: COLONOSCOPY with polypectomy;  Surgeon: William Rivas MD;  Location: [facility];  Service: Endoscopy;  Laterality: N/A;   • CT ABLATION LIVER RADIOFREQUENCY  8/14/2019    CT ABLATION LIVER RADIOFREQUENCY 8/14/2019 [facility] MIS CT SCAN   • CT ABSCESS CYST PERITONEAL  3/5/2021    CT ABSCESS CYST PERITONEAL 3/5/2021 [facility] MIS CT SCAN   • ESOPHAGOGASTRODUODENOSCOPY N/A 2/22/2018    Procedure: EGD - ESOPHAGOGASTRODUODENOSCOPY with banding  x 2 with crna;  Surgeon: William Rivas MD;  Location: [facility];  Service: Endoscopy;  Laterality: N/A;   • ESOPHAGOGASTRODUODENOSCOPY N/A 4/6/2018    Procedure: EGD - ESOPHAGOGASTRODUODENOSCOPY with crna;  Surgeon: William Rivas MD;  Location: [facility];  Service: Endoscopy;  Laterality: N/A;   • ESOPHAGOGASTRODUODENOSCOPY N/A 5/7/2019    Procedure: EGD - ESOPHAGOGASTRODUODENOSCOPY;  Surgeon: William Rivas MD;  Location: [facility];  Service: Endoscopy;  Laterality: N/A;   • ESOPHAGOGASTRODUODENOSCOPY N/A 5/11/2021    Procedure: ESOPHAGOGASTRODUODENOSCOPY;  Surgeon: William Rivas MD;  Location: [facility];  Service: Endoscopy;  Laterality: N/A;   • HAND SURGERY Left     thumb   • HERNIA REPAIR  01/01/1965   • IR INJECTION ABSCESS CATHETER EVALUATION  4/2/2021    IR INJECTION ABSCESS CATHETER EVALUATION 4/2/2021 Mireya Jules MD [facility] MIS INTERVEN RAD   • IR INJECTION ABSCESS CATHETER EVALUATION  4/2/2021    IR INJECTION ABSCESS CATHETER EVALUATION 4/2/2021 Mireya Jules MD [facility] MIS INTERVEN RAD   • IR INJECTION ABSCESS CATHETER EVALUATION  5/5/2021    IR INJECTION ABSCESS CATHETER EVALUATION 5/5/2021 Chencho Wagner MD [facility] MIS INTERVEN  RAD   • IR INJECTION ABSCESS CATHETER EVALUATION  5/5/2021    IR INJECTION ABSCESS CATHETER EVALUATION 5/5/2021 Chencho Wagner MD [facility] INTERVEN RAD   • IR INJECTION ABSCESS CATHETER EVALUATION  5/18/2021    IR INJECTION ABSCESS CATHETER EVALUATION 5/18/2021 [facility] MIS INTERVEN RAD   • IR INJECTION ABSCESS CATHETER EVALUATION  5/18/2021    IR INJECTION ABSCESS CATHETER EVALUATION 5/18/2021 [facility] MIS INTERVEN RAD   • IR TUNNELED CENTRAL LINE WITH PORT  12/31/2020    IR TUNNELED CENTRAL LINE WITH PORT 12/31/2020 Sonny Danielle MD [facility] INTERVEN RAD   • KNEE ARTHROSCOPY  09/19/2017    left knee, with partial medial meniscectomy; limited chondroplasty, patellofemoral joint   • LIVER BIOPSY      by Dr. Alejandre   • OTHER SURGICAL HISTORY      esophageal varices, banded   • VASECTOMY      at approx age of 25     Social History     Socioeconomic History   • Marital status:      Spouse name: Not on file   • Number of children: Not on file   • Years of education: Not on file   • Highest education level: Not on file   Occupational History   • Not on file   Tobacco Use   • Smoking status: Never Smoker   • Smokeless tobacco: Never Used   Substance and Sexual Activity   • Alcohol use: No   • Drug use: No   • Sexual activity: Defer   Other Topics Concern   • Not on file   Social History Narrative   • Not on file     Social Determinants of Health     Financial Resource Strain:    • Difficulty of Paying Living Expenses:    Food Insecurity:    • Worried About Running Out of Food in the Last Year:    • Ran Out of Food in the Last Year:    Transportation Needs:    • Lack of Transportation (Medical):    • Lack of Transportation (Non-Medical):    Physical Activity:    • Days of Exercise per Week:    • Minutes of Exercise per Session:    Stress:    • Feeling of Stress :    Social Connections:    • Frequency of Communication with Friends and Family:    • Frequency of Social Gatherings with Friends and Family:    • Attends [employer]  Services:    • Active Member of Clubs or Organizations:    • Attends Club or Organization Meetings:    • Marital Status:    Intimate Partner Violence:    • Fear of Current or Ex-Partner:    • Emotionally Abused:    • Physically Abused:    • Sexually Abused:       ALLERGIES:     Allergies as of 05/27/2021 - Reviewed 05/18/2021   Allergen Reaction Noted   • Metformin Hives    • Adhesive tape-silicones  04/06/2018   • Interferon violetta-2a  02/05/2019   • Latex     • Milk  02/05/2019   • Mometasone     • Mometasone furoate     • Omeprazole     • Ondansetron hcl Nausea And Vomiting 05/01/2019   • Pepper (genus capsicum)  02/05/2019   • Primidone  05/01/2019   • Ribavirin Itching 10/19/2017   • Rosuvastatin  02/05/2019   • Sorafenib  04/04/2019   • Spironolactone     • Statins-hmg-coa reductase inhibitors Itching and GI intolerance 04/06/2018   • Interferon violetta (human leuk. derived) Palpitations and Rash 09/20/2019   • Pantoprazole Itching and Rash 02/28/2018   • Peginterferon violetta-2b Palpitations 10/19/2017   • Penicillins Other (see comments) 10/19/2017      MEDICATIONS:     Current Outpatient Medications   Medication Sig Dispense Refill   • morphine (MS CONTIN) 15 mg 12 hr tablet TAKE ONE TABLET BY MOUTH TWICE A DAY FOR PAIN     • amoxicillin-pot clavulanate (AUGMENTIN) 875-125 mg per tablet Take 1 tablet by mouth 2 (two) times a day for 14 days 28 tablet 0   • lidocaine-prilocaine (EMLA) cream Apply thick layer to intact skin over port 1 hour prior to procedure. Cover with occlusive dressing. 30 g 2   • HYDROmorphone (DILAUDID) 2 mg tablet Take 2-4 mg by mouth every 6 (six) hours as needed       • sildenafiL (VIAGRA) 100 mg tablet Take 100 mg by mouth as needed for erectile dysfunction Up to 4 times monthly     • fluoride, sodium, (DENTAGEL) 1.1 % gel dental gel See administration instructions Apply small amount to toothbrush in place of regular toothpaste. Brush for 2 minutes in the morning and evening.     •  polyethylene glycol (Miralax) 17 gram/dose powder Take 17 g by mouth daily     • brimonidine (ALPHAGAN) 0.2 % ophthalmic solution 1 drop 2 (two) times a day     • carboxymethylcellulose (REFRESH PLUS) 0.5 % dropperette Administer 1 drop into both eyes every 3 (three) hours Indications: dry eye       • prochlorperazine (COMPAZINE) 10 mg tablet Take 1 tablet (10 mg total) by mouth every 6 (six) hours as needed for nausea or vomiting (Patient not taking: Reported on 4/15/2021 ) 30 tablet 5   • artificial saliva (YERBA GLADYS AND LYTES) aerosol,spray spray Apply 2 sprays to the mouth or throat as needed       • fluticasone propionate (FLONASE) 50 mcg/actuation nasal spray Administer 2 sprays into each nostril daily       • naloxone HCl (NARCAN NASL) Administer into affected nostril(s) as needed     • traZODone (DESYREL) 100 mg tablet Take 100 mg by mouth nightly       • insulin glargine (Lantus Solostar U-100 Insulin) 100 unit/mL (3 mL) insulin pen injection pen Inject 24 Units under the skin every morning       • carvediloL (COREG) 3.125 mg tablet Take 1 tablet (3.125 mg total) by mouth 2 (two) times a day with meals 180 tablet 0   • diclofenac sodium (VOLTAREN) 1 % gel Apply 4 g topically 4 (four) times a day       • cetirizine (ZyrTEC) 10 mg tablet Take 10 mg by mouth daily     • loperamide (Imodium A-D) 2 mg tablet Take 2 tablets with first loose stool of the day, then up to 8 tablets daily (Patient not taking: Reported on 4/15/2021 ) 80 tablet 2   • buPROPion SR (WELLBUTRIN SR) 100 mg 12 hr tablet Take 1 tablet (100 mg total) by mouth daily 90 tablet 1   • lactulose (GENERLAC) 10 gram/15 mL solution Take 15 mL (10 g total) by mouth daily 473 mL 2   • terazosin (HYTRIN) 5 mg capsule Take 1 capsule (5 mg total) by mouth 2 (two) times a day 180 capsule 2   • blood-glucose meter (ACCU-CHEK ADELE PLUS METER) [facility] [place] Use to test blood sugars 3 times daily (E11.65, non insulin depedent) AccuChek Adele plus, meter is 8 years  old 1 each 0   • lisinopril (PRINIVIL,ZESTRIL) 20 mg tablet Take 1 tablet (20 mg total) by mouth daily 90 tablet 2   • furosemide (LASIX) 20 mg tablet Take 1 tablet (20 mg total) by mouth 2 (two) times a day. 180 tablet 2   • lansoprazole (PREVACID) 30 mg capsule Take 30 mg by mouth daily      • liraglutide (VICTOZA 2-JASE) 0.6 mg/0.1 mL (18 mg/3 mL) injection Inject 1.8 mg under the skin daily       • sodium chloride (SALINE NASAL) 0.65 % nasal spray Administer 1 spray into each nostril as needed for congestion or rhinitis      • pramoxine-menthol (GOLD BOND MEDICATED ANTI-ITCH) 1-1 % cream Apply topically as needed       • latanoprost (XALATAN) 0.005 % ophthalmic solution Administer 1 drop into both eyes nightly   10 0     No current facility-administered medications for this visit.     25 minutes spent reconciling patient's medication list    REVIEW OF SYSTEMS:   The ECOG performance status today is .2 - Symptomatic, <50% confined to bed  Review of Systems   Constitutional: Positive for appetite change and fatigue. Negative for chills, diaphoresis, fever and unexpected weight change.   HENT:   Negative for hearing loss, lump/mass, mouth sores, nosebleeds, sore throat, tinnitus, trouble swallowing and voice change.    Eyes: Negative for eye problems and icterus.   Respiratory: Positive for cough. Negative for chest tightness, hemoptysis, shortness of breath and wheezing.    Cardiovascular: Positive for leg swelling. Negative for chest pain and palpitations.   Gastrointestinal: Positive for abdominal pain (RUQ, epigastric) and constipation. Negative for abdominal distention, blood in stool, diarrhea, nausea and vomiting.        2 drains in place   Endocrine:        No chills, cold intolerance    Genitourinary: Negative for bladder incontinence, difficulty urinating, dysuria, frequency and hematuria.    Musculoskeletal: Positive for back pain (with cough-right posterior (using voltaren gel)) and gait problem  "(ambulates with a walker at home). Negative for arthralgias, flank pain, myalgias and neck pain.   Skin: Negative for itching, rash and wound.   Neurological: Positive for gait problem (ambulates with a walker at home). Negative for dizziness, extremity weakness, headaches, light-headedness, numbness, seizures and speech difficulty.   Hematological: Negative for adenopathy. Does not bruise/bleed easily.   Psychiatric/Behavioral: Positive for depression ([facility]). Negative for confusion, decreased concentration, sleep disturbance and suicidal ideas. The patient is not nervous/anxious.         \"brain fog\" stable overall, improves with rest   All other systems reviewed and are negative.      PHYSICAL EXAM:   /70   Pulse 79   Temp 37 °C (98.6 °F) (Oral)   Wt 84.6 kg (186 lb 9.6 oz)   SpO2 93%   BMI 31.05 kg/m²    Pain average 3-5/10, up to a 7/10     Physical Exam  Constitutional:       General: He is not in acute distress.     Appearance: Normal appearance. He is well-developed.   HENT:      Head: Normocephalic.      Nose: Nose normal.   Eyes:      General: No scleral icterus.        Right eye: No discharge.         Left eye: No discharge.      Extraocular Movements: Extraocular movements intact.      Conjunctiva/sclera: Conjunctivae normal.   Neck:      Thyroid: No thyromegaly.   Cardiovascular:      Rate and Rhythm: Normal rate and regular rhythm.      Heart sounds: Normal heart sounds, S1 normal and S2 normal. No murmur heard.   No gallop.    Pulmonary:      Effort: Pulmonary effort is normal. No respiratory distress.      Breath sounds: Normal breath sounds. No decreased breath sounds, wheezing, rhonchi or rales.   Chest:      Chest wall: No tenderness.   Abdominal:      General: Bowel sounds are normal. There is no distension.      Palpations: Abdomen is soft. There is no mass.      Tenderness: There is abdominal tenderness in the right upper quadrant, right lower quadrant and " epigastric area. There is no guarding or rebound.   Musculoskeletal:         General: No swelling, tenderness or deformity. Normal range of motion.      Cervical back: Full passive range of motion without pain, normal range of motion and neck supple.      Right lower leg: No edema.      Left lower leg: No edema.   Lymphadenopathy:      Head:      Right side of head: No submental, submandibular, tonsillar, preauricular, posterior auricular or occipital adenopathy.      Left side of head: No submental, submandibular, tonsillar, preauricular, posterior auricular or occipital adenopathy.      Cervical: No cervical adenopathy.      Upper Body:      Right upper body: No supraclavicular adenopathy.      Left upper body: No supraclavicular adenopathy.   Skin:     General: Skin is warm and dry.      Capillary Refill: Capillary refill takes less than 2 seconds.      Coloration: Skin is not jaundiced or pale.      Findings: No bruising, erythema, lesion, petechiae or rash.      Comments: Lateral drain site CDI, site of previous drain does not have any significant erythema but there is a tan-brown foul smelling drainage    Neurological:      General: No focal deficit present.      Mental Status: He is alert and oriented to person, place, and time.      Cranial Nerves: Cranial nerves are intact. No cranial nerve deficit.      Sensory: Sensation is intact.      Motor: Motor function is intact.      Coordination: Coordination is intact.      Gait: Gait is intact.   Psychiatric:         Attention and Perception: Attention and perception normal.         Mood and Affect: Mood and affect normal.         Speech: Speech normal.         Behavior: Behavior normal. Behavior is cooperative.         Thought Content: Thought content normal.         Cognition and Memory: Cognition and memory normal.         Judgment: Judgment normal.         LABORATORY DATA:     Lab Results   Component Value Date    WBC 4.0 05/27/2021    HGB 12.5 (L)  05/27/2021    HCT 38.2 05/27/2021     (L) 05/27/2021    RBC 4.69 05/27/2021    MCV 81.4 (L) 05/27/2021    MCH 26.7 (L) 05/27/2021    MCHC 32.8 05/27/2021    RDW 16.1 (H) 05/27/2021    MPV 8.0 05/27/2021    ANC 2.640 04/23/2020    NEUTROABS 3.10 05/27/2021     Lab Results   Component Value Date     (L) 05/27/2021    K 4.1 05/27/2021    CL 99 05/27/2021    CO2 30 05/27/2021    BUN 12 05/27/2021    CREATININE 0.73 05/27/2021    GLUCOSE 235 (H) 05/27/2021    CALCIUM 8.8 05/27/2021    PROT 6.8 05/27/2021    ALBUMIN 3.4 (L) 05/27/2021    AST 26 05/27/2021    ALT 17 05/27/2021    ALKPHOS 218 (H) 05/27/2021    BILITOT 0.94 05/27/2021     Lab Results   Component Value Date    PT 14.1 (H) 01/21/2021    INR 1.2 (H) 01/21/2021     Lab Results   Component Value Date    TSH 2.096 05/06/2021     Reviewed    DIAGNOSTIC IMAGING:    CT abdomen/pelvis from 3/31/21 reviewed.    Reviewed    IMPRESSION & PLAN:   1.  Hepatocellular carcinoma: Clinically, he appears to be stable with out signs of disease progression.  His labs are within acceptable limits.   -Proceed with cycle 8 atezolizumab 1200 mg IV alone. Due to concern for abdominal infection bevacizumab will be held today  -Restage with MR abdomen in July 2021  -Plan for follow-up at the [facility] with GI   -Will likely need to keep the lateral drain. IR will evaluate  2.  Drain site infection vs abscess recurrence: Culture pending. No new acute pain, no fevers. I think we can hold off on CT imaging for now and treat empirically with Augmentin x 14 days. He will have evaluation with IR on 6/1/21 to look at the lateral drain site.  3.  Pain: Cancer-related, managed by PCP currently.  He has run out of the MS Contin. Does have a refill request in however, I do not want him going without this.  -Refilled MS Contin 15 mg BID (7 days worth)  -Continue hydromorphone as needed  -No new pains to report today.  4.  Constipation: Secondary to narcotic pain medication.  Recent colonoscopy was normal with benign polyp  -Continue bowel protocol.  4.  Depression: On Wellbutrin. [facility]  -Follow up with our patient support team as well if needed.  5.  Follow-up in 3 weeks with repeat labs prior to the start of the next cycle.  He can be seen sooner if needed.  Instructed to call for questions or concerns.    One this date of service 45 minutes of total time was spent on this encounter. Greater than 50% of which was spent in counseling and coordination of care.    Electronically signed by: Chrystal Goodman, CNP

## 2021-06-01 NOTE — PROGRESS NOTES
"Reviewed wound culture and anaerobic culture results with Dr. Myers. He recommends stopping Augmentin and treating with flagyl 500 mg TID x 21 days. Monitor output.    Per Iva Call had a pretty good weekend.  Drainage from the previous drain site has decreased \"em[place]\" since starting antibiotics. The lateral drain is draining over the weekend 25 cc-30 cc, 5/28 15 cc, 5/27 21 cc    I reviewed Dr. Myers's recommendations with her. She plans to talk to his PCP Dr. Ramírez and Dr. Rivas to figure out who the point of contact should be regarding his drains etc as she does not know who is actually managing the drains. I told her I would be happy to help and will continue to be here for them if they need something and to just reach out if they have questions or concerns.  "

## 2021-06-07 NOTE — POST-PROCEDURE NOTE
Post Procedure Note    Patient Name: Ben Lai      : 1950    Radiologist: MATHIEU VALLE MD    Pre-operative Diagnosis: Pericholecystic Abscess    Operation : Abscess catheter check     Anesthesia Type:  none  Estimated Blood Loss: none    Findings: Drain stable in position. Communicates to biliary sytem as previous.      Complications:  None; patient tolerated the procedure well.

## 2021-06-17 NOTE — PROGRESS NOTES
Medical Oncology Follow-Up    Subjective   HISTORY OF PRESENT ILLNESS:  Ben Lai Sr is a 71 y.o. male who  was diagnosed with hepatocellular carcinoma in November 2018.  He has an underlying hepatitis C which was supposedly cured.    Due to the extent of his HCC per GI at [facility] it was not amenable to local therapy, resection or radiation, referred to medical oncology   He was started on sorafenib early December 2018 but this appears to have been stopped in February due to significant side effects.  Patient states unequivocally that he started to feel better after a 3-week break but the sorafenib was recently restarted and he is having significant quality of life compromising side effects again.  This includes severe joint and muscle pain and fatigue.     7/29/19 restaging scans showed progressive disease in the liver, however no sings of metastatic disease     8/14/19 s/p   CT-guided  right lobe of the liver radiofrequency ablation.     9/2019 seen hepatobiliary team at [facility], and was discussed for tumor board, planned to have Y90  Treatment     10/8/19 MRI abdomen showed Nodular appearing liver with 2 areas of hyper/early arterial enhancement suggestive of hepatocellular carcinoma are both adjacent to the gallbladder which contains filling defects which are enhancing suggestive of neoplasm. This could be primary cholangiocarcinoma or hepatocellular carcinoma which is invading the gallbladder. The liver and gallbladder masses are very similar to the study from 7/29/2019.  Wedge-shaped signal abnormality in the anterior right lobe of the liver similar to the prior study could represent hepatic infarct.    11/7/19 s/p Y90 treatment at [facility]     12/9/19 MRI abdomen showed Cirrhosis and evidence of portal hypertension. Posttreatment changes of radioembolization throughout the right hepatic lobe, with increased diffuse heterogeneous arterial enhancement. This is the  patient's first posttreatment MRI. LR-TR equivocal. Continued attention on follow-up.  Grossly stable size of the lobulated enhancing endoluminal gallbladder mass with decreased but persistent areas of enhancing viable tumor.  Stable segment 4 exophytic 2.6 cm OPTN-5b lesion.  Unchanged right hepatic vein and right portal vein branch thrombi, without convincing thrombus enhancement.  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 Attention on follow-up.    12/11/19 s/p chemo embolization for possible residual viable tumor in the liver.    1/16/20  restaging MR showed  The circumscribed mass is seen in the anterior aspect of the right lobe of the liver demonstrates positive response to treatment with significantly reduced enhancement, no change in overall size.2.  Post radiation ablation defect in the anterior inferior right lobe with surrounding heterogeneous nonmass-like enhancement. It would be difficult to exclude active tumor within this surrounding area of enhancement.  Significant change in appearance of the posterior segment of the right lobe of the liver. Previous infiltrative nodular enhancement, now geographic nonmass-like enhancement with parenchymal volume loss. This could represent posttreatment related change. Difficult to exclude residual/progressive tumor    4/21/20 MR abdomen showed Continued evolution of post embolic/radiotherapy changes to the right hepatic lobe with large region of volume loss of enhancement likely representing fibrosis.  Previous HCC anteriorly along this region continues to regress with no evidence of enhancement to suggest recurrent or residual tumor. Previous nodular lesion inferiorly along the treatment bed which also likely represented a site of HCC also continues to regress with no evidence of recurrence or residual disease. No new suspicious liver lesions are demonstrated. Cirrhosis. Splenomegaly. Portosystemic collaterals.    6/26/20 MR No significant  MRI change in the treatment-related appearance of the right hepatic lobe since 4/21/2020. No MR evidence of recurrent hepatoma. Treated anterior right hepatic lobe lesion is redemonstrated. Treated lesion at right more posterior hepatic lobe is also seen.   Hepatic fibrosis.Cirrhosis, splenomegaly and portosystemic collaterals.    10/5/20 MR abdomen showed 2 treated right hepatic lobe lesions and associated scarring and capsular retraction. Since the previous examination, there is a new 4 mm enhancing nodule at the posterior aspect of the right anterior hepatic lobe treated lesion.  The enhancement characteristics are progressive and indeterminate and there is the hint of small restricted diffusion. Overall, a change in the appearance of this treated lesion which has otherwise been stable since 1/16/2020 is suspicious for recurrent tumor. However, the imaging characteristics themselves are nonspecific of the 4 mm nodule (with differential diagnosis including rounded scar), and attention on short-term follow-up MRI of the abdomen with IV contrast in 3 months is recommended, along with correlation with AFP levels.     12/21/20 MR showed Amorphous enhancement has increased within the lower right hepatic region area of previous treated tumor. This enhancement pattern is worrisome for tumor progression the area. Correlation with recent AFP would be helpful. Considerable scar tissue and areas well.. In addition the lesion overlying the gallbladder fossa may involve the gallbladder which now appears to be markedly thickened and demonstrates considerable wall enhancement which appears to have changed since the previous study. Differential would include direct tumor invasion gallbladder and acute cholecystitis.     3/3/21 MR showed Very complex appearing situation in the region of the gallbladder fundus, subhepatic space extending to the hepatic flexure of the colon. There is concern for the possibility of extrahepatic  malignancy and/or an inflammatory process in this area which could be affecting the gallbladder. There is a 2.8 cm fluid collection adjacent to the gallbladder. Cholecystitis would be another consideration. Additional correlation with contrast-enhanced CT may be helpful to better define some of the anatomy in this areaCapsular retraction, scarring and mild enhancement redemonstrated in right hepatic lobe. Most of the intrahepatic signal abnormalities do not appear dramatically changed. There is still some biliary dilatation the right hepatic lobe with adjacent enhancement. Question cholangitis. In addition there are small filling defects within the common bile duct. Possible choledocholithiasis. Currently there is not seen to be high-grade obstruction however. Common bile duct diameter approximately millimeters    Today:   Pain in RUQ improved with drainage, still on oral antibiotics today.    No fevers or chills.  Has had symptoms of grade 1 Hepatic encephalopathy.     Energy is  Stable ECOG 2.   appetite ist stable, weight is stable.   Takes care of his ADLs.  Stable,back pain , chest pain and neuropathy.         Encounter Diagnosis   Name Primary?   • Hepatocellular carcinoma (CMS/HCC) (HCC)    .     Treatment History:  Oncology History   Hepatocellular carcinoma (CMS/HCC) (HCC)   1/23/2019 Initial Diagnosis    Hepatocellular carcinoma (CMS/HCC) (HCC)     7/30/2019 - 12/9/2019 Adjuvant Therapy    Hepatocellular - Sorafenib (Nexavar)  Plan Provider: ANDERSON Bryan  Treatment goal: Palliative  Line of treatment: [No plan line of treatment]     6/12/2020 Cancer Staged    Staging form: Liver, AJCC 8th Edition  - Clinical: Stage IIIB (cT4, cN0, cM0) - Signed by Milka Reynoso MD on 6/12/2020 12/31/2020 -  Adjuvant Therapy    Hepatocellular - Bevacizumab / Atezolizumab  Plan Provider: Milka Reynoso MD  Treatment goal: Palliative  Line of treatment: [No plan line of treatment]            RECENT EVENTS:    PAST MEDICAL HISTORY:   Past Medical History:   Diagnosis Date   • A-fib (CMS/HCC) (HCC)    • Allergy    • Anxiety    • Arthritis    • Asthma    • Blindness of right eye    • BPH (benign prostatic hyperplasia)    • Cardiovascular disease    • Cirrhosis (CMS/HCC) (HCC)     with possible liver mass by MRI 5/18, rec repeat in 8/2018   • Complication of anesthesia    • Congestive heart failure (CMS/HCC) (HCC) 9/20/2019   • COPD (chronic obstructive pulmonary disease) (CMS/HCC) (HCC)    • Depression    • Endocrine disorder    • Gallstones    • Gastritis    • GERD (gastroesophageal reflux disease)    • Glaucoma    • Hearing loss     bilateral hearing aids   • Hemorrhoids    • Hepatitis C     Hep C, 2016 remission, complicated by cirrhosis.  MRI abd 5/30/18, rec 3 month follow up for focus of enhancement right and left hepatic lobe junction   • Hernia, abdominal    • High blood pressure    • IBS (irritable bowel syndrome)    • Infectious viral hepatitis    • Jaundice    • Kidney stones    • Obstructive sleep apnea syndrome    • Periodic limb movement disorder    • Persistent hypersomnia    • Persistent insomnia    • Pneumonia    • PONV (postoperative nausea and vomiting)    • Type 2 diabetes mellitus (CMS/HCC) (HCC)    • Urinary incontinence    • Wears partial dentures         GYNECOLOGICAL HISTORY (if applicable): NA     FAMILY HISTORY:   Family History   Problem Relation Age of Onset   • Arthritis Mother    • Rheum arthritis Mother    • Diabetes Mother    • Rectal cancer Mother    • Other Paternal Grandmother    • Other Paternal Grandfather    • Diabetes Other    • Rheum arthritis Other    • Osteoporosis Other         SOCIAL HISTORY:   Social History     Socioeconomic History   • Marital status:      Spouse name: Not on file   • Number of children: Not on file   • Years of education: Not on file   • Highest education level: Not on file   Occupational History   • Not on file   Tobacco Use    • Smoking status: Never Smoker   • Smokeless tobacco: Never Used   Substance and Sexual Activity   • Alcohol use: No   • Drug use: No   • Sexual activity: Defer   Other Topics Concern   • Not on file   Social History Narrative   • Not on file     Social Determinants of Health     Financial Resource Strain:    • Difficulty of Paying Living Expenses:    Food Insecurity:    • Worried About Running Out of Food in the Last Year:    • Ran Out of Food in the Last Year:    Transportation Needs:    • Lack of Transportation (Medical):    • Lack of Transportation (Non-Medical):    Physical Activity:    • Days of Exercise per Week:    • Minutes of Exercise per Session:    Stress:    • Feeling of Stress :    Social Connections:    • Frequency of Communication with Friends and Family:    • Frequency of Social Gatherings with Friends and Family:    • Attends Taoism Services:    • Active Member of Clubs or Organizations:    • Attends Club or Organization Meetings:    • Marital Status:    Intimate Partner Violence:    • Fear of Current or Ex-Partner:    • Emotionally Abused:    • Physically Abused:    • Sexually Abused:         ALLERGIES:   Allergies as of 06/17/2021 - Reviewed 06/07/2021   Allergen Reaction Noted   • Metformin Hives    • Adhesive tape-silicones  04/06/2018   • Interferon violetta-2a  02/05/2019   • Latex     • Milk  02/05/2019   • Mometasone     • Mometasone furoate     • Omeprazole     • Ondansetron hcl Nausea And Vomiting 05/01/2019   • Pepper (genus capsicum)  02/05/2019   • Primidone  05/01/2019   • Ribavirin Itching 10/19/2017   • Rosuvastatin  02/05/2019   • Sorafenib  04/04/2019   • Spironolactone     • Statins-hmg-coa reductase inhibitors Itching and GI intolerance 04/06/2018   • Interferon violetta (human leuk. derived) Palpitations and Rash 09/20/2019   • Pantoprazole Itching and Rash 02/28/2018   • Peginterferon violetta-2b Palpitations 10/19/2017   • Penicillins Other (see comments) 10/19/2017         MEDICATIONS:   Current Outpatient Medications   Medication Sig Dispense Refill   • metroNIDAZOLE (FlagyL) 500 mg tablet Take 1 tablet (500 mg total) by mouth 3 (three) times a day for 21 days 63 tablet 0   • morphine (MS CONTIN) 15 mg 12 hr tablet TAKE ONE TABLET BY MOUTH TWICE A DAY FOR PAIN     • lidocaine-prilocaine (EMLA) cream Apply thick layer to intact skin over port 1 hour prior to procedure. Cover with occlusive dressing. 30 g 2   • HYDROmorphone (DILAUDID) 2 mg tablet Take 2-4 mg by mouth every 6 (six) hours as needed       • fluoride, sodium, (DENTAGEL) 1.1 % gel dental gel See administration instructions Apply small amount to toothbrush in place of regular toothpaste. Brush for 2 minutes in the morning and evening.     • polyethylene glycol (Miralax) 17 gram/dose powder Take 17 g by mouth daily     • brimonidine (ALPHAGAN) 0.2 % ophthalmic solution 1 drop 2 (two) times a day     • carboxymethylcellulose (REFRESH PLUS) 0.5 % dropperette Administer 1 drop into both eyes every 3 (three) hours Indications: dry eye       • prochlorperazine (COMPAZINE) 10 mg tablet Take 1 tablet (10 mg total) by mouth every 6 (six) hours as needed for nausea or vomiting 30 tablet 5   • artificial saliva (YERBA GLADYS AND LYTES) aerosol,spray spray Apply 2 sprays to the mouth or throat as needed       • fluticasone propionate (FLONASE) 50 mcg/actuation nasal spray Administer 2 sprays into each nostril daily       • naloxone HCl (NARCAN NASL) Administer into affected nostril(s) as needed     • traZODone (DESYREL) 100 mg tablet Take 100 mg by mouth nightly       • insulin glargine (Lantus Solostar U-100 Insulin) 100 unit/mL (3 mL) insulin pen injection pen Inject 24 Units under the skin every morning       • carvediloL (COREG) 3.125 mg tablet Take 1 tablet (3.125 mg total) by mouth 2 (two) times a day with meals 180 tablet 0   • diclofenac sodium (VOLTAREN) 1 % gel Apply 4 g topically 4 (four) times a day       • cetirizine  (ZyrTEC) 10 mg tablet Take 10 mg by mouth daily     • buPROPion SR (WELLBUTRIN SR) 100 mg 12 hr tablet Take 1 tablet (100 mg total) by mouth daily 90 tablet 1   • lactulose (GENERLAC) 10 gram/15 mL solution Take 15 mL (10 g total) by mouth daily 473 mL 2   • terazosin (HYTRIN) 5 mg capsule Take 1 capsule (5 mg total) by mouth 2 (two) times a day 180 capsule 2   • blood-glucose meter (ACCU-CHEK ADELE PLUS METER) [facility] Use to test blood sugars 3 times daily (E11.65, non insulin depedent) AccuChek Adele plus, meter is 8 years old 1 each 0   • lisinopril (PRINIVIL,ZESTRIL) 20 mg tablet Take 1 tablet (20 mg total) by mouth daily 90 tablet 2   • furosemide (LASIX) 20 mg tablet Take 1 tablet (20 mg total) by mouth 2 (two) times a day. 180 tablet 2   • lansoprazole (PREVACID) 30 mg capsule Take 30 mg by mouth daily      • liraglutide (VICTOZA 2-JASE) 0.6 mg/0.1 mL (18 mg/3 mL) injection Inject 1.8 mg under the skin daily       • latanoprost (XALATAN) 0.005 % ophthalmic solution Administer 1 drop into both eyes nightly   10 0   • amoxicillin-pot clavulanate (AUGMENTIN) 875-125 mg per tablet Take 1 tablet by mouth 2 (two) times a day for 14 days 28 tablet 0   • amoxicillin-pot clavulanate (AUGMENTIN) 875-125 mg per tablet Take 1 tablet by mouth 2 (two) times a day for 14 days 28 tablet 0   • sildenafiL (VIAGRA) 100 mg tablet Take 100 mg by mouth as needed for erectile dysfunction Up to 4 times monthly     • loperamide (Imodium A-D) 2 mg tablet Take 2 tablets with first loose stool of the day, then up to 8 tablets daily (Patient not taking: Reported on 4/15/2021 ) 80 tablet 2   • sodium chloride (SALINE NASAL) 0.65 % nasal spray Administer 1 spray into each nostril as needed for congestion or rhinitis      • pramoxine-menthol (GOLD BOND MEDICATED ANTI-ITCH) 1-1 % cream Apply topically as needed         No current facility-administered medications for this visit.       REVIEW OF SYSTEMS:   Review of Systems   Constitutional:  Positive for fatigue.   HENT:   Positive for hearing loss.    Eyes: Positive for eye problems.   Gastrointestinal: Positive for abdominal distention and abdominal pain.   Endocrine: Negative.    Genitourinary: Positive for bladder incontinence.    Musculoskeletal: Positive for arthralgias, gait problem and myalgias.   Skin: Negative.    Neurological: Positive for gait problem.   Hematological: Negative.    Psychiatric/Behavioral: The patient is nervous/anxious.    All other systems reviewed and are negative.      The ECOG performance status today is 2 - Ambulatory care of self; up and about >50% of waking hours.          Objective    PHYSICAL EXAM:   /68   Pulse 78   Temp 36.8 °C (98.2 °F) (Temporal)   Wt 84.8 kg (187 lb)   SpO2 93%   BMI 31.12 kg/m²    Body mass index is 31.12 kg/m².       Physical Exam  HENT:      Head: Normocephalic.      Nose: Nose normal.      Mouth/Throat:      Mouth: Mucous membranes are moist.   Eyes:      Pupils: Pupils are equal, round, and reactive to light.   Cardiovascular:      Rate and Rhythm: Normal rate.   Pulmonary:      Effort: Pulmonary effort is normal.   Abdominal:      General: Abdomen is flat. There is no distension.   Musculoskeletal:         General: Normal range of motion.      Cervical back: Normal range of motion.   Skin:     General: Skin is dry.   Neurological:      General: No focal deficit present.      Mental Status: He is alert.   Psychiatric:         Mood and Affect: Mood normal.         LABS:   Appointment on 06/17/2021   Component Date Value Ref Range Status   • WBC 06/17/2021 3.1* 3.7 - 9.6 10*3/uL Final   • RBC 06/17/2021 4.53  4.10 - 5.80 10*6/µL Final   • Hemoglobin 06/17/2021 12.0* 13.2 - 17.2 g/dL Final   • Hematocrit 06/17/2021 36.8* 38.0 - 50.0 % Final   • MCV 06/17/2021 81.2* 82.0 - 97.0 fL Final   • MCH 06/17/2021 26.5* 29.0 - 34.0 pg Final   • MCHC 06/17/2021 32.6  32.0 - 36.0 g/dL Final   • RDW 06/17/2021 16.8* 11.5 - 15.0 % Final   •  Platelets 06/17/2021 115* 130 - 350 10*3/uL Final   • MPV 06/17/2021 8.0  6.9 - 10.8 fL Final   • Neutrophils% 06/17/2021 77* 46 - 70 % Final   • Lymphocytes% 06/17/2021 10* 15 - 47 % Final   • Monocytes% 06/17/2021 10  5 - 13 % Final   • Eosinophils% 06/17/2021 2  0 - 3 % Final   • Basophils% 06/17/2021 1  0 - 2 % Final   • ANC (auto diff) 06/17/2021 2.40  10*3/UL Final   • Lymphocytes Absolute 06/17/2021 0.30  10*3/uL Final   • Monocytes Absolute 06/17/2021 0.30  10*3/uL Final   • Eosinophils Absolute 06/17/2021 0.10  10*3/uL Final   • Basophils Absolute 06/17/2021 0.00  10*3/uL Final   • Hypochromia 06/17/2021 1+   Final   • Sodium 06/17/2021 133* 135 - 145 mmol/L Final   • Potassium 06/17/2021 4.1  3.5 - 5.1 mmol/L Final   • Chloride 06/17/2021 100  98 - 107 mmol/L Final   • CO2 06/17/2021 27  21 - 32 mmol/L Final   • Anion Gap 06/17/2021 6  3 - 11 mmol/L Final   • BUN 06/17/2021 16  7 - 25 mg/dL Final   • Creatinine 06/17/2021 0.68* 0.70 - 1.30 mg/dL Final   • Glucose 06/17/2021 232* 70 - 105 mg/dL Final   • Calcium 06/17/2021 8.8  8.6 - 10.3 mg/dL Final   • AST 06/17/2021 32  <40 U/L Final   • ALT (SGPT) 06/17/2021 14  7 - 52 U/L Final   • Alkaline Phosphatase 06/17/2021 165* 45 - 115 U/L Final   • Total Protein 06/17/2021 6.8  6.0 - 8.3 g/dL Final   • Albumin 06/17/2021 3.3* 3.5 - 5.3 g/dL Final   • Total Bilirubin 06/17/2021 0.70  0.20 - 1.40 mg/dL Final   • eGFR 06/17/2021 97  >60 mL/min/1.73m*2 Final   • Corrected Calcium 06/17/2021 9.4  8.6 - 10.3 mg/dL Final       DIAGNOSTIC REPORTS REVIEWED:   Imaging:  Reviewed     Assessment/Plan    IMPRESSION:   This is a pleasant 71 y.o. male with diagnosis of hepatocellular carcinoma involving the right lobe, locally advanced disease, per the chart not amenable to local therapy, started sorafenib December 2018 however stopped in February 2019 due to intolerance without any dose reductions.  Has been off of treatment since then, restaging CAT scan and MRI  7/2019  show progressive disease in the liver.  Started  sorafenib 8/2019 at a lower dose 200 mg twice daily, s/p RFA 8/2019, was referred to [facility], s/p Y90 treatment followed by TACE, MR showed response to treatment without progression on 1/16/20 and 4/21/20  currently treatment on hold until evidence of disease progression       concerned about enhancement of the gallbladder discussed at tumor board at  and  There is no rule for surgery, on MR 12/2020 there was clear progression of HCC and discussed plan of either starting bevacizumab and atezolizomab vs lenvatinib   After discussion likely to start bevacizumab and atezolizomab.     Restaging MR 3/2021 showed Very complex appearing situation in the region of the gallbladder fundus,  possibility of extrahepatic malignancy and/or an inflammatory process  . There is a 2.8 cm fluid collection adjacent to the gallbladder. Cholecystitis would be another consideration.  S/p drain tubes and antibiotics      Plan:   - continue atzolizumab 1200 mg  and bevacizumab 15mg/kg  Until drain tubes are removed.  - will restage 7/29/21 with MR abdomen.   -  Follow up with [facility] for GI follow up and repeat ERCP.   - continue follow up with palliative medicine for pain control.   - follow up  hepatology for liver cirrhosis.   - follow up ID for antibiotics.        Physician: Milka Reynoso MD

## 2021-06-20 ENCOUNTER — HEALTH MAINTENANCE LETTER (OUTPATIENT)
Age: 71
End: 2021-06-20

## 2021-06-21 NOTE — POST-PROCEDURE NOTE
Operation : Fluoroscopic right abdominal Drain / Tube Exchange and Check    Patient Name: Ben Lai      : 1950    Radiologist: Mireya Jules MD    Pre-operative Diagnosis: right abdominal drain    Findings: New fistulous connection seen to the adjacent colon, compared to prior drain chest. Drain was exchanged for a new drain and left to bulb suction. No flushing of the drain. Recommend repeat drain check in 2 weeks.     Complications:  None; patient tolerated the procedure well.

## 2021-07-05 NOTE — TELEPHONE ENCOUNTER
"Laura calls in to report that since he has finished his meds he is experiencing increased abdominal  pain and he has had an increase of drainage out of is tube, 45-55 cc's daily. Theres also white\"stuff \" in with the blood drainage that she has noted yesterday.No fever but some chills in spurts. She does not want to take him to the [place] [facility] because the last time they went they were there 15 hours. [facility] , they will discuss what they want to do and go from there. Laura can call back any questions.  .   "

## 2021-07-05 NOTE — POST-PROCEDURE NOTE
Post Procedure Note    Patient Name: Ben Lai      : 1950    Radiologist: MATHIEU VALLE MD    Pre-operative Diagnosis: RUQ Abscess    Operation :RUQ Abscess catheter check    Anesthesia Type:  none    Estimated Blood Loss: none    Findings: 1. Residual cavity is same size as pigtail.   2. No colon communication.  3. Only small communication to bile duct.    Complications:  None; patient tolerated the procedure well.

## 2021-07-08 NOTE — PROGRESS NOTES
Hematology/Medical Oncology Follow-Up    Patient Name: eBn Lai Sr  YOB: 1950  Medical Record Number: [id number]    DATE OF SERVICE:   7/8/2021    CHIEF COMPLAINT:  Ben Lai Sr is a 71 y.o. male with locally advanced hepatocellular carcinoma who presents today for follow-up and treatment.    ONCOLOGY HISTORY/ HISTORY OF PRESENT ILLNESS:  Oncology History   Hepatocellular carcinoma (CMS/HCC) (HCC)   1/23/2019 Initial Diagnosis    Hepatocellular carcinoma (CMS/HCC) (HCC)     7/30/2019 - 12/9/2019 Adjuvant Therapy    Hepatocellular - Sorafenib (Nexavar)  Plan Provider: ANDERSON Bryan  Treatment goal: Palliative  Line of treatment: [No plan line of treatment]     6/12/2020 Cancer Staged    Staging form: Liver, AJCC 8th Edition  - Clinical: Stage IIIB (cT4, cN0, cM0) - Signed by Milka Reynoso MD on 6/12/2020 12/31/2020 -  Adjuvant Therapy    Hepatocellular - Bevacizumab / Atezolizumab  Plan Provider: Milka Reynoso MD  Treatment goal: Palliative  Line of treatment: [No plan line of treatment]     diagnosed with hepatocellular carcinoma in November 2018.  He has an underlying hepatitis C which was supposedly cured.    Due to the extent of his HCC per GI at [facility] it was not amenable to local therapy, resection or radiation, referred to medical oncology   He was started on sorafenib early December 2018 but this appears to have been stopped in February due to significant side effects.  Patient states unequivocally that he started to feel better after a 3-week break but the sorafenib was recently restarted and he is having significant quality of life compromising side effects again.  This includes severe joint and muscle pain and fatigue.      7/29/19 restaging scans showed progressive disease in the liver, however no sings of metastatic disease      8/14/19 s/p   CT-guided  right lobe of the liver radiofrequency ablation.      9/2019 seen hepatobiliary team at  [facility], and was discussed for tumor board, planned to have Y90  Treatment      10/8/19 MRI abdomen showed Nodular appearing liver with 2 areas of hyper/early arterial enhancement suggestive of hepatocellular carcinoma are both adjacent to the gallbladder which contains filling defects which are enhancing suggestive of neoplasm. This could be primary cholangiocarcinoma or hepatocellular carcinoma which is invading the gallbladder. The liver and gallbladder masses are very similar to the study from 7/29/2019.  Wedge-shaped signal abnormality in the anterior right lobe of the liver similar to the prior study could represent hepatic infarct.     11/7/19 s/p Y90 treatment at [facility]      12/9/19 MRI abdomen showed Cirrhosis and evidence of portal hypertension. Posttreatment changes of radioembolization throughout the right hepatic lobe, with increased diffuse heterogeneous arterial enhancement. This is the patient's first posttreatment MRI. LR-TR equivocal. Continued attention on follow-up.  Grossly stable size of the lobulated enhancing endoluminal gallbladder mass with decreased but persistent areas of enhancing viable tumor.  Stable segment 4 exophytic 2.6 cm OPTN-5b lesion.  Unchanged right hepatic vein and right portal vein branch thrombi, without convincing thrombus enhancement.  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 Attention on follow-up.     12/11/19 s/p chemo embolization for possible residual viable tumor in the liver.     1/16/20  restaging MR showed  The circumscribed mass is seen in the anterior aspect of the right lobe of the liver demonstrates positive response to treatment with significantly reduced enhancement, no change in overall size.2.  Post radiation ablation defect in the anterior inferior right lobe with surrounding heterogeneous nonmass-like enhancement. It would be difficult to exclude active tumor within this surrounding area of  enhancement.  Significant change in appearance of the posterior segment of the right lobe of the liver. Previous infiltrative nodular enhancement, now geographic nonmass-like enhancement with parenchymal volume loss. This could represent posttreatment related change. Difficult to exclude residual/progressive tumor     4/21/20 MR abdomen showed Continued evolution of post embolic/radiotherapy changes to the right hepatic lobe with large region of volume loss of enhancement likely representing fibrosis.  Previous HCC anteriorly along this region continues to regress with no evidence of enhancement to suggest recurrent or residual tumor. Previous nodular lesion inferiorly along the treatment bed which also likely represented a site of HCC also continues to regress with no evidence of recurrence or residual disease. No new suspicious liver lesions are demonstrated. Cirrhosis. Splenomegaly. Portosystemic collaterals.     6/26/20 MR No significant MRI change in the treatment-related appearance of the right hepatic lobe since 4/21/2020. No MR evidence of recurrent hepatoma. Treated anterior right hepatic lobe lesion is redemonstrated. Treated lesion at right more posterior hepatic lobe is also seen.   Hepatic fibrosis.Cirrhosis, splenomegaly and portosystemic collaterals.     10/5/20 MR abdomen showed 2 treated right hepatic lobe lesions and associated scarring and capsular retraction. Since the previous examination, there is a new 4 mm enhancing nodule at the posterior aspect of the right anterior hepatic lobe treated lesion.  The enhancement characteristics are progressive and indeterminate and there is the hint of small restricted diffusion. Overall, a change in the appearance of this treated lesion which has otherwise been stable since 1/16/2020 is suspicious for recurrent tumor. However, the imaging characteristics themselves are nonspecific of the 4 mm nodule (with differential diagnosis including rounded scar), and  attention on short-term follow-up MRI of the abdomen with IV contrast in 3 months is recommended, along with correlation with AFP levels.     12/21/20 MR showed Amorphous enhancement has increased within the lower right hepatic region area of previous treated tumor. This enhancement pattern is worrisome for tumor progression the area. Correlation with recent AFP would be helpful. Considerable scar tissue and areas well.. In addition the lesion overlying the gallbladder fossa may involve the gallbladder which now appears to be markedly thickened and demonstrates considerable wall enhancement which appears to have changed since the previous study. Differential would include direct tumor invasion gallbladder and acute cholecystitis.      3/3/21 MR showed Very complex appearing situation in the region of the gallbladder fundus, subhepatic space extending to the hepatic flexure of the colon. There is concern for the possibility of extrahepatic malignancy and/or an inflammatory process in this area which could be affecting the gallbladder. There is a 2.8 cm fluid collection adjacent to the gallbladder. Cholecystitis would be another consideration. Additional correlation with contrast-enhanced CT may be helpful to better define some of the anatomy in this areaCapsular retraction, scarring and mild enhancement redemonstrated in right hepatic lobe. Most of the intrahepatic signal abnormalities do not appear dramatically changed.  3. There is still some biliary dilatation the right hepatic lobe with adjacent enhancement. Question cholangitis.  4. In addition there are small filling defects within the common bile duct. Possible choledocholithiasis. Currently there is not seen to be high-grade obstruction however. Common bile duct diameter approximately millimeters    -He is status post drain placement and antibiotic treatment after being hospitalized 3/4/21-3/10/21    5/5/21: MR abdomen  Showed  1.  The subhepatic inflammatory  process has improved. There are at least two subhepatic drains in place. No measurable collections are seen.  2.  Stable appearance of the liver. No new or progressive disease.    INTERVAL HISTORY:  Over the last 3 weeks Jakub has overall been doing well.  He had his drain replaced by IR without issues on the fifth.  On the sixth his wife started to notice a purulent drainage and increased amount of drainage in his KYUNG drain.  [facility]  Imaging was not done.  He was started on cefuroxime and metronidazole.  His PCP has been in contact with Dr. Myers previously regarding his recurrent infections as have I.  His culture and sensitivity is pending at this time.  Per his wife he is more energetic, alert and overall looks better since being on the antibiotics last couple of days.  He continues to have abdominal pain.  He ran out of his MS Contin again and had delays in getting this refilled so he was taking more hydromorphone which led to some constipation and lower abdominal pain.  They are working with MiraLAX and senna as well as lactulose to regulate the bowels.  Of note, they are thinking about going to [place] in mid August for vacation.        Past Medical History:   Diagnosis Date   • A-fib (CMS/HCC) (HCC)    • Allergy    • Anxiety    • Arthritis    • Asthma    • Blindness of right eye    • BPH (benign prostatic hyperplasia)    • Cardiovascular disease    • Cirrhosis (CMS/HCC) (HCC)     with possible liver mass by MRI 5/18, rec repeat in 8/2018   • Complication of anesthesia    • Congestive heart failure (CMS/HCC) (HCC) 9/20/2019   • COPD (chronic obstructive pulmonary disease) (CMS/HCC) (HCC)    • Depression    • Endocrine disorder    • Gallstones    • Gastritis    • GERD (gastroesophageal reflux disease)    • Glaucoma    • Hearing loss     bilateral hearing aids   • Hemorrhoids    • Hepatitis C     Hep C, 2016 remission, complicated by cirrhosis.  MRI abd 5/30/18, rec  3 month follow up for focus of enhancement right and left hepatic lobe junction   • Hernia, abdominal    • High blood pressure    • IBS (irritable bowel syndrome)    • Infectious viral hepatitis    • Jaundice    • Kidney stones    • Obstructive sleep apnea syndrome    • Periodic limb movement disorder    • Persistent hypersomnia    • Persistent insomnia    • Pneumonia    • PONV (postoperative nausea and vomiting)    • Type 2 diabetes mellitus (CMS/HCC) (HCC)    • Urinary incontinence    • Wears partial dentures      Past Surgical History:   Procedure Laterality Date   • COLONOSCOPY  10/27/2016    Pili, normal, repeat 10 years   • COLONOSCOPY N/A 5/11/2021    Procedure: COLONOSCOPY with polypectomy;  Surgeon: William Rivas MD;  Location: [facility];  Service: Endoscopy;  Laterality: N/A;   • CT ABLATION LIVER RADIOFREQUENCY  8/14/2019    CT ABLATION LIVER RADIOFREQUENCY 8/14/2019 [facility] MIS CT SCAN   • CT ABSCESS CYST PERITONEAL  3/5/2021    CT ABSCESS CYST PERITONEAL 3/5/2021 [facility] MIS CT SCAN   • ESOPHAGOGASTRODUODENOSCOPY N/A 2/22/2018    Procedure: EGD - ESOPHAGOGASTRODUODENOSCOPY with banding  x 2 with crna;  Surgeon: William Rivas MD;  Location: [facility];  Service: Endoscopy;  Laterality: N/A;   • ESOPHAGOGASTRODUODENOSCOPY N/A 4/6/2018    Procedure: EGD - ESOPHAGOGASTRODUODENOSCOPY with crna;  Surgeon: William Rivas MD;  Location: [facility];  Service: Endoscopy;  Laterality: N/A;   • ESOPHAGOGASTRODUODENOSCOPY N/A 5/7/2019    Procedure: EGD - ESOPHAGOGASTRODUODENOSCOPY;  Surgeon: William Rivas MD;  Location: [facility];  Service: Endoscopy;  Laterality: N/A;   • ESOPHAGOGASTRODUODENOSCOPY N/A 5/11/2021    Procedure: ESOPHAGOGASTRODUODENOSCOPY;  Surgeon: William Rivas MD;  Location: [facility];  Service: Endoscopy;  Laterality: N/A;   • HAND SURGERY Left     thumb   • HERNIA REPAIR  01/01/1965   • IR INJECTION ABSCESS CATHETER EVALUATION  4/2/2021    IR INJECTION ABSCESS CATHETER EVALUATION  4/2/2021 Mireya Jules MD [facility] MIS INTERVEN RAD   • IR INJECTION ABSCESS CATHETER EVALUATION  4/2/2021    IR INJECTION ABSCESS CATHETER EVALUATION 4/2/2021 Mireya Jules MD [facility] MIS INTERVEN RAD   • IR INJECTION ABSCESS CATHETER EVALUATION  5/5/2021    IR INJECTION ABSCESS CATHETER EVALUATION 5/5/2021 Chencho Wagner MD [facility] MIS INTERVEN RAD   • IR INJECTION ABSCESS CATHETER EVALUATION  5/5/2021    IR INJECTION ABSCESS CATHETER EVALUATION 5/5/2021 Chencho Wagner MD [facility] MIS INTERVEN RAD   • IR INJECTION ABSCESS CATHETER EVALUATION  5/18/2021    IR INJECTION ABSCESS CATHETER EVALUATION 5/18/2021 [facility] MIS INTERVEN RAD   • IR INJECTION ABSCESS CATHETER EVALUATION  5/18/2021    IR INJECTION ABSCESS CATHETER EVALUATION 5/18/2021 [facility] MIS INTERVEN RAD   • IR INJECTION ABSCESS CATHETER EVALUATION  6/1/2021    IR INJECTION ABSCESS CATHETER EVALUATION 6/1/2021 Chencho Wagner MD [facility] MIS INTERVEN RAD   • IR INJECTION ABSCESS CATHETER EVALUATION  6/7/2021    IR INJECTION ABSCESS CATHETER EVALUATION 6/7/2021 [facility] MIS INTERVEN RAD   • IR INJECTION ABSCESS CATHETER EVALUATION  7/5/2021    IR INJECTION ABSCESS CATHETER EVALUATION 7/5/2021 [facility] MIS INTERVEN RAD   • IR INJECTION CATHETER EXCHANGE  6/21/2021    IR INJECTION CATHETER EXCHANGE 6/21/2021 [facility] MIS INTERVEN RAD   • IR TUNNELED CENTRAL LINE WITH PORT  12/31/2020    IR TUNNELED CENTRAL LINE WITH PORT 12/31/2020 Sonny Danielle MD [facility] MIS INTERVEN RAD   • KNEE ARTHROSCOPY  09/19/2017    left knee, with partial medial meniscectomy; limited chondroplasty, patellofemoral joint   • LIVER BIOPSY      by Dr. Alejandre   • OTHER SURGICAL HISTORY      esophageal varices, banded   • VASECTOMY      at approx age of 25     Social History     Socioeconomic History   • Marital status:      Spouse name: Not on file   • Number of children: Not on file   • Years of education: Not on file   • Highest education level: Not on file   Occupational History   • Not on file   Tobacco Use   • Smoking  status: Never Smoker   • Smokeless tobacco: Never Used   Substance and Sexual Activity   • Alcohol use: No   • Drug use: No   • Sexual activity: Defer   Other Topics Concern   • Not on file   Social History Narrative   • Not on file     Social Determinants of Health     Financial Resource Strain:    • Difficulty of Paying Living Expenses:    Food Insecurity:    • Worried About Running Out of Food in the Last Year:    • Ran Out of Food in the Last Year:    Transportation Needs:    • Lack of Transportation (Medical):    • Lack of Transportation (Non-Medical):    Physical Activity:    • Days of Exercise per Week:    • Minutes of Exercise per Session:    Stress:    • Feeling of Stress :    Social Connections:    • Frequency of Communication with Friends and Family:    • Frequency of Social Gatherings with Friends and Family:    • Attends [employer] Services:    • Active Member of Clubs or Organizations:    • Attends Club or Organization Meetings:    • Marital Status:    Intimate Partner Violence:    • Fear of Current or Ex-Partner:    • Emotionally Abused:    • Physically Abused:    • Sexually Abused:       ALLERGIES:     Allergies as of 07/08/2021 - Reviewed 07/05/2021   Allergen Reaction Noted   • Metformin Hives    • Adhesive tape-silicones  04/06/2018   • Interferon violetta-2a  02/05/2019   • Latex     • Milk  02/05/2019   • Mometasone     • Mometasone furoate     • Omeprazole     • Ondansetron hcl Nausea And Vomiting 05/01/2019   • Pepper (genus capsicum)  02/05/2019   • Primidone  05/01/2019   • Ribavirin Itching 10/19/2017   • Rosuvastatin  02/05/2019   • Sorafenib  04/04/2019   • Spironolactone     • Statins-hmg-coa reductase inhibitors Itching and GI intolerance 04/06/2018   • Interferon violetta (human leuk. derived) Palpitations and Rash 09/20/2019   • Pantoprazole Itching and Rash 02/28/2018   • Peginterferon violetta-2b Palpitations 10/19/2017   • Penicillins Other (see comments) 10/19/2017      MEDICATIONS:      Current Outpatient Medications   Medication Sig Dispense Refill   • amoxicillin-pot clavulanate (AUGMENTIN) 875-125 mg per tablet Take 1 tablet by mouth 2 (two) times a day for 14 days 28 tablet 0   • morphine (MS CONTIN) 15 mg 12 hr tablet TAKE ONE TABLET BY MOUTH TWICE A DAY FOR PAIN     • amoxicillin-pot clavulanate (AUGMENTIN) 875-125 mg per tablet Take 1 tablet by mouth 2 (two) times a day for 14 days 28 tablet 0   • lidocaine-prilocaine (EMLA) cream Apply thick layer to intact skin over port 1 hour prior to procedure. Cover with occlusive dressing. 30 g 2   • HYDROmorphone (DILAUDID) 2 mg tablet Take 2-4 mg by mouth every 6 (six) hours as needed       • sildenafiL (VIAGRA) 100 mg tablet Take 100 mg by mouth as needed for erectile dysfunction Up to 4 times monthly     • fluoride, sodium, (DENTAGEL) 1.1 % gel dental gel See administration instructions Apply small amount to toothbrush in place of regular toothpaste. Brush for 2 minutes in the morning and evening.     • polyethylene glycol (Miralax) 17 gram/dose powder Take 17 g by mouth daily     • brimonidine (ALPHAGAN) 0.2 % ophthalmic solution 1 drop 2 (two) times a day     • carboxymethylcellulose (REFRESH PLUS) 0.5 % dropperette Administer 1 drop into both eyes every 3 (three) hours Indications: dry eye       • prochlorperazine (COMPAZINE) 10 mg tablet Take 1 tablet (10 mg total) by mouth every 6 (six) hours as needed for nausea or vomiting 30 tablet 5   • artificial saliva (YERBA GLADYS AND LYTES) aerosol,spray spray Apply 2 sprays to the mouth or throat as needed       • fluticasone propionate (FLONASE) 50 mcg/actuation nasal spray Administer 2 sprays into each nostril daily       • naloxone HCl (NARCAN NASL) Administer into affected nostril(s) as needed     • traZODone (DESYREL) 100 mg tablet Take 100 mg by mouth nightly       • insulin glargine (Lantus Solostar U-100 Insulin) 100 unit/mL (3 mL) insulin pen injection pen Inject 24 Units under the skin  every morning       • carvediloL (COREG) 3.125 mg tablet Take 1 tablet (3.125 mg total) by mouth 2 (two) times a day with meals 180 tablet 0   • diclofenac sodium (VOLTAREN) 1 % gel Apply 4 g topically 4 (four) times a day       • cetirizine (ZyrTEC) 10 mg tablet Take 10 mg by mouth daily     • loperamide (Imodium A-D) 2 mg tablet Take 2 tablets with first loose stool of the day, then up to 8 tablets daily (Patient not taking: Reported on 4/15/2021 ) 80 tablet 2   • buPROPion SR (WELLBUTRIN SR) 100 mg 12 hr tablet Take 1 tablet (100 mg total) by mouth daily 90 tablet 1   • lactulose (GENERLAC) 10 gram/15 mL solution Take 15 mL (10 g total) by mouth daily 473 mL 2   • terazosin (HYTRIN) 5 mg capsule Take 1 capsule (5 mg total) by mouth 2 (two) times a day 180 capsule 2   • blood-glucose meter (ACCU-CHEK ADELE PLUS METER) [facility] Use to test blood sugars 3 times daily (E11.65, non insulin depedent) AccuChek Adele plus, meter is 8 years old 1 each 0   • lisinopril (PRINIVIL,ZESTRIL) 20 mg tablet Take 1 tablet (20 mg total) by mouth daily 90 tablet 2   • furosemide (LASIX) 20 mg tablet Take 1 tablet (20 mg total) by mouth 2 (two) times a day. 180 tablet 2   • lansoprazole (PREVACID) 30 mg capsule Take 30 mg by mouth daily      • liraglutide (VICTOZA 2-JASE) 0.6 mg/0.1 mL (18 mg/3 mL) injection Inject 1.8 mg under the skin daily       • sodium chloride (SALINE NASAL) 0.65 % nasal spray Administer 1 spray into each nostril as needed for congestion or rhinitis      • pramoxine-menthol (GOLD BOND MEDICATED ANTI-ITCH) 1-1 % cream Apply topically as needed       • latanoprost (XALATAN) 0.005 % ophthalmic solution Administer 1 drop into both eyes nightly   10 0     No current facility-administered medications for this visit.     25 minutes spent reconciling patient's medication list    REVIEW OF SYSTEMS:   The ECOG performance status today is .2 - Symptomatic, <50% confined to bed  Review of Systems   Constitutional: Positive for  "appetite change (doing protein drinks-sees nutritionist tomorrow) and fatigue. Negative for chills, diaphoresis, fever and unexpected weight change.   HENT:   Negative for hearing loss, lump/mass, mouth sores, nosebleeds, sore throat, tinnitus, trouble swallowing and voice change.    Eyes: Negative for eye problems and icterus.   Respiratory: Positive for cough. Negative for chest tightness, hemoptysis, shortness of breath and wheezing.    Cardiovascular: Negative for chest pain, leg swelling and palpitations.   Gastrointestinal: Positive for abdominal pain (RUQ, epigastric) and constipation (taking senna, miralax and lactulose). Negative for abdominal distention, blood in stool, diarrhea, nausea and vomiting.        1 drain, was thick, white and increasing after new drain placed, since starting abx has slowed down-more serosang in color  Between 10-25 ml output average   Endocrine:        No chills, cold intolerance    Genitourinary: Negative for bladder incontinence, difficulty urinating, dysuria, frequency and hematuria.    Musculoskeletal: Positive for back pain (with cough-right posterior (using voltaren gel)) and gait problem (ambulating with walker today). Negative for arthralgias, flank pain, myalgias and neck pain.   Skin: Negative for itching, rash and wound.   Neurological: Positive for gait problem (ambulating with walker today). Negative for dizziness, extremity weakness, headaches, light-headedness, numbness, seizures and speech difficulty.   Hematological: Negative for adenopathy. Does not bruise/bleed easily.   Psychiatric/Behavioral: Positive for depression ([facility] Negative for confusion, decreased concentration, sleep disturbance and suicidal ideas. The patient is not nervous/anxious.         \"brain fog\" stable overall, improves with rest   All other systems reviewed and are negative.      PHYSICAL EXAM:   /60 (BP Location: Left arm)   Pulse 72   Temp 36.7 °C (98 °F) " (Oral)   Wt 82.1 kg (181 lb)   SpO2 95%   BMI 30.12 kg/m²    Pain RUQ 7/10, up to a 7/10     Physical Exam  Constitutional:       General: He is not in acute distress.     Appearance: Normal appearance. He is well-developed.   HENT:      Head: Normocephalic.      Nose: Nose normal.   Eyes:      General: No scleral icterus.        Right eye: No discharge.         Left eye: No discharge.      Extraocular Movements: Extraocular movements intact.      Conjunctiva/sclera: Conjunctivae normal.   Neck:      Thyroid: No thyromegaly.   Cardiovascular:      Rate and Rhythm: Normal rate and regular rhythm.      Heart sounds: Normal heart sounds, S1 normal and S2 normal. No murmur heard.   No gallop.    Pulmonary:      Effort: Pulmonary effort is normal. No respiratory distress.      Breath sounds: Normal breath sounds. No decreased breath sounds, wheezing, rhonchi or rales.   Chest:      Chest wall: No tenderness.   Abdominal:      General: Bowel sounds are normal. There is no distension.      Palpations: Abdomen is soft. There is no mass.      Tenderness: There is abdominal tenderness in the right upper quadrant, right lower quadrant, epigastric area and left lower quadrant. There is no guarding or rebound.   Musculoskeletal:         General: No swelling, tenderness or deformity. Normal range of motion.      Cervical back: Full passive range of motion without pain, normal range of motion and neck supple.      Right lower leg: No edema.      Left lower leg: No edema.   Lymphadenopathy:      Head:      Right side of head: No submental, submandibular, tonsillar, preauricular, posterior auricular or occipital adenopathy.      Left side of head: No submental, submandibular, tonsillar, preauricular, posterior auricular or occipital adenopathy.      Cervical: No cervical adenopathy.      Upper Body:      Right upper body: No supraclavicular adenopathy.      Left upper body: No supraclavicular adenopathy.   Skin:     General: Skin  is warm and dry.      Capillary Refill: Capillary refill takes less than 2 seconds.      Coloration: Skin is not jaundiced or pale.      Findings: No bruising, erythema, lesion, petechiae or rash.      Comments: Lateral drain site CDI, site of previous drain does not have any significant erythema but there is a tan-brown foul smelling drainage    Neurological:      General: No focal deficit present.      Mental Status: He is alert and oriented to person, place, and time.      Cranial Nerves: Cranial nerves are intact. No cranial nerve deficit.      Sensory: Sensation is intact.      Motor: Motor function is intact.      Coordination: Coordination is intact.      Gait: Gait is intact.   Psychiatric:         Attention and Perception: Attention and perception normal.         Mood and Affect: Mood and affect normal.         Speech: Speech normal.         Behavior: Behavior normal. Behavior is cooperative.         Thought Content: Thought content normal.         Cognition and Memory: Cognition and memory normal.         Judgment: Judgment normal.         LABORATORY DATA:     Lab Results   Component Value Date    WBC 2.7 (L) 07/08/2021    HGB 11.9 (L) 07/08/2021    HCT 36.1 (L) 07/08/2021     (L) 07/08/2021    RBC 4.40 07/08/2021    MCV 82.0 07/08/2021    MCH 27.1 (L) 07/08/2021    MCHC 33.1 07/08/2021    RDW 17.0 (H) 07/08/2021    MPV 7.7 07/08/2021    ANC 2.640 04/23/2020    NEUTROABS 1.90 07/08/2021     Lab Results   Component Value Date     (L) 07/08/2021    K 4.1 07/08/2021    CL 99 07/08/2021    CO2 28 07/08/2021    BUN 12 07/08/2021    CREATININE 0.72 07/08/2021    GLUCOSE 240 (H) 07/08/2021    CALCIUM 8.9 07/08/2021    PROT 6.6 07/08/2021    ALBUMIN 3.3 (L) 07/08/2021    AST 34 07/08/2021    ALT 22 07/08/2021    ALKPHOS 229 (H) 07/08/2021    BILITOT 1.09 07/08/2021     Lab Results   Component Value Date    PT 14.1 (H) 01/21/2021    INR 1.2 (H) 01/21/2021     Lab Results   Component Value Date    TSH  "2.411 07/08/2021     Reviewed    DIAGNOSTIC IMAGING:    CT abdomen/pelvis from 3/31/21 reviewed.    Reviewed    IMPRESSION & PLAN:   1.  Hepatocellular carcinoma: Clinically, he appears to be stable with out signs of disease progression.  His labs are within acceptable limits.   -Proceed with cycle 10 atezolizumab 1200 mg IV alone. Due active abdominal infection bevacizumab will be held today  -Restage with MR abdomen in July 2021  -Plan for follow-up at the [facility] with GI   -Will likely need to keep the lateral drain. IR will evaluate  2.  Abdominal infection: Started on cefuroxime and metronidazole in the [facility].  Culture and sensitivity pending.  Patient's wife will let us know if anything changes with his antibiotic plan  3.  Pain: Cancer-related, managed by PCP currently.    -Continue MS Contin 15 mg BID   -Continue hydromorphone as needed  -No new pains to report today.  4.  Constipation: Likely secondary to narcotic pain medication. Recent colonoscopy was normal with benign polyp  -Continue bowel protocol.  -Can increase miralax to BID for a couple of days to help with \"clean out\" monitoring closely for diarrhea  5.  Depression: On Wellbutrin. Continue  [facility]  -Follow up with our patient support team as well if needed.  6.  Follow-up in 3 weeks with repeat labs prior to the start of the next cycle.  He can be seen sooner if needed.  Instructed to call for questions or concerns.    One this date of service 45 minutes of total time was spent on this encounter. Greater than 50% of which was spent in counseling and coordination of care.    Electronically signed by: Chrystal Goodman CNP  "

## 2021-07-19 NOTE — POST-PROCEDURE NOTE
Post Procedure Note    Patient Name: Ben Lai      : 1950    Radiologist: MATHIEU VALLE MD    Pre-operative Diagnosis: Perihapatic Abscess    Operation : Abscess catheter check     Anesthesia Type:  none    Estimated Blood Loss: none    Findings: No significant residual cavity. There is early communication of cavity to colon. Tube needs to stay in place.     Complications:  None; patient tolerated the procedure well.

## 2021-07-28 NOTE — INTERDISCIPLINARY/THERAPY
PT Daily Treatment Note      Patient Name: Ben Lai Sr  Date of Service: 7/28/2021  Medicare Re-certification period: 7/19/2021 THRU 10/18/2021    Visit Number: 2    Subjective:  Patient reports doing well since last therapy session. States the exercises helped him put a pep in his step, started even doing some of the other exercises from previous. Notices he can get out of his bed better.           Has patient fallen since last visit?  No  Fall Risk Interventions: close SBA-CGA and gait belt will be utilized to reduce risk of falls during dynamic activities.       Pain:  Pain Assessment Scale  Pain Scale: 0-10  0-10 Pain Score: 0 - No pain    Have there been any changes in medications? No    Comments:  NA    Objective:         Time Calculation  Start Time: 1302  Stop Time: 1330  Time Calculation (min): 28 min       Therapeutic Interventions (Time Spent in Minutes)  Therapeutic Exercise: 28                      Treatment:  TherEx:  -nustep level 3 x 5 minutes (  -standing marching  -standing hip abduction  -heel raises  -dynamic balance activities on airex pad:   >narrow CIERA   >reaching ipsil and contra to midline   >head turns   >slow marching  -ambulating 50m with cues for exaggerated step length using Udrive walker   >15m without AD           HEP:  Access Code: [email or web address]  URL: [email or web address]  Date: 07/19/2021  Prepared by: Guerline Garcia     Exercises  Sit to Stand - 2 x daily - 7 x weekly - 2 sets - 10 reps  Standing March with Counter Support - 2 x daily - 7 x weekly - 2 sets - 10 reps  Heel rises with counter support - 2 x daily - 7 x weekly - 2 sets - 10 reps  Standing Tandem Balance with Counter Support - 2 x daily - 7 x weekly - 2 sets - 30 seconds hold    Rehab Goals/Potential/Assessment/Plan:  Patient returns to PT with improvements in stamina and feeling excited with starting exercises. He is very compliant with HEP and would like more exercises to perform daily. Plan  to assess and educated in UE strengthening next therapy session.       Short-Term Goals:  Short Term PT Goal 1: Patient will be independent and compliant with HEP within 2-3 weeks.    Short Term PT Goal 2: Patient will improve 30 second sit<>stand > 7 reps within 4-6 weeks.      Long-Term Goals:  Long Term PT Goal 1: Patient will improve cardiovascular endurance to tolerate completion of 6 minute walk test within 6-8 weeks.    Long Term PT Goal 2: patient will improve LE strength to 4+/5 throughout using MMT within 8-10 weeks.      Additional Comments:     Thank you for allowing us to share in the care of this patient.  If you have any questions, recommendations, or further concerns regarding this patient, please feel free to contact me at [phone].    Signed by: Guerline Garcia PT  7/28/2021  1:10 PM

## 2021-07-29 NOTE — PROGRESS NOTES
Medical Oncology Follow-Up    Subjective   HISTORY OF PRESENT ILLNESS:  Ben Lai Sr is a 71 y.o. male who  was diagnosed with hepatocellular carcinoma in November 2018.  He has an underlying hepatitis C which was supposedly cured.    Due to the extent of his HCC per GI at [facility] it was not amenable to local therapy, resection or radiation, referred to medical oncology   He was started on sorafenib early December 2018 but this appears to have been stopped in February due to significant side effects.  Patient states unequivocally that he started to feel better after a 3-week break but the sorafenib was recently restarted and he is having significant quality of life compromising side effects again.  This includes severe joint and muscle pain and fatigue.     7/29/19 restaging scans showed progressive disease in the liver, however no sings of metastatic disease     8/14/19 s/p   CT-guided  right lobe of the liver radiofrequency ablation.     9/2019 seen hepatobiliary team at [facility], and was discussed for tumor board, planned to have Y90  Treatment     10/8/19 MRI abdomen showed Nodular appearing liver with 2 areas of hyper/early arterial enhancement suggestive of hepatocellular carcinoma are both adjacent to the gallbladder which contains filling defects which are enhancing suggestive of neoplasm. This could be primary cholangiocarcinoma or hepatocellular carcinoma which is invading the gallbladder. The liver and gallbladder masses are very similar to the study from 7/29/2019.  Wedge-shaped signal abnormality in the anterior right lobe of the liver similar to the prior study could represent hepatic infarct.    11/7/19 s/p Y90 treatment at [facility]     12/9/19 MRI abdomen showed Cirrhosis and evidence of portal hypertension. Posttreatment changes of radioembolization throughout the right hepatic lobe, with increased diffuse heterogeneous arterial enhancement. This is the  patient's first posttreatment MRI. LR-TR equivocal. Continued attention on follow-up.  Grossly stable size of the lobulated enhancing endoluminal gallbladder mass with decreased but persistent areas of enhancing viable tumor.  Stable segment 4 exophytic 2.6 cm OPTN-5b lesion.  Unchanged right hepatic vein and right portal vein branch thrombi, without convincing thrombus enhancement.  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 Attention on follow-up.    12/11/19 s/p chemo embolization for possible residual viable tumor in the liver.    1/16/20  restaging MR showed  The circumscribed mass is seen in the anterior aspect of the right lobe of the liver demonstrates positive response to treatment with significantly reduced enhancement, no change in overall size.2.  Post radiation ablation defect in the anterior inferior right lobe with surrounding heterogeneous nonmass-like enhancement. It would be difficult to exclude active tumor within this surrounding area of enhancement.  Significant change in appearance of the posterior segment of the right lobe of the liver. Previous infiltrative nodular enhancement, now geographic nonmass-like enhancement with parenchymal volume loss. This could represent posttreatment related change. Difficult to exclude residual/progressive tumor    4/21/20 MR abdomen showed Continued evolution of post embolic/radiotherapy changes to the right hepatic lobe with large region of volume loss of enhancement likely representing fibrosis.  Previous HCC anteriorly along this region continues to regress with no evidence of enhancement to suggest recurrent or residual tumor. Previous nodular lesion inferiorly along the treatment bed which also likely represented a site of HCC also continues to regress with no evidence of recurrence or residual disease. No new suspicious liver lesions are demonstrated. Cirrhosis. Splenomegaly. Portosystemic collaterals.    6/26/20 MR No significant  MRI change in the treatment-related appearance of the right hepatic lobe since 4/21/2020. No MR evidence of recurrent hepatoma. Treated anterior right hepatic lobe lesion is redemonstrated. Treated lesion at right more posterior hepatic lobe is also seen.   Hepatic fibrosis.Cirrhosis, splenomegaly and portosystemic collaterals.    10/5/20 MR abdomen showed 2 treated right hepatic lobe lesions and associated scarring and capsular retraction. Since the previous examination, there is a new 4 mm enhancing nodule at the posterior aspect of the right anterior hepatic lobe treated lesion.  The enhancement characteristics are progressive and indeterminate and there is the hint of small restricted diffusion. Overall, a change in the appearance of this treated lesion which has otherwise been stable since 1/16/2020 is suspicious for recurrent tumor. However, the imaging characteristics themselves are nonspecific of the 4 mm nodule (with differential diagnosis including rounded scar), and attention on short-term follow-up MRI of the abdomen with IV contrast in 3 months is recommended, along with correlation with AFP levels.     12/21/20 MR showed Amorphous enhancement has increased within the lower right hepatic region area of previous treated tumor. This enhancement pattern is worrisome for tumor progression the area. Correlation with recent AFP would be helpful. Considerable scar tissue and areas well.. In addition the lesion overlying the gallbladder fossa may involve the gallbladder which now appears to be markedly thickened and demonstrates considerable wall enhancement which appears to have changed since the previous study. Differential would include direct tumor invasion gallbladder and acute cholecystitis.     3/3/21 MR showed Very complex appearing situation in the region of the gallbladder fundus, subhepatic space extending to the hepatic flexure of the colon. There is concern for the possibility of extrahepatic  malignancy and/or an inflammatory process in this area which could be affecting the gallbladder. There is a 2.8 cm fluid collection adjacent to the gallbladder. Cholecystitis would be another consideration. Additional correlation with contrast-enhanced CT may be helpful to better define some of the anatomy in this areaCapsular retraction, scarring and mild enhancement redemonstrated in right hepatic lobe. Most of the intrahepatic signal abnormalities do not appear dramatically changed. There is still some biliary dilatation the right hepatic lobe with adjacent enhancement. Question cholangitis. In addition there are small filling defects within the common bile duct. Possible choledocholithiasis. Currently there is not seen to be high-grade obstruction however. Common bile duct diameter approximately millimeters    7/29/21 MR showed   Stable appearance of the liver. No evidence of new or progressive malignancy..  Two subhepatic drains remain in place. No discrete focal fluid collection.Right portal vein thrombosis.  Sequelae of portal hypertension including recanalization of the umbilical vein, moderate splenomegaly, and small upper abdominal ascites..  Filling defects in the common bile duct, suggestive of choledocholithiasis, unchanged.    Today:   Pain in RUQ improved with drainage, still on oral antibiotics today.    No fevers or chills.  Has had symptoms of grade 1 Hepatic encephalopathy.     Energy is  Stable ECOG 2.   appetite ist stable, weight is stable.   Takes care of his ADLs.  Stable,back pain , chest pain and neuropathy.         Encounter Diagnosis   Name Primary?   • Hepatocellular carcinoma (CMS/HCC) (HCC)    .     Treatment History:  Oncology History   Hepatocellular carcinoma (CMS/HCC) (HCC)   1/23/2019 Initial Diagnosis    Hepatocellular carcinoma (CMS/HCC) (HCC)     7/30/2019 - 12/9/2019 Adjuvant Therapy    Hepatocellular - Sorafenib (Nexavar)  Plan Provider: ANDERSON Bryan  Treatment  goal: Palliative  Line of treatment: [No plan line of treatment]     6/12/2020 Cancer Staged    Staging form: Liver, AJCC 8th Edition  - Clinical: Stage IIIB (cT4, cN0, cM0) - Signed by Milka Reynoso MD on 6/12/2020 12/31/2020 -  Adjuvant Therapy    Hepatocellular - Bevacizumab / Atezolizumab  Plan Provider: Milka Reynoso MD  Treatment goal: Palliative  Line of treatment: [No plan line of treatment]           RECENT EVENTS:    PAST MEDICAL HISTORY:   Past Medical History:   Diagnosis Date   • A-fib (CMS/HCC) (HCC)    • Allergy    • Anxiety    • Arthritis    • Asthma    • Blindness of right eye    • BPH (benign prostatic hyperplasia)    • Cardiovascular disease    • Cirrhosis (CMS/HCC) (HCC)     with possible liver mass by MRI 5/18, rec repeat in 8/2018   • Complication of anesthesia    • Congestive heart failure (CMS/HCC) (HCC) 9/20/2019   • COPD (chronic obstructive pulmonary disease) (CMS/HCC) (HCC)    • Depression    • Endocrine disorder    • Gallstones    • Gastritis    • GERD (gastroesophageal reflux disease)    • Glaucoma    • Hearing loss     bilateral hearing aids   • Hemorrhoids    • Hepatitis C     Hep C, 2016 remission, complicated by cirrhosis.  MRI abd 5/30/18, rec 3 month follow up for focus of enhancement right and left hepatic lobe junction   • Hernia, abdominal    • High blood pressure    • IBS (irritable bowel syndrome)    • Infectious viral hepatitis    • Jaundice    • Kidney stones    • Obstructive sleep apnea syndrome    • Periodic limb movement disorder    • Persistent hypersomnia    • Persistent insomnia    • Pneumonia    • PONV (postoperative nausea and vomiting)    • Type 2 diabetes mellitus (CMS/HCC) (HCC)    • Urinary incontinence    • Wears partial dentures         GYNECOLOGICAL HISTORY (if applicable): NA     FAMILY HISTORY:   Family History   Problem Relation Age of Onset   • Arthritis Mother    • Rheum arthritis Mother    • Diabetes Mother    • Rectal cancer  Mother    • Other Paternal Grandmother    • Other Paternal Grandfather    • Diabetes Other    • Rheum arthritis Other    • Osteoporosis Other         SOCIAL HISTORY:   Social History     Socioeconomic History   • Marital status:      Spouse name: Not on file   • Number of children: Not on file   • Years of education: Not on file   • Highest education level: Not on file   Occupational History   • Not on file   Tobacco Use   • Smoking status: Never Smoker   • Smokeless tobacco: Never Used   Substance and Sexual Activity   • Alcohol use: No   • Drug use: No   • Sexual activity: Defer   Other Topics Concern   • Not on file   Social History Narrative   • Not on file     Social Determinants of Health     Financial Resource Strain:    • Difficulty of Paying Living Expenses:    Food Insecurity:    • Worried About Running Out of Food in the Last Year:    • Ran Out of Food in the Last Year:    Transportation Needs:    • Lack of Transportation (Medical):    • Lack of Transportation (Non-Medical):    Physical Activity:    • Days of Exercise per Week:    • Minutes of Exercise per Session:    Stress:    • Feeling of Stress :    Social Connections:    • Frequency of Communication with Friends and Family:    • Frequency of Social Gatherings with Friends and Family:    • Attends Judaism Services:    • Active Member of Clubs or Organizations:    • Attends Club or Organization Meetings:    • Marital Status:    Intimate Partner Violence:    • Fear of Current or Ex-Partner:    • Emotionally Abused:    • Physically Abused:    • Sexually Abused:         ALLERGIES:   Allergies as of 07/29/2021 - Reviewed 07/29/2021   Allergen Reaction Noted   • Metformin Hives    • Adhesive tape-silicones  04/06/2018   • Interferon violetta-2a  02/05/2019   • Latex     • Milk  02/05/2019   • Mometasone     • Mometasone furoate     • Omeprazole     • Ondansetron hcl Nausea And Vomiting 05/01/2019   • Pepper (genus capsicum)  02/05/2019   • Primidone   05/01/2019   • Ribavirin Itching 10/19/2017   • Rosuvastatin  02/05/2019   • Sorafenib  04/04/2019   • Spironolactone     • Statins-hmg-coa reductase inhibitors Itching and GI intolerance 04/06/2018   • Interferon violetta (human leuk. derived) Palpitations and Rash 09/20/2019   • Pantoprazole Itching and Rash 02/28/2018   • Peginterferon violetta-2b Palpitations 10/19/2017   • Penicillins Other (see comments) 10/19/2017        MEDICATIONS:   Current Outpatient Medications   Medication Sig Dispense Refill   • Narcan 4 mg/actuation spray,non-aerosol      • cefuroxime (CEFTIN) 500 mg tablet Take 500 mg by mouth 2 (two) times a day X 10 days     • metroNIDAZOLE (FLAGYL) 500 mg tablet Take 500 mg by mouth 3 (three) times a day x21 days (started 7/6/21)     • morphine (MS CONTIN) 15 mg 12 hr tablet TAKE ONE TABLET BY MOUTH TWICE A DAY FOR PAIN     • lidocaine-prilocaine (EMLA) cream Apply thick layer to intact skin over port 1 hour prior to procedure. Cover with occlusive dressing. 30 g 2   • HYDROmorphone (DILAUDID) 2 mg tablet Take 2-4 mg by mouth every 6 (six) hours as needed       • sildenafiL (VIAGRA) 100 mg tablet Take 100 mg by mouth as needed for erectile dysfunction Up to 4 times monthly     • fluoride, sodium, (DENTAGEL) 1.1 % gel dental gel See administration instructions Apply small amount to toothbrush in place of regular toothpaste. Brush for 2 minutes in the morning and evening.     • polyethylene glycol (Miralax) 17 gram/dose powder Take 17 g by mouth daily     • brimonidine (ALPHAGAN) 0.2 % ophthalmic solution 1 drop 2 (two) times a day     • carboxymethylcellulose (REFRESH PLUS) 0.5 % dropperette Administer 1 drop into both eyes every 3 (three) hours Indications: dry eye       • prochlorperazine (COMPAZINE) 10 mg tablet Take 1 tablet (10 mg total) by mouth every 6 (six) hours as needed for nausea or vomiting 30 tablet 5   • artificial saliva (YERBA GLADYS AND LYTES) aerosol,spray spray Apply 2 sprays to the  mouth or throat as needed       • fluticasone propionate (FLONASE) 50 mcg/actuation nasal spray Administer 2 sprays into each nostril daily       • naloxone HCl (NARCAN NASL) Administer into affected nostril(s) as needed     • traZODone (DESYREL) 100 mg tablet Take 100 mg by mouth nightly       • insulin glargine (Lantus Solostar U-100 Insulin) 100 unit/mL (3 mL) insulin pen injection pen Inject 24 Units under the skin every morning       • carvediloL (COREG) 3.125 mg tablet Take 1 tablet (3.125 mg total) by mouth 2 (two) times a day with meals 180 tablet 0   • diclofenac sodium (VOLTAREN) 1 % gel Apply 4 g topically 4 (four) times a day       • cetirizine (ZyrTEC) 10 mg tablet Take 10 mg by mouth daily     • loperamide (Imodium A-D) 2 mg tablet Take 2 tablets with first loose stool of the day, then up to 8 tablets daily (Patient not taking: Reported on 4/15/2021 ) 80 tablet 2   • buPROPion SR (WELLBUTRIN SR) 100 mg 12 hr tablet Take 1 tablet (100 mg total) by mouth daily 90 tablet 1   • lactulose (GENERLAC) 10 gram/15 mL solution Take 15 mL (10 g total) by mouth daily 473 mL 2   • terazosin (HYTRIN) 5 mg capsule Take 1 capsule (5 mg total) by mouth 2 (two) times a day 180 capsule 2   • blood-glucose meter (ACCU-CHEK ADELE PLUS METER) [facility] Use to test blood sugars 3 times daily (E11.65, non insulin depedent) AccuChek Adele plus, meter is 8 years old 1 each 0   • lisinopril (PRINIVIL,ZESTRIL) 20 mg tablet Take 1 tablet (20 mg total) by mouth daily 90 tablet 2   • furosemide (LASIX) 20 mg tablet Take 1 tablet (20 mg total) by mouth 2 (two) times a day. 180 tablet 2   • lansoprazole (PREVACID) 30 mg capsule Take 30 mg by mouth daily      • liraglutide (VICTOZA 2-JASE) 0.6 mg/0.1 mL (18 mg/3 mL) injection Inject 1.8 mg under the skin daily       • sodium chloride (SALINE NASAL) 0.65 % nasal spray Administer 1 spray into each nostril as needed for congestion or rhinitis      • pramoxine-menthol (GOLD BOND MEDICATED  ANTI-ITCH) 1-1 % cream Apply topically as needed       • latanoprost (XALATAN) 0.005 % ophthalmic solution Administer 1 drop into both eyes nightly   10 0     No current facility-administered medications for this visit.       REVIEW OF SYSTEMS:   Review of Systems   Constitutional: Positive for fatigue.   HENT:   Positive for hearing loss.    Eyes: Positive for eye problems.   Gastrointestinal: Positive for abdominal distention and abdominal pain.   Endocrine: Negative.    Genitourinary: Positive for bladder incontinence.    Musculoskeletal: Positive for arthralgias, gait problem and myalgias.   Skin: Negative.    Neurological: Positive for gait problem.   Hematological: Negative.    Psychiatric/Behavioral: The patient is nervous/anxious.    All other systems reviewed and are negative.      The ECOG performance status today is 2 - Ambulatory care of self; up and about >50% of waking hours.          Objective    PHYSICAL EXAM:   BP 90/58   Pulse 76   Temp 36.4 °C (97.5 °F) (Temporal)   Resp 18   Wt 80.8 kg (178 lb 3.2 oz)   SpO2 95%   BMI 29.65 kg/m²    Body mass index is 29.65 kg/m².       Physical Exam  HENT:      Head: Normocephalic.      Nose: Nose normal.      Mouth/Throat:      Mouth: Mucous membranes are moist.   Eyes:      Pupils: Pupils are equal, round, and reactive to light.   Cardiovascular:      Rate and Rhythm: Normal rate.   Pulmonary:      Effort: Pulmonary effort is normal.   Abdominal:      General: Abdomen is flat. There is no distension.   Musculoskeletal:         General: Normal range of motion.      Cervical back: Normal range of motion.   Skin:     General: Skin is dry.   Neurological:      General: No focal deficit present.      Mental Status: He is alert.   Psychiatric:         Mood and Affect: Mood normal.         LABS:   Appointment on 07/29/2021   Component Date Value Ref Range Status   • WBC 07/29/2021 2.1* 3.7 - 9.6 10*3/uL Final   • RBC 07/29/2021 4.41  4.10 - 5.80 10*6/µL Final    • Hemoglobin 07/29/2021 11.7* 13.2 - 17.2 g/dL Final   • Hematocrit 07/29/2021 36.6* 38.0 - 50.0 % Final   • MCV 07/29/2021 83.0  82.0 - 97.0 fL Final   • MCH 07/29/2021 26.6* 29.0 - 34.0 pg Final   • MCHC 07/29/2021 32.1  32.0 - 36.0 g/dL Final   • RDW 07/29/2021 17.4* 11.5 - 15.0 % Final   • Platelets 07/29/2021 93* 130 - 350 10*3/uL Final   • MPV 07/29/2021 8.3  6.9 - 10.8 fL Final   • Neutrophils% 07/29/2021 66  46 - 70 % Final   • Lymphocytes% 07/29/2021 18  15 - 47 % Final   • Monocytes% 07/29/2021 14* 5 - 13 % Final   • Eosinophils% 07/29/2021 2  0 - 3 % Final   • Basophils% 07/29/2021 1  0 - 2 % Final   • ANC (auto diff) 07/29/2021 1.40  10*3/UL Final   • Lymphocytes Absolute 07/29/2021 0.40  10*3/uL Final   • Monocytes Absolute 07/29/2021 0.30  10*3/uL Final   • Eosinophils Absolute 07/29/2021 0.00  10*3/uL Final   • Basophils Absolute 07/29/2021 0.00  10*3/uL Final   • Anisocytosis 07/29/2021 1+   Final   • Hypochromia 07/29/2021 1+   Final   • Sodium 07/29/2021 136  135 - 145 mmol/L Final   • Potassium 07/29/2021 4.1  3.5 - 5.1 mmol/L Final   • Chloride 07/29/2021 101  98 - 107 mmol/L Final   • CO2 07/29/2021 28  21 - 32 mmol/L Final   • Anion Gap 07/29/2021 7  3 - 11 mmol/L Final   • BUN 07/29/2021 15  7 - 25 mg/dL Final   • Creatinine 07/29/2021 0.73  0.70 - 1.30 mg/dL Final   • Glucose 07/29/2021 212* 70 - 105 mg/dL Final   • Calcium 07/29/2021 8.7  8.6 - 10.3 mg/dL Final   • AST 07/29/2021 32  <40 U/L Final   • ALT (SGPT) 07/29/2021 15  7 - 52 U/L Final   • Alkaline Phosphatase 07/29/2021 120* 45 - 115 U/L Final   • Total Protein 07/29/2021 6.6  6.0 - 8.3 g/dL Final   • Albumin 07/29/2021 3.3* 3.5 - 5.3 g/dL Final   • Total Bilirubin 07/29/2021 0.80  0.20 - 1.40 mg/dL Final   • eGFR 07/29/2021 94  >60 mL/min/1.73m*2 Final   • Corrected Calcium 07/29/2021 9.3  8.6 - 10.3 mg/dL Final   • Clumped Platelets 07/29/2021 None Seen  None Seen Final   • RBC Morphology 07/29/2021 Abnormal* Normal Final   •  Anisocytosis 07/29/2021 1+* None Final   • Hypochromia 07/29/2021 1+* None Final   • Platelet Morphology 07/29/2021 Normal  Normal Final       DIAGNOSTIC REPORTS REVIEWED:   Imaging:  Reviewed     Assessment/Plan    IMPRESSION:   This is a pleasant 71 y.o. male with diagnosis of hepatocellular carcinoma involving the right lobe, locally advanced disease, per the chart not amenable to local therapy, started sorafenib December 2018 however stopped in February 2019 due to intolerance without any dose reductions.  Has been off of treatment since then, restaging CAT scan and MRI  7/2019 show progressive disease in the liver.  Started  sorafenib 8/2019 at a lower dose 200 mg twice daily, s/p RFA 8/2019, was referred to [facility], s/p Y90 treatment followed by [facility], MR showed response to treatment without progression on 1/16/20 and 4/21/20  currently treatment on hold until evidence of disease progression       concerned about enhancement of the gallbladder discussed at tumor board at  and  There is no rule for surgery, on MR 12/2020 there was clear progression of HCC and discussed plan of either starting bevacizumab and atezolizomab vs lenvatinib   After discussion likely to start bevacizumab and atezolizomab.  Started 12/2020     Restaging MR 3/2021 showed Very complex appearing situation in the region of the gallbladder fundus,  possibility of extrahepatic malignancy and/or an inflammatory process  . There is a 2.8 cm fluid collection adjacent to the gallbladder. Cholecystitis would be another consideration.  S/p drain tubes and antibiotics      7/2021 scans with stable disease     Plan:   - continue atzolizumab 1200 mg  and bevacizumab 15mg/kg  Until drain tubes are removed.  - will restage 10/21/21 with MR abdomen.   -  Follow up with [facility] for GI follow up and repeat ERCP.   - continue follow up with palliative medicine for pain control.   - follow up  hepatology for liver cirrhosis.           Physician: Milka Reynoso MD

## 2021-08-02 NOTE — INTERDISCIPLINARY/THERAPY
PT Daily Treatment Note      Patient Name: Ben Lai Sr  Date of Service: 8/2/2021  Medicare Re-certification period: 7/19/2021 THRU 10/18/2021    Visit Number: 3    Subjective:  Patient reports doing well. No changes, uneventful weekend which is good. Would like to work on some arm strengthening today.            Has patient fallen since last visit?  No  Fall Risk Interventions: close SBA-CGA and gait belt will be utilized to reduce risk of falls during dynamic activities.       Pain:  Pain Assessment Scale  Pain Scale: 0-10  0-10 Pain Score: 0 - No pain    Have there been any changes in medications? No    Comments:  NA    Objective:         Time Calculation  Start Time: 1215  Stop Time: 1245  Time Calculation (min): 30 min       Therapeutic Interventions (Time Spent in Minutes)  Therapeutic Exercise: 30                      Treatment:  TherEx:  -nustep level 3 x 5 minutes (  -resisted row with OTB  -standing bicep curls w/ OTB  -standing tricep extension w/ OTB  -chest pull apart w/ OTB  -standing shoulder extension w/ OTB  -seated bicep curls with 4# wts  -seated shoulder raises with 4# wt               HEP:  Access Code: [email or web address]  URL: [email or web address]  Date: 07/19/2021  Prepared by: Guerline Garcia     Exercises  Sit to Stand - 2 x daily - 7 x weekly - 2 sets - 10 reps  Standing March with Counter Support - 2 x daily - 7 x weekly - 2 sets - 10 reps  Heel rises with counter support - 2 x daily - 7 x weekly - 2 sets - 10 reps  Standing Tandem Balance with Counter Support - 2 x daily - 7 x weekly - 2 sets - 30 seconds hold  Scapular Retraction with Resistance - 1 x daily - 7 x weekly - 2 sets - 10 reps  Standing Bicep Curls with Resistance - 1 x daily - 7 x weekly - 2 sets - 10 reps  Standing Elbow Extension with Anchored Resistance - 1 x daily - 7 x weekly - 2 sets - 10 reps  Standing Shoulder Horizontal Abduction with Resistance - 1 x daily - 7 x weekly - 2 sets - 10  reps    Rehab Goals/Potential/Assessment/Plan:  Patient tolerated therapy exercises well, required a lot of cuing for correct technique and performance but was very eager and motivated to learn. Patient was given handout of UE exercises to perform at home, will continue to revisit this and progress as able.         Short-Term Goals:  Short Term PT Goal 1: Patient will be independent and compliant with HEP within 2-3 weeks.    Short Term PT Goal 2: Patient will improve 30 second sit<>stand > 7 reps within 4-6 weeks.      Long-Term Goals:  Long Term PT Goal 1: Patient will improve cardiovascular endurance to tolerate completion of 6 minute walk test within 6-8 weeks.    Long Term PT Goal 2: patient will improve LE strength to 4+/5 throughout using MMT within 8-10 weeks.      Additional Comments:     Thank you for allowing us to share in the care of this patient.  If you have any questions, recommendations, or further concerns regarding this patient, please feel free to contact me at [phone].    Signed by: Guerline Garcia, NONA  8/2/2021  12:19 PM

## 2021-08-04 NOTE — INTERDISCIPLINARY/THERAPY
PT Daily Treatment Note      Patient Name: Ben Lai Sr  Date of Service: 8/4/2021  Medicare Re-certification period: 7/19/2021 THRU 10/18/2021    Visit Number: 4    Subjective:  Patient reports today is a assisted good day. States his arms are pretty sore from mondays' therapy session, knows he used some muscles that had not been used in a while.             Has patient fallen since last visit?  No  Fall Risk Interventions: close SBA-CGA and gait belt will be utilized to reduce risk of falls during dynamic activities.       Pain:  Pain Assessment Scale  Pain Scale: 0-10  0-10 Pain Score: 0 - No pain    Have there been any changes in medications? No    Comments:  NA    Objective:         Time Calculation  Start Time: 1305  Stop Time: 1345  Time Calculation (min): 40 min       Therapeutic Interventions (Time Spent in Minutes)  Therapeutic Exercise: 40                    Treatment:  TherEx:  -nustep level 4 x 7 minutes (336 steps)  -ankle rocker board   -ankle rockers - DF/PF   -marching  -standing hip abduction x10 ea side   >standing hip extension   -mini squats  -corner balance on airex pad:   >ipsil and contra reaches   >marching   >head turns   >eyes closed  -ambulating 2x50m with reciprocal arm swings, 2 wooden dowels for 1.5 laps. Performed last lap with cues for arm swing and picking up feet. Increased shuffling with distance.                HEP:  Access Code: [email or web address]  URL: [email or web address]  Date: 07/19/2021  Prepared by: Guerline Garcia     Exercises  Sit to Stand - 2 x daily - 7 x weekly - 2 sets - 10 reps  Standing March with Counter Support - 2 x daily - 7 x weekly - 2 sets - 10 reps  Heel rises with counter support - 2 x daily - 7 x weekly - 2 sets - 10 reps  Standing Tandem Balance with Counter Support - 2 x daily - 7 x weekly - 2 sets - 30 seconds hold  Scapular Retraction with Resistance - 1 x daily - 7 x weekly - 2 sets - 10 reps  Standing Bicep Curls with Resistance  - 1 x daily - 7 x weekly - 2 sets - 10 reps  Standing Elbow Extension with Anchored Resistance - 1 x daily - 7 x weekly - 2 sets - 10 reps  Standing Shoulder Horizontal Abduction with Resistance - 1 x daily - 7 x weekly - 2 sets - 10 reps    Rehab Goals/Potential/Assessment/Plan:  Patient demonstrated LE fatigue and decreased endurance with 100m ambulation at end of therapy session, increased shuffling and limited foot clearance with fatigue. Patient is very compliant with HEP, will progress this as able. Continue with generalized strengthening, balance, gait, and stability.         Short-Term Goals:  Short Term PT Goal 1: Patient will be independent and compliant with HEP within 2-3 weeks.    Short Term PT Goal 2: Patient will improve 30 second sit<>stand > 7 reps within 4-6 weeks.      Long-Term Goals:  Long Term PT Goal 1: Patient will improve cardiovascular endurance to tolerate completion of 6 minute walk test within 6-8 weeks.    Long Term PT Goal 2: patient will improve LE strength to 4+/5 throughout using MMT within 8-10 weeks.      Additional Comments:     Thank you for allowing us to share in the care of this patient.  If you have any questions, recommendations, or further concerns regarding this patient, please feel free to contact me at [phone].    Signed by: Guerline Garcia, NONA  8/4/2021  1:09 PM

## 2021-08-05 NOTE — TELEPHONE ENCOUNTER
Guerline made aware of this information and will notify patient to come get rx.    ----- Message from Milka Reynoso MD sent at 8/4/2021  4:52 PM MDT -----  Either way is fine   Can keep it as it is   ----- Message -----  From: Yaima Monsalve RN  Sent: 8/4/2021   1:00 PM MDT  To: MD Guerline Bryan made aware of your input. She does wonder if patient should be on 10 mg BID for the first week than decrease to the 5 mg BID. I asked if patient has picked up rx yet and she states he has not.  ----- Message -----  From: Milka Reynoso MD  Sent: 8/4/2021  12:01 PM MDT  To: Yaima Monsalve RN    Apixiban is superior to coumadin in cancer patients, coumadin is relativly contraindicated in patient with liver failure , cannot switch     Thanks  ----- Message -----  From: Yaima Monsalve RN  Sent: 8/4/2021  11:11 AM MDT  To: MD Guerline [facility] called wanting more of an explanation for the Eliquis. [facility] coumadin and wonders if he could switch. Please advise.        # [phone]

## 2021-08-10 NOTE — TELEPHONE ENCOUNTER
Jakub was seen at [facility], he has been placed on Ceftin and Flagyl, for abscess formation.  He had a CT today at [facility], they are going to push the films and labs  To [facility].  Laura says they cultured his drain tube and blood , his provider was Dr David Rosales. Jakub  goes into IR tomorrow for a tube check. If Radiologist wants to check with ID, Dr Ellsworth is on call and I let Laura know this in case he needs to be admitted. Laura will stay in touch and I told her I would be out tomorrow and that Senait would be at my desk to talk with if needed.

## 2021-08-10 NOTE — TELEPHONE ENCOUNTER
Laura called in to let us know that she is worried that Jakub is having a recurrent infection.  At the side of where he had his second drain removed a lump has developed. The area of swelling is migrating down to the lower abdomen.  He has no fever or chills. His pain is increasing.  He feels unwell . Laura called IR for an appt for a drain check and he has an appointment there tomorrow at 8 a.m. They are looking at going to [facility] due to the [facility] and the wait time at  [facility].  I have encouraged them to do so.  I gave her my desk number for the staff there if they have any questions we can assist with.  Laura will stay in touch.

## 2021-08-11 PROBLEM — K65.1 INTRA-ABDOMINAL ABSCESS (CMS/HCC): Status: ACTIVE | Noted: 2021-01-01

## 2021-08-11 NOTE — CROSS COVER NOTE
GI Cross Cover Note     The GI service has been asked to consult due to a perforated gallbladder from an internalized biliary stent.     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 Repeat ERCP in October of 2020 with gallbladder stents exchanged and the common bile duct stent removed.     Surgery does not feel he is a surgical candidate at this time for cholecystectomy.     Discussed with our biliary endoscopist, Dr. Heller, he is willing to perform ERCP to remove the prior gallbladder stent and possibly place another. Would hopefully perform this on Saturday.     Full consult note to follow tomorrow.

## 2021-08-11 NOTE — POST-PROCEDURE NOTE
Post Procedure Note    Operation : Abscess Drain     Patient Name: Ben Lai Sr    : 1950    Radiologist: Mireya Jules MD    Pre-operative Diagnosis: right upper abdominal Abscess    Post-operative Diagnosis: Same    Anesthesia Type: Local and Conscious sedation    Estimated Blood Loss: 0 ml    Findings:  Placement of 10F Rodgers Whitley drain into the right upper abdominal abscess. Aspiration of 10 cc of bloody purulent material.     Specimens Removed: 10 cc bloody purulent material, sent to lab for culture    Complications:  None; patient tolerated the procedure well.          Prosthetic devices, implanted devices: 10 Yoruba All Purpose Drain

## 2021-08-11 NOTE — PLAN OF CARE
Problem: Safety Adult - Fall  Goal: Free from fall injury  Description: INTERVENTIONS:    Inpatient - Please reference Cares/Safety Flowsheet under [facility] Fall Risk for interventions.  Pediatrics - Please reference Peds Daily Cares/Safety Flowsheet under [facility] Pediatric Fall Assessment Fall Bundle for interventions  LD/OB - Please reference OB Shift Screening Flowsheet under OB Fall Risk for interventions.  Outcome: Progressing     Problem: Gastrointestinal - Adult  Goal: Minimal or absence of nausea and vomiting  Description: INTERVENTIONS:  1. Ensure adequate hydration  2. Monitor intake and output  3. Maintain NPO status until nausea and vomiting are resolved  4. Nasogastric tube to low intermittent suction as ordered  5. Administer ordered antiemetic medications as needed  6. Provide nonpharmacologic comfort measures as appropriate  7. Advance diet as ordered  8. Nutrition consult as indicated   Outcome: Progressing  Goal: Maintains or returns to baseline digestive function  Description: INTERVENTIONS:  1. Assess bowel function  2. Ensure adequate hydration  3. Administer ordered medications as needed  4. Encourage mobilization and activity  5. Nutrition consult as indicated  6. Assess hydration and nutritional status  7. Assess characteristics and frequency of stool  8. Monitor for metabolic panel imbalances  9. Assess for treatment effectiveness  Outcome: Progressing  Goal: Maintains adequate nutritional intake  Description: INTERVENTIONS:  1. Monitor percentage of each meal consumed  2. Identify factors contributing to decreased intake, treat as appropriate  3. Assist with meals as needed  4. Monitor I&O, weight and lab values  5. Obtain nutritional consult as indicated  6. Administer alternative nutrition interventions as ordered  Outcome: Progressing     Problem: Skin/Tissue Integrity - Adult  Goal: Skin integrity remains intact  Description: INTERVENTIONS  1. Assess and document risk factors for  pressure ulcer development  2. Assess and document skin integrity  3. Monitor for areas of redness and/or skin breakdown  4. Initiate pressure ulcer prevention measures as indicated  Outcome: Progressing  Goal: Incisions, wounds, or drain sites healing without S/S of infection  Description: INTERVENTIONS  1. Assess and document risk factors for skin breakdown  2. Assess and document skin integrity  3. Assess and document dressing/incision, wound bed, drain sites and surrounding tissue  4. Implement wound care per orders  Outcome: Progressing     Problem: Hematologic - Adult  Goal: Maintains hematologic stability  Description: INTERVENTIONS  1. Assess for signs and symptoms of bleeding or hemorrhage  2. Monitor labs  3. Administer supportive blood products/factors as ordered and appropriate  4. Administer medications as ordered  5. Initiate bleeding precautions as indicated  6. Educate patient/family to report signs/symptoms of bleeding  Outcome: Progressing     Problem: Infection - Adult  Goal: Absence of infection during hospitalization  Description: INTERVENTIONS:  1. Assess and monitor for signs and symptoms of infection  2. Monitor lab/diagnostic results  3. Monitor all insertion sites/wounds/incisions  4. Monitor secretions for changes in amount and color  5. Administer medications as ordered  6. Educate and encourage patient and family to use good hand hygiene technique  7. Identify and educate in appropriate isolation precautions for identified infection/condition  Outcome: Progressing     Problem: Safety Adult  Goal: Patient will remain safe during hospitalization  Description: INTERVENTIONS    1. Assess patient for fall risk and implement interventions if needed  2. Use safe transport techniques  3. Assess patient using the appropriate Javon skin assessment scale  4. Assess patient for risk of aspiration  5. Assess patient for risk of elopement  6. Assess patient for risk of suicide  Outcome: Progressing      Problem: Daily Care  Goal: Daily care needs are met  Description: INTERVENTIONS:   1. Assess and monitor skin integrity  2. Identify patients at risk for skin breakdown on admission and per policy  3. Assess and monitor ability to perform self care and identify potential discharge needs  4. Assess skin integrity/risk for skin breakdown  5. Assist patient with activities of daily living as needed  6. Encourage independent activity per ability   7. Provide mouth care   8. Include patient/family/caregiver in decisions related to daily care   Outcome: Progressing

## 2021-08-11 NOTE — NURSING NOTE
Call completed, answered pts/familys questions, states understanding, NPO, , [facility]

## 2021-08-11 NOTE — ED PROVIDER NOTES
Room: 14    HPI:  Chief Complaint   Patient presents with   • Abdominal Pain     has a j pierce tube to area around gallbladder since [facility]. has a appointment at 0800 in  to evaluate the tube. was also seen in [facility] yesterday and placed on 2 antibiotics. they are concerned with pain and red area to drain site.   • Abscess     had a ct scan yesterday and results showed abscess to area.     HPI  Patient presents for evaluation of abdominal and gallbladder disease.  He apparently has abscesses and gallbladder disease.  [facility] [place].  He was told he was not a surgical candidate.  He has drains in place at this time.  He states that has been having some increased pain here and bulging of the skin where previous drain was.  There is still one drain in place.  [facility] and [facility].  He was sent here today for radiology to work on him.  Radiology apparently sent him here as they cannot get him in this afternoon for possible drain placement.      [facility] yesterday.  He had a CT scan that showed fluid collections in the subcutaneous soft tissues of the right upper quadrant adjacent to the liver and the area of the previous drain.  This was concerning for abscess.  There appears to be signs of recurrent hepatocellular carcinoma.  He does have biliary stents in place still.  His labs from yesterday were reviewed.  His creatinine was 0.6.  His potassium was 3.9.  Liver functions actually look good and his lipase was 40.  His white count was 3.2 with a hemoglobin of 11.8.    HISTORY:  Past Medical History:   Diagnosis Date   • A-fib (CMS/HCC) (HCC)    • Allergy    • Anxiety    • Arthritis    • Asthma    • Blindness of right eye    • BPH (benign prostatic hyperplasia)    • Cardiovascular disease    • Cirrhosis (CMS/HCC) (HCC)     with possible liver mass by MRI 5/18, rec repeat in 8/2018   • Complication of  anesthesia    • Congestive heart failure (CMS/HCC) (HCC) 9/20/2019   • COPD (chronic obstructive pulmonary disease) (CMS/HCC) (HCC)    • Depression    • Endocrine disorder    • Gallstones    • Gastritis    • GERD (gastroesophageal reflux disease)    • Glaucoma    • Hearing loss     bilateral hearing aids   • Hemorrhoids    • Hepatitis C     Hep C, 2016 remission, complicated by cirrhosis.  MRI abd 5/30/18, rec 3 month follow up for focus of enhancement right and left hepatic lobe junction   • Hernia, abdominal    • High blood pressure    • IBS (irritable bowel syndrome)    • Infectious viral hepatitis    • Jaundice    • Kidney stones    • Obstructive sleep apnea syndrome    • Periodic limb movement disorder    • Persistent hypersomnia    • Persistent insomnia    • Pneumonia    • PONV (postoperative nausea and vomiting)    • Type 2 diabetes mellitus (CMS/HCC) (HCC)    • Urinary incontinence    • Wears partial dentures        Past Surgical History:   Procedure Laterality Date   • COLONOSCOPY  10/27/2016    Pili, normal, repeat 10 years   • COLONOSCOPY N/A 5/11/2021    Procedure: COLONOSCOPY with polypectomy;  Surgeon: William Rivas MD;  Location: [facility] End[facility];  Service: Endoscopy;  Laterality: N/A;   • CT ABLATION LIVER RADIOFREQUENCY  8/14/2019    CT ABLATION LIVER RADIOFREQUENCY 8/14/2019 [facility] MIS CT SCAN   • CT ABSCESS CYST LIVER  8/11/2021    CT ABSCESS CYST LIVER 8/11/2021 Mireya Jules MD [facility] MIS CT SCAN   • CT ABSCESS CYST PERITONEAL  3/5/2021    CT ABSCESS CYST PERITONEAL 3/5/2021 [facility] MIS CT SCAN   • ERCP N/A 8/14/2021    Procedure: ENDOSCOPIC RETROGRADE CHOLANGIOPANCREATOGRAPHY with stent removal,  balloon sweep, transcystic drainage of the gall bladder, and stent placement;  Surgeon: Sabas Heller MD;  Location: [facility];  Service: Endoscopy;  Laterality: N/A;   • ESOPHAGOGASTRODUODENOSCOPY N/A 2/22/2018    Procedure: EGD - ESOPHAGOGASTRODUODENOSCOPY with banding  x 2 with crna;  Surgeon:  William Rivas MD;  Location: [facility];  Service: Endoscopy;  Laterality: N/A;   • ESOPHAGOGASTRODUODENOSCOPY N/A 4/6/2018    Procedure: EGD - ESOPHAGOGASTRODUODENOSCOPY with crna;  Surgeon: William Rivas MD;  Location: [facility] End[facility];  Service: Endoscopy;  Laterality: N/A;   • ESOPHAGOGASTRODUODENOSCOPY N/A 5/7/2019    Procedure: EGD - ESOPHAGOGASTRODUODENOSCOPY;  Surgeon: William Rivas MD;  Location: [facility];  Service: Endoscopy;  Laterality: N/A;   • ESOPHAGOGASTRODUODENOSCOPY N/A 5/11/2021    Procedure: ESOPHAGOGASTRODUODENOSCOPY;  Surgeon: William Rivas MD;  Location: [facility] End[facility];  Service: Endoscopy;  Laterality: N/A;   • FL ERCP 1 DUCT  8/14/2021    FL ERCP 1 DUCT 8/14/2021 [facility] MIS FLUOROSCOPY   • HAND SURGERY Left     thumb   • HERNIA REPAIR  01/01/1965   • IR INJECTION ABSCESS CATHETER EVALUATION  4/2/2021    IR INJECTION ABSCESS CATHETER EVALUATION 4/2/2021 Mireya Jules MD [facility] MIS INTERVEN RAD   • IR INJECTION ABSCESS CATHETER EVALUATION  4/2/2021    IR INJECTION ABSCESS CATHETER EVALUATION 4/2/2021 Mireya Jules MD [facility] MIS INTERVEN RAD   • IR INJECTION ABSCESS CATHETER EVALUATION  5/5/2021    IR INJECTION ABSCESS CATHETER EVALUATION 5/5/2021 Chencho Wagner MD [facility] MIS INTERVEN RAD   • IR INJECTION ABSCESS CATHETER EVALUATION  5/5/2021    IR INJECTION ABSCESS CATHETER EVALUATION 5/5/2021 Chenhco Wagner MD [facility] MIS INTERVEN RAD   • IR INJECTION ABSCESS CATHETER EVALUATION  5/18/2021    IR INJECTION ABSCESS CATHETER EVALUATION 5/18/2021 [facility] MIS INTERVEN RAD   • IR INJECTION ABSCESS CATHETER EVALUATION  5/18/2021    IR INJECTION ABSCESS CATHETER EVALUATION 5/18/2021 [facility] MIS INTERVEN RAD   • IR INJECTION ABSCESS CATHETER EVALUATION  6/1/2021    IR INJECTION ABSCESS CATHETER EVALUATION 6/1/2021 Chencho Wagner MD [facility] MIS INTERVEN RAD   • IR INJECTION ABSCESS CATHETER EVALUATION  6/7/2021    IR INJECTION ABSCESS CATHETER EVALUATION 6/7/2021 [facility] MIS INTERVEN RAD   • IR INJECTION ABSCESS CATHETER  EVALUATION  7/5/2021    IR INJECTION ABSCESS CATHETER EVALUATION 7/5/2021 [facility] MIS INTERVEN RAD   • IR INJECTION ABSCESS CATHETER EVALUATION  7/19/2021    IR INJECTION ABSCESS CATHETER EVALUATION 7/19/2021 Sonny Danielle MD [facility] MIS INTERVEN RAD   • IR INJECTION ABSCESS CATHETER EVALUATION  8/11/2021    IR INJECTION ABSCESS CATHETER EVALUATION 8/11/2021 [facility] MIS INTERVEN RAD   • IR INJECTION CATHETER EXCHANGE  6/21/2021    IR INJECTION CATHETER EXCHANGE 6/21/2021 [facility] MIS INTERVEN RAD   • IR INJECTION CATHETER EXCHANGE  8/23/2021    IR INJECTION CATHETER EXCHANGE 8/23/2021 Junior Meza MD [facility] MIS INTERVEN RAD   • IR PICC LINE PLACEMENT W IMAGING OVER 5 YEARS OLD  8/16/2021    IR PICC LINE PLACEMENT W IMAGING OVER 5 YEARS OLD 8/16/2021 Sonny Danielle MD [facility] INTERVEN RAD   • IR TUNNELED CENTRAL LINE WITH PORT  12/31/2020    IR TUNNELED CENTRAL LINE WITH PORT 12/31/2020 Sonny Danielle MD [facility] MIS INTERVEN RAD   • KNEE ARTHROSCOPY  09/19/2017    left knee, with partial medial meniscectomy; limited chondroplasty, patellofemoral joint   • LIVER BIOPSY      by Dr. Alejandre   • OTHER SURGICAL HISTORY      esophageal varices, banded   • VASECTOMY      at approx age of 25       Family History   Problem Relation Age of Onset   • Arthritis Mother    • Rheum arthritis Mother    • Diabetes Mother    • Rectal cancer Mother    • Other Paternal Grandmother    • Other Paternal Grandfather    • Diabetes Other    • Rheum arthritis Other    • Osteoporosis Other        Social History     Tobacco Use   • Smoking status: Never Smoker   • Smokeless tobacco: Never Used   Substance Use Topics   • Alcohol use: No   • Drug use: No       ROS:  Constitutional: Negative for fever.   HENT: Negative for sore throat.    Eyes: Negative for pain.   Respiratory: Negative for shortness of breath.    Cardiovascular: Negative for chest pain.   Gastrointestinal: Positive for abdominal pain.   Endocrine: Negative for polyuria.   Genitourinary: Negative for  flank pain.   Musculoskeletal: Negative for back pain.   Neurological: Negative for headaches.   Hematological: Negative for bleeding.    PHYSICAL EXAM:  ED Triage Vitals   Temp Heart Rate Resp BP SpO2   08/11/21 0709 08/11/21 0709 08/11/21 0709 08/11/21 0709 08/11/21 0709   98.2 °F (36.8 °C) 74 15 151/68 94 %      Temp Source Heart Rate Source Patient Position BP Location FiO2 (%)   08/11/21 0709 08/11/21 1400 08/11/21 1250 08/11/21 1400 --   Oral Monitor Head of bed 30 degrees or higher Left arm      Nursing note and vitals reviewed.  Constitutional: appears well-developed.   HENT: left in mask, no gross abnormalities  Head: Normocephalic and atraumatic.   Eyes: Pupils are equal, round, .   Neck: Supple, no lymphadenopathy  Cardiovascular: Regular rate and rhythm with no murmur, rub, or gallop.  Normal pulses.  Pulmonary/Chest: No respiratory distress.  Clear to auscultation bilaterally.  Abdominal: Soft and tender and bulging at previous drain site to right abdomen.    Back: No CVA tenderness.  Musculoskeletal: No edema  Neurological: Alert.   Skin: Skin is warm and dry. No rash noted.   Psychiatric: Normal mood and affect.      Labs Reviewed   CBC WITH AUTO DIFFERENTIAL - Abnormal       Result Value    WBC 3.0 (*)     RBC 4.24      Hemoglobin 11.7 (*)     Hematocrit 35.5 (*)     MCV 83.8      MCH 27.5 (*)     MCHC 32.8      RDW 17.8 (*)     Platelets 106 (*)     MPV 7.9      Neutrophils% 83 (*)     Lymphocytes% 7 (*)     Monocytes% 9      Eosinophils% 1      Basophils% 0      ANC (auto diff) 2.50      Lymphocytes Absolute 0.20      Monocytes Absolute 0.30      Eosinophils Absolute 0.00      Basophils Absolute 0.00      Anisocytosis 1+     COMPREHENSIVE METABOLIC PANEL - Abnormal    Sodium 134 (*)     Potassium 4.0      Chloride 101      CO2 27      Anion Gap 6      BUN 8      Creatinine 0.56 (*)     Glucose 166 (*)     Calcium 8.5 (*)     AST 24      ALT (SGPT) 13      Alkaline Phosphatase 167 (*)     Total  Protein 6.3      Albumin 3.1 (*)     Total Bilirubin 1.12      eGFR 105      Corrected Calcium 9.2      Narrative:     Estimated GFR calculated using the 2009 CKD-EPI creatinine equation.   MAGNESIUM - Abnormal    Magnesium 1.6 (*)    LIPASE - Normal    Lipase 12     PHOSPHORUS - Normal    Phosphorus 2.8             ED Medication Administration from 08/11/2021 0659 to 08/11/2021 1410       Date/Time Order Dose Route Action Action by     08/11/2021 0935 iopamidoL (ISOVUE-300) 61 % injection 50 mL 50 mL intravenous Given GUI Bacon     08/11/2021 1046 sodium chloride 0.9 % infusion 100 mL/hr intravenous New Bag/New Syringe Carolyn A     08/11/2021 1351 sodium chloride 0.9 % infusion 0 mL/hr intravenous Stopped VIDYA Dumont     08/11/2021 1047 fentaNYL citrate (PF) 50 mcg/mL injection 50 mcg 50 mcg intravenous Given Carolyn A     08/11/2021 1248 fentaNYL citrate (PF) 50 mcg/mL injection 50 mcg 50 mcg intravenous Given ZHANG Sauer     08/11/2021 1047 metoclopramide (REGLAN) injection 10 mg 10 mg intravenous Given aCrolyn A     08/11/2021 1301 ampicillin-sulbactam (UNASYN) 3,000 mg in normal saline 100 mL IVPB - MBP 3,000 mg intravenous New Bag/New Syringe ZHANG Sauer     08/11/2021 1330 ampicillin-sulbactam (UNASYN) 3,000 mg in normal saline 100 mL IVPB - MBP 0 mg intravenous Stopped LENA Leon            PROCEDURES:  Procedures      ED COURSE:  ED Course as of Sep 02 1256   Wed Aug 11, 2021   1136 Reviewed patient's discharge summary from March.  He was on Unasyn at that time for these abscesses.  He said continued abscesses and drains.  He states has been to [place] and is not a surgical candidate.    [BE]      ED Course User Index  [BE] Amanda Espinoza MD       MDM:     Patient presents with abdominal pain.  He had CT yesterday and labs which I reviewed.  New labs were obtained here.  He needs a new drain to his abscess.  He was discussed with radiology.  I reviewed his previous records and he was put on Unasyn again.  He has no  leukocytosis and his vital signs here are stable but he does have significant liver disease with abscesses once again.  He will be hospitalized for radiology to drain these and for further evaluation.  He was given pain medications here is remained stable in serial evaluations.      CLINICAL IMPRESSION:  Final diagnoses:   [L02.211] Abdominal wall abscess   Gallbladder disease with intra-abdominal abscess  Hepatocellular carcinoma  A. fib  Anxiety  COPD  Diabetes  IBS  Hepatitis C      A voice recognition program was used to aid in documentation of this record.  Sometimes words are not printed exactly as they were spoken.  While efforts were made to carefully edit and correct any inaccuracies, some areas may be present; please take these into context.  Please contact the provider if areas are identified.             Amanda Espinoza MD  09/02/21 [id number]

## 2021-08-11 NOTE — NURSING NOTE
After  scans, site marked, cleaned with chlorhexidine, draped with sterile towels and 1% lidocaine used for local.

## 2021-08-11 NOTE — CONSULTATION
Consults  Subjective     Code status: DNR    History of Present Illness  Patient is a 71-year-old male with past medical history including hepatocellular carcinoma secondary to chronic hepatitis C with cirrhosis, recent diagnosis of portal vein thrombosis on Eliquis, type II DM, hypertension, paroxysmal atrial fibrillation, COPD, depression anxiety, BPH, JENIFFER, GERD, and hearing loss who presented to the emergency department today on 08/11/2021 for evaluation of abdominal and gallbladder disease.  Patient was initially diagnosed with hepatocellular carcinoma in December 2018 which per records was not amenable to local therapy, he has been receiving treatment with chemotherapy through [facility]. In October 2020, patient required a transpapillary gallbladder drain at the [facility] and was hospitalized here in March 2021 with a right upper quadrant abscess.  He underwent percutaneous drain placement x2 by IR.  Since this time patient has had extensive antibiotic treatment and follow-up with both ID and IR.  In April 2021 the medial abdominal drain was removed.      Patient was seen at [place] yesterday due to concerns for redness at site and was placed on 2 antibiotics, he also had an appointment with IR earlier today[facility] showed fluid collections in the subcutaneous soft tissues of the right upper quadrant adjacent to the liver in the area of the previous drain concerning for abscess as well as signs of recurrent hepatocellular carcinoma.  As noted above patient was sent to [facility] for further evaluation and possible intervention by IR.  Patient has been seen by the palliative team in the past primarily for pain management.  He has been on MS Contin with as needed Dilaudid available for breakthrough pain.  [facility] as this is more convenient for patient and his wife.  Palliative care has been consulted  to further review goals of care and assist with symptom management during this hospitalization.    Past Medical History  Past Medical History:   Diagnosis Date   • A-fib (CMS/HCC) (HCC)    • Allergy    • Anxiety    • Arthritis    • Asthma    • Blindness of right eye    • BPH (benign prostatic hyperplasia)    • Cardiovascular disease    • Cirrhosis (CMS/HCC) (HCC)     with possible liver mass by MRI 5/18, rec repeat in 8/2018   • Complication of anesthesia    • Congestive heart failure (CMS/HCC) (HCC) 9/20/2019   • COPD (chronic obstructive pulmonary disease) (CMS/HCC) (HCC)    • Depression    • Endocrine disorder    • Gallstones    • Gastritis    • GERD (gastroesophageal reflux disease)    • Glaucoma    • Hearing loss     bilateral hearing aids   • Hemorrhoids    • Hepatitis C     Hep C, 2016 remission, complicated by cirrhosis.  MRI abd 5/30/18, rec 3 month follow up for focus of enhancement right and left hepatic lobe junction   • Hernia, abdominal    • High blood pressure    • IBS (irritable bowel syndrome)    • Infectious viral hepatitis    • Jaundice    • Kidney stones    • Obstructive sleep apnea syndrome    • Periodic limb movement disorder    • Persistent hypersomnia    • Persistent insomnia    • Pneumonia    • PONV (postoperative nausea and vomiting)    • Type 2 diabetes mellitus (CMS/HCC) (HCC)    • Urinary incontinence    • Wears partial dentures        Past Surgical History  Past Surgical History:   Procedure Laterality Date   • COLONOSCOPY  10/27/2016    Pili, normal, repeat 10 years   • COLONOSCOPY N/A 5/11/2021    Procedure: COLONOSCOPY with polypectomy;  Surgeon: William Rivas MD;  Location: [facility];  Service: Endoscopy;  Laterality: N/A;   • CT ABLATION LIVER RADIOFREQUENCY  8/14/2019    CT ABLATION LIVER RADIOFREQUENCY 8/14/2019 [facility] MIS CT SCAN   • CT ABSCESS CYST PERITONEAL  3/5/2021    CT ABSCESS CYST PERITONEAL 3/5/2021 [facility] MIS CT SCAN   • ESOPHAGOGASTRODUODENOSCOPY N/A 2/22/2018     Procedure: EGD - ESOPHAGOGASTRODUODENOSCOPY with banding  x 2 with crna;  Surgeon: William Rivas MD;  Location: [facility];  Service: Endoscopy;  Laterality: N/A;   • ESOPHAGOGASTRODUODENOSCOPY N/A 4/6/2018    Procedure: EGD - ESOPHAGOGASTRODUODENOSCOPY with crna;  Surgeon: William Rivas MD;  Location: [facility];  Service: Endoscopy;  Laterality: N/A;   • ESOPHAGOGASTRODUODENOSCOPY N/A 5/7/2019    Procedure: EGD - ESOPHAGOGASTRODUODENOSCOPY;  Surgeon: William Rivas MD;  Location: [facility];  Service: Endoscopy;  Laterality: N/A;   • ESOPHAGOGASTRODUODENOSCOPY N/A 5/11/2021    Procedure: ESOPHAGOGASTRODUODENOSCOPY;  Surgeon: William Rivas MD;  Location: [facility];  Service: Endoscopy;  Laterality: N/A;   • HAND SURGERY Left     thumb   • HERNIA REPAIR  01/01/1965   • IR INJECTION ABSCESS CATHETER EVALUATION  4/2/2021    IR INJECTION ABSCESS CATHETER EVALUATION 4/2/2021 Mireya Jules MD [facility] MIS INTERVEN RAD   • IR INJECTION ABSCESS CATHETER EVALUATION  4/2/2021    IR INJECTION ABSCESS CATHETER EVALUATION 4/2/2021 Mireya Jules MD [facility] MIS INTERVEN RAD   • IR INJECTION ABSCESS CATHETER EVALUATION  5/5/2021    IR INJECTION ABSCESS CATHETER EVALUATION 5/5/2021 Chencho Wagner MD [facility] MIS INTERVEN RAD   • IR INJECTION ABSCESS CATHETER EVALUATION  5/5/2021    IR INJECTION ABSCESS CATHETER EVALUATION 5/5/2021 Chencho Wagner MD [facility] MIS INTERVEN RAD   • IR INJECTION ABSCESS CATHETER EVALUATION  5/18/2021    IR INJECTION ABSCESS CATHETER EVALUATION 5/18/2021 [facility] MIS INTERVEN RAD   • IR INJECTION ABSCESS CATHETER EVALUATION  5/18/2021    IR INJECTION ABSCESS CATHETER EVALUATION 5/18/2021 [facility] MIS INTERVEN RAD   • IR INJECTION ABSCESS CATHETER EVALUATION  6/1/2021    IR INJECTION ABSCESS CATHETER EVALUATION 6/1/2021 Chencho Wagner MD [facility] MIS INTERVEN RAD   • IR INJECTION ABSCESS CATHETER EVALUATION  6/7/2021    IR INJECTION ABSCESS CATHETER EVALUATION 6/7/2021 [facility] MIS INTERVEN RAD   • IR INJECTION ABSCESS CATHETER  EVALUATION  7/5/2021    IR INJECTION ABSCESS CATHETER EVALUATION 7/5/2021 [facility] MIS INTERVEN RAD   • IR INJECTION ABSCESS CATHETER EVALUATION  7/19/2021    IR INJECTION ABSCESS CATHETER EVALUATION 7/19/2021 Sonny Danielle MD [facility] MIS INTERVEN RAD   • IR INJECTION ABSCESS CATHETER EVALUATION  8/11/2021    IR INJECTION ABSCESS CATHETER EVALUATION 8/11/2021 [facility] MIS INTERVEN RAD   • IR INJECTION CATHETER EXCHANGE  6/21/2021    IR INJECTION CATHETER EXCHANGE 6/21/2021 [facility] MIS INTERVEN RAD   • IR TUNNELED CENTRAL LINE WITH PORT  12/31/2020    IR TUNNELED CENTRAL LINE WITH PORT 12/31/2020 Sonny Danielle MD [facility] MIS INTERVEN RAD   • KNEE ARTHROSCOPY  09/19/2017    left knee, with partial medial meniscectomy; limited chondroplasty, patellofemoral joint   • LIVER BIOPSY      by Dr. Alejandre   • OTHER SURGICAL HISTORY      esophageal varices, banded   • VASECTOMY      at approx age of 25     Social History  Per previous records: Ben HOLCOMB Ming Sr  reports that he has never smoked. He has never used smokeless tobacco. He reports that he does not drink alcohol and does not use drugs.    Review of Systems  Review of Systems   Constitutional: Positive for activity change and appetite change.   Respiratory: Negative for shortness of breath.    Gastrointestinal: Positive for abdominal pain, constipation and nausea. Negative for vomiting.   Neurological: Positive for weakness.   Psychiatric/Behavioral: Positive for sleep disturbance (trazadone helps). The patient is nervous/anxious (r/t health problems).        Objective     Vital signs for last 24 hours  Temp:  [36.8 °C (98.2 °F)-37.3 °C (99.2 °F)] 37.3 °C (99.2 °F)  Heart Rate:  [65-77] 77  Resp:  [15-22] 22  BP: (141-173)/(68-83) 173/74    Intake/Output this shift  I/O this shift:  In: 595.5 [I.V.:500; IV Piggyback:95.5]  Out: 20 [Other:20]    Physicial Exam  Physical Exam  Constitutional:       General: He is not in acute distress.     Appearance: He is ill-appearing.   HENT:       Head: Normocephalic and atraumatic.   Eyes:      Conjunctiva/sclera: Conjunctivae normal.   Cardiovascular:      Rate and Rhythm: Normal rate and regular rhythm.   Pulmonary:      Effort: Pulmonary effort is normal. No respiratory distress.   Abdominal:      General: Bowel sounds are normal. There is no distension.      Palpations: Abdomen is soft.      Tenderness: There is abdominal tenderness.   Musculoskeletal:      Right lower leg: Edema present.      Left lower leg: Edema present.   Skin:     General: Skin is warm and dry.   Neurological:      Comments: Intermittently confused during visit, was not sure about where he is.   Psychiatric:      Comments: Calm and cooperative          Labs    Results from last 4 days   Lab Units 08/11/21  1028   WBC AUTO 10*3/uL 3.0*   HEMOGLOBIN g/dL 11.7*   HEMATOCRIT % 35.5*   PLATELETS AUTO 10*3/uL 106*   NEUTROS PCT AUTO % 83*   LYMPHS PCT AUTO % 7*   MONOS PCT AUTO % 9   EOS PCT AUTO % 1     Results from last 4 days   Lab Units 08/11/21  1026   SODIUM mmol/L 134*   POTASSIUM mmol/L 4.0   CHLORIDE mmol/L 101   CO2 mmol/L 27   BUN mg/dL 8   CREATININE mg/dL 0.56*   CALCIUM mg/dL 8.5*   TOTAL PROTEIN g/dL 6.3   BILIRUBIN TOTAL mg/dL 1.12   ALK PHOS U/L 167*   ALT U/L 13   AST U/L 24   GLUCOSE mg/dL 166*     Wt Readings from Last 3 Encounters:   08/11/21 80 kg (176 lb 5.9 oz)   07/29/21 80.8 kg (178 lb 3.2 oz)   07/08/21 82.1 kg (181 lb)     Radiology  CT abdomen pelvis with IV contrast   08/10/2021 from outside source  Impression:    1.  Fluid collections in the subcutaneous soft tissues of the right upper quadrant adjacent to the liver in the area of the previously noted biliary drain, concerning for abscess with adjacent cellulitis.    2.  Heterogeneous right portal vein thrombosis has increased and is worrisome for residual/recurrent hepatocellular carcinoma.  There are otherwise stable appearing treatment-related changes in the right hepatic lobe on this single phase  study.    3.  The remaining biliary stents appear appropriately positioned.    4.  Cirrhosis and sequelae of portal hypertension with mild interval worsening of splenomegaly.  Small amount of perihepatic ascites persists but has improved.  Small amount of pelvic ascites.    5.  Bilateral nonobstructing renal stones.    ASSESSMENT/PLAN    1. Palliative care by specialist    Symptoms: Pain, nausea, sleep disturbance    Code Status: DNR    Goals of Care: To proceed with intervention by IR for perforated gallbladder and fluid collection.  Plan is for CT-guided drain placement this afternoon.  Patient is tiring of procedures however he would like to have this treated and is hoping to follow-up with [facility] for ongoing treatment of his hepatocellular carcinoma.    2.  Hepatocellular carcinoma  Receiving treatment with chemotherapy through [facility].  Now hospitalized due to perforated gallbladder secondary to biliary stent.    3.  Pain  Continue with scheduled MS Contin at current dose (15 mg every 12 hours) and as needed oral Dilaudid for breakthrough pain.  Agree with IV fentanyl if patient unable to take p.o. Plan on increase in as needed/short acting use through this acute problem.     4.  Sleep disturbance  Continue with scheduled trazodone, this has been helpful for patient.    5.  Nausea  As needed Zofran available.    Lory Wong, CNP  8/11/2021  3:10 PM

## 2021-08-11 NOTE — H&P
[facility] Hospitalist Services  [address] [place] [address]    HOSPITALIST HISTORY AND PHYSICAL    Date of Service: 08/11/21  Time of Service: 12:47 PM    Chief complaint: RUQ pain    HPI:  Ben Lai Sr is a 71 y.o. male, with a complicated medical history including hepatocellular carcinoma secondary to chronic hepatitis C with cirrhosis, diagnosed in 2018 for which he follows with our cancer care [place] Dr. Reynoso, his hepatocellular cancer has been considered nonoperable or amenable to radiation as per [facility] and he has been on monthly chemotherapy, recent diagnosis of portal vein thrombosis - on Eliquis, type 2 diabetes mellitus, hypertension, paroxysmal atrial fibrillation, COPD, depression/anxiety, BPH, JENIFFER, GERD, and hearing loss.  In October 2020, patient required a transpapillary gallbladder drain at the [facility] and was hospitalized here in March 2021 with a right upper quadrant abscess.  He underwent percutaneous drain placement x2 by IR.  At that time he had a polymicrobial drainage of fluid and was placed on IV Unasyn, which he tolerated.  At that time, he had a HIDA scan that showed no extravasation of fluid consistent with biliary leak which and he was discharged on oral antibiotics with 2 drains left in.  He followed up with infectious disease after this hospitalization.  He also followed with interventional radiology every 2 weeks for assessment of his drains.  The more medial abdominal drain was removed in April 2021 and he has continued to have the more lateral drain which has had consistent drainage.  The patient and his wife are concerned about the appearance of his medial drainage site with erythema and edema [facility]  He was started on cefuroxime and Flagyl, however the site continued to worsen.  Patient was supposed to be seen by IR on 8/11 for reassessment of his drain, but presented to the emergency  department in the morning of 8/11 due to worsening right upper quadrant pain and worsening appearance of his drain site.  Denied fever, chills, nausea, vomiting, chest pain, shortness of breath, headache or dizziness.  Patient underwent fluoroscopic right abdominal drain check by Dr. Jules from interventional radiology and found that his drain appeared to be next to the gallbladder and no longer within the gallbladder and his previous biliary stent appeared to perforate through the gallbladder with a new fluid collection seen anterior to where his previous drain was placed.  There did not appear to be any fistulous connection to the bowel.  Patient and his wife reported that the old drain site has continued to worsen with more erythema and edema.  Denied any recent infections around his right chest port site.  Patient has been continued on atezolizumab and bevacizumab per oncology recommendations, with last dose on 7/29/2021, with plans for every 3-week infusions.  His most recent MR staging scan showed stable disease.  Patient has been vaccinated for Covid.    In the emergency department, significant lab work included sodium of 134, creatinine was stable at 0.56, glucose 166, alkaline phosphatase 167, with otherwise normal hepatic function panel.  CBC showed WBC of 3.0, hemoglobin 11.7, which appears stable from previous and platelet count of 106,000, which is also similar to previous.  Blood cultures were obtained.  Patient was started on IV Unasyn and normal saline infusion.  He also required 2 doses of fentanyl and Reglan for nausea.  Patient will be admitted to the hospitalist department for further evaluation of his intra-abdominal abscess with consultation with palliative care and possibly GI.    Primary care provider: Dr. Ramírez    Past Medical History:   Diagnosis Date   • A-fib (CMS/HCC) (HCC)    • Allergy    • Anxiety    • Arthritis    • Asthma    • Blindness of right eye    • BPH (benign prostatic  hyperplasia)    • Cardiovascular disease    • Cirrhosis (CMS/HCC) (HCC)     with possible liver mass by MRI 5/18, rec repeat in 8/2018   • Complication of anesthesia    • Congestive heart failure (CMS/HCC) (HCC) 9/20/2019   • COPD (chronic obstructive pulmonary disease) (CMS/HCC) (HCC)    • Depression    • Endocrine disorder    • Gallstones    • Gastritis    • GERD (gastroesophageal reflux disease)    • Glaucoma    • Hearing loss     bilateral hearing aids   • Hemorrhoids    • Hepatitis C     Hep C, 2016 remission, complicated by cirrhosis.  MRI abd 5/30/18, rec 3 month follow up for focus of enhancement right and left hepatic lobe junction   • Hernia, abdominal    • High blood pressure    • IBS (irritable bowel syndrome)    • Infectious viral hepatitis    • Jaundice    • Kidney stones    • Obstructive sleep apnea syndrome    • Periodic limb movement disorder    • Persistent hypersomnia    • Persistent insomnia    • Pneumonia    • PONV (postoperative nausea and vomiting)    • Type 2 diabetes mellitus (CMS/HCC) (HCC)    • Urinary incontinence    • Wears partial dentures         Past Surgical History:   Procedure Laterality Date   • COLONOSCOPY  10/27/2016    Pili, normal, repeat 10 years   • COLONOSCOPY N/A 5/11/2021    Procedure: COLONOSCOPY with polypectomy;  Surgeon: William Rivas MD;  Location: [facility] Endoscopy;  Service: Endoscopy;  Laterality: N/A;   • CT ABLATION LIVER RADIOFREQUENCY  8/14/2019    CT ABLATION LIVER RADIOFREQUENCY 8/14/2019 [facility] MIS CT SCAN   • CT ABSCESS CYST PERITONEAL  3/5/2021    CT ABSCESS CYST PERITONEAL 3/5/2021 [facility] MIS CT SCAN   • ESOPHAGOGASTRODUODENOSCOPY N/A 2/22/2018    Procedure: EGD - ESOPHAGOGASTRODUODENOSCOPY with banding  x 2 with crna;  Surgeon: William Rivas MD;  Location: [facility] Endoscopy;  Service: Endoscopy;  Laterality: N/A;   • ESOPHAGOGASTRODUODENOSCOPY N/A 4/6/2018    Procedure: EGD - ESOPHAGOGASTRODUODENOSCOPY with crna;  Surgeon: William Rivas MD;  Location: [facility]  Endoscopy;  Service: Endoscopy;  Laterality: N/A;   • ESOPHAGOGASTRODUODENOSCOPY N/A 5/7/2019    Procedure: EGD - ESOPHAGOGASTRODUODENOSCOPY;  Surgeon: William Rivas MD;  Location: [facility] Endoscopy;  Service: Endoscopy;  Laterality: N/A;   • ESOPHAGOGASTRODUODENOSCOPY N/A 5/11/2021    Procedure: ESOPHAGOGASTRODUODENOSCOPY;  Surgeon: William Rivas MD;  Location: [facility] Endoscopy;  Service: Endoscopy;  Laterality: N/A;   • HAND SURGERY Left     thumb   • HERNIA REPAIR  01/01/1964   • IR INJECTION ABSCESS CATHETER EVALUATION  4/2/2021    IR INJECTION ABSCESS CATHETER EVALUATION 4/2/2021 Mireya Jules MD [facility] MIS INTERVEN RAD   • IR INJECTION ABSCESS CATHETER EVALUATION  4/2/2021    IR INJECTION ABSCESS CATHETER EVALUATION 4/2/2021 Mireya Jules MD [facility] MIS INTERVEN RAD   • IR INJECTION ABSCESS CATHETER EVALUATION  5/5/2021    IR INJECTION ABSCESS CATHETER EVALUATION 5/5/2021 Chencho Wagner MD [facility] MIS INTERVEN RAD   • IR INJECTION ABSCESS CATHETER EVALUATION  5/5/2021    IR INJECTION ABSCESS CATHETER EVALUATION 5/5/2021 Chencho Wagner MD [facility] MIS INTERVEN RAD   • IR INJECTION ABSCESS CATHETER EVALUATION  5/18/2021    IR INJECTION ABSCESS CATHETER EVALUATION 5/18/2021 [facility] MIS INTERVEN RAD   • IR INJECTION ABSCESS CATHETER EVALUATION  5/18/2021    IR INJECTION ABSCESS CATHETER EVALUATION 5/18/2021 [facility] MIS INTERVEN RAD   • IR INJECTION ABSCESS CATHETER EVALUATION  6/1/2021    IR INJECTION ABSCESS CATHETER EVALUATION 6/1/2021 Chencho Wagner MD [facility] MIS INTERVEN RAD   • IR INJECTION ABSCESS CATHETER EVALUATION  6/7/2021    IR INJECTION ABSCESS CATHETER EVALUATION 6/7/2021 [facility] MIS INTERVEN RAD   • IR INJECTION ABSCESS CATHETER EVALUATION  7/5/2021    IR INJECTION ABSCESS CATHETER EVALUATION 7/5/2021 [facility] MIS INTERVEN RAD   • IR INJECTION ABSCESS CATHETER EVALUATION  7/19/2021    IR INJECTION ABSCESS CATHETER EVALUATION 7/19/2021 Sonny Danielle MD [facility] MIS INTERVEN RAD   • IR INJECTION ABSCESS CATHETER EVALUATION  8/11/2021    IR  INJECTION ABSCESS CATHETER EVALUATION 8/11/2021 [facility] MIS INTERVEN RAD   • IR INJECTION CATHETER EXCHANGE  6/21/2021    IR INJECTION CATHETER EXCHANGE 6/21/2021 [facility] MIS INTERVEN RAD   • IR TUNNELED CENTRAL LINE WITH PORT  12/31/2020    IR TUNNELED CENTRAL LINE WITH PORT 12/31/2020 Sonny Danielle MD [facility] MIS INTERVEN RAD   • KNEE ARTHROSCOPY  09/19/2017    left knee, with partial medial meniscectomy; limited chondroplasty, patellofemoral joint   • LIVER BIOPSY      by Dr. Alejandre   • OTHER SURGICAL HISTORY      esophageal varices, banded   • VASECTOMY      at approx age of 25       Social History:    Ben Lai Sr  reports that he has never smoked. He has never used smokeless tobacco. He reports that he does not drink alcohol and does not use drugs.    Family History:  family history includes Arthritis in his mother; Diabetes in his mother and another family member; Osteoporosis in an other family member; Other in his paternal grandfather and paternal grandmother; Rectal cancer in his mother; Rheum arthritis in his mother and another family member.    Allergies:  Allergies   Allergen Reactions   • Metformin Hives   • Adhesive Tape-Silicones    • Interferon Jose Maria-2a    • Latex    • Milk      Pt claimed that he is not allergic to milk at all    • Mometasone    • Mometasone Furoate    • Omeprazole    • Ondansetron Hcl Nausea And Vomiting   • Pepper (Genus Capsicum)      Any Hot Peppers - can eat bell peppers   • Primidone      Other reaction(s): HEADACHE   • Ribavirin Itching   • Rosuvastatin    • Sorafenib      Other reaction(s): BURNING SENSATION, HEADACHE, JOINT PAIN   • Spironolactone    • Statins-Hmg-Coa Reductase Inhibitors Itching and GI intolerance   • Interferon Jose Maria (Human Leuk. Derived) Palpitations and Rash   • Pantoprazole Itching and Rash   • Peginterferon Jose Maria-2b Palpitations   • Penicillins Other (see comments)     Passed out after IM injection in service ?vasovagal reaction  Pt tolerates unasyn         Medications:   No current facility-administered medications on file prior to encounter.     Current Outpatient Medications on File Prior to Encounter   Medication Sig   • Narcan 4 mg/actuation spray,non-aerosol    • apixaban (ELIQUIS) 5 mg tablet Take 1 tablet (5 mg total) by mouth 2 (two) times a day   • cefuroxime (CEFTIN) 500 mg tablet Take 500 mg by mouth 2 (two) times a day X 10 days   • metroNIDAZOLE (FLAGYL) 500 mg tablet Take 500 mg by mouth 3 (three) times a day x21 days (started 7/6/21)   • morphine (MS CONTIN) 15 mg 12 hr tablet TAKE ONE TABLET BY MOUTH TWICE A DAY FOR PAIN   • lidocaine-prilocaine (EMLA) cream Apply thick layer to intact skin over port 1 hour prior to procedure. Cover with occlusive dressing.   • HYDROmorphone (DILAUDID) 2 mg tablet Take 2-4 mg by mouth every 6 (six) hours as needed     • sildenafiL (VIAGRA) 100 mg tablet Take 100 mg by mouth as needed for erectile dysfunction Up to 4 times monthly   • fluoride, sodium, (DENTAGEL) 1.1 % gel dental gel See administration instructions Apply small amount to toothbrush in place of regular toothpaste. Brush for 2 minutes in the morning and evening.   • polyethylene glycol (Miralax) 17 gram/dose powder Take 17 g by mouth daily   • brimonidine (ALPHAGAN) 0.2 % ophthalmic solution 1 drop 2 (two) times a day   • carboxymethylcellulose (REFRESH PLUS) 0.5 % dropperette Administer 1 drop into both eyes every 3 (three) hours Indications: dry eye     • prochlorperazine (COMPAZINE) 10 mg tablet Take 1 tablet (10 mg total) by mouth every 6 (six) hours as needed for nausea or vomiting   • artificial saliva (YERBA GLADYS AND LYTES) aerosol,spray spray Apply 2 sprays to the mouth or throat as needed     • fluticasone propionate (FLONASE) 50 mcg/actuation nasal spray Administer 2 sprays into each nostril daily     • naloxone HCl (NARCAN NASL) Administer into affected nostril(s) as needed   • traZODone (DESYREL) 100 mg tablet Take 100 mg by mouth  nightly     • insulin glargine (Lantus Solostar U-100 Insulin) 100 unit/mL (3 mL) insulin pen injection pen Inject 24 Units under the skin every morning     • carvediloL (COREG) 3.125 mg tablet Take 1 tablet (3.125 mg total) by mouth 2 (two) times a day with meals   • diclofenac sodium (VOLTAREN) 1 % gel Apply 4 g topically 4 (four) times a day     • cetirizine (ZyrTEC) 10 mg tablet Take 10 mg by mouth daily   • loperamide (Imodium A-D) 2 mg tablet Take 2 tablets with first loose stool of the day, then up to 8 tablets daily (Patient not taking: Reported on 4/15/2021 )   • buPROPion SR (WELLBUTRIN SR) 100 mg 12 hr tablet Take 1 tablet (100 mg total) by mouth daily   • lactulose (GENERLAC) 10 gram/15 mL solution Take 15 mL (10 g total) by mouth daily   • terazosin (HYTRIN) 5 mg capsule Take 1 capsule (5 mg total) by mouth 2 (two) times a day   • blood-glucose meter (ACCU-CHEK ADELE PLUS METER) [facility] Use to test blood sugars 3 times daily (E11.65, non insulin depedent) AccuChek Adele plus, meter is 8 years old   • lisinopril (PRINIVIL,ZESTRIL) 20 mg tablet Take 1 tablet (20 mg total) by mouth daily   • furosemide (LASIX) 20 mg tablet Take 1 tablet (20 mg total) by mouth 2 (two) times a day.   • lansoprazole (PREVACID) 30 mg capsule Take 30 mg by mouth daily    • liraglutide (VICTOZA 2-JASE) 0.6 mg/0.1 mL (18 mg/3 mL) injection Inject 1.8 mg under the skin daily     • sodium chloride (SALINE NASAL) 0.65 % nasal spray Administer 1 spray into each nostril as needed for congestion or rhinitis    • pramoxine-menthol (GOLD BOND MEDICATED ANTI-ITCH) 1-1 % cream Apply topically as needed     • latanoprost (XALATAN) 0.005 % ophthalmic solution Administer 1 drop into both eyes nightly         Review of Systems:  Review of Systems   Constitutional: Positive for fatigue. Negative for chills, diaphoresis and fever.   Respiratory: Negative for cough, chest tightness, shortness of breath and wheezing.    Cardiovascular: Positive for  leg swelling. Negative for chest pain and palpitations.   Gastrointestinal: Positive for abdominal pain, constipation and nausea. Negative for abdominal distention, blood in stool, diarrhea and vomiting.   Genitourinary: Negative for difficulty urinating and dysuria.   Musculoskeletal: Negative for arthralgias and myalgias.   Skin: Positive for wound. Negative for rash.   Neurological: Positive for weakness. Negative for dizziness, syncope and light-headedness.       Objective     Vital signs:   Temp:  [36.8 °C (98.2 °F)] 36.8 °C (98.2 °F)  Heart Rate:  [65-74] 69  Resp:  [15-20] 20  BP: (147-172)/(68-83) 159/78      Exam:  Physical Exam  Vitals and nursing note reviewed.   Constitutional:       General: He is not in acute distress.     Appearance: He is ill-appearing.   HENT:      Head: Normocephalic.      Nose: Nose normal.      Mouth/Throat:      Mouth: Mucous membranes are moist.      Pharynx: Oropharynx is clear. No oropharyngeal exudate.   Eyes:      General: No scleral icterus.     Conjunctiva/sclera: Conjunctivae normal.      Pupils: Pupils are equal, round, and reactive to light.   Cardiovascular:      Rate and Rhythm: Normal rate. Rhythm irregular.      Pulses: Normal pulses.      Heart sounds: Normal heart sounds. No murmur heard.   No friction rub. No gallop.    Pulmonary:      Effort: Pulmonary effort is normal. No respiratory distress.      Breath sounds: No wheezing or rales.      Comments: Diminished lung sounds in the bases bilaterally, patient having difficulty taking deep breaths secondary to abdominal pain.  Abdominal:      General: Bowel sounds are normal. There is no distension.      Palpations: Abdomen is soft.      Tenderness: There is abdominal tenderness.      Comments: Right upper quadrant pain to palpation, more lateral drain site shows clean and dry dressings with dark bloody appearing drainage in his drain, old drain site more medial to his current drain shows about a 3 x 3 cm area of  erythema with edema and a centralized wound with scab cover.  No drainage currently.   Musculoskeletal:         General: Swelling present. No tenderness. Normal range of motion.      Cervical back: Normal range of motion and neck supple.      Comments: 2-3+ pitting edema to bilateral lower extremities, no erythema or calf tenderness.   Skin:     General: Skin is warm and dry.      Capillary Refill: Capillary refill takes less than 2 seconds.      Findings: No rash.   Neurological:      General: No focal deficit present.      Mental Status: He is alert and oriented to person, place, and time. Mental status is at baseline.      Cranial Nerves: No cranial nerve deficit.      Sensory: No sensory deficit.      Motor: Weakness present.      Comments: Patient is hard of hearing.  No focal neuro deficits noted.   Psychiatric:         Mood and Affect: Mood normal.         Behavior: Behavior normal.         Thought Content: Thought content normal.         Judgment: Judgment normal.         Labs:   Admission on 08/11/2021   Component Date Value Ref Range Status   • WBC 08/11/2021 3.0* 3.7 - 9.6 10*3/uL Final   • RBC 08/11/2021 4.24  4.10 - 5.80 10*6/µL Final   • Hemoglobin 08/11/2021 11.7* 13.2 - 17.2 g/dL Final   • Hematocrit 08/11/2021 35.5* 38.0 - 50.0 % Final   • MCV 08/11/2021 83.8  82.0 - 97.0 fL Final   • MCH 08/11/2021 27.5* 29.0 - 34.0 pg Final   • MCHC 08/11/2021 32.8  32.0 - 36.0 g/dL Final   • RDW 08/11/2021 17.8* 11.5 - 15.0 % Final   • Platelets 08/11/2021 106* 130 - 350 10*3/uL Final   • MPV 08/11/2021 7.9  6.9 - 10.8 fL Final   • Neutrophils% 08/11/2021 83* 46 - 70 % Final   • Lymphocytes% 08/11/2021 7* 15 - 47 % Final   • Monocytes% 08/11/2021 9  5 - 13 % Final   • Eosinophils% 08/11/2021 1  0 - 3 % Final   • Basophils% 08/11/2021 0  0 - 2 % Final   • ANC (auto diff) 08/11/2021 2.50  10*3/UL Final   • Lymphocytes Absolute 08/11/2021 0.20  10*3/uL Final   • Monocytes Absolute 08/11/2021 0.30  10*3/uL Final   •  Eosinophils Absolute 08/11/2021 0.00  10*3/uL Final   • Basophils Absolute 08/11/2021 0.00  10*3/uL Final   • Anisocytosis 08/11/2021 1+   Final   • Sodium 08/11/2021 134* 135 - 145 mmol/L Final   • Potassium 08/11/2021 4.0  3.5 - 5.1 mmol/L Final   • Chloride 08/11/2021 101  98 - 107 mmol/L Final   • CO2 08/11/2021 27  21 - 32 mmol/L Final   • Anion Gap 08/11/2021 6  3 - 11 mmol/L Final   • BUN 08/11/2021 8  7 - 25 mg/dL Final   • Creatinine 08/11/2021 0.56* 0.70 - 1.30 mg/dL Final   • Glucose 08/11/2021 166* 70 - 105 mg/dL Final   • Calcium 08/11/2021 8.5* 8.6 - 10.3 mg/dL Final   • AST 08/11/2021 24  <40 U/L Final   • ALT (SGPT) 08/11/2021 13  7 - 52 U/L Final   • Alkaline Phosphatase 08/11/2021 167* 45 - 115 U/L Final   • Total Protein 08/11/2021 6.3  6.0 - 8.3 g/dL Final   • Albumin 08/11/2021 3.1* 3.5 - 5.3 g/dL Final   • Total Bilirubin 08/11/2021 1.12  0.20 - 1.40 mg/dL Final   • eGFR 08/11/2021 105  >60 mL/min/1.73m*2 Final   • Corrected Calcium 08/11/2021 9.2  8.6 - 10.3 mg/dL Final   • Lipase 08/11/2021 12  11 - 82 U/L Final       Imaging:   MR abdomen with and without contrast    Result Date: 7/29/2021  Narrative: EXAM: MR ABDOMEN W AND WO CONTRAST 07/29/2021 CLINICAL HISTORY: Hepatocellular carcinoma (CMS/HCC) (HCC); Neoplasm: liver or bile duct TECHNIQUE: Multiplanar, multisequence MRI of the abdomen without and with 17 mL of ProHance intravenous contrast. Single use contrast vial. 0 mL of contrast was discarded. Axial and coronal T2, axial T2 with fat suppression, axial in and opposed phase gradient echo, axial diffusion weighted, and axial pre and postcontrast T1-weighted sequences were performed. COMPARISON: Abdominal MRI 5/5/2021. FINDINGS: Nodular liver contour and irregular liver morphology, compatible with hepatic cirrhosis. Irregular reticular T2 hyperintensity throughout the liver parenchyma, compatible with fibrosis. Redemonstration of capsular retraction with focal fibrosis in the  anterolateral aspect of the right hepatic lobe, unchanged. Two subhepatic drains remained in place. No discrete residual subhepatic collection remains. Heterogenous contrast enhancement of the right hepatic lobe, similar to prior examination. No new foci of abnormal T2 signal hyperintensity, contrast enhancement, or diffusion restriction. No lymphadenopathy in the upper abdomen. Findings compatible with portal hypertension, including recanalization of the umbilical vein, moderate splenomegaly measuring 18.8 cm on coronal images, splenorenal shunt, and multiple subcutaneous varices in the ventral abdominal wall. Small perihepatic and perisplenic upper abdominal ascites. Mild scattered mesenteric edema throughout the abdomen. Thrombosis of the right portal vein is demonstrated. This was also seen on the prior MRI in retrospect, however now thrombosis extends from near the intrahepatic portal vein bifurcation and extends distally throughout the right portal vein. Filling defects are demonstrated within the common bile duct on axial T2-weighted images (series 401, images 28-33), suggestive of choledocholithiasis, unchanged. No intrahepatic and extra hepatic biliary ductal dilatation, stable. Normal pancreas. No pancreatic ductal dilatation. Normal adrenal glands. Small calyceal diverticulum in the right kidney. Kidneys are otherwise unremarkable. No solid renal mass or hydronephrosis. Visualized loops of small and large bowel are normal in caliber. Bone marrow signal is normal. Trace bilateral pleural effusions. Visualized lung bases are otherwise unremarkable, however lung parenchyma is inadequately evaluated on MRI.     Impression: IMPRESSION: 1.  Stable appearance of the liver. No evidence of new or progressive malignancy. 2.  Two subhepatic drains remain in place. No discrete focal fluid collection. 3.  Right portal vein thrombosis. 4.  Sequelae of portal hypertension including recanalization of the umbilical vein,  moderate splenomegaly, and small upper abdominal ascites. 5.  Filling defects in the common bile duct, suggestive of choledocholithiasis, unchanged.    CT ABDOMEN PELVIS W IV CONTRAST    Result Date: 8/11/2021  Narrative: NOTE: This study was received from an outside source for PACS Storage.    IR injection abscess catheter evaluation    Result Date: 8/11/2021  Narrative: Exam: Drain check 08/11/2021 Clinical history:  abscess/routine checkup. Procedure/Views: Fluoroscopic time 1.5 minutes images 9 Comparison/s: Outside CT abdomen pelvis 8/10/2021 Findings:  view demonstrates stable positioning of the right upper quadrant drain. Adjacent internalized biliary stents with proximal aspect of the stent in the gallbladder. Injection of contrast demonstrated decompressed cavity about the pigtail loop, with contrast seen to flow in the free intraperitoneal cavity and outlining bowel loops. No definite fistulous connection to adjacent bowel with no evidence of peristalsis. Again contrast appears to outline the bowel loops and is not within the bowel loops. When comparing outside CT imaging this drain appears to be adjacent to the gallbladder and does not appear to be within the gallbladder lumen. The internalized biliary stents appears to be perforated through the gallbladder. Given patient will have up to 30 cc of output a day, recommend leaving drain in place, as patient is likely still having spillage of bile from the perforated gallbladder. Recommend correlation with GI recommendations about possible replacement of stents and possibly pulled back through the perforated gallbladder. Additionally with reviewing CT imaging there has been reaccumulation of fluid and abscess formation is formed slightly anterior to the existing drain, and in the location of the previously removed drain. Additional subcutaneous abscess. Ultrasound evaluation was unable to a identify this deeper fluid collection. Patient will likely need  CT drain placement into this fluid collection.     Impression: IMPRESSION: 1.  Decompressed cavity about the right upper quadrant drain with spillage of contrast into the free intraperitoneal cavity with outlining bowel loops. No definite fistulous connection to bowel with no peristalsis. Contrast outlines the bowel loops and is not within the bowel loops. See above discussion with findings of recent CT imaging. 2.  Patient has developed new abscess cavity more anterior adjacent to the liver, in the location of the previously removed drain. Patient will require CT drain placement.    IR injection abscess catheter evaluation    Result Date: 7/19/2021  Narrative: Exam:  Abscess catheter check 07/19/2021 Clinical History: Routine check-up; Procedure/Views:  Utilizing fluoroscopy and digital imaging, the existing catheter was injected with contrast. Fluoroscopy time for the procedure was 0.3 minutes. 4 images are obtained. Comparison/s:  7/5/2021 Findings:  The catheter is in satisfactory position. Outputs recently from the catheter have been fairly low, 20 cc or less. Contrast injection shows immediate communication to the right colon. No significant flow into the stents draining the biliary system is present. No significant cavity is present surrounding the catheter.     Impression: IMPRESSION:  1. No significant residual cavity remains surrounding the pigtail the catheter. However, there is immediate contrast opacification of the right colon. The catheter needs to remain in place.    Assessment/Plan      Assessment:  Principal Problem:    Right upper quadrant abdominal abscess (CMS/HCC) (HCC)  Active Problems:    Chronic hepatitis C with cirrhosis (CMS/HCC) (HCC)    Type 2 diabetes mellitus with neurologic complication (CMS/HCC) (HCC)    Essential hypertension    Hepatocellular carcinoma (CMS/HCC) (HCC)    Gastroesophageal reflux disease without esophagitis    Esophageal varices in cirrhosis (CMS/HCC) (HCC)     Obstructive sleep apnea    Hyperlipidemia    Intra-abdominal abscess (CMS/HCC) (HCC)    Plan:  Intra-abdominal abscess: CT-guided drainage ordered through IR for notable fluid collection.  Will obtain cultures of this drainage.  Continued on IV Unasyn at this time as this covered his previous abscess cultures.  No evidence of sepsis at this time as patient is hemodynamically stable and afebrile.    Perforated gallbladder secondary to biliary stent: Patient has previous biliary stents that were placed at the [facility].  Per interventional radiology report, it appears that his stent has perforated his gallbladder.  Case reviewed with GI for assistance with management.    Hepatocellular carcinoma  Portal vein thrombosis  Chronic hepatitis C with cirrhosis  Will notify Dr. Reynoso of patient's hospitalization.  Last dose of chemotherapy was on 7/29/2021.  Will likely resume Eliquis after abscesses drained and decision is made by GI about possible procedure.  Will hold at this time.  No evidence of GI bleeding at this time.  Patient's hepatic function appears stable.    Type 2 diabetes mellitus: Last hemoglobin A1c was 7.5 in August 2020, will recheck.  Patient will be n.p.o. initially in preparation for procedure.  Will do every 4 hours glucose checks and placed on low correctional sliding scale insulin.  Awaiting final medication reconciliation, but it appears the patient is on Lantus 24 units daily and Victoza daily.  Will hold at this time.  Patient will likely need reinitiation of long-acting insulin when tolerating p.o. intake.    Chronic pain: Patient is on MS Contin 15 mg twice daily for chronic pain with p.o. Dilaudid 2 to 4 mg every 6 hours as needed for breakthrough.  Will resume this regimen with the addition of fentanyl as needed.    GERD: Continue PPI therapy.    Paroxysmal atrial fibrillation: Patient was rate controlled at time of presentation.  Will resume home dose of Coreg.  Holding  Eliquis at this time for possible procedures.    Essential hypertension: Resume home dose of Coreg and lisinopril.    Pancytopenia  Patient's hemoglobin appears to be at baseline at 11.7.  No evidence of acute GI bleeding.  Platelet count also stable at 106,000.  Suspect secondary to underlying malignancy and chemotherapy regimen.  WBC 3.0 with ANC of 2.5.  Patient has been afebrile.    Goals of care: Patient requested to be a DNR.  Patient and wife agreeable to further discussion on his goals of care with palliative care, who has been consulted.    Additional Cares:  Lines: Right chest port  DVT Prophylaxis: SCDs, holding Eliquis at this time due to possible procedures  CODE STATUS: DNR  Disposition: Patient was living at home with his wife prior to hospitalization, likely return there pending clinical course  Anticipated date of discharge: Likely prolonged hospitalization  Family Contact: WifeLaura was at the bedside    Impressions and care plan discussed with patient. All questions answered. Please see orders.     Discussed with provider(s): Whitney Carbajal CNP (palliative care), Kimberly Sr CNP (GI)    Time spent: 62 minutes    Electronically signed by: Kayy Francois CNP  8/11/2021  12:47 PM    Addendum:  1400  Discussed case with Kimberly rS CNP from GI.  Dr. Heller at this time recommend surgical consult for cholecystectomy.  If they felt he is not a surgical candidate, Dr. Heller would peform ERCP for stent exchange.      Addendum:  1420  Case discussed with Joan Nuñez CNP and Dr. Keith from General Surgery, patient is not a candidate for cholecystectomy.  Discussed with Kimberly from GI, recommending ERCP on Saturday with Dr. Heller.      Attestation  Patient seen and examined with Kayy Nichole CNP.  Chart reviewed.  Agree with findings and plan of care above.    Exam:  RUQ abdomen with erythema and underlying fluctuant mass with induration, warmth and tenderness      Case also  discussed with Dr. Reynoso who feels at this time his cancer is stable at this point.

## 2021-08-11 NOTE — POST-PROCEDURE NOTE
Operation : Fluoroscopic right abdominal Drain Check    Patient Name: Ben Lai Sr     : 1950    Radiologist: Mireya Jules MD    Pre-operative Diagnosis: perforated gallbladder     Findings: Injection of contrast again demonstrates decompressed fluid collection about the drain and spillage of contrast into the abdomen. Looking at CT imaging from outside on 8/10/2021 this drain appears to be next to the gallbladder, and no longer within the gallbladder. The stent appears to perforate through the gallbladder. There is a new fluid collection seen anterior where previous drain was placed. Unable to see with ultrasound. Patient will need new CT guided drain placement into this new fluid collection.     Complications:  None; patient tolerated the procedure well.

## 2021-08-11 NOTE — MEDICATION HISTORY SPECIALIST NOTES
[facility] ONC [id number]    CSN: [id number]  : [id number]    Information sources:  EPIC  Complete Dispense Report  [facility]    Allergies verified.    Patient's wife interviewed via phone who provided all history. Wife verified medications and provided last doses. Verified with Complete Dispense Report.    Discontinued medications:  volteran  Imodium  Sodium chloride    Discrepancies:  Lactulose - pt taking 30ml every morning    **High alert medications**  Apixaban (Eliquis)     See  for medication resources.  Outside records permanent

## 2021-08-12 NOTE — PROGRESS NOTES
Laura called and states Jakub is in the hospital and wondered if she should cancel his appts for 8/19. Told Laura we would keep him scheduled and asked her to keep in touch with us on his status and when he is discharged and then we would review with the provider. Dulce SIGALA

## 2021-08-12 NOTE — PLAN OF CARE
Problem: Safety Adult - Fall  Goal: Free from fall injury  Description: INTERVENTIONS:    Inpatient - Please reference Cares/Safety Flowsheet under [facility] Fall Risk for interventions.  Pediatrics - Please reference Peds Daily Cares/Safety Flowsheet under [facility] Pediatric Fall Assessment Fall Bundle for interventions  LD/OB - Please reference OB Shift Screening Flowsheet under OB Fall Risk for interventions.  Outcome: Not Progressing     Problem: Gastrointestinal - Adult  Goal: Minimal or absence of nausea and vomiting  Description: INTERVENTIONS:  1. Ensure adequate hydration  2. Monitor intake and output  3. Maintain NPO status until nausea and vomiting are resolved  4. Nasogastric tube to low intermittent suction as ordered  5. Administer ordered antiemetic medications as needed  6. Provide nonpharmacologic comfort measures as appropriate  7. Advance diet as ordered  8. Nutrition consult as indicated   Outcome: Not Progressing  Goal: Maintains or returns to baseline digestive function  Description: INTERVENTIONS:  1. Assess bowel function  2. Ensure adequate hydration  3. Administer ordered medications as needed  4. Encourage mobilization and activity  5. Nutrition consult as indicated  6. Assess hydration and nutritional status  7. Assess characteristics and frequency of stool  8. Monitor for metabolic panel imbalances  9. Assess for treatment effectiveness  Outcome: Not Progressing  Goal: Maintains adequate nutritional intake  Description: INTERVENTIONS:  1. Monitor percentage of each meal consumed  2. Identify factors contributing to decreased intake, treat as appropriate  3. Assist with meals as needed  4. Monitor I&O, weight and lab values  5. Obtain nutritional consult as indicated  6. Administer alternative nutrition interventions as ordered  Outcome: Not Progressing     Problem: Skin/Tissue Integrity - Adult  Goal: Skin integrity remains intact  Description: INTERVENTIONS  1. Assess and document risk  factors for pressure ulcer development  2. Assess and document skin integrity  3. Monitor for areas of redness and/or skin breakdown  4. Initiate pressure ulcer prevention measures as indicated  Outcome: Not Progressing  Goal: Incisions, wounds, or drain sites healing without S/S of infection  Description: INTERVENTIONS  1. Assess and document risk factors for skin breakdown  2. Assess and document skin integrity  3. Assess and document dressing/incision, wound bed, drain sites and surrounding tissue  4. Implement wound care per orders  Outcome: Not Progressing     Problem: Hematologic - Adult  Goal: Maintains hematologic stability  Description: INTERVENTIONS  1. Assess for signs and symptoms of bleeding or hemorrhage  2. Monitor labs  3. Administer supportive blood products/factors as ordered and appropriate  4. Administer medications as ordered  5. Initiate bleeding precautions as indicated  6. Educate patient/family to report signs/symptoms of bleeding  Outcome: Not Progressing     Problem: Infection - Adult  Goal: Absence of infection during hospitalization  Description: INTERVENTIONS:  1. Assess and monitor for signs and symptoms of infection  2. Monitor lab/diagnostic results  3. Monitor all insertion sites/wounds/incisions  4. Monitor secretions for changes in amount and color  5. Administer medications as ordered  6. Educate and encourage patient and family to use good hand hygiene technique  7. Identify and educate in appropriate isolation precautions for identified infection/condition  Outcome: Not Progressing     Problem: Safety Adult  Goal: Patient will remain safe during hospitalization  Description: INTERVENTIONS    1. Assess patient for fall risk and implement interventions if needed  2. Use safe transport techniques  3. Assess patient using the appropriate Javon skin assessment scale  4. Assess patient for risk of aspiration  5. Assess patient for risk of elopement  6. Assess patient for risk of  suicide  Outcome: Not Progressing     Problem: Daily Care  Goal: Daily care needs are met  Description: INTERVENTIONS:   1. Assess and monitor skin integrity  2. Identify patients at risk for skin breakdown on admission and per policy  3. Assess and monitor ability to perform self care and identify potential discharge needs  4. Assess skin integrity/risk for skin breakdown  5. Assist patient with activities of daily living as needed  6. Encourage independent activity per ability   7. Provide mouth care   8. Include patient/family/caregiver in decisions related to daily care   Outcome: Not Progressing
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
## 2021-08-12 NOTE — PROGRESS NOTES
PALLIATIVE CARE DAILY PROGRESS NOTE     Ben Lai Sr is a 71 y.o. male admitted 8/11/2021 to hospitalist service.     Length of Stay: 1 day(s)    insulin aspart  •  Insert peripheral IV **AND** Maintain IV access **AND** Saline lock IV **AND** sodium chloride  •  acetaminophen  •  fentaNYL citrate (PF)  •  bisacodyL  •  magnesium hydroxide  •  HYDROmorphone  •  lactulose  •  dextrose 50 % in water (D50W)  •  dextrose  •  glucagon **OR** glucagon  •  sodium chloride 0.9% (NS)  enoxaparin, 40 mg, subcutaneous, Daily at 1400  insulin aspart, 0-25 Units, subcutaneous, Insulin: 3x daily with meals  insulin aspart U-100, 0-15 Units, subcutaneous, Insulin: 4x daily with meals  sennosides-docusate sodium, 1 tablet, oral, 2x daily  polyethylene glycol, 17 g, oral, Daily  ampicillin-sulbactam, 3,000 mg, intravenous, q6h  buPROPion SR, 100 mg, oral, Daily  carvediloL, 3.125 mg, oral, 2x daily with meals  loratadine, 10 mg, oral, Daily  fluticasone propionate, 2 spray, Each Nostril, Daily  lansoprazole, 30 mg, oral, Daily  lisinopriL, 20 mg, oral, Daily  morphine, 15 mg, oral, q12h RONAL  terazosin, 5 mg, oral, 2x daily  traZODone, 100 mg, oral, Nightly  furosemide, 20 mg, oral, 2x daily diuretic  sodium chloride, 2 mL, intra-catheter, Daily        Allergies   Allergen Reactions   • Metformin Hives   • Adhesive Tape-Silicones    • Interferon Jose Maria-2a    • Latex    • Milk      Pt claimed that he is not allergic to milk at all    • Mometasone    • Mometasone Furoate    • Omeprazole    • Ondansetron Hcl Nausea And Vomiting   • Pepper (Genus Capsicum)      Any Hot Peppers - can eat bell peppers   • Primidone      Other reaction(s): HEADACHE   • Ribavirin Itching   • Rosuvastatin    • Sorafenib      Other reaction(s): BURNING SENSATION, HEADACHE, JOINT PAIN   • Spironolactone    • Statins-Hmg-Coa Reductase Inhibitors Itching and GI intolerance   • Interferon Jose Maria (Human Leuk. Derived) Palpitations and Rash   • Pantoprazole  "Itching and Rash   • Peginterferon Jose Maria-2b Palpitations   • Penicillins Other (see comments)     Passed out after IM injection in service ?vasovagal reaction  Pt tolerates unasyn        Subjective:      Interval History: Patient is status post CT-guided right upper abdominal abscess drain placement yesterday.  Patient resting in bed with wife present at bedside.  Reports that he was having some increased abdominal pain earlier today but took as needed Dilaudid and is feeling better at this time.  Denies any nausea.  Reports he had a bowel movement just prior to visit and also felt that that gave him some relief from his abdominal pain/discomfort.  Plan is for ERCP this Saturday.  Please see assessment and plan for further detail.    Activity Level: returning to normal    Pain Assessment: Abdominal pain    Review of Systems  Review of Systems   Constitutional: Positive for activity change and fatigue.   Respiratory: Negative for shortness of breath.    Gastrointestinal: Positive for abdominal pain and constipation (improved). Negative for nausea and vomiting.   Neurological: Positive for weakness.           Objective:      Most Recent Vital Signs:  /78 (BP Location: Right arm, Patient Position: Head of bed 30 degrees or higher, Cuff Size: Regular Adult)   Pulse 81   Temp 36.7 °C (98.1 °F) (Axillary)   Resp 20   Ht 1.651 m (5' 5\")   Wt 78 kg (171 lb 15.3 oz)   SpO2 94%   BMI 28.62 kg/m²     I/O last 3 completed shifts:  In: 947.6 [I.V.:538.5; IV Piggyback:409.1]  Out: 1370 [Urine:1300; Drains:50; Other:20]  I/O this shift:  In: 143.6 [I.V.:37.1; IV Piggyback:106.6]  Out: -     Physical Exam:   Physical Exam  Constitutional:       General: He is not in acute distress.  HENT:      Head: Normocephalic and atraumatic.   Eyes:      Conjunctiva/sclera: Conjunctivae normal.   Cardiovascular:      Rate and Rhythm: Normal rate.   Pulmonary:      Effort: Pulmonary effort is normal. No respiratory distress.      " Breath sounds: Normal breath sounds.   Abdominal:      General: Bowel sounds are normal.      Palpations: Abdomen is soft.      Tenderness: There is abdominal tenderness.   Musculoskeletal:      Right lower leg: Edema present.      Left lower leg: Edema present.   Skin:     General: Skin is warm and dry.   Neurological:      Mental Status: He is oriented to person, place, and time.   Psychiatric:         Mood and Affect: Mood normal.         Behavior: Behavior normal.         Thought Content: Thought content normal.         Judgment: Judgment normal.          Pertinent Lab Results:  CBC with Platelet:    Lab Results   Component Value Date    WBC 3.1 (L) 08/12/2021    HGB 10.9 (L) 08/12/2021    HCT 32.5 (L) 08/12/2021     (L) 08/12/2021    RBC 3.95 (L) 08/12/2021    MCV 82.2 08/12/2021    MCH 27.5 (L) 08/12/2021    MCHC 33.5 08/12/2021    RDW 17.9 (H) 08/12/2021    MPV 7.8 08/12/2021     Comp:   Lab Results   Component Value Date     08/12/2021    K 3.7 08/12/2021     08/12/2021    CO2 24 08/12/2021    BUN 8 08/12/2021    CREATININE 0.50 (L) 08/12/2021    GLUCOSE 108 (H) 08/12/2021    CALCIUM 7.8 (L) 08/12/2021    PROT 5.6 (L) 08/12/2021    ALBUMIN 2.7 (L) 08/12/2021    AST 21 08/12/2021    ALT 11 08/12/2021    ALKPHOS 154 (H) 08/12/2021    BILITOT 1.02 08/12/2021     Pertinent Studies:  CT abscess cyst liver 08/11/2021  IMPRESSION:  Successful CT-guided  right upper abdominal abscess drain placement. Bloody purulent material was aspirated and sent to lab for culture. During prepping for drain placement, the cutaneous abscess broke open and was oozing.       Assessment/Plan:     1. Palliative care by specialist     Symptoms: Pain, nausea, sleep disturbance, constipation     Code Status: DNR     Goals of Care:  No changes, to continue with treatment for abscess and perforated gallbladder.  Plans to follow-up with [facility] for ongoing treatment of his hepatocellular carcinoma.  Symptoms well managed on  current regimen.  Palliative care will follow loosely.     2.  Hepatocellular carcinoma  Receiving treatment with chemotherapy through [facility].  Now hospitalized due to perforated gallbladder secondary to biliary stent.  Status post abdominal abscess drain placement by IR yesterday.  Per gastroenterology plan is for ERCP on August 14 to remove prior stent and hopefully replace a new gallbladder and possible common bile duct stent.     3.  Pain  Continue with scheduled MS Contin at current dose (15 mg every 12 hours) and as needed oral Dilaudid for breakthrough pain.  Agree with IV fentanyl if patient unable to take p.o. pain well managed on current regimen, continue to monitor PRN use.      4.  Sleep disturbance  Continue with scheduled trazodone, this has been helpful for patient.     5.  Nausea  As needed Zofran available.    6.  Constipation  Improved, continue with senna plus and MiraLAX.    Lory Wong, MARIELOS   8/12/2021  11:01 AM

## 2021-08-12 NOTE — CONSULTATION
Gastroenterology Consult Note    Admission Date:  8/11/2021    Date of Consultation:  08/12/21     Physician Requesting Consultation:  Lucina Chahal MD    Reason for Consultation:  Perforated gallbladder stent     History of Present Illness:  Ben Lai Sr is a 71 y.o. male patient with a past medical history hepatitis C, cirrhosis, HCC, recent PVT (on Eliquis, last dose 8/10), type 2 diabetes, hypertension, paroxysmal atrial fibrillation, COPD, depression/anxiety, BPH, JENIFFER, GERD and hearing loss.  He is followed at the [facility] by Dr. Reynoso, his HCC is considered nonoperable or amenable to radiation and he has been on monthly chemotherapy.  In September 2020 he was found to have cholecystitis, he was determined to be a nonsurgical candidate.  He underwent ERCP and had transpapillary gallbladder stents and a common bile duct stent placed.  He underwent repeat ERCP in October 2020 with the gallbladder stents exchanged and the common bile duct stent removed.  He was hospitalized at our facility in March 2021 with a right upper quadrant abscess.  He underwent percutaneous drain placement x2 by IR.  At that time he had a HIDA scan performed that showed no extravasation of fluid consistent with biliary leak and he was discharged on oral antibiotics with 2 drains left in.  He had been following with interventional radiology every 2 weeks for assessment of his drains.  The medial abdominal drain was removed in April 2021, but continued to have the lateral drain which has had consistent drainage.  [facility] concerns of erythema and edema around his drain site, he was started on Flagyl and cefuroxime.  He presented to the emergency department on 8/11 due to worsening right upper quadrant pain and worsening appearance of his drain site.  He underwent fluoroscopic right abdominal drain check by Dr. Jules from interventional radiology.  She believed that the prior biliary  stent appeared to perforate through the gallbladder with a new fluid collection seen anterior to where the previous drain was placed.  There does not appear to be any fistulous connection to the bowel.  He is currently on Unasyn.    Today, Jakub is resting comfortably in bed.  His wife is at bedside.  He denies nausea, vomiting, melena, hematochezia.  He is having right upper quadrant pain.  He denies fever or chills.  He does report constipation.    Labs this morning show alkaline phosphatase 154, albumin 2.7, AST 21, ALT 11, total bilirubin 1.02.  White count 3.1.  Hemoglobin 10.9.  Platelets 103.  Blood cultures show no growth at 12 hours.    Vital signs stable.    Past Medical History:   Diagnosis Date   • A-fib (CMS/HCC) (HCC)    • Allergy    • Anxiety    • Arthritis    • Asthma    • Blindness of right eye    • BPH (benign prostatic hyperplasia)    • Cardiovascular disease    • Cirrhosis (CMS/HCC) (HCC)     with possible liver mass by MRI 5/18, rec repeat in 8/2018   • Complication of anesthesia    • Congestive heart failure (CMS/HCC) (HCC) 9/20/2019   • COPD (chronic obstructive pulmonary disease) (CMS/HCC) (HCC)    • Depression    • Endocrine disorder    • Gallstones    • Gastritis    • GERD (gastroesophageal reflux disease)    • Glaucoma    • Hearing loss     bilateral hearing aids   • Hemorrhoids    • Hepatitis C     Hep C, 2016 remission, complicated by cirrhosis.  MRI abd 5/30/18, rec 3 month follow up for focus of enhancement right and left hepatic lobe junction   • Hernia, abdominal    • High blood pressure    • IBS (irritable bowel syndrome)    • Infectious viral hepatitis    • Jaundice    • Kidney stones    • Obstructive sleep apnea syndrome    • Periodic limb movement disorder    • Persistent hypersomnia    • Persistent insomnia    • Pneumonia    • PONV (postoperative nausea and vomiting)    • Type 2 diabetes mellitus (CMS/HCC) (HCC)    • Urinary incontinence    • Wears partial dentures         Past  Surgical History:   Procedure Laterality Date   • COLONOSCOPY  10/27/2016    Pili, normal, repeat 10 years   • COLONOSCOPY N/A 5/11/2021    Procedure: COLONOSCOPY with polypectomy;  Surgeon: William Rivas MD;  Location: [facility];  Service: Endoscopy;  Laterality: N/A;   • CT ABLATION LIVER RADIOFREQUENCY  8/14/2019    CT ABLATION LIVER RADIOFREQUENCY 8/14/2019 [facility] MIS CT SCAN   • CT ABSCESS CYST LIVER  8/11/2021    CT ABSCESS CYST LIVER 8/11/2021 Mireya Juels MD [facility] MIS CT SCAN   • CT ABSCESS CYST PERITONEAL  3/5/2021    CT ABSCESS CYST PERITONEAL 3/5/2021 [facility] MIS CT SCAN   • ESOPHAGOGASTRODUODENOSCOPY N/A 2/22/2018    Procedure: EGD - ESOPHAGOGASTRODUODENOSCOPY with banding  x 2 with crna;  Surgeon: William Rivas MD;  Location: [facility];  Service: Endoscopy;  Laterality: N/A;   • ESOPHAGOGASTRODUODENOSCOPY N/A 4/6/2018    Procedure: EGD - ESOPHAGOGASTRODUODENOSCOPY with crna;  Surgeon: William Rivas MD;  Location: [facility];  Service: Endoscopy;  Laterality: N/A;   • ESOPHAGOGASTRODUODENOSCOPY N/A 5/7/2019    Procedure: EGD - ESOPHAGOGASTRODUODENOSCOPY;  Surgeon: William Rivas MD;  Location: [facility];  Service: Endoscopy;  Laterality: N/A;   • ESOPHAGOGASTRODUODENOSCOPY N/A 5/11/2021    Procedure: ESOPHAGOGASTRODUODENOSCOPY;  Surgeon: William Rivas MD;  Location: [facility];  Service: Endoscopy;  Laterality: N/A;   • HAND SURGERY Left     thumb   • HERNIA REPAIR  01/01/1964   • IR INJECTION ABSCESS CATHETER EVALUATION  4/2/2021    IR INJECTION ABSCESS CATHETER EVALUATION 4/2/2021 Mrieya Jules MD [facility] MIS INTERVEN RAD   • IR INJECTION ABSCESS CATHETER EVALUATION  4/2/2021    IR INJECTION ABSCESS CATHETER EVALUATION 4/2/2021 Mireya Jules MD [facility] MIS INTERVEN RAD   • IR INJECTION ABSCESS CATHETER EVALUATION  5/5/2021    IR INJECTION ABSCESS CATHETER EVALUATION 5/5/2021 Chencho Wagner MD [facility] MIS INTERVEN RAD   • IR INJECTION ABSCESS CATHETER EVALUATION  5/5/2021    IR INJECTION ABSCESS  CATHETER EVALUATION 5/5/2021 Chencho Wagner MD [facility] MIS INTERVEN RAD   • IR INJECTION ABSCESS CATHETER EVALUATION  5/18/2021    IR INJECTION ABSCESS CATHETER EVALUATION 5/18/2021 [facility] MIS INTERVEN RAD   • IR INJECTION ABSCESS CATHETER EVALUATION  5/18/2021    IR INJECTION ABSCESS CATHETER EVALUATION 5/18/2021 [facility] MIS INTERVEN RAD   • IR INJECTION ABSCESS CATHETER EVALUATION  6/1/2021    IR INJECTION ABSCESS CATHETER EVALUATION 6/1/2021 Chencho Wagner MD [facility] MIS INTERVEN RAD   • IR INJECTION ABSCESS CATHETER EVALUATION  6/7/2021    IR INJECTION ABSCESS CATHETER EVALUATION 6/7/2021 [facility] MIS INTERVEN RAD   • IR INJECTION ABSCESS CATHETER EVALUATION  7/5/2021    IR INJECTION ABSCESS CATHETER EVALUATION 7/5/2021 [facility] MIS INTERVEN RAD   • IR INJECTION ABSCESS CATHETER EVALUATION  7/19/2021    IR INJECTION ABSCESS CATHETER EVALUATION 7/19/2021 Sonny Danielle MD [facility] MIS INTERVEN RAD   • IR INJECTION ABSCESS CATHETER EVALUATION  8/11/2021    IR INJECTION ABSCESS CATHETER EVALUATION 8/11/2021 [facility] MIS INTERVEN RAD   • IR INJECTION CATHETER EXCHANGE  6/21/2021    IR INJECTION CATHETER EXCHANGE 6/21/2021 [facility] MIS INTERVEN RAD   • IR TUNNELED CENTRAL LINE WITH PORT  12/31/2020    IR TUNNELED CENTRAL LINE WITH PORT 12/31/2020 Sonny Danielle MD [facility] MIS INTERVEN RAD   • KNEE ARTHROSCOPY  09/19/2017    left knee, with partial medial meniscectomy; limited chondroplasty, patellofemoral joint   • LIVER BIOPSY      by Dr. Alejandre   • OTHER SURGICAL HISTORY      esophageal varices, banded   • VASECTOMY      at approx age of 25       Medications Prior to Admission   Medication Sig Dispense Refill Last Dose   • brimonidine (ALPHAGAN) 0.2 % ophthalmic solution Administer 1 drop into both eyes 2 (two) times a day Indications: for glaucoma At least 8 hours apart    8/10/2021 at Unknown time   • metroNIDAZOLE (FLAGYL) 500 mg tablet Take 500 mg by mouth 4 (four) times a day Indications: for infection For 10 days    8/10/2021 at 3 tablets yesterday    •  apixaban (ELIQUIS) 5 mg tablet Take 1 tablet (5 mg total) by mouth 2 (two) times a day 60 tablet 4 8/10/2021 at 2000   • cefuroxime (CEFTIN) 500 mg tablet Take 500 mg by mouth 2 (two) times a day Indications: for infection X 10 days    8/10/2021 at Unknown time   • morphine (MS CONTIN) 15 mg 12 hr tablet Take 15 mg by mouth 2 (two) times a day Indications: pain     8/10/2021 at 2000   • lidocaine-prilocaine (EMLA) cream Apply thick layer to intact skin over port 1 hour prior to procedure. Cover with occlusive dressing. 30 g 2 Past Month at Unknown time   • HYDROmorphone (DILAUDID) 2 mg tablet Take 2-4 mg by mouth every 6 (six) hours as needed     8/10/2021 at 1430   • sildenafiL (VIAGRA) 100 mg tablet Take 100 mg by mouth as needed for erectile dysfunction Indications: the inability to have an erection Up to 4 times monthly     Past Week at Unknown time   • fluoride, sodium, (DENTAGEL) 1.1 % gel dental gel See administration instructions Apply small amount to toothbrush in place of regular toothpaste. Brush for 2 minutes in the morning and evening.   8/10/2021 at Unknown time   • polyethylene glycol (Miralax) 17 gram/dose powder Take 17 g by mouth daily   8/10/2021 at Unknown time   • carboxymethylcellulose (REFRESH PLUS) 0.5 % dropperette Administer 1 drop into both eyes every 3 (three) hours Indications: dry eye     8/10/2021 at Unknown time   • prochlorperazine (COMPAZINE) 10 mg tablet Take 1 tablet (10 mg total) by mouth every 6 (six) hours as needed for nausea or vomiting 30 tablet 5 8/10/2021 at Unknown time   • artificial saliva (YERBA GLADYS AND LYTES) aerosol,spray spray Apply 2 sprays to the mouth or throat as needed     Past Week at Unknown time   • fluticasone propionate (FLONASE) 50 mcg/actuation nasal spray Administer 2 sprays into each nostril daily     8/10/2021 at Unknown time   • traZODone (DESYREL) 100 mg tablet Take 100 mg by mouth nightly as needed for sleep     8/10/2021 at Unknown time   •  insulin glargine (Lantus Solostar U-100 Insulin) 100 unit/mL (3 mL) insulin pen injection pen Inject 24 Units under the skin every morning     8/10/2021 at Unknown time   • carvediloL (COREG) 3.125 mg tablet Take 1 tablet (3.125 mg total) by mouth 2 (two) times a day with meals 180 tablet 0 8/10/2021 at Unknown time   • cetirizine (ZyrTEC) 10 mg tablet Take 10 mg by mouth daily Indications: allergy symptoms     8/10/2021 at Unknown time   • buPROPion SR (WELLBUTRIN SR) 100 mg 12 hr tablet Take 1 tablet (100 mg total) by mouth daily 90 tablet 1 8/10/2021 at Unknown time   • lactulose (GENERLAC) 10 gram/15 mL solution Take 15 mL (10 g total) by mouth daily 473 mL 2 8/10/2021 at Unknown time   • terazosin (HYTRIN) 5 mg capsule Take 1 capsule (5 mg total) by mouth 2 (two) times a day 180 capsule 2 8/10/2021 at Unknown time   • lisinopril (PRINIVIL,ZESTRIL) 20 mg tablet Take 1 tablet (20 mg total) by mouth daily 90 tablet 2 8/10/2021 at Unknown time   • furosemide (LASIX) 20 mg tablet Take 1 tablet (20 mg total) by mouth 2 (two) times a day. 180 tablet 2 8/10/2021 at Unknown time   • lansoprazole (PREVACID) 30 mg capsule Take 30 mg by mouth daily Indications: Gerd     8/10/2021 at Unknown time   • liraglutide (VICTOZA 2-JASE) 0.6 mg/0.1 mL (18 mg/3 mL) injection Inject 1.8 mg under the skin daily Indications: blood sugar control     8/10/2021 at Unknown time   • latanoprost (XALATAN) 0.005 % ophthalmic solution Administer 1 drop into both eyes nightly Indications: glaucoma   10 0 8/10/2021 at Unknown time   • Narcan 4 mg/actuation spray,non-aerosol Administer 1 kit into one nostril as needed May repeat dose in other nostril if no response    Unknown at Unknown time   • blood-glucose meter (ACCU-CHEK ADELE PLUS METER) [facility] [place] Use to test blood sugars 3 times daily (E11.65, non insulin depedent) AccuChek Adele plus, meter is 8 years old 1 each 0        Current Facility-Administered Medications   Medication Dose Route Frequency  Provider Last Rate Last Admin   • enoxaparin (LOVENOX) syringe 40 mg  40 mg subcutaneous Daily at 1400 Lucina Chahal MD       • insulin aspart (NovoLOG) injection pen (mealtime/snack insulin) 0-25 Units  0-25 Units subcutaneous Insulin: 3x daily with meals Lucina Chahal MD       • insulin aspart (NovoLOG) injection pen (mealtime/snack insulin) 0-25 Units  0-25 Units subcutaneous PRN Lucina Chahal MD       • insulin aspart U-100 (NovoLOG) injection pen (correction dose) 0-15 Units  0-15 Units subcutaneous Insulin: 4x daily with meals Lucina Chahal MD       • sodium chloride flush 3 mL  3 mL intravenous PRN Kayy Francois CNP       • acetaminophen (TYLENOL) tablet 325-650 mg  325-650 mg oral q4h PRN Kayy Francois CNP       • fentaNYL citrate (PF) 50 mcg/mL injection 25 mcg  25 mcg intravenous q2h PRN Kayy Francois CNP   25 mcg at 08/11/21 2138   • sennosides-docusate sodium (SENNA PLUS) 8.6-50 mg 1 tablet  1 tablet oral 2x daily Kayy Francois CNP   1 tablet at 08/12/21 0904   • bisacodyL (DULCOLAX) suppository 10 mg  10 mg rectal Daily PRN Kayy Francois CNP       • magnesium hydroxide (MILK OF MAGNESIA) 400 mg/5 mL oral suspension 30 mL  30 mL oral Daily PRN Kayy Francois CNP       • polyethylene glycol (GLYCOLAX) powder 17 g  17 g oral Daily Kayy Francois CNP   17 g at 08/12/21 0904   • ampicillin-sulbactam (UNASYN) 3,000 mg in normal saline 100 mL IVPB - MBP  3,000 mg intravenous q6h Kayy Francois CNP   Stopped at 08/12/21 0632   • buPROPion SR (WELLBUTRIN SR) 12 hr tablet 100 mg  100 mg oral Daily Kayy Francois CNP   100 mg at 08/12/21 0904   • carvediloL (COREG) tablet 3.125 mg  3.125 mg oral 2x daily with meals Kayy Francois CNP   3.125 mg at 08/12/21 0904   • loratadine (CLARITIN) tablet 10 mg  10 mg oral Daily Kayy Francois CNP   10 mg at 08/12/21 0904   • fluticasone propionate (FLONASE) 50 mcg/actuation nasal spray 2 spray  2  spray Each Nostril Daily Kayy Francois CNP   2 spray at 08/12/21 0905   • HYDROmorphone (DILAUDID) tablet 2-4 mg  2-4 mg oral q6h PRN Kayy Francois CNP       • lactulose (CHRONULAC) 20 gram/30 mL solution 10 g  10 g oral 2x daily PRN Kayy Franocis CNP   10 g at 08/12/21 0904   • lansoprazole (PREVACID) capsule 30 mg  30 mg oral Daily Kayy Francois CNP   30 mg at 08/12/21 0903   • lisinopriL (PRINIVIL,ZESTRIL) tablet 20 mg  20 mg oral Daily Kayy Francois CNP   20 mg at 08/12/21 0904   • morphine (MS CONTIN) 12 hr tablet 15 mg  15 mg oral q12h RONAL Kayy Francois CNP   15 mg at 08/12/21 0904   • terazosin (HYTRIN) capsule 5 mg  5 mg oral 2x daily Kayy Francois CNP   5 mg at 08/12/21 0904   • traZODone (DESYREL) tablet 100 mg  100 mg oral Nightly Kayy Francois CNP   100 mg at 08/11/21 2242   • dextrose 50 % in water (D50W) syringe 15-30 mL  15-30 mL intravenous q15 min PRN Kayy Francois CNP       • dextrose (GLUTOSE) 40 % gel 15.2 g  15.2 g oral q15 min PRN Kayy Francois CNP       • glucagon (GLUCAGEN) injection 1 mg  1 mg intramuscular q15 min PRN Kayy Francois CNP        Or   • glucagon (GLUCAGEN) injection 1 mg  1 mg subcutaneous q15 min PRN Kayy Francois CNP       • furosemide (LASIX) tablet 20 mg  20 mg oral 2x daily diuretic Lucina Chahal MD   20 mg at 08/12/21 0904   • sodium chloride 0.9% (NS) carrier flush 25-50 mL  25-50 mL intravenous PRN Lucina Chahal MD   Stopped at 08/12/21 0643   • sodium chloride flush 2 mL  2 mL intra-catheter Daily Mireya Jules MD   2 mL at 08/12/21 0900       Allergies   Allergen Reactions   • Metformin Hives   • Adhesive Tape-Silicones    • Interferon Jose Maria-2a    • Latex    • Milk      Pt claimed that he is not allergic to milk at all    • Mometasone    • Mometasone Furoate    • Omeprazole    • Ondansetron Hcl Nausea And Vomiting   • Pepper (Genus Capsicum)      Any Hot Peppers - can eat bell peppers  "  • Primidone      Other reaction(s): HEADACHE   • Ribavirin Itching   • Rosuvastatin    • Sorafenib      Other reaction(s): BURNING SENSATION, HEADACHE, JOINT PAIN   • Spironolactone    • Statins-Hmg-Coa Reductase Inhibitors Itching and GI intolerance   • Interferon Jose Maria (Human Leuk. Derived) Palpitations and Rash   • Pantoprazole Itching and Rash   • Peginterferon Jose Maria-2b Palpitations   • Penicillins Other (see comments)     Passed out after IM injection in service ?vasovagal reaction  Pt tolerates unasyn        Family History   Problem Relation Age of Onset   • Arthritis Mother    • Rheum arthritis Mother    • Diabetes Mother    • Rectal cancer Mother    • Other Paternal Grandmother    • Other Paternal Grandfather    • Diabetes Other    • Rheum arthritis Other    • Osteoporosis Other        Social History  Alcohol use: Denies  Drug use: Denies  Smoking: Denies    Review of Systems    Review of Systems   Constitutional: Positive for fatigue.   HENT: Negative.    Respiratory: Negative.    Cardiovascular: Positive for leg swelling.   Gastrointestinal: Positive for abdominal pain and constipation.   Genitourinary: Negative.    Musculoskeletal: Negative.    Skin: Negative.    Neurological: Positive for weakness.   Psychiatric/Behavioral: Negative.        Vital Signs  /78 (BP Location: Right arm, Patient Position: Head of bed 30 degrees or higher, Cuff Size: Regular Adult)   Pulse 81   Temp 36.7 °C (98.1 °F) (Axillary)   Resp 20   Ht 1.651 m (5' 5\")   Wt 78 kg (171 lb 15.3 oz)   SpO2 94%   BMI 28.62 kg/m²     Physical Exam  Physical Exam  Vitals and nursing note reviewed.   Constitutional:       Appearance: Normal appearance. He is ill-appearing.   HENT:      Head: Normocephalic.   Eyes:      Conjunctiva/sclera: Conjunctivae normal.   Cardiovascular:      Rate and Rhythm: Normal rate and regular rhythm.      Pulses: Normal pulses.      Heart sounds: Normal heart sounds.   Pulmonary:      Comments: " Diminished lung sounds in the bases bilaterally  Abdominal:      General: Bowel sounds are normal.      Palpations: Abdomen is soft.      Tenderness: There is abdominal tenderness.      Comments: Right upper quadrant pain to palpation, lateral drain site shows clean and dry dressings with bloody appearing drainage in his drain, old drain site medial to his current drain shows an area of erythema with edema and a centralized wound with scab.    Musculoskeletal:         General: Normal range of motion.      Cervical back: Normal range of motion.      Right lower leg: Edema present.      Left lower leg: Edema present.   Skin:     General: Skin is warm and dry.   Neurological:      General: No focal deficit present.      Mental Status: He is alert and oriented to person, place, and time.   Psychiatric:         Mood and Affect: Mood normal.         Behavior: Behavior normal.       Labs  CBC:   Lab Results   Component Value Date    WBC 3.1 (L) 08/12/2021    RBC 3.95 (L) 08/12/2021    HGB 10.9 (L) 08/12/2021    HCT 32.5 (L) 08/12/2021     (L) 08/12/2021     CMP:   Lab Results   Component Value Date     08/12/2021    K 3.7 08/12/2021     08/12/2021    CO2 24 08/12/2021    GLUCOSE 108 (H) 08/12/2021    CREATININE 0.50 (L) 08/12/2021    CALCIUM 7.8 (L) 08/12/2021    ALBUMIN 2.7 (L) 08/12/2021    ALKPHOS 154 (H) 08/12/2021    BILITOT 1.02 08/12/2021    ALT 11 08/12/2021    AST 21 08/12/2021    BUN 8 08/12/2021    ANIONGAP 8 08/12/2021     Imaging:    MR abdomen with and without contrast    Result Date: 7/29/2021    Impression: 1.  Stable appearance of the liver. No evidence of new or progressive malignancy. 2.  Two subhepatic drains remain in place. No discrete focal fluid collection. 3.  Right portal vein thrombosis. 4.  Sequelae of portal hypertension including recanalization of the umbilical vein, moderate splenomegaly, and small upper abdominal ascites. 5.  Filling defects in the common bile duct,  suggestive of choledocholithiasis, unchanged.    IR injection abscess catheter evaluation    Result Date: 8/11/2021  Narrative: Exam: Drain check 08/11/2021 Clinical history:  abscess/routine checkup. Procedure/Views: Fluoroscopic time 1.5 minutes images 9 Comparison/s: Outside CT abdomen pelvis 8/10/2021 Findings:  view demonstrates stable positioning of the right upper quadrant drain. Adjacent internalized biliary stents with proximal aspect of the stent in the gallbladder. Injection of contrast demonstrated decompressed cavity about the pigtail loop, with contrast seen to flow in the free intraperitoneal cavity and outlining bowel loops. No definite fistulous connection to adjacent bowel with no evidence of peristalsis. Again contrast appears to outline the bowel loops and is not within the bowel loops. When comparing outside CT imaging this drain appears to be adjacent to the gallbladder and does not appear to be within the gallbladder lumen. The internalized biliary stents appears to be perforated through the gallbladder. Given patient will have up to 30 cc of output a day, recommend leaving drain in place, as patient is likely still having spillage of bile from the perforated gallbladder. Recommend correlation with GI recommendations about possible replacement of stents and possibly pulled back through the perforated gallbladder. Additionally with reviewing CT imaging there has been reaccumulation of fluid and abscess formation is formed slightly anterior to the existing drain, and in the location of the previously removed drain. Additional subcutaneous abscess. Ultrasound evaluation was unable to a identify this deeper fluid collection. Patient will likely need CT drain placement into this fluid collection.     Impression: 1.  Decompressed cavity about the right upper quadrant drain with spillage of contrast into the free intraperitoneal cavity with outlining bowel loops. No definite fistulous connection  to bowel with no peristalsis. Contrast outlines the bowel loops and is not within the bowel loops. See above discussion with findings of recent CT imaging. 2.  Patient has developed new abscess cavity more anterior adjacent to the liver, in the location of the previously removed drain. Patient will require CT drain placement.    CT abscess cyst liver    Result Date: 8/11/2021    Impression: Successful CT-guided  right upper abdominal abscess drain placement. Bloody purulent material was aspirated and sent to lab for culture. During prepping for drain placement, the cutaneous abscess broke open and was oozing.    Assessment and Plan    Imaging shows the internalized biliary stent appears to perforate through the gallbladder.  There is likely spillage of bile from the perforated gallbladder.  Surgery does not feel he is an appropriate surgical candidate.  Discussed with our biliary endoscopist, Dr. Heller, who is willing to perform ERCP to remove prior stent and hopefully replace a new gallbladder and possible common bile duct stent.    At this time, the tentative plan is to perform ERCP on Saturday, August 14th.         This consult was requested of and performed in its entirety by Lakisha Sr CNP under the supervision of Dr. Lanny Villalobos     A voice recognition program was used to aid in documentation of this record. Sometimes words are not printed exactly as they were spoken.  While efforts were made to carefully edit and correct any inaccuracies, some areas may be present; please take these into context.  Please contact the provider if areas are identified.

## 2021-08-12 NOTE — INTERDISCIPLINARY/THERAPY
Case Management Admission Note    Phone # [phone]    Living Situation: Spouse/significant other Private residence            ADLs: Independent  Stairs: Yes 13 (only has to go up 6 )  HME/CPAP: Tub Bench, 4-Wheeled Walker, CPAP      Oxygen: No      Home Health:No     Current Resources: None      Diabetes/supplies: Do you have Diabetes?: Yes  Do you have diabetic supplies?: Yes  PCP: Collin Ramírez,   Funding: [facility] [employer]  Pharmacy:[facility] ([place])    Support Person: Primary Emergency Contact: Laura Lai, Home Phone: [phone], Mobile Phone: [phone], Relation: Spouse  Needs transportation assistance at DC: No     Discharge Needs/Barriers: None identified at this time   Narrative: Met with pt and pts wife in pt room. Introduced self and explained CM role. Pt and wife both voiced u/s of plan of care. IV axb; Symptom mngmnt; Palliative and GI on case. Hopeful for ERCP w/ stent replacement on Saturday. Also currently has appt w/ oncology on 08/19 and is questioning if going to be able to have appt w/ infusions as scheduled. This CM did reach out to [facility] and awaiting for response.   Dispo: home

## 2021-08-12 NOTE — MEDICATION HISTORY SPECIALIST NOTES
[facility] ONC [id number]    Follow up. Called Wife to verify medication(s). Morphine IR 15 mg  MORPHINE SO4 15MG TAB TAKE ONE TABLET BY MOUTH TWICE A DAY AS NEEDED FOR PAIN, 7/6/21 PO/ORAL DISCONTINUED   08/05/2021 [id number] 07/06/2021 [facility]ICCI ADAIR 07/06/2021 [facility]     Patients wife stated he was no longer taking the Morphine IR Verified with Care everywhere that this medication has been discontinued.  EPIC updated accordingly.  Notified [facility]

## 2021-08-12 NOTE — PROGRESS NOTES
[facility]   Hospitalist Services  [address] [place] [address]       HOSPITALIST PROGRESS NOTE    Date of Service: 8/12/2021    Time of Service: 0900    Patient name: Ben Lai Sr  MRN: [id number]   LOS: 1 day     Subjective   Patient seen and examined. Discussed with nurse. Chart reviewed.  Jakub states that he is doing good today and he is all right with eating today.  We discussed that his ERCP will be planned for the Saturday for possible stent exchange.  We are continuing his antibiotics and he states that his abdominal pain has improved.  He does not have any concerns today.    Review of Systems   Constitutional: Negative for chills, fatigue and fever.   Gastrointestinal: Negative for abdominal pain, nausea and vomiting.   Skin: Positive for wound.   All other systems reviewed and are negative.      Objective     Temp:  [36.3 °C (97.4 °F)-37 °C (98.6 °F)] 36.3 °C (97.4 °F)  Heart Rate:  [66-81] 71  Resp:  [18-28] 18  BP: (129-169)/() 142/65    I/O last 3 completed shifts:  In: 947.6 [I.V.:538.5; IV Piggyback:409.1]  Out: 1370 [Urine:1300; Drains:50; Other:20]    I/O this shift:  In: 343.6 [P.O.:200; I.V.:37.1; IV Piggyback:106.6]  Out: 23 [Drains:23]    Physical Exam  Vitals and nursing note reviewed.   Constitutional:       General: He is not in acute distress.     Appearance: Normal appearance. He is normal weight. He is not ill-appearing.   HENT:      Head: Normocephalic and atraumatic.   Eyes:      General: No scleral icterus.  Cardiovascular:      Rate and Rhythm: Normal rate and regular rhythm.      Pulses: Normal pulses.      Heart sounds: Normal heart sounds. No murmur heard.   No gallop.       Comments: Right chest port in place without any erythema or drainage  Pulmonary:      Effort: Pulmonary effort is normal. No respiratory distress.      Breath sounds: Normal breath sounds. No stridor. No wheezing, rhonchi or rales.   Abdominal:      General: Abdomen  is flat. Bowel sounds are normal. There is no distension.      Palpations: Abdomen is soft. There is no mass.      Tenderness: There is no abdominal tenderness. There is no guarding.      Hernia: No hernia is present.      Comments: 2 KYUNG drains in place draining thick reddish fluid   Musculoskeletal:      Right lower leg: No edema.      Left lower leg: No edema.   Skin:     General: Skin is warm and dry.      Capillary Refill: Capillary refill takes less than 2 seconds.   Neurological:      General: No focal deficit present.      Mental Status: He is alert and oriented to person, place, and time. Mental status is at baseline.   Psychiatric:         Mood and Affect: Mood normal.         Behavior: Behavior normal.         Thought Content: Thought content normal.         Judgment: Judgment normal.         Medications:     Current Facility-Administered Medications:   •  enoxaparin (LOVENOX) syringe 40 mg, 40 mg, subcutaneous, Daily at 1400, Lucina Chahal MD, 40 mg at 08/12/21 1320  •  insulin aspart (NovoLOG) injection pen (mealtime/snack insulin) 0-25 Units, 0-25 Units, subcutaneous, Insulin: 3x daily with meals, Lucina Chahal MD, 2 Units at 08/12/21 1430  •  insulin aspart (NovoLOG) injection pen (mealtime/snack insulin) 0-25 Units, 0-25 Units, subcutaneous, PRN, Lucina Chahal MD  •  insulin aspart U-100 (NovoLOG) injection pen (correction dose) 0-15 Units, 0-15 Units, subcutaneous, Insulin: 4x daily with meals, Lucina Chahal MD, 1 Units at 08/12/21 1023  •  Insert peripheral IV, , , Once **AND** Maintain IV access, , , Until discontinued **AND** Saline lock IV, , , Once **AND** sodium chloride flush 3 mL, 3 mL, intravenous, PRN, Kayy Francois CNP  •  acetaminophen (TYLENOL) tablet 325-650 mg, 325-650 mg, oral, q4h PRN, Kayy Francois CNP  •  fentaNYL citrate (PF) 50 mcg/mL injection 25 mcg, 25 mcg, intravenous, q2h PRN, Kayy Francois CNP, 25 mcg at 08/11/21 0274  •   sennosides-docusate sodium (SENNA PLUS) 8.6-50 mg 1 tablet, 1 tablet, oral, 2x daily, Kayy Francois CNP, 1 tablet at 08/12/21 0904  •  bisacodyL (DULCOLAX) suppository 10 mg, 10 mg, rectal, Daily PRN, Kayy Francois CNP  •  magnesium hydroxide (MILK OF MAGNESIA) 400 mg/5 mL oral suspension 30 mL, 30 mL, oral, Daily PRN, Kayy Francois CNP  •  polyethylene glycol (GLYCOLAX) powder 17 g, 17 g, oral, Daily, Kayy Francois CNP, 17 g at 08/12/21 0904  •  ampicillin-sulbactam (UNASYN) 3,000 mg in normal saline 100 mL IVPB - MBP, 3,000 mg, intravenous, q6h, Kayy Francois CNP, Stopped at 08/12/21 1349  •  buPROPion SR (WELLBUTRIN SR) 12 hr tablet 100 mg, 100 mg, oral, Daily, Kayy Francois CNP, 100 mg at 08/12/21 0904  •  carvediloL (COREG) tablet 3.125 mg, 3.125 mg, oral, 2x daily with meals, Kayy Francois CNP, 3.125 mg at 08/12/21 0904  •  loratadine (CLARITIN) tablet 10 mg, 10 mg, oral, Daily, Kayy Francois CNP, 10 mg at 08/12/21 0904  •  fluticasone propionate (FLONASE) 50 mcg/actuation nasal spray 2 spray, 2 spray, Each Nostril, Daily, Kayy Francois CNP, 2 spray at 08/12/21 0905  •  HYDROmorphone (DILAUDID) tablet 2-4 mg, 2-4 mg, oral, q6h PRN, Kayy Frnacois CNP, 4 mg at 08/12/21 1053  •  lactulose (CHRONULAC) 20 gram/30 mL solution 10 g, 10 g, oral, 2x daily PRN, Kayy Francois CNP, 10 g at 08/12/21 0904  •  lansoprazole (PREVACID) capsule 30 mg, 30 mg, oral, Daily, Kayy Francois CNP, 30 mg at 08/12/21 0903  •  lisinopriL (PRINIVIL,ZESTRIL) tablet 20 mg, 20 mg, oral, Daily, Kayy Francois CNP, 20 mg at 08/12/21 0904  •  morphine (MS CONTIN) 12 hr tablet 15 mg, 15 mg, oral, q12h RONAL, Kayy Francois CNP, 15 mg at 08/12/21 0904  •  terazosin (HYTRIN) capsule 5 mg, 5 mg, oral, 2x daily, Kayy Francois CNP, 5 mg at 08/12/21 0904  •  traZODone (DESYREL) tablet 100 mg, 100 mg, oral, Nightly, Kayy Francois CNP, 100 mg at 08/11/21 4452  •   dextrose 50 % in water (D50W) syringe 15-30 mL, 15-30 mL, intravenous, q15 min PRN, Kayy Francois CNP  •  dextrose (GLUTOSE) 40 % gel 15.2 g, 15.2 g, oral, q15 min PRN, Kayy Francois CNP  •  glucagon (GLUCAGEN) injection 1 mg, 1 mg, intramuscular, q15 min PRN **OR** glucagon (GLUCAGEN) injection 1 mg, 1 mg, subcutaneous, q15 min PRN, Kayy Francois CNP  •  furosemide (LASIX) tablet 20 mg, 20 mg, oral, 2x daily diuretic, Lucina Chahal MD, 20 mg at 08/12/21 1434  •  sodium chloride 0.9% (NS) carrier flush 25-50 mL, 25-50 mL, intravenous, PRN, Lucina Chahal MD, Stopped at 08/12/21 0643  •  sodium chloride flush 2 mL, 2 mL, intra-catheter, Daily, Lucina Chahal MD, 2 mL at 08/12/21 0900    Results from last 4 days   Lab Units 08/12/21  0338 08/11/21  1028   WBC AUTO 10*3/uL 3.1* 3.0*   HEMOGLOBIN g/dL 10.9* 11.7*   HEMATOCRIT % 32.5* 35.5*   PLATELETS AUTO 10*3/uL 103* 106*       Results from last 4 days   Lab Units 08/12/21  0338 08/11/21  1026   SODIUM mmol/L 138 134*   POTASSIUM mmol/L 3.7 4.0   CHLORIDE mmol/L 106 101   CO2 mmol/L 24 27   BUN mg/dL 8 8   CREATININE mg/dL 0.50* 0.56*   CALCIUM mg/dL 7.8* 8.5*   TOTAL PROTEIN g/dL 5.6* 6.3   BILIRUBIN TOTAL mg/dL 1.02 1.12   ALK PHOS U/L 154* 167*   ALT U/L 11 13   AST U/L 21 24   GLUCOSE mg/dL 108* 166*       Imaging:   MR abdomen with and without contrast    Result Date: 7/29/2021  Narrative: EXAM: MR ABDOMEN W AND WO CONTRAST 07/29/2021 CLINICAL HISTORY: Hepatocellular carcinoma (CMS/HCC) (HCC); Neoplasm: liver or bile duct TECHNIQUE: Multiplanar, multisequence MRI of the abdomen without and with 17 mL of ProHance intravenous contrast. Single use contrast vial. 0 mL of contrast was discarded. Axial and coronal T2, axial T2 with fat suppression, axial in and opposed phase gradient echo, axial diffusion weighted, and axial pre and postcontrast T1-weighted sequences were performed. COMPARISON: Abdominal MRI 5/5/2021. FINDINGS: Nodular liver  contour and irregular liver morphology, compatible with hepatic cirrhosis. Irregular reticular T2 hyperintensity throughout the liver parenchyma, compatible with fibrosis. Redemonstration of capsular retraction with focal fibrosis in the anterolateral aspect of the right hepatic lobe, unchanged. Two subhepatic drains remained in place. No discrete residual subhepatic collection remains. Heterogenous contrast enhancement of the right hepatic lobe, similar to prior examination. No new foci of abnormal T2 signal hyperintensity, contrast enhancement, or diffusion restriction. No lymphadenopathy in the upper abdomen. Findings compatible with portal hypertension, including recanalization of the umbilical vein, moderate splenomegaly measuring 18.8 cm on coronal images, splenorenal shunt, and multiple subcutaneous varices in the ventral abdominal wall. Small perihepatic and perisplenic upper abdominal ascites. Mild scattered mesenteric edema throughout the abdomen. Thrombosis of the right portal vein is demonstrated. This was also seen on the prior MRI in retrospect, however now thrombosis extends from near the intrahepatic portal vein bifurcation and extends distally throughout the right portal vein. Filling defects are demonstrated within the common bile duct on axial T2-weighted images (series 401, images 28-33), suggestive of choledocholithiasis, unchanged. No intrahepatic and extra hepatic biliary ductal dilatation, stable. Normal pancreas. No pancreatic ductal dilatation. Normal adrenal glands. Small calyceal diverticulum in the right kidney. Kidneys are otherwise unremarkable. No solid renal mass or hydronephrosis. Visualized loops of small and large bowel are normal in caliber. Bone marrow signal is normal. Trace bilateral pleural effusions. Visualized lung bases are otherwise unremarkable, however lung parenchyma is inadequately evaluated on MRI.     Impression: IMPRESSION: 1.  Stable appearance of the liver. No  evidence of new or progressive malignancy. 2.  Two subhepatic drains remain in place. No discrete focal fluid collection. 3.  Right portal vein thrombosis. 4.  Sequelae of portal hypertension including recanalization of the umbilical vein, moderate splenomegaly, and small upper abdominal ascites. 5.  Filling defects in the common bile duct, suggestive of choledocholithiasis, unchanged.    CT ABDOMEN PELVIS W IV CONTRAST    Result Date: 8/11/2021  Narrative: NOTE: This study was received from an outside source for PACS Storage.    IR injection abscess catheter evaluation    Result Date: 8/11/2021  Narrative: Exam: Drain check 08/11/2021 Clinical history:  abscess/routine checkup. Procedure/Views: Fluoroscopic time 1.5 minutes images 9 Comparison/s: Outside CT abdomen pelvis 8/10/2021 Findings:  view demonstrates stable positioning of the right upper quadrant drain. Adjacent internalized biliary stents with proximal aspect of the stent in the gallbladder. Injection of contrast demonstrated decompressed cavity about the pigtail loop, with contrast seen to flow in the free intraperitoneal cavity and outlining bowel loops. No definite fistulous connection to adjacent bowel with no evidence of peristalsis. Again contrast appears to outline the bowel loops and is not within the bowel loops. When comparing outside CT imaging this drain appears to be adjacent to the gallbladder and does not appear to be within the gallbladder lumen. The internalized biliary stents appears to be perforated through the gallbladder. Given patient will have up to 30 cc of output a day, recommend leaving drain in place, as patient is likely still having spillage of bile from the perforated gallbladder. Recommend correlation with GI recommendations about possible replacement of stents and possibly pulled back through the perforated gallbladder. Additionally with reviewing CT imaging there has been reaccumulation of fluid and abscess formation  is formed slightly anterior to the existing drain, and in the location of the previously removed drain. Additional subcutaneous abscess. Ultrasound evaluation was unable to a identify this deeper fluid collection. Patient will likely need CT drain placement into this fluid collection.     Impression: IMPRESSION: 1.  Decompressed cavity about the right upper quadrant drain with spillage of contrast into the free intraperitoneal cavity with outlining bowel loops. No definite fistulous connection to bowel with no peristalsis. Contrast outlines the bowel loops and is not within the bowel loops. See above discussion with findings of recent CT imaging. 2.  Patient has developed new abscess cavity more anterior adjacent to the liver, in the location of the previously removed drain. Patient will require CT drain placement.    IR injection abscess catheter evaluation    Result Date: 7/19/2021  Narrative: Exam:  Abscess catheter check 07/19/2021 Clinical History: Routine check-up; Procedure/Views:  Utilizing fluoroscopy and digital imaging, the existing catheter was injected with contrast. Fluoroscopy time for the procedure was 0.3 minutes. 4 images are obtained. Comparison/s:  7/5/2021 Findings:  The catheter is in satisfactory position. Outputs recently from the catheter have been fairly low, 20 cc or less. Contrast injection shows immediate communication to the right colon. No significant flow into the stents draining the biliary system is present. No significant cavity is present surrounding the catheter.     Impression: IMPRESSION:  1. No significant residual cavity remains surrounding the pigtail the catheter. However, there is immediate contrast opacification of the right colon. The catheter needs to remain in place.    CT abscess cyst liver    Result Date: 8/11/2021  Narrative: Exam: CT-guided right upper abdominal abscess drain placement Clinical History:  Abdominal abscess Fluoroscopy time: 10 seconds of CT  fluoroscopy time. Dose reduction technique utilized; the mA was adjusted based on patient size. Medication: Lidocaine 5 ml sq. Complications: None Technique: The patient was informed of the risks and benefits of this procedure and provided written informed consent. Patient placed in supine  position and CT scanning used to rocio the proper location.  The right upper abdomen was prepped and draped in the usual sterile fashion, following the maximum sterile barrier technique; cap, mask, sterile gloves, large sterile sheet, hand hygiene, and antisepsis with 2% chlorhexidine (or acceptable alternative antiseptics per current guidelines) for cutaneous antisepsis were performed. During prepping the cutaneous abscess broke open, with significant drainage of bloody pus. Access site into the fluid collection was placed as far away from the subcutaneous abscess as possible. Lidocaine is used for local anesthesia.  After local anesthesia with lidocaine, CT guidance was used to place a 5 Somali Yueh needle into the right upper quadrant fluid collection. Wire was placed tract serially dilated and subsequently a 10 Somali Rodgers-Whitley drain was placed into the fluid collection. Ultimately 10 cc of bloody purulent material was aspirated and sent to lab for culture. Post procedure scanning showed decompressed abscess cavity.  The patient tolerated the procedure well and there was no immediate complication. Drain was secured to the skin surface with a single 2-0 Prolene suture.     Impression: IMPRESSION: Successful CT-guided  right upper abdominal abscess drain placement. Bloody purulent material was aspirated and sent to lab for culture. During prepping for drain placement, the cutaneous abscess broke open and was oozing.       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)    Essential hypertension    Hepatocellular carcinoma (CMS/HCC) (HCC)    Gastroesophageal reflux disease without esophagitis    Intractable right upper quadrant abdominal pain    Esophageal varices in cirrhosis (CMS/HCC) (HCC)    Obstructive sleep apnea    Hyperlipidemia      Other Chronic/Previous Medical Issues:  None    Plan:  Cellulitis with abscess of the abdominal wall  Status incision and drainage with placement of drain  Cultures pending  Continue Unasyn  Blood cultures negative at 24 hours    Perforation of gallbladder due to internalized biliary stent with spillage of bile  GI consulted  Is not a surgical candidate for cholecystectomy  ERCP to be performed on August 14    Hepatocellular carcinoma  Chronic hepatitis C with cirrhosis  Portal vein thrombosis  History of ascites  Thrombocytopenia  Receiving chemotherapy and is followed by Dr. Reynoso  Continue to hold Eliquis for procedure on Saturday  Continue Lovenox  Continue Lasix    Insulin-dependent type 2 diabetes mellitus with a recent hemoglobin A1c of 7.2%  Continue diabetic diet  Sliding scale of insulin and 1-15 carb counting    Paroxysmal atrial fibrillation  Continue carvedilol  Eliquis on hold    GERD-continue lansoprazole    Essential hypertension  Continue lisinopril    Chronic pain secondary to hepatocellular carcinoma  Palliative care consulted  Continue with MS Contin, Dilaudid and fentanyl as needed    Constipation  Continue senna and MiraLAX    Insomnia  Continue trazodone    Pancytopenia  Most likely secondary to hepatocellular carcinoma, infection and chemotherapy  Continue to monitor      Nutristionist consulted- appreciate assistance.    Malnutrition Assessment:  Type: Protein calorie  Parameters for Malnutrition: Risk for malnutrition       Evaluation:             • Imaging:   • Labs: None  • Discharge planning     Additional Cares:  • Lines:Chest port, PIV, 2  drains  • [place]:None  • Telemetry: Sinus rhythm  • DVT prophylaxis:lovenox  • CODE STATUS: DNR  • Isolation:None  • Disposition: For ERCP on Saturday with GI    Care plan discussed with patient and nurse. All questions answered. Please see orders.   Provider Inpatient Hospital Services Certification   Awaiting specialty consultation that can impact the therapeutic plan : Yes  Awaiting specific diagnostic testing and/or results that can impact the therapeutic care plan: Yes  Current therapeutic care plan can only be provided in the hospital: Yes  Current anti-infective care plan requires IV antibiotics that currently can only be gien in the inpatient setting: Yes  Discharge expected in : 3-4 days  Predicted/Planned discharge to: home        Discussed with provider(s): Kamilla Ho CNP RN    Time spent: 20 minutes    Electronically signed by: Lucina Chahal MD  8/12/2021  3:17 PM    A voice recognition program was used to aid in documentation of this record. Sometimes words are not printed exactly as they were spoken.  While efforts were made to carefully edit and correct any inaccuracies, some errors may be present; please take these into context.  Please contact the provider if errors are identified.

## 2021-08-13 PROBLEM — K82.2 PERFORATED GALLBLADDER: Status: ACTIVE | Noted: 2021-01-01

## 2021-08-13 NOTE — CROSS COVER NOTE
GI Cross Cover Note     Visited with Jakub and his wife this morning. Jakub is still having abdominal pain, he thinks it is a little better. Otherwise, no complaints.     Labs noted. Vital signs stable.     Plan for ERCP tomorrow.

## 2021-08-13 NOTE — PROGRESS NOTES
[facility]   Hospitalist Services  [address] [facility] [place] [address]       HOSPITALIST PROGRESS NOTE    Date of Service: 8/13/2021    Time of Service: 1600    Patient name: Ben Lai Sr  MRN: [id number]   LOS: 2 days     Subjective   Patient seen and examined. Discussed with nurse. Chart reviewed.  Jakub states that he is doing okay and his pain is getting a little better.  His wife is by the bedside and we discussed the procedure tomorrow, the gallbladder perforation, the antibiotics and exchange of stents.  We also discussed the prolonged antibiotic course recommended by infectious disease as it appears that he may have possibly failed the oral antibiotic regimen back in March.  His wife is wondering who needs to be taking care of the KYUNG drains as he has had the one in his abdomen for at least 5 months now and has not received any directions on what to do for the strain.  He has followed up with the [facility], the [facility] and with his primary care provider along with GI as an outpatient here in [place].  He has an appointment with Dr. Cisneros who is a hepatologist at [facility] whom she will try to see if she can get appointment sooner rather than later.    Review of Systems   Constitutional: Negative for activity change, appetite change, chills, fatigue and fever.   Gastrointestinal: Positive for abdominal pain. Negative for abdominal distention, constipation, diarrhea, nausea and vomiting.   Psychiatric/Behavioral: Positive for dysphoric mood.        Gets tearful while discussing the plan of care and the drains, [facility]   All other systems reviewed and are negative.      Objective     Temp:  [36.4 °C (97.6 °F)-37 °C (98.6 °F)] 36.8 °C (98.2 °F)  Heart Rate:  [64-74] 65  Resp:  [18] 18  BP: (126-168)/(63-78) 157/78    I/O last 3 completed shifts:  In: 1169.7 [P.O.:350; I.V.:75.6; IV Piggyback:744.1]  Out: 982  [Urine:900; Drains:82]    I/O this shift:  In: 266.3 [P.O.:50; IV Piggyback:216.3]  Out: 610 [Urine:600; Drains:10]    Physical Exam  Vitals and nursing note reviewed. Exam conducted with a chaperone present (Wife, Santiago RN).   Constitutional:       General: He is not in acute distress.     Appearance: Normal appearance. He is normal weight. He is not ill-appearing.   HENT:      Head: Normocephalic and atraumatic.   Eyes:      General: No scleral icterus.  Cardiovascular:      Rate and Rhythm: Normal rate and regular rhythm.      Pulses: Normal pulses.      Heart sounds: Normal heart sounds. No murmur heard.   No gallop.    Pulmonary:      Effort: Pulmonary effort is normal. No respiratory distress.      Breath sounds: Normal breath sounds. No stridor. No wheezing, rhonchi or rales.   Abdominal:      General: Abdomen is flat. Bowel sounds are normal. There is no distension.      Palpations: Abdomen is soft. There is no mass.      Tenderness: There is abdominal tenderness. There is no guarding.      Hernia: No hernia is present.      Comments: Right upper quadrant   Musculoskeletal:      Right lower leg: No edema.      Left lower leg: No edema.   Skin:     General: Skin is warm and dry.      Capillary Refill: Capillary refill takes less than 2 seconds.      Findings: Lesion present.      Comments: Improved erythema near second KYUNG drain site   Neurological:      General: No focal deficit present.      Mental Status: He is alert and oriented to person, place, and time. Mental status is at baseline.   Psychiatric:         Behavior: Behavior normal.         Thought Content: Thought content normal.         Judgment: Judgment normal.      Comments: Depressed mood, tearful at times             Medications:     Current Facility-Administered Medications:   •  [START ON 8/14/2021] indomethacin (INDOCIN) 50 mg suppository 100 mg, 100 mg, rectal, Once, Lakisha Sr CNP  •  carboxymethylcellulose (REFRESH PLUS) 0.5 % ophthalmic  solution 2 drop, 2 drop, Both Eyes, 3x daily PRN, Lucina Chahal MD  •  enoxaparin (LOVENOX) syringe 40 mg, 40 mg, subcutaneous, Daily at 1400, Lucina Chahal MD, 40 mg at 08/13/21 1303  •  insulin aspart (NovoLOG) injection pen (mealtime/snack insulin) 0-25 Units, 0-25 Units, subcutaneous, Insulin: 3x daily with meals, Lucina Chahal MD, 2 Units at 08/13/21 1031  •  insulin aspart (NovoLOG) injection pen (mealtime/snack insulin) 0-25 Units, 0-25 Units, subcutaneous, PRN, Lucina Chahal MD  •  insulin aspart U-100 (NovoLOG) injection pen (correction dose) 0-15 Units, 0-15 Units, subcutaneous, Insulin: 4x daily with meals, Lucina Chahal MD, 1 Units at 08/12/21 1023  •  Insert peripheral IV, , , Once **AND** Maintain IV access, , , Until discontinued **AND** Saline lock IV, , , Once **AND** sodium chloride flush 3 mL, 3 mL, intravenous, PRN, Kayy Francois CNP  •  acetaminophen (TYLENOL) tablet 325-650 mg, 325-650 mg, oral, q4h PRN, Kayy Francois CNP  •  fentaNYL citrate (PF) 50 mcg/mL injection 25 mcg, 25 mcg, intravenous, q2h PRN, Kayy Francois CNP, 25 mcg at 08/11/21 2138  •  sennosides-docusate sodium (SENNA PLUS) 8.6-50 mg 1 tablet, 1 tablet, oral, 2x daily, Kayy Francois CNP, 1 tablet at 08/12/21 0904  •  bisacodyL (DULCOLAX) suppository 10 mg, 10 mg, rectal, Daily PRN, Kayy Francois CNP  •  magnesium hydroxide (MILK OF MAGNESIA) 400 mg/5 mL oral suspension 30 mL, 30 mL, oral, Daily PRN, Kayy Francois CNP  •  polyethylene glycol (GLYCOLAX) powder 17 g, 17 g, oral, Daily, Kayy Francois CNP, 17 g at 08/12/21 0904  •  ampicillin-sulbactam (UNASYN) 3,000 mg in normal saline 100 mL IVPB - MBP, 3,000 mg, intravenous, q6h, Lucina Chahal MD, Stopped at 08/13/21 1333  •  buPROPion SR (WELLBUTRIN SR) 12 hr tablet 100 mg, 100 mg, oral, Daily, Kayy Francois CNP, 100 mg at 08/13/21 0937  •  carvediloL (COREG) tablet 3.125 mg, 3.125 mg, oral, 2x daily with  meals, Kayy Francois CNP, 3.125 mg at 08/13/21 0942  •  loratadine (CLARITIN) tablet 10 mg, 10 mg, oral, Daily, Kayy Francois CNP, 10 mg at 08/13/21 0941  •  fluticasone propionate (FLONASE) 50 mcg/actuation nasal spray 2 spray, 2 spray, Each Nostril, Daily, Kayy Francois CNP, 2 spray at 08/13/21 0944  •  HYDROmorphone (DILAUDID) tablet 2-4 mg, 2-4 mg, oral, q6h PRN, Kayy Francois CNP, 2 mg at 08/13/21 0936  •  lactulose (CHRONULAC) 20 gram/30 mL solution 10 g, 10 g, oral, 2x daily PRN, Kayy Francois CNP, 10 g at 08/12/21 0904  •  lansoprazole (PREVACID) capsule 30 mg, 30 mg, oral, Daily, Kayy Francois CNP, 30 mg at 08/13/21 0937  •  lisinopriL (PRINIVIL,ZESTRIL) tablet 20 mg, 20 mg, oral, Daily, Kayy Francois CNP, 20 mg at 08/13/21 0941  •  morphine (MS CONTIN) 12 hr tablet 15 mg, 15 mg, oral, q12h RONAL, Kayy Francois CNP, 15 mg at 08/13/21 0942  •  terazosin (HYTRIN) capsule 5 mg, 5 mg, oral, 2x daily, Kayy Francois CNP, 5 mg at 08/13/21 0937  •  traZODone (DESYREL) tablet 100 mg, 100 mg, oral, Nightly, Kayy Francois CNP, 100 mg at 08/12/21 2125  •  dextrose 50 % in water (D50W) syringe 15-30 mL, 15-30 mL, intravenous, q15 min PRN, Kayy Francois CNP  •  dextrose (GLUTOSE) 40 % gel 15.2 g, 15.2 g, oral, q15 min PRN, Kayy Francois CNP  •  glucagon (GLUCAGEN) injection 1 mg, 1 mg, intramuscular, q15 min PRN **OR** glucagon (GLUCAGEN) injection 1 mg, 1 mg, subcutaneous, q15 min PRN, Kayy Francois CNP  •  furosemide (LASIX) tablet 20 mg, 20 mg, oral, 2x daily diuretic, Lucina Chahal MD, 20 mg at 08/13/21 1423  •  sodium chloride 0.9% (NS) carrier flush 25-50 mL, 25-50 mL, intravenous, PRN, Lucina Chahal MD, Stopped at 08/12/21 0643  •  sodium chloride flush 2 mL, 2 mL, intra-catheter, Daily, Lucina Chahal MD, 2 mL at 08/13/21 0944    Results from last 4 days   Lab Units 08/12/21  0338 08/11/21  1028   WBC AUTO 10*3/uL 3.1* 3.0*    HEMOGLOBIN g/dL 10.9* 11.7*   HEMATOCRIT % 32.5* 35.5*   PLATELETS AUTO 10*3/uL 103* 106*       Results from last 4 days   Lab Units 08/12/21  0338 08/11/21  1026   SODIUM mmol/L 138 134*   POTASSIUM mmol/L 3.7 4.0   CHLORIDE mmol/L 106 101   CO2 mmol/L 24 27   BUN mg/dL 8 8   CREATININE mg/dL 0.50* 0.56*   CALCIUM mg/dL 7.8* 8.5*   TOTAL PROTEIN g/dL 5.6* 6.3   BILIRUBIN TOTAL mg/dL 1.02 1.12   ALK PHOS U/L 154* 167*   ALT U/L 11 13   AST U/L 21 24   GLUCOSE mg/dL 108* 166*       Imaging:   MR abdomen with and without contrast    Result Date: 7/29/2021  Narrative: EXAM: MR ABDOMEN W AND WO CONTRAST 07/29/2021 CLINICAL HISTORY: Hepatocellular carcinoma (CMS/HCC) (HCC); Neoplasm: liver or bile duct TECHNIQUE: Multiplanar, multisequence MRI of the abdomen without and with 17 mL of ProHance intravenous contrast. Single use contrast vial. 0 mL of contrast was discarded. Axial and coronal T2, axial T2 with fat suppression, axial in and opposed phase gradient echo, axial diffusion weighted, and axial pre and postcontrast T1-weighted sequences were performed. COMPARISON: Abdominal MRI 5/5/2021. FINDINGS: Nodular liver contour and irregular liver morphology, compatible with hepatic cirrhosis. Irregular reticular T2 hyperintensity throughout the liver parenchyma, compatible with fibrosis. Redemonstration of capsular retraction with focal fibrosis in the anterolateral aspect of the right hepatic lobe, unchanged. Two subhepatic drains remained in place. No discrete residual subhepatic collection remains. Heterogenous contrast enhancement of the right hepatic lobe, similar to prior examination. No new foci of abnormal T2 signal hyperintensity, contrast enhancement, or diffusion restriction. No lymphadenopathy in the upper abdomen. Findings compatible with portal hypertension, including recanalization of the umbilical vein, moderate splenomegaly measuring 18.8 cm on coronal images, splenorenal shunt, and multiple subcutaneous  varices in the ventral abdominal wall. Small perihepatic and perisplenic upper abdominal ascites. Mild scattered mesenteric edema throughout the abdomen. Thrombosis of the right portal vein is demonstrated. This was also seen on the prior MRI in retrospect, however now thrombosis extends from near the intrahepatic portal vein bifurcation and extends distally throughout the right portal vein. Filling defects are demonstrated within the common bile duct on axial T2-weighted images (series 401, images 28-33), suggestive of choledocholithiasis, unchanged. No intrahepatic and extra hepatic biliary ductal dilatation, stable. Normal pancreas. No pancreatic ductal dilatation. Normal adrenal glands. Small calyceal diverticulum in the right kidney. Kidneys are otherwise unremarkable. No solid renal mass or hydronephrosis. Visualized loops of small and large bowel are normal in caliber. Bone marrow signal is normal. Trace bilateral pleural effusions. Visualized lung bases are otherwise unremarkable, however lung parenchyma is inadequately evaluated on MRI.     Impression: IMPRESSION: 1.  Stable appearance of the liver. No evidence of new or progressive malignancy. 2.  Two subhepatic drains remain in place. No discrete focal fluid collection. 3.  Right portal vein thrombosis. 4.  Sequelae of portal hypertension including recanalization of the umbilical vein, moderate splenomegaly, and small upper abdominal ascites. 5.  Filling defects in the common bile duct, suggestive of choledocholithiasis, unchanged.    CT ABDOMEN PELVIS W IV CONTRAST    Result Date: 8/11/2021  Narrative: NOTE: This study was received from an outside source for PACS Storage.    IR injection abscess catheter evaluation    Result Date: 8/11/2021  Narrative: Exam: Drain check 08/11/2021 Clinical history:  abscess/routine checkup. Procedure/Views: Fluoroscopic time 1.5 minutes images 9 Comparison/s: Outside CT abdomen pelvis 8/10/2021 Findings:  view  demonstrates stable positioning of the right upper quadrant drain. Adjacent internalized biliary stents with proximal aspect of the stent in the gallbladder. Injection of contrast demonstrated decompressed cavity about the pigtail loop, with contrast seen to flow in the free intraperitoneal cavity and outlining bowel loops. No definite fistulous connection to adjacent bowel with no evidence of peristalsis. Again contrast appears to outline the bowel loops and is not within the bowel loops. When comparing outside CT imaging this drain appears to be adjacent to the gallbladder and does not appear to be within the gallbladder lumen. The internalized biliary stents appears to be perforated through the gallbladder. Given patient will have up to 30 cc of output a day, recommend leaving drain in place, as patient is likely still having spillage of bile from the perforated gallbladder. Recommend correlation with GI recommendations about possible replacement of stents and possibly pulled back through the perforated gallbladder. Additionally with reviewing CT imaging there has been reaccumulation of fluid and abscess formation is formed slightly anterior to the existing drain, and in the location of the previously removed drain. Additional subcutaneous abscess. Ultrasound evaluation was unable to a identify this deeper fluid collection. Patient will likely need CT drain placement into this fluid collection.     Impression: IMPRESSION: 1.  Decompressed cavity about the right upper quadrant drain with spillage of contrast into the free intraperitoneal cavity with outlining bowel loops. No definite fistulous connection to bowel with no peristalsis. Contrast outlines the bowel loops and is not within the bowel loops. See above discussion with findings of recent CT imaging. 2.  Patient has developed new abscess cavity more anterior adjacent to the liver, in the location of the previously removed drain. Patient will require CT  drain placement.    IR injection abscess catheter evaluation    Result Date: 7/19/2021  Narrative: Exam:  Abscess catheter check 07/19/2021 Clinical History: Routine check-up; Procedure/Views:  Utilizing fluoroscopy and digital imaging, the existing catheter was injected with contrast. Fluoroscopy time for the procedure was 0.3 minutes. 4 images are obtained. Comparison/s:  7/5/2021 Findings:  The catheter is in satisfactory position. Outputs recently from the catheter have been fairly low, 20 cc or less. Contrast injection shows immediate communication to the right colon. No significant flow into the stents draining the biliary system is present. No significant cavity is present surrounding the catheter.     Impression: IMPRESSION:  1. No significant residual cavity remains surrounding the pigtail the catheter. However, there is immediate contrast opacification of the right colon. The catheter needs to remain in place.    CT abscess cyst liver    Result Date: 8/11/2021  Narrative: Exam: CT-guided right upper abdominal abscess drain placement Clinical History:  Abdominal abscess Fluoroscopy time: 10 seconds of CT fluoroscopy time. Dose reduction technique utilized; the mA was adjusted based on patient size. Medication: Lidocaine 5 ml sq. Complications: None Technique: The patient was informed of the risks and benefits of this procedure and provided written informed consent. Patient placed in supine  position and CT scanning used to rocio the proper location.  The right upper abdomen was prepped and draped in the usual sterile fashion, following the maximum sterile barrier technique; cap, mask, sterile gloves, large sterile sheet, hand hygiene, and antisepsis with 2% chlorhexidine (or acceptable alternative antiseptics per current guidelines) for cutaneous antisepsis were performed. During prepping the cutaneous abscess broke open, with significant drainage of bloody pus. Access site into the fluid collection was  placed as far away from the subcutaneous abscess as possible. Lidocaine is used for local anesthesia.  After local anesthesia with lidocaine, CT guidance was used to place a 5 Libyan Yueh needle into the right upper quadrant fluid collection. Wire was placed tract serially dilated and subsequently a 10 Libyan Rodgers-Whitley drain was placed into the fluid collection. Ultimately 10 cc of bloody purulent material was aspirated and sent to lab for culture. Post procedure scanning showed decompressed abscess cavity.  The patient tolerated the procedure well and there was no immediate complication. Drain was secured to the skin surface with a single 2-0 Prolene suture.     Impression: IMPRESSION: Successful CT-guided  right upper abdominal abscess drain placement. Bloody purulent material was aspirated and sent to lab for culture. During prepping for drain placement, the cutaneous abscess broke open and was oozing.       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)    Essential hypertension    Hepatocellular carcinoma (CMS/HCC) (HCC)    Gastroesophageal reflux disease without esophagitis    Intractable right upper quadrant abdominal pain    Esophageal varices in cirrhosis (CMS/HCC) (HCC)    Obstructive sleep apnea    Hyperlipidemia    Perforated gallbladder      Other Chronic/Previous Medical Issues:  None    Plan:  Right upper abdominal abscess status post incision and drainage with placement of the drain  Cellulitis  Improving  Cultures growing Enterococcus faecalis and Citrobacter koseri  Discussed with infectious disease and they recommend Unasyn 3 g every 6 hours via PICC line for 4 weeks with follow-up with Dr. Myers in 2 weeks  Ordered culture of CBD or gall bladder aspiration of fluid during  ERCP  Cultures negative at 48 hours    Perforation of gallbladder due to internalized biliary stent with spillage of bile  GI consulted  ERCP tomorrow morning  Is not a surgical candidate for cholecystectomy  Hopefully prolonged course of IV antibiotics will aid in healing    Hepatocellular carcinoma  Chronic hepatitis C with cirrhosis  Portal vein thrombosis  History of ascites  Thrombocytopenia  Receiving chemotherapy and is followed by Dr. Reynoso  Continue to hold eliquis and will restart 24 hours after ERCP  Continue lovenox for DVT prophylaxis  Continue lasix    Insulin-dependent type 2 diabetes mellitus with a recent hemoglobin A1c of 7.2%  Continue diabetic diet  N.p.o. at midnight  At that time we will start every 4 hour glucose checks and sliding scale of insulin    Paroxysmal atrial fibrillation  Continue carvedilol  Eliquis on hold    GERD  Continue lansoprazole    Essential hypertension  Continue lisinopril    Chronic pain secondary to hepatocellular carcinoma  Palliative care consulted  Continue with MS Contin, Dilaudid and fentanyl as needed    Constipation  Continue senna and MiraLAX    Insomnia  Continue trazodone    Pancytopenia  Most likely secondary to hepatocellular carcinoma, infection and chemotherapy  Continue to monitor    Nutristionist consulted- appreciate assistance.    Malnutrition Assessment:  Type: Protein calorie  Parameters for Malnutrition: Risk for malnutrition       Evaluation:             • Imaging:   • Labs: CMP and CBC in the morning  • Discharge planning     Additional Cares:  • Lines: Port, peripheral IV and 2 abdominal drains  • [place]: None  • Telemetry: None  • DVT prophylaxis: Lovenox  • CODE STATUS: DO NOT RESUSCITATE  • Isolation: None  • Disposition: For ERCP in the morning.  Will need a total of 30 days of IV antibiotics and will need a PICC line placed to receive IV antibiotics at home.  Care management has been consulted.    Care plan discussed with patient and  nurse. All questions answered. Please see orders.   Provider Inpatient Hospital Services Certification   Awaiting specialty consultation that can impact the therapeutic plan : Yes  Awaiting specific diagnostic testing and/or results that can impact the therapeutic care plan: Yes  Current therapeutic care plan can only be provided in the hospital: Yes  Current anti-infective care plan requires IV antibiotics that currently can only be gien in the inpatient setting: Yes  Discharge expected in : 3-4 days  Predicted/Planned discharge to: home        Discussed with provider(s): Santiago SIGALA, Kimberly Sr CNP, Santiago SIGALA, Dr. Angulo    Time spent: 30 minutes    Electronically signed by: Lucina Chhaal MD  8/13/2021  4:56 PM    A voice recognition program was used to aid in documentation of this record. Sometimes words are not printed exactly as they were spoken.  While efforts were made to carefully edit and correct any inaccuracies, some errors may be present; please take these into context.  Please contact the provider if errors are identified.

## 2021-08-14 NOTE — NURSING END OF SHIFT
Nursing End of Shift Summary:    Patient: Ben Lai Sr  MRN: [id number]  : 1950, Age: 71 y.o.    Location: [address] [place] [address]    Nursing Goals  Clinical Goals for the Shift: pt will remain safe, monitor vitals, I/O and labs, manage pain, ambulate without complication, NPO @ MN, Monitor blood sugar, rest    Narrative Summary of Progress Toward Clinical Goals:  Pt remained safe and comfortable. Vital signs stable. Pain controlled with scheduled MS contin. Refused to wear CPAP. Room air with > 90% of O2 sats. NPO @ midnight. IV Abx administered. Ambulated without complications.     Barriers to Goals/Nursing Concerns:  No    New Patient or Family Concerns/Issues:  No    Shift Summary:      Significant Events & Communications to Providers (last 12 hours)      Last 5 Values    No documentation.              Oxygen Usage (last 12 hours)      Last 5 Values     Row Name 21 0400                Oxygen Weaning Trial by Nursing    Is Patient on Room Air OR on the Same Amount of O2 as at Home?  Yes  Yes                  Mobility (last 12 hours)      Last 5 Values     Row Name 21 1739 21 0158             Mobility    Activity  Ambulate in room;Bathroom privileges  Ambulate in room;Bathroom privileges;Turn  Bathroom privileges;Ambulate in room      Level of Assistance  --  Minimal assist, patient does 75% or more  --      Anti-Embolism Devices Applied  --  Bilateral;AE calf pump  --          Urethral Catheter    Active Urethral Catheter     None            Active Lines    Active Central venous catheter / Peripherally inserted central catheter / Implantable Port / Hemodialysis catheter / Midline Catheter     Name:   Placement date:   Placement time:   Site:   Days:    Single Lumen Implantable Port 20 Right Chest   20    Chest   225              Infusing Medications   Medication Dose Last Rate     PRN Medications   Medication Dose Last Admin   •  carboxymethylcellulose  2 drop     • insulin aspart  0-25 Units     • sodium chloride  3 mL     • acetaminophen  325-650 mg     • fentaNYL citrate (PF)  25 mcg 25 mcg at 08/13/21 1832   • bisacodyL  10 mg     • magnesium hydroxide  30 mL     • HYDROmorphone  2-4 mg 2 mg at 08/13/21 0936   • lactulose  10 g 10 g at 08/12/21 0904   • dextrose 50 % in water (D50W)  15-30 mL     • dextrose  15.2 g     • glucagon  1 mg      Or   • glucagon  1 mg     • sodium chloride 0.9% (NS)  25-50 mL 25 mL at 08/14/21 0124     _________________________  Debbie Agrawal RN  08/14/21 5:23 AM   Patient/surrogate refused vaccine...

## 2021-08-14 NOTE — PROGRESS NOTES
Case discussed with Dr Chahal. Trans-cystic drainage revised in the hope of resolving leakage and abscess but if no improvement seen on subsequent scans, he will have exhausted local resources for management of this problem. GI will sign off. Please call prn.

## 2021-08-14 NOTE — ANESTHESIA PROCEDURE NOTES
Airway  Urgency: elective    Airway not difficult    General Information and Staff    Patient location during procedure: OR  CRNA: Joan Woodson CRNA  Performed: CRNA     Indications and Patient Condition  Indications for airway management: anesthesia and airway protection  Spontaneous Ventilation: absent  Sedation level: deep  Preoxygenated: yes  Patient position: sniffing  MILS maintained throughout  Mask difficulty assessment: 1 - vent by mask    Final Airway Details  Final airway type: endotracheal airway      Successful airway: ETT  Cuffed: yes   Successful intubation technique: direct laryngoscopy  Facilitating devices/methods: stylet  Endotracheal tube insertion site: oral  Blade: [place]  Blade size: #2  ETT size (mm): 7.5  Cormack-Lehane Classification: grade I - full view of glottis  Placement verified by: chest auscultation, capnometry and palpation of cuff   Measured from: lips  ETT to lips (cm): 23  Number of attempts at approach: 1

## 2021-08-14 NOTE — ANESTHESIA PREPROCEDURE EVALUATION
"Pre-Procedure Assessment    Patient: Ben Lai , male, 71 y.o.    Ht Readings from Last 1 Encounters:   08/12/21 1.651 m (5' 5\")     Wt Readings from Last 1 Encounters:   08/14/21 80.6 kg (177 lb 11.1 oz)       Last Vitals  BP      Temp      Pulse     Resp      SpO2      Pain Score         Problem list reviewed and Medical history reviewed    History of anesthetic complications: delayed emergence and PONV   Family history of anesthetic complications (no):      Airway   Mallampati: II  TM distance: >3 FB  Neck ROM: limited      Dental    (+) chipped    Pulmonary     breath sounds clear to auscultation  (+) pneumonia, COPD moderate, asthma, sleep apnea,   Cardiovascular   (+) hypertension, CHF (normal EF),     Rhythm: regular  Rate: normal  ROS comment: Pulmonary HTN    Mental Status/Neuro/Psych    Pt is alert.      (+) arthritis,     GI/Hepatic/Renal    (+) GERD, PUD, hepatitis C, liver disease, jaundice,     Endo/Other    (+) diabetes mellitus, history of cancer,   Abdominal           Social History     Tobacco Use   • Smoking status: Never Smoker   • Smokeless tobacco: Never Used   Substance Use Topics   • Alcohol use: No      Hematology   WBC   Date Value Ref Range Status   08/14/2021 2.0 (L) 3.7 - 9.6 10*3/uL Final     RBC   Date Value Ref Range Status   08/14/2021 3.84 (L) 4.10 - 5.80 10*6/µL Final     MCV   Date Value Ref Range Status   08/14/2021 83.8 82.0 - 97.0 fL Final     Hemoglobin   Date Value Ref Range Status   08/14/2021 10.5 (L) 13.2 - 17.2 g/dL Final     Hematocrit   Date Value Ref Range Status   08/14/2021 32.2 (L) 38.0 - 50.0 % Final     Platelets   Date Value Ref Range Status   08/14/2021 101 (L) 130 - 350 10*3/uL Final      Coagulation   Protime   Date Value Ref Range Status   08/14/2021 15.0 (H) 9.4 - 12.5 seconds Final     INR   Date Value Ref Range Status   08/14/2021 1.3 (H) <=1.1 Final      General Chemistry   POC Glucose   Date Value Ref Range Status   08/14/2021 91 70 - 105 mg/dL " Final     Comment:     Notified RN     Calcium   Date Value Ref Range Status   08/14/2021 7.9 (L) 8.6 - 10.3 mg/dL Final     BUN   Date Value Ref Range Status   08/14/2021 7 7 - 25 mg/dL Final     Creatinine   Date Value Ref Range Status   08/14/2021 0.50 (L) 0.70 - 1.30 mg/dL Final     Glucose   Date Value Ref Range Status   08/14/2021 96 70 - 105 mg/dL Final     Sodium   Date Value Ref Range Status   08/14/2021 137 135 - 145 mmol/L Final     Potassium   Date Value Ref Range Status   08/14/2021 3.3 (L) 3.5 - 5.1 mmol/L Final     Magnesium   Date Value Ref Range Status   08/12/2021 2.1 1.8 - 2.4 mg/dL Final     CO2   Date Value Ref Range Status   08/14/2021 26 21 - 32 mmol/L Final     Chloride   Date Value Ref Range Status   08/14/2021 104 98 - 107 mmol/L Final     Anesthesia Plan    ASA 4   NPO status reviewed: > 6 hours    General         Induction: intravenous   Airway Planning: oral ET tube    Additional Comments: ERCP safer with controlled airway          Anesthetic plan and risks discussed with patient.      Plan discussed with CRNA.

## 2021-08-15 NOTE — INTERDISCIPLINARY/THERAPY
08/15/21 1230   Time Calculation   Start Time 1230   Stop Time 1249   Time Calculation (min) 19 min   PT Last Visit   PT Received On 08/15/21   Visit Number 1   Pain Assessment Scale   Pain Scale 0-10   0-10 Pain Score   (Pt doesn't rate pain.)   Pain Location Abdomen   Pain Orientation Right   Pain Interventions Rest   General   Chart Reviewed Yes   Therapy Treatment Diagnosis R UQ abdominal abscess   PT Treatment Duration (Min) 19 Minutes   Is this a Co-Treatment? No   Additional Pertinent History PMH: hepatocellular carcinoma, chronic pain, chronic hep C, DM2, HTN   Family/Caregiver Present Yes  (wife)   Precautions   Reinforced Precautions Yes   Other Precautions R UQ drain (gait belt high with buckle to L), Fall   Home Living   Type of Home House   Home Layout Two level;Stairs to alternate level with rails   Alternate Level Stairs-Rails Both   Alternate Level Stairs-Number of Steps split level 7+7   Home Adaptive Equipment 4 Wheel walker   Prior Function   Level of [place] Independent with ADLs and functional transfers   Lived With Spouse   Receives Help From Family   Prior Function Comments Pt reports (I) w/o AD in home and 4WW in community. Wife assists as needed but he typically needs little help. Pt denies falls. Was attending OP PT 2x/week for strengthening.   Subjective Comments   RN Stated patient is medically cleared for therapy Yes   Subjective Comments RN okayed therapy. Pt in bed, agreeable to PT. Pt in restroom end of tx, verbalizes understanding to use call light, wife in room. Notified RN that patient in restroom.   Bed Mobility   Bed Mobility Assessed   Bed Mobility 1   Bed Mobility From 1 Supine   Bed Mobility Type 1 To   Bed Mobility to 1 Short sit   Level of Assistance 1 Standby assistance   Bed Mobility Comments 1 HOB elevated. Increased time s/t fatigue.   Transfers   Transfer Assessed   Transfer 1   Transfer From 1 Bed;Sit   Transfer Type 1 To and from   Transfer to 1 Stand;Toilet    Technique 1 Sit to stand;Stand to sit   Transfer Device 1 Transfer belt;4 Wheel walker   Level of Assistance 1 Contact guard assist x 1   Trials/Comments 1 CGA for safety   Ambulation   Ambulation Assessed   Ambulation 1   Surface 1 Level surface;Smooth   Device 1 Gait belt;4 Wheel walker   Assistance 1 Standby assistance   Quality of Gait 1 Slow pace, no LOB.    Comments/Distance 1 x100m - Pt's 4WW is set up high which is how he prefers it.   Stairs   Stairs No  (Pt wanting to return to room s/t pain.)   RLE Assessment   RLE Assessment WFL   LLE Assessment   LLE Assessment WFL   Activity Tolerance   Activity Tolerance Comments Limited by pain.   Assessment   Rehab Potential Good   Progress   (Eval 8/15)   Problem List Decreased mobility;Pain   Assessment Comment Jakub tolerates mobility well, but limited by R UQ pain. WIll follow-up to trial stairs. Encourage frequent mobilization with nursing staff.   Recommendation   Recommendations for Therapy Continue skilled therapy   Therapeutic Interventions (Time Spent in Minutes)   Gait/Mobility 8   PT Untimed Charges - Quick List (Time Spent in Minutes)   PT Eval Low Complexity 11   Plan   Treatment Interventions Gait training;Stair training;Endurance training;Balance training;Therapeutic activities;Therapeutic exercises   PT Frequency Follow-up visit only   Plan Comment 1-assist; 100m 4WW, follow-up to trial stairs (7+7 with B HR)   Date POC Due 08/22/21

## 2021-08-15 NOTE — PROGRESS NOTES
[facility]   Hospitalist Services  [address] [place] [address]       HOSPITALIST PROGRESS NOTE    Date of Service: 8/15/2021    Time of Service: 1100    Patient name: Ben Lai Sr  MRN: [id number]   LOS: 4 days     Subjective   Patient seen and examined. Discussed with nurse. Chart reviewed.  Laura is by the bedside.  This afternoon Ben is sleeping quietly and his wife states that he had some trouble sleeping last night.  They continue to be concerned about who is going to manage drains once he is discharged from the hospital.  They would like to establish care with a hepatologist from the [facility] who comes up to the [facility] clinic once a month.  Otherwise Jakub is doing fine and I discussed the care with him and his wife.    Review of Systems   Psychiatric/Behavioral: Positive for sleep disturbance.   All other systems reviewed and are negative.      Objective     Temp:  [36.4 °C (97.5 °F)-36.5 °C (97.7 °F)] 36.5 °C (97.7 °F)  Heart Rate:  [56-87] 65  Resp:  [16-18] 18  BP: (118-147)/(61-70) 144/61    I/O last 3 completed shifts:  In: 2244.4 [P.O.:830; I.V.:775.8; IV Piggyback:638.6]  Out: 869 [Urine:800; Drains:68; Stool:1]    I/O this shift:  In: 250 [P.O.:250]  Out: 2 [Drains:2]    Physical Exam  Vitals and nursing note reviewed. Exam conducted with a chaperone present (Laura).   Constitutional:       General: He is not in acute distress.     Appearance: Normal appearance. He is normal weight. He is not ill-appearing.   HENT:      Head: Normocephalic and atraumatic.   Eyes:      General: No scleral icterus.  Cardiovascular:      Rate and Rhythm: Normal rate and regular rhythm.      Pulses: Normal pulses.      Heart sounds: Normal heart sounds. No murmur heard.   No gallop.       Comments: Chest port without erythema  Pulmonary:      Effort: Pulmonary effort is normal. No respiratory distress.      Breath sounds: Normal breath sounds.  No stridor. No wheezing, rhonchi or rales.   Abdominal:      General: Abdomen is flat. Bowel sounds are normal. There is no distension.      Palpations: Abdomen is soft. There is no mass.      Tenderness: There is no abdominal tenderness. There is no guarding.      Hernia: No hernia is present.      Comments: KYUNG drains draining greenish dark red fluid   Skin:     General: Skin is warm and dry.      Capillary Refill: Capillary refill takes less than 2 seconds.   Neurological:      General: No focal deficit present.      Mental Status: He is alert and oriented to person, place, and time. Mental status is at baseline.   Psychiatric:         Mood and Affect: Mood normal.         Behavior: Behavior normal.         Thought Content: Thought content normal.         Judgment: Judgment normal.         Medications:     Current Facility-Administered Medications:   •  melatonin tablet 10.5 mg, 10.5 mg, oral, Nightly, Lucina Chahal MD  •  ondansetron ODT (ZOFRAN-ODT) disintegrating tablet 4 mg, 4 mg, oral, q8h PRN, Lucina Chahal MD  •  ondansetron (ZOFRAN) injection 4 mg, 4 mg, intravenous, q8h PRN, Lucina Chahal MD  •  insulin aspart U-100 (NovoLOG) injection pen (correction dose) 0-15 Units, 0-15 Units, subcutaneous, Insulin: 4x daily with meals, Lucina Chahal MD, 1 Units at 08/15/21 0825  •  apixaban (ELIQUIS) tablet 5 mg, 5 mg, oral, 2x daily, Lucina Chahal MD, 5 mg at 08/15/21 0821  •  carboxymethylcellulose (REFRESH PLUS) 0.5 % ophthalmic solution 2 drop, 2 drop, Both Eyes, 3x daily PRN, Lucina Chahal MD, 2 drop at 08/15/21 1508  •  insulin aspart (NovoLOG) injection pen (mealtime/snack insulin) 0-25 Units, 0-25 Units, subcutaneous, Insulin: 3x daily with meals, Lucina Chahal MD, 3 Units at 08/14/21 1627  •  insulin aspart (NovoLOG) injection pen (mealtime/snack insulin) 0-25 Units, 0-25 Units, subcutaneous, PRN, Lucina Chahal MD, 3 Units at 08/15/21 0824  •  Insert peripheral IV, , , Once  **AND** Maintain IV access, , , Until discontinued **AND** Saline lock IV, , , Once **AND** sodium chloride flush 3 mL, 3 mL, intravenous, PRN, Kayy Francois CNP  •  acetaminophen (TYLENOL) tablet 325-650 mg, 325-650 mg, oral, q4h PRN, Kayy Francois CNP  •  fentaNYL citrate (PF) 50 mcg/mL injection 25 mcg, 25 mcg, intravenous, q2h PRN, Kayy Francois CNP, 25 mcg at 08/13/21 1832  •  sennosides-docusate sodium (SENNA PLUS) 8.6-50 mg 1 tablet, 1 tablet, oral, 2x daily, Kayy Francois CNP, 1 tablet at 08/15/21 0821  •  bisacodyL (DULCOLAX) suppository 10 mg, 10 mg, rectal, Daily PRN, Kayy Francois CNP  •  magnesium hydroxide (MILK OF MAGNESIA) 400 mg/5 mL oral suspension 30 mL, 30 mL, oral, Daily PRN, Kayy Francois CNP  •  polyethylene glycol (GLYCOLAX) powder 17 g, 17 g, oral, Daily, Kayy Francois CNP, 17 g at 08/15/21 0821  •  ampicillin-sulbactam (UNASYN) 3,000 mg in normal saline 100 mL IVPB - MBP, 3,000 mg, intravenous, q6h, Lucina Chahal MD, Stopped at 08/15/21 1434  •  buPROPion SR (WELLBUTRIN SR) 12 hr tablet 100 mg, 100 mg, oral, Daily, Kayy Francois CNP, 100 mg at 08/15/21 0821  •  carvediloL (COREG) tablet 3.125 mg, 3.125 mg, oral, 2x daily with meals, Kayy Francois CNP, 3.125 mg at 08/15/21 0821  •  loratadine (CLARITIN) tablet 10 mg, 10 mg, oral, Daily, Kayy Francois CNP, 10 mg at 08/15/21 0822  •  fluticasone propionate (FLONASE) 50 mcg/actuation nasal spray 2 spray, 2 spray, Each Nostril, Daily, Kayy Francois CNP, 2 spray at 08/15/21 0821  •  HYDROmorphone (DILAUDID) tablet 2-4 mg, 2-4 mg, oral, q6h PRN, Kayy Francois CNP, 2 mg at 08/15/21 0937  •  lactulose (CHRONULAC) 20 gram/30 mL solution 10 g, 10 g, oral, 2x daily PRN, Kayy Francois CNP, 10 g at 08/12/21 0904  •  lansoprazole (PREVACID) capsule 30 mg, 30 mg, oral, Daily, Kayy Francois CNP, 30 mg at 08/15/21 0822  •  lisinopriL (PRINIVIL,ZESTRIL) tablet 20 mg, 20  mg, oral, Daily, Kayy Francois, CNP, 20 mg at 08/15/21 0821  •  morphine (MS CONTIN) 12 hr tablet 15 mg, 15 mg, oral, q12h RONAL, Kayy Sloanan, CNP, 15 mg at 08/15/21 0822  •  terazosin (HYTRIN) capsule 5 mg, 5 mg, oral, 2x daily, Kayy Francois, CNP, 5 mg at 08/15/21 0822  •  traZODone (DESYREL) tablet 100 mg, 100 mg, oral, Nightly, Kayy Francois, CNP, 100 mg at 08/14/21 2040  •  dextrose 50 % in water (D50W) syringe 15-30 mL, 15-30 mL, intravenous, q15 min PRN, Kayy Francois CNP  •  dextrose (GLUTOSE) 40 % gel 15.2 g, 15.2 g, oral, q15 min PRN, Kayy Francois CNP  •  glucagon (GLUCAGEN) injection 1 mg, 1 mg, intramuscular, q15 min PRN **OR** glucagon (GLUCAGEN) injection 1 mg, 1 mg, subcutaneous, q15 min PRN, Kayy Francois CNP  •  furosemide (LASIX) tablet 20 mg, 20 mg, oral, 2x daily diuretic, Lucina Chahal MD, 20 mg at 08/15/21 1405  •  sodium chloride 0.9% (NS) carrier flush 25-50 mL, 25-50 mL, intravenous, PRN, Lucina Chahal MD, Last Rate: 25 mL/hr at 08/15/21 0703, 25 mL at 08/15/21 0703  •  sodium chloride flush 2 mL, 2 mL, intra-catheter, Daily, Lucina Chahal MD, 2 mL at 08/15/21 0822    Results from last 4 days   Lab Units 08/15/21  0504 08/14/21  0407 08/12/21  0338   WBC AUTO 10*3/uL 3.2* 2.0* 3.1*   HEMOGLOBIN g/dL 11.2* 10.5* 10.9*   HEMATOCRIT % 34.0* 32.2* 32.5*   PLATELETS AUTO 10*3/uL 110* 101* 103*       Results from last 4 days   Lab Units 08/15/21  0504 08/14/21  0408 08/12/21  0338   SODIUM mmol/L 140 137 138   POTASSIUM mmol/L 4.1 3.3* 3.7   CHLORIDE mmol/L 106 104 106   CO2 mmol/L 27 26 24   BUN mg/dL 8 7 8   CREATININE mg/dL 0.47* 0.50* 0.50*   CALCIUM mg/dL 8.3* 7.9* 7.8*   TOTAL PROTEIN g/dL 5.6* 5.4* 5.6*   BILIRUBIN TOTAL mg/dL 0.63 0.67 1.02   ALK PHOS U/L 194* 148* 154*   ALT U/L 17 9 11   AST U/L 35 23 21   GLUCOSE mg/dL 119* 96 108*       Imaging:   MR abdomen with and without contrast    Result Date: 7/29/2021  Narrative: EXAM: MR  ABDOMEN W AND WO CONTRAST 07/29/2021 CLINICAL HISTORY: Hepatocellular carcinoma (CMS/HCC) (HCC); Neoplasm: liver or bile duct TECHNIQUE: Multiplanar, multisequence MRI of the abdomen without and with 17 mL of ProHance intravenous contrast. Single use contrast vial. 0 mL of contrast was discarded. Axial and coronal T2, axial T2 with fat suppression, axial in and opposed phase gradient echo, axial diffusion weighted, and axial pre and postcontrast T1-weighted sequences were performed. COMPARISON: Abdominal MRI 5/5/2021. FINDINGS: Nodular liver contour and irregular liver morphology, compatible with hepatic cirrhosis. Irregular reticular T2 hyperintensity throughout the liver parenchyma, compatible with fibrosis. Redemonstration of capsular retraction with focal fibrosis in the anterolateral aspect of the right hepatic lobe, unchanged. Two subhepatic drains remained in place. No discrete residual subhepatic collection remains. Heterogenous contrast enhancement of the right hepatic lobe, similar to prior examination. No new foci of abnormal T2 signal hyperintensity, contrast enhancement, or diffusion restriction. No lymphadenopathy in the upper abdomen. Findings compatible with portal hypertension, including recanalization of the umbilical vein, moderate splenomegaly measuring 18.8 cm on coronal images, splenorenal shunt, and multiple subcutaneous varices in the ventral abdominal wall. Small perihepatic and perisplenic upper abdominal ascites. Mild scattered mesenteric edema throughout the abdomen. Thrombosis of the right portal vein is demonstrated. This was also seen on the prior MRI in retrospect, however now thrombosis extends from near the intrahepatic portal vein bifurcation and extends distally throughout the right portal vein. Filling defects are demonstrated within the common bile duct on axial T2-weighted images (series 401, images 28-33), suggestive of choledocholithiasis, unchanged. No intrahepatic and extra  hepatic biliary ductal dilatation, stable. Normal pancreas. No pancreatic ductal dilatation. Normal adrenal glands. Small calyceal diverticulum in the right kidney. Kidneys are otherwise unremarkable. No solid renal mass or hydronephrosis. Visualized loops of small and large bowel are normal in caliber. Bone marrow signal is normal. Trace bilateral pleural effusions. Visualized lung bases are otherwise unremarkable, however lung parenchyma is inadequately evaluated on MRI.     Impression: IMPRESSION: 1.  Stable appearance of the liver. No evidence of new or progressive malignancy. 2.  Two subhepatic drains remain in place. No discrete focal fluid collection. 3.  Right portal vein thrombosis. 4.  Sequelae of portal hypertension including recanalization of the umbilical vein, moderate splenomegaly, and small upper abdominal ascites. 5.  Filling defects in the common bile duct, suggestive of choledocholithiasis, unchanged.    CT ABDOMEN PELVIS W IV CONTRAST    Result Date: 8/11/2021  Narrative: NOTE: This study was received from an outside source for PACS Storage.    FL ERCP 1 duct    Result Date: 8/14/2021  Narrative: EXAM: FL ERCP 1 DUCT 08/14/2021 CLINICAL HISTORY:  perforation of gallbladder TECHNIQUE: 17 intraoperative fluoroscopic cholangiogram images submitted for interpretation. Fluoroscopy time: 14.4 minutes Number of fluoroscopy images: 17 fluoroscopic images submitted for interpretation. COMPARISON: CT abdomen and pelvis 8/10/2021. FINDINGS: Limited by intraoperative technique with portable fluoroscopy.  Two separate pigtail drainage catheters enter the right flank. Images demonstrate a stent extending from the gallbladder through the common bile duct and into the duodenum. Endoscopy performed, with opacification of the biliary tree. Stents removed, and a single new stent placed, extending from the gallbladder fossa through the common bile duct and into the duodenum. No intrahepatic or extrahepatic biliary  ductal dilatation.     Impression: IMPRESSION: Exchange of stent extending from the gallbladder through the common bile duct and into the duodenum.     IR injection abscess catheter evaluation    Result Date: 8/11/2021  Narrative: Exam: Drain check 08/11/2021 Clinical history:  abscess/routine checkup. Procedure/Views: Fluoroscopic time 1.5 minutes images 9 Comparison/s: Outside CT abdomen pelvis 8/10/2021 Findings:  view demonstrates stable positioning of the right upper quadrant drain. Adjacent internalized biliary stents with proximal aspect of the stent in the gallbladder. Injection of contrast demonstrated decompressed cavity about the pigtail loop, with contrast seen to flow in the free intraperitoneal cavity and outlining bowel loops. No definite fistulous connection to adjacent bowel with no evidence of peristalsis. Again contrast appears to outline the bowel loops and is not within the bowel loops. When comparing outside CT imaging this drain appears to be adjacent to the gallbladder and does not appear to be within the gallbladder lumen. The internalized biliary stents appears to be perforated through the gallbladder. Given patient will have up to 30 cc of output a day, recommend leaving drain in place, as patient is likely still having spillage of bile from the perforated gallbladder. Recommend correlation with GI recommendations about possible replacement of stents and possibly pulled back through the perforated gallbladder. Additionally with reviewing CT imaging there has been reaccumulation of fluid and abscess formation is formed slightly anterior to the existing drain, and in the location of the previously removed drain. Additional subcutaneous abscess. Ultrasound evaluation was unable to a identify this deeper fluid collection. Patient will likely need CT drain placement into this fluid collection.     Impression: IMPRESSION: 1.  Decompressed cavity about the right upper quadrant drain with  spillage of contrast into the free intraperitoneal cavity with outlining bowel loops. No definite fistulous connection to bowel with no peristalsis. Contrast outlines the bowel loops and is not within the bowel loops. See above discussion with findings of recent CT imaging. 2.  Patient has developed new abscess cavity more anterior adjacent to the liver, in the location of the previously removed drain. Patient will require CT drain placement.    IR injection abscess catheter evaluation    Result Date: 7/19/2021  Narrative: Exam:  Abscess catheter check 07/19/2021 Clinical History: Routine check-up; Procedure/Views:  Utilizing fluoroscopy and digital imaging, the existing catheter was injected with contrast. Fluoroscopy time for the procedure was 0.3 minutes. 4 images are obtained. Comparison/s:  7/5/2021 Findings:  The catheter is in satisfactory position. Outputs recently from the catheter have been fairly low, 20 cc or less. Contrast injection shows immediate communication to the right colon. No significant flow into the stents draining the biliary system is present. No significant cavity is present surrounding the catheter.     Impression: IMPRESSION:  1. No significant residual cavity remains surrounding the pigtail the catheter. However, there is immediate contrast opacification of the right colon. The catheter needs to remain in place.    CT abscess cyst liver    Result Date: 8/11/2021  Narrative: Exam: CT-guided right upper abdominal abscess drain placement Clinical History:  Abdominal abscess Fluoroscopy time: 10 seconds of CT fluoroscopy time. Dose reduction technique utilized; the mA was adjusted based on patient size. Medication: Lidocaine 5 ml sq. Complications: None Technique: The patient was informed of the risks and benefits of this procedure and provided written informed consent. Patient placed in supine  position and CT scanning used to rocio the proper location.  The right upper abdomen was prepped  and draped in the usual sterile fashion, following the maximum sterile barrier technique; cap, mask, sterile gloves, large sterile sheet, hand hygiene, and antisepsis with 2% chlorhexidine (or acceptable alternative antiseptics per current guidelines) for cutaneous antisepsis were performed. During prepping the cutaneous abscess broke open, with significant drainage of bloody pus. Access site into the fluid collection was placed as far away from the subcutaneous abscess as possible. Lidocaine is used for local anesthesia.  After local anesthesia with lidocaine, CT guidance was used to place a 5 Mosotho Yueh needle into the right upper quadrant fluid collection. Wire was placed tract serially dilated and subsequently a 10 Mosotho Rodgers-Whitley drain was placed into the fluid collection. Ultimately 10 cc of bloody purulent material was aspirated and sent to lab for culture. Post procedure scanning showed decompressed abscess cavity.  The patient tolerated the procedure well and there was no immediate complication. Drain was secured to the skin surface with a single 2-0 Prolene suture.     Impression: IMPRESSION: Successful CT-guided  right upper abdominal abscess drain placement. Bloody purulent material was aspirated and sent to lab for culture. During prepping for drain placement, the cutaneous abscess broke open and was oozing.       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)    Essential hypertension    Hepatocellular carcinoma (CMS/HCC) (HCC)    Gastroesophageal reflux disease without esophagitis    Intractable right upper quadrant abdominal pain    Esophageal varices in cirrhosis (CMS/HCC) (HCC)    Obstructive sleep apnea    Hyperlipidemia    Perforated gallbladder      Other Chronic/Previous  Medical Issues:  None    Plan:  Right upper abdominal abscess status post incision and drainage with placement of the drain  Cellulitis-improving  Continue Unasyn 3 g every 6 hours via PICC line for a total of 4 weeks  Follow-up with infectious disease in clinic in 2 weeks    Perforation of gallbladder due to internalized biliary stent with spillage of bile  Nonoperative gallbladder  ERCP performed on 8/14 and 2 temporary stents were placed in the common bile duct and the gallbladder  CT scan of the abdomen tomorrow with IV contrast    Hepatocellular carcinoma   Chronic hepatitis C with cirrhosis  Portal vein thrombosis  History of ascites  Thrombocytopenia  Receiving chemotherapy with oncology  Continue eliquis  Continue lasix    Insulin dependent type 2 diabetes mellitus with recent hemoglobin A1C 7.2%  Continue diabetic diet, carb counting and SSI    Paroxysmal atrial fibrillation  Continue carvedilol and eliquis    GERD  Lansoprazole    Essential hypertension  Continue lisinopril    Chronic pain secondary to hepatocellular carcinoma  Palliative care consulted  Continue with MS contin, dilaudid and fentanyl as needed    Constipation  Continue senna and miralax    Insomnia  Continue trazodone    Pancytopenia  Continue to monitor    Nutristionist consulted- appreciate assistance.    Malnutrition Assessment:  Type: Protein calorie  Parameters for Malnutrition: Risk for malnutrition       Evaluation:             • Imaging:   • Labs: CBC, CMP, PT/INR  • Discharge planning     Additional Cares:  • Lines:Port  • [place]:None  • Telemetry:None  • DVT prophylaxis:Eliquis  • CODE STATUS: DNR  • Isolation:None  • Disposition:Most likely discharge home when outpatient IV antibiotics have been arranged.      Care plan discussed with patient and nurse. All questions answered. Please see orders.   Provider Inpatient Hospital Services Certification   Awaiting specialty consultation that can impact the therapeutic plan :  No  Awaiting specific diagnostic testing and/or results that can impact the therapeutic care plan: Yes  Current therapeutic care plan can only be provided in the hospital: Yes  Current anti-infective care plan requires IV antibiotics that currently can only be gien in the inpatient setting: Yes  Discharge expected in : 1-2 days  Predicted/Planned discharge to: home        Discussed with provider(s): Shalini SIGALA    Time spent: 15 minutes    Electronically signed by: Lucina Chahal MD  8/15/2021  5:11 PM    A voice recognition program was used to aid in documentation of this record. Sometimes words are not printed exactly as they were spoken.  While efforts were made to carefully edit and correct any inaccuracies, some errors may be present; please take these into context.  Please contact the provider if errors are identified.

## 2021-08-15 NOTE — PLAN OF CARE
Problem: Safety Adult - Fall  Goal: Free from fall injury  Description: INTERVENTIONS:    Inpatient - Please reference Cares/Safety Flowsheet under [facility] Fall Risk for interventions.  Pediatrics - Please reference Peds Daily Cares/Safety Flowsheet under [place] Pediatric Fall Assessment Fall Bundle for interventions  LD/OB - Please reference OB Shift Screening Flowsheet under OB Fall Risk for interventions.  Outcome: Progressing     Problem: Gastrointestinal - Adult  Goal: Minimal or absence of nausea and vomiting  Description: INTERVENTIONS:  1. Ensure adequate hydration  2. Monitor intake and output  3. Maintain NPO status until nausea and vomiting are resolved  4. Nasogastric tube to low intermittent suction as ordered  5. Administer ordered antiemetic medications as needed  6. Provide nonpharmacologic comfort measures as appropriate  7. Advance diet as ordered  8. Nutrition consult as indicated   Outcome: Progressing  Goal: Maintains or returns to baseline digestive function  Description: INTERVENTIONS:  1. Assess bowel function  2. Ensure adequate hydration  3. Administer ordered medications as needed  4. Encourage mobilization and activity  5. Nutrition consult as indicated  6. Assess hydration and nutritional status  7. Assess characteristics and frequency of stool  8. Monitor for metabolic panel imbalances  9. Assess for treatment effectiveness  Outcome: Progressing     Problem: Skin/Tissue Integrity - Adult  Goal: Skin integrity remains intact  Description: INTERVENTIONS  1. Assess and document risk factors for pressure ulcer development  2. Assess and document skin integrity  3. Monitor for areas of redness and/or skin breakdown  4. Initiate pressure ulcer prevention measures as indicated  Outcome: Progressing     Problem: Hematologic - Adult  Goal: Maintains hematologic stability  Description: INTERVENTIONS  1. Assess for signs and symptoms of bleeding or hemorrhage  2. Monitor labs  3. Administer  supportive blood products/factors as ordered and appropriate  4. Administer medications as ordered  5. Initiate bleeding precautions as indicated  6. Educate patient/family to report signs/symptoms of bleeding  Outcome: Progressing     Problem: Infection - Adult  Goal: Absence of infection during hospitalization  Description: INTERVENTIONS:  1. Assess and monitor for signs and symptoms of infection  2. Monitor lab/diagnostic results  3. Monitor all insertion sites/wounds/incisions  4. Monitor secretions for changes in amount and color  5. Administer medications as ordered  6. Educate and encourage patient and family to use good hand hygiene technique  7. Identify and educate in appropriate isolation precautions for identified infection/condition  Outcome: Progressing     Problem: Safety Adult  Goal: Patient will remain safe during hospitalization  Description: INTERVENTIONS    1. Assess patient for fall risk and implement interventions if needed  2. Use safe transport techniques  3. Assess patient using the appropriate Javon skin assessment scale  4. Assess patient for risk of aspiration  5. Assess patient for risk of elopement  6. Assess patient for risk of suicide  Outcome: Progressing     Problem: Daily Care  Goal: Daily care needs are met  Description: INTERVENTIONS:   1. Assess and monitor skin integrity  2. Identify patients at risk for skin breakdown on admission and per policy  3. Assess and monitor ability to perform self care and identify potential discharge needs  4. Assess skin integrity/risk for skin breakdown  5. Assist patient with activities of daily living as needed  6. Encourage independent activity per ability   7. Provide mouth care   8. Include patient/family/caregiver in decisions related to daily care   Outcome: Progressing

## 2021-08-15 NOTE — NURSING END OF SHIFT
Nursing End of Shift Summary:    Patient: Ben Lai Sr  MRN: [id number]  : 1950, Age: 71 y.o.    Location: [address] [place] [address]    Nursing Goals  Clinical Goals for the Shift: pt will remain safe and comfortable, monitor vitals, I/O and labs, monitor KYUNG drain, manage pain, ambulate without complications    Narrative Summary of Progress Toward Clinical Goals:  Pt remained safe and comfortable. VSS. No Bm this shift. Adequate urine output. No BM this shift.  Pain controlled with  Scheduled MS contin. Ambulated without complications. Good appetite. Abx given.     Barriers to Goals/Nursing Concerns:  No    New Patient or Family Concerns/Issues:  No    Shift Summary:      Significant Events & Communications to Providers (last 12 hours)      Last 5 Values    No documentation.              Oxygen Usage (last 12 hours)      Last 5 Values     Row Name 08/14/21 1945 08/15/21 040                Oxygen Weaning Trial by Nursing    Is Patient on Room Air OR on the Same Amount of O2 as at Home?  No  No       Are You Performing the [facility] O2 Weaning Trial?  Yes  Yes                  Mobility (last 12 hours)      Last 5 Values     Row Name 21                Mobility    Activity  Ambulate in room;Bathroom privileges;Turn  Ambulate in room;Bathroom privileges       Level of Assistance  Minimal assist, patient does 75% or more  --       Anti-Embolism Devices Applied  Bilateral;AE calf pump  --           Urethral Catheter    Active Urethral Catheter     None            Active Lines    Active Central venous catheter / Peripherally inserted central catheter / Implantable Port / Hemodialysis catheter / Midline Catheter     Name:   Placement date:   Placement time:   Site:   Days:    Single Lumen Implantable Port 20 Right Chest   20    Chest   226              Infusing Medications   Medication Dose Last Rate     PRN Medications   Medication Dose Last Admin   • ondansetron ODT  4 mg     •  ondansetron  4 mg     • carboxymethylcellulose  2 drop     • insulin aspart  0-25 Units     • sodium chloride  3 mL     • acetaminophen  325-650 mg     • fentaNYL citrate (PF)  25 mcg 25 mcg at 08/13/21 1832   • bisacodyL  10 mg     • magnesium hydroxide  30 mL     • HYDROmorphone  2-4 mg 2 mg at 08/14/21 1443   • lactulose  10 g 10 g at 08/12/21 0904   • dextrose 50 % in water (D50W)  15-30 mL     • dextrose  15.2 g     • glucagon  1 mg      Or   • glucagon  1 mg     • sodium chloride 0.9% (NS)  25-50 mL Stopped at 08/15/21 0210     _________________________  Debbie Agrawal RN  08/15/21 6:05 AM

## 2021-08-15 NOTE — ANESTHESIA POSTPROCEDURE EVALUATION
Patient: Ben Lai Sr    Procedure Summary     Date: 08/14/21 Room / Location: [facility] END[facility] 01 / [facility]    Anesthesia Start: 0932 Anesthesia Stop: 1111    Procedure: ENDOSCOPIC RETROGRADE CHOLANGIOPANCREATOGRAPHY with stent removal,  balloon sweep, transcystic drainage of the gall bladder, and stent placement (N/A Esophagus) Diagnosis:       Perforated gallbladder      (cholecystitis, biliary leak)    Providers: Sabas Heller MD Responsible Provider: Daniel Aleman MD    Anesthesia Type: general ASA Status: 4          Anesthesia Type: general    Last vitals  Vitals Value Taken Time   /73 08/14/21 1145   Temp 36.1 °C (97 °F) 08/14/21 1145   Pulse 62 08/14/21 1200   Resp 16 08/14/21 1145   SpO2 93 % 08/14/21 1200   0-10 Pain Score 0 - No pain 08/14/21 1145       Anesthesia Post Evaluation    Patient location during evaluation: PACU  Patient participation: complete - patient participated  Level of consciousness: awake and alert  Pain management: adequate  Airway patency: patent  Anesthetic complications: no  Cardiovascular status: acceptable  Respiratory status: acceptable  Hydration status: acceptable  May dismiss recovered patient based on consultation with the appropriate physicians and/or meeting appropriate discharge criteria      Cosmetic?  This procedure is not cosmetic.

## 2021-08-15 NOTE — PROGRESS NOTES
[facility]   Hospitalist Services  [address] [place] [address]       HOSPITALIST PROGRESS NOTE    Date of Service: 8/14/2021    Time of Service: 1300    Patient name: Ben Lai Sr  MRN: [id number]   LOS: 3 days     Subjective   Patient seen and examined. Discussed with nurse. Chart reviewed.  Jakub states that he is doing okay at this time.  I discussed with him and his wife at the bedside that at this time the care for his gallbladder and liver will need more specialized care than what we are able to provide in the [place] area.  It was recommended by GI that he follows closely with his [facility] team and also possibly with the [facility] who comes monthly to the [facility].  I discussed with him about the procedure and ERCP findings and they would also like to discuss this with the GI team.    Review of Systems   Constitutional: Negative for appetite change, fatigue and fever.   Gastrointestinal: Negative for abdominal pain, nausea and vomiting.   All other systems reviewed and are negative.      Objective     Temp:  [36.1 °C (97 °F)-37.5 °C (99.5 °F)] 36.5 °C (97.7 °F)  Heart Rate:  [57-95] 80  Resp:  [9-19] 15  BP: (120-185)/(55-80) 126/66    I/O last 3 completed shifts:  In: 2349.5 [P.O.:970; I.V.:740; IV Piggyback:639.5]  Out: 1467 [Urine:1400; Drains:66; Stool:1]    No intake/output data recorded.    Physical Exam  Vitals and nursing note reviewed. Exam conducted with a chaperone present (Wife Laura at bedside).   Constitutional:       General: He is not in acute distress.     Appearance: Normal appearance. He is normal weight. He is not ill-appearing.   Eyes:      General: No scleral icterus.  Cardiovascular:      Rate and Rhythm: Normal rate and regular rhythm.      Pulses: Normal pulses.      Heart sounds: Normal heart sounds. No murmur heard.   No gallop.    Pulmonary:      Effort: Pulmonary effort is normal. No  respiratory distress.      Breath sounds: Normal breath sounds. No stridor. No wheezing, rhonchi or rales.   Abdominal:      General: There is no distension.      Palpations: Abdomen is soft. There is no mass.      Tenderness: There is abdominal tenderness. There is no guarding.      Hernia: No hernia is present.      Comments: Round, right upper quadrant tenderness, 2 KYUNG drains in place draining dark reddish fluid   Skin:     General: Skin is warm and dry.      Capillary Refill: Capillary refill takes less than 2 seconds.   Neurological:      General: No focal deficit present.      Mental Status: He is alert and oriented to person, place, and time. Mental status is at baseline.   Psychiatric:         Behavior: Behavior normal.         Thought Content: Thought content normal.         Judgment: Judgment normal.      Comments: Jakub appears depressed and tearful         Medications:     Current Facility-Administered Medications:   •  ondansetron ODT (ZOFRAN-ODT) disintegrating tablet 4 mg, 4 mg, oral, q8h PRN, Lucina Chahal MD  •  ondansetron (ZOFRAN) injection 4 mg, 4 mg, intravenous, q8h PRN, Lucina Chahal MD  •  insulin aspart U-100 (NovoLOG) injection pen (correction dose) 0-15 Units, 0-15 Units, subcutaneous, Insulin: 4x daily with meals, Lucina Chahal MD  •  carboxymethylcellulose (REFRESH PLUS) 0.5 % ophthalmic solution 2 drop, 2 drop, Both Eyes, 3x daily PRN, Lucina Chahal MD  •  enoxaparin (LOVENOX) syringe 40 mg, 40 mg, subcutaneous, Daily at 1400, Lucina Chahal MD, 40 mg at 08/14/21 1347  •  insulin aspart (NovoLOG) injection pen (mealtime/snack insulin) 0-25 Units, 0-25 Units, subcutaneous, Insulin: 3x daily with meals, Lucina Chahal MD, 3 Units at 08/14/21 1627  •  insulin aspart (NovoLOG) injection pen (mealtime/snack insulin) 0-25 Units, 0-25 Units, subcutaneous, PRN, Lucina Chahal MD  •  Insert peripheral IV, , , Once **AND** Maintain IV access, , , Until discontinued **AND**  Saline lock IV, , , Once **AND** sodium chloride flush 3 mL, 3 mL, intravenous, PRN, Kayy Francois CNP  •  acetaminophen (TYLENOL) tablet 325-650 mg, 325-650 mg, oral, q4h PRN, Kayy Francois CNP  •  fentaNYL citrate (PF) 50 mcg/mL injection 25 mcg, 25 mcg, intravenous, q2h PRN, Kayy Francois CNP, 25 mcg at 08/13/21 1832  •  sennosides-docusate sodium (SENNA PLUS) 8.6-50 mg 1 tablet, 1 tablet, oral, 2x daily, Kayy Francois CNP, 1 tablet at 08/14/21 0837  •  bisacodyL (DULCOLAX) suppository 10 mg, 10 mg, rectal, Daily PRN, Kayy Francois CNP  •  magnesium hydroxide (MILK OF MAGNESIA) 400 mg/5 mL oral suspension 30 mL, 30 mL, oral, Daily PRN, Kayy Francois CNP  •  polyethylene glycol (GLYCOLAX) powder 17 g, 17 g, oral, Daily, Kayy Francois CNP, 17 g at 08/12/21 0904  •  ampicillin-sulbactam (UNASYN) 3,000 mg in normal saline 100 mL IVPB - MBP, 3,000 mg, intravenous, q6h, Lucina Chahal MD, Last Rate: 220 mL/hr at 08/14/21 1804, 3,000 mg at 08/14/21 1804  •  buPROPion SR (WELLBUTRIN SR) 12 hr tablet 100 mg, 100 mg, oral, Daily, Kayy Francois CNP, 100 mg at 08/14/21 0830  •  carvediloL (COREG) tablet 3.125 mg, 3.125 mg, oral, 2x daily with meals, Kayy Francois CNP, 3.125 mg at 08/14/21 1802  •  loratadine (CLARITIN) tablet 10 mg, 10 mg, oral, Daily, Kayy Francois CNP, 10 mg at 08/14/21 0830  •  fluticasone propionate (FLONASE) 50 mcg/actuation nasal spray 2 spray, 2 spray, Each Nostril, Daily, Kayy Francois CNP, 2 spray at 08/14/21 0838  •  HYDROmorphone (DILAUDID) tablet 2-4 mg, 2-4 mg, oral, q6h PRN, Kayy Francois CNP, 2 mg at 08/14/21 1443  •  lactulose (CHRONULAC) 20 gram/30 mL solution 10 g, 10 g, oral, 2x daily PRN, Kayy Francois CNP, 10 g at 08/12/21 0904  •  lansoprazole (PREVACID) capsule 30 mg, 30 mg, oral, Daily, Kayy Francois CNP, 30 mg at 08/14/21 0837  •  lisinopriL (PRINIVIL,ZESTRIL) tablet 20 mg, 20 mg, oral, Daily,  Kayy Francois CNP, 20 mg at 08/14/21 0837  •  morphine (MS CONTIN) 12 hr tablet 15 mg, 15 mg, oral, q12h RONAL, Kayy Francois CNP, 15 mg at 08/14/21 0837  •  terazosin (HYTRIN) capsule 5 mg, 5 mg, oral, 2x daily, Kayy Francois CNP, 5 mg at 08/14/21 0837  •  traZODone (DESYREL) tablet 100 mg, 100 mg, oral, Nightly, Kayy Francois CNP, 100 mg at 08/13/21 2038  •  dextrose 50 % in water (D50W) syringe 15-30 mL, 15-30 mL, intravenous, q15 min PRN, aKyy Francois CNP  •  dextrose (GLUTOSE) 40 % gel 15.2 g, 15.2 g, oral, q15 min PRN, Kayy Francosi CNP  •  glucagon (GLUCAGEN) injection 1 mg, 1 mg, intramuscular, q15 min PRN **OR** glucagon (GLUCAGEN) injection 1 mg, 1 mg, subcutaneous, q15 min PRN, Kayy Francois CNP  •  furosemide (LASIX) tablet 20 mg, 20 mg, oral, 2x daily diuretic, Lucina Chahal MD, 20 mg at 08/14/21 1444  •  sodium chloride 0.9% (NS) carrier flush 25-50 mL, 25-50 mL, intravenous, PRN, Lucina Chahal MD, Last Rate: 25 mL/hr at 08/14/21 1800, Rate Verify at 08/14/21 1800  •  sodium chloride flush 2 mL, 2 mL, intra-catheter, Daily, Lucina Chahal MD, 2 mL at 08/14/21 0839    Results from last 4 days   Lab Units 08/14/21  0407 08/12/21  0338 08/11/21  1028   WBC AUTO 10*3/uL 2.0* 3.1* 3.0*   HEMOGLOBIN g/dL 10.5* 10.9* 11.7*   HEMATOCRIT % 32.2* 32.5* 35.5*   PLATELETS AUTO 10*3/uL 101* 103* 106*       Results from last 4 days   Lab Units 08/14/21  0408 08/12/21  0338 08/11/21  1026   SODIUM mmol/L 137 138 134*   POTASSIUM mmol/L 3.3* 3.7 4.0   CHLORIDE mmol/L 104 106 101   CO2 mmol/L 26 24 27   BUN mg/dL 7 8 8   CREATININE mg/dL 0.50* 0.50* 0.56*   CALCIUM mg/dL 7.9* 7.8* 8.5*   TOTAL PROTEIN g/dL 5.4* 5.6* 6.3   BILIRUBIN TOTAL mg/dL 0.67 1.02 1.12   ALK PHOS U/L 148* 154* 167*   ALT U/L 9 11 13   AST U/L 23 21 24   GLUCOSE mg/dL 96 108* 166*       Imaging:   MR abdomen with and without contrast    Result Date: 7/29/2021  Narrative: EXAM: MR ABDOMEN W AND WO  CONTRAST 07/29/2021 CLINICAL HISTORY: Hepatocellular carcinoma (CMS/HCC) (HCC); Neoplasm: liver or bile duct TECHNIQUE: Multiplanar, multisequence MRI of the abdomen without and with 17 mL of ProHance intravenous contrast. Single use contrast vial. 0 mL of contrast was discarded. Axial and coronal T2, axial T2 with fat suppression, axial in and opposed phase gradient echo, axial diffusion weighted, and axial pre and postcontrast T1-weighted sequences were performed. COMPARISON: Abdominal MRI 5/5/2021. FINDINGS: Nodular liver contour and irregular liver morphology, compatible with hepatic cirrhosis. Irregular reticular T2 hyperintensity throughout the liver parenchyma, compatible with fibrosis. Redemonstration of capsular retraction with focal fibrosis in the anterolateral aspect of the right hepatic lobe, unchanged. Two subhepatic drains remained in place. No discrete residual subhepatic collection remains. Heterogenous contrast enhancement of the right hepatic lobe, similar to prior examination. No new foci of abnormal T2 signal hyperintensity, contrast enhancement, or diffusion restriction. No lymphadenopathy in the upper abdomen. Findings compatible with portal hypertension, including recanalization of the umbilical vein, moderate splenomegaly measuring 18.8 cm on coronal images, splenorenal shunt, and multiple subcutaneous varices in the ventral abdominal wall. Small perihepatic and perisplenic upper abdominal ascites. Mild scattered mesenteric edema throughout the abdomen. Thrombosis of the right portal vein is demonstrated. This was also seen on the prior MRI in retrospect, however now thrombosis extends from near the intrahepatic portal vein bifurcation and extends distally throughout the right portal vein. Filling defects are demonstrated within the common bile duct on axial T2-weighted images (series 401, images 28-33), suggestive of choledocholithiasis, unchanged. No intrahepatic and extra hepatic biliary  ductal dilatation, stable. Normal pancreas. No pancreatic ductal dilatation. Normal adrenal glands. Small calyceal diverticulum in the right kidney. Kidneys are otherwise unremarkable. No solid renal mass or hydronephrosis. Visualized loops of small and large bowel are normal in caliber. Bone marrow signal is normal. Trace bilateral pleural effusions. Visualized lung bases are otherwise unremarkable, however lung parenchyma is inadequately evaluated on MRI.     Impression: IMPRESSION: 1.  Stable appearance of the liver. No evidence of new or progressive malignancy. 2.  Two subhepatic drains remain in place. No discrete focal fluid collection. 3.  Right portal vein thrombosis. 4.  Sequelae of portal hypertension including recanalization of the umbilical vein, moderate splenomegaly, and small upper abdominal ascites. 5.  Filling defects in the common bile duct, suggestive of choledocholithiasis, unchanged.    CT ABDOMEN PELVIS W IV CONTRAST    Result Date: 8/11/2021  Narrative: NOTE: This study was received from an outside source for PACS Storage.    FL ERCP 1 duct    Result Date: 8/14/2021  Narrative: EXAM: FL ERCP 1 DUCT 08/14/2021 CLINICAL HISTORY:  perforation of gallbladder TECHNIQUE: 17 intraoperative fluoroscopic cholangiogram images submitted for interpretation. Fluoroscopy time: 14.4 minutes Number of fluoroscopy images: 17 fluoroscopic images submitted for interpretation. COMPARISON: CT abdomen and pelvis 8/10/2021. FINDINGS: Limited by intraoperative technique with portable fluoroscopy.  Two separate pigtail drainage catheters enter the right flank. Images demonstrate a stent extending from the gallbladder through the common bile duct and into the duodenum. Endoscopy performed, with opacification of the biliary tree. Stents removed, and a single new stent placed, extending from the gallbladder fossa through the common bile duct and into the duodenum. No intrahepatic or extrahepatic biliary ductal  dilatation.     Impression: IMPRESSION: Exchange of stent extending from the gallbladder through the common bile duct and into the duodenum.     IR injection abscess catheter evaluation    Result Date: 8/11/2021  Narrative: Exam: Drain check 08/11/2021 Clinical history:  abscess/routine checkup. Procedure/Views: Fluoroscopic time 1.5 minutes images 9 Comparison/s: Outside CT abdomen pelvis 8/10/2021 Findings:  view demonstrates stable positioning of the right upper quadrant drain. Adjacent internalized biliary stents with proximal aspect of the stent in the gallbladder. Injection of contrast demonstrated decompressed cavity about the pigtail loop, with contrast seen to flow in the free intraperitoneal cavity and outlining bowel loops. No definite fistulous connection to adjacent bowel with no evidence of peristalsis. Again contrast appears to outline the bowel loops and is not within the bowel loops. When comparing outside CT imaging this drain appears to be adjacent to the gallbladder and does not appear to be within the gallbladder lumen. The internalized biliary stents appears to be perforated through the gallbladder. Given patient will have up to 30 cc of output a day, recommend leaving drain in place, as patient is likely still having spillage of bile from the perforated gallbladder. Recommend correlation with GI recommendations about possible replacement of stents and possibly pulled back through the perforated gallbladder. Additionally with reviewing CT imaging there has been reaccumulation of fluid and abscess formation is formed slightly anterior to the existing drain, and in the location of the previously removed drain. Additional subcutaneous abscess. Ultrasound evaluation was unable to a identify this deeper fluid collection. Patient will likely need CT drain placement into this fluid collection.     Impression: IMPRESSION: 1.  Decompressed cavity about the right upper quadrant drain with spillage  of contrast into the free intraperitoneal cavity with outlining bowel loops. No definite fistulous connection to bowel with no peristalsis. Contrast outlines the bowel loops and is not within the bowel loops. See above discussion with findings of recent CT imaging. 2.  Patient has developed new abscess cavity more anterior adjacent to the liver, in the location of the previously removed drain. Patient will require CT drain placement.    IR injection abscess catheter evaluation    Result Date: 7/19/2021  Narrative: Exam:  Abscess catheter check 07/19/2021 Clinical History: Routine check-up; Procedure/Views:  Utilizing fluoroscopy and digital imaging, the existing catheter was injected with contrast. Fluoroscopy time for the procedure was 0.3 minutes. 4 images are obtained. Comparison/s:  7/5/2021 Findings:  The catheter is in satisfactory position. Outputs recently from the catheter have been fairly low, 20 cc or less. Contrast injection shows immediate communication to the right colon. No significant flow into the stents draining the biliary system is present. No significant cavity is present surrounding the catheter.     Impression: IMPRESSION:  1. No significant residual cavity remains surrounding the pigtail the catheter. However, there is immediate contrast opacification of the right colon. The catheter needs to remain in place.    CT abscess cyst liver    Result Date: 8/11/2021  Narrative: Exam: CT-guided right upper abdominal abscess drain placement Clinical History:  Abdominal abscess Fluoroscopy time: 10 seconds of CT fluoroscopy time. Dose reduction technique utilized; the mA was adjusted based on patient size. Medication: Lidocaine 5 ml sq. Complications: None Technique: The patient was informed of the risks and benefits of this procedure and provided written informed consent. Patient placed in supine  position and CT scanning used to rocio the proper location.  The right upper abdomen was prepped and  draped in the usual sterile fashion, following the maximum sterile barrier technique; cap, mask, sterile gloves, large sterile sheet, hand hygiene, and antisepsis with 2% chlorhexidine (or acceptable alternative antiseptics per current guidelines) for cutaneous antisepsis were performed. During prepping the cutaneous abscess broke open, with significant drainage of bloody pus. Access site into the fluid collection was placed as far away from the subcutaneous abscess as possible. Lidocaine is used for local anesthesia.  After local anesthesia with lidocaine, CT guidance was used to place a 5 [place]erratian Yueh needle into the right upper quadrant fluid collection. Wire was placed tract serially dilated and subsequently a 10 [place]erratian Rodgers-Whitley drain was placed into the fluid collection. Ultimately 10 cc of bloody purulent material was aspirated and sent to lab for culture. Post procedure scanning showed decompressed abscess cavity.  The patient tolerated the procedure well and there was no immediate complication. Drain was secured to the skin surface with a single 2-0 Prolene suture.     Impression: IMPRESSION: Successful CT-guided  right upper abdominal abscess drain placement. Bloody purulent material was aspirated and sent to lab for culture. During prepping for drain placement, the cutaneous abscess broke open and was oozing.       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 Myers in clinic in 2 weeks  Ordered placed for aspiration cultures during ERCP have not been collected    Perforation of gallbladder due to internalized biliary stent with spillage of bile  Nonoperative gallbladder  ERCP performed today and 2 temporary stents placed in the common bile duct and the gallbladder  Recommendation is for CT of the abdomen and pelvis with IV contrast on Monday    Hepatocellular carcinoma  Chronic hepatitis C with cirrhosis  Portal vein thrombosis  History of ascites  Thrombocytopenia  Receiving chemotherapy with oncology  We will start Eliquis tomorrow morning  Continue DVT prophylaxis  Continue Lasix    Insulin-dependent type 2 diabetes mellitus with a recent hemoglobin A1c of 7.2%  Continue diabetic diet    Paroxysmal atrial fibrillation  Continue carvedilol  Eliquis on hold    GERD  Continue lansoprazole    Essential hypertension  Continue lisinopril    Chronic pain secondary to hepatocellular carcinoma  Palliative care consulted  Continue with MS Contin, Dilaudid and fentanyl as needed    Constipation  Continue senna and MiraLAX    Insomnia  Continue trazodone    Pancytopenia  Continue to monitor          Nutristionist consulted- appreciate assistance.    Malnutrition Assessment:  Type: Protein calorie  Parameters for Malnutrition: Risk for malnutrition       Evaluation:             • Imaging: CT scan of the abdomen with IV contrast on Monday  • Labs: CBC and CMP in the morning  • Discharge planning     Additional Cares:  • Lines: Port, peripheral IV and 2 abdominal drains  • [place]: None  • Telemetry: None  • DVT prophylaxis: Lovenox  • CODE STATUS:DNR  • Isolation:None  • Disposition:Continue IV antibiotics and will order CT scan of the abdomen for Monday.      Care  plan discussed with patient and nurse. All questions answered. Please see orders.   Provider Inpatient Hospital Services Certification   Awaiting specialty consultation that can impact the therapeutic plan : No  Awaiting specific diagnostic testing and/or results that can impact the therapeutic care plan: Yes  Current therapeutic care plan can only be provided in the hospital: Yes  Current anti-infective care plan requires IV antibiotics that currently can only be gien in the inpatient setting: Yes  Discharge expected in : 3-4 days  Predicted/Planned discharge to: home        Discussed with provider(s): Santiago Monreal RN    Time spent: 20 minutes    Electronically signed by: Lucina Chahal MD  8/14/2021  6:35 PM    A voice recognition program was used to aid in documentation of this record. Sometimes words are not printed exactly as they were spoken.  While efforts were made to carefully edit and correct any inaccuracies, some errors may be present; please take these into context.  Please contact the provider if errors are identified.

## 2021-08-16 NOTE — PLAN OF CARE
Problem: Safety Adult - Fall  Goal: Free from fall injury  Description: INTERVENTIONS:    Inpatient - Please reference Cares/Safety Flowsheet under [facility] Fall Risk for interventions.  Pediatrics - Please reference Peds Daily Cares/Safety Flowsheet under [facility] Pediatric Fall Assessment Fall Bundle for interventions  LD/OB - Please reference OB Shift Screening Flowsheet under OB Fall Risk for interventions.  Outcome: Progressing     Problem: Gastrointestinal - Adult  Goal: Minimal or absence of nausea and vomiting  Description: INTERVENTIONS:  1. Ensure adequate hydration  2. Monitor intake and output  3. Maintain NPO status until nausea and vomiting are resolved  4. Nasogastric tube to low intermittent suction as ordered  5. Administer ordered antiemetic medications as needed  6. Provide nonpharmacologic comfort measures as appropriate  7. Advance diet as ordered  8. Nutrition consult as indicated   Outcome: Progressing  Goal: Maintains or returns to baseline digestive function  Description: INTERVENTIONS:  1. Assess bowel function  2. Ensure adequate hydration  3. Administer ordered medications as needed  4. Encourage mobilization and activity  5. Nutrition consult as indicated  6. Assess hydration and nutritional status  7. Assess characteristics and frequency of stool  8. Monitor for metabolic panel imbalances  9. Assess for treatment effectiveness  Outcome: Progressing  Goal: Maintains adequate nutritional intake  Description: INTERVENTIONS:  1. Monitor percentage of each meal consumed  2. Identify factors contributing to decreased intake, treat as appropriate  3. Assist with meals as needed  4. Monitor I&O, weight and lab values  5. Obtain nutritional consult as indicated  6. Administer alternative nutrition interventions as ordered  Outcome: Progressing     Problem: Skin/Tissue Integrity - Adult  Goal: Skin integrity remains intact  Description: INTERVENTIONS  1. Assess and document risk factors for  pressure ulcer development  2. Assess and document skin integrity  3. Monitor for areas of redness and/or skin breakdown  4. Initiate pressure ulcer prevention measures as indicated  Outcome: Progressing  Goal: Incisions, wounds, or drain sites healing without S/S of infection  Description: INTERVENTIONS  1. Assess and document risk factors for skin breakdown  2. Assess and document skin integrity  3. Assess and document dressing/incision, wound bed, drain sites and surrounding tissue  4. Implement wound care per orders  Outcome: Progressing     Problem: Hematologic - Adult  Goal: Maintains hematologic stability  Description: INTERVENTIONS  1. Assess for signs and symptoms of bleeding or hemorrhage  2. Monitor labs  3. Administer supportive blood products/factors as ordered and appropriate  4. Administer medications as ordered  5. Initiate bleeding precautions as indicated  6. Educate patient/family to report signs/symptoms of bleeding  Outcome: Progressing     Problem: Infection - Adult  Goal: Absence of infection during hospitalization  Description: INTERVENTIONS:  1. Assess and monitor for signs and symptoms of infection  2. Monitor lab/diagnostic results  3. Monitor all insertion sites/wounds/incisions  4. Monitor secretions for changes in amount and color  5. Administer medications as ordered  6. Educate and encourage patient and family to use good hand hygiene technique  7. Identify and educate in appropriate isolation precautions for identified infection/condition  Outcome: Progressing     Problem: Safety Adult  Goal: Patient will remain safe during hospitalization  Description: INTERVENTIONS    1. Assess patient for fall risk and implement interventions if needed  2. Use safe transport techniques  3. Assess patient using the appropriate Javon skin assessment scale  4. Assess patient for risk of aspiration  5. Assess patient for risk of elopement  6. Assess patient for risk of suicide  Outcome: Progressing      Problem: Daily Care  Goal: Daily care needs are met  Description: INTERVENTIONS:   1. Assess and monitor skin integrity  2. Identify patients at risk for skin breakdown on admission and per policy  3. Assess and monitor ability to perform self care and identify potential discharge needs  4. Assess skin integrity/risk for skin breakdown  5. Assist patient with activities of daily living as needed  6. Encourage independent activity per ability   7. Provide mouth care   8. Include patient/family/caregiver in decisions related to daily care   Outcome: Progressing     Problem: Mobility  Goal: LTG - Patient will ascend and descend stairs  Description: X7 with  B HR and SBA  Outcome: Progressing

## 2021-08-16 NOTE — INTERDISCIPLINARY/THERAPY
Spiritual Care Service:     08/16/21 5641   Clinical Encounter Type   Referral By: Rounds   Visited With Patient   Visited With Other Individuals Significant other/spouse   Visit Type Initial   Quaker Affilation   Affiliated with [facility] Yes   Current [facility] Affiliation Hinduism   Spiritual/Emotional Distress   Level Low   Plan of Care   Spiritual Care Plan Initiated Provide spiritual support as needed   Spiritual Assessment   Completed by    Tanika Beliefs Believes in a Higher Power   Relationships Spouse/Significant Other is a support   Hope Is a source of spiritual strength   Spiritual Interventions   Spiritual/Quaker Interventions Prayer provided;[place]/Ritual provided   Emotional/Spiritual Interventions Empathic and Reflective listening provided   Family Spiritual Care Encounters   Family Coping Acceptance   Service available, prn.

## 2021-08-16 NOTE — INTERDISCIPLINARY/THERAPY
08/16/21 1019   Time Calculation   Start Time 1019   Stop Time 1027   Time Calculation (min) 8 min   OT Last Visit   OT Received On 08/16/21   General   Chart Reviewed Yes   Therapy Treatment Diagnosis s/p endoscopic retrograde cholangiopancreatography   OT Treatment Duration (Min) 8 Minutes   Is this a Co-Treatment? No   Additional Pertinent History see chart   Family/Caregiver Present Yes  (wife )   Precautions   Reinforced Precautions Yes   Other Precautions fall risk, KYUNG drains x2    Home Living   Type of Home House   Home Layout Two level   Alternate Level Stairs-Rails Both   Alternate Level Stairs-Number of Steps split level 7 + 7   Bathroom Shower/Tub Walk-in shower   Bathroom Toilet Handicapped height   Bathroom Equipment Built-in shower seat;Raised toilet seat without rails   Home Adaptive Equipment 4 Wheel walker   Home Living Comments Pt lives near [place], [place] with wife    Prior Function   Level of [place] Independent with ADLs and functional transfers;Needs assistance with homemaking   Lived With Spouse   Receives Help From Family   ADL Assistance Independent   IADL Assistance Needs assistance   Prior Function Comments Pt reports PLOF as (I) with ADLs and wife assist with IADLs    Subjective Comments   RN Stated patient is medically cleared for therapy Yes   Subjective Comments RN ok'd eval for OT. Upon arrival, pt in a supine position in bed in bed. Pt reports he needs to use the restroom. At the end of the tx session. Pt steated on toilet with call light in reach. RN aware of session outcomes.    Pain Assessment Scale   0-10 Pain Score 0 - No pain   Cognition   Arousal/Alertness WFL   Cognition Comment appropriate to task and conversation    Bed Mobility   Bed Mobility Assessed   Bed Mobility 1   Bed Mobility From 1 Supine   Bed Mobility Type 1 To   Bed Mobility to 1 Short sit   Level of Assistance 1 Standby assistance   Bed Mobility Comments 1 HOB elevated    Transfers   Transfer Assessed    Transfer 1   Transfer From 1 Sit;Bed   Transfer Type 1 To   Transfer to 1 Stand;Sit;Toilet   Technique 1 Sit to stand;Stand to sit   Transfer Device 1 4 Wheel walker;Transfer belt   Level of Assistance 1 Contact guard assist x 1   Trials/Comments 1 CGA    Ambulation   Ambulation Assessed   Ambulation 1   Surface 1 Level surface;Smooth   Device 1 4 Wheel walker;Gait belt   Assistance 1 Contact guard assist x 1   Quality of Gait 1 slow but steady    Comments/Distance 1 4m    Toileting   Toileting Bladder Incontinence No   Toileting Bowel Incontinence No   Toileting Level of Assistance Contact guard   Where Assessed Toilet   Toileting Comments pt able to manage brief and sit on toilet. Pt verbalized understanding of using call light. RN aware of session outcomes.    RUE Assessment   RUE Assessment WFL   LUE Assessment   LUE Assessment WFL   Hand Function   Gross Grasp R Functional;L Functional   Assessment   Rehab Potential Good   Progress   (eval this date)   Problem List Decreased ADL status;Decreased safe judgment during ADL;Decreased endurance;Decreased functional mobility;Decreased IADLs;Decreased gross motor control   Recommendations for Therapy Continue skilled therapy   Assessment Comment Pt demo'd limitations with ADLs and fxal mobility. Pt to benefit from skilled OT services to maximize fxal independence and ensure a safe d/c.   OT Untimed Charges - Quick List (Time Spent in Minutes)   OT Eval Low Complexity 8   Plan   Plan Comment 1 assist, fxal endurance, ADLs sinkside    Treatment Interventions ADL retraining;Therapeutic activity;Therapeutic exercise;Endurance training;UE strengthening/ROM;Patient/family training;Equipment evaluation/education;Compensatory technique education   OT Frequency 3-5x/wk   Date POC Due 08/23/21

## 2021-08-16 NOTE — PLAN OF CARE
Problem: Safety Adult - Fall  Goal: Free from fall injury  Description: INTERVENTIONS:    Inpatient - Please reference Cares/Safety Flowsheet under [facility] Fall Risk for interventions.  Pediatrics - Please reference Peds Daily Cares/Safety Flowsheet under [facility] Pediatric Fall Assessment Fall Bundle for interventions  LD/OB - Please reference OB Shift Screening Flowsheet under OB Fall Risk for interventions.  Outcome: Progressing     Problem: Gastrointestinal - Adult  Goal: Minimal or absence of nausea and vomiting  Description: INTERVENTIONS:  1. Ensure adequate hydration  2. Monitor intake and output  3. Maintain NPO status until nausea and vomiting are resolved  4. Nasogastric tube to low intermittent suction as ordered  5. Administer ordered antiemetic medications as needed  6. Provide nonpharmacologic comfort measures as appropriate  7. Advance diet as ordered  8. Nutrition consult as indicated   Outcome: Progressing  Goal: Maintains or returns to baseline digestive function  Description: INTERVENTIONS:  1. Assess bowel function  2. Ensure adequate hydration  3. Administer ordered medications as needed  4. Encourage mobilization and activity  5. Nutrition consult as indicated  6. Assess hydration and nutritional status  7. Assess characteristics and frequency of stool  8. Monitor for metabolic panel imbalances  9. Assess for treatment effectiveness  Outcome: Progressing     Problem: Skin/Tissue Integrity - Adult  Goal: Skin integrity remains intact  Description: INTERVENTIONS  1. Assess and document risk factors for pressure ulcer development  2. Assess and document skin integrity  3. Monitor for areas of redness and/or skin breakdown  4. Initiate pressure ulcer prevention measures as indicated  Outcome: Progressing     Problem: Hematologic - Adult  Goal: Maintains hematologic stability  Description: INTERVENTIONS  1. Assess for signs and symptoms of bleeding or hemorrhage  2. Monitor labs  3. Administer  supportive blood products/factors as ordered and appropriate  4. Administer medications as ordered  5. Initiate bleeding precautions as indicated  6. Educate patient/family to report signs/symptoms of bleeding  Outcome: Progressing     Problem: Infection - Adult  Goal: Absence of infection during hospitalization  Description: INTERVENTIONS:  1. Assess and monitor for signs and symptoms of infection  2. Monitor lab/diagnostic results  3. Monitor all insertion sites/wounds/incisions  4. Monitor secretions for changes in amount and color  5. Administer medications as ordered  6. Educate and encourage patient and family to use good hand hygiene technique  7. Identify and educate in appropriate isolation precautions for identified infection/condition  Outcome: Progressing     Problem: Safety Adult  Goal: Patient will remain safe during hospitalization  Description: INTERVENTIONS    1. Assess patient for fall risk and implement interventions if needed  2. Use safe transport techniques  3. Assess patient using the appropriate Javon skin assessment scale  4. Assess patient for risk of aspiration  5. Assess patient for risk of elopement  6. Assess patient for risk of suicide  Outcome: Progressing

## 2021-08-16 NOTE — NURSING END OF SHIFT
Nursing End of Shift Summary:    Patient: Ben Lai Sr  MRN: [id number]  : 1950, Age: 71 y.o.    Location: [address] [place] [address]    Nursing Goals  Clinical Goals for the Shift: pt will remain safe and comfortable, monitor vitals, I/O and labs, manage pain, abx    Narrative Summary of Progress Toward Clinical Goals:  Pt remained safe and comfortable. Vital signs stable. Pain controlled with scheduled and Prn pain medications. Pt  requested to take  suppository to help with his constipation, PRN Dulcolax suppository was given. Pt had 1 large soft and formed stool after administering the suppository. Adequate urine output. Good appetite. Walt drains dressing changed. Slept good during the shift.  Barriers to Goals/Nursing Concerns:  No    New Patient or Family Concerns/Issues:  No    Shift Summary:      Significant Events & Communications to Providers (last 12 hours)      Last 5 Values    No documentation.              Oxygen Usage (last 12 hours)      Last 5 Values     Row Name 08/15/21 1915                   Oxygen Weaning Trial by Nursing    Is Patient on Room Air OR on the Same Amount of O2 as at Home?  Yes                   Mobility (last 12 hours)      Last 5 Values     Row Name 08/15/21 1915                   Mobility    Activity  Ambulate in room;Bathroom privileges;Turn        Level of Assistance  Minimal assist, patient does 75% or more        Anti-Embolism Devices Applied  Bilateral;AE calf pump            Urethral Catheter    Active Urethral Catheter     None            Active Lines    Active Central venous catheter / Peripherally inserted central catheter / Implantable Port / Hemodialysis catheter / Midline Catheter     Name:   Placement date:   Placement time:   Site:   Days:    Single Lumen Implantable Port 20 Right Chest   20    Chest   227              Infusing Medications   Medication Dose Last Rate     PRN Medications   Medication Dose Last Admin   • ondansetron ODT  4 mg     •  ondansetron  4 mg     • carboxymethylcellulose  2 drop 2 drop at 08/15/21 1508   • insulin aspart  0-25 Units 3 Units at 08/15/21 0824   • sodium chloride  3 mL     • acetaminophen  325-650 mg     • fentaNYL citrate (PF)  25 mcg 25 mcg at 08/13/21 1832   • bisacodyL  10 mg 10 mg at 08/15/21 1917   • magnesium hydroxide  30 mL     • HYDROmorphone  2-4 mg 4 mg at 08/15/21 2127   • lactulose  10 g 10 g at 08/12/21 0904   • dextrose 50 % in water (D50W)  15-30 mL     • dextrose  15.2 g     • glucagon  1 mg      Or   • glucagon  1 mg     • sodium chloride 0.9% (NS)  25-50 mL 25 mL at 08/16/21 0100     _________________________  Debbie Agrawal RN  08/16/21 5:46 AM

## 2021-08-16 NOTE — POST-PROCEDURE NOTE
Post Procedure Note    Patient Name: Ben Lai      : 1950    Radiologist: MATHIEU VALLE MD    Pre-operative Diagnosis: Right upper quadrant abscess. Needs IV access for long term antibiotics.    Operation : PICC line placement    Anesthesia Type: 1% licodaine    Estimated Blood Loss: <3ml    Findings: Ultrasound and fluoroscopically guided PICC line placement via left basilic vein with cath tip at RA/SVC junction. OK to use.    Complications:  None; patient tolerated the procedure well.          Prosthetic devices, implanted devices: PICC line

## 2021-08-16 NOTE — INTERDISCIPLINARY/THERAPY
Case Management Progress Note    Phone # [phone]    Reason for Admission: Abd abscess (hepatocellular CA)     Plan of Care: PICC placement; IV axb; PT/OT; GI signed off as no more local resources for management for leakage/abscess. Pt and wife would like to see about seeing [facility] liver specialist that come to .     Discussed Discharge Needs/Topics: Needs ref[facility] for out[facility] IV axb. Pt and wife would like to do CADD pump w/ infusion+ daily visits if possible.     Disposition: home tomorrow w/ inf+
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
## 2021-08-17 NOTE — INTERDISCIPLINARY/THERAPY
08/17/21 0952   Time Calculation   Start Time 0952   Stop Time 1002   Time Calculation (min) 10 min   PT Last Visit   PT Received On 08/17/21   General   Chart Reviewed Yes   Therapy Treatment Diagnosis R UQ abdominal abscess   PT Treatment Duration (Min) 10 Minutes   Is this a Co-Treatment? Yes  (OT)   Additional Pertinent History PMH: hepatocellular carcinoma, chronic pain, chronic hep C, DM2, HTN   Family/Caregiver Present   (spouse)   Precautions   Reinforced Precautions Yes   Other Precautions R UQ drain (gait belt high with buckle to L   Subjective Comments   RN Stated patient is medically cleared for therapy Yes   Subjective Comments Pt seated in chair upon arrival and agreeable to therapy. Pt returned to chair at end of session with call light within reach.   Cognition   Arousal/Alertness WFL   Cognition Comment appropriate to task and conversation   Transfer 1   Transfer From 1 Sit;Chair with arms   Transfer Type 1 To and from   Transfer to 1 Stand   Technique 1 Sit to stand;Stand to sit   Transfer Device 1 4 Wheel walker   Level of Assistance 1 Standby assistance   Trials/Comments 1 SBA, steady upon standing with 4WW   Ambulation 1   Surface 1 Level surface;Smooth   Device 1 Gait belt;4 Wheel walker   Assistance 1 Standby assistance   Quality of Gait 1 Steady with 4WW, No LOB noted.   Comments/Distance 1 100m   Stairs   Stairs Yes   Stairs 1   Rails 1 Bilateral   Device 1 No device;Gait belt   Assistance 1 Contact guard assist x 1   Comment/# Steps 1 11 steps.   Balance   Balance Intact  (with 4WW)   Assessment   Assessment Comment Pt is at his baseline level of function. Amblating 100m with 4WW able to do full flight of stairs. No need for skilled PT at thi time.   Therapeutic Interventions (Time Spent in Minutes)   Gait/Mobility 10   Plan   Plan Comment d/c PT

## 2021-08-17 NOTE — PLAN OF CARE
Problem: Safety Adult - Fall  Goal: Free from fall injury  Description: INTERVENTIONS:    Inpatient - Please reference Cares/Safety Flowsheet under [facility] Fall Risk for interventions.  Pediatrics - Please reference Peds Daily Cares/Safety Flowsheet under [facility] Pediatric Fall Assessment Fall Bundle for interventions  LD/OB - Please reference OB Shift Screening Flowsheet under OB Fall Risk for interventions.  Outcome: Progressing     Problem: Gastrointestinal - Adult  Goal: Minimal or absence of nausea and vomiting  Description: INTERVENTIONS:  1. Ensure adequate hydration  2. Monitor intake and output  3. Maintain NPO status until nausea and vomiting are resolved  4. Nasogastric tube to low intermittent suction as ordered  5. Administer ordered antiemetic medications as needed  6. Provide nonpharmacologic comfort measures as appropriate  7. Advance diet as ordered  8. Nutrition consult as indicated   Outcome: Progressing  Goal: Maintains or returns to baseline digestive function  Description: INTERVENTIONS:  1. Assess bowel function  2. Ensure adequate hydration  3. Administer ordered medications as needed  4. Encourage mobilization and activity  5. Nutrition consult as indicated  6. Assess hydration and nutritional status  7. Assess characteristics and frequency of stool  8. Monitor for metabolic panel imbalances  9. Assess for treatment effectiveness  Outcome: Progressing     Problem: Skin/Tissue Integrity - Adult  Goal: Skin integrity remains intact  Description: INTERVENTIONS  1. Assess and document risk factors for pressure ulcer development  2. Assess and document skin integrity  3. Monitor for areas of redness and/or skin breakdown  4. Initiate pressure ulcer prevention measures as indicated  Outcome: Progressing  Goal: Incisions, wounds, or drain sites healing without S/S of infection  Description: INTERVENTIONS  1. Assess and document risk factors for skin breakdown  2. Assess and document skin  integrity  3. Assess and document dressing/incision, wound bed, drain sites and surrounding tissue  4. Implement wound care per orders  Outcome: Progressing     Problem: Hematologic - Adult  Goal: Maintains hematologic stability  Description: INTERVENTIONS  1. Assess for signs and symptoms of bleeding or hemorrhage  2. Monitor labs  3. Administer supportive blood products/factors as ordered and appropriate  4. Administer medications as ordered  5. Initiate bleeding precautions as indicated  6. Educate patient/family to report signs/symptoms of bleeding  Outcome: Progressing     Problem: Infection - Adult  Goal: Absence of infection during hospitalization  Description: INTERVENTIONS:  1. Assess and monitor for signs and symptoms of infection  2. Monitor lab/diagnostic results  3. Monitor all insertion sites/wounds/incisions  4. Monitor secretions for changes in amount and color  5. Administer medications as ordered  6. Educate and encourage patient and family to use good hand hygiene technique  7. Identify and educate in appropriate isolation precautions for identified infection/condition  Outcome: Progressing     Problem: Safety Adult  Goal: Patient will remain safe during hospitalization  Description: INTERVENTIONS    1. Assess patient for fall risk and implement interventions if needed  2. Use safe transport techniques  3. Assess patient using the appropriate Javon skin assessment scale  4. Assess patient for risk of aspiration  5. Assess patient for risk of elopement  6. Assess patient for risk of suicide  Outcome: Progressing     Problem: Daily Care  Goal: Daily care needs are met  Description: INTERVENTIONS:   1. Assess and monitor skin integrity  2. Identify patients at risk for skin breakdown on admission and per policy  3. Assess and monitor ability to perform self care and identify potential discharge needs  4. Assess skin integrity/risk for skin breakdown  5. Assist patient with activities of daily living  as needed  6. Encourage independent activity per ability   7. Provide mouth care   8. Include patient/family/caregiver in decisions related to daily care   Outcome: Progressing

## 2021-08-17 NOTE — INTERDISCIPLINARY/THERAPY
Case Management Discharge Note    Phone # [phone]    Discharge Disposition: home     Needs transportation assistance at DC: No       Specialty Referrals: inf+; outpt PT/OT; GI-hepatologist w/ [facility]- they will call pt. ID; PCP    Support System Notified: Spoke to wife and pt in pt room updating them of d/c plans.     Narrative: Discussed d/c plans; educated all [facility]. Educated to report to inf+ @ 1530 for CADD pump and further appt scheduling.     No further needs anticipated.

## 2021-08-17 NOTE — NURSING END OF SHIFT
Nursing End of Shift Summary:    Patient: Ben Lai Sr  MRN: [id number]  : 1950, Age: 71 y.o.    Location: [address] [place] [address]    Nursing Goals  Clinical Goals for the Shift: Maintain patient's safety and comfort. Monitor labs,vitals and I/O.    Narrative Summary of Progress Toward Clinical Goals:  Maintained patient's safety and comfort. Stable vital signs. No c/o pain.     Barriers to Goals/Nursing Concerns:  Yes - Possible discharge today    New Patient or Family Concerns/Issues:  No    Shift Summary:      Significant Events & Communications to Providers (last 12 hours)      Last 5 Values    No documentation.              Oxygen Usage (last 12 hours)      Last 5 Values     Row Name 21 0400                Oxygen Weaning Trial by Nursing    Is Patient on Room Air OR on the Same Amount of O2 as at Home?  Yes  Yes       Are You Performing the QShift O2 Weaning Trial?  No  No                  Mobility (last 12 hours)      Last 5 Values     Row Name 21 1820 21             Mobility    Activity  Turn;Sleeping  --  Turn;Sleeping;Ambulate in room;Bathroom privileges      Level of Assistance  Minimal assist, patient does 75% or more  --  Minimal assist, patient does 75% or more      Length of Time in Chair (min)  --  --  10      Distance Ambulated (feet)  --  --  10 Feet      Distance Ambulated (Meters Calculated)  --  --  3.05 Meters      Distance Ambulated (Meters Calculated)(Do Not Use)  --  --  3.05 Feet      Anti-Embolism Devices Applied  --  Bilateral;AE calf pump  --          Urethral Catheter    Active Urethral Catheter     None            Active Lines    Active Central venous catheter / Peripherally inserted central catheter / Implantable Port / Hemodialysis catheter / Midline Catheter     Name:   Placement date:   Placement time:   Site:   Days:    PICC Single Lumen 21 Left Upper arm   21    0854    Upper arm   less than 1    Single Lumen Implantable  Port 12/31/20 0921 Right Chest   12/31/20    0921    Chest   228              Infusing Medications   Medication Dose Last Rate     PRN Medications   Medication Dose Last Admin   • sodium chloride  10 mL     • ondansetron ODT  4 mg     • ondansetron  4 mg     • carboxymethylcellulose  2 drop 2 drop at 08/16/21 0932   • insulin aspart  0-25 Units 3 Units at 08/15/21 0824   • sodium chloride  3 mL     • acetaminophen  325-650 mg     • fentaNYL citrate (PF)  25 mcg 25 mcg at 08/13/21 1832   • bisacodyL  10 mg 10 mg at 08/15/21 1917   • magnesium hydroxide  30 mL     • HYDROmorphone  2-4 mg 2 mg at 08/16/21 1220   • lactulose  10 g 10 g at 08/12/21 0904   • dextrose 50 % in water (D50W)  15-30 mL     • dextrose  15.2 g     • glucagon  1 mg      Or   • glucagon  1 mg     • sodium chloride 0.9% (NS)  25-50 mL Stopped at 08/17/21 0109     _________________________  Joan Raines RN  08/17/21 4:27 AM

## 2021-08-17 NOTE — DISCHARGE INSTRUCTIONS
Flush each drain with 2 mL normal saline intracatheter daily.          Continue to check your blood sugar before you administer Lantus (long acting) insulin and follow Dr. Ramírez's instructions.

## 2021-08-17 NOTE — INTERDISCIPLINARY/THERAPY
08/17/21 0951   Time Calculation   Start Time 0951   Stop Time 1002   Time Calculation (min) 11 min   OT Last Visit   OT Received On 08/17/21   General   Chart Reviewed Yes   OT Treatment Duration (Min) 11 Minutes   Is this a Co-Treatment? Yes  (PT)   Family/Caregiver Present Yes  (wife)   Precautions   Reinforced Precautions Yes   Other Precautions KYUNG drains x2   Subjective Comments   RN Stated patient is medically cleared for therapy Yes   Subjective Comments RN ok'd cotx session for OT/PT. Upon arrival, pt seated in bedside chair. Pt agreeable to therapy. At the end of the tx session pt seated back in bedside chair. RN aware of session outcomes.   Cognition   Arousal/Alertness WFL   Cognition Comment appropriate to task and conversation    Bed Mobility   Bed Mobility Not assessed   Transfers   Transfer Assessed   Transfer 1   Transfer From 1 Sit;Chair with arms   Transfer Type 1 To and from   Transfer to 1 Stand   Technique 1 Sit to stand;Stand to sit   Transfer Device 1 4 Wheel walker;Transfer belt   Level of Assistance 1 Standby assistance   Trials/Comments 1 SBA    Ambulation   Ambulation Assessed   Ambulation 1   Surface 1 Level surface;Smooth   Device 1 Gait belt;4 Wheel walker   Assistance 1 Standby assistance   Quality of Gait 1 steady w/ 4WW no LOB    Comments/Distance 1 100m    Balance   Balance Intact   LE Dressing   LE Dressing Yes   Sock Level of Assistance Modified independent   LE Dressing Where Assessed Bedside chair   LE Dressing Comments pt able to doff and shamika L sock mod (I)    Assessment   Progress Discontinue OT   Recommendations for Therapy No further therapy needed   Assessment Comment Pt completed LB dressing, fxal transfer, and mobility from mod (I) to SBA. Pt demo'd good safety awareness and has good support from wife at home. At this time pt does not need skilled OT services. Discontinue OT.    Therapeutic Interventions (Time Spent in Minutes)   Therapeutic Activity Dynamic 11   Plan    Plan Comment D/C OT

## 2021-08-17 NOTE — PROGRESS NOTES
[facility]   Hospitalist Services  [address] [place] [address]       HOSPITALIST PROGRESS NOTE    Date of Service: 8/16/2021    Time of Service: 1400    Patient name: Ben Lai Sr  MRN: [id number]   LOS: 5 days     Subjective   Patient seen and examined. Discussed with nurse. Chart reviewed.  Jakub states that he is doing okay and is having a little tenderness in his right upper quadrant.  He is hoping his IV antibiotics will get improved, but is alright with staying.  We discuss his CT scan showed that his abscess' are draining the gall bladder still has a perforation, stent partially penetrating.      Review of Systems   Constitutional: Negative for activity change, appetite change, chills, fatigue and fever.   Gastrointestinal: Positive for abdominal pain. Negative for nausea and vomiting.   All other systems reviewed and are negative.      Objective     Temp:  [36.7 °C (98 °F)-36.8 °C (98.2 °F)] 36.7 °C (98 °F)  Heart Rate:  [67-82] 82  Resp:  [12-16] 16  BP: (142-144)/(51-61) 142/61    I/O last 3 completed shifts:  In: 1212.5 [P.O.:460; I.V.:106.9; IV Piggyback:645.6]  Out: 38.7 [Drains:37.7; Stool:1]    I/O this shift:  In: 139.9 [I.V.:29.9; IV Piggyback:110]  Out: -     Physical Exam  Vitals and nursing note reviewed. Exam conducted with a chaperone present (Laura at bedside).   Constitutional:       General: He is not in acute distress.     Appearance: Normal appearance. He is normal weight. He is not ill-appearing.   HENT:      Head: Normocephalic and atraumatic.   Eyes:      General: No scleral icterus.  Cardiovascular:      Rate and Rhythm: Normal rate and regular rhythm.      Pulses: Normal pulses.      Heart sounds: Normal heart sounds. No murmur heard.   No gallop.    Pulmonary:      Effort: Pulmonary effort is normal. No respiratory distress.      Breath sounds: Normal breath sounds. No stridor. No wheezing, rhonchi or rales.   Abdominal:      General: Abdomen  is flat. Bowel sounds are normal. There is no distension.      Palpations: Abdomen is soft. There is no mass.      Tenderness: There is abdominal tenderness. There is no guarding.      Hernia: No hernia is present.      Comments: Right upper quadrant   Musculoskeletal:      Right lower leg: No edema.      Left lower leg: No edema.   Skin:     General: Skin is warm and dry.      Capillary Refill: Capillary refill takes less than 2 seconds.   Neurological:      General: No focal deficit present.      Mental Status: He is alert and oriented to person, place, and time. Mental status is at baseline.   Psychiatric:         Mood and Affect: Mood normal.         Behavior: Behavior normal.         Thought Content: Thought content normal.         Judgment: Judgment normal.         Medications:     Current Facility-Administered Medications:   •  sodium chloride flush 10 mL, 10 mL, intravenous, 2x daily, Sonny Danielle MD, 10 mL at 08/16/21 0930  •  sodium chloride flush 10 mL, 10 mL, intravenous, PRN, Sonny Danielle MD  •  melatonin tablet 10.5 mg, 10.5 mg, oral, Nightly, Lucina Chahal MD, 10.5 mg at 08/15/21 2108  •  ondansetron ODT (ZOFRAN-ODT) disintegrating tablet 4 mg, 4 mg, oral, q8h PRN, Lucina Chahal MD  •  ondansetron (ZOFRAN) injection 4 mg, 4 mg, intravenous, q8h PRN, Lucina Chahal MD  •  insulin aspart U-100 (NovoLOG) injection pen (correction dose) 0-15 Units, 0-15 Units, subcutaneous, Insulin: 4x daily with meals, Lucina Chahal MD, 1 Units at 08/15/21 0825  •  apixaban (ELIQUIS) tablet 5 mg, 5 mg, oral, 2x daily, Lucina Chahal MD, 5 mg at 08/16/21 0929  •  carboxymethylcellulose (REFRESH PLUS) 0.5 % ophthalmic solution 2 drop, 2 drop, Both Eyes, 3x daily PRN, Lucina Chahal MD, 2 drop at 08/16/21 0932  •  insulin aspart (NovoLOG) injection pen (mealtime/snack insulin) 0-25 Units, 0-25 Units, subcutaneous, Insulin: 3x daily with meals, Lucina Chahal MD, 4 Units at 08/16/21 1915  •  insulin  aspart (NovoLOG) injection pen (mealtime/snack insulin) 0-25 Units, 0-25 Units, subcutaneous, PRN, Lucina Chahal MD, 3 Units at 08/15/21 0824  •  Insert peripheral IV, , , Once **AND** Maintain IV access, , , Until discontinued **AND** Saline lock IV, , , Once **AND** sodium chloride flush 3 mL, 3 mL, intravenous, PRN, Kayy Francois CNP  •  acetaminophen (TYLENOL) tablet 325-650 mg, 325-650 mg, oral, q4h PRN, Kayy Francois CNP  •  fentaNYL citrate (PF) 50 mcg/mL injection 25 mcg, 25 mcg, intravenous, q2h PRN, Kayy Francois CNP, 25 mcg at 08/13/21 1832  •  sennosides-docusate sodium (SENNA PLUS) 8.6-50 mg 1 tablet, 1 tablet, oral, 2x daily, Kayy Francois CNP, 1 tablet at 08/16/21 0929  •  bisacodyL (DULCOLAX) suppository 10 mg, 10 mg, rectal, Daily PRN, Kayy Francois CNP, 10 mg at 08/15/21 1917  •  magnesium hydroxide (MILK OF MAGNESIA) 400 mg/5 mL oral suspension 30 mL, 30 mL, oral, Daily PRN, Kayy Francois CNP  •  polyethylene glycol (GLYCOLAX) powder 17 g, 17 g, oral, Daily, Kayy Francois CNP, 17 g at 08/16/21 0928  •  ampicillin-sulbactam (UNASYN) 3,000 mg in normal saline 100 mL IVPB - MBP, 3,000 mg, intravenous, q6h, Lucina Chahal MD, Stopped at 08/16/21 1849  •  buPROPion SR (WELLBUTRIN SR) 12 hr tablet 100 mg, 100 mg, oral, Daily, Kayy Francois CNP, 100 mg at 08/16/21 0929  •  carvediloL (COREG) tablet 3.125 mg, 3.125 mg, oral, 2x daily with meals, Kayy Francois CNP, 3.125 mg at 08/16/21 1819  •  loratadine (CLARITIN) tablet 10 mg, 10 mg, oral, Daily, Kayy Francois CNP, 10 mg at 08/16/21 0929  •  fluticasone propionate (FLONASE) 50 mcg/actuation nasal spray 2 spray, 2 spray, Each Nostril, Daily, Kayy Francois CNP, 2 spray at 08/16/21 0932  •  HYDROmorphone (DILAUDID) tablet 2-4 mg, 2-4 mg, oral, q6h PRN, Kayy Francois CNP, 2 mg at 08/16/21 1220  •  lactulose (CHRONULAC) 20 gram/30 mL solution 10 g, 10 g, oral, 2x daily PRN,  Kayy Francois CNP, 10 g at 08/12/21 0904  •  lansoprazole (PREVACID) capsule 30 mg, 30 mg, oral, Daily, Kayy Francois CNP, 30 mg at 08/16/21 0929  •  lisinopriL (PRINIVIL,ZESTRIL) tablet 20 mg, 20 mg, oral, Daily, Kayy Francois CNP, 20 mg at 08/16/21 0929  •  morphine (MS CONTIN) 12 hr tablet 15 mg, 15 mg, oral, q12h RONAL, Kayy Francois CNP, 15 mg at 08/16/21 0928  •  terazosin (HYTRIN) capsule 5 mg, 5 mg, oral, 2x daily, Kayy Francois CNP, 5 mg at 08/16/21 0929  •  traZODone (DESYREL) tablet 100 mg, 100 mg, oral, Nightly, Kayy Francois CNP, 100 mg at 08/15/21 2108  •  dextrose 50 % in water (D50W) syringe 15-30 mL, 15-30 mL, intravenous, q15 min PRN, Kayy Francois CNP  •  dextrose (GLUTOSE) 40 % gel 15.2 g, 15.2 g, oral, q15 min PRN, Kayy Francois CNP  •  glucagon (GLUCAGEN) injection 1 mg, 1 mg, intramuscular, q15 min PRN **OR** glucagon (GLUCAGEN) injection 1 mg, 1 mg, subcutaneous, q15 min PRN, Kayy Francois CNP  •  furosemide (LASIX) tablet 20 mg, 20 mg, oral, 2x daily diuretic, Lucina Chahal MD, 20 mg at 08/16/21 1533  •  sodium chloride 0.9% (NS) carrier flush 25-50 mL, 25-50 mL, intravenous, PRN, Lucina Chahal MD, Last Rate: 25 mL/hr at 08/16/21 1906, Rate Verify at 08/16/21 1906  •  sodium chloride flush 2 mL, 2 mL, intra-catheter, Daily, Lucina Chahal MD, 2 mL at 08/16/21 0930    Results from last 4 days   Lab Units 08/16/21  0427 08/15/21  0504 08/14/21  0407   WBC AUTO 10*3/uL 2.3* 3.2* 2.0*   HEMOGLOBIN g/dL 10.9* 11.2* 10.5*   HEMATOCRIT % 33.1* 34.0* 32.2*   PLATELETS AUTO 10*3/uL 107* 110* 101*       Results from last 4 days   Lab Units 08/16/21  0427 08/15/21  0504 08/14/21  0408   SODIUM mmol/L 138 140 137   POTASSIUM mmol/L 3.5 4.1 3.3*   CHLORIDE mmol/L 104 106 104   CO2 mmol/L 29 27 26   BUN mg/dL 8 8 7   CREATININE mg/dL 0.55* 0.47* 0.50*   CALCIUM mg/dL 8.1* 8.3* 7.9*   TOTAL PROTEIN g/dL 5.4* 5.6* 5.4*   BILIRUBIN TOTAL mg/dL  0.54 0.63 0.67   ALK PHOS U/L 180* 194* 148*   ALT U/L 13 17 9   AST U/L 31 35 23   GLUCOSE mg/dL 132* 119* 96       Imaging:   CT ABDOMEN UPPER W IV CONTRAST    Result Date: 8/16/2021  Narrative:   Exam: CT of the abdomen with contrast from 08/16/2021 Clinical History:  Cholecystitis  follow up; Please see prior CT scan from this admission.  RUQ abscess  chronic cholecystitis with drains and GB perforation s/p ERCP with 2 stents placed   this is to check to see if they are working Comparison(s): CT abdomen pelvis 8/10/2021 Technique: Helical axial imaging was performed through the abdomen after the intravenous administration of 105 cc of Isovue-370 and without oral contrast. Coronal reformations were constructed and reviewed.  Dose reduction technique utilized; automatic/anatomic modulation of X-ray tube current (Auto mA). Findings: Since 8/10/2021 interval placement of right upper quadrant pigtail drain into the abscess cavity seen anterior to the gallbladder. Abscess cavity is decompressed about the pigtail drain. Mild surrounding soft tissue thickening. Again seen is subcutaneous soft tissue edema and tract seen to the skin surface just lateral to the drain tract. More posterior right upper quadrant drain is adjacent to the gallbladder with a decompressed cavity. This drain does not appear to be within the gallbladder. Two biliary stent with one double-J stent proximal loops seen within the gallbladder and appears to be slightly retracted compared to prior CT on 8/10/2021, but appears to possibly partially perforate outside the gallbladder. Gallbladder is largely decompressed with diffuse wall thickening. No significant biliary duct dilatation with minimal pneumobilia. Cirrhotic configuration of liver with findings of portal hypertension including recanalization of umbilical vein with anterior abdominal varices. Unchanged partial thrombosis of the portal vein.. Small amount of perihepatic ascites. Mild  splenomegaly. The adrenals, kidneys, pancreas are unremarkable. The visualized bowel loops are decompressed. Bibasilar atelectasis. Findings of prior granulomatous infection.     Impression: Impression: 1. Interval placement of pigtail drain into an abscess cavity seen anterior to the gallbladder. Abscess cavity is largely decompressed about the pigtail drain with mild surrounding soft tissue thickening. Again seen is subcutaneous soft tissue skin tract seen just lateral to the drain but no evidence of subcutaneous abscess. 2. Stable positioning of the more posterior drain with decompressed cavity about the drain. This drain does not appear to be within the gallbladder. 3. 2 biliary stents with one biliary stent proximal loops seen within the gallbladder, and appears to be partially perforated outside the gallbladder. Gallbladder is largely decompressed with diffuse gallbladder wall thickening. 4. Stable minimal thrombosis seen within the portal vein. Cirrhotic configuration of liver with findings of portal hypertension.    MR abdomen with and without contrast    Result Date: 7/29/2021  Narrative: EXAM: MR ABDOMEN W AND WO CONTRAST 07/29/2021 CLINICAL HISTORY: Hepatocellular carcinoma (CMS/HCC) (HCC); Neoplasm: liver or bile duct TECHNIQUE: Multiplanar, multisequence MRI of the abdomen without and with 17 mL of ProHance intravenous contrast. Single use contrast vial. 0 mL of contrast was discarded. Axial and coronal T2, axial T2 with fat suppression, axial in and opposed phase gradient echo, axial diffusion weighted, and axial pre and postcontrast T1-weighted sequences were performed. COMPARISON: Abdominal MRI 5/5/2021. FINDINGS: Nodular liver contour and irregular liver morphology, compatible with hepatic cirrhosis. Irregular reticular T2 hyperintensity throughout the liver parenchyma, compatible with fibrosis. Redemonstration of capsular retraction with focal fibrosis in the anterolateral aspect of the right  hepatic lobe, unchanged. Two subhepatic drains remained in place. No discrete residual subhepatic collection remains. Heterogenous contrast enhancement of the right hepatic lobe, similar to prior examination. No new foci of abnormal T2 signal hyperintensity, contrast enhancement, or diffusion restriction. No lymphadenopathy in the upper abdomen. Findings compatible with portal hypertension, including recanalization of the umbilical vein, moderate splenomegaly measuring 18.8 cm on coronal images, splenorenal shunt, and multiple subcutaneous varices in the ventral abdominal wall. Small perihepatic and perisplenic upper abdominal ascites. Mild scattered mesenteric edema throughout the abdomen. Thrombosis of the right portal vein is demonstrated. This was also seen on the prior MRI in retrospect, however now thrombosis extends from near the intrahepatic portal vein bifurcation and extends distally throughout the right portal vein. Filling defects are demonstrated within the common bile duct on axial T2-weighted images (series 401, images 28-33), suggestive of choledocholithiasis, unchanged. No intrahepatic and extra hepatic biliary ductal dilatation, stable. Normal pancreas. No pancreatic ductal dilatation. Normal adrenal glands. Small calyceal diverticulum in the right kidney. Kidneys are otherwise unremarkable. No solid renal mass or hydronephrosis. Visualized loops of small and large bowel are normal in caliber. Bone marrow signal is normal. Trace bilateral pleural effusions. Visualized lung bases are otherwise unremarkable, however lung parenchyma is inadequately evaluated on MRI.     Impression: IMPRESSION: 1.  Stable appearance of the liver. No evidence of new or progressive malignancy. 2.  Two subhepatic drains remain in place. No discrete focal fluid collection. 3.  Right portal vein thrombosis. 4.  Sequelae of portal hypertension including recanalization of the umbilical vein, moderate splenomegaly, and small  upper abdominal ascites. 5.  Filling defects in the common bile duct, suggestive of choledocholithiasis, unchanged.    CT ABDOMEN PELVIS W IV CONTRAST    Result Date: 8/11/2021  Narrative: NOTE: This study was received from an outside source for PACS Storage.    FL ERCP 1 duct    Result Date: 8/14/2021  Narrative: EXAM: FL ERCP 1 DUCT 08/14/2021 CLINICAL HISTORY:  perforation of gallbladder TECHNIQUE: 17 intraoperative fluoroscopic cholangiogram images submitted for interpretation. Fluoroscopy time: 14.4 minutes Number of fluoroscopy images: 17 fluoroscopic images submitted for interpretation. COMPARISON: CT abdomen and pelvis 8/10/2021. FINDINGS: Limited by intraoperative technique with portable fluoroscopy.  Two separate pigtail drainage catheters enter the right flank. Images demonstrate a stent extending from the gallbladder through the common bile duct and into the duodenum. Endoscopy performed, with opacification of the biliary tree. Stents removed, and a single new stent placed, extending from the gallbladder fossa through the common bile duct and into the duodenum. No intrahepatic or extrahepatic biliary ductal dilatation.     Impression: IMPRESSION: Exchange of stent extending from the gallbladder through the common bile duct and into the duodenum.     IR picc line placement with imaging over 5 years old    Result Date: 8/16/2021  Narrative: Exam:  Left upper extremity PICC line placement. 08/16/2021 Clinical History:  Other; 4 weeks of IV Unasyn, abscess Procedure/Views: Fluoroscopy time for the procedure was 0.1 minutes 1 image is obtained. Informed and written consent was obtained. The left arm was prepped and draped following the maximum barrier technique; cap, mask, sterile gown, gloves, large sterile sheet, hand hygiene, and acceptable antisepsis were performed. Sterile ultrasound procedures were performed. This includes sterile ultrasound probe cover and sterile ultrasound gel. The left basilic vein  was documented patent with ultrasound, hardcopy imaging was obtained. The left basilic vein was then punctured with a micropuncture set using direct sonographic guidance. Local anesthesia with lidocaine was utilized and serial fascial dilation performed for placement of a 4 Kinyarwanda single-lumen PICC line with the tip into the superior vena cava/right atrium. Fluoroscopic guidance was used to place the catheter tip at the cavoatrial junction. Hard copy imaging of the final catheter position was obtained.     Impression: IMPRESSION: Left upper extremity PICC line placement.    IR injection abscess catheter evaluation    Result Date: 8/11/2021  Narrative: Exam: Drain check 08/11/2021 Clinical history:  abscess/routine checkup. Procedure/Views: Fluoroscopic time 1.5 minutes images 9 Comparison/s: Outside CT abdomen pelvis 8/10/2021 Findings:  view demonstrates stable positioning of the right upper quadrant drain. Adjacent internalized biliary stents with proximal aspect of the stent in the gallbladder. Injection of contrast demonstrated decompressed cavity about the pigtail loop, with contrast seen to flow in the free intraperitoneal cavity and outlining bowel loops. No definite fistulous connection to adjacent bowel with no evidence of peristalsis. Again contrast appears to outline the bowel loops and is not within the bowel loops. When comparing outside CT imaging this drain appears to be adjacent to the gallbladder and does not appear to be within the gallbladder lumen. The internalized biliary stents appears to be perforated through the gallbladder. Given patient will have up to 30 cc of output a day, recommend leaving drain in place, as patient is likely still having spillage of bile from the perforated gallbladder. Recommend correlation with GI recommendations about possible replacement of stents and possibly pulled back through the perforated gallbladder. Additionally with reviewing CT imaging there has been  reaccumulation of fluid and abscess formation is formed slightly anterior to the existing drain, and in the location of the previously removed drain. Additional subcutaneous abscess. Ultrasound evaluation was unable to a identify this deeper fluid collection. Patient will likely need CT drain placement into this fluid collection.     Impression: IMPRESSION: 1.  Decompressed cavity about the right upper quadrant drain with spillage of contrast into the free intraperitoneal cavity with outlining bowel loops. No definite fistulous connection to bowel with no peristalsis. Contrast outlines the bowel loops and is not within the bowel loops. See above discussion with findings of recent CT imaging. 2.  Patient has developed new abscess cavity more anterior adjacent to the liver, in the location of the previously removed drain. Patient will require CT drain placement.    IR injection abscess catheter evaluation    Result Date: 7/19/2021  Narrative: Exam:  Abscess catheter check 07/19/2021 Clinical History: Routine check-up; Procedure/Views:  Utilizing fluoroscopy and digital imaging, the existing catheter was injected with contrast. Fluoroscopy time for the procedure was 0.3 minutes. 4 images are obtained. Comparison/s:  7/5/2021 Findings:  The catheter is in satisfactory position. Outputs recently from the catheter have been fairly low, 20 cc or less. Contrast injection shows immediate communication to the right colon. No significant flow into the stents draining the biliary system is present. No significant cavity is present surrounding the catheter.     Impression: IMPRESSION:  1. No significant residual cavity remains surrounding the pigtail the catheter. However, there is immediate contrast opacification of the right colon. The catheter needs to remain in place.    CT abscess cyst liver    Result Date: 8/11/2021  Narrative: Exam: CT-guided right upper abdominal abscess drain placement Clinical History:  Abdominal  abscess Fluoroscopy time: 10 seconds of CT fluoroscopy time. Dose reduction technique utilized; the mA was adjusted based on patient size. Medication: Lidocaine 5 ml sq. Complications: None Technique: The patient was informed of the risks and benefits of this procedure and provided written informed consent. Patient placed in supine  position and CT scanning used to rocio the proper location.  The right upper abdomen was prepped and draped in the usual sterile fashion, following the maximum sterile barrier technique; cap, mask, sterile gloves, large sterile sheet, hand hygiene, and antisepsis with 2% chlorhexidine (or acceptable alternative antiseptics per current guidelines) for cutaneous antisepsis were performed. During prepping the cutaneous abscess broke open, with significant drainage of bloody pus. Access site into the fluid collection was placed as far away from the subcutaneous abscess as possible. Lidocaine is used for local anesthesia.  After local anesthesia with lidocaine, CT guidance was used to place a 5 Omani Yueh needle into the right upper quadrant fluid collection. Wire was placed tract serially dilated and subsequently a 10 Omani Rodgers-Whitley drain was placed into the fluid collection. Ultimately 10 cc of bloody purulent material was aspirated and sent to lab for culture. Post procedure scanning showed decompressed abscess cavity.  The patient tolerated the procedure well and there was no immediate complication. Drain was secured to the skin surface with a single 2-0 Prolene suture.     Impression: IMPRESSION: Successful CT-guided  right upper abdominal abscess drain placement. Bloody purulent material was aspirated and sent to lab for culture. During prepping for drain placement, the cutaneous abscess broke open and was oozing.       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)    Essential hypertension    Hepatocellular carcinoma (CMS/HCC) (HCC)    Gastroesophageal reflux disease without esophagitis    Intractable right upper quadrant abdominal pain    Esophageal varices in cirrhosis (CMS/HCC) (HCC)    Obstructive sleep apnea    Hyperlipidemia    Perforated gallbladder      Other Chronic/Previous Medical Issues:  None    Plan:  Right upper abdominal abscess status post drain placement  Improving  Continue Unasyn 3 g every 6 hours via PICC line for a total of 4 weeks  Follow-up with infectious disease    Perforation of gallbladder due to internalized biliary stent with spillage of bile  Nonoperative gallbladder  ERCP on 8/14 he had placement of 2 temporary stents in the common bile duct and the gallbladder  CT scan showed that there was partial perforation of gallbladder stent  Patient would like to establish care with [facility] the [facility]    Hepatocellular carcinoma  Chronic hepatitis C with cirrhosis  Portal vein thrombosis  History of ascites  Thrombocytopenia  Receiving immune no therapy/chemotherapy with oncology  Oncology updated on plan of care  Continue Eliquis and Lasix    Insulin-dependent type 2 diabetes mellitus with recent hemoglobin A1c of 7.2%  Patient has only used about 6 units of his sliding scale and carb counting in the past 24 hours  We will continue to monitor insulin needs overnight  His home Lantus may need to be decreased or possibly discontinued based upon his needs with close outpatient follow-up    Paroxysmal atrial fibrillation  Continue carvedilol and Eliquis    Essential hypertension  Continue lisinopril    Chronic pain secondary to hepatocellular carcinoma  Palliative care consulted  Continue with MS Contin, Dilaudid and fentanyl as needed    Constipation  Continue senna and MiraLAX    Insomnia  Continue trazodone and  melatonin    Pancytopenia    Nutristionist consulted- appreciate assistance.    Malnutrition Assessment:  Type: Protein calorie  Parameters for Malnutrition: Risk for malnutrition       Evaluation:             • Imaging:   • Labs: CBC and CMP weekly while on antibiotics  • Discharge planning     Additional Cares:  • Lines:PICC line, port  • [place]:None  • Telemetry:None  • DVT prophylaxis:eliquis  • CODE STATUS:DNR  • Isolation:None  • Disposition:Discharge home tomorrow after antibiotics arranged.      Care plan discussed with patient and nurse. All questions answered. Please see orders.   Provider Inpatient Hospital Services Certification   Awaiting specialty consultation that can impact the therapeutic plan : No  Awaiting specific diagnostic testing and/or results that can impact the therapeutic care plan: No  Current therapeutic care plan can only be provided in the hospital: Yes  Current anti-infective care plan requires IV antibiotics that currently can only be gien in the inpatient setting: Yes  Discharge expected in : 1-2 days  Predicted/Planned discharge to: home        Discussed with provider(s): Shalini SIGALA, Goldie ARCEO (GI), CM    Time spent: 15 minutes    Electronically signed by: Lucina Chahal MD  8/16/2021  7:46 PM    A voice recognition program was used to aid in documentation of this record. Sometimes words are not printed exactly as they were spoken.  While efforts were made to carefully edit and correct any inaccuracies, some errors may be present; please take these into context.  Please contact the provider if errors are identified.

## 2021-08-17 NOTE — PROGRESS NOTES
PALLIATIVE CARE DAILY PROGRESS NOTE     Ben Lai Sr is a 71 y.o. male admitted 8/11/2021 to hospitalist service.    Length of Stay: 6 day(s)    sodium chloride  •  ondansetron ODT  •  ondansetron  •  carboxymethylcellulose  •  insulin aspart  •  Insert peripheral IV **AND** Maintain IV access **AND** Saline lock IV **AND** sodium chloride  •  acetaminophen  •  fentaNYL citrate (PF)  •  bisacodyL  •  magnesium hydroxide  •  HYDROmorphone  •  lactulose  •  dextrose 50 % in water (D50W)  •  dextrose  •  glucagon **OR** glucagon  •  sodium chloride 0.9% (NS)  sennosides-docusate sodium, 2 tablet, oral, 2x daily  sodium chloride, 10 mL, intravenous, 2x daily  melatonin, 10.5 mg, oral, Nightly  insulin aspart U-100, 0-15 Units, subcutaneous, Insulin: 4x daily with meals  apixaban, 5 mg, oral, 2x daily  insulin aspart, 0-25 Units, subcutaneous, Insulin: 3x daily with meals  polyethylene glycol, 17 g, oral, Daily  ampicillin-sulbactam, 3,000 mg, intravenous, q6h  buPROPion SR, 100 mg, oral, Daily  carvediloL, 3.125 mg, oral, 2x daily with meals  loratadine, 10 mg, oral, Daily  fluticasone propionate, 2 spray, Each Nostril, Daily  lansoprazole, 30 mg, oral, Daily  lisinopriL, 20 mg, oral, Daily  morphine, 15 mg, oral, q12h RONAL  terazosin, 5 mg, oral, 2x daily  traZODone, 100 mg, oral, Nightly  furosemide, 20 mg, oral, 2x daily diuretic  sodium chloride, 2 mL, intra-catheter, Daily        Allergies   Allergen Reactions   • Metformin Hives   • Adhesive Tape-Silicones    • Interferon Jose Maria-2a    • Latex    • Mometasone    • Mometasone Furoate    • Omeprazole    • Ondansetron Hcl Nausea And Vomiting   • Pepper (Genus Capsicum)      Any Hot Peppers - can eat bell peppers   • Primidone      Other reaction(s): HEADACHE   • Ribavirin Itching   • Rosuvastatin    • Sorafenib      Other reaction(s): BURNING SENSATION, HEADACHE, JOINT PAIN   • Spironolactone    • Statins-Hmg-Coa Reductase Inhibitors Itching and GI intolerance   •  "Interferon Jose Maria (Human Leuk. Derived) Palpitations and Rash   • Pantoprazole Itching and Rash   • Peginterferon Jose Maria-2b Palpitations   • Penicillins Other (see comments)     Passed out after IM injection in service ?vasovagal reaction  Pt tolerates unasyn        Subjective:      Interval History: Status post ERCP on 8/14 with placement of 2 temporary stents in the common bile duct and the gallbladder.  Patient resting in bed with wife present at bedside.  He reports ongoing abdominal pain however feels that it is tolerable.  Has only needed breakthrough pain medication with Dilaudid x1 in the past 24 hours.  Denies any nausea, or dyspnea.  Continues to have some constipation.  He did have a bowel movement after Dulcolax suppository the other day.  We will adjust his bowel regimen.  Patient has no other questions or concerns for me at this time.    Activity Level: returning to normal    Pain Assessment: Abdominal pain, tolerable    Review of Systems  Review of Systems   Constitutional: Positive for activity change and appetite change.   Respiratory: Negative for shortness of breath.    Gastrointestinal: Positive for abdominal pain and constipation. Negative for nausea and vomiting.         Objective:      Most Recent Vital Signs:  /53 (BP Location: Right arm, Patient Position: Head of bed 30 degrees or higher, Cuff Size: Regular Adult)   Pulse 75   Temp 36.9 °C (98.4 °F) (Oral)   Resp 18   Ht 1.651 m (5' 5\")   Wt 80.6 kg (177 lb 11.1 oz)   SpO2 94%   BMI 29.57 kg/m²     I/O last 3 completed shifts:  In: 1117.8 [P.O.:310; I.V.:156.1; IV Piggyback:651.7]  Out: 135.5 [Urine:101; Drains:33.5; Stool:1]  I/O this shift:  In: 130.9 [I.V.:20.9; IV Piggyback:110]  Out: 1 [Urine:1]    Physical Exam:   Physical Exam  Constitutional:       General: He is not in acute distress.  HENT:      Head: Normocephalic and atraumatic.   Eyes:      Conjunctiva/sclera: Conjunctivae normal.   Cardiovascular:      Rate and Rhythm: " Normal rate and regular rhythm.   Pulmonary:      Effort: Pulmonary effort is normal. No respiratory distress.      Breath sounds: Normal breath sounds.   Abdominal:      General: Bowel sounds are normal. There is no distension.      Tenderness: There is abdominal tenderness.      Comments: KYUNG drains with bloody drainage noted   Musculoskeletal:      Right lower leg: No edema.      Left lower leg: No edema.   Skin:     General: Skin is warm and dry.   Neurological:      Mental Status: He is oriented to person, place, and time.   Psychiatric:         Mood and Affect: Mood normal.         Behavior: Behavior normal.         Thought Content: Thought content normal.          Pertinent Lab Results:  CBC with Platelet:    Lab Results   Component Value Date    WBC 2.3 (L) 08/16/2021    HGB 10.9 (L) 08/16/2021    HCT 33.1 (L) 08/16/2021     (L) 08/16/2021    RBC 3.95 (L) 08/16/2021    MCV 83.8 08/16/2021    MCH 27.7 (L) 08/16/2021    MCHC 33.0 08/16/2021    RDW 18.1 (H) 08/16/2021    MPV 7.5 08/16/2021     Comp:   Lab Results   Component Value Date     08/16/2021    K 3.5 08/16/2021     08/16/2021    CO2 29 08/16/2021    BUN 8 08/16/2021    CREATININE 0.55 (L) 08/16/2021    GLUCOSE 132 (H) 08/16/2021    CALCIUM 8.1 (L) 08/16/2021    PROT 5.4 (L) 08/16/2021    ALBUMIN 2.8 (L) 08/16/2021    AST 31 08/16/2021    ALT 13 08/16/2021    ALKPHOS 180 (H) 08/16/2021    BILITOT 0.54 08/16/2021       Pertinent Studies:    CT abdomen upper with IV contrast 08/14/2021  Impression:     1. Interval placement of pigtail drain into an abscess cavity seen anterior to the gallbladder. Abscess cavity is largely decompressed about the pigtail drain with mild surrounding soft tissue thickening. Again seen is subcutaneous soft tissue skin tract seen just lateral to the drain but no evidence of subcutaneous abscess.  2. Stable positioning of the more posterior drain with decompressed cavity about the drain. This drain does not  appear to be within the gallbladder.  3. 2 biliary stents with one biliary stent proximal loops seen within the gallbladder, and appears to be partially perforated outside the gallbladder. Gallbladder is largely decompressed with diffuse gallbladder wall thickening.  4. Stable minimal thrombosis seen within the portal vein. Cirrhotic configuration of liver with findings of portal hypertension.       Assessment/Plan:   1. Palliative care by specialist     Symptoms: Pain, nausea, sleep disturbance, constipation     Code Status: DNR     Goals of Care:   To continue with treatment.  Is hoping to go home with IV antibiotics.  Plans to follow-up with infectious disease and [facility] GI specialists here in [place] (GI comes to  for outreach).  [facility]     2.  Hepatocellular carcinoma  Has been receiving treatment with chemotherapy through [facility].  Hospitalized with perforation of gallbladder. See below.    3.  Perforation of the gallbladder  Due to internalized biliary stent with spillage of bile. Per records, non-operative gallbladder.  Status post ERCP on 8/14 with placement of 2 temporary stents in the common bile duct and the gallbladder.  As noted above patient plans to go home with IV antibiotics and establish care with [facility] at the [facility].     4.  Pain  Continue with scheduled MS Contin at current dose (15 mg every 12 hours) and as needed oral Dilaudid for breakthrough pain.  Would recommend reevaluation and possible up titration of MS Contin if patient needing 4 or more doses of Dilaudid per 24-hour.  [facility]     5.  Sleep disturbance  Continue with scheduled trazodone, this has been helpful for patient.     6.  Nausea  As needed Zofran available.     7.  Constipation  Have increased senna plus to 2 tablets 2 times daily, continue with scheduled MiraLAX.  Patient  also can resume lactulose at home if as he has found this helpful in the past.  Continue with Dulcolax suppositories as needed.          Lory Wong CNP   8/17/2021  1:54 PM

## 2021-08-17 NOTE — DISCHARGE SUMMARY
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 drain placement into this fluid collection.     Impression: IMPRESSION: 1.  Decompressed cavity about the right upper quadrant drain with spillage of contrast into the free intraperitoneal cavity with outlining bowel loops. No definite fistulous connection to bowel with no peristalsis. Contrast outlines the bowel loops and is not within the bowel loops. See above discussion with findings of recent CT imaging. 2.  Patient has developed new abscess cavity more anterior adjacent to the liver, in the location of the previously removed drain. Patient will require CT drain placement.    IR injection abscess catheter evaluation    Result Date: 7/19/2021  Narrative: Exam:  Abscess catheter check 07/19/2021 Clinical History: Routine check-up; Procedure/Views:  Utilizing fluoroscopy and digital imaging, the existing catheter was injected with contrast. Fluoroscopy time for the procedure was 0.3 minutes. 4 images are obtained. Comparison/s:  7/5/2021 Findings:  The catheter is in satisfactory position. Outputs recently from the catheter have been fairly low, 20 cc or less. Contrast injection shows immediate communication to the right colon. No significant flow into the stents  draining the biliary system is present. No significant cavity is present surrounding the catheter.     Impression: IMPRESSION:  1. No significant residual cavity remains surrounding the pigtail the catheter. However, there is immediate contrast opacification of the right colon. The catheter needs to remain in place.    CT abscess cyst liver    Result Date: 8/11/2021  Narrative: Exam: CT-guided right upper abdominal abscess drain placement Clinical History:  Abdominal abscess Fluoroscopy time: 10 seconds of CT fluoroscopy time. Dose reduction technique utilized; the mA was adjusted based on patient size. Medication: Lidocaine 5 ml sq. Complications: None Technique: The patient was informed of the risks and benefits of this procedure and provided written informed consent. Patient placed in supine  position and CT scanning used to rocio the proper location.  The right upper abdomen was prepped and draped in the usual sterile fashion, following the maximum sterile barrier technique; cap, mask, sterile gloves, large sterile sheet, hand hygiene, and antisepsis with 2% chlorhexidine (or acceptable alternative antiseptics per current guidelines) for cutaneous antisepsis were performed. During prepping the cutaneous abscess broke open, with significant drainage of bloody pus. Access site into the fluid collection was placed as far away from the subcutaneous abscess as possible. Lidocaine is used for local anesthesia.  After local anesthesia with lidocaine, CT guidance was used to place a 5 Kenyan Yueh needle into the right upper quadrant fluid collection. Wire was placed tract serially dilated and subsequently a 10 Kenyan Rodgers-Whitley drain was placed into the fluid collection. Ultimately 10 cc of bloody purulent material was aspirated and sent to lab for culture. Post procedure scanning showed decompressed abscess cavity.  The patient tolerated the procedure well and there was no immediate complication. Drain was secured  to the skin surface with a single 2-0 Prolene suture.     Impression: IMPRESSION: Successful CT-guided  right upper abdominal abscess drain placement. Bloody purulent material was aspirated and sent to lab for culture. During prepping for drain placement, the cutaneous abscess broke open and was oozing.         Microbiology Results (last 21 days)     Procedure Component Value - Date/Time    Anaerobic culture [[id number]]     Lab Status: No result Specimen: Body Fluid from Gallbladder     Body fluid culture [[id number]]     Lab Status: No result Specimen: Body Fluid from Gallbladder     Misc Body fluid culture w/ stain [[id number]]     Lab Status: No result Specimen: Body Fluid from Gallbladder     Narrative:      The following orders were created for panel order Misc Body fluid culture w/ stain.  Procedure                               Abnormality         Status                     ---------                               -----------         ------                     Body fluid culture[[id number]]                                                           Anaerobic culture[[id number]]                                                              Please view results for these tests on the individual orders.    Misc Body fluid culture w/ stain [[id number]]  (Abnormal) Collected: 08/11/21 1640    Lab Status: Final result Specimen: Body Fluid from Abdomen Updated: 08/14/21 1125    Narrative:      The following orders were created for panel order Misc Body fluid culture w/ stain.  Procedure                               Abnormality         Status                     ---------                               -----------         ------                     Body fluid culture[[id number]]            Abnormal            Final result               Anaerobic culture[[id number]]             Normal              Final result                 Please view results for these tests on the individual orders.    Body fluid culture [[id number]]  (Abnormal)   (Susceptibility) Collected: 08/11/21 1640    Lab Status: Final result Specimen: Body Fluid from Abdomen Updated: 08/13/21 0939     Culture Many (>50 Colonies) Citrobacter koseri      Many (>50 Colonies) Enterococcus faecalis     Gram Stain Result Many WBC per oil immersion field      No organisms seen    Susceptibility      Citrobacter koseri Enterococcus faecalis      EBONY EBONY      Amoxicillin + Clavulanate <=4/2  Susceptible        Ampicillin 16.0  Resistant <=2  Susceptible      Ampicillin + Sulbactam <=1/0.5  Susceptible        Cefazolin <=2  Susceptible        Cefotaxime <=2  Susceptible        Cefoxitin <=8  Susceptible        Ceftazidime <=1  Susceptible        Ceftriaxone <=1  Susceptible        Cefuroxime 8.0  Susceptible        Ciprofloxacin <=1  Susceptible        Gentamicin <=1  Susceptible        Gentamycin Synergy Screen   <=500  Susceptible      Penicillin   2.0  Susceptible      Piperacillin + Tazobactam <=4  Susceptible        Tetracycline <=4  Susceptible        Tobramycin <=1  Susceptible        Trimethoprim + Sulfamethoxazole <=2/38  Susceptible        Vancomycin   1.0  Susceptible                 Linear View                   Anaerobic culture [[id number]]  (Normal) Collected: 08/11/21 1640    Lab Status: Final result Specimen: Body Fluid from Abdomen Updated: 08/14/21 1125     Culture No anaerobic organism isolated    Blood culture, 1 set [[id number]] Collected: 08/11/21 1028    Lab Status: Final result Specimen: Blood, Venous Updated: 08/16/21 1052     Blood Culture No growth at 120 hours    Blood culture, 2 sets [[id number]] Collected: 08/11/21 1026    Lab Status: Final result Specimen: Blood, Venous Updated: 08/16/21 1052    Narrative:      The following orders were created for panel order Blood culture, 2 sets.  Procedure                               Abnormality         Status                     ---------                               -----------         ------                     Blood culture,  1 set[[id number]]                              Final result               Blood culture, 1 set[[id number]]                              Final result                 Please view results for these tests on the individual orders.    Blood culture, 1 set [[id number]] Collected: 08/11/21 1026    Lab Status: Final result Specimen: Blood, Venous Updated: 08/16/21 1052     Blood Culture No growth at 120 hours            A voice recognition program was used to aid in documentation of this record.  Sometimes words are not printed exactly as they were spoken.  While efforts were made to carefully edit and correct any inaccuracies, some errors may be present; please take these into context.  Please contact the provider if errors are identified.

## 2021-08-17 NOTE — PLAN OF CARE
Problem: Safety Adult - Fall  Goal: Free from fall injury  Description: INTERVENTIONS:    Inpatient - Please reference Cares/Safety Flowsheet under [facility] Fall Risk for interventions.  Pediatrics - Please reference Peds Daily Cares/Safety Flowsheet under [facility] Pediatric Fall Assessment Fall Bundle for interventions  LD/OB - Please reference OB Shift Screening Flowsheet under OB Fall Risk for interventions.  Outcome: Progressing     Problem: Gastrointestinal - Adult  Goal: Minimal or absence of nausea and vomiting  Description: INTERVENTIONS:  1. Ensure adequate hydration  2. Monitor intake and output  3. Maintain NPO status until nausea and vomiting are resolved  4. Nasogastric tube to low intermittent suction as ordered  5. Administer ordered antiemetic medications as needed  6. Provide nonpharmacologic comfort measures as appropriate  7. Advance diet as ordered  8. Nutrition consult as indicated   Outcome: Progressing  Goal: Maintains or returns to baseline digestive function  Description: INTERVENTIONS:  1. Assess bowel function  2. Ensure adequate hydration  3. Administer ordered medications as needed  4. Encourage mobilization and activity  5. Nutrition consult as indicated  6. Assess hydration and nutritional status  7. Assess characteristics and frequency of stool  8. Monitor for metabolic panel imbalances  9. Assess for treatment effectiveness  Outcome: Progressing  Goal: Maintains adequate nutritional intake  Description: INTERVENTIONS:  1. Monitor percentage of each meal consumed  2. Identify factors contributing to decreased intake, treat as appropriate  3. Assist with meals as needed  4. Monitor I&O, weight and lab values  5. Obtain nutritional consult as indicated  6. Administer alternative nutrition interventions as ordered  Outcome: Progressing     Problem: Skin/Tissue Integrity - Adult  Goal: Skin integrity remains intact  Description: INTERVENTIONS  1. Assess and document risk factors for  pressure ulcer development  2. Assess and document skin integrity  3. Monitor for areas of redness and/or skin breakdown  4. Initiate pressure ulcer prevention measures as indicated  Outcome: Progressing  Goal: Incisions, wounds, or drain sites healing without S/S of infection  Description: INTERVENTIONS  1. Assess and document risk factors for skin breakdown  2. Assess and document skin integrity  3. Assess and document dressing/incision, wound bed, drain sites and surrounding tissue  4. Implement wound care per orders  Outcome: Progressing     Problem: Hematologic - Adult  Goal: Maintains hematologic stability  Description: INTERVENTIONS  1. Assess for signs and symptoms of bleeding or hemorrhage  2. Monitor labs  3. Administer supportive blood products/factors as ordered and appropriate  4. Administer medications as ordered  5. Initiate bleeding precautions as indicated  6. Educate patient/family to report signs/symptoms of bleeding  Outcome: Progressing     Problem: Infection - Adult  Goal: Absence of infection during hospitalization  Description: INTERVENTIONS:  1. Assess and monitor for signs and symptoms of infection  2. Monitor lab/diagnostic results  3. Monitor all insertion sites/wounds/incisions  4. Monitor secretions for changes in amount and color  5. Administer medications as ordered  6. Educate and encourage patient and family to use good hand hygiene technique  7. Identify and educate in appropriate isolation precautions for identified infection/condition  Outcome: Progressing     Problem: Safety Adult  Goal: Patient will remain safe during hospitalization  Description: INTERVENTIONS    1. Assess patient for fall risk and implement interventions if needed  2. Use safe transport techniques  3. Assess patient using the appropriate Javon skin assessment scale  4. Assess patient for risk of aspiration  5. Assess patient for risk of elopement  6. Assess patient for risk of suicide  Outcome: Progressing      Problem: Daily Care  Goal: Daily care needs are met  Description: INTERVENTIONS:   1. Assess and monitor skin integrity  2. Identify patients at risk for skin breakdown on admission and per policy  3. Assess and monitor ability to perform self care and identify potential discharge needs  4. Assess skin integrity/risk for skin breakdown  5. Assist patient with activities of daily living as needed  6. Encourage independent activity per ability   7. Provide mouth care   8. Include patient/family/caregiver in decisions related to daily care   Outcome: Progressing     Problem: Mobility  Goal: LTG - Patient will ascend and descend stairs  Description: X7 with  B HR and SBA  Outcome: Progressing     Problem: Dressings Lower Extremities  Goal: STG - Patient to complete lower body dressing  Description: Mod (I) AE PRN   Outcome: Progressing     Problem: Transfers  Goal: STG - Patient will perform toilet transfer  Description: With supervision   Outcome: Progressing

## 2021-08-18 NOTE — TELEPHONE ENCOUNTER
Patient was discharged to home from [facility] on 8/17/21. Case management note says is referred to [facility] ([facility] 3 Gm q 6 h), PT/OT, GI-Hepatology [facility], ID and PCP.  No ID notes noted on this admission.

## 2021-08-19 NOTE — PROGRESS NOTES
Hematology/Medical Oncology Follow-Up    Patient Name: Ben Lai Sr  YOB: 1950  Medical Record Number: [id number]    DATE OF SERVICE:   8/19/2021    CHIEF COMPLAINT:  Ben Lai Sr is a 71 y.o. male with locally advanced hepatocellular carcinoma who presents today for follow-up and treatment.    ONCOLOGY HISTORY/ HISTORY OF PRESENT ILLNESS:  Oncology History   Hepatocellular carcinoma (CMS/HCC) (HCC)   1/23/2019 Initial Diagnosis    Hepatocellular carcinoma (CMS/HCC) (HCC)     7/30/2019 - 12/9/2019 Adjuvant Therapy    Hepatocellular - Sorafenib (Nexavar)  Plan Provider: ANDERSON Bryan  Treatment goal: Palliative  Line of treatment: [No plan line of treatment]     6/12/2020 Cancer Staged    Staging form: Liver, AJCC 8th Edition  - Clinical: Stage IIIB (cT4, cN0, cM0) - Signed by Milka Reynoso MD on 6/12/2020 12/31/2020 -  Adjuvant Therapy    Hepatocellular - Bevacizumab / Atezolizumab  Plan Provider: Milka Reynoso MD  Treatment goal: Palliative  Line of treatment: [No plan line of treatment]     diagnosed with hepatocellular carcinoma in November 2018.  He has an underlying hepatitis C which was supposedly cured.    Due to the extent of his HCC per GI at [facility] it was not amenable to local therapy, resection or radiation, referred to medical oncology   He was started on sorafenib early December 2018 but this appears to have been stopped in February due to significant side effects.  Patient states unequivocally that he started to feel better after a 3-week break but the sorafenib was recently restarted and he is having significant quality of life compromising side effects again.  This includes severe joint and muscle pain and fatigue.      7/29/19 restaging scans showed progressive disease in the liver, however no sings of metastatic disease      8/14/19 s/p   CT-guided  right lobe of the liver radiofrequency ablation.      9/2019 seen hepatobiliary team at  [facility], and was discussed for tumor board, planned to have Y90  Treatment      10/8/19 MRI abdomen showed Nodular appearing liver with 2 areas of hyper/early arterial enhancement suggestive of hepatocellular carcinoma are both adjacent to the gallbladder which contains filling defects which are enhancing suggestive of neoplasm. This could be primary cholangiocarcinoma or hepatocellular carcinoma which is invading the gallbladder. The liver and gallbladder masses are very similar to the study from 7/29/2019.  Wedge-shaped signal abnormality in the anterior right lobe of the liver similar to the prior study could represent hepatic infarct.     11/7/19 s/p Y90 treatment at [facility]      12/9/19 MRI abdomen showed Cirrhosis and evidence of portal hypertension. Posttreatment changes of radioembolization throughout the right hepatic lobe, with increased diffuse heterogeneous arterial enhancement. This is the patient's first posttreatment MRI. LR-TR equivocal. Continued attention on follow-up.  Grossly stable size of the lobulated enhancing endoluminal gallbladder mass with decreased but persistent areas of enhancing viable tumor.  Stable segment 4 exophytic 2.6 cm OPTN-5b lesion.  Unchanged right hepatic vein and right portal vein branch thrombi, without convincing thrombus enhancement.  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 Attention on follow-up.     12/11/19 s/p chemo embolization for possible residual viable tumor in the liver.     1/16/20  restaging MR showed  The circumscribed mass is seen in the anterior aspect of the right lobe of the liver demonstrates positive response to treatment with significantly reduced enhancement, no change in overall size.2.  Post radiation ablation defect in the anterior inferior right lobe with surrounding heterogeneous nonmass-like enhancement. It would be difficult to exclude active tumor within this surrounding area of  enhancement.  Significant change in appearance of the posterior segment of the right lobe of the liver. Previous infiltrative nodular enhancement, now geographic nonmass-like enhancement with parenchymal volume loss. This could represent posttreatment related change. Difficult to exclude residual/progressive tumor     4/21/20 MR abdomen showed Continued evolution of post embolic/radiotherapy changes to the right hepatic lobe with large region of volume loss of enhancement likely representing fibrosis.  Previous HCC anteriorly along this region continues to regress with no evidence of enhancement to suggest recurrent or residual tumor. Previous nodular lesion inferiorly along the treatment bed which also likely represented a site of HCC also continues to regress with no evidence of recurrence or residual disease. No new suspicious liver lesions are demonstrated. Cirrhosis. Splenomegaly. Portosystemic collaterals.     6/26/20 MR No significant MRI change in the treatment-related appearance of the right hepatic lobe since 4/21/2020. No MR evidence of recurrent hepatoma. Treated anterior right hepatic lobe lesion is redemonstrated. Treated lesion at right more posterior hepatic lobe is also seen.   Hepatic fibrosis.Cirrhosis, splenomegaly and portosystemic collaterals.     10/5/20 MR abdomen showed 2 treated right hepatic lobe lesions and associated scarring and capsular retraction. Since the previous examination, there is a new 4 mm enhancing nodule at the posterior aspect of the right anterior hepatic lobe treated lesion.  The enhancement characteristics are progressive and indeterminate and there is the hint of small restricted diffusion. Overall, a change in the appearance of this treated lesion which has otherwise been stable since 1/16/2020 is suspicious for recurrent tumor. However, the imaging characteristics themselves are nonspecific of the 4 mm nodule (with differential diagnosis including rounded scar), and  attention on short-term follow-up MRI of the abdomen with IV contrast in 3 months is recommended, along with correlation with AFP levels.     12/21/20 MR showed Amorphous enhancement has increased within the lower right hepatic region area of previous treated tumor. This enhancement pattern is worrisome for tumor progression the area. Correlation with recent AFP would be helpful. Considerable scar tissue and areas well.. In addition the lesion overlying the gallbladder fossa may involve the gallbladder which now appears to be markedly thickened and demonstrates considerable wall enhancement which appears to have changed since the previous study. Differential would include direct tumor invasion gallbladder and acute cholecystitis.      3/3/21 MR showed Very complex appearing situation in the region of the gallbladder fundus, subhepatic space extending to the hepatic flexure of the colon. There is concern for the possibility of extrahepatic malignancy and/or an inflammatory process in this area which could be affecting the gallbladder. There is a 2.8 cm fluid collection adjacent to the gallbladder. Cholecystitis would be another consideration. Additional correlation with contrast-enhanced CT may be helpful to better define some of the anatomy in this areaCapsular retraction, scarring and mild enhancement redemonstrated in right hepatic lobe. Most of the intrahepatic signal abnormalities do not appear dramatically changed.  3. There is still some biliary dilatation the right hepatic lobe with adjacent enhancement. Question cholangitis.  4. In addition there are small filling defects within the common bile duct. Possible choledocholithiasis. Currently there is not seen to be high-grade obstruction however. Common bile duct diameter approximately millimeters    -He is status post drain placement and antibiotic treatment after being hospitalized 3/4/21-3/10/21    5/5/21: MR abdomen  Showed  1.  The subhepatic inflammatory  process has improved. There are at least two subhepatic drains in place. No measurable collections are seen.  2.  Stable appearance of the liver. No new or progressive disease.    7/2021 scans with stable disease     INTERVAL HISTORY:  He was unfortunately hospitalized last week for right upper abdominal abscess status post drain placement.  He has been on IV Unasyn under the direction of infectious disease, recommended for total of 4 weeks.  Had ERCP done on August 14 and had 2 temporary stents placed in the common bile duct and gallbladder, there was concern for perforation of the gallbladder, he is at a surgical candidate to have this removed. He is hoping to follow with hepatology through [facility] who does outreach at [facility] instead of traveling to [facility]. He followed with palliative care while inpatient for pain and continues with MS Contin twice daily and Dilaudid as needed (has needed a few times since discharge). Will stop by Dr. Myers's office tomorrow to touch base. Follow up with Dr. Ramírez on Monday. 1 cc off #1 drain 15 cc off #2 today, 4 and 5 cc respectively yesterday.         Past Medical History:   Diagnosis Date   • A-fib (CMS/HCC) (HCC)    • Allergy    • Anxiety    • Arthritis    • Asthma    • Blindness of right eye    • BPH (benign prostatic hyperplasia)    • Cardiovascular disease    • Cirrhosis (CMS/HCC) (HCC)     with possible liver mass by MRI 5/18, rec repeat in 8/2018   • Complication of anesthesia    • Congestive heart failure (CMS/HCC) (HCC) 9/20/2019   • COPD (chronic obstructive pulmonary disease) (CMS/HCC) (HCC)    • Depression    • Endocrine disorder    • Gallstones    • Gastritis    • GERD (gastroesophageal reflux disease)    • Glaucoma    • Hearing loss     bilateral hearing aids   • Hemorrhoids    • Hepatitis C     Hep C, 2016 remission, complicated by cirrhosis.  MRI abd 5/30/18, rec 3 month follow up for focus of enhancement right and left hepatic lobe junction   • Hernia, abdominal    •  High blood pressure    • IBS (irritable bowel syndrome)    • Infectious viral hepatitis    • Jaundice    • Kidney stones    • Obstructive sleep apnea syndrome    • Periodic limb movement disorder    • Persistent hypersomnia    • Persistent insomnia    • Pneumonia    • PONV (postoperative nausea and vomiting)    • Type 2 diabetes mellitus (CMS/HCC) (HCC)    • Urinary incontinence    • Wears partial dentures      Past Surgical History:   Procedure Laterality Date   • COLONOSCOPY  10/27/2016    Pili, normal, repeat 10 years   • COLONOSCOPY N/A 5/11/2021    Procedure: COLONOSCOPY with polypectomy;  Surgeon: William Rivas MD;  Location: [facility];  Service: Endoscopy;  Laterality: N/A;   • CT ABLATION LIVER RADIOFREQUENCY  8/14/2019    CT ABLATION LIVER RADIOFREQUENCY 8/14/2019 [facility] MIS CT SCAN   • CT ABSCESS CYST LIVER  8/11/2021    CT ABSCESS CYST LIVER 8/11/2021 Mireya Jules MD [facility] MIS CT SCAN   • CT ABSCESS CYST PERITONEAL  3/5/2021    CT ABSCESS CYST PERITONEAL 3/5/2021 [facility] MIS CT SCAN   • ERCP N/A 8/14/2021    Procedure: ENDOSCOPIC RETROGRADE CHOLANGIOPANCREATOGRAPHY with stent removal,  balloon sweep, transcystic drainage of the gall bladder, and stent placement;  Surgeon: Sabas Heller MD;  Location: [facility];  Service: Endoscopy;  Laterality: N/A;   • ESOPHAGOGASTRODUODENOSCOPY N/A 2/22/2018    Procedure: EGD - ESOPHAGOGASTRODUODENOSCOPY with banding  x 2 with crna;  Surgeon: William Rivas MD;  Location: [facility];  Service: Endoscopy;  Laterality: N/A;   • ESOPHAGOGASTRODUODENOSCOPY N/A 4/6/2018    Procedure: EGD - ESOPHAGOGASTRODUODENOSCOPY with crna;  Surgeon: William Rivas MD;  Location: [facility];  Service: Endoscopy;  Laterality: N/A;   • ESOPHAGOGASTRODUODENOSCOPY N/A 5/7/2019    Procedure: EGD - ESOPHAGOGASTRODUODENOSCOPY;  Surgeon: William Rivas MD;  Location: [facility];  Service: Endoscopy;  Laterality: N/A;   • ESOPHAGOGASTRODUODENOSCOPY N/A 5/11/2021    Procedure:  ESOPHAGOGASTRODUODENOSCOPY;  Surgeon: William Rivas MD;  Location: [facility];  Service: Endoscopy;  Laterality: N/A;   • FL ERCP 1 DUCT  8/14/2021    FL ERCP 1 DUCT 8/14/2021 [facility] MIS FLUOROSCOPY   • HAND SURGERY Left     thumb   • HERNIA REPAIR  01/01/1965   • IR INJECTION ABSCESS CATHETER EVALUATION  4/2/2021    IR INJECTION ABSCESS CATHETER EVALUATION 4/2/2021 Mireya Jules MD [facility] MIS INTERVEN RAD   • IR INJECTION ABSCESS CATHETER EVALUATION  4/2/2021    IR INJECTION ABSCESS CATHETER EVALUATION 4/2/2021 Mireya Jules MD [facility] MIS INTERVEN RAD   • IR INJECTION ABSCESS CATHETER EVALUATION  5/5/2021    IR INJECTION ABSCESS CATHETER EVALUATION 5/5/2021 Chencho Wagner MD [facility] MIS INTERVEN RAD   • IR INJECTION ABSCESS CATHETER EVALUATION  5/5/2021    IR INJECTION ABSCESS CATHETER EVALUATION 5/5/2021 Chencho Wagner MD [facility] MIS INTERVEN RAD   • IR INJECTION ABSCESS CATHETER EVALUATION  5/18/2021    IR INJECTION ABSCESS CATHETER EVALUATION 5/18/2021 [facility] MIS INTERVEN RAD   • IR INJECTION ABSCESS CATHETER EVALUATION  5/18/2021    IR INJECTION ABSCESS CATHETER EVALUATION 5/18/2021 [facility] MIS INTERVEN RAD   • IR INJECTION ABSCESS CATHETER EVALUATION  6/1/2021    IR INJECTION ABSCESS CATHETER EVALUATION 6/1/2021 Chencho Wagner MD [facility] MIS INTERVEN RAD   • IR INJECTION ABSCESS CATHETER EVALUATION  6/7/2021    IR INJECTION ABSCESS CATHETER EVALUATION 6/7/2021 [facility] MIS INTERVEN RAD   • IR INJECTION ABSCESS CATHETER EVALUATION  7/5/2021    IR INJECTION ABSCESS CATHETER EVALUATION 7/5/2021 [facility] MIS INTERVEN RAD   • IR INJECTION ABSCESS CATHETER EVALUATION  7/19/2021    IR INJECTION ABSCESS CATHETER EVALUATION 7/19/2021 Sonny Danielle MD [facility] MIS INTERVEN RAD   • IR INJECTION ABSCESS CATHETER EVALUATION  8/11/2021    IR INJECTION ABSCESS CATHETER EVALUATION 8/11/2021 [facility] MIS INTERVEN RAD   • IR INJECTION CATHETER EXCHANGE  6/21/2021    IR INJECTION CATHETER EXCHANGE 6/21/2021 [facility] MIS INTERVEN RAD   • IR PICC LINE PLACEMENT W IMAGING OVER 5  YEARS OLD  8/16/2021    IR PICC LINE PLACEMENT W IMAGING OVER 5 YEARS OLD 8/16/2021 Sonny Danielle MD [facility] MIS INTERVEN RAD   • IR TUNNELED CENTRAL LINE WITH PORT  12/31/2020    IR TUNNELED CENTRAL LINE WITH PORT 12/31/2020 Sonny Danielle MD [facility] MIS INTERVEN RAD   • KNEE ARTHROSCOPY  09/19/2017    left knee, with partial medial meniscectomy; limited chondroplasty, patellofemoral joint   • LIVER BIOPSY      by Dr. Alejandre   • OTHER SURGICAL HISTORY      esophageal varices, banded   • VASECTOMY      at approx age of 25     Social History     Socioeconomic History   • Marital status:      Spouse name: Not on file   • Number of children: Not on file   • Years of education: Not on file   • Highest education level: Not on file   Occupational History   • Not on file   Tobacco Use   • Smoking status: Never Smoker   • Smokeless tobacco: Never Used   Substance and Sexual Activity   • Alcohol use: No   • Drug use: No   • Sexual activity: Defer   Other Topics Concern   • Not on file   Social History Narrative   • Not on file     Social Determinants of Health     Financial Resource Strain:    • Difficulty of Paying Living Expenses: Not on file   Food Insecurity:    • Worried About Running Out of Food in the Last Year: Not on file   • Ran Out of Food in the Last Year: Not on file   Transportation Needs:    • Lack of Transportation (Medical): Not on file   • Lack of Transportation (Non-Medical): Not on file   Physical Activity:    • Days of Exercise per Week: Not on file   • Minutes of Exercise per Session: Not on file   Stress:    • Feeling of Stress : Not on file   Social Connections:    • Frequency of Communication with Friends and Family: Not on file   • Frequency of Social Gatherings with Friends and Family: Not on file   • Attends Rastafari Services: Not on file   • Active Member of Clubs or Organizations: Not on file   • Attends Club or Organization Meetings: Not on file   • Marital Status: Not on file   Intimate  Partner Violence:    • Fear of Current or Ex-Partner: Not on file   • Emotionally Abused: Not on file   • Physically Abused: Not on file   • Sexually Abused: Not on file      ALLERGIES:     Allergies as of 08/19/2021 - Reviewed 08/19/2021   Allergen Reaction Noted   • Metformin Hives    • Adhesive tape-silicones  04/06/2018   • Interferon violetta-2a  02/05/2019   • Latex     • Mometasone     • Mometasone furoate     • Omeprazole     • Ondansetron hcl Nausea And Vomiting 05/01/2019   • Pepper (genus capsicum)  02/05/2019   • Primidone  05/01/2019   • Ribavirin Itching 10/19/2017   • Rosuvastatin  02/05/2019   • Sorafenib  04/04/2019   • Spironolactone     • Statins-hmg-coa reductase inhibitors Itching and GI intolerance 04/06/2018   • Interferon violetta (human leuk. derived) Palpitations and Rash 09/20/2019   • Pantoprazole Itching and Rash 02/28/2018   • Peginterferon violetta-2b Palpitations 10/19/2017   • Penicillins Other (see comments) 10/19/2017      MEDICATIONS:     Current Outpatient Medications   Medication Sig Dispense Refill   • ampicillin-sulbactam 3,000 mg in normal saline 100 mL IVPB Infuse 3,000 mg into a venous catheter every 6 (six) hours     • brimonidine (ALPHAGAN) 0.2 % ophthalmic solution Administer 1 drop into both eyes 2 (two) times a day Indications: for glaucoma At least 8 hours apart      • Narcan 4 mg/actuation spray,non-aerosol Administer 1 kit into one nostril as needed May repeat dose in other nostril if no response      • apixaban (ELIQUIS) 5 mg tablet Take 1 tablet (5 mg total) by mouth 2 (two) times a day 60 tablet 4   • morphine (MS CONTIN) 15 mg 12 hr tablet Take 15 mg by mouth 2 (two) times a day Indications: pain       • lidocaine-prilocaine (EMLA) cream Apply thick layer to intact skin over port 1 hour prior to procedure. Cover with occlusive dressing. 30 g 2   • HYDROmorphone (DILAUDID) 2 mg tablet Take 2-4 mg by mouth every 6 (six) hours as needed       • sildenafiL (VIAGRA) 100 mg  tablet Take 100 mg by mouth as needed for erectile dysfunction Indications: the inability to have an erection Up to 4 times monthly       • fluoride, sodium, (DENTAGEL) 1.1 % gel dental gel See administration instructions Apply small amount to toothbrush in place of regular toothpaste. Brush for 2 minutes in the morning and evening.     • polyethylene glycol (Miralax) 17 gram/dose powder Take 17 g by mouth daily     • carboxymethylcellulose (REFRESH PLUS) 0.5 % dropperette Administer 1 drop into both eyes every 3 (three) hours Indications: dry eye       • prochlorperazine (COMPAZINE) 10 mg tablet Take 1 tablet (10 mg total) by mouth every 6 (six) hours as needed for nausea or vomiting 30 tablet 5   • artificial saliva (YERBA GLADYS AND LYTES) aerosol,spray spray Apply 2 sprays to the mouth or throat as needed       • fluticasone propionate (FLONASE) 50 mcg/actuation nasal spray Administer 2 sprays into each nostril daily       • traZODone (DESYREL) 100 mg tablet Take 100 mg by mouth nightly as needed for sleep       • insulin glargine (Lantus Solostar U-100 Insulin) 100 unit/mL (3 mL) insulin pen injection pen Inject 24 Units under the skin every morning       • carvediloL (COREG) 3.125 mg tablet Take 1 tablet (3.125 mg total) by mouth 2 (two) times a day with meals 180 tablet 0   • cetirizine (ZyrTEC) 10 mg tablet Take 10 mg by mouth daily Indications: allergy symptoms       • buPROPion SR (WELLBUTRIN SR) 100 mg 12 hr tablet Take 1 tablet (100 mg total) by mouth daily 90 tablet 1   • lactulose (GENERLAC) 10 gram/15 mL solution Take 15 mL (10 g total) by mouth daily 473 mL 2   • terazosin (HYTRIN) 5 mg capsule Take 1 capsule (5 mg total) by mouth 2 (two) times a day 180 capsule 2   • blood-glucose meter (ACCU-CHEK ADELE PLUS METER) [facility] [place] test blood sugars 3 times daily (E11.65, non insulin depedent) AccuChek Adele plus, meter is 8 years old 1 each 0   • lisinopril (PRINIVIL,ZESTRIL) 20 mg tablet Take 1 tablet  "(20 mg total) by mouth daily 90 tablet 2   • furosemide (LASIX) 20 mg tablet Take 1 tablet (20 mg total) by mouth 2 (two) times a day. 180 tablet 2   • lansoprazole (PREVACID) 30 mg capsule Take 30 mg by mouth daily Indications: Gerd       • liraglutide (VICTOZA 2-JASE) 0.6 mg/0.1 mL (18 mg/3 mL) injection Inject 1.8 mg under the skin daily Indications: blood sugar control       • latanoprost (XALATAN) 0.005 % ophthalmic solution Administer 1 drop into both eyes nightly Indications: glaucoma   10 0     No current facility-administered medications for this visit.     25 minutes spent reconciling patient's medication list    REVIEW OF SYSTEMS:   The ECOG performance status today is .2 - Symptomatic, <50% confined to bed  Review of Systems   Constitutional: Positive for appetite change (low, doing the boost/ensure every couple of days) and fatigue. Negative for chills, diaphoresis, fever and unexpected weight change.   HENT:   Negative for hearing loss, lump/mass, mouth sores, nosebleeds, sore throat, tinnitus, trouble swallowing and voice change.    Eyes: Negative for eye problems and icterus.   Respiratory: Positive for cough. Negative for chest tightness, hemoptysis, shortness of breath and wheezing.    Cardiovascular: Negative for chest pain, leg swelling and palpitations.   Gastrointestinal: Positive for abdominal pain (RUQ, epigastric) and constipation (taking senna BID, miralax and lactulose). Negative for abdominal distention, blood in stool, diarrhea, nausea and vomiting.   Endocrine:        No chills, cold intolerance    Genitourinary: Negative for bladder incontinence, difficulty urinating, dysuria, frequency and hematuria.    Musculoskeletal: Positive for back pain (with cough-right posterior (using voltaren gel)) and gait problem (ambulating with walker). Negative for arthralgias, flank pain, myalgias and neck pain.        PT on hold right now   Skin: Negative for itching, rash and wound.        \"diaper " "rash\" from depends   Neurological: Positive for gait problem (ambulating with walker) and light-headedness (on occasion). Negative for dizziness, extremity weakness, headaches, numbness, seizures and speech difficulty.   Hematological: Negative for adenopathy. Does not bruise/bleed easily.   Psychiatric/Behavioral: Positive for depression ([facility] Negative for confusion, decreased concentration, sleep disturbance and suicidal ideas. The patient is not nervous/anxious.         \"brain fog\" stable overall, improves with rest   All other systems reviewed and are negative.      PHYSICAL EXAM:   /80 (BP Location: Right arm)   Pulse 77   Temp 36.8 °C (98.2 °F) (Oral)   Wt 82.1 kg (181 lb)   SpO2 92%   BMI 30.12 kg/m²    Pain RUQ 7-8/10 with being still up to a 8/10 with movement    Physical Exam  Constitutional:       General: He is not in acute distress.     Appearance: Normal appearance. He is well-developed.   HENT:      Head: Normocephalic.      Nose: Nose normal.   Eyes:      General: No scleral icterus.        Right eye: No discharge.         Left eye: No discharge.      Extraocular Movements: Extraocular movements intact.      Conjunctiva/sclera: Conjunctivae normal.   Neck:      Thyroid: No thyromegaly.   Cardiovascular:      Rate and Rhythm: Normal rate and regular rhythm.      Heart sounds: Normal heart sounds, S1 normal and S2 normal. No murmur heard.   No gallop.    Pulmonary:      Effort: Pulmonary effort is normal. No respiratory distress.      Breath sounds: Normal breath sounds. No decreased breath sounds, wheezing, rhonchi or rales.   Chest:      Chest wall: No tenderness.   Abdominal:      Comments: Two abdominal drains, dressings CDI   Musculoskeletal:         General: No swelling, tenderness or deformity. Normal range of motion.      Cervical back: Full passive range of motion without pain, normal range of motion and neck supple.      Right lower leg: No edema.      " Left lower leg: No edema.   Lymphadenopathy:      Head:      Right side of head: No submental, submandibular, tonsillar, preauricular, posterior auricular or occipital adenopathy.      Left side of head: No submental, submandibular, tonsillar, preauricular, posterior auricular or occipital adenopathy.      Cervical: No cervical adenopathy.      Upper Body:      Right upper body: No supraclavicular adenopathy.      Left upper body: No supraclavicular adenopathy.   Skin:     General: Skin is warm and dry.      Capillary Refill: Capillary refill takes less than 2 seconds.      Coloration: Skin is not jaundiced or pale.      Findings: No bruising, erythema, lesion, petechiae or rash.   Neurological:      General: No focal deficit present.      Mental Status: He is alert and oriented to person, place, and time.      Cranial Nerves: Cranial nerves are intact. No cranial nerve deficit.      Sensory: Sensation is intact.      Motor: Motor function is intact.      Coordination: Coordination is intact.      Gait: Gait is intact.   Psychiatric:         Attention and Perception: Attention and perception normal.         Mood and Affect: Mood and affect normal.         Speech: Speech normal.         Behavior: Behavior normal. Behavior is cooperative.         Thought Content: Thought content normal.         Cognition and Memory: Cognition and memory normal.         Judgment: Judgment normal.         LABORATORY DATA:     Lab Results   Component Value Date    WBC 2.3 (L) 08/16/2021    HGB 10.9 (L) 08/16/2021    HCT 33.1 (L) 08/16/2021     (L) 08/16/2021    RBC 3.95 (L) 08/16/2021    MCV 83.8 08/16/2021    MCH 27.7 (L) 08/16/2021    MCHC 33.0 08/16/2021    RDW 18.1 (H) 08/16/2021    MPV 7.5 08/16/2021    ANC 2.640 04/23/2020    NEUTROABS 1.40 08/14/2021     Lab Results   Component Value Date     08/16/2021    K 3.5 08/16/2021     08/16/2021    CO2 29 08/16/2021    BUN 8 08/16/2021    CREATININE 0.55 (L) 08/16/2021     GLUCOSE 132 (H) 08/16/2021    CALCIUM 8.1 (L) 08/16/2021    PROT 5.4 (L) 08/16/2021    ALBUMIN 2.8 (L) 08/16/2021    AST 31 08/16/2021    ALT 13 08/16/2021    ALKPHOS 180 (H) 08/16/2021    BILITOT 0.54 08/16/2021     Lab Results   Component Value Date    PT 17.6 (H) 08/16/2021    INR 1.5 (H) 08/16/2021     Lab Results   Component Value Date    TSH 2.411 07/08/2021     Reviewed    DIAGNOSTIC IMAGING:       Reviewed    IMPRESSION & PLAN:   1.  Hepatocellular carcinoma: Clinically, he appears to be stable with out signs of disease progression.  His labs are within acceptable limits.   -Proceed with cycle 1 atezolizumab 1200 mg IV alone. Due active abdominal infection bevacizumab will be held today  -Restage with MR abdomen in October 2021  -Plans to establish with hepatology through [facility] who does outreach at [facility]  2.  Abdominal infection: On Unasyn under the direction of infectious disease   -Follow up with Dr. Myers tomorrow  3.  Pain: Cancer-related, managed by PCP currently.    -Continue MS Contin 15 mg BID   -Continue hydromorphone as needed  -No new pains to report today.  4.  Constipation: Likely secondary to narcotic pain medication. Recent colonoscopy was normal with benign polyp  -Continue bowel protocol.  5.  Depression: On Wellbutrin. [facility]  -Follow up with our patient support team as well if needed.  6.  Follow-up in 3 weeks with repeat labs prior to the start of the next cycle. He will see Dr. Reynoso for this visit. He can be seen sooner if needed.  Instructed to call for questions or concerns.    On this date of service 45 minutes of total time was spent on this encounter. Greater than 50% of which was spent in counseling and coordination of care.    Electronically signed by: Chrystal Goodman, MARIELOS

## 2021-08-20 PROBLEM — I85.00 ESOPHAGEAL VARICES (CMS/HCC): Status: ACTIVE | Noted: 2021-01-01

## 2021-08-20 PROBLEM — K81.1 CHRONIC CHOLECYSTITIS: Status: ACTIVE | Noted: 2021-01-01

## 2021-08-20 PROBLEM — F43.23 ADJUSTMENT DISORDER WITH MIXED ANXIETY AND DEPRESSED MOOD: Status: ACTIVE | Noted: 2021-01-01

## 2021-08-20 PROBLEM — I48.0 PAROXYSMAL ATRIAL FIBRILLATION (CMS/HCC): Status: ACTIVE | Noted: 2021-01-01

## 2021-08-20 PROBLEM — Z79.01 LONG TERM (CURRENT) USE OF ANTICOAGULANTS: Status: ACTIVE | Noted: 2021-01-01

## 2021-08-20 NOTE — LETTER
[facility] INFECTIOUS DISEASES  [address] [facility] [address]  [phone]  Dept: [phone]  08/20/21    Collin Ramírez DO  [address]  [facility] [address]      Dear Dr. Ramírez,    I am writing to confirm that your patient, Ben Lai Sr, received care in my office on 08/20/21. I have enclosed a summary of the care provided to Ben for your reference.    Please contact me with any questions you may have regarding the visit.    Sincerely,         Jermaine Myers MD  [address] [address]  [phone]    CC: No Recipients

## 2021-08-20 NOTE — PROGRESS NOTES
INFECTIOUS DISEASES OUTPATIENT NOTE    Subjective     HPI:     Patient is 71 years old  male with a history of chronic hepatitis C/cirrhosis/hepatocellular carcinoma/biliary obstruction, gallbladder perforation with recurrent right upper quadrant intra-abdominal abscess.  I did see him back in February-March with the same presentation he had percutaneous drainage and treated with IV antibiotic followed with oral antibiotic.  Recently he was hospitalized because of increased swelling of the right upper quadrant, he was found to have an abscess, he had second percutaneous drain with improvement of his symptoms, also he underwent ERCP and placement of 2 temporary biliary stent by Dr. Heller.  Patient is to follow-up with the GI clinic in [facility].  Currently in the process of making appointment with [facility] GI clinic here in town (through [facility])  Overall he feels okay, he has generalized weakness malaise  Complaining of constipation despite he is on multiple laxatives including lactulose/MiraLAX/senna.  Intermittent abdominal cramps  The output from the old drain is minimal usually less than 5 mL/day  The output from the new drain ranging between 25 to 30 mL, mainly serosanguineous with significant sediment  Patient started on IV Unasyn with a plan to complete 4 weeks course  Currently has a PICC line in the left arm with no complication  No nausea vomiting  No acute cough dyspnea chest pain  No rash or itching  No fever or chills  No side effect with Unasyn IV    REVIEW OF SYSTEMS:  Pertinent positives and negatives are noted in the HPI above,   all other systems reviewed and were negative    PAST HISTORY:     Past Medical History:   Diagnosis Date   • A-fib (CMS/HCC) (HCC)    • Allergy    • Anxiety    • Arthritis    • Asthma    • Blindness of right eye    • BPH (benign prostatic hyperplasia)    • Cardiovascular disease    • Cirrhosis (CMS/HCC) (HCC)      with possible liver mass by MRI 5/18, rec repeat in 8/2018   • Complication of anesthesia    • Congestive heart failure (CMS/HCC) (HCC) 9/20/2019   • COPD (chronic obstructive pulmonary disease) (CMS/HCC) (HCC)    • Depression    • Endocrine disorder    • Gallstones    • Gastritis    • GERD (gastroesophageal reflux disease)    • Glaucoma    • Hearing loss     bilateral hearing aids   • Hemorrhoids    • Hepatitis C     Hep C, 2016 remission, complicated by cirrhosis.  MRI abd 5/30/18, rec 3 month follow up for focus of enhancement right and left hepatic lobe junction   • Hernia, abdominal    • High blood pressure    • IBS (irritable bowel syndrome)    • Infectious viral hepatitis    • Jaundice    • Kidney stones    • Obstructive sleep apnea syndrome    • Periodic limb movement disorder    • Persistent hypersomnia    • Persistent insomnia    • Pneumonia    • PONV (postoperative nausea and vomiting)    • Type 2 diabetes mellitus (CMS/HCC) (HCC)    • Urinary incontinence    • Wears partial dentures      Past Surgical History:   Procedure Laterality Date   • COLONOSCOPY  10/27/2016    Pili, normal, repeat 10 years   • COLONOSCOPY N/A 5/11/2021    Procedure: COLONOSCOPY with polypectomy;  Surgeon: William Rivas MD;  Location: [facility] Endoscopy;  Service: Endoscopy;  Laterality: N/A;   • CT ABLATION LIVER RADIOFREQUENCY  8/14/2019    CT ABLATION LIVER RADIOFREQUENCY 8/14/2019 [facility] MIS CT SCAN   • CT ABSCESS CYST LIVER  8/11/2021    CT ABSCESS CYST LIVER 8/11/2021 Mireya Jules MD [facility] MIS CT SCAN   • CT ABSCESS CYST PERITONEAL  3/5/2021    CT ABSCESS CYST PERITONEAL 3/5/2021 [facility] MIS CT SCAN   • ERCP N/A 8/14/2021    Procedure: ENDOSCOPIC RETROGRADE CHOLANGIOPANCREATOGRAPHY with stent removal,  balloon sweep, transcystic drainage of the gall bladder, and stent placement;  Surgeon: Sabas Heller MD;  Location: [facility] Endoscopy;  Service: Endoscopy;  Laterality: N/A;   • ESOPHAGOGASTRODUODENOSCOPY N/A 2/22/2018     Procedure: EGD - ESOPHAGOGASTRODUODENOSCOPY with banding  x 2 with crna;  Surgeon: William Rivas MD;  Location: [facility] Endoscopy;  Service: Endoscopy;  Laterality: N/A;   • ESOPHAGOGASTRODUODENOSCOPY N/A 4/6/2018    Procedure: EGD - ESOPHAGOGASTRODUODENOSCOPY with crna;  Surgeon: William Rivas MD;  Location: [facility] Endoscopy;  Service: Endoscopy;  Laterality: N/A;   • ESOPHAGOGASTRODUODENOSCOPY N/A 5/7/2019    Procedure: EGD - ESOPHAGOGASTRODUODENOSCOPY;  Surgeon: William Rivas MD;  Location: [facility] Endoscopy;  Service: Endoscopy;  Laterality: N/A;   • ESOPHAGOGASTRODUODENOSCOPY N/A 5/11/2021    Procedure: ESOPHAGOGASTRODUODENOSCOPY;  Surgeon: William Rivas MD;  Location: [facility] Endoscopy;  Service: Endoscopy;  Laterality: N/A;   • FL ERCP 1 DUCT  8/14/2021    FL ERCP 1 DUCT 8/14/2021 [facility] MIS FLUOROSCOPY   • HAND SURGERY Left     thumb   • HERNIA REPAIR  01/01/1965   • IR INJECTION ABSCESS CATHETER EVALUATION  4/2/2021    IR INJECTION ABSCESS CATHETER EVALUATION 4/2/2021 Mireya Jules MD [facility] MIS INTERVEN RAD   • IR INJECTION ABSCESS CATHETER EVALUATION  4/2/2021    IR INJECTION ABSCESS CATHETER EVALUATION 4/2/2021 Mireya Jules MD [facility] MIS INTERVEN RAD   • IR INJECTION ABSCESS CATHETER EVALUATION  5/5/2021    IR INJECTION ABSCESS CATHETER EVALUATION 5/5/2021 Chencho Wagner MD [facility] MIS INTERVEN RAD   • IR INJECTION ABSCESS CATHETER EVALUATION  5/5/2021    IR INJECTION ABSCESS CATHETER EVALUATION 5/5/2021 Chencho Wagner MD [facility] MIS INTERVEN RAD   • IR INJECTION ABSCESS CATHETER EVALUATION  5/18/2021    IR INJECTION ABSCESS CATHETER EVALUATION 5/18/2021 [facility] MIS INTERVEN RAD   • IR INJECTION ABSCESS CATHETER EVALUATION  5/18/2021    IR INJECTION ABSCESS CATHETER EVALUATION 5/18/2021 [facility] MIS INTERVEN RAD   • IR INJECTION ABSCESS CATHETER EVALUATION  6/1/2021    IR INJECTION ABSCESS CATHETER EVALUATION 6/1/2021 Chencho Wagner MD [facility] MIS INTERVEN RAD   • IR INJECTION ABSCESS CATHETER EVALUATION  6/7/2021    IR INJECTION ABSCESS CATHETER  EVALUATION 6/7/2021 [facility] MIS INTERVEN RAD   • IR INJECTION ABSCESS CATHETER EVALUATION  7/5/2021    IR INJECTION ABSCESS CATHETER EVALUATION 7/5/2021 [facility] MIS INTERVEN RAD   • IR INJECTION ABSCESS CATHETER EVALUATION  7/19/2021    IR INJECTION ABSCESS CATHETER EVALUATION 7/19/2021 Sonny Danielle MD [facility] MIS INTERVEN RAD   • IR INJECTION ABSCESS CATHETER EVALUATION  8/11/2021    IR INJECTION ABSCESS CATHETER EVALUATION 8/11/2021 [facility] MIS INTERVEN RAD   • IR INJECTION CATHETER EXCHANGE  6/21/2021    IR INJECTION CATHETER EXCHANGE 6/21/2021 [facility] MIS INTERVEN RAD   • IR PICC LINE PLACEMENT W IMAGING OVER 5 YEARS OLD  8/16/2021    IR PICC LINE PLACEMENT W IMAGING OVER 5 YEARS OLD 8/16/2021 Sonny Danielle MD [facility] INTERVEN RAD   • IR TUNNELED CENTRAL LINE WITH PORT  12/31/2020    IR TUNNELED CENTRAL LINE WITH PORT 12/31/2020 Sonny Danielle MD [facility] MIS INTERVEN RAD   • KNEE ARTHROSCOPY  09/19/2017    left knee, with partial medial meniscectomy; limited chondroplasty, patellofemoral joint   • LIVER BIOPSY      by Dr. Alejandre   • OTHER SURGICAL HISTORY      esophageal varices, banded   • VASECTOMY      at approx age of 25      Social History     Occupational History   • Not on file   Tobacco Use   • Smoking status: Never Smoker   • Smokeless tobacco: Never Used   Substance and Sexual Activity   • Alcohol use: No   • Drug use: No   • Sexual activity: Defer   Social History Narrative   • Not on file     Family History   Problem Relation Age of Onset   • Arthritis Mother    • Rheum arthritis Mother    • Diabetes Mother    • Rectal cancer Mother    • Other Paternal Grandmother    • Other Paternal Grandfather    • Diabetes Other    • Rheum arthritis Other    • Osteoporosis Other        ALLERGY:     Allergies   Allergen Reactions   • Metformin Hives   • Adhesive Tape-Silicones    • Interferon Jose Maria-2a    • Latex    • Mometasone    • Mometasone Furoate    • Omeprazole    • Ondansetron Hcl Nausea And Vomiting   • Pepper (Genus Capsicum)       Any Hot Peppers - can eat bell peppers   • Primidone      Other reaction(s): HEADACHE   • Ribavirin Itching   • Rosuvastatin    • Sorafenib      Other reaction(s): BURNING SENSATION, HEADACHE, JOINT PAIN   • Spironolactone    • Statins-Hmg-Coa Reductase Inhibitors Itching and GI intolerance   • Interferon Jose Maria (Human Leuk. Derived) Palpitations and Rash   • Pantoprazole Itching and Rash   • Peginterferon Jose Maria-2b Palpitations   • Penicillins Other (see comments)     Passed out after IM injection in service ?vasovagal reaction  Pt tolerates unasyn         MEDICATIONS:     Current Outpatient Medications   Medication Sig Dispense Refill   • ampicillin-sulbactam 3,000 mg in normal saline 100 mL IVPB Infuse 3,000 mg into a venous catheter every 6 (six) hours     • brimonidine (ALPHAGAN) 0.2 % ophthalmic solution Administer 1 drop into both eyes 2 (two) times a day Indications: for glaucoma At least 8 hours apart      • Narcan 4 mg/actuation spray,non-aerosol Administer 1 kit into one nostril as needed May repeat dose in other nostril if no response      • apixaban (ELIQUIS) 5 mg tablet Take 1 tablet (5 mg total) by mouth 2 (two) times a day 60 tablet 4   • morphine (MS CONTIN) 15 mg 12 hr tablet Take 15 mg by mouth 2 (two) times a day Indications: pain       • lidocaine-prilocaine (EMLA) cream Apply thick layer to intact skin over port 1 hour prior to procedure. Cover with occlusive dressing. 30 g 2   • HYDROmorphone (DILAUDID) 2 mg tablet Take 2-4 mg by mouth every 6 (six) hours as needed       • sildenafiL (VIAGRA) 100 mg tablet Take 100 mg by mouth as needed for erectile dysfunction Indications: the inability to have an erection Up to 4 times monthly       • fluoride, sodium, (DENTAGEL) 1.1 % gel dental gel See administration instructions Apply small amount to toothbrush in place of regular toothpaste. Brush for 2 minutes in the morning and evening.     • polyethylene glycol (Miralax) 17 gram/dose powder Take 17 g  by mouth daily     • carboxymethylcellulose (REFRESH PLUS) 0.5 % dropperette Administer 1 drop into both eyes every 3 (three) hours Indications: dry eye       • prochlorperazine (COMPAZINE) 10 mg tablet Take 1 tablet (10 mg total) by mouth every 6 (six) hours as needed for nausea or vomiting 30 tablet 5   • artificial saliva (YERBA GLADYS AND LYTES) aerosol,spray spray Apply 2 sprays to the mouth or throat as needed       • fluticasone propionate (FLONASE) 50 mcg/actuation nasal spray Administer 2 sprays into each nostril daily       • traZODone (DESYREL) 100 mg tablet Take 100 mg by mouth nightly as needed for sleep       • insulin glargine (Lantus Solostar U-100 Insulin) 100 unit/mL (3 mL) insulin pen injection pen Inject 24 Units under the skin every morning       • carvediloL (COREG) 3.125 mg tablet Take 1 tablet (3.125 mg total) by mouth 2 (two) times a day with meals 180 tablet 0   • cetirizine (ZyrTEC) 10 mg tablet Take 10 mg by mouth daily Indications: allergy symptoms       • buPROPion SR (WELLBUTRIN SR) 100 mg 12 hr tablet Take 1 tablet (100 mg total) by mouth daily 90 tablet 1   • lactulose (GENERLAC) 10 gram/15 mL solution Take 15 mL (10 g total) by mouth daily 473 mL 2   • terazosin (HYTRIN) 5 mg capsule Take 1 capsule (5 mg total) by mouth 2 (two) times a day 180 capsule 2   • lisinopril (PRINIVIL,ZESTRIL) 20 mg tablet Take 1 tablet (20 mg total) by mouth daily 90 tablet 2   • furosemide (LASIX) 20 mg tablet Take 1 tablet (20 mg total) by mouth 2 (two) times a day. 180 tablet 2   • lansoprazole (PREVACID) 30 mg capsule Take 30 mg by mouth daily Indications: Gerd       • liraglutide (VICTOZA 2-JASE) 0.6 mg/0.1 mL (18 mg/3 mL) injection Inject 1.8 mg under the skin daily Indications: blood sugar control       • latanoprost (XALATAN) 0.005 % ophthalmic solution Administer 1 drop into both eyes nightly Indications: glaucoma   10 0   • blood-glucose meter (ACCU-CHEK ADELE PLUS METER) [facility] [place] Use to test blood  "sugars 3 times daily (E11.65, non insulin depedent) AccuChek Annabella plus, meter is 8 years old 1 each 0     No current facility-administered medications for this visit.       Objective     PHYSICAL EXAMINATION:     VITALS: /78 (BP Location: Right arm, Cuff Size: Regular Adult)   Pulse 72   Resp 18   Ht 1.651 m (5' 5\")   SpO2 95%   BMI 30.12 kg/m²      EXAM:    GENERAL: No acute distress, well developed, chronically ill-appearing  HEENT: normal external nose and ears,   RESPIRATORY: non-laboured, breathing   CARDIOVASCULAR: Normal rate, PICC line in left arm with no complication  GASTROINTESTINAL: Soft, 2 drains in the right upper quadrant (see above)  MUSCULOSKELETAL: no acute inflammatory joint  SKIN: No rash,   NEUROLOGIC: Alert and oriented, grossly no focal deficit  PSYCHIATRIC: Normal affect and cognition    DIAGNOSTIC DATA:      Lab data/culture data reviewed by me:    Microbiology data:    Results from last 4 days   Lab Units 08/19/21  1254 08/16/21  0427   WBC AUTO 10*3/uL 2.8* 2.3*   HEMOGLOBIN g/dL 10.9* 10.9*   HEMATOCRIT % 33.4* 33.1*   PLATELETS AUTO 10*3/uL 102* 107*     Results from last 4 days   Lab Units 08/19/21  1254 08/16/21  0427   SODIUM mmol/L 135 138   POTASSIUM mmol/L 3.6 3.5   CO2 mmol/L 26 29   BUN mg/dL 9 8   CREATININE mg/dL 0.62* 0.55*   CALCIUM mg/dL 8.2* 8.1*   TOTAL PROTEIN g/dL 5.8* 5.4*   BILIRUBIN TOTAL mg/dL 0.74 0.54   ALK PHOS U/L 184* 180*   ALT U/L 16 13   AST U/L 37 31           IMAGING:      I reviewed the images personally.  CT scan of abdomen pelvis done during his previous hospitalization which showed subcutaneous fluid collection as well as right upper quadrant intra-abdominal fluid collection, status post drain placement    Assessment/Plan     ASSESSMENT/PLAN:      Diagnosis Plan   1. Intra-abdominal abscess (CMS/HCC) (HCC)     2. Polymicrobial bacterial infection     3. Hepatocellular carcinoma (CMS/HCC) (HCC)     4. Constipation, unspecified constipation " type         At this point we will continue with Unasyn IV to complete 4 weeks course with expected end date 9/13/2021  To see me in the clinic on 9/10 determine if we need to have him on some sort of suppressive therapy  Patient is in the process to be scheduled with [facility] GI clinic  I advised patient to increase the dosage of lactulose to 2 tablespoon 3 times daily and to continue with MiraLAX and senna      Total time - 35 minutes. More than 50% of time spent in direct patient care, care coordination, patient counseling, and formalizing plan of care (as above).        Jermaine Myers MD  8/20/2021

## 2021-08-20 NOTE — LETTER
[facility] INFECTIOUS DISEASES  [address] [id number] [phone]  Dept: [phone]  08/20/21    Stephanie Alfaro DO  [address] [address]      Dear Dr. Alfaro,    Thank you for referring your patient, Ben Lai , to receive care in my office. I have enclosed a summary of the care provided to Ben on 08/20/21.    Please contact me with any questions you may have regarding the visit.    Sincerely,         Jermaine Myers MD  [address] [id number] [phone]    CC: No Recipients

## 2021-08-23 NOTE — POST-PROCEDURE NOTE
Post Operative Note    Operation : Fluoroscopic abscess   Drain / Tube Exchange and Check    Patient Name: Ben Lai      : 1950    Radiologist: JIM TINEO MD    Pre-operative Diagnosis: abscess    Post-operative Diagnosis: Same    Anesthesia Type: SQ Lidocaine     Estimated Blood Loss: 0 mL    Findings: communication with biliary system    Specimens Removed:  None    Complications:  None; patient tolerated the procedure well.    Prosthetic devices, implanted devices: None

## 2021-09-03 NOTE — TELEPHONE ENCOUNTER
Laura called in to touch base with me about Jakub.  Been a rough week with a couple of falls from which he is sore.  She has questions about his drains , who is helping her with them , will his PICC come out next week?  We will talk about this next week when they come in to clinic. Reassured her that all her questions will be addressed, asked her to record the color of the fluid coming out of his drains and to watch for s/s of fever and chills.

## 2021-09-09 NOTE — PROGRESS NOTES
Spoke with patient and his wife about recommendations from Dr. Tompkins. He is wanting patient to be off of the Atezolizumab for 6 weeks prior to surgery and they are trying to get him scheduled for the end of the month. We cancelled todays appts and his appts on 9/30/21. We will keep his future appts the way they are scheduled until wife calls us when he is done with surgery. JOVON Thompson

## 2021-09-10 NOTE — PROGRESS NOTES
INFECTIOUS DISEASES OUTPATIENT NOTE    Subjective     HPI:     9/10/2021 today note  Patient came today for follow-up.  Was seen by [facility] surgeon on Tuesday with plan to proceed with cholecystectomy in the end of this month.  Patient currently on IV antibiotic as a treatment of intra-abdominal abscess, currently has 2 drains, acute #1 with no significant output, acute #2 with significant output approximately 40-50 mL/day.  Patient has chronic pain in the right upper quadrant area with no significant change  No fever chills  He has a PICC line in the left arm with no complication  No rash or itching  No significant nausea or vomiting, no change of the bowel habit    8/20/2021 note  Patient is 71 years old  male with a history of chronic hepatitis C/cirrhosis/hepatocellular carcinoma/biliary obstruction, gallbladder perforation with recurrent right upper quadrant intra-abdominal abscess.  I did see him back in February-March with the same presentation he had percutaneous drainage and treated with IV antibiotic followed with oral antibiotic.  Recently he was hospitalized because of increased swelling of the right upper quadrant, he was found to have an abscess, he had second percutaneous drain with improvement of his symptoms, also he underwent ERCP and placement of 2 temporary biliary stent by Dr. Heller.  Patient is to follow-up with the GI clinic in [facility].  Currently in the process of making appointment with [facility] GI clinic here in town (through [facility])  Overall he feels okay, he has generalized weakness malaise  Complaining of constipation despite he is on multiple laxatives including lactulose/MiraLAX/senna.  Intermittent abdominal cramps  The output from the old drain is minimal usually less than 5 mL/day  The output from the new drain ranging between 25 to 30 mL, mainly serosanguineous with significant sediment  Patient  started on IV Unasyn with a plan to complete 4 weeks course  Currently has a PICC line in the left arm with no complication  No nausea vomiting  No acute cough dyspnea chest pain  No rash or itching  No fever or chills  No side effect with Unasyn IV    REVIEW OF SYSTEMS:  Pertinent positives and negatives are noted in the HPI above,   all other systems reviewed and were negative    PAST HISTORY:     Past Medical History:   Diagnosis Date   • A-fib (CMS/HCC) (HCC)    • Allergy    • Anxiety    • Arthritis    • Asthma    • Blindness of right eye    • BPH (benign prostatic hyperplasia)    • Cardiovascular disease    • Cirrhosis (CMS/HCC) (HCC)     with possible liver mass by MRI 5/18, rec repeat in 8/2018   • Complication of anesthesia    • Congestive heart failure (CMS/HCC) (HCC) 9/20/2019   • COPD (chronic obstructive pulmonary disease) (CMS/HCC) (HCC)    • Depression    • Endocrine disorder    • Gallstones    • Gastritis    • GERD (gastroesophageal reflux disease)    • Glaucoma    • Hearing loss     bilateral hearing aids   • Hemorrhoids    • Hepatitis C     Hep C, 2016 remission, complicated by cirrhosis.  MRI abd 5/30/18, rec 3 month follow up for focus of enhancement right and left hepatic lobe junction   • Hernia, abdominal    • High blood pressure    • IBS (irritable bowel syndrome)    • Infectious viral hepatitis    • Jaundice    • Kidney stones    • Obstructive sleep apnea syndrome    • Periodic limb movement disorder    • Persistent hypersomnia    • Persistent insomnia    • Pneumonia    • PONV (postoperative nausea and vomiting)    • Type 2 diabetes mellitus (CMS/HCC) (HCC)    • Urinary incontinence    • Wears partial dentures      Past Surgical History:   Procedure Laterality Date   • COLONOSCOPY  10/27/2016    Pili, normal, repeat 10 years   • COLONOSCOPY N/A 5/11/2021    Procedure: COLONOSCOPY with polypectomy;  Surgeon: William Rivas MD;  Location: [facility] Endoscopy;  Service: Endoscopy;  Laterality: N/A;    • CT ABLATION LIVER RADIOFREQUENCY  8/14/2019    CT ABLATION LIVER RADIOFREQUENCY 8/14/2019 [facility] MIS CT SCAN   • CT ABSCESS CYST LIVER  8/11/2021    CT ABSCESS CYST LIVER 8/11/2021 Mireya Jules MD [facility] MIS CT SCAN   • CT ABSCESS CYST PERITONEAL  3/5/2021    CT ABSCESS CYST PERITONEAL 3/5/2021 [facility] CT SCAN   • ERCP N/A 8/14/2021    Procedure: ENDOSCOPIC RETROGRADE CHOLANGIOPANCREATOGRAPHY with stent removal,  balloon sweep, transcystic drainage of the gall bladder, and stent placement;  Surgeon: Sabas Heller MD;  Location: [facility] Endoscopy;  Service: Endoscopy;  Laterality: N/A;   • ESOPHAGOGASTRODUODENOSCOPY N/A 2/22/2018    Procedure: EGD - ESOPHAGOGASTRODUODENOSCOPY with banding  x 2 with crna;  Surgeon: William Rivas MD;  Location: [facility] Endoscopy;  Service: Endoscopy;  Laterality: N/A;   • ESOPHAGOGASTRODUODENOSCOPY N/A 4/6/2018    Procedure: EGD - ESOPHAGOGASTRODUODENOSCOPY with crna;  Surgeon: William Rivas MD;  Location: [facility] Endoscopy;  Service: Endoscopy;  Laterality: N/A;   • ESOPHAGOGASTRODUODENOSCOPY N/A 5/7/2019    Procedure: EGD - ESOPHAGOGASTRODUODENOSCOPY;  Surgeon: William Rivas MD;  Location: [facility] Endoscopy;  Service: Endoscopy;  Laterality: N/A;   • ESOPHAGOGASTRODUODENOSCOPY N/A 5/11/2021    Procedure: ESOPHAGOGASTRODUODENOSCOPY;  Surgeon: William Rivas MD;  Location: [facility] Endoscopy;  Service: Endoscopy;  Laterality: N/A;   • FL ERCP 1 DUCT  8/14/2021    FL ERCP 1 DUCT 8/14/2021 [facility] MIS FLUOROSCOPY   • HAND SURGERY Left     thumb   • HERNIA REPAIR  01/01/1965   • IR INJECTION ABSCESS CATHETER EVALUATION  4/2/2021    IR INJECTION ABSCESS CATHETER EVALUATION 4/2/2021 Mireya Jules MD [facility] MIS INTERVEN RAD   • IR INJECTION ABSCESS CATHETER EVALUATION  4/2/2021    IR INJECTION ABSCESS CATHETER EVALUATION 4/2/2021 Mireya Jules MD [facility] MIS INTERVEN RAD   • IR INJECTION ABSCESS CATHETER EVALUATION  5/5/2021    IR INJECTION ABSCESS CATHETER EVALUATION 5/5/2021 Chencho Wagner MD [facility] MIS INTERVEN RAD    • IR INJECTION ABSCESS CATHETER EVALUATION  5/5/2021    IR INJECTION ABSCESS CATHETER EVALUATION 5/5/2021 Chencho Wagner MD [facility] MIS INTERVEN RAD   • IR INJECTION ABSCESS CATHETER EVALUATION  5/18/2021    IR INJECTION ABSCESS CATHETER EVALUATION 5/18/2021 [facility] MIS INTERVEN RAD   • IR INJECTION ABSCESS CATHETER EVALUATION  5/18/2021    IR INJECTION ABSCESS CATHETER EVALUATION 5/18/2021 [facility] MIS INTERVEN RAD   • IR INJECTION ABSCESS CATHETER EVALUATION  6/1/2021    IR INJECTION ABSCESS CATHETER EVALUATION 6/1/2021 Chencho Wagner MD [facility] MIS INTERVEN RAD   • IR INJECTION ABSCESS CATHETER EVALUATION  6/7/2021    IR INJECTION ABSCESS CATHETER EVALUATION 6/7/2021 [facility] MIS INTERVEN RAD   • IR INJECTION ABSCESS CATHETER EVALUATION  7/5/2021    IR INJECTION ABSCESS CATHETER EVALUATION 7/5/2021 [facility] MIS INTERVEN RAD   • IR INJECTION ABSCESS CATHETER EVALUATION  7/19/2021    IR INJECTION ABSCESS CATHETER EVALUATION 7/19/2021 Sonny Danielle MD [facility] MIS INTERVEN RAD   • IR INJECTION ABSCESS CATHETER EVALUATION  8/11/2021    IR INJECTION ABSCESS CATHETER EVALUATION 8/11/2021 [facility] MIS INTERVEN RAD   • IR INJECTION CATHETER EXCHANGE  6/21/2021    IR INJECTION CATHETER EXCHANGE 6/21/2021 [facility] MIS INTERVEN RAD   • IR INJECTION CATHETER EXCHANGE  8/23/2021    IR INJECTION CATHETER EXCHANGE 8/23/2021 Junior Meza MD [facility] MIS INTERVEN RAD   • IR PICC LINE PLACEMENT W IMAGING OVER 5 YEARS OLD  8/16/2021    IR PICC LINE PLACEMENT W IMAGING OVER 5 YEARS OLD 8/16/2021 Sonny Danielle MD [facility] MIS INTERVEN RAD   • IR TUNNELED CENTRAL LINE WITH PORT  12/31/2020    IR TUNNELED CENTRAL LINE WITH PORT 12/31/2020 Sonny Danielle MD [facility] MIS INTERVEN RAD   • KNEE ARTHROSCOPY  09/19/2017    left knee, with partial medial meniscectomy; limited chondroplasty, patellofemoral joint   • LIVER BIOPSY      by Dr. Alejandre   • OTHER SURGICAL HISTORY      esophageal varices, banded   • VASECTOMY      at approx age of 25      Social History     Occupational History   • Not  on file   Tobacco Use   • Smoking status: Never Smoker   • Smokeless tobacco: Never Used   Substance and Sexual Activity   • Alcohol use: No   • Drug use: No   • Sexual activity: Defer   Social History Narrative   • Not on file     Family History   Problem Relation Age of Onset   • Arthritis Mother    • Rheum arthritis Mother    • Diabetes Mother    • Rectal cancer Mother    • Other Paternal Grandmother    • Other Paternal Grandfather    • Diabetes Other    • Rheum arthritis Other    • Osteoporosis Other        ALLERGY:     Allergies   Allergen Reactions   • Metformin Hives   • Adhesive Tape-Silicones    • Interferon Jose Maria-2a    • Latex    • Mometasone    • Mometasone Furoate    • Omeprazole    • Ondansetron Hcl Nausea And Vomiting   • Pepper (Genus Capsicum)      Any Hot Peppers - can eat bell peppers   • Primidone      Other reaction(s): HEADACHE   • Ribavirin Itching   • Rosuvastatin    • Sorafenib      Other reaction(s): BURNING SENSATION, HEADACHE, JOINT PAIN   • Spironolactone    • Statins-Hmg-Coa Reductase Inhibitors Itching and GI intolerance   • Interferon Jose Maria (Human Leuk. Derived) Palpitations and Rash   • Pantoprazole Itching and Rash   • Peginterferon Jose Maria-2b Palpitations   • Penicillins Other (see comments)     Passed out after IM injection in service ?vasovagal reaction  Pt tolerates unasyn         MEDICATIONS:     Current Outpatient Medications   Medication Sig Dispense Refill   • Normal Saline Flush injection      • ampicillin-sulbactam 3,000 mg in normal saline 100 mL IVPB Infuse 3,000 mg into a venous catheter every 6 (six) hours     • brimonidine (ALPHAGAN) 0.2 % ophthalmic solution Administer 1 drop into both eyes 2 (two) times a day Indications: for glaucoma At least 8 hours apart      • Narcan 4 mg/actuation spray,non-aerosol Administer 1 kit into one nostril as needed May repeat dose in other nostril if no response      • apixaban (ELIQUIS) 5 mg tablet Take 1 tablet (5 mg total) by mouth 2  (two) times a day 60 tablet 4   • morphine (MS CONTIN) 15 mg 12 hr tablet Take 15 mg by mouth 2 (two) times a day Indications: pain       • lidocaine-prilocaine (EMLA) cream Apply thick layer to intact skin over port 1 hour prior to procedure. Cover with occlusive dressing. 30 g 2   • HYDROmorphone (DILAUDID) 2 mg tablet Take 2-4 mg by mouth every 6 (six) hours as needed       • sildenafiL (VIAGRA) 100 mg tablet Take 100 mg by mouth as needed for erectile dysfunction Indications: the inability to have an erection Up to 4 times monthly       • fluoride, sodium, (DENTAGEL) 1.1 % gel dental gel See administration instructions Apply small amount to toothbrush in place of regular toothpaste. Brush for 2 minutes in the morning and evening.     • polyethylene glycol (Miralax) 17 gram/dose powder Take 17 g by mouth daily     • carboxymethylcellulose (REFRESH PLUS) 0.5 % dropperette Administer 1 drop into both eyes every 3 (three) hours Indications: dry eye       • prochlorperazine (COMPAZINE) 10 mg tablet Take 1 tablet (10 mg total) by mouth every 6 (six) hours as needed for nausea or vomiting 30 tablet 5   • artificial saliva (YERBA GLADYS AND LYTES) aerosol,spray spray Apply 2 sprays to the mouth or throat as needed       • fluticasone propionate (FLONASE) 50 mcg/actuation nasal spray Administer 2 sprays into each nostril daily       • traZODone (DESYREL) 100 mg tablet Take 100 mg by mouth nightly as needed for sleep       • insulin glargine (Lantus Solostar U-100 Insulin) 100 unit/mL (3 mL) insulin pen injection pen Inject 24 Units under the skin every morning       • carvediloL (COREG) 3.125 mg tablet Take 1 tablet (3.125 mg total) by mouth 2 (two) times a day with meals 180 tablet 0   • cetirizine (ZyrTEC) 10 mg tablet Take 10 mg by mouth daily Indications: allergy symptoms       • buPROPion SR (WELLBUTRIN SR) 100 mg 12 hr tablet Take 1 tablet (100 mg total) by mouth daily 90 tablet 1   • lactulose (GENERLAC) 10 gram/15  "mL solution Take 15 mL (10 g total) by mouth daily 473 mL 2   • terazosin (HYTRIN) 5 mg capsule Take 1 capsule (5 mg total) by mouth 2 (two) times a day 180 capsule 2   • blood-glucose meter (ACCU-CHEK ADELE PLUS METER) [facility] Use to test blood sugars 3 times daily (E11.65, non insulin depedent) AccuChek Adele plus, meter is 8 years old 1 each 0   • lisinopril (PRINIVIL,ZESTRIL) 20 mg tablet Take 1 tablet (20 mg total) by mouth daily 90 tablet 2   • furosemide (LASIX) 20 mg tablet Take 1 tablet (20 mg total) by mouth 2 (two) times a day. 180 tablet 2   • lansoprazole (PREVACID) 30 mg capsule Take 30 mg by mouth daily Indications: Gerd       • liraglutide (VICTOZA 2-JASE) 0.6 mg/0.1 mL (18 mg/3 mL) injection Inject 1.8 mg under the skin daily Indications: blood sugar control       • latanoprost (XALATAN) 0.005 % ophthalmic solution Administer 1 drop into both eyes nightly Indications: glaucoma   10 0     No current facility-administered medications for this visit.       Objective     PHYSICAL EXAMINATION:     VITALS: /78 (BP Location: Left arm, Cuff Size: Large Adult)   Pulse 74   Temp 36.7 °C (98.1 °F)   Resp 16   Ht 1.651 m (5' 5\")   SpO2 96%   BMI 30.12 kg/m²      EXAM:    GENERAL: No acute distress, well developed, chronically ill-appearing  HEENT: normal external nose and ears,   RESPIRATORY: non-laboured, breathing   CARDIOVASCULAR: Normal rate, PICC line in left arm with no complication  GASTROINTESTINAL: Soft, 2 drains in the right upper quadrant (see above)  MUSCULOSKELETAL: no acute inflammatory joint  SKIN: No rash,   NEUROLOGIC: Alert and oriented, grossly no focal deficit  PSYCHIATRIC: Normal affect and cognition    DIAGNOSTIC DATA:      Lab data/culture data reviewed by me:    Microbiology data:    Results from last 4 days   Lab Units 09/07/21  0843   WBC AUTO 10*3/uL 2.3*   HEMOGLOBIN g/dL 11.1*   HEMATOCRIT % 33.8*   PLATELETS AUTO 10*3/uL 87*     Results from last 4 days   Lab Units " 09/07/21  0843   SODIUM mmol/L 136   POTASSIUM mmol/L 3.6   CO2 mmol/L 27   BUN mg/dL 10   CREATININE mg/dL 0.69*   CALCIUM mg/dL 8.4*   TOTAL PROTEIN g/dL 6.2   BILIRUBIN TOTAL mg/dL 2.10*   ALK PHOS U/L 395*   ALT U/L 49   AST U/L 101*             Assessment/Plan     ASSESSMENT/PLAN:      Diagnosis Plan   1. Intra-abdominal abscess (CMS/HCC) (HCC)     2. Polymicrobial bacterial infection     3. Hepatocellular carcinoma (CMS/HCC) (HCC)         Since patient will undergo cholecystectomy and he continued to have significant output from percutaneous drain will extend the course of the IV antibiotic to the end of the month,  To contact my office if not any side effect with the Unasyn IV  To contact my office with an update on his surgery date      Total time - 25 minutes. More than 50% of time spent in direct patient care, care coordination, patient counseling, and formalizing plan of care (as above).        Jermaine Myers MD  9/10/2021

## 2021-09-10 NOTE — INTERDISCIPLINARY/THERAPY
Patient currently on Unasyn 3gm IV q6hrs for treatment of intra-abdominal abcess and Saw Dr. Myers this morning.  Stacy Watson RN for Dr. Myers called to extend treatment through 9/30/21.  See progress note for further information.  There is no follow-up appointment currently scheduled.

## 2021-09-10 NOTE — LETTER
[facility] INFECTIOUS DISEASES  [address] [id number] [phone]  Dept: [phone]  09/10/21    Pedro Cr DO  [address] [address]      Dear Dr. Cr,    Thank you for referring your patient, Ben Lai , to receive care in my office. I have enclosed a summary of the care provided to Ben on 09/10/21.    Please contact me with any questions you may have regarding the visit.    Sincerely,         Jermaine Myers MD  [address] [id number] [phone]    CC: No Recipients

## 2021-09-10 NOTE — LETTER
[facility] INFECTIOUS DISEASES  [address] [facility] [address]  [phone]  Dept: [phone]  09/10/21    Collin Ramírez DO  [address]  [place] [facility] [address]      Dear Dr. Ramírez,    I am writing to confirm that your patient, Ben Lai Sr, received care in my office on 09/10/21. I have enclosed a summary of the care provided to Ben for your reference.    Please contact me with any questions you may have regarding the visit.    Sincerely,         Jermaine Myers MD  [address] [facility] [address]  [phone]    CC: No Recipients

## 2021-09-13 NOTE — TELEPHONE ENCOUNTER
Laura called in as Jakub has two upcoming appts. One is 9/29 for presurgical and 9/30 for the actual surgery date.  She is wondering about the plan for the PICC line and meds as he is to end the antibiotics on 9/30 but they will be in [place].  Does the PICC and meds end on 9/28 or what should they plan for? They will have an antibiotic on 9/28  then leave for [place].

## 2021-09-13 NOTE — POST-PROCEDURE NOTE
Post Operative Note    Operation : Fluoroscopic abscess Drain / Tube  Check    Patient Name: Ben Lai Sr     : 1950    Radiologist: JIM TINEO MD    Pre-operative Diagnosis: gallbladder cancer, leak and infection    Post-operative Diagnosis: Same    Anesthesia Type: SQ Lidocaine     Estimated Blood Loss: 0 mL    Findings: small medial drain communicates with biliary system. Lateral drain not producing output and was removed    Specimens Removed:  None    Complications:  None; patient tolerated the procedure well.    Prosthetic devices, implanted devices: None

## 2021-09-13 NOTE — INTERDISCIPLINARY/THERAPY
[address] [facility] [id number] [phone]    Physical Therapy Progress Note     Patient Name: Ben Lai   Referring practitioner: Milka Reynoso MD  Date of Service: 9/13/2021     Re-certification Period: THRU 12/12/2021  HICN: [id number]    Visits from Start of Care: Visit Number: 5    Primary Medical Diagnosis: Hepatocellular carcinoma (CMS/HCC) (HCC) [C22.0]     Treatment Diagnosis.   Generalized weakness, balance impairments, difficulty with functional transfers, fall risk, poor tolerance to functional activities.     Visits from start of care:  Treatments were initiated on 7/19/2021 and 5 treatments have been provided. Treatments ranged from 30 to 45 minutes, 0 to 2 times per week, and included:  Therapeutic activity  Therapeutic exercise  Manual therapy  Neuromuscular reeducation  Physical agent modalities  Patient/family training    Patient's Compliance: good    Current status: Patient initiated physical therapy on July 19, 2021 and completed 4 therapy sessions before being hospitalized in early August. He returns to PT today with a PICC line on the left arm for 6 weeks worth of antibiotics. He also has 2 abdominal drains. Patient's wife reports that he is planning to have surgery in [place] at the end of September. He returns to physical therapy with improvements in functional sit to stand and LE strength. He reports trying to keep up on his exercises and walk around the house often. He would benefit from continued skilled PT to improve generalized strength, stamina, balance, gait training, and help reduce risk of falls at home and in the community.       Subjective:  Patient states getting in/out of bed has gotten better. Some SOB when getting dressed in the morning. Gets dizzy and light headed easily. Patient reports having a few falls since being home, he had one big one where he hit the back of his head. His wife helped him getting back up again. Does not use AD when inside  the house. Uses [employer] 4WW when outside of the house           Fall Risk Interventions: close SBA-CGA and gait belt will be utilized to reduce risk of falls during dynamic activities.       Pain Assessment Scale  Pain Scale: 0-10  0-10 Pain Score: 6  Pain Type: Chronic pain  Pain Location: Arm  Pain Orientation: Left      Objective:        Time Calculation  Start Time: 0945  Stop Time: 1025  Time Calculation (min): 40 min       Therapeutic Interventions (Time Spent in Minutes)  Therapeutic Exercise: 40                    Observation: PICC line on L bicep   2 drains from abdomen.    Quite jaundice in color.     Strength of bilat LE: 4+/5 throughout using MMT while sitting    Strength of bilat UE: 4+/5 throughout using MMT while sitting except:   Bilat elbow flexion: 4/5   Bilat elbow extension:4/5     30 second sit to stand: 9 repetitions   *completed 6 reps on 7/19/2021    Treatment  TherEx:  -ambulating 35m with 4WW to therapy gym, corner table.  -resisted movements in all direcitons  -sit<>stand x 9  -marching  -heel raises  -standing hip abduction  -standing butt kicks  -nustep level 3 x 6 minutes        Access Code: [email or web address]  URL: [email or web address]  Date: 09/13/2021  Prepared by: Geurline Garcia    Exercises  Sit to Stand - 2 x daily - 7 x weekly - 2 sets - 10 reps  Standing March with Counter Support - 2 x daily - 7 x weekly - 2 sets - 10 reps  Heel rises with counter support - 2 x daily - 7 x weekly - 2 sets - 10 reps  Standing Tandem Balance with Counter Support - 2 x daily - 7 x weekly - 2 sets - 30 seconds hold  Standing Hip Abduction with Counter Support - 1 x daily - 7 x weekly - 3 sets - 10 reps        Rehab Goals/Potential/Assessment/Plan:    Patient was fatigue with ambulation and sit to stand testing today. Required seated rest breaks throughout session. He moves slowly, and often forgot about PICC line attachment, needing reminders of lines. Patient would benefit from skilled  PT to improve functional strength, mobility, balance, and safety at home and in the community after recent hospitalization and debility.       Short-Term Goals:  Short Term PT Goal 1: Patient will be independent and compliant with HEP within 2-3 weeks.      Short Term PT Goal 2: Patient will improve 30 second sit<>stand > 7 reps within 4-6 weeks.  GOAL MET    Long-Term Goals:  Long Term PT Goal 1: Patient will improve cardiovascular endurance to tolerate completion of 6 minute walk test within 6-8 weeks.  GOAL NOT MET    Long Term PT Goal 2: patient will improve LE strength to 4+/5 throughout using MMT within 8-10 weeks.  GOAL NOT MET     Plan  Treatment Interventions: Functional transfer training, Gait training, Stair training, Balance training, Endurance training, Therapeutic activities, Therapeutic exercises, Manual therapy, Modalities, Neuromuscular re-education  PT Frequency: 1-2x/wk  Treatment Duration : 30-45 minutes, 1-2x/week for 10-12 weeks.  Plan Comment: RESUME PT 1-2x/week for LE and UE strengthening, balance , gait training; perform 6 min walk test as able.      Additional Comments:     Thank you for allowing us to share in the care of this patient.  If you have any questions, recommendations, or further concerns regarding this patient, please feel free to contact me at [phone].    Signed by: Guerline Garcia PT  9/13/2021  9:55 AM

## 2021-09-14 NOTE — TELEPHONE ENCOUNTER
Per Dr Myers, I visited with Laura and updated the plan moving forward.  Last dose of antibiotics is the morning of 9/28 and the PICC will stay in place incase they want to use it at the [facility] for his surgery. I informed the Infusion Pharmacist of the plan as well.

## 2021-09-14 NOTE — INTERDISCIPLINARY/THERAPY
Stacy Watson RN called for Dr. Myers.  Last day of Unasyn will be 9/28/21.  Unasyn will be stopped that morning.  Patient is leaving for [facility] for surgery.  PICC line will remain.  [facility]

## 2021-09-16 NOTE — INTERDISCIPLINARY/THERAPY
"    PT Daily Treatment Note      Patient Name: Ben Lai Sr  Date of Service: 9/16/2021  Certification period: THRU 12/12/2021    Visit Number: 6    Subjective:  Patient reports having a fall yesterday while going up the stairs. States he was able to \"wiggle myself up\". Reports fatigued today, but did have 1 drain removed yesterday.           Has patient fallen since last visit?  Yes  Fall Risk Interventions: close SBA-CGA and gait belt will be utilized to reduce risk of falls during dynamic activities.       Pain:  Pain Assessment Scale  Pain Scale: 0-10  0-10 Pain Score: 5 - Moderate pain  Pain Location: Abdomen    Have there been any changes in medications? No    Comments:  NA    Objective:                              Time Calculation  Start Time: 0935  Stop Time: 1015  Time Calculation (min): 40 min       Therapeutic Interventions (Time Spent in Minutes)  Therapeutic Exercise: 40                      Treatment:  TherEx:  -nustep level 3 x 7 minutes  -seated LAQ x 8 ; repeated with 2# ankle weights  -seated alt marching with 2# ankle weight  -seated hip adduction isometric squeeze  -ambulating 50m with 2# ankle weights, cues for picking up feet, marching.   -ambulating 35m with UDrive cues for increased stride length  -//bars: marching   >heel raises  *seated rest break then stopped to use the rest room. Was indep in toileting and dressing.              Access Code: [email or web address]  URL: [email or web address]  Date: 09/13/2021  Prepared by: Guerline Garcia     Exercises  Sit to Stand - 2 x daily - 7 x weekly - 2 sets - 10 reps  Standing March with Counter Support - 2 x daily - 7 x weekly - 2 sets - 10 reps  Heel rises with counter support - 2 x daily - 7 x weekly - 2 sets - 10 reps  Standing Tandem Balance with Counter Support - 2 x daily - 7 x weekly - 2 sets - 30 seconds hold  Standing Hip Abduction with Counter Support - 1 x daily - 7 x weekly - 3 sets - 10 reps    Rehab " Goals/Potential/Assessment/Plan:  Patient fatigues very quickly with standing exercises or walking. Responds well to cuing for increased stride length, this also improved toe clearance. However STM is affected as well, and requires cuing often. Patient would benefit from continued skilled PT for improved activity tolerance, strength, and safety at home and in the community.     Plan  Treatment Interventions: Functional transfer training, Gait training, Stair training, Balance training, Endurance training, Therapeutic activities, Therapeutic exercises, Manual therapy, Modalities, Neuromuscular re-education  PT Frequency: 1-2x/wk  Treatment Duration : 30-45 minutes, 1-2x/week for 10-12 weeks.  Plan Comment: RESUME PT 1-2x/week for LE and UE strengthening, balance , gait training; perform 6 min walk test as able.      Additional Comments:     Thank you for allowing us to share in the care of this patient.  If you have any questions, recommendations, or further concerns regarding this patient, please feel free to contact me at [phone].    Signed by: Guerline Garcia, PT  9/16/2021  10:11 AM

## 2021-09-20 NOTE — TELEPHONE ENCOUNTER
[facility] called to let me know about his lab values, I will send a message to Dr Myers to take a look at them.

## 2021-09-20 NOTE — INTERDISCIPLINARY/THERAPY
"    PT Daily Treatment Note      Patient Name: Ben Lai Sr  Date of Service: 9/20/2021  Certification period: THRU 12/12/2021    Visit Number: 7    Subjective:  Patient states he got a new stent today before coming to therapy, took one out and replaced it. States he is having some discomfort with this but \"its okay\". Patient denies SOB, lightheadedness, or dizziness.               Has patient fallen since last visit?  Yes  Fall Risk Interventions: close SBA-CGA and gait belt will be utilized to reduce risk of falls during dynamic activities.       Pain:  Pain Assessment Scale  Pain Scale: 0-10  0-10 Pain Score: 5 - Moderate pain  Pain Location: Chest  Pain Orientation: Left    Have there been any changes in medications? No    Comments:  NA    Objective:                              Time Calculation  Start Time: 1002  Stop Time: 1045  Time Calculation (min): 43 min       Therapeutic Interventions (Time Spent in Minutes)  Therapeutic Exercise: 43                    Labs this morning indicate Hgb: 10.7 and low potassium. Several rest breaks provided.     Treatment:  TherEx:  -nustep level 3 x 6 minutes; several cues to keep eyes open   -supine exs with wedge for head elevation   > trunk rotation   >marching, constant cuing  *then returned to a seated position as patient was having a difficult time keeping eyes open, complaints of back pain.    >ModAx1 for supine to sit EOB transfer  *with further questioning of patient status, pt does state he is cold and does not feel well. He began to shake while trying to drink water. Decision was made to end therapy session early. Ambulating 20m then needing to sit. Waved his wife over as he was resting. ambulating another 10m before needing to sit again. Assisted patient into passenger side of vehicle. Gave wife a gait belt and instructed in proper use to assist with balance support.     *pt's wife stated since Friday he has been more confused, lethargic, cold, and " jaundiced. Has a call placed to his PCP for further management.              Access Code: [email or web address]  URL: [email or web address]  Date: 09/13/2021  Prepared by: Guerline Garcia     Exercises  Sit to Stand - 2 x daily - 7 x weekly - 2 sets - 10 reps  Standing March with Counter Support - 2 x daily - 7 x weekly - 2 sets - 10 reps  Heel rises with counter support - 2 x daily - 7 x weekly - 2 sets - 10 reps  Standing Tandem Balance with Counter Support - 2 x daily - 7 x weekly - 2 sets - 30 seconds hold  Standing Hip Abduction with Counter Support - 1 x daily - 7 x weekly - 3 sets - 10 reps    Rehab Goals/Potential/Assessment/Plan:  Continue to defer UE exercises due to L arm PICC line. Patient was more lethargic today with activity, conversation, and recall.  Patient ambulated to therapy gym significantly better as compared to back to waiting room. Therapy session was abbreviated due to activity tolerance and appropriateness of activity today. Discussed condition with his wife who is waiting for a call back from PCP to assist in managing change in status. Will continue to follow and address strength and stamina as able.         Plan  Treatment Interventions: Functional transfer training, Gait training, Stair training, Balance training, Endurance training, Therapeutic activities, Therapeutic exercises, Manual therapy, Modalities, Neuromuscular re-education  PT Frequency: 1-2x/wk  Treatment Duration : 30-45 minutes, 1-2x/week for 10-12 weeks.  Plan Comment: RESUME PT 1-2x/week for LE and UE strengthening, balance , gait training; perform 6 min walk test as able.      Additional Comments:     Thank you for allowing us to share in the care of this patient.  If you have any questions, recommendations, or further concerns regarding this patient, please feel free to contact me at [phone].    Signed by: Guerline Garcia, PT  9/20/2021  10:08 AM

## 2021-09-20 NOTE — PROGRESS NOTES
[facility] called with a critical potassium level of 2.6. Discussed with Chrystal Goodman CNP and per her direction this needs to be managed by his PCP. I contacted Dr. Salcido office at [facility] and faxed them the lab results from today.

## 2021-09-20 NOTE — TELEPHONE ENCOUNTER
Dr Myers reviewed Jakub's labs and new orders have been placed.  Laura has been notified , she will  the [facility] and get started on that today. She reports that [facility] and [facility]  She reports that Jakub is jaundiced and disoriented today.

## 2021-09-20 NOTE — NURSING NOTE
Critical potassium result 2.6 called to [place]ell at [facility] oncology for Dr Jarquin.

## 2021-09-22 NOTE — TELEPHONE ENCOUNTER
Laura calls in, Jakub has been life flighted to [facility].  She will keep us posted.

## 2021-10-10 ENCOUNTER — HEALTH MAINTENANCE LETTER (OUTPATIENT)
Age: 71
End: 2021-10-10

## 2022-01-30 ENCOUNTER — HEALTH MAINTENANCE LETTER (OUTPATIENT)
Age: 72
End: 2022-01-30

## 2022-05-22 ENCOUNTER — HEALTH MAINTENANCE LETTER (OUTPATIENT)
Age: 72
End: 2022-05-22

## 2022-09-16 NOTE — PROGRESS NOTES
Medical Oncology Follow-Up    Subjective   HISTORY OF PRESENT ILLNESS:  Ben Lai Sr is a 70 y.o. male who  was diagnosed with hepatocellular carcinoma in November 2018.  He has an underlying hepatitis C which was supposedly cured.    Due to the extent of his HCC per GI at [facility] it was not amenable to local therapy, resection or radiation, referred to medical oncology   He was started on sorafenib early December 2018 but this appears to have been stopped in February due to significant side effects.  Patient states unequivocally that he started to feel better after a 3-week break but the sorafenib was recently restarted and he is having significant quality of life compromising side effects again.  This includes severe joint and muscle pain and fatigue.     7/29/19 restaging scans showed progressive disease in the liver, however no sings of metastatic disease     8/14/19 s/p   CT-guided  right lobe of the liver radiofrequency ablation.     9/2019 seen hepatobiliary team at [facility], and was discussed for tumor board, planned to have Y90  Treatment     10/8/19 MRI abdomen showed Nodular appearing liver with 2 areas of hyper/early arterial enhancement suggestive of hepatocellular carcinoma are both adjacent to the gallbladder which contains filling defects which are enhancing suggestive of neoplasm. This could be primary cholangiocarcinoma or hepatocellular carcinoma which is invading the gallbladder. The liver and gallbladder masses are very similar to the study from 7/29/2019.  Wedge-shaped signal abnormality in the anterior right lobe of the liver similar to the prior study could represent hepatic infarct.    11/7/19 s/p Y90 treatment at [facility]     12/9/19 MRI abdomen showed Cirrhosis and evidence of portal hypertension. Posttreatment changes of radioembolization throughout the right hepatic lobe, with increased diffuse heterogeneous arterial enhancement. This is the  Pt to CT via transport in stable condition    patient's first posttreatment MRI. LR-TR equivocal. Continued attention on follow-up.  Grossly stable size of the lobulated enhancing endoluminal gallbladder mass with decreased but persistent areas of enhancing viable tumor.  Stable segment 4 exophytic 2.6 cm OPTN-5b lesion.  Unchanged right hepatic vein and right portal vein branch thrombi, without convincing thrombus enhancement.  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 Attention on follow-up.    12/11/19 s/p chemo embolization for possible residual viable tumor in the liver.    1/16/20  restaging MR showed  The circumscribed mass is seen in the anterior aspect of the right lobe of the liver demonstrates positive response to treatment with significantly reduced enhancement, no change in overall size.2.  Post radiation ablation defect in the anterior inferior right lobe with surrounding heterogeneous nonmass-like enhancement. It would be difficult to exclude active tumor within this surrounding area of enhancement.  Significant change in appearance of the posterior segment of the right lobe of the liver. Previous infiltrative nodular enhancement, now geographic nonmass-like enhancement with parenchymal volume loss. This could represent posttreatment related change. Difficult to exclude residual/progressive tumor    4/21/20 MR abdomen showed Continued evolution of post embolic/radiotherapy changes to the right hepatic lobe with large region of volume loss of enhancement likely representing fibrosis.  Previous HCC anteriorly along this region continues to regress with no evidence of enhancement to suggest recurrent or residual tumor. Previous nodular lesion inferiorly along the treatment bed which also likely represented a site of HCC also continues to regress with no evidence of recurrence or residual disease. No new suspicious liver lesions are demonstrated. Cirrhosis. Splenomegaly. Portosystemic collaterals.    6/26/20 MR No significant  MRI change in the treatment-related appearance of the right hepatic lobe since 4/21/2020. No MR evidence of recurrent hepatoma. Treated anterior right hepatic lobe lesion is redemonstrated. Treated lesion at right more posterior hepatic lobe is also seen.   Hepatic fibrosis.Cirrhosis, splenomegaly and portosystemic collaterals.    10/5/20 MR abdomen showed 2 treated right hepatic lobe lesions and associated scarring and capsular retraction. Since the previous examination, there is a new 4 mm enhancing nodule at the posterior aspect of the right anterior hepatic lobe treated lesion.  The enhancement characteristics are progressive and indeterminate and there is the hint of small restricted diffusion. Overall, a change in the appearance of this treated lesion which has otherwise been stable since 1/16/2020 is suspicious for recurrent tumor. However, the imaging characteristics themselves are nonspecific of the 4 mm nodule (with differential diagnosis including rounded scar), and attention on short-term follow-up MRI of the abdomen with IV contrast in 3 months is recommended, along with correlation with AFP levels.     12/21/20 MR showed Amorphous enhancement has increased within the lower right hepatic region area of previous treated tumor. This enhancement pattern is worrisome for tumor progression the area. Correlation with recent AFP would be helpful. Considerable scar tissue and areas well.. In addition the lesion overlying the gallbladder fossa may involve the gallbladder which now appears to be markedly thickened and demonstrates considerable wall enhancement which appears to have changed since the previous study. Differential would include direct tumor invasion gallbladder and acute cholecystitis.     3/3/21 MR showed Very complex appearing situation in the region of the gallbladder fundus, subhepatic space extending to the hepatic flexure of the colon. There is concern for the possibility of extrahepatic  malignancy and/or an inflammatory process in this area which could be affecting the gallbladder. There is a 2.8 cm fluid collection adjacent to the gallbladder. Cholecystitis would be another consideration. Additional correlation with contrast-enhanced CT may be helpful to better define some of the anatomy in this areaCapsular retraction, scarring and mild enhancement redemonstrated in right hepatic lobe. Most of the intrahepatic signal abnormalities do not appear dramatically changed.  3. There is still some biliary dilatation the right hepatic lobe with adjacent enhancement. Question cholangitis.  4. In addition there are small filling defects within the common bile duct. Possible choledocholithiasis. Currently there is not seen to be high-grade obstruction however. Common bile duct diameter approximately millimeters    Today:   Pain  Is worsening in the right upper quadrent, unbearable   No fevers or chills   Has had symptoms of grade 1 Hepatic encephalopathy     Energy is  Stable ECOG 2   appetite ist stable, weight is stable   Takes care of his ADLS   Stable,back pain , chest pain and neuropathy         Encounter Diagnosis   Name Primary?   • Hepatocellular carcinoma (CMS/HCC) (HCC)    .     Treatment History:  Oncology History   Hepatocellular carcinoma (CMS/HCC) (HCC)   1/23/2019 Initial Diagnosis    Hepatocellular carcinoma (CMS/HCC) (HCC)     7/30/2019 - 12/9/2019 Adjuvant Therapy    Hepatocellular - Sorafenib (Nexavar)  Plan Provider: ANDERSON Bryan  Treatment goal: Palliative  Line of treatment: [No plan line of treatment]     6/12/2020 Cancer Staged    Staging form: Liver, AJCC 8th Edition  - Clinical: Stage IIIB (cT4, cN0, cM0) - Signed by Milka Reynoso MD on 6/12/2020 12/31/2020 -  Adjuvant Therapy    Hepatocellular - Bevacizumab / Atezolizumab  Plan Provider: Milka Reynoso MD  Treatment goal: Palliative  Line of treatment: [No plan line of treatment]           RECENT  EVENTS:    PAST MEDICAL HISTORY:   Past Medical History:   Diagnosis Date   • A-fib (CMS/HCC) (HCC)    • Allergy    • Anxiety    • Arthritis    • Asthma    • Blindness of right eye    • BPH (benign prostatic hyperplasia)    • Cardiovascular disease    • Cirrhosis (CMS/HCC) (HCC)     with possible liver mass by MRI 5/18, rec repeat in 8/2018   • Complication of anesthesia    • COPD (chronic obstructive pulmonary disease) (CMS/HCC) (HCC)    • Depression    • Endocrine disorder    • Gallstones    • Gastritis    • GERD (gastroesophageal reflux disease)    • Glaucoma    • Hearing loss     bilateral hearing aids   • Hemorrhoids    • Hepatitis C     Hep C, 2016 remission, complicated by cirrhosis.  MRI abd 5/30/18, rec 3 month follow up for focus of enhancement right and left hepatic lobe junction   • Hernia, abdominal    • High blood pressure    • IBS (irritable bowel syndrome)    • Infectious viral hepatitis    • Jaundice    • Kidney stones    • Obstructive sleep apnea syndrome    • Periodic limb movement disorder    • Persistent hypersomnia    • Persistent insomnia    • Pneumonia    • PONV (postoperative nausea and vomiting)    • Type 2 diabetes mellitus (CMS/HCC) (HCC)    • Urinary incontinence    • Wears partial dentures         GYNECOLOGICAL HISTORY (if applicable): NA     FAMILY HISTORY:   Family History   Problem Relation Age of Onset   • Arthritis Mother    • Rheum arthritis Mother    • Diabetes Mother    • Rectal cancer Mother    • Other Paternal Grandmother    • Other Paternal Grandfather    • Diabetes Other    • Rheum arthritis Other    • Osteoporosis Other         SOCIAL HISTORY:   Social History     Socioeconomic History   • Marital status:      Spouse name: Not on file   • Number of children: Not on file   • Years of education: Not on file   • Highest education level: Not on file   Occupational History   • Not on file   Tobacco Use   • Smoking status: Never Smoker   • Smokeless tobacco: Never Used    Substance and Sexual Activity   • Alcohol use: No   • Drug use: No   • Sexual activity: Defer   Other Topics Concern   • Not on file   Social History Narrative   • Not on file     Social Determinants of Health     Financial Resource Strain:    • Difficulty of Paying Living Expenses:    Food Insecurity:    • Worried About Running Out of Food in the Last Year:    • Ran Out of Food in the Last Year:    Transportation Needs:    • Lack of Transportation (Medical):    • Lack of Transportation (Non-Medical):    Physical Activity:    • Days of Exercise per Week:    • Minutes of Exercise per Session:    Stress:    • Feeling of Stress :    Social Connections:    • Frequency of Communication with Friends and Family:    • Frequency of Social Gatherings with Friends and Family:    • Attends Denominational Services:    • Active Member of Clubs or Organizations:    • Attends Club or Organization Meetings:    • Marital Status:    Intimate Partner Violence:    • Fear of Current or Ex-Partner:    • Emotionally Abused:    • Physically Abused:    • Sexually Abused:         ALLERGIES:   Allergies as of 03/04/2021 - Reviewed 02/11/2021   Allergen Reaction Noted   • Penicillins Anaphylaxis 10/19/2017   • Metformin Hives    • Adhesive tape-silicones  04/06/2018   • Interferon violetta-2a  02/05/2019   • Latex     • Milk  02/05/2019   • Mometasone     • Mometasone furoate     • Omeprazole     • Ondansetron hcl Nausea And Vomiting 05/01/2019   • Pepper (genus capsicum)  02/05/2019   • Primidone  05/01/2019   • Ribavirin Itching 10/19/2017   • Rosuvastatin  02/05/2019   • Sorafenib  04/04/2019   • Spironolactone     • Statins-hmg-coa reductase inhibitors Itching and GI intolerance 04/06/2018   • Interferon violetta (human leuk. derived) Palpitations and Rash 09/20/2019   • Pantoprazole Itching and Rash 02/28/2018   • Peginterferon violetta-2b Palpitations 10/19/2017        MEDICATIONS:   Current Outpatient Medications   Medication Sig Dispense Refill   •  morphine (MS CONTIN) 15 mg 12 hr tablet Take 1 tablet (15 mg total) by mouth 2 (two) times a day Max Daily Amount: 30 mg 60 tablet 0   • polyethylene glycol (Miralax) 17 gram/dose powder Take 17 g by mouth daily     • brimonidine (ALPHAGAN) 0.2 % ophthalmic solution 1 drop 2 (two) times a day     • escitalopram (LEXAPRO) 10 mg tablet Take 5 mg by mouth daily     • carboxymethylcellulose (REFRESH PLUS) 0.5 % dropperette INSTILL ONE DROP IN BOTH EYES EVERY 3 HOURS FOR DRY EYES     • prochlorperazine (COMPAZINE) 10 mg tablet Take 1 tablet (10 mg total) by mouth every 6 (six) hours as needed for nausea or vomiting 30 tablet 5   • artificial saliva (YERBA GLADYS AND LYTES) aerosol,spray spray See administration instructions Take 3 to 5 sprays up to 3 to 5 times daily     • lidocaine (Lidoderm) 5 % patch Apply 1 patch topically daily Remove & discard patch within 12 hours or as directed by MD.     • psyllium husk (METAMUCIL ORAL) Take 1 Scoop by mouth daily       • fluticasone propionate (FLONASE) 50 mcg/actuation nasal spray Administer 2 sprays into each nostril daily       • CLOTRIMAZOLE, BULK, MISC as needed To feet     • naloxone HCl (NARCAN NASL) Administer into affected nostril(s) as needed     • traZODone (DESYREL) 100 mg tablet Take 100 mg by mouth nightly       • SUMAtriptan (IMITREX) 100 mg tablet Take 100 mg by mouth once as needed for migraine     • insulin glargine (Lantus Solostar U-100 Insulin) 100 unit/mL (3 mL) insulin pen injection pen Inject 10 Units under the skin every morning     • carvediloL (COREG) 3.125 mg tablet Take 1 tablet (3.125 mg total) by mouth 2 (two) times a day with meals 180 tablet 0   • diclofenac sodium (VOLTAREN) 1 % gel Apply 4 g topically 4 (four) times a day       • cetirizine (ZyrTEC) 10 mg tablet Take 10 mg by mouth daily     • loperamide (Imodium A-D) 2 mg tablet Take 2 tablets with first loose stool of the day, then up to 8 tablets daily (Patient not taking: Reported on  1/21/2021 ) 80 tablet 2   • buPROPion SR (WELLBUTRIN SR) 100 mg 12 hr tablet Take 1 tablet (100 mg total) by mouth daily 90 tablet 1   • lactulose (GENERLAC) 10 gram/15 mL solution Take 15 mL (10 g total) by mouth daily 473 mL 2   • terazosin (HYTRIN) 5 mg capsule Take 1 capsule (5 mg total) by mouth 2 (two) times a day 180 capsule 2   • blood-glucose meter (ACCU-CHEK ADELE PLUS METER) [facility] Use to test blood sugars 3 times daily (E11.65, non insulin depedent) AccuChek Adele plus, meter is 8 years old (Patient taking differently: Use to test blood sugars 3 times daily (E11.65, non insulin dependent) ) 1 each 0   • lisinopril (PRINIVIL,ZESTRIL) 20 mg tablet Take 1 tablet (20 mg total) by mouth daily 90 tablet 2   • furosemide (LASIX) 20 mg tablet Take 1 tablet (20 mg total) by mouth 2 (two) times a day. (Patient taking differently: Take 20 mg by mouth 2 (two) times a day Taking 40 mg in am ) 180 tablet 2   • lansoprazole (PREVACID) 30 mg capsule Take 30 mg by mouth daily      • liraglutide (VICTOZA 2-JASE) 0.6 mg/0.1 mL (18 mg/3 mL) injection Inject 1.8 mg under the skin daily       • sodium chloride (SALINE NASAL) 0.65 % nasal spray Administer 1 spray into each nostril as needed for congestion or rhinitis      • pramoxine-menthol (GOLD BOND MEDICATED ANTI-ITCH) 1-1 % cream Apply topically as needed       • GLYCERIN/PROPYLENE GLYCOL (ARTIFICIAL TEARS,GLYCERIN-PEG, OPHT) Administer 1 drop into affected eye(s) as needed (Dry eyes)      • latanoprost (XALATAN) 0.005 % ophthalmic solution Administer 1 drop into both eyes nightly   10 0     No current facility-administered medications for this visit.       REVIEW OF SYSTEMS:   Review of Systems   Constitutional: Positive for fatigue.   HENT:   Positive for hearing loss.    Eyes: Positive for eye problems.   Gastrointestinal: Positive for abdominal distention and abdominal pain.   Endocrine: Negative.    Genitourinary: Positive for bladder incontinence.    Musculoskeletal:  Positive for arthralgias, gait problem and myalgias.   Skin: Negative.    Neurological: Positive for gait problem.   Hematological: Negative.    Psychiatric/Behavioral: The patient is nervous/anxious.    All other systems reviewed and are negative.      The ECOG performance status today is 2 - Ambulatory care of self; up and about >50% of waking hours.          Objective    PHYSICAL EXAM:   Pulse 85   Temp 36.9 °C (98.4 °F) (Temporal)   Resp 18   Wt 88.5 kg (195 lb 3.2 oz)   SpO2 92%   BMI 32.48 kg/m²    Body mass index is 32.48 kg/m².       Physical Exam  HENT:      Head: Normocephalic.      Nose: Nose normal.      Mouth/Throat:      Mouth: Mucous membranes are moist.   Eyes:      Pupils: Pupils are equal, round, and reactive to light.   Cardiovascular:      Rate and Rhythm: Normal rate.   Pulmonary:      Effort: Pulmonary effort is normal.   Abdominal:      General: Abdomen is flat. There is no distension.   Musculoskeletal:         General: Normal range of motion.      Cervical back: Normal range of motion.   Skin:     General: Skin is dry.   Neurological:      General: No focal deficit present.      Mental Status: He is alert.   Psychiatric:         Mood and Affect: Mood normal.         LABS:   Appointment on 03/03/2021   Component Date Value Ref Range Status   • WBC 03/03/2021 4.5  3.7 - 9.6 10*3/uL Final   • RBC 03/03/2021 4.40  4.10 - 5.80 10*6/µL Final   • Hemoglobin 03/03/2021 12.1* 13.2 - 17.2 g/dL Final   • Hematocrit 03/03/2021 37.3* 38.0 - 50.0 % Final   • MCV 03/03/2021 84.6  82.0 - 97.0 fL Final   • MCH 03/03/2021 27.5* 29.0 - 34.0 pg Final   • MCHC 03/03/2021 32.5  32.0 - 36.0 g/dL Final   • RDW 03/03/2021 15.5* 11.5 - 15.0 % Final   • Platelets 03/03/2021 113* 130 - 350 10*3/uL Final   • MPV 03/03/2021 7.8  6.9 - 10.8 fL Final   • Neutrophils% 03/03/2021 79* 46 - 70 % Final   • Lymphocytes% 03/03/2021 8* 15 - 47 % Final   • Monocytes% 03/03/2021 12  5 - 13 % Final   • Eosinophils% 03/03/2021 1   0 - 3 % Final   • Basophils% 03/03/2021 1  0 - 2 % Final   • Neutrophils Absolute 03/03/2021 3.50  10*3/uL Final   • Lymphocytes Absolute 03/03/2021 0.40  10*3/uL Final   • Monocytes Absolute 03/03/2021 0.50  10*3/uL Final   • Eosinophils Absolute 03/03/2021 0.00  10*3/uL Final   • Basophils Absolute 03/03/2021 0.00  10*3/uL Final   • Sodium 03/03/2021 138  135 - 145 mmol/L Final   • Potassium 03/03/2021 3.7  3.5 - 5.1 mmol/L Final   • Chloride 03/03/2021 101  98 - 107 mmol/L Final   • CO2 03/03/2021 29  21 - 32 mmol/L Final   • Anion Gap 03/03/2021 8  3 - 11 mmol/L Final   • BUN 03/03/2021 11  7 - 25 mg/dL Final   • Creatinine 03/03/2021 0.90  0.70 - 1.30 mg/dL Final   • Glucose 03/03/2021 128* 70 - 105 mg/dL Final   • Calcium 03/03/2021 8.3* 8.6 - 10.3 mg/dL Final   • AST 03/03/2021 29  <40 U/L Final   • ALT (SGPT) 03/03/2021 15  7 - 52 U/L Final   • Alkaline Phosphatase 03/03/2021 117* 45 - 115 U/L Final   • Total Protein 03/03/2021 6.4  6.0 - 8.3 g/dL Final   • Albumin 03/03/2021 3.6  3.5 - 5.3 g/dL Final   • Total Bilirubin 03/03/2021 0.95  0.20 - 1.40 mg/dL Final   • eGFR 03/03/2021 87  >60 mL/min/1.73m*2 Final   • Corrected Calcium 03/03/2021 8.6  8.6 - 10.3 mg/dL Final   • Protein, Urine 03/03/2021 Negative  Negative mg/dL Final   • TSH 03/03/2021 2.929  0.340 - 4.820 uIU/ml Final       DIAGNOSTIC REPORTS REVIEWED:   Imaging:  Reviewed     Assessment/Plan    IMPRESSION:   This is a pleasant 70 y.o. male with diagnosis of hepatocellular carcinoma involving the right lobe, locally advanced disease, per the chart not amenable to local therapy, started sorafenib December 2018 however stopped in February 2019 due to intolerance without any dose reductions.  Has been off of treatment since then, restaging CAT scan and MRI  7/2019 show progressive disease in the liver.  Started  sorafenib 8/2019 at a lower dose 200 mg twice daily, s/p RFA 8/2019, was referred to [facility], s/p Y90 treatment followed  by [facility]ACE, MR showed response to treatment without progression on 1/16/20 and 4/21/20  currently treatment on hold until evidence of disease progression       concerned about enhancement of the gallbladder discussed at tumor board at  and  There is no rule for surgery, on MR 12/2020 there was clear progression of HCC and discussed plan of either starting bevacizumab and atezolizomab vs lenvatinib   After discussion likely to start bevacizumab and atezolizomab.     Restaging MR 3/2021 showed Very complex appearing situation in the region of the gallbladder fundus,  possibility of extrahepatic malignancy and/or an inflammatory process  . There is a 2.8 cm fluid collection adjacent to the gallbladder. Cholecystitis would be another consideration.       Plan:   - referral to the ED for sever right upper quadrent pain, for pain control ( discussed the case with IR, and recommended a contrast CT scan of the abdomen and pelvis)    -  Hold atzolizumab 1200 mg and bevacizumab 15mg/kg  Until acute episode is resolved   - follow up after the patient is discharge to restart treatment    - will restage after another 3 cycles   -  Follow up with [facility] for GI follow up and repeat ERCP   - continue follow up with palliative medicine for pain control.   - follow up on referral to hepatology for liver cirrhosis             Physician: Milka Reynoso MD    On this date of service  I spent a total time of 40 minutes

## 2022-09-18 ENCOUNTER — HEALTH MAINTENANCE LETTER (OUTPATIENT)
Age: 72
End: 2022-09-18

## 2023-01-29 ENCOUNTER — HEALTH MAINTENANCE LETTER (OUTPATIENT)
Age: 73
End: 2023-01-29

## 2023-05-07 ENCOUNTER — HEALTH MAINTENANCE LETTER (OUTPATIENT)
Age: 73
End: 2023-05-07

## 2023-06-04 ENCOUNTER — HEALTH MAINTENANCE LETTER (OUTPATIENT)
Age: 73
End: 2023-06-04

## 2023-10-08 ENCOUNTER — HEALTH MAINTENANCE LETTER (OUTPATIENT)
Age: 73
End: 2023-10-08

## 2024-02-25 ENCOUNTER — HEALTH MAINTENANCE LETTER (OUTPATIENT)
Age: 74
End: 2024-02-25

## 2025-05-17 NOTE — NURSING NOTE
Pt taken to room 12 for port placement.  
Pt tolerated procedure well. States is comfortable.  
NEGATIVE

## (undated) DEVICE — SOL WATER IRRIG 1000ML BOTTLE 2F7114

## (undated) DEVICE — LABEL MEDICATION SYSTEM 3303-P

## (undated) DEVICE — TUBING SUCTION 3/16"X10'

## (undated) DEVICE — KIT CONNECTOR FOR OLYMPUS ENDOSCOPES DEFENDO 100310

## (undated) DEVICE — FORCEP BIOPSY RADIAL JAW 4

## (undated) DEVICE — SNARE SENSATION MED STIFF 13MM MED STIFF MICRO OVAL SNARE

## (undated) DEVICE — SUCTION MANIFOLD DORNOCH ULTRA CART UL-CL500

## (undated) DEVICE — SUCTION YANKAUER BULB TIP W 1 PIECE HANDLE

## (undated) DEVICE — ENDO BITE BLOCK ADULT OMNI-BLOC

## (undated) DEVICE — ENDO FUSION OMNI-TOME G31903

## (undated) DEVICE — PACK ENDOSCOPY GI CUSTOM UMMC

## (undated) DEVICE — BIOPSY VALVE BIOSHIELD 00711135

## (undated) DEVICE — ENDO SNARE POLYPECTOMY OVAL 15MM LOOP SD-240U-15

## (undated) DEVICE — BLOCK BITE 60F OMNIBLOC YLW YELLOW

## (undated) DEVICE — ENDO TUBING CO2 SMARTCAP STERILE DISP 100145CO2EXT

## (undated) DEVICE — SYSTEM INJECTION RAPID REFILL CONTINUOUS

## (undated) DEVICE — WIRE GUIDE 0.025"X270CM STR VISIGLIDE G-240-2527S

## (undated) DEVICE — MASK CURAPLEX POM OXYGEN PAMORAMIC ELITE

## (undated) DEVICE — TRAP POLYP E TRAP

## (undated) DEVICE — KIT ENDO PROCEDURE CARRY ON

## (undated) DEVICE — SPHINCTEROTOME RX AUTOTOME 44 CANNULATING

## (undated) DEVICE — ENDO DEVICE LOCKING AND BIOPSY CAP M00545261

## (undated) DEVICE — KIT ENDO FIRST STEP DISINFECTANT 200ML W/POUCH EP-4

## (undated) DEVICE — WIRE GUIDE JAGWIRE 35 X 260CM HYDRA

## (undated) DEVICE — CATH RETRIEVAL BALLOON EXTRACTOR PRO RX-S INJ ABOVE 9-12MM

## (undated) DEVICE — SNARE EXACTO COLD 9MM

## (undated) DEVICE — ESU GROUND PAD ADULT W/CORD E7507

## (undated) DEVICE — WIRE GUIDE 0.025"X270CM ANG VISIGLIDE G-240-2527A

## (undated) DEVICE — SYSTEM LOCK DEVICE&BIOPSY CAP OLYMPUS COMPATIBLE

## (undated) DEVICE — BAG URINE PED-U-BAG @

## (undated) RX ORDER — SIMETHICONE 40MG/0.6ML
SUSPENSION, DROPS(FINAL DOSAGE FORM)(ML) ORAL
Status: DISPENSED
Start: 2020-10-27

## (undated) RX ORDER — PROPOFOL 10 MG/ML
INJECTION, EMULSION INTRAVENOUS
Status: DISPENSED
Start: 2020-09-02

## (undated) RX ORDER — FENTANYL CITRATE 50 UG/ML
INJECTION, SOLUTION INTRAMUSCULAR; INTRAVENOUS
Status: DISPENSED
Start: 2019-12-11

## (undated) RX ORDER — HEPARIN SODIUM 1000 [USP'U]/ML
INJECTION, SOLUTION INTRAVENOUS; SUBCUTANEOUS
Status: DISPENSED
Start: 2019-12-11

## (undated) RX ORDER — LIDOCAINE HYDROCHLORIDE 20 MG/ML
INJECTION, SOLUTION EPIDURAL; INFILTRATION; INTRACAUDAL; PERINEURAL
Status: DISPENSED
Start: 2020-10-27

## (undated) RX ORDER — CLINDAMYCIN PHOSPHATE 900 MG/50ML
INJECTION, SOLUTION INTRAVENOUS
Status: DISPENSED
Start: 2019-11-07

## (undated) RX ORDER — LIDOCAINE HYDROCHLORIDE 10 MG/ML
INJECTION, SOLUTION EPIDURAL; INFILTRATION; INTRACAUDAL; PERINEURAL
Status: DISPENSED
Start: 2019-11-07

## (undated) RX ORDER — FENTANYL CITRATE-0.9 % NACL/PF 10 MCG/ML
PLASTIC BAG, INJECTION (ML) INTRAVENOUS
Status: DISPENSED
Start: 2020-09-02

## (undated) RX ORDER — FENTANYL CITRATE 50 UG/ML
INJECTION, SOLUTION INTRAMUSCULAR; INTRAVENOUS
Status: DISPENSED
Start: 2019-11-07

## (undated) RX ORDER — FENTANYL CITRATE 50 UG/ML
INJECTION, SOLUTION INTRAMUSCULAR; INTRAVENOUS
Status: DISPENSED
Start: 2020-09-02

## (undated) RX ORDER — SODIUM CHLORIDE 9 MG/ML
INJECTION, SOLUTION INTRAVENOUS
Status: DISPENSED
Start: 2019-11-07

## (undated) RX ORDER — SODIUM CHLORIDE, SODIUM LACTATE, POTASSIUM CHLORIDE, CALCIUM CHLORIDE 600; 310; 30; 20 MG/100ML; MG/100ML; MG/100ML; MG/100ML
INJECTION, SOLUTION INTRAVENOUS
Status: DISPENSED
Start: 2020-09-02

## (undated) RX ORDER — FENTANYL CITRATE 50 UG/ML
INJECTION, SOLUTION INTRAMUSCULAR; INTRAVENOUS
Status: DISPENSED
Start: 2019-11-06

## (undated) RX ORDER — LIDOCAINE HYDROCHLORIDE 20 MG/ML
INJECTION, SOLUTION EPIDURAL; INFILTRATION; INTRACAUDAL; PERINEURAL
Status: DISPENSED
Start: 2020-09-02

## (undated) RX ORDER — FENTANYL CITRATE-0.9 % NACL/PF 10 MCG/ML
PLASTIC BAG, INJECTION (ML) INTRAVENOUS
Status: DISPENSED
Start: 2020-10-27

## (undated) RX ORDER — DEXAMETHASONE SODIUM PHOSPHATE 4 MG/ML
INJECTION, SOLUTION INTRA-ARTICULAR; INTRALESIONAL; INTRAMUSCULAR; INTRAVENOUS; SOFT TISSUE
Status: DISPENSED
Start: 2020-09-02

## (undated) RX ORDER — LIDOCAINE HYDROCHLORIDE 10 MG/ML
INJECTION, SOLUTION EPIDURAL; INFILTRATION; INTRACAUDAL; PERINEURAL
Status: DISPENSED
Start: 2019-12-11

## (undated) RX ORDER — LIDOCAINE HYDROCHLORIDE 10 MG/ML
INJECTION, SOLUTION EPIDURAL; INFILTRATION; INTRACAUDAL; PERINEURAL
Status: DISPENSED
Start: 2019-11-06

## (undated) RX ORDER — ONDANSETRON 2 MG/ML
INJECTION INTRAMUSCULAR; INTRAVENOUS
Status: DISPENSED
Start: 2020-09-02

## (undated) RX ORDER — SODIUM CHLORIDE 9 MG/ML
INJECTION, SOLUTION INTRAVENOUS
Status: DISPENSED
Start: 2019-11-06

## (undated) RX ORDER — EPHEDRINE SULFATE 50 MG/ML
INJECTION, SOLUTION INTRAMUSCULAR; INTRAVENOUS; SUBCUTANEOUS
Status: DISPENSED
Start: 2020-09-02

## (undated) RX ORDER — ATROPINE SULFATE 0.1 MG/ML
INJECTION INTRAVENOUS
Status: DISPENSED
Start: 2019-12-11

## (undated) RX ORDER — SODIUM CHLORIDE 9 MG/ML
INJECTION, SOLUTION INTRAVENOUS
Status: DISPENSED
Start: 2019-12-11

## (undated) RX ORDER — EPHEDRINE SULFATE 50 MG/ML
INJECTION, SOLUTION INTRAMUSCULAR; INTRAVENOUS; SUBCUTANEOUS
Status: DISPENSED
Start: 2020-10-27

## (undated) RX ORDER — CLINDAMYCIN PHOSPHATE 900 MG/50ML
INJECTION, SOLUTION INTRAVENOUS
Status: DISPENSED
Start: 2019-12-11

## (undated) RX ORDER — SCOLOPAMINE TRANSDERMAL SYSTEM 1 MG/1
PATCH, EXTENDED RELEASE TRANSDERMAL
Status: DISPENSED
Start: 2019-12-11

## (undated) RX ORDER — CIPROFLOXACIN 2 MG/ML
INJECTION, SOLUTION INTRAVENOUS
Status: DISPENSED
Start: 2020-09-02

## (undated) RX ORDER — PROPOFOL 10 MG/ML
INJECTION, EMULSION INTRAVENOUS
Status: DISPENSED
Start: 2020-10-27

## (undated) RX ORDER — FENTANYL CITRATE 50 UG/ML
INJECTION, SOLUTION INTRAMUSCULAR; INTRAVENOUS
Status: DISPENSED
Start: 2020-10-27

## (undated) RX ORDER — GLYCOPYRROLATE 0.2 MG/ML
INJECTION, SOLUTION INTRAMUSCULAR; INTRAVENOUS
Status: DISPENSED
Start: 2020-10-27